# Patient Record
Sex: FEMALE | Race: WHITE | NOT HISPANIC OR LATINO | Employment: OTHER | URBAN - METROPOLITAN AREA
[De-identification: names, ages, dates, MRNs, and addresses within clinical notes are randomized per-mention and may not be internally consistent; named-entity substitution may affect disease eponyms.]

---

## 2017-01-03 ENCOUNTER — COMMUNICATION - HEALTHEAST (OUTPATIENT)
Dept: ONCOLOGY | Facility: HOSPITAL | Age: 52
End: 2017-01-03

## 2017-01-13 ENCOUNTER — MEDICAL CORRESPONDENCE (OUTPATIENT)
Dept: TRANSPLANT | Facility: CLINIC | Age: 52
End: 2017-01-13

## 2017-01-23 ENCOUNTER — RECORDS - HEALTHEAST (OUTPATIENT)
Dept: ADMINISTRATIVE | Facility: OTHER | Age: 52
End: 2017-01-23

## 2017-01-23 ENCOUNTER — HOSPITAL ENCOUNTER (OUTPATIENT)
Dept: CT IMAGING | Facility: CLINIC | Age: 52
Discharge: HOME | End: 2017-01-23
Payer: COMMERCIAL

## 2017-01-23 ENCOUNTER — OFFICE VISIT (OUTPATIENT)
Dept: TRANSPLANT | Facility: CLINIC | Age: 52
End: 2017-01-23
Attending: NURSE PRACTITIONER
Payer: COMMERCIAL

## 2017-01-23 VITALS
HEART RATE: 71 BPM | DIASTOLIC BLOOD PRESSURE: 75 MMHG | OXYGEN SATURATION: 100 % | WEIGHT: 226.7 LBS | SYSTOLIC BLOOD PRESSURE: 110 MMHG | BODY MASS INDEX: 35.5 KG/M2 | RESPIRATION RATE: 16 BRPM | TEMPERATURE: 97.7 F

## 2017-01-23 DIAGNOSIS — C83.18 LYMPHOMA, MANTLE CELL, MULTIPLE SITES (H): Primary | ICD-10-CM

## 2017-01-23 LAB
ALBUMIN SERPL-MCNC: 3.2 G/DL (ref 3.4–5)
ALP SERPL-CCNC: 96 U/L (ref 40–150)
ALT SERPL W P-5'-P-CCNC: 26 U/L (ref 0–50)
ANION GAP SERPL CALCULATED.3IONS-SCNC: 5 MMOL/L (ref 3–14)
AST SERPL W P-5'-P-CCNC: 17 U/L (ref 0–45)
BASOPHILS # BLD AUTO: 0 10E9/L (ref 0–0.2)
BASOPHILS NFR BLD AUTO: 0.4 %
BILIRUB SERPL-MCNC: 0.9 MG/DL (ref 0.2–1.3)
BUN SERPL-MCNC: 15 MG/DL (ref 7–30)
CALCIUM SERPL-MCNC: 8.3 MG/DL (ref 8.5–10.1)
CHLORIDE SERPL-SCNC: 111 MMOL/L (ref 94–109)
CO2 SERPL-SCNC: 26 MMOL/L (ref 20–32)
COPATH REPORT: NORMAL
CREAT SERPL-MCNC: 1.75 MG/DL (ref 0.52–1.04)
DIFFERENTIAL METHOD BLD: ABNORMAL
EOSINOPHIL # BLD AUTO: 0.1 10E9/L (ref 0–0.7)
EOSINOPHIL NFR BLD AUTO: 2.4 %
ERYTHROCYTE [DISTWIDTH] IN BLOOD BY AUTOMATED COUNT: 14.8 % (ref 10–15)
GFR SERPL CREATININE-BSD FRML MDRD: 31 ML/MIN/1.7M2
GLUCOSE SERPL-MCNC: 83 MG/DL (ref 70–99)
HCT VFR BLD AUTO: 40.3 % (ref 35–47)
HGB BLD-MCNC: 13.2 G/DL (ref 11.7–15.7)
IMM GRANULOCYTES # BLD: 0 10E9/L (ref 0–0.4)
IMM GRANULOCYTES NFR BLD: 0.2 %
LDH SERPL L TO P-CCNC: 202 U/L (ref 81–234)
LYMPHOCYTES # BLD AUTO: 1 10E9/L (ref 0.8–5.3)
LYMPHOCYTES NFR BLD AUTO: 19.6 %
MCH RBC QN AUTO: 30 PG (ref 26.5–33)
MCHC RBC AUTO-ENTMCNC: 32.8 G/DL (ref 31.5–36.5)
MCV RBC AUTO: 92 FL (ref 78–100)
MONOCYTES # BLD AUTO: 0.4 10E9/L (ref 0–1.3)
MONOCYTES NFR BLD AUTO: 7.5 %
NEUTROPHILS # BLD AUTO: 3.5 10E9/L (ref 1.6–8.3)
NEUTROPHILS NFR BLD AUTO: 69.9 %
NRBC # BLD AUTO: 0 10*3/UL
NRBC BLD AUTO-RTO: 0 /100
PLATELET # BLD AUTO: 148 10E9/L (ref 150–450)
POTASSIUM SERPL-SCNC: 3.6 MMOL/L (ref 3.4–5.3)
PROT SERPL-MCNC: 5.4 G/DL (ref 6.8–8.8)
RBC # BLD AUTO: 4.4 10E12/L (ref 3.8–5.2)
SODIUM SERPL-SCNC: 142 MMOL/L (ref 133–144)
WBC # BLD AUTO: 5 10E9/L (ref 4–11)

## 2017-01-23 PROCEDURE — 88264 CHROMOSOME ANALYSIS 20-25: CPT | Performed by: NURSE PRACTITIONER

## 2017-01-23 PROCEDURE — 88185 FLOWCYTOMETRY/TC ADD-ON: CPT

## 2017-01-23 PROCEDURE — 74176 CT ABD & PELVIS W/O CONTRAST: CPT

## 2017-01-23 PROCEDURE — 38221 DX BONE MARROW BIOPSIES: CPT | Mod: ZF

## 2017-01-23 PROCEDURE — 88237 TISSUE CULTURE BONE MARROW: CPT | Performed by: NURSE PRACTITIONER

## 2017-01-23 PROCEDURE — 25000125 ZZHC RX 250: Mod: ZF | Performed by: NURSE PRACTITIONER

## 2017-01-23 PROCEDURE — 88280 CHROMOSOME KARYOTYPE STUDY: CPT | Performed by: NURSE PRACTITIONER

## 2017-01-23 PROCEDURE — G0364 BONE MARROW ASPIRATE &BIOPSY: HCPCS | Mod: ZF

## 2017-01-23 PROCEDURE — 00000058 ZZHCL STATISTIC BONE MARROW ASP PERF TC 38220: Performed by: NURSE PRACTITIONER

## 2017-01-23 PROCEDURE — 81261 IGH GENE REARRANGE AMP METH: CPT | Performed by: NURSE PRACTITIONER

## 2017-01-23 PROCEDURE — 85025 COMPLETE CBC W/AUTO DIFF WBC: CPT | Performed by: NURSE PRACTITIONER

## 2017-01-23 PROCEDURE — 40000611 ZZHCL STATISTIC MORPHOLOGY W/INTERP HEMEPATH TC 85060: Performed by: NURSE PRACTITIONER

## 2017-01-23 PROCEDURE — 40000803 ZZHCL STATISTIC DNA ISOL HIGH PURITY: Performed by: NURSE PRACTITIONER

## 2017-01-23 PROCEDURE — 88275 CYTOGENETICS 100-300: CPT | Performed by: NURSE PRACTITIONER

## 2017-01-23 PROCEDURE — 80053 COMPREHEN METABOLIC PANEL: CPT | Performed by: NURSE PRACTITIONER

## 2017-01-23 PROCEDURE — 40000424 ZZHCL STATISTIC BONE MARROW CORE PERF TC 38221: Performed by: NURSE PRACTITIONER

## 2017-01-23 PROCEDURE — 88311 DECALCIFY TISSUE: CPT | Performed by: NURSE PRACTITIONER

## 2017-01-23 PROCEDURE — 88271 CYTOGENETICS DNA PROBE: CPT | Performed by: NURSE PRACTITIONER

## 2017-01-23 PROCEDURE — 00000161 ZZHCL STATISTIC H-SPHEME PROCESS B/S: Performed by: NURSE PRACTITIONER

## 2017-01-23 PROCEDURE — 83615 LACTATE (LD) (LDH) ENZYME: CPT | Performed by: NURSE PRACTITIONER

## 2017-01-23 PROCEDURE — 40000951 ZZHCL STATISTIC BONE MARROW INTERP TC 85097: Performed by: NURSE PRACTITIONER

## 2017-01-23 PROCEDURE — 88305 TISSUE EXAM BY PATHOLOGIST: CPT | Performed by: NURSE PRACTITIONER

## 2017-01-23 PROCEDURE — 88184 FLOWCYTOMETRY/ TC 1 MARKER: CPT

## 2017-01-23 PROCEDURE — 88161 CYTOPATH SMEAR OTHER SOURCE: CPT | Performed by: NURSE PRACTITIONER

## 2017-01-23 RX ORDER — HEPARIN SODIUM (PORCINE) LOCK FLUSH IV SOLN 100 UNIT/ML 100 UNIT/ML
5 SOLUTION INTRAVENOUS ONCE
Status: COMPLETED | OUTPATIENT
Start: 2017-01-23 | End: 2017-01-23

## 2017-01-23 RX ADMIN — SODIUM CHLORIDE, PRESERVATIVE FREE 5 ML: 5 INJECTION INTRAVENOUS at 08:55

## 2017-01-23 NOTE — PROGRESS NOTES
Patient supine for 30 minutes following biopsy. After 30 minutes, dressing clean, dry and intact. Vital signs stable. See DOC flow sheets for details. Left ambulatory with family member.

## 2017-01-23 NOTE — NURSING NOTE
BMT Heme Malignancy Rooming Note    Avis Paul - 1/23/2017 8:06 AM     Chief Complaint   Patient presents with     RECHECK     BMBX-NHL        /70 mmHg  Pulse 64  Temp(Src) 97.7  F (36.5  C) (Oral)  Resp 16  Wt 102.83 kg (226 lb 11.2 oz)  SpO2 100%     Medications reviewed: Yes    Labs drawn: No    Dressing changed: Not applicable     Medications given: No    Staff time:0    Additional information if applicable: n/a    BRITTNEY GARRIDO CMA

## 2017-01-23 NOTE — PROGRESS NOTES
2BMT ONC Adult Bone Marrow Biopsy Procedure Note  January 23, 2017  /70 mmHg  Pulse 64  Temp(Src) 97.7  F (36.5  C) (Oral)  Resp 16  Wt 102.83 kg (226 lb 11.2 oz)  SpO2 100%     Learning needs assessment complete within 12 months? YES    DIAGNOSIS: MCL     PROCEDURE: Unilateral Bone Marrow Biopsy and Unilateral Aspirate    LOCATION: [facility] 2nd Floor    Patient s identification was positively verified by verbal identification and invasive procedure safety checklist was completed. Informed consent was obtained. The patient was placed in the prone position and prepped and draped in a sterile manner. Approximately 20 cc of 1% Lidocaine was used over the right posterior iliac spine. Following this a 3 mm incision was made. Trephine bone marrow core(s) was (were) obtained from the [facility]. Bone marrow aspirates were obtained from the [facility]. Aspirates were sent for morphology, immunophenotyping, cytogenetics and molecular diagnostics . A total of approximately 20 ml of marrow was aspirated. Following this procedure a sterile dressing was applied to the bone marrow biopsy site(s). The patient was placed in the supine position to maintain pressure on the biopsy site. Post-procedure wound care instructions were given.     Complications: NO    Pre-procedural pain: 0 out of 10 on the numeric pain rating scale.     Procedural pain: 2 out of 10 on the numeric pain rating scale.     Post-procedural pain assessment: 0 out of 10 on the numeric pain rating scale.     Interventions: NO    Length of procedure:20 minutes or less      Procedure performed by: layne Franks

## 2017-01-24 ENCOUNTER — RECORDS - HEALTHEAST (OUTPATIENT)
Dept: ADMINISTRATIVE | Facility: OTHER | Age: 52
End: 2017-01-24

## 2017-01-24 ENCOUNTER — OFFICE VISIT (OUTPATIENT)
Dept: TRANSPLANT | Facility: CLINIC | Age: 52
End: 2017-01-24
Payer: COMMERCIAL

## 2017-01-24 VITALS
BODY MASS INDEX: 35.59 KG/M2 | OXYGEN SATURATION: 99 % | DIASTOLIC BLOOD PRESSURE: 72 MMHG | WEIGHT: 227.3 LBS | TEMPERATURE: 98.4 F | HEART RATE: 81 BPM | SYSTOLIC BLOOD PRESSURE: 116 MMHG | RESPIRATION RATE: 16 BRPM

## 2017-01-24 DIAGNOSIS — K21.9 GASTROESOPHAGEAL REFLUX DISEASE WITHOUT ESOPHAGITIS: Primary | ICD-10-CM

## 2017-01-24 DIAGNOSIS — C83.18 MANTLE CELL LYMPHOMA OF LYMPH NODES OF MULTIPLE SITES (H): ICD-10-CM

## 2017-01-24 DIAGNOSIS — K29.90 GASTRITIS AND DUODENITIS: ICD-10-CM

## 2017-01-24 LAB — COPATH REPORT: NORMAL

## 2017-01-24 PROCEDURE — 99212 OFFICE O/P EST SF 10 MIN: CPT | Mod: ZF

## 2017-01-24 RX ORDER — PROCHLORPERAZINE MALEATE 10 MG
5 TABLET ORAL EVERY 6 HOURS PRN
Qty: 90 TABLET | Refills: 3 | Status: SHIPPED | OUTPATIENT
Start: 2017-01-24 | End: 2017-03-21

## 2017-01-24 RX ORDER — PANTOPRAZOLE SODIUM 40 MG/1
40 TABLET, DELAYED RELEASE ORAL DAILY
Qty: 30 TABLET | Refills: 11 | Status: SHIPPED | OUTPATIENT
Start: 2017-01-24 | End: 2017-05-22

## 2017-01-24 NOTE — NURSING NOTE
BMT Heme Malignancy Rooming Note    Avis Paul - 1/24/2017 3:48 PM     Chief Complaint   Patient presents with     RECHECK     Return: LYMPHOMA- 1.5 year review        /72 mmHg  Pulse 81  Temp(Src) 98.4  F (36.9  C) (Oral)  Resp 16  Wt 103.103 kg (227 lb 4.8 oz)  SpO2 99%     Medications reviewed: Yes    Labs drawn: No    Dressing changed: No     Medications given: No    Staff time:6    Additional information if applicable: n/a    BRITTNEY GARRIDO CMA

## 2017-01-24 NOTE — MR AVS SNAPSHOT
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  After hours call the   hospital at [phone] or   [phone]. Ask for the BMT   fellow for pediatric or adult patients           Care Instructions    GI refereal for EGD 1/31 10am arrival, 1st floor Hospital Unit [facility], No food or drink 8hrs, need          Follow-ups after your visit        Additional Services     GASTROENTEROLOGY ADULT REF PROCEDURE ONLY       Last Lab Result: CREATININE (mg/dL)       Date                     Value                 01/23/2017               1.75*                 08/05/2015               1.98*                ----------  Body mass index is 35.59 kg/(m^2).      Patient will be contacted to schedule procedure.     Please be aware that coverage of these services is subject to the terms and limitations of your health insurance plan.  Call member services at your health plan with any benefit or coverage questions.  Any procedures must be performed at a [place] facility OR coordinated by your clinic's referral office.    Please bring the following with you to your appointment:    (1) Any X-Rays, CTs or MRIs which have been performed.  Contact the facility where they were done to arrange for  prior to your scheduled appointment.    (2) List of  current medications   (3) This referral request   (4) Any documents/labs given to you for this referral                  Your next 10 appointments already scheduled     Jan 31, 2017   Procedure with Leisa Macdonald MD   [facility] [place], Endoscopy ([facility])    [address]  [place] [address]   [phone]           The [facility] is located on the corner of [address] and [address] on the [facility]. It is easily accessible from virtually any point in the [facility] area, via [employer] and [employer].              Future tests that were ordered for you today     Open Future Orders        Priority Expected Expires Ordered    GASTROENTEROLOGY ADULT REF PROCEDURE ONLY Routine 1/31/2017 2/3/2017 1/24/2017            Who to contact     If you have questions or need follow up information about today's clinic visit or your schedule please contact [facility] BLOOD AND MARROW TRANSPLANT directly at [phone].  Normal or non-critical lab and imaging results will be communicated to you by [email or web address], letter or phone within 4 business days after the clinic has received the results. If you do not hear from us within 7 days, please contact the clinic through [employer] or phone. If you have a critical or abnormal lab result, we will notify you by phone as soon as possible.  Submit refill requests through [employer] or call your pharmacy and they will forward the refill request to us. Please allow 3 business days for your refill to be completed.          Additional Information About Your Visit        [email or web address] Information     [employer] gives you secure access to your electronic health record. If you see a primary care provider, you can also send messages to your care team and make appointments. If you have questions, please call your primary care clinic.  If you do not have a primary care provider, please call [phone] and they will  assist you.        Care EveryWhere ID     This is your Care EveryWhere ID. This could be used by other organizations to access your [place] medical records  [id number]        Your Vitals Were     Pulse Temperature Respirations Pulse Oximetry          81 98.4  F (36.9  C) (Oral) 16 99%         Blood Pressure from Last 3 Encounters:   01/24/17 116/72   01/23/17 110/75   11/07/16 123/70    Weight from Last 3 Encounters:   01/24/17 103.103 kg (227 lb 4.8 oz)   01/23/17 102.83 kg (226 lb 11.2 oz)   11/07/16 109.4 kg (241 lb 2.9 oz)                 Today's Medication Changes          These changes are accurate as of: 1/24/17  4:14 PM.  If you have any questions, ask your nurse or doctor.               Start taking these medicines.        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), Gastritis and duodenitis   Started by:  Carol Rose MD        Dose:  5 mg   Take 0.5 tablets (5 mg) by mouth every 6 hours as needed for nausea or vomiting   Quantity:  90 tablet   Refills:  3            Where to get your medicines      These medications were sent to [facility] - [address]  [address] [place] [address]     Phone:  [phone]    - pantoprazole 40 MG EC tablet  - prochlorperazine 10 MG tablet             Recent Review Flowsheet Data     BMT Recent Results Latest Ref Rng 10/8/2015 10/22/2015 11/23/2015 11/23/2015 2/17/2016 8/17/2016 1/23/2017    WBC 4.0 - 11.0 10e9/L 4.5 4.7 - 5.1 6.7 6.8 5.0    Hemoglobin 11.7 - 15.7 g/dL 9.7(L) 10.3(L) - 10.9(L) 13.0  12.8 13.2    Platelet Count 150 - 450 10e9/L 146(L) 137(L) - 146(L) 153 128(L) 148(L)    Neutrophils (Absolute) 1.6 - 8.3 10e9/L 3.0 2.9 - 3.4 5.1 4.8 3.5    Sodium 133 - 144 mmol/L 141 140 - 142 142 141 142    Potassium 3.4 - 5.3 mmol/L 3.7 3.6 - 3.1(L) 3.7 3.6 3.6    Chloride 94 - 109 mmol/L 108 108 - 109 109 110(H) 111(H)    Glucose 70 - 99 mg/dL 91 89 87 86 86 96 83    Urea Nitrogen 7 - 30 mg/dL 19 16 - 12 19 24 15    Creatinine 0.52 - 1.04 mg/dL 1.97(H) 1.88(H) - 1.74(H) 1.87(H) 1.78(H) 1.75(H)    Calcium (Total) 8.5 - 10.1 mg/dL 8.7 8.7 - 8.7 8.8 8.6 8.3(L)    Protein (Total) 6.8 - 8.8 g/dL 6.0(L) 6.2(L) - 6.0(L) 6.2(L) 5.5(L) 5.4(L)    Albumin 3.4 - 5.0 g/dL 3.4 3.6 - 3.6 4.0 3.4 3.2(L)    Alkaline Phosphatase 40 - 150 U/L 86 92 - 95 113 104 96    AST 0 - 45 U/L 14 13 - 13 14 18 17    ALT 0 - 50 U/L 17 18 - 25 22 29 26    MCV 78 - 100 fl 92 92 - 93 92 93 92               Primary Care Provider    None       No address on file        Thank you!     Thank you for choosing [facility] BLOOD AND MARROW TRANSPLANT  for your care. Our goal is always to provide you with excellent care. Hearing back from our patients is one way we can continue to improve our services. Please take a few minutes to complete the written survey that you may receive in the mail after your visit with us. Thank you!             Your Updated Medication List - Protect others around you: Learn how to safely use, store and throw away your medicines at www.disposemymeds.org.          This list is accurate as of: 1/24/17  4:14 PM.  Always use your most recent med list.                   Brand Name Dispense Instructions for use    ACYCLOVIR PO      Take 800 mg by mouth daily       LEVOTHYROXINE SODIUM PO      Take 75 mcg by mouth daily       pantoprazole 40 MG EC tablet    PROTONIX    30 tablet    Take 1 tablet (40 mg) by mouth daily       prochlorperazine 10 MG tablet    COMPAZINE    90 tablet    Take 0.5 tablets (5 mg) by mouth every 6 hours as  needed for nausea or vomiting

## 2017-01-27 LAB — COPATH REPORT: NORMAL

## 2017-01-28 ENCOUNTER — COMMUNICATION - HEALTHEAST (OUTPATIENT)
Dept: ONCOLOGY | Facility: HOSPITAL | Age: 52
End: 2017-01-28

## 2017-01-31 ENCOUNTER — HOSPITAL ENCOUNTER (OUTPATIENT)
Facility: CLINIC | Age: 52
Discharge: HOME | End: 2017-01-31
Attending: INTERNAL MEDICINE | Admitting: INTERNAL MEDICINE
Payer: COMMERCIAL

## 2017-01-31 ENCOUNTER — RECORDS - HEALTHEAST (OUTPATIENT)
Dept: ADMINISTRATIVE | Facility: OTHER | Age: 52
End: 2017-01-31

## 2017-01-31 ENCOUNTER — SURGERY (OUTPATIENT)
Age: 52
End: 2017-01-31

## 2017-01-31 VITALS
OXYGEN SATURATION: 100 % | HEART RATE: 71 BPM | RESPIRATION RATE: 18 BRPM | SYSTOLIC BLOOD PRESSURE: 92 MMHG | BODY MASS INDEX: 35.63 KG/M2 | DIASTOLIC BLOOD PRESSURE: 67 MMHG | WEIGHT: 227 LBS | HEIGHT: 67 IN

## 2017-01-31 LAB — SMALL BOWEL ENTEROSCOPY: NORMAL

## 2017-01-31 PROCEDURE — 25000132 ZZH RX MED GY IP 250 OP 250 PS 637: Performed by: INTERNAL MEDICINE

## 2017-01-31 PROCEDURE — 88184 FLOWCYTOMETRY/ TC 1 MARKER: CPT | Performed by: INTERNAL MEDICINE

## 2017-01-31 PROCEDURE — 88305 TISSUE EXAM BY PATHOLOGIST: CPT | Performed by: INTERNAL MEDICINE

## 2017-01-31 PROCEDURE — G0500 MOD SEDAT ENDO SERVICE >5YRS: HCPCS | Performed by: INTERNAL MEDICINE

## 2017-01-31 PROCEDURE — 88185 FLOWCYTOMETRY/TC ADD-ON: CPT | Performed by: INTERNAL MEDICINE

## 2017-01-31 PROCEDURE — 99153 MOD SED SAME PHYS/QHP EA: CPT | Performed by: INTERNAL MEDICINE

## 2017-01-31 PROCEDURE — 25000125 ZZHC RX 250: Performed by: INTERNAL MEDICINE

## 2017-01-31 PROCEDURE — 44361 SMALL BOWEL ENDOSCOPY/BIOPSY: CPT | Performed by: INTERNAL MEDICINE

## 2017-01-31 PROCEDURE — 25000128 H RX IP 250 OP 636: Performed by: INTERNAL MEDICINE

## 2017-01-31 PROCEDURE — 00000160 ZZHCL STATISTIC H-SPECIAL HANDLING: Performed by: INTERNAL MEDICINE

## 2017-01-31 PROCEDURE — 00000159 ZZHCL STATISTIC H-SEND OUTS PREP: Performed by: INTERNAL MEDICINE

## 2017-01-31 RX ORDER — LIDOCAINE 40 MG/G
CREAM TOPICAL
Status: DISCONTINUED | OUTPATIENT
Start: 2017-01-31 | End: 2017-01-31 | Stop reason: HOSPADM

## 2017-01-31 RX ORDER — FENTANYL CITRATE 50 UG/ML
INJECTION, SOLUTION INTRAMUSCULAR; INTRAVENOUS PRN
Status: DISCONTINUED | OUTPATIENT
Start: 2017-01-31 | End: 2017-01-31 | Stop reason: HOSPADM

## 2017-01-31 RX ORDER — SIMETHICONE
LIQUID (ML) MISCELLANEOUS PRN
Status: DISCONTINUED | OUTPATIENT
Start: 2017-01-31 | End: 2017-01-31 | Stop reason: HOSPADM

## 2017-01-31 RX ORDER — DIPHENHYDRAMINE HYDROCHLORIDE 50 MG/ML
INJECTION INTRAMUSCULAR; INTRAVENOUS PRN
Status: DISCONTINUED | OUTPATIENT
Start: 2017-01-31 | End: 2017-01-31 | Stop reason: HOSPADM

## 2017-01-31 RX ADMIN — MIDAZOLAM HYDROCHLORIDE 1 MG: 1 INJECTION, SOLUTION INTRAMUSCULAR; INTRAVENOUS at 11:12

## 2017-01-31 RX ADMIN — FENTANYL CITRATE 50 MCG: 50 INJECTION, SOLUTION INTRAMUSCULAR; INTRAVENOUS at 11:22

## 2017-01-31 RX ADMIN — FENTANYL CITRATE 50 MCG: 50 INJECTION, SOLUTION INTRAMUSCULAR; INTRAVENOUS at 11:30

## 2017-01-31 RX ADMIN — BENZOCAINE 1 APPLICATOR: 220 SPRAY, METERED PERIODONTAL at 11:03

## 2017-01-31 RX ADMIN — FENTANYL CITRATE 50 MCG: 50 INJECTION, SOLUTION INTRAMUSCULAR; INTRAVENOUS at 11:10

## 2017-01-31 RX ADMIN — FENTANYL CITRATE 50 MCG: 50 INJECTION, SOLUTION INTRAMUSCULAR; INTRAVENOUS at 11:15

## 2017-01-31 RX ADMIN — FENTANYL CITRATE 50 MCG: 50 INJECTION, SOLUTION INTRAMUSCULAR; INTRAVENOUS at 11:03

## 2017-01-31 RX ADMIN — MIDAZOLAM HYDROCHLORIDE 1 MG: 1 INJECTION, SOLUTION INTRAMUSCULAR; INTRAVENOUS at 11:15

## 2017-01-31 RX ADMIN — Medication 2 ML: at 10:45

## 2017-01-31 RX ADMIN — FENTANYL CITRATE 50 MCG: 50 INJECTION, SOLUTION INTRAMUSCULAR; INTRAVENOUS at 11:12

## 2017-01-31 RX ADMIN — DIPHENHYDRAMINE HYDROCHLORIDE 25 MG: 50 INJECTION, SOLUTION INTRAMUSCULAR; INTRAVENOUS at 11:05

## 2017-01-31 RX ADMIN — MIDAZOLAM HYDROCHLORIDE 1 MG: 1 INJECTION, SOLUTION INTRAMUSCULAR; INTRAVENOUS at 11:03

## 2017-01-31 RX ADMIN — MIDAZOLAM HYDROCHLORIDE 1 MG: 1 INJECTION, SOLUTION INTRAMUSCULAR; INTRAVENOUS at 11:20

## 2017-01-31 NOTE — OR NURSING
Saline flushed port with 10ml saline lock flush and then heparinized with 5ml 100units/ml heparin lock flush and then deaccessed port and applied dressing.

## 2017-01-31 NOTE — DISCHARGE INSTRUCTIONS
dcDischarge Instructions after  Enteroscopy   Activity and Diet  You were given medicine for pain. You may be dizzy or sleepy.  For 24 hours:    Do not drive or use heavy equipment.    Do not make important decisions.    Do not drink any alcohol.  ___ You may return to your regular diet.    Discomfort  You may have a sore throat for 2 to 3 days. It may help to:    Avoid hot liquids for 24 hours.    Use sore throat lozenges.    Gargle as needed with salt water up to 4 times a day. Mix 1 cup of warm water  with 1 teaspoon of salt. Do not swallow.  ___ Your esophagus was dilated (opened) or banded during the exam:    Drink only cool liquids for the rest of the day. Eat a soft diet for the next few days.    You may have a sore chest for 2 to 3 days.    You may take Tylenol (acetaminophen) for pain unless your doctor has told you not to.    Do not take aspirin or ibuprofen (Advil, Motrin) or other NSAIDS  (anti-inflammatory drugs) for ___ days.    Follow-up  ___ We took small tissue samples for study. If you do not have a follow-up visit scheduled,  call your provider s office in 2 weeks for the results.    Other instructions________________________________________________________    When to call us:  Problems are rare. Call right away if you have:    Unusual throat pain or trouble swallowing    Unusual pain in belly or chest that is not relieved by belching or passing air    Black stools (tar-like looking bowel movement)    Temperature above 100.6  F. (37.5  C).    If you vomit blood or have severe pain, go to an emergency room.    If you have questions, call:  Monday to Friday, 7 a.m. to 4:30 p.m.: Endoscopy: [phone] (We may have to call you back)    After hours: Hospital: [phone] (Ask for the GI fellow on call)

## 2017-01-31 NOTE — OR NURSING
Small bowel enteroscopy with biopsies completed.  Pt did a lot of reaching and attempting to take out bite block during first half of procedure.  Pt tolerated second half of procedure better.  Sedation was given per MD instruction.  VSs on 2L NC.  Pt taken to recovery by RN.

## 2017-01-31 NOTE — IP AVS SNAPSHOT
MRN:[id number]                      After Visit Summary   1/31/2017    Avis Paul    MRN: [id number]           Thank you!     Thank you for choosing [facility] for your care. Our goal is always to provide you with excellent care. Hearing back from our patients is one way we can continue to improve our services. Please take a few minutes to complete the written survey that you may receive in the mail after you visit with us. Thank you!        Patient Information     Date Of Birth          1965        About your hospital stay     You were admitted on:  January 31, 2017 You last received care in the:  [facility], Endoscopy    You were discharged on:  January 31, 2017       Who to Call     For medical emergencies, please call 911.  For non-urgent questions about your medical care, please call your primary care provider or clinic, None  For questions related to your surgery, please call your surgery clinic        Attending Provider     Provider    Leisa Macdonald MD       Primary Care Provider    None       No address on file        Further instructions from your care team       dcDischarge Instructions after  Enteroscopy   Activity and Diet  You were given medicine for pain. You may be dizzy or sleepy.  For 24 hours:    Do not drive or use heavy equipment.    Do not make important decisions.    Do not drink any alcohol.  ___ You may return to your regular diet.    Discomfort  You may have a sore throat for 2 to 3 days. It may help to:    Avoid hot liquids for 24 hours.    Use sore throat lozenges.    Gargle as needed with salt water up to 4 times a day. Mix 1 cup of warm water  with 1 teaspoon of salt. Do not swallow.  ___ Your esophagus was dilated (opened) or banded during the exam:    Drink only cool liquids for the rest of the day. Eat a soft diet for the next few days.    You may have a sore chest for 2 to 3 days.    You may take Tylenol (acetaminophen) for pain unless your  "doctor has told you not to.    Do not take aspirin or ibuprofen (Advil, Motrin) or other NSAIDS  (anti-inflammatory drugs) for ___ days.    Follow-up  ___ We took small tissue samples for study. If you do not have a follow-up visit scheduled,  call your provider s office in 2 weeks for the results.    Other instructions________________________________________________________    When to call us:  Problems are rare. Call right away if you have:    Unusual throat pain or trouble swallowing    Unusual pain in belly or chest that is not relieved by belching or passing air    Black stools (tar-like looking bowel movement)    Temperature above 100.6  F. (37.5  C).    If you vomit blood or have severe pain, go to an emergency room.    If you have questions, call:  Monday to Friday, 7 a.m. to 4:30 p.m.: Endoscopy: [phone] (We may have to call you back)    After hours: Hospital: [phone] (Ask for the GI fellow on call)    Pending Results     No orders found from 1/30/2017 to 2/1/2017.            Admission Information        Provider Department Dept Phone    1/31/2017 Leisa Macdonald MD  Endoscopy [phone]      Your Vitals Were     Blood Pressure Pulse Respirations Height Weight BMI (Body Mass Index)    102/56 mmHg 71 7 1.702 m (5' 7\") 102.967 kg (227 lb) 35.55 kg/m2    Pulse Oximetry                   94%           MyChart Information     [employer] gives you secure access to your electronic health record. If you see a primary care provider, you can also send messages to your care team and make appointments. If you have questions, please call your primary care clinic.  If you do not have a primary care provider, please call [phone] and they will assist you.        Care EveryWhere ID     This is your Care EveryWhere ID. This could be used by other organizations to access your [facility] medical records  [id number]           Review of your medicines      CONTINUE these medicines which have NOT " CHANGED        Dose / Directions    ACYCLOVIR PO        Dose:  800 mg   Take 800 mg by mouth daily   Refills:  0       LEVOTHYROXINE SODIUM PO        Dose:  75 mcg   Take 75 mcg by mouth daily   Refills:  0       pantoprazole 40 MG EC tablet   Commonly known as:  PROTONIX   Used for:  Gastroesophageal reflux disease without esophagitis        Dose:  40 mg   Take 1 tablet (40 mg) by mouth daily   Quantity:  30 tablet   Refills:  11       prochlorperazine 10 MG tablet   Commonly known as:  COMPAZINE   Used for:  Gastroesophageal reflux disease without esophagitis, 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             Medication List: This is a list of all your medications and when to take them. Check marks below indicate your daily home schedule. Keep this list as a reference.      Medications           Morning Afternoon Evening Bedtime As Needed    ACYCLOVIR PO   Take 800 mg by mouth daily                                LEVOTHYROXINE SODIUM PO   Take 75 mcg by mouth daily                                pantoprazole 40 MG EC tablet   Commonly known as:  PROTONIX   Take 1 tablet (40 mg) by mouth daily                                prochlorperazine 10 MG tablet   Commonly known as:  COMPAZINE   Take 0.5 tablets (5 mg) by mouth every 6 hours as needed for nausea or vomiting

## 2017-01-31 NOTE — IP AVS SNAPSHOT
[facility], [place], Endoscopy    [address] [address]    Phone:  [phone]                                       After Visit Summary   1/31/2017    Avis Paul    MRN: [id number]           After Visit Summary Signature Page     I have received my discharge instructions, and my questions have been answered. I have discussed any challenges I see with this plan with the nurse or doctor.    ..........................................................................................................................................  Patient/Patient Representative Signature      ..........................................................................................................................................  Patient Representative Print Name and Relationship to Patient    ..................................................               ................................................  Date                                            Time    ..........................................................................................................................................  Reviewed by Signature/Title    ...................................................              ..............................................  Date                                                            Time

## 2017-02-01 LAB — COPATH REPORT: NORMAL

## 2017-02-02 LAB — COPATH REPORT: NORMAL

## 2017-02-06 ENCOUNTER — TELEPHONE (OUTPATIENT)
Dept: GASTROENTEROLOGY | Facility: CLINIC | Age: 52
End: 2017-02-06

## 2017-02-06 DIAGNOSIS — R19.7 DIARRHEA, UNSPECIFIED TYPE: Primary | ICD-10-CM

## 2017-02-06 LAB — COPATH REPORT: NORMAL

## 2017-02-06 NOTE — TELEPHONE ENCOUNTER
Able to reach patient today to discuss recent pathology results from her recent push enteroscopy.    Symptom-wise - patient endorsing new nausea and acute worsening of chronic diarrhea in the last month or so.   She describes pronounced diarrhea for about 11 months after BMT. Thereafter she reached a baseline of loose to sometimes formed stools, often several a day.   In the last few weeks she had significant nausea (though now improved with sticking to a bland diet including bread/toast with gluten).  Her diarrhea worsened around the same time with loose to only semi-formed stools, averaging 6 BMs throughout the day, often with urgency. Occasionally having nocturnal stools. Infrequent fecal incontinence with passing of flatus. Flatus increased above baseline as well.     Family history is notable for a sister with celiac disease. Patient reports she had a negative blood test for celiac disease in 2009 (she believes at [facility]).  Pt takes rare NSAIDs. She is on a daily PPI.    We discussed that no evidence of lymphoma/abnormal cells was found on flow cytometry done on intestinal biopsies. On pathology she did have findings of increased intraepithelial lymphocytosis (though not in the villi) with preserved architecture is a non-specific finding and may represent celiac disease, gut infections (including SIBO), autoimmune disease, or even med effect (NSAIDs, PPIs). In many cases a specific cause is not identified. Pt is noted to have very mild villous blunting in the 1st and 2nd portion of the duodenum, but not throughout the small bowel (which would be unusual for untreated celiac disease).     I discussed pursuing additional testing for celiac disease (with DGP in the setting of patient's low immunoglobulins - this test has been more accurate in those with low IgA) - a positive DGP would be helpful, though a negative may not rule-out the diagnosis (the patient continues to eat gluten at this time). Will also  send infectious studies in light of worsening in the last few weeks.  Pending test results may consider HBT for SIBO. We discussing having her seen in GI clinic which she was open to.     In regards to her symptoms - we reviewed symptom and diet journaling. Recommended trial of fiber for stool bulking while she is symptomatic. If stool studies negative - can consider loperamide in the future.   Pt's questions were addressed and she was provided with GI contact information.     Leisa Macdonald MD

## 2017-02-07 ENCOUNTER — PRE VISIT (OUTPATIENT)
Dept: GASTROENTEROLOGY | Facility: CLINIC | Age: 52
End: 2017-02-07

## 2017-02-07 NOTE — TELEPHONE ENCOUNTER
1.  Date/reason for appt: 2/13/17 -- GERD    2.  Referring provider: Leisa Macdonald    3.  Call to patient (Yes / No - short description): no, referred.    4.  Previous care at / records requested from: [facility] GI -- records and imaging in epic/pacs

## 2017-02-08 ENCOUNTER — HOSPITAL ENCOUNTER (OUTPATIENT)
Facility: CLINIC | Age: 52
Discharge: HOME | End: 2017-02-08
Attending: INTERNAL MEDICINE | Admitting: INTERNAL MEDICINE
Payer: COMMERCIAL

## 2017-02-08 ENCOUNTER — INFUSION THERAPY VISIT (OUTPATIENT)
Dept: INFUSION THERAPY | Facility: CLINIC | Age: 52
End: 2017-02-08
Attending: INTERNAL MEDICINE
Payer: COMMERCIAL

## 2017-02-08 DIAGNOSIS — R19.7 DIARRHEA, UNSPECIFIED TYPE: ICD-10-CM

## 2017-02-08 PROCEDURE — 83516 IMMUNOASSAY NONANTIBODY: CPT | Performed by: INTERNAL MEDICINE

## 2017-02-08 PROCEDURE — 83516 IMMUNOASSAY NONANTIBODY: CPT | Mod: 91 | Performed by: INTERNAL MEDICINE

## 2017-02-08 PROCEDURE — 36591 DRAW BLOOD OFF VENOUS DEVICE: CPT

## 2017-02-08 PROCEDURE — 25000128 H RX IP 250 OP 636: Performed by: INTERNAL MEDICINE

## 2017-02-08 RX ORDER — HEPARIN SODIUM (PORCINE) LOCK FLUSH IV SOLN 100 UNIT/ML 100 UNIT/ML
5 SOLUTION INTRAVENOUS
Status: DISCONTINUED | OUTPATIENT
Start: 2017-02-08 | End: 2017-02-08 | Stop reason: HOSPADM

## 2017-02-08 RX ADMIN — SODIUM CHLORIDE, PRESERVATIVE FREE 5 ML: 5 INJECTION INTRAVENOUS at 15:07

## 2017-02-08 NOTE — PROGRESS NOTES
Infusion Nursing Note:  Avis Paul presents today for port flush and labs.    Patient seen by provider today: No   present during visit today: Not Applicable.    Note: N/A.    Intravenous Access:  Labs drawn without difficulty.  Implanted Port.    Treatment Conditions:  Not Applicable.      Post Infusion Assessment:  Patient tolerated port flush without incident.  Access discontinued per protocol.    Discharge Plan:   Patient discharged in stable condition accompanied by: self.  Departure Mode: Ambulatory.    Analia Stacy RN

## 2017-02-09 LAB
COPATH REPORT: NORMAL
GLIADIN IGA SER-ACNC: NORMAL U/ML
GLIADIN IGG SER-ACNC: NORMAL U/ML

## 2017-02-12 ASSESSMENT — ENCOUNTER SYMPTOMS
FATIGUE: 1
TINGLING: 1
DIZZINESS: 1
WEIGHT LOSS: 1
NAUSEA: 1
VOMITING: 1
NUMBNESS: 1
DIARRHEA: 1
BLOATING: 1
SINUS CONGESTION: 1
INCREASED ENERGY: 1
SORE THROAT: 1
DECREASED APPETITE: 1
ABDOMINAL PAIN: 1

## 2017-02-13 ENCOUNTER — RECORDS - HEALTHEAST (OUTPATIENT)
Dept: ADMINISTRATIVE | Facility: OTHER | Age: 52
End: 2017-02-13

## 2017-02-13 ENCOUNTER — OFFICE VISIT (OUTPATIENT)
Dept: GASTROENTEROLOGY | Facility: CLINIC | Age: 52
End: 2017-02-13

## 2017-02-13 VITALS
HEIGHT: 67 IN | HEART RATE: 79 BPM | DIASTOLIC BLOOD PRESSURE: 73 MMHG | WEIGHT: 222.9 LBS | BODY MASS INDEX: 34.99 KG/M2 | SYSTOLIC BLOOD PRESSURE: 114 MMHG | OXYGEN SATURATION: 100 %

## 2017-02-13 DIAGNOSIS — K63.8219 SMALL INTESTINAL BACTERIAL OVERGROWTH: ICD-10-CM

## 2017-02-13 DIAGNOSIS — K90.89 OTHER SPECIFIED INTESTINAL MALABSORPTION: ICD-10-CM

## 2017-02-13 DIAGNOSIS — K63.8219 SMALL INTESTINAL BACTERIAL OVERGROWTH: Primary | ICD-10-CM

## 2017-02-13 DIAGNOSIS — E55.9 VITAMIN D DEFICIENCY: ICD-10-CM

## 2017-02-13 DIAGNOSIS — R19.7 DIARRHEA, UNSPECIFIED TYPE: ICD-10-CM

## 2017-02-13 LAB
BASOPHILS # BLD AUTO: 0 10E9/L (ref 0–0.2)
BASOPHILS NFR BLD AUTO: 0.4 %
C DIFF TOX B STL QL: NORMAL
CAMPYLOBACTER GROUP BY NAT: NOT DETECTED
DEPRECATED CALCIDIOL+CALCIFEROL SERPL-MC: 16 UG/L (ref 20–75)
DIFFERENTIAL METHOD BLD: ABNORMAL
ENTERIC PATHOGEN COMMENT: ABNORMAL
EOSINOPHIL # BLD AUTO: 0.1 10E9/L (ref 0–0.7)
EOSINOPHIL NFR BLD AUTO: 1.7 %
ERYTHROCYTE [DISTWIDTH] IN BLOOD BY AUTOMATED COUNT: 15.5 % (ref 10–15)
FERRITIN SERPL-MCNC: 61 NG/ML (ref 8–252)
FOLATE SERPL-MCNC: 18 NG/ML
HCT VFR BLD AUTO: 38.3 % (ref 35–47)
HGB BLD-MCNC: 12.6 G/DL (ref 11.7–15.7)
IMM GRANULOCYTES # BLD: 0 10E9/L (ref 0–0.4)
IMM GRANULOCYTES NFR BLD: 0.4 %
IRON SATN MFR SERPL: 17 % (ref 15–46)
IRON SERPL-MCNC: 43 UG/DL (ref 35–180)
LYMPHOCYTES # BLD AUTO: 1 10E9/L (ref 0.8–5.3)
LYMPHOCYTES NFR BLD AUTO: 18.8 %
MCH RBC QN AUTO: 29.9 PG (ref 26.5–33)
MCHC RBC AUTO-ENTMCNC: 32.9 G/DL (ref 31.5–36.5)
MCV RBC AUTO: 91 FL (ref 78–100)
MONOCYTES # BLD AUTO: 0.4 10E9/L (ref 0–1.3)
MONOCYTES NFR BLD AUTO: 8.2 %
NEUTROPHILS # BLD AUTO: 3.7 10E9/L (ref 1.6–8.3)
NEUTROPHILS NFR BLD AUTO: 70.5 %
NOROVIRUS I AND II BY NAT: ABNORMAL
NRBC # BLD AUTO: 0 10*3/UL
NRBC BLD AUTO-RTO: 0 /100
PLATELET # BLD AUTO: 138 10E9/L (ref 150–450)
RBC # BLD AUTO: 4.22 10E12/L (ref 3.8–5.2)
ROTAVIRUS A BY NAT: NOT DETECTED
SALMONELLA SPECIES BY NAT: NOT DETECTED
SHIGA TOXIN 1 GENE BY NAT: NOT DETECTED
SHIGA TOXIN 2 GENE BY NAT: NOT DETECTED
SHIGELLA SP+EIEC IPAH STL QL NAA+PROBE: NOT DETECTED
SPECIMEN SOURCE: NORMAL
TIBC SERPL-MCNC: 249 UG/DL (ref 240–430)
VIBRIO GROUP BY NAT: NOT DETECTED
VIT B12 SERPL-MCNC: 166 PG/ML (ref 193–986)
WBC # BLD AUTO: 5.3 10E9/L (ref 4–11)
YERSINIA ENTEROCOLITICA BY NAT: NOT DETECTED

## 2017-02-13 PROCEDURE — 87206 SMEAR FLUORESCENT/ACID STAI: CPT | Mod: XU | Performed by: INTERNAL MEDICINE

## 2017-02-13 PROCEDURE — 84597 ASSAY OF VITAMIN K: CPT | Performed by: INTERNAL MEDICINE

## 2017-02-13 PROCEDURE — 83540 ASSAY OF IRON: CPT | Performed by: INTERNAL MEDICINE

## 2017-02-13 PROCEDURE — 87177 OVA AND PARASITES SMEARS: CPT | Performed by: INTERNAL MEDICINE

## 2017-02-13 PROCEDURE — 84446 ASSAY OF VITAMIN E: CPT | Performed by: INTERNAL MEDICINE

## 2017-02-13 PROCEDURE — 82607 VITAMIN B-12: CPT | Performed by: INTERNAL MEDICINE

## 2017-02-13 PROCEDURE — 84590 ASSAY OF VITAMIN A: CPT | Performed by: INTERNAL MEDICINE

## 2017-02-13 PROCEDURE — 82746 ASSAY OF FOLIC ACID SERUM: CPT | Performed by: INTERNAL MEDICINE

## 2017-02-13 PROCEDURE — 87493 C DIFF AMPLIFIED PROBE: CPT | Mod: XU | Performed by: INTERNAL MEDICINE

## 2017-02-13 PROCEDURE — 82728 ASSAY OF FERRITIN: CPT | Performed by: INTERNAL MEDICINE

## 2017-02-13 PROCEDURE — 87209 SMEAR COMPLEX STAIN: CPT | Performed by: INTERNAL MEDICINE

## 2017-02-13 PROCEDURE — 82306 VITAMIN D 25 HYDROXY: CPT | Performed by: INTERNAL MEDICINE

## 2017-02-13 PROCEDURE — 87506 IADNA-DNA/RNA PROBE TQ 6-11: CPT | Performed by: INTERNAL MEDICINE

## 2017-02-13 PROCEDURE — 85025 COMPLETE CBC W/AUTO DIFF WBC: CPT | Performed by: INTERNAL MEDICINE

## 2017-02-13 PROCEDURE — 87329 GIARDIA AG IA: CPT | Performed by: INTERNAL MEDICINE

## 2017-02-13 PROCEDURE — 83550 IRON BINDING TEST: CPT | Performed by: INTERNAL MEDICINE

## 2017-02-13 ASSESSMENT — PAIN SCALES - GENERAL: PAINLEVEL: MILD PAIN (2)

## 2017-02-13 NOTE — PATIENT INSTRUCTIONS
I've included a brief summary of our discussion and care plan from today's visit below.  Please review this information with your primary care provider.  _______________________________________________________________________      1. Thank you for coming into clinic today to see us, we really appreciate your time.    2. Recommend routine studies including nutritional and inflammatory markers in addition to stool testing for infections    3. We are recommending a course of treatment with rifaximin for small bowel bacterial overgrowth and will see how you respond after treatment.    4. We are recommending a colonoscopy, so we will arrange that in the next 2-3 months (sooner if no improvement on the rifaximin)    5. After the rifaximin (if you are feeling better), would recommend start soluble fiber (benefiber or generic equivalent)-1 tablespoon twice daily in cold glass of water.     6. Follow-up with us in clinic in 2-3 months    [facility] [phone]    7. Return to GI Clinic with me in 2-3 to review your progress, sooner if symptomatic.    - If you are unable to schedule this follow-up appointment today, please contact Dasha Cruz at [phone] within the next week to help set up this necessary appointment.    _______________________________________________________________________    It was a pleasure seeing you in clinic today - please be in touch if there are any further questions that arise following today's visit.  During business hours, you may reach my Clinic Coordinator at [phone].  For urgent/emergent questions after business hours, you may reach the on-call GI Fellow by contacting the [facility] at [phone].    Any benign/non-urgent test results are usually communicated via letter or [employer] message within 1-2 weeks after completion.  Urgent results (those that require a change in the previously-discussed care plan) are usually communicated via a phone call once available  from our clinic staff to discuss the results and the next steps in your evaluation.    I recommend signing up for [employer] access if you have not already done so and are comfortable with using a computer.  This allows for online access to your lab results and also helps you communicate efficiently with my clinic should any questions arise in your care.    We have Financial Counseling services available through our clinic.  If you have questions about your insurance coverage or payment responsibilities, particularly before you undergo any tests or procedures, please let us know and we can arrange a consultation accordingly to help you make informed decisions about your healthcare.    Sincerely,     Hari Taylor MD  Gastroenterology Fellow

## 2017-02-13 NOTE — NURSING NOTE
"Chief Complaint   Patient presents with     Consult     consult       Vitals:    02/13/17 0842   BP: 114/73   BP Location: Left arm   Pulse: 79   SpO2: 100%   Weight: 222 lb 14.4 oz   Height: 5' 7\"       Body mass index is 34.91 kg/(m^2).      Agusto Louis MA                          "

## 2017-02-13 NOTE — MR AVS SNAPSHOT
After Visit Summary   2/13/2017    Avis Paul    MRN: [id number]           Patient Information     Date Of Birth          1965        Visit Information        Provider Department      2/13/2017 8:30 AM Hari Taylor MD [facility] Gastroenterology and IBD        Today's Diagnoses     Small intestinal bacterial overgrowth    -  1      Care Instructions    I've included a brief summary of our discussion and care plan from today's visit below.  Please review this information with your primary care provider.  _______________________________________________________________________      1. Thank you for coming into clinic today to see us, we really appreciate your time.    2. Recommend routine studies including nutritional and inflammatory markers in addition to stool testing for infections    3. We are recommending a course of treatment with rifaximin for small bowel bacterial overgrowth and will see how you respond after treatment.    4. We are recommending a colonoscopy, so we will arrange that in the next 2-3 months (sooner if no improvement on the rifaximin)    5. After the rifaximin (if you are feeling better), would recommend start soluble fiber (benefiber or generic equivalent)-1 tablespoon twice daily in cold glass of water.     6. Follow-up with us in clinic in 2-3 months    7. Return to GI Clinic with me in 2-3 to review your progress, sooner if symptomatic.    - If you are unable to schedule this follow-up appointment today, please contact Dasha Cruz at [phone] within the next week to help set up this necessary appointment.    _______________________________________________________________________    It was a pleasure seeing you in clinic today - please be in touch if there are any further questions that arise following today's visit.  During business hours, you may reach my Clinic Coordinator at [phone].  For urgent/emergent questions after business hours, you may reach the  on-call GI Fellow by contacting the [facility] at [phone].    Any benign/non-urgent test results are usually communicated via letter or [employer] message within 1-2 weeks after completion.  Urgent results (those that require a change in the previously-discussed care plan) are usually communicated via a phone call once available from our clinic staff to discuss the results and the next steps in your evaluation.    I recommend signing up for [employer] access if you have not already done so and are comfortable with using a computer.  This allows for online access to your lab results and also helps you communicate efficiently with my clinic should any questions arise in your care.    We have Financial Counseling services available through our clinic.  If you have questions about your insurance coverage or payment responsibilities, particularly before you undergo any tests or procedures, please let us know and we can arrange a consultation accordingly to help you make informed decisions about your healthcare.    Sincerely,     Hari Taylor MD  Gastroenterology Fellow          Follow-ups after your visit        Follow-up notes from your care team     Return in about 10 weeks (around 4/24/2017).      Who to contact     Please call your clinic at [phone] to:    Ask questions about your health    Make or cancel appointments    Discuss your medicines    Learn about your test results    Speak to your doctor   If you have compliments or concerns about an experience at your clinic, or if you wish to file a complaint, please contact [facility] Patient Relations at [phone] or email us at [email or web address]         Additional Information About Your Visit        [employer] Information     [employer] gives you secure access to your electronic health record. If you see a primary care provider, you can also send messages to your care team and make appointments. If you have  "questions, please call your primary care clinic.  If you do not have a primary care provider, please call [phone] and they will assist you.      [employer] is an electronic gateway that provides easy, online access to your medical records. With [employer], you can request a clinic appointment, read your test results, renew a prescription or communicate with your care team.     To access your existing account, please contact your [facility] or call [phone] for assistance.        Care EveryWhere ID     This is your Care EveryWhere ID. This could be used by other organizations to access your [facility] medical records  [id number]        Your Vitals Were     Pulse Height Pulse Oximetry BMI (Body Mass Index)          79 1.702 m (5' 7\") 100% 34.91 kg/m2         Blood Pressure from Last 3 Encounters:   02/13/17 114/73   01/31/17 92/67   01/24/17 116/72    Weight from Last 3 Encounters:   02/13/17 101.1 kg (222 lb 14.4 oz)   01/31/17 103 kg (227 lb)   01/24/17 103.1 kg (227 lb 4.8 oz)              Today, you had the following     No orders found for display         Today's Medication Changes          These changes are accurate as of: 2/13/17  9:54 AM.  If you have any questions, ask your nurse or doctor.               Start taking these medicines.        Dose/Directions    rifaximin 550 MG Tabs tablet   Commonly known as:  XIFAXAN   Used for:  Small intestinal bacterial overgrowth   Started by:  Hari Taylor MD        Dose:  550 mg   Take 1 tablet (550 mg) by mouth 3 times daily for 10 days   Quantity:  30 tablet   Refills:  0            Where to get your medicines      These medications were sent to [facility] [address] [place] [address] [address]  [address] [facility], hospitals [address]     Phone:  [phone]     rifaximin 550 MG Tabs tablet                Primary Care Provider    None       No address on file        Thank you!     Thank you for choosing [facility] GASTROENTEROLOGY " AND IBD  for your care. Our goal is always to provide you with excellent care. Hearing back from our patients is one way we can continue to improve our services. Please take a few minutes to complete the written survey that you may receive in the mail after your visit with us. Thank you!             Your Updated Medication List - Protect others around you: Learn how to safely use, store and throw away your medicines at www.disposemymeds.org.          This list is accurate as of: 2/13/17  9:54 AM.  Always use your most recent med list.                   Brand Name Dispense Instructions for use    ACYCLOVIR PO      Take 800 mg by mouth daily       LEVOTHYROXINE SODIUM PO      Take 75 mcg by mouth daily       pantoprazole 40 MG EC tablet    PROTONIX    30 tablet    Take 1 tablet (40 mg) by mouth daily       prochlorperazine 10 MG tablet    COMPAZINE    90 tablet    Take 0.5 tablets (5 mg) by mouth every 6 hours as needed for nausea or vomiting       rifaximin 550 MG Tabs tablet    XIFAXAN    30 tablet    Take 1 tablet (550 mg) by mouth 3 times daily for 10 days

## 2017-02-13 NOTE — PROGRESS NOTES
GI CLINIC VISIT - NEW PATIENT    CC/REFERRING MD:  Torres  REASON FOR CONSULTATION:  Nausea, vomiting, abdominal pain, bloating, diarrhea    HPI:  52 year old female with past history of S IV Mantle Cell Lymphoma s/p BEAM conditioning and subsequent autologous HSCT in 8/2015. Prior to her transplant, she suffered from significant constipation (1 BM every 6-7 days) with prior episodes of rectal prolapse (one she can recall with balbir prolapse of several cm) in the setting of straining. Following her transplant, however, she developed significant diarrhea (10-12 loose watery stools daily with nocturnal stools) which persisted for approximately 10-12 months (with oscillating improvement and worsening) and eventual improvement who, more recently (12/2016) has now developed increasing nausea, bloating, post-prandial vomiting and intermittent diarrhea.  With regards to the nausea, she states that her symptoms started around the holidays and, at their worst, she was vomiting 3-4 times per day. Her vomiting would and is almost exclusively post-prandial and occurs 30 minutes to 2 hours after her meal. It is predominantly composed of partially digested food. If she does not have vomiting, she will develop significant abdominal bloating and audible borborygmi. Both are relieved with vomiting.  She does continue to have loose stools. On a good day, she will pass 2-3 smaller loose stools during the day, but continues to pass 1-2 stools overnight as well (will wake her from sleep). On bad days, she will have up to 12 stools in the course of a day, most of which are small and loose, but intermittently, she will pass large amounts of watery stool in a rather explosive manner. Additionally, she has developed insecurity passing gas and has had a few episodes of fecal incontinence in the setting of passing flatus. She has not had involuntary incontinence and no urge or stress incontinence. No blood. Does have hemorrhoids. As  "mentioned above, has previously had what she describes as an episode of balbir rectal prolapse, although no such episodes recently. Takes ibuprofen only very intermittently (once every 2 weeks) and has not started taking PPI yet.    ROS:  10pt ROS performed and otherwise negative.    PERTINENT PAST MEDICAL HISTORY:  As noted above.    PREVIOUS ABDOMINAL/GYNECOLOGIC SURGERIES:  As noted above.    PREVIOUS ENDOSCOPY:  EGD (push) on 1/23 with normal endoscopic findings--pathology with increased intraepithelial lymphocytes and some villous atrophy-not entirely classic for celiac.    PERTINENT MEDICATIONS:  Medications reviewed with patient today, see Medication List/Assessment for details.  No other NSAID/anticoagulation reported by patient.  No other OTC/herbal/supplements reported by patient.    SOCIAL HISTORY:  [place] in [place]--has cats/dogs/alpacas/chickens and geese. Interacts with animals on a daily basis. Has well water-recently tested. Very intermittent EtOH-no tobacco use.    FAMILY HISTORY:  Mother and sister with celiac  Mother and several maternal aunts with hypothyroidism.    PHYSICAL EXAMINATION:  /73 (BP Location: Left arm)  Pulse 79  Ht 1.702 m (5' 7\")  Wt 101.1 kg (222 lb 14.4 oz)  SpO2 100%  BMI 34.91 kg/m2 **noted significant weight loss over last year-->plateau-->recent recurrent weight loss.  Gen: aaox3, cooperative, pleasant, not dyspneic/diaphoretic, nad  HEENT: ncat, neck supple, no clad/sclad, normal op w/o ulcer/exudate, anicteric, mmm  Mallampati Score III  Resp/CV RRR, nl s1/2, no crackles, rhonchi, rales, good air exchange  Abd:  Soft, NT, ND, BS+  Ext: no c/c/e  Skin: warm, perfused, no jaundice  Neuro: grossly intact, no asterixis noted    PERTINENT STUDIES:  Noted dgp IgG negative (IgA negative as well)-->unclear how this may apply in the setting of all her immunoglobulins being low.  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    ASSESSMENT/PLAN:    #Nausea/Vomiting/Diarrhea/Bloating and microscopic findings of increased intraepithelial lymphocytes and partial villous blunting:  -Several possibilities discussed, including SIBO (symptoms c/w and noted duodenal diverticulum seen on CT), celiac disease, auto-immune enteropathy, less likely medication induced (very rare NSAID and not taking PPI), vs aquired CVID (noted pan-deficiency in immunoglobulins at last check vs new-onset IBD (although suspect anorectal thickening is most likely 2/2 prolapse physiology).   No clear-cut risk factors for delayed gastric emptying (no DM et cetera) but would be worth considered eval if empiric rx for SIBO ineffective.     Plan:  -10 day course of rifaximin (550 mg TID)-followed by probiotic of patients choice (some evidence showing probiotic maintenance therapy following SIBO rx may be beneficial).  -extended infectious stool w/u given low globulins and farm exposures.   -Schedule for colonosopy and EGD with biopsies for evaluation of colonic pathology and potential improvement of small bowel findings.  -IF SIBO treatment effective, recommend initiation of soluble fiber as per instructions.   -If above ineffective, and colonoscopy unremarkable and EGD findings and symptoms persist, would consider empiric GFD to assess for improvement and need to have a discussion with heme/onc if the acquired hypoglobulinemia could lead to a CVID-type picture and explain her gut symptoms.     2. Colorectal Cancer Screening  -Due, rec colonoscopy as above    RTC 3-4 months, sooner if symptomatic.      This patient was discussed with Dr. Amos, attending gastroenterologist    Hari Taylor MD  GI fellow  (p) [phone]    ATTENDING ATTESTATION:    REFERRING PROVIDER: Torres  DATE SEEN: 2/13/17      Thank you for this consultation. It was a pleasure to participate in the care of this patient; please contact us with any further questions.    Patient was discussed,  seen, and examined by me, Alek Amos. The plan of care and pertinent data/imaging were also reviewed with the GI Fellow in clinic today. Agree with the joint assessment and plan as delineated above.    Please contact me with any further questions.    Alek Amos MD    [facility] - Department of Medicine  Division of Gastroenterology

## 2017-02-13 NOTE — NURSING NOTE
Chief Complaint   Patient presents with     Port Draw     accessed with Gripper needle for labs, heparin locked and de accessed

## 2017-02-13 NOTE — LETTER
2/13/2017       RE: Avis Paul  [address] [facility] [address]     Dear Colleague,    Thank you for referring your patient, Avis Paul, to the [facility] GASTROENTEROLOGY AND IBD at [facility]. Please see a copy of my visit note below.    GI CLINIC VISIT - NEW PATIENT    CC/REFERRING MD:  Torres  REASON FOR CONSULTATION:  Nausea, vomiting, abdominal pain, bloating, diarrhea    HPI:  52 year old female with past history of S IV Mantle Cell Lymphoma s/p BEAM conditioning and subsequent autologous HSCT in 8/2015. Prior to her transplant, she suffered from significant constipation (1 BM every 6-7 days) with prior episodes of rectal prolapse (one she can recall with balbir prolapse of several cm) in the setting of straining. Following her transplant, however, she developed significant diarrhea (10-12 loose watery stools daily with nocturnal stools) which persisted for approximately 10-12 months (with oscillating improvement and worsening) and eventual improvement who, more recently (12/2016) has now developed increasing nausea, bloating, post-prandial vomiting and intermittent diarrhea.  With regards to the nausea, she states that her symptoms started around the holidays and, at their worst, she was vomiting 3-4 times per day. Her vomiting would and is almost exclusively post-prandial and occurs 30 minutes to 2 hours after her meal. It is predominantly composed of partially digested food. If she does not have vomiting, she will develop significant abdominal bloating and audible borborygmi. Both are relieved with vomiting.  She does continue to have loose stools. On a good day, she will pass 2-3 smaller loose stools during the day, but continues to pass 1-2 stools overnight as well (will wake her from sleep). On bad days, she will have up to 12 stools in the course of a day, most of which are small and loose, but intermittently, she will pass large amounts of watery stool  "in a rather explosive manner. Additionally, she has developed insecurity passing gas and has had a few episodes of fecal incontinence in the setting of passing flatus. She has not had involuntary incontinence and no urge or stress incontinence. No blood. Does have hemorrhoids. As mentioned above, has previously had what she describes as an episode of balbir rectal prolapse, although no such episodes recently. Takes ibuprofen only very intermittently (once every 2 weeks) and has not started taking PPI yet.    ROS:  10pt ROS performed and otherwise negative.    PERTINENT PAST MEDICAL HISTORY:  As noted above.    PREVIOUS ABDOMINAL/GYNECOLOGIC SURGERIES:  As noted above.    PREVIOUS ENDOSCOPY:  EGD (push) on 1/23 with normal endoscopic findings--pathology with increased intraepithelial lymphocytes and some villous atrophy-not entirely classic for celiac.    PERTINENT MEDICATIONS:  Medications reviewed with patient today, see Medication List/Assessment for details.  No other NSAID/anticoagulation reported by patient.  No other OTC/herbal/supplements reported by patient.    SOCIAL HISTORY:  Lives on a hobby farm in [place]--has cats/dogs/alpacas/chickens and geese. Interacts with animals on a daily basis. Has well water-recently tested. Very intermittent EtOH-no tobacco use.    FAMILY HISTORY:  Mother and sister with celiac  Mother and several maternal aunts with hypothyroidism.    PHYSICAL EXAMINATION:  /73 (BP Location: Left arm)  Pulse 79  Ht 1.702 m (5' 7\")  Wt 101.1 kg (222 lb 14.4 oz)  SpO2 100%  BMI 34.91 kg/m2 **noted significant weight loss over last year-->plateau-->recent recurrent weight loss.  Gen: aaox3, cooperative, pleasant, not dyspneic/diaphoretic, nad  HEENT: ncat, neck supple, no clad/sclad, normal op w/o ulcer/exudate, anicteric, mmm  Mallampati Score III  Resp/CV RRR, nl s1/2, no crackles, rhonchi, rales, good air exchange  Abd:  Soft, NT, ND, BS+  Ext: no c/c/e  Skin: warm, " perfused, no jaundice  Neuro: grossly intact, no asterixis noted    PERTINENT STUDIES:  Noted dgp IgG negative (IgA negative as well)-->unclear how this may apply in the setting of all her immunoglobulins being low.  Noted she is DQ2 on haplotyping for BMT.    ASSESSMENT/PLAN:    #Nausea/Vomiting/Diarrhea/Bloating and microscopic findings of increased intraepithelial lymphocytes and partial villous blunting:  -Several possibilities discussed, including SIBO (symptoms c/w and noted duodenal diverticulum seen on CT), celiac disease, auto-immune enteropathy, less likely medication induced (very rare NSAID and not taking PPI), vs aquired CVID (noted pan-deficiency in immunoglobulins at last check vs new-onset IBD (although suspect anorectal thickening is most likely 2/2 prolapse physiology).   No clear-cut risk factors for delayed gastric emptying (no DM et cetera) but would be worth considered eval if empiric rx for SIBO ineffective.     Plan:  -10 day course of rifaximin (550 mg TID)-followed by probiotic of patients choice (some evidence showing probiotic maintenance therapy following SIBO rx may be beneficial).  -extended infectious stool w/u given low globulins and farm exposures.   -Schedule for colonosopy and EGD with biopsies for evaluation of colonic pathology and potential improvement of small bowel findings.  -IF SIBO treatment effective, recommend initiation of soluble fiber as per instructions.   -If above ineffective, and colonoscopy unremarkable and EGD findings and symptoms persist, would consider empiric GFD to assess for improvement and need to have a discussion with heme/onc if the acquired hypoglobulinemia could lead to a CVID-type picture and explain her gut symptoms.     2. Colorectal Cancer Screening  -Due, rec colonoscopy as above    RTC 3-4 months, sooner if symptomatic.      This patient was discussed with Dr. Amos, attending gastroenterologist    Hari Taylor MD  GI fellow  (p)  [phone]    ATTENDING ATTESTATION:    REFERRING PROVIDER: Torres  DATE SEEN: 2/13/17      Thank you for this consultation. It was a pleasure to participate in the care of this patient; please contact us with any further questions.    Patient was discussed, seen, and examined by me, Alek Amos. The plan of care and pertinent data/imaging were also reviewed with the GI Fellow in clinic today. Agree with the joint assessment and plan as delineated above.    Please contact me with any further questions.    Alek Amos MD    [facility] - Department of Medicine  Division of Gastroenterology      After visit summary printed. Follow up appointment scheduled.     Again, thank you for allowing me to participate in the care of your patient.      Sincerely,    Hari Taylor MD

## 2017-02-14 ENCOUNTER — TELEPHONE (OUTPATIENT)
Dept: GASTROENTEROLOGY | Facility: CLINIC | Age: 52
End: 2017-02-14

## 2017-02-14 LAB
G LAMBLIA AG STL QL IA: NORMAL
MICRO REPORT STATUS: NORMAL
MICRO REPORT STATUS: NORMAL
O+P STL MICRO: NORMAL
SPECIMEN SOURCE: NORMAL
SPECIMEN SOURCE: NORMAL

## 2017-02-14 NOTE — TELEPHONE ENCOUNTER
Patient returned call later this afternoon.    Reviewed stool studies which revealed a positive norovirus (remainder of test results negative thus far). This certainly could lead to acute worsening of chronic diarrhea with new nausea and emesis. This would be consistent with biopsy results as well.   Typically intervention would be supportive.     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  Ms. Paul feels better than in Dec/Jan when symptoms began but not yet at baseline. For significant and persisting symptoms - transplant ID referral could be considered for nitazoxanide. An immunosuppression decrease could also be considered.    Whether this explains all her acute changes or is just part of the picture is unclear. This would be unlikely to explain her chronic (for years) diarrhea.    Ms. Paul was encouraged to follow-up in GI clinic as scheduled as well as update the clinic on her symptoms in the next 3-4 weeks. She was curious about starting rifaximin as discussed in GI Clinic and I encouraged her to speak to her GI clinic team. This antibiotic would not worsen things in the setting of norovirus and potentially could help if SIBO is part of her more chronic diarrhea picture.     All her questions were addressed.   This information will be passed along to her outpatient GI providers, Lois Taylor and Dandre.    Leisa Macdonald MD

## 2017-02-14 NOTE — TELEPHONE ENCOUNTER
Attempted to reach patient today to discuss stool test results.  No answer. Voicemail message left with callback # and GI clinic contact #.    Leisa Macdonald MD

## 2017-02-15 LAB
A-TOCOPHEROL VIT E SERPL-MCNC: 7.7
ANNOTATION COMMENT IMP: NORMAL
BETA+GAMMA TOCOPHEROL SERPL-MCNC: 0.9 MG/DL
MICRO REPORT STATUS: NORMAL
O+P STL CONC: NORMAL
RETINYL PALMITATE SERPL-MCNC: NORMAL UG/ML
SPECIMEN SOURCE: NORMAL
VIT A SERPL-MCNC: 0.61 UG/ML

## 2017-02-17 LAB — PHYTONADIONE SERPL-MCNC: 0.31 NG/L

## 2017-02-20 ENCOUNTER — COMMUNICATION - HEALTHEAST (OUTPATIENT)
Dept: ADMINISTRATIVE | Facility: HOSPITAL | Age: 52
End: 2017-02-20

## 2017-02-21 ENCOUNTER — DOCUMENTATION ONLY (OUTPATIENT)
Dept: GASTROENTEROLOGY | Facility: CLINIC | Age: 52
End: 2017-02-21

## 2017-02-21 ENCOUNTER — COMMUNICATION - HEALTHEAST (OUTPATIENT)
Dept: ADMINISTRATIVE | Facility: HOSPITAL | Age: 52
End: 2017-02-21

## 2017-02-21 ENCOUNTER — TRANSFERRED RECORDS (OUTPATIENT)
Dept: HEALTH INFORMATION MANAGEMENT | Facility: CLINIC | Age: 52
End: 2017-02-21

## 2017-02-21 LAB
HPV ABSTRACT: NORMAL
PAP-ABSTRACT: NORMAL

## 2017-02-21 NOTE — PROGRESS NOTES
Called patient to discuss lab results and clinical progress. Noted norovirus + in stool and low B12/normal folate.   Discussed that her constellation of symptoms, combined with low B12/normal folate does suggest SIBO as possibility. She is taking the rifaximin and has noted some improvement in her symptoms, although this is not consistent throughout the day.  We discussed the plan moving forward as well, which is to complete rx with rifaximin for 10 total days, re-evaluate symptoms in about 2 weeks time and if she is still having significant diarrhea, will recommend to Dr. Rose that she see transplant ID for consideration of treatment with nitazoxanide. She will also keep her appointment for colonoscopy for age appropriate screening and, additionally, for evaluation for IBD/microscopic colitis.   She was asked to speak with her PCP regarding Vitamin D replacement  We will defer treatment of B12 deficiency (and further work-up) to hematology/oncology but would not re-check until 6-8 weeks after SIBO treatment.     Will message Dr. Rose with regards to above as well.

## 2017-02-23 ENCOUNTER — COMMUNICATION - HEALTHEAST (OUTPATIENT)
Dept: TELEHEALTH | Facility: CLINIC | Age: 52
End: 2017-02-23

## 2017-02-23 ENCOUNTER — OFFICE VISIT - HEALTHEAST (OUTPATIENT)
Dept: ONCOLOGY | Facility: HOSPITAL | Age: 52
End: 2017-02-23

## 2017-02-23 ENCOUNTER — INFUSION - HEALTHEAST (OUTPATIENT)
Dept: INFUSION THERAPY | Facility: HOSPITAL | Age: 52
End: 2017-02-23

## 2017-02-23 ENCOUNTER — AMBULATORY - HEALTHEAST (OUTPATIENT)
Dept: INFUSION THERAPY | Facility: HOSPITAL | Age: 52
End: 2017-02-23

## 2017-02-23 DIAGNOSIS — C83.10 MANTLE CELL LYMPHOMA, UNSPECIFIED BODY REGION (H): ICD-10-CM

## 2017-02-23 DIAGNOSIS — C83.13 MANTLE CELL LYMPHOMA OF INTRA-ABDOMINAL LYMPH NODES (H): ICD-10-CM

## 2017-02-25 DIAGNOSIS — E55.9 VITAMIN D DEFICIENCY: Primary | ICD-10-CM

## 2017-02-25 RX ORDER — ERGOCALCIFEROL 1.25 MG/1
50000 CAPSULE, LIQUID FILLED ORAL WEEKLY
Qty: 4 CAPSULE | Refills: 1 | Status: SHIPPED | OUTPATIENT
Start: 2017-02-25 | End: 2017-03-21

## 2017-03-01 ENCOUNTER — MEDICAL CORRESPONDENCE (OUTPATIENT)
Dept: TRANSPLANT | Facility: CLINIC | Age: 52
End: 2017-03-01

## 2017-03-08 DIAGNOSIS — R14.1 FLATULENCE, ERUCTATION, AND GAS PAIN: ICD-10-CM

## 2017-03-08 DIAGNOSIS — R14.2 FLATULENCE, ERUCTATION, AND GAS PAIN: ICD-10-CM

## 2017-03-08 DIAGNOSIS — R19.7 DIARRHEA, UNSPECIFIED TYPE: Primary | ICD-10-CM

## 2017-03-08 DIAGNOSIS — K90.49 MALABSORPTION DUE TO INTOLERANCE, NOT ELSEWHERE CLASSIFIED: ICD-10-CM

## 2017-03-08 DIAGNOSIS — R14.3 FLATULENCE, ERUCTATION, AND GAS PAIN: ICD-10-CM

## 2017-03-08 DIAGNOSIS — K90.0 CELIAC DISEASE: ICD-10-CM

## 2017-03-20 ENCOUNTER — TELEPHONE (OUTPATIENT)
Dept: GASTROENTEROLOGY | Facility: CLINIC | Age: 52
End: 2017-03-20

## 2017-03-20 ENCOUNTER — COMMUNICATION - HEALTHEAST (OUTPATIENT)
Dept: ONCOLOGY | Facility: HOSPITAL | Age: 52
End: 2017-03-20

## 2017-03-20 NOTE — TELEPHONE ENCOUNTER
Patient scheduled for EGD and Colonoscopy    Indication for procedure.   Celiac disease [K90.0]       Malabsorption due to intolerance, not elsewhere classified        Referring Provider. Dr. Amos    ? Not Needed    Arrival time verified? Yes, 1050    Facility location verified? Yes, [address] [facility]    Instructions given regarding prep and procedure    Prep Type Golytely    Are you taking any anticoagulants or blood thinners? No    Instructions given? N/A    Electronic implanted devices? No    Barriers to learning? No    Pre procedure teaching completed? Yes    Transportation from procedure? ,  will stay with patient after procedure    H&P / Pre op physical completed? N/A

## 2017-03-21 ENCOUNTER — MYC MEDICAL ADVICE (OUTPATIENT)
Dept: TRANSPLANT | Facility: CLINIC | Age: 52
End: 2017-03-21

## 2017-03-21 RX ORDER — ALBUTEROL SULFATE 90 UG/1
2 AEROSOL, METERED RESPIRATORY (INHALATION) EVERY 4 HOURS PRN
COMMUNITY

## 2017-03-22 ENCOUNTER — SURGERY (OUTPATIENT)
Age: 52
End: 2017-03-22

## 2017-03-22 ENCOUNTER — HOSPITAL ENCOUNTER (OUTPATIENT)
Facility: CLINIC | Age: 52
Discharge: HOME | End: 2017-03-22
Attending: INTERNAL MEDICINE | Admitting: INTERNAL MEDICINE
Payer: COMMERCIAL

## 2017-03-22 ENCOUNTER — RECORDS - HEALTHEAST (OUTPATIENT)
Dept: ADMINISTRATIVE | Facility: OTHER | Age: 52
End: 2017-03-22

## 2017-03-22 VITALS
DIASTOLIC BLOOD PRESSURE: 53 MMHG | SYSTOLIC BLOOD PRESSURE: 111 MMHG | RESPIRATION RATE: 15 BRPM | HEART RATE: 63 BPM | OXYGEN SATURATION: 100 %

## 2017-03-22 LAB — COLONOSCOPY: NORMAL

## 2017-03-22 PROCEDURE — 25000128 H RX IP 250 OP 636: Performed by: INTERNAL MEDICINE

## 2017-03-22 PROCEDURE — 45380 COLONOSCOPY AND BIOPSY: CPT | Performed by: INTERNAL MEDICINE

## 2017-03-22 PROCEDURE — G0500 MOD SEDAT ENDO SERVICE >5YRS: HCPCS | Performed by: INTERNAL MEDICINE

## 2017-03-22 PROCEDURE — 43235 EGD DIAGNOSTIC BRUSH WASH: CPT | Performed by: INTERNAL MEDICINE

## 2017-03-22 PROCEDURE — 25000125 ZZHC RX 250: Performed by: INTERNAL MEDICINE

## 2017-03-22 PROCEDURE — 88342 IMHCHEM/IMCYTCHM 1ST ANTB: CPT | Performed by: INTERNAL MEDICINE

## 2017-03-22 PROCEDURE — 88305 TISSUE EXAM BY PATHOLOGIST: CPT | Performed by: INTERNAL MEDICINE

## 2017-03-22 PROCEDURE — 99153 MOD SED SAME PHYS/QHP EA: CPT | Performed by: INTERNAL MEDICINE

## 2017-03-22 PROCEDURE — 45378 DIAGNOSTIC COLONOSCOPY: CPT | Performed by: INTERNAL MEDICINE

## 2017-03-22 RX ORDER — DIPHENHYDRAMINE HYDROCHLORIDE 50 MG/ML
INJECTION INTRAMUSCULAR; INTRAVENOUS PRN
Status: DISCONTINUED | OUTPATIENT
Start: 2017-03-22 | End: 2017-03-22 | Stop reason: HOSPADM

## 2017-03-22 RX ORDER — FENTANYL CITRATE 50 UG/ML
INJECTION, SOLUTION INTRAMUSCULAR; INTRAVENOUS PRN
Status: DISCONTINUED | OUTPATIENT
Start: 2017-03-22 | End: 2017-03-22 | Stop reason: HOSPADM

## 2017-03-22 RX ADMIN — FENTANYL CITRATE 50 MCG: 50 INJECTION, SOLUTION INTRAMUSCULAR; INTRAVENOUS at 11:41

## 2017-03-22 RX ADMIN — FENTANYL CITRATE 50 MCG: 50 INJECTION, SOLUTION INTRAMUSCULAR; INTRAVENOUS at 11:38

## 2017-03-22 RX ADMIN — FENTANYL CITRATE 50 MCG: 50 INJECTION, SOLUTION INTRAMUSCULAR; INTRAVENOUS at 11:55

## 2017-03-22 RX ADMIN — FENTANYL CITRATE 50 MCG: 50 INJECTION, SOLUTION INTRAMUSCULAR; INTRAVENOUS at 11:49

## 2017-03-22 RX ADMIN — MIDAZOLAM HYDROCHLORIDE 1 MG: 1 INJECTION, SOLUTION INTRAMUSCULAR; INTRAVENOUS at 11:41

## 2017-03-22 RX ADMIN — MIDAZOLAM HYDROCHLORIDE 1 MG: 1 INJECTION, SOLUTION INTRAMUSCULAR; INTRAVENOUS at 11:38

## 2017-03-22 RX ADMIN — MIDAZOLAM HYDROCHLORIDE 1 MG: 1 INJECTION, SOLUTION INTRAMUSCULAR; INTRAVENOUS at 11:49

## 2017-03-22 RX ADMIN — DIPHENHYDRAMINE HYDROCHLORIDE 50 MG: 50 INJECTION, SOLUTION INTRAMUSCULAR; INTRAVENOUS at 11:46

## 2017-03-22 NOTE — OR NURSING
Port patent. Irrigated with Normal Saline 10 cc and Heparin  500 unit per 5 cc.  Line de-accessed and site dry. Covered with Band-Aid. Jennie Singer RN

## 2017-03-22 NOTE — IP AVS SNAPSHOT
MRN:[id number]                      After Visit Summary   3/22/2017    Sr. Avis aPul    MRN: [id number]           Thank you!     Thank you for choosing [facility] for your care. Our goal is always to provide you with excellent care. Hearing back from our patients is one way we can continue to improve our services. Please take a few minutes to complete the written survey that you may receive in the mail after you visit with us. Thank you!        Patient Information     Date Of Birth          1965        About your hospital stay     You were admitted on:  March 22, 2017 You last received care in the:  [facility], Endoscopy    You were discharged on:  March 22, 2017       Who to Call     For medical emergencies, please call 911.  For non-urgent questions about your medical care, please call your primary care provider or clinic, None  For questions related to your surgery, please call your surgery clinic        Attending Provider     Provider Specialty    Breanna Guzman MD Hepatology       Primary Care Provider    None       No address on file        Your next 10 appointments already scheduled     May 22, 2017  9:00 AM CDT   (Arrive by 8:45 AM)   Return Visit with Hari Taylor MD   [facility] Gastroenterology and IBD ([facility] [place])    [address] [place] [id number] [phone]              Pending Results     No orders found from 3/20/2017 to 3/23/2017.            Admission Information     Date & Time Provider Department Dept. Phone    3/22/2017 Breanna Guzman MD [facility], [facility], Endoscopy [phone]      Your Vitals Were     Blood Pressure Pulse Respirations Pulse Oximetry          111/53 63 15 100%        MyChart Information     [employer] gives you secure access to your electronic health record. If you see a primary care provider, you can also send messages to your care team and make appointments. If you have questions, please call  your primary care clinic.  If you do not have a primary care provider, please call [phone] and they will assist you.        Care EveryWhere ID     This is your Care EveryWhere ID. This could be used by other organizations to access your [place] medical records  [id number]           Review of your medicines      UNREVIEWED medicines. Ask your doctor about these medicines        Dose / Directions    ACYCLOVIR PO        Dose:  800 mg   Take 800 mg by mouth daily   Refills:  0       albuterol 108 (90 BASE) MCG/ACT Inhaler   Commonly known as:  PROAIR HFA/PROVENTIL HFA/VENTOLIN HFA        Dose:  2 puff   Inhale 2 puffs into the lungs every 4 hours as needed for shortness of breath / dyspnea or wheezing   Refills:  0       AZITHROMYCIN PO        Dose:  250 mg   Take 250 mg by mouth 2 times daily   Refills:  0       BENZONATATE PO        Dose:  200 mg   Take 200 mg by mouth 3 times daily   Refills:  0       LEVOTHYROXINE SODIUM PO        Dose:  75 mcg   Take 75 mcg by mouth daily   Refills:  0       pantoprazole 40 MG EC tablet   Commonly known as:  PROTONIX   Used for:  Gastroesophageal reflux disease without esophagitis        Dose:  40 mg   Take 1 tablet (40 mg) by mouth daily   Quantity:  30 tablet   Refills:  11       PREDNISONE PO        Dose:  60 mg   Take 60 mg by mouth daily   Refills:  0                Protect others around you: Learn how to safely use, store and throw away your medicines at www.disposemymeds.org.             Medication List: This is a list of all your medications and when to take them. Check marks below indicate your daily home schedule. Keep this list as a reference.      Medications           Morning Afternoon Evening Bedtime As Needed    ACYCLOVIR PO   Take 800 mg by mouth daily                                albuterol 108 (90 BASE) MCG/ACT Inhaler   Commonly known as:  PROAIR HFA/PROVENTIL HFA/VENTOLIN HFA   Inhale 2 puffs into the lungs every 4 hours as needed for shortness of  breath / dyspnea or wheezing                                AZITHROMYCIN PO   Take 250 mg by mouth 2 times daily                                BENZONATATE PO   Take 200 mg by mouth 3 times daily                                LEVOTHYROXINE SODIUM PO   Take 75 mcg by mouth daily                                pantoprazole 40 MG EC tablet   Commonly known as:  PROTONIX   Take 1 tablet (40 mg) by mouth daily                                PREDNISONE PO   Take 60 mg by mouth daily

## 2017-03-22 NOTE — IP AVS SNAPSHOT
[facility], [place], Endoscopy    [address] [facility] [address]    Phone:  [phone]                                       After Visit Summary   3/22/2017    Sr. Avis Paul    MRN: [id number]           After Visit Summary Signature Page     I have received my discharge instructions, and my questions have been answered. I have discussed any challenges I see with this plan with the nurse or doctor.    ..........................................................................................................................................  Patient/Patient Representative Signature      ..........................................................................................................................................  Patient Representative Print Name and Relationship to Patient    ..................................................               ................................................  Date                                            Time    ..........................................................................................................................................  Reviewed by Signature/Title    ...................................................              ..............................................  Date                                                            Time

## 2017-03-23 DIAGNOSIS — C83.18 LYMPHOMA, MANTLE CELL, MULTIPLE SITES (H): Primary | ICD-10-CM

## 2017-03-23 RX ORDER — ACYCLOVIR 400 MG/1
800 TABLET ORAL DAILY
Qty: 60 TABLET | Refills: 11 | Status: SHIPPED | OUTPATIENT
Start: 2017-03-23 | End: 2018-05-02

## 2017-03-24 DIAGNOSIS — E53.8 VITAMIN B12 DEFICIENCY (NON ANEMIC): ICD-10-CM

## 2017-03-24 DIAGNOSIS — C83.18 LYMPHOMA, MANTLE CELL, MULTIPLE SITES (H): Primary | ICD-10-CM

## 2017-03-24 LAB — COPATH REPORT: NORMAL

## 2017-04-07 DIAGNOSIS — C83.18 LYMPHOMA, MANTLE CELL, MULTIPLE SITES (H): ICD-10-CM

## 2017-04-07 LAB — IGG SERPL-MCNC: 92 MG/DL (ref 695–1620)

## 2017-04-07 PROCEDURE — 82784 ASSAY IGA/IGD/IGG/IGM EACH: CPT

## 2017-04-07 NOTE — NURSING NOTE
Chief Complaint   Patient presents with     Port Draw     Pt with hx of lymphoma labs collected via portacath.

## 2017-05-04 ENCOUNTER — COMMUNICATION - HEALTHEAST (OUTPATIENT)
Dept: ONCOLOGY | Facility: HOSPITAL | Age: 52
End: 2017-05-04

## 2017-05-04 ENCOUNTER — AMBULATORY - HEALTHEAST (OUTPATIENT)
Dept: ONCOLOGY | Facility: HOSPITAL | Age: 52
End: 2017-05-04

## 2017-05-04 DIAGNOSIS — E03.9 HYPOTHYROID: ICD-10-CM

## 2017-05-18 ENCOUNTER — AMBULATORY - HEALTHEAST (OUTPATIENT)
Dept: INFUSION THERAPY | Facility: HOSPITAL | Age: 52
End: 2017-05-18

## 2017-05-18 ENCOUNTER — TRANSFERRED RECORDS (OUTPATIENT)
Dept: HEALTH INFORMATION MANAGEMENT | Facility: CLINIC | Age: 52
End: 2017-05-18

## 2017-05-18 ENCOUNTER — INFUSION - HEALTHEAST (OUTPATIENT)
Dept: INFUSION THERAPY | Facility: HOSPITAL | Age: 52
End: 2017-05-18

## 2017-05-18 ENCOUNTER — OFFICE VISIT - HEALTHEAST (OUTPATIENT)
Dept: ONCOLOGY | Facility: HOSPITAL | Age: 52
End: 2017-05-18

## 2017-05-18 DIAGNOSIS — C83.10 MANTLE CELL LYMPHOMA, UNSPECIFIED BODY REGION (H): ICD-10-CM

## 2017-05-18 DIAGNOSIS — E03.9 HYPOTHYROID: ICD-10-CM

## 2017-05-18 DIAGNOSIS — C83.10 MANTLE CELL LYMPHOMA (H): ICD-10-CM

## 2017-05-22 ENCOUNTER — OFFICE VISIT (OUTPATIENT)
Dept: GASTROENTEROLOGY | Facility: CLINIC | Age: 52
End: 2017-05-22

## 2017-05-22 VITALS
DIASTOLIC BLOOD PRESSURE: 82 MMHG | HEIGHT: 68 IN | HEART RATE: 76 BPM | SYSTOLIC BLOOD PRESSURE: 124 MMHG | TEMPERATURE: 97.6 F | BODY MASS INDEX: 34.51 KG/M2 | WEIGHT: 227.7 LBS | OXYGEN SATURATION: 100 %

## 2017-05-22 DIAGNOSIS — K63.8219 SMALL INTESTINAL BACTERIAL OVERGROWTH: Primary | ICD-10-CM

## 2017-05-22 ASSESSMENT — ENCOUNTER SYMPTOMS
MUSCLE CRAMPS: 1
SINUS PAIN: 1
BRUISES/BLEEDS EASILY: 1
DIZZINESS: 1
SINUS CONGESTION: 1
STIFFNESS: 1
DECREASED CONCENTRATION: 1
TINGLING: 1
MEMORY LOSS: 1
JOINT SWELLING: 1
HEADACHES: 1
NUMBNESS: 1

## 2017-05-22 ASSESSMENT — PAIN SCALES - GENERAL: PAINLEVEL: NO PAIN (0)

## 2017-05-22 NOTE — NURSING NOTE
"Chief Complaint   Patient presents with     RECHECK     f/u       Vitals:    05/22/17 0905   BP: 124/82   BP Location: Left arm   Patient Position: Chair   Cuff Size: Adult Regular   Pulse: 76   Temp: 97.6  F (36.4  C)   SpO2: 100%   Weight: 227 lb 11.2 oz   Height: 5' 7.5\"       Body mass index is 35.14 kg/(m^2).      Agusto Louis MA                          "

## 2017-05-22 NOTE — LETTER
"5/22/2017       RE: Avis Paul  [address] [address] [facility] [address]     Dear Colleague,    Thank you for referring your patient, Avis Paul, to the [facility] GASTROENTEROLOGY AND IBD at [facility]. Please see a copy of my visit note below.    GI clinic follow-up note:    S: Ms. Paul is a delightful 52 year old female with mantle cell lymphoma s/p BEAM conditioning and auto-BMT in 8/2015. Her baseline bowel pattern prior to transplant was significant constipation (one BM/week) but following transplant, she developed significant diarrhea. We evaluated the diarrhea and she, over time, had evaluation with push enteroscopy (increased IELs, preserved villous architecture) and colonoscopy (path showing single cell necrotic bodies and no CMV). She also had persistent norovirus + testing on ENRIQUE. She was also having significant bloating in addition to her diarrhea.   The differential discussed at that time was SIBO vs persistent norovirus infection vs CVID and acquired auto-immune enteropathy. Celiac was felt less likely given preserved villous architecture although serologic testing was noted to likely be ineffective given low immunoglobulins    We elected to empirically treat SIBO (low B12/high folate, bloating, diarrhea, chemotherapy, small bowel diverticuli) and then subsequently to attempt pharmacologic treatment for norovirus.   We were more concerned about auto-immune enteropathy after colonoscopy results returned but she has improved remarkably following treatment with rifaximin.   She is currently having 1-2 formed stools daily and her bloating has also improved dramatically. She has no abdominal pain either.   She states that she overall feels quite well and has no additional GI complaints.    O:  /82 (BP Location: Left arm, Patient Position: Chair, Cuff Size: Adult Regular)  Pulse 76  Temp 97.6  F (36.4  C)  Ht 1.715 m (5' 7.5\")  Wt 103.3 kg " (227 lb 11.2 oz)  SpO2 100%  BMI 35.14 kg/m2  GEN: well-appearing, in NAD, respirations non-labored  HEENT: sclera anicteric, NCAT, PERRL  PULM: CTAB  CV: RRR  ABD: soft, NT, ND, BS+  EXTREM: WWP, no LE edema  NEURO: no asterixis    A:  52 year old female with history as noted above who presents for evaluation of diarrhea, now significantly and sustainably improved after empiric treatment for SIBO. Despite this, her colonscopy and EGD findings, in conjunction with her significantly low immunoglobulins, still place her at risk for diagnoses as mentioned above (including celiac, CVID, auto-immune enteropathy) and must be considered were her symptoms to recur (in addition to SIBO recurrence).     Recs:  -Continue to monitor for diarrhea  -RTC 6 months, sooner if symptoms arise    Discussed with Dr. Dandre Taylor  GI fellow    ATTENDING ATTESTATION:    REFERRING PROVIDER: [place]  DATE SEEN: 5/22/17      Thank you for this consultation. It was a pleasure to participate in the care of this patient; please contact us with any further questions.    Patient was discussed, seen, and examined by me, Alek Amos. The plan of care and pertinent data/imaging were also reviewed with the GI Fellow in clinic today. Agree with the joint assessment and plan as delineated above.    Please contact me with any further questions.    Alek Amos MD    [facility] - Department of Medicine  Division of Gastroenterology      Again, thank you for allowing me to participate in the care of your patient.      Hari Taylor MD

## 2017-05-22 NOTE — MR AVS SNAPSHOT
After Visit Summary   5/22/2017    Sr. Avis Paul    MRN: [id number]           Patient Information     Date Of Birth          1965        Visit Information        Provider Department      5/22/2017 9:00 AM Hari Taylor MD [facility] Gastroenterology and IBD        Today's Diagnoses     Small intestinal bacterial overgrowth    -  1      Care Instructions    I've included a brief summary of our discussion and care plan from today's visit below.  Please review this information with your primary care provider.  _______________________________________________________________________      1. Thank you for coming into see us in GI clinic, we appreciate you taking the time to come see us    2. Recommend routine studies including B12/folate today.    3. Return to GI Clinic with Dr. Amos in 4-6 months to review your progress, sooner if symptomatic.    - If you are unable to schedule this follow-up appointment today, please contact Dasha Cruz at [phone] within the next week to help set up this necessary appointment.    _______________________________________________________________________    It was a pleasure seeing you in clinic today - please be in touch if there are any further questions that arise following today's visit.  During business hours, you may reach my Clinic Coordinator at [phone], option 3.  For urgent/emergent questions after business hours, you may reach the on-call GI Fellow by contacting the [facility]  at [phone].    Any benign/non-urgent test results are usually communicated via letter or [employer] message within 1-2 weeks after completion.  Urgent results (those that require a change in the previously-discussed care plan) are usually communicated via a phone call once available from our clinic staff to discuss the results and the next steps in your evaluation.    I recommend signing up for [employer] access if you have not already done so  and are comfortable with using a computer.  This allows for online access to your lab results and also helps you communicate efficiently with my clinic should any questions arise in your care.    We have Financial Counseling services available through our clinic.  If you have questions about your insurance coverage or payment responsibilities, particularly before you undergo any tests or procedures, please let us know and we can arrange a consultation accordingly to help you make informed decisions about your healthcare.    Sincerely,     Hari Taylor MD  Gastroenterology Fellow          Follow-ups after your visit        Follow-up notes from your care team     Return in about 5 months (around 10/22/2017).      Your next 10 appointments already scheduled     Jun 13, 2017  1:30 PM CDT   [facility] Draw with  [facility]   [facility] Draw ([facility])    [address] [place] [address] [phone]            Jun 13, 2017  2:00 PM CDT   Return with Carol Rose MD   [facility] Blood and Marrow Transplant ([facility])    [address] [place] [address] [phone]              Future tests that were ordered for you today     Open Future Orders        Priority Expected Expires Ordered    Vitamin B12 Routine  5/22/2018 5/22/2017    Folate Routine  5/22/2018 5/22/2017            Who to contact     Please call your clinic at [phone] to:    Ask questions about your health    Make or cancel appointments    Discuss your medicines    Learn about your test results    Speak to your doctor   If you have compliments or concerns about an experience at your clinic, or if you wish to file a complaint, please contact [facility] Patient Relations at [phone] or email us at [email or web address]         Additional Information About Your Visit        Guy  "Information     [employer] gives you secure access to your electronic health record. If you see a primary care provider, you can also send messages to your care team and make appointments. If you have questions, please call your primary care clinic.  If you do not have a primary care provider, please call [phone] and they will assist you.      [employer]IPS is an electronic gateway that provides easy, online access to your medical records. With [employer], you can request a clinic appointment, read your test results, renew a prescription or communicate with your care team.     To access your existing account, please contact your [facility] or call [phone] for assistance.        Care EveryWhere ID     This is your Care EveryWhere ID. This could be used by other organizations to access your [place] medical records  [id number]        Your Vitals Were     Pulse Temperature Height Pulse Oximetry BMI (Body Mass Index)       76 97.6  F (36.4  C) 1.715 m (5' 7.5\") 100% 35.14 kg/m2        Blood Pressure from Last 3 Encounters:   05/22/17 124/82   03/22/17 111/53   02/13/17 114/73    Weight from Last 3 Encounters:   05/22/17 103.3 kg (227 lb 11.2 oz)   02/13/17 101.1 kg (222 lb 14.4 oz)   01/31/17 103 kg (227 lb)               Primary Care Provider    None       No address on file        Thank you!     Thank you for choosing [facility] GASTROENTEROLOGY AND IBD  for your care. Our goal is always to provide you with excellent care. Hearing back from our patients is one way we can continue to improve our services. Please take a few minutes to complete the written survey that you may receive in the mail after your visit with us. Thank you!             Your Updated Medication List - Protect others around you: Learn how to safely use, store and throw away your medicines at www.disposemymeds.org.          This list is accurate as of: 5/22/17  9:34 AM.  Always use your most recent med list.             "       Brand Name Dispense Instructions for use    acyclovir 400 MG tablet    ZOVIRAX    60 tablet    Take 2 tablets (800 mg) by mouth daily       albuterol 108 (90 BASE) MCG/ACT Inhaler    PROAIR HFA/PROVENTIL HFA/VENTOLIN HFA     Inhale 2 puffs into the lungs every 4 hours as needed for shortness of breath / dyspnea or wheezing       B-12 1000 MCG Tbcr     100 tablet    Take 1,000 mcg by mouth daily       BENZONATATE PO      Take 200 mg by mouth 3 times daily       LEVOTHYROXINE SODIUM PO      Take 75 mcg by mouth daily

## 2017-05-22 NOTE — PATIENT INSTRUCTIONS
I've included a brief summary of our discussion and care plan from today's visit below.  Please review this information with your primary care provider.  _______________________________________________________________________      1. Thank you for coming into see us in GI clinic, we appreciate you taking the time to come see us    2. Recommend routine studies including B12/folate today.    3. Return to GI Clinic with Dr. Amos in 4-6 months to review your progress, sooner if symptomatic.    - If you are unable to schedule this follow-up appointment today, please contact Dasha Cruz at [phone] within the next week to help set up this necessary appointment.    _______________________________________________________________________    It was a pleasure seeing you in clinic today - please be in touch if there are any further questions that arise following today's visit.  During business hours, you may reach my Clinic Coordinator at [phone], option 3.  For urgent/emergent questions after business hours, you may reach the on-call GI Fellow by contacting the [facility]  at [phone].    Any benign/non-urgent test results are usually communicated via letter or Check I'm Herehart message within 1-2 weeks after completion.  Urgent results (those that require a change in the previously-discussed care plan) are usually communicated via a phone call once available from our clinic staff to discuss the results and the next steps in your evaluation.    I recommend signing up for [employer] access if you have not already done so and are comfortable with using a computer.  This allows for online access to your lab results and also helps you communicate efficiently with my clinic should any questions arise in your care.    We have Financial Counseling services available through our clinic.  If you have questions about your insurance coverage or payment responsibilities, particularly before you undergo any tests  or procedures, please let us know and we can arrange a consultation accordingly to help you make informed decisions about your healthcare.    Sincerely,     Hari Taylor MD  Gastroenterology Fellow

## 2017-05-23 ENCOUNTER — COMMUNICATION - HEALTHEAST (OUTPATIENT)
Dept: ONCOLOGY | Facility: CLINIC | Age: 52
End: 2017-05-23

## 2017-06-12 DIAGNOSIS — C85.90 NON-HODGKIN'S LYMPHOMA, UNSPECIFIED BODY REGION, UNSPECIFIED NON-HODGKIN LYMPHOMA TYPE (H): Primary | ICD-10-CM

## 2017-06-13 ENCOUNTER — APPOINTMENT (OUTPATIENT)
Dept: LAB | Facility: CLINIC | Age: 52
End: 2017-06-13
Payer: COMMERCIAL

## 2017-06-13 ENCOUNTER — RECORDS - HEALTHEAST (OUTPATIENT)
Dept: ADMINISTRATIVE | Facility: OTHER | Age: 52
End: 2017-06-13

## 2017-06-13 ENCOUNTER — ONCOLOGY VISIT (OUTPATIENT)
Dept: TRANSPLANT | Facility: CLINIC | Age: 52
End: 2017-06-13
Payer: COMMERCIAL

## 2017-06-13 VITALS
SYSTOLIC BLOOD PRESSURE: 112 MMHG | WEIGHT: 227 LBS | BODY MASS INDEX: 35.03 KG/M2 | TEMPERATURE: 97.7 F | OXYGEN SATURATION: 97 % | HEART RATE: 67 BPM | RESPIRATION RATE: 18 BRPM | DIASTOLIC BLOOD PRESSURE: 80 MMHG

## 2017-06-13 DIAGNOSIS — K63.8219 SMALL INTESTINAL BACTERIAL OVERGROWTH: ICD-10-CM

## 2017-06-13 DIAGNOSIS — J01.01 ACUTE RECURRENT MAXILLARY SINUSITIS: Primary | ICD-10-CM

## 2017-06-13 DIAGNOSIS — G60.3 IDIOPATHIC PROGRESSIVE POLYNEUROPATHY: ICD-10-CM

## 2017-06-13 DIAGNOSIS — E55.9 VITAMIN D DEFICIENCY: ICD-10-CM

## 2017-06-13 LAB
DEPRECATED CALCIDIOL+CALCIFEROL SERPL-MC: 25 UG/L (ref 20–75)
FOLATE SERPL-MCNC: 48.8 NG/ML
VIT B12 SERPL-MCNC: 394 PG/ML (ref 193–986)

## 2017-06-13 PROCEDURE — 36415 COLL VENOUS BLD VENIPUNCTURE: CPT | Performed by: INTERNAL MEDICINE

## 2017-06-13 PROCEDURE — 82784 ASSAY IGA/IGD/IGG/IGM EACH: CPT | Performed by: INTERNAL MEDICINE

## 2017-06-13 PROCEDURE — 99212 OFFICE O/P EST SF 10 MIN: CPT | Mod: ZF

## 2017-06-13 PROCEDURE — 82607 VITAMIN B-12: CPT | Performed by: INTERNAL MEDICINE

## 2017-06-13 PROCEDURE — 82746 ASSAY OF FOLIC ACID SERUM: CPT | Performed by: INTERNAL MEDICINE

## 2017-06-13 PROCEDURE — 36591 DRAW BLOOD OFF VENOUS DEVICE: CPT

## 2017-06-13 PROCEDURE — 82306 VITAMIN D 25 HYDROXY: CPT | Performed by: INTERNAL MEDICINE

## 2017-06-13 RX ORDER — GABAPENTIN 300 MG/1
300 CAPSULE ORAL AT BEDTIME
Qty: 90 CAPSULE | Refills: 3 | Status: SHIPPED | OUTPATIENT
Start: 2017-06-13 | End: 2017-08-17

## 2017-06-13 NOTE — PROGRESS NOTES
BMT  visit note    Mrs Paul is a 49yo female, day +529 s/p auto PBSCT for MCL.        HPI:       Review of Systems:  o/w neg    Physical Exam:   /80  Pulse 67  Temp 97.7  F (36.5  C)  Resp 18  Wt 103 kg (227 lb)  SpO2 97%  BMI 35.03 kg/m2   Wt Readings from Last 4 Encounters:   06/13/17 103 kg (227 lb)   05/22/17 103.3 kg (227 lb 11.2 oz)   02/13/17 101.1 kg (222 lb 14.4 oz)   01/31/17 103 kg (227 lb)     General: NAD, interactive, appropriate, %   Eyes: : ROSA, sclera anicteric   Nose/Mouth/Throat: OP clear, mucosa moist,  Sl red on soft palate bilaterally; no exudate  Ears; TM clear bilat; no ext canal inflammation.  No sinus tenderness. Both nares clear for breathing.  Lungs:no crackles or wheezes  Cardiovascular: RRR, no M/R/G   Abdominal/Rectal: mild epigastic tenderness to palpation, no guarding, no RUQ or Mares sign, obese  Lymphatics: No cerv, axill supraclav nodes.  R groin soft mass smaller.   No BLE edema  Skin: No rashes or petechaie.   Neuro: A&O. Grossly non-focal.   Additional Findings: port in place; not tnder  LabS  Lab Results   Component Value Date    WBC 5.3 02/13/2017    ANEU 3.7 02/13/2017    HGB 12.6 02/13/2017    HCT 38.3 02/13/2017     (L) 02/13/2017     01/23/2017    POTASSIUM 3.6 01/23/2017    CHLORIDE 111 (H) 01/23/2017    CO2 26 01/23/2017    GLC 83 01/23/2017    BUN 15 01/23/2017    CR 1.75 (H) 01/23/2017    MAG 1.7 08/31/2015    INR 1.08 09/09/2015    AST 17 01/23/2017    ALT 26 01/23/2017   HISTORY OF PRESENT ILLNESS:  Avis is about 21 months after autologous transplant for her mantle cell lymphoma.  She is chronically being troubled by recurrent sinusitis and upper respiratory infections.  The last infection started about 3 weeks ago.  She has a runny nose, yellow secretions and intermittent headaches.  She has no fevers or night sweats.  She feels tired at times.  She also noted worsening neuropathy in the fingers of both hands and her toes for  the last couple of months.  The patient has not been on IVIG therapy due to insurance denial.  She also had a recent diagnosis of vitamin B12 and vitamin D deficiency and was only on oral over-the-counter replacement since insurance denied the replacement IVIG.  She is currently in remission from her mantle cell lymphoma.       PHYSICAL EXAMINATION:   VITAL SIGNS:  She is afebrile.    GENERAL:  She looks tired.   HEENT:  She has oropharyngeal erythema and tenderness over the maxillary sinus.  There is some nasal congestion.    CHEST:  Clear to auscultation.   HEART:  Heart tones are regular.     LYMPH:  No adenopathy.      LABORATORY DATA:  I reviewed the results of her lab tests.  The B12 today is 394 which is within normal limits.  We are waiting for the chemistry.  IgG level was 92 mg/dL in April.      ASSESSMENT AND PLAN:     1.  Recurrent sinusitis associated with hypogammaglobulinemia.  This is a rituximab side effect.  She will be referred to ENT for evaluation.  Also, the most important part of her therapy is to start IVIG as soon as possible.  Dr. Starks is appealing her insurance denial and I hope she can receive her IVIG soon.  My recommendation is to start IVIG weekly x4 every other month.   Treat with Levaquin 500mg po daily x 14 days.   2.  Polyneuropathy.  Likely residual secondary to prior chemotherapy.  It is now worse, but it is not B12 deficiency since the level is normal.  She will try gabapentin 300 mg at bedtime and during the day p.r.n.    3.  Mantle cell lymphoma, in remission at 21 months post-transplant.  She is on maintenance rituximab, which she will finish at 2 year post-transplant in august 2017.  The patient has a very high-risk mantle cell lymphoma and previously responded to ibrutinib.    4.  Infectious Disease.  She will receive 2-year immunizations in 08/2017.  5.  Gastrointestinal.  The patient had diarrhea likely due to bacterial overgrowth, status post rifampin therapy, now  completely resolved.  Her EGD did not show mantle cell lymphoma.       I will see Avis at her 2-year anniversary.   Carol oRse MD  Associate Professor of Medicine  [phone]        CT 1/4/2017  IMPRESSION: In this patient with a history of non-Hodgkin lymphoma:  1. No evidence evidence of pathologically enlarged lymphadenopathy.  Stable mild central mesenteric stranding and nonpathologically  enlarged mesenteric lymph nodes.  2. Duodenal wall thickening with mildly increased mesenteric  inflammatory stranding, concerning for infection/duodenitis, this felt  less likely to represent lymphomatous involvement.  3. Colonic wall thickening of the anorectal region, appears improved  from 8/17/2016.  4. Stable bilateral partially calcified breast masses.    BONE MARROW BIOPSY 11/20/2015  Bone marrow, posterior iliac crest, right decalcified trephine biopsy and touch imprint; right particle crush, direct aspirate smear, and concentrated aspirate smear; and peripheral blood smear:  - Marrow cellularity of 50-60%, with maturing trilineage hematopoiesis, and no morphologic or immunophenotypic evidence of  recurrent/persistent B-cell lymphoma (see comment)  - Peripheral blood showing slight normochromic, normocytic anemia; slight thrombocytopenia    Assessment and Plan: 49yo female, day +589 s/p auto PBSCT for MCL.    1.MCL : Received BEAM prep. Auto PBSCT8/18/15, cell dose 6.58.   - In complete remission.   - Received Rituximab 325mg q 3 months for 2 years. This is based on promising phase 2 studies.   is managing Rituximab.   - 1 year anniversary - ongoing CR.   -on maintanance Rituximab x 2 years     2. HEME: counts excellent   - No transfusion requirements.     3. ID: Afebrile  - no active infections.   -1 year immunization     -hypogamaglobulineia due to Rituximab. Will replace IVIG next week as level is below 200.  Due insurance and no active infection - it is ok no to replace.     - PPx:take  Cont HD  ACV 800mg bid resumed   - Sulfa allergy (hives). pentamidine  monthly at 's office.      4. GI: new onset Duodenal wall thickening with mildly increased mesenteric inflammatory stranding, concerning for infection/duodenitis, this felt  less likely to represent lymphomatous involvement.    Refer to GI for EGD - will need bx to r/o Mantle cell lymphoma.      - Hemorrhoids: internal and external per patient report. Using Proctofoam. PET active - pt refused further referral to colorectal surgery which I offered her today.  - Protonix for GI prophy.     5. FEN/Renal: CKD with new TORSTEN likely due to UTI and recent hypotension. Baseline Cr 2.0.   - Cr back to baseline.      - No electrolyte SS due to CKD.   MUST NOT GET IV CONTRAST and she is aware    6. Endo:  - Hypothyroid: Cont Levothyroxine    PLAN:  Worsening neuropathy - will try Gapapentin   hypogamaglobulinemia - IVIG replacement with   Refer to ENT for recurrent sinusitis       Carol Rose MD  Associate Professor of Medicine

## 2017-06-13 NOTE — PATIENT INSTRUCTIONS
MSG MD/NC to get pt w/ENT sooner than July    Jessica  BMT    No other instructions from today(6/13)

## 2017-06-13 NOTE — NURSING NOTE
"Oncology Rooming Note    June 13, 2017 2:24 PM   Avis Paul is a 52 year old female who presents for:    Chief Complaint   Patient presents with     Port Draw     hands are very swollen      RECHECK     Post BMT for NHL here for labs and provider     Initial Vitals: /80  Pulse 67  Temp 97.7  F (36.5  C)  Resp 18  Wt 103 kg (227 lb)  SpO2 97%  BMI 35.03 kg/m2 Estimated body mass index is 35.03 kg/(m^2) as calculated from the following:    Height as of 5/22/17: 1.715 m (5' 7.5\").    Weight as of this encounter: 103 kg (227 lb). Body surface area is 2.21 meters squared.  Data Unavailable Comment: Data Unavailable   No LMP recorded. Patient is postmenopausal.  Allergies reviewed: Yes  Medications reviewed: Yes    Medications: MEDICATION REFILLS NEEDED TODAY. Provider was NOT notified.  Pharmacy name entered into [employer]:    [facility] [address] [place] [address] - [address] [place] [address] PHARMACY [place], [place] - [address] [facility] [phone]    Clinical concerns: NA NA was NOT notified.    5 minutes for nursing intake (face to face time)     Anali Patton CMA              "

## 2017-06-13 NOTE — MR AVS SNAPSHOT
After Visit Summary   6/13/2017    Avis Paul    MRN: [id number]           Patient Information     Date Of Birth          1965        Visit Information        Provider Department      6/13/2017 2:00 PM Carol Rose MD [facility] Blood and Marrow Transplant        Today's Diagnoses     Acute recurrent maxillary sinusitis    -  1    Small intestinal bacterial overgrowth        Vitamin D deficiency        Idiopathic progressive polyneuropathy              [facility]  [address] [place] [address]  Phone: [phone]  Clinic Hours:   Monday-Thursday:7am to 7pm   Friday: 7am to 5pm   Weekends and holidays:    8am to noon (in general)  If your fever is 100.5  or greater,   call the clinic.  After hours call the   hospital at [phone] or   [phone]. Ask for the BMT   fellow on-call           Care Instructions    MSG MD/NC to get pt w/ENT sooner than July Mai  BMT    No other instructions from today(6/13)          Follow-ups after your visit        Additional Services     OTOLARYNGOLOGY REFERRAL       Your provider has referred you to: [facility]: Adult Ear, Nose and Throat Clinic (Otolaryngology) - [place] [phone]  [email or web address]    Please be aware that coverage of these services is subject to the terms and limitations of your health insurance plan.  Call member services at your health plan with any benefit or coverage questions.      Please bring the following with you to your appointment:    (1) Any X-Rays, CTs or MRIs which have been performed.  Contact the facility where they were done to arrange for  prior to your scheduled appointment.   (2) List of current medications  (3) This referral request   (4) Any documents/labs given to you for this referral                  Your next 10 appointments already scheduled     Jun 19, 2017  1:30 PM CDT   (Arrive by 1:15 PM)   NEW  RHINOLOGY with Emperatriz Waite MD   [facility] Ear Nose and Throat ([facility])    [address] [address] [phone]            Oct 02, 2017 10:40 AM CDT   (Arrive by 10:25 AM)   Return Visit with Alek Amos MD   [facility] Gastroenterology and IBD ([facility])    [address] [address] [phone]              Who to contact     If you have questions or need follow up information about today's clinic visit or your schedule please contact [facility] BLOOD [facility] MARROW TRANSPLANT directly at [phone].  Normal or non-critical lab and imaging results will be communicated to you by [employer], letter or phone within 4 business days after the clinic has received the results. If you do not hear from us within 7 days, please contact the clinic through [employer] or phone. If you have a critical or abnormal lab result, we will notify you by phone as soon as possible.  Submit refill requests through [employer] or call your pharmacy and they will forward the refill request to us. Please allow 3 business days for your refill to be completed.          Additional Information About Your Visit        [employer] Information     [employer] gives you secure access to your electronic health record. If you see a primary care provider, you can also send messages to your care team and make appointments. If you have questions, please call your primary care clinic.  If you do not have a primary care provider, please call [phone] and they will assist you.        Care EveryWhere ID     This is your Care EveryWhere ID. This could be used by other organizations to access your [facility] medical records  [id number]        Your Vitals Were     Pulse Temperature Respirations Pulse Oximetry BMI (Body Mass Index)       67 97.7  F (36.5  C) 18 97% 35.03 kg/m2        Blood Pressure from Last 3 Encounters:   06/13/17 112/80   05/22/17  124/82   03/22/17 111/53    Weight from Last 3 Encounters:   06/13/17 103 kg (227 lb)   05/22/17 103.3 kg (227 lb 11.2 oz)   02/13/17 101.1 kg (222 lb 14.4 oz)              We Performed the Following     Folate     IgG     OTOLARYNGOLOGY REFERRAL     Vitamin B12     Vitamin D Deficiency          Today's Medication Changes          These changes are accurate as of: 6/13/17 11:59 PM.  If you have any questions, ask your nurse or doctor.               Start taking these medicines.        Dose/Directions    gabapentin 300 MG capsule   Commonly known as:  NEURONTIN   Used for:  Idiopathic progressive polyneuropathy   Started by:  Carol Rose MD        Dose:  300 mg   Take 1 capsule (300 mg) by mouth At Bedtime   Quantity:  90 capsule   Refills:  3            Where to get your medicines      These medications were sent to [facility] [address] [place] [address] [address] [place] [address]  [address] [facility] [address]     Phone:  [phone]     gabapentin 300 MG capsule                Recent Review Flowsheet Data     BMT Recent Results Latest Ref Rng & Units 10/22/2015 11/23/2015 11/23/2015 2/17/2016 8/17/2016 1/23/2017 2/13/2017    WBC 4.0 - 11.0 10e9/L 4.7 - 5.1 6.7 6.8 5.0 5.3    Hemoglobin 11.7 - 15.7 g/dL 10.3(L) - 10.9(L) 13.0 12.8 13.2 12.6    Platelet Count 150 - 450 10e9/L 137(L) - 146(L) 153 128(L) 148(L) 138(L)    Neutrophils (Absolute) 1.6 - 8.3 10e9/L 2.9 - 3.4 5.1 4.8 3.5 3.7    INR 0.86 - 1.14 - - - - - - -    Sodium 133 - 144 mmol/L 140 - 142 142 141 142 -    Potassium 3.4 - 5.3 mmol/L 3.6 - 3.1(L) 3.7 3.6 3.6 -    Chloride 94 - 109 mmol/L 108 - 109 109 110(H) 111(H) -    Glucose 70 - 99 mg/dL 89 87 86 86 96 83 -    Urea Nitrogen 7 - 30 mg/dL 16 - 12 19 24 15 -    Creatinine 0.52 - 1.04 mg/dL 1.88(H) - 1.74(H) 1.87(H) 1.78(H) 1.75(H) -    Calcium (Total) 8.5 - 10.1 mg/dL 8.7 - 8.7 8.8 8.6 8.3(L) -    Protein (Total) 6.8 - 8.8 g/dL 6.2(L) - 6.0(L) 6.2(L) 5.5(L) 5.4(L) -    Albumin 3.4 - 5.0 g/dL  3.6 - 3.6 4.0 3.4 3.2(L) -    Alkaline Phosphatase 40 - 150 U/L 92 - 95 113 104 96 -    AST 0 - 45 U/L 13 - 13 14 18 17 -    ALT 0 - 50 U/L 18 - 25 22 29 26 -    MCV 78 - 100 fl 92 - 93 92 93 92 91               Primary Care Provider    None       No address on file        Thank you!     Thank you for choosing [facility] BLOOD [facility] MARROW TRANSPLANT  for your care. Our goal is always to provide you with excellent care. Hearing back from our patients is one way we can continue to improve our services. Please take a few minutes to complete the written survey that you may receive in the mail after your visit with us. Thank you!             Your Updated Medication List - Protect others around you: Learn how to safely use, store and throw away your medicines at www.disposemymeds.org.          This list is accurate as of: 6/13/17 11:59 PM.  Always use your most recent med list.                   Brand Name Dispense Instructions for use    acyclovir 400 MG tablet    ZOVIRAX    60 tablet    Take 2 tablets (800 mg) by mouth daily       albuterol 108 (90 BASE) MCG/ACT Inhaler    PROAIR HFA/PROVENTIL HFA/VENTOLIN HFA     Inhale 2 puffs into the lungs every 4 hours as needed for shortness of breath / dyspnea or wheezing       B-12 1000 MCG Tbcr     100 tablet    Take 1,000 mcg by mouth daily       BENZONATATE PO      Take 200 mg by mouth 3 times daily       gabapentin 300 MG capsule    NEURONTIN    90 capsule    Take 1 capsule (300 mg) by mouth At Bedtime       LEVOTHYROXINE SODIUM PO      Take 75 mcg by mouth daily

## 2017-06-15 LAB — IGG SERPL-MCNC: 127 MG/DL (ref 695–1620)

## 2017-06-16 ENCOUNTER — TELEPHONE (OUTPATIENT)
Dept: TRANSPLANT | Facility: CLINIC | Age: 52
End: 2017-06-16

## 2017-06-16 DIAGNOSIS — J32.9 SINUSITIS: Primary | ICD-10-CM

## 2017-06-16 RX ORDER — LEVOFLOXACIN 500 MG/1
500 TABLET, FILM COATED ORAL DAILY
Qty: 14 TABLET | Refills: 0 | Status: SHIPPED | OUTPATIENT
Start: 2017-06-16 | End: 2017-08-17

## 2017-06-19 ENCOUNTER — RECORDS - HEALTHEAST (OUTPATIENT)
Dept: ADMINISTRATIVE | Facility: OTHER | Age: 52
End: 2017-06-19

## 2017-06-19 ENCOUNTER — COMMUNICATION - HEALTHEAST (OUTPATIENT)
Dept: ONCOLOGY | Facility: HOSPITAL | Age: 52
End: 2017-06-19

## 2017-06-19 ENCOUNTER — OFFICE VISIT (OUTPATIENT)
Dept: OTOLARYNGOLOGY | Facility: CLINIC | Age: 52
End: 2017-06-19

## 2017-06-19 VITALS — HEIGHT: 68 IN | WEIGHT: 227 LBS | BODY MASS INDEX: 34.4 KG/M2

## 2017-06-19 DIAGNOSIS — J01.01 ACUTE RECURRENT MAXILLARY SINUSITIS: Primary | ICD-10-CM

## 2017-06-19 PROBLEM — R53.81 MALAISE AND FATIGUE: Status: ACTIVE | Noted: 2017-06-19

## 2017-06-19 PROBLEM — R53.83 MALAISE AND FATIGUE: Status: ACTIVE | Noted: 2017-06-19

## 2017-06-19 PROBLEM — R50.9 FEVER: Status: ACTIVE | Noted: 2017-06-19

## 2017-06-19 PROBLEM — R05.9 COUGH: Status: ACTIVE | Noted: 2017-06-19

## 2017-06-19 RX ORDER — FLUTICASONE PROPIONATE 50 MCG
SPRAY, SUSPENSION (ML) NASAL PRN
COMMUNITY
Start: 2015-11-04

## 2017-06-19 RX ORDER — CYANOCOBALAMIN (VITAMIN B-12) 500 MCG
2 TABLET ORAL
COMMUNITY
End: 2017-08-17

## 2017-06-19 RX ORDER — PROCHLORPERAZINE MALEATE 10 MG
5 TABLET ORAL
COMMUNITY
Start: 2017-01-24 | End: 2019-10-01

## 2017-06-19 RX ORDER — LEVOTHYROXINE SODIUM 75 UG/1
TABLET ORAL
COMMUNITY
Start: 2017-05-04 | End: 2018-08-31

## 2017-06-19 RX ORDER — PANTOPRAZOLE SODIUM 40 MG/1
40 TABLET, DELAYED RELEASE ORAL
COMMUNITY
Start: 2017-01-24 | End: 2017-08-17

## 2017-06-19 ASSESSMENT — PAIN SCALES - GENERAL: PAINLEVEL: MILD PAIN (3)

## 2017-06-19 NOTE — PROGRESS NOTES
Otolaryngology Adult Consultation    Patient: Avis Paul   : 1965     Patient presents with:  Consult:   Chief Complaint   Patient presents with     Consult     runny nose and sinus infections        Referring Provider: Carol Rose   Consulting Physician:  Emperatriz Waite MD       Assessment/Plan: ASSESSMENT AND PLAN:  Ms. Paul has recurrent acute rhinosinusitis.  This is the third episode this year.  She would benefit most from IVIG and hopefully she will be approved for that.  If not, we may consider surgical intervention.  However, when I review her CT from just over a year ago, she has completely normal sinus anatomy and no evidence of disease at that time  so I am going to bring her back when she is acutely symptomatic and repeat her imaging in the event that we are considering doing surgery.  We also talked about preventative measures such as saline irrigations when she is feeling congested and getting on an antibiotic within 5-7 days of feeling ill in the future.          HPI: Avis Paul is a 52 year old female seen today in the Otolaryngology Clinic for recurrent acute sinus infection in the setting of chemotherapy for mantle cell lymphoma.  She is currently on rituximab for at least two years.  She states that she used to have frequent sinus infections but not to the extent that she is having them now.  She was treated with a transplant for her mantle cell lymphoma and also treated with aggressive chemotherapy.  She is currently in remission, but she has been having a lot of trouble with sinus infections.  Dr. Rose has been following her.  She has hypogammaglobulinemia and is going through the process of getting approved for IVIG.  She is just now getting over a sinus infection.  She has been treated most recently with Levaquin and her symptoms are beginning to resolve.  She was having thick green mucus previously.  Now, it is more clear and she is breathing more  comfortably.  She does not do saline irrigations in her nose.  She is unaware as to whether or not she has allergies.           Current Outpatient Prescriptions on File Prior to Visit:  levofloxacin (LEVAQUIN) 500 MG tablet Take 1 tablet (500 mg) by mouth daily   gabapentin (NEURONTIN) 300 MG capsule Take 1 capsule (300 mg) by mouth At Bedtime   Cyanocobalamin (B-12) 1000 MCG TBCR Take 1,000 mcg by mouth daily   acyclovir (ZOVIRAX) 400 MG tablet Take 2 tablets (800 mg) by mouth daily   BENZONATATE PO Take 200 mg by mouth 3 times daily   albuterol (PROAIR HFA/PROVENTIL HFA/VENTOLIN HFA) 108 (90 BASE) MCG/ACT Inhaler Inhale 2 puffs into the lungs every 4 hours as needed for shortness of breath / dyspnea or wheezing   LEVOTHYROXINE SODIUM PO Take 75 mcg by mouth daily     No current facility-administered medications on file prior to visit.        Allergies   Allergen Reactions     Zofran [Ondansetron] Other (See Comments) and GI Disturbance     Massive constipation over 8 days  Severe constipation greater than 8 days     Aspirin Hives, Nausea and Vomiting and Rash     Vomiting       Codeine Hives, Nausea and Vomiting and Rash     6/2015 tolerated a dose of hydrocodone/apap with surgery  vomiting     Penicillins Hives, Nausea and Vomiting and Rash     vomiting     Sulfa Drugs Hives and Rash       Past Medical History:   Diagnosis Date     Cancer (H) 2014    Mantle Cell Lymphoma     History of blood transfusion     reaction to platelets with hives in the past     Hypothyroidism 2007     Kidney stones 2002     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)     significant improved     GENNA (obstructive sleep apnea)     uses CPAP     PONV (postoperative nausea and vomiting)          Review of Systems   ENT ROS 6/19/2017   Constitutional Problems with sleep   Neurology Numbness, Unexplained weakness   Ears, Nose, Throat  Hearing loss, Ringing/noise in ears   Cardiopulmonary Cough, Breathing problems   Musculoskeletal Sore or stiff joints, Back pain, Neck pain   Allergy/Immunology Allergies or hay fever   Hematologic Easy bruising        14 point ROS neg other than the symptoms noted above.    Physical Exam:    General Assessment   The patient is alert, oriented and in no acute distress.   Head/Face/Scalp  Grossly normal.   Ears  Normal canals, auricles and tympanic membranes.   Nose  External nose is straight, skin is normal. Intranasal exam (anterior rhinoscopy) reveals normal moist mucosa, turbinate tissue without edema, erythema or crusting.  Septum mainly in the midline.  Thick mucous left nasal cavity, not purulent.  Mouth  Oral cavity shows healthy mucosa with out ulceration, masses or other lesions involving the tongue, palate, buccal mucosa, floor of mouth or gingiva.  Dentition in good repair.  Oropharynx with normal tonsils, posterior wall and base of tongue.  Neck  No significant adenopathy, thyroid or salivary gland abnormality. Scar from neck fusion.      Imaging:    CT neck Feb 2016 shows normal sinus anatomy and no evidence of mucosal disease.

## 2017-06-19 NOTE — MR AVS SNAPSHOT
After Visit Summary   6/19/2017    Avis Paul    MRN: [id number]           Patient Information     Date Of Birth          1965        Visit Information        Provider Department      6/19/2017 1:30 PM Emperatriz Waite MD [facility] Ear Nose [facility] Th[facility]        Today's Diagnoses     Acute recurrent maxillary sinusitis    -  1       Follow-ups after your visit        Your next 10 appointments already scheduled     Aug 17, 2017  9:00 AM CDT   CT CHEST ABDOMEN PELVIS W/O CONTRAST with UCCT2   [facility] [place] [facility] ([facility])    [address] [place] [id number] [phone]           Please bring any scans or X-rays taken at other hospitals, if similar tests were done. Also bring a list of your medicines, including vitamins, minerals and over-the-counter drugs. It is safest to leave personal items at home.  Be sure to tell your doctor:   If you have any allergies.   If there s any chance you are pregnant.   If you are breastfeeding.   If you have any special needs.  You do not need to do anything special to prepare.  Please wear loose clothing, such as a sweat suit or jogging clothes. Avoid snaps, zippers and other metal. We may ask you to undress and put on a hospital gown.            Aug 17, 2017 10:00 AM CDT   [facility] Lab Draw with  MASONIC LAB DRAW   [facility] Lab Draw ([facility])    [address] [place] [id number]   [phone]            Aug 17, 2017 10:30 AM CDT   Bone Marrow Biopsy with  BMT BOONE #2, UU BONE MARROW BIOPSY   [facility] Blood and Marrow Transplant ([facility])    [address] [place] [id number]   [phone]            Aug 22, 2017  2:00 PM CDT   BMT Anniversary Visit with Carol Rose MD   [facility] Blood and Marrow Transplant ([facility])    [address] [place] [address]  "[facility] [id number] [phone]            Oct 02, 2017 10:40 AM CDT   (Arrive by 10:25 AM)   Return Visit with Alek Amos MD   [facility] Gastroenterology and IBD ([facility])    [address] [facility]  4th [facility] [id number] [phone]              Who to contact     Please call your clinic at [phone] to:    Ask questions about your health    Make or cancel appointments    Discuss your medicines    Learn about your test results    Speak to your doctor   If you have compliments or concerns about an experience at your clinic, or if you wish to file a complaint, please contact [facility] Patient Relations at [phone] or email us at [email or web address]         Additional Information About Your Visit        MyChart Information     [employer] gives you secure access to your electronic health record. If you see a primary care provider, you can also send messages to your care team and make appointments. If you have questions, please call your primary care clinic.  If you do not have a primary care provider, please call [phone] and they will assist you.      [employer] is an electronic gateway that provides easy, online access to your medical records. With [employer], you can request a clinic appointment, read your test results, renew a prescription or communicate with your care team.     To access your existing account, please contact your [facility] or call [phone] for assistance.        Care EveryWhere ID     This is your Care EveryWhere ID. This could be used by other organizations to access your [place] medical records  [id number]        Your Vitals Were     Height BMI (Body Mass Index)                1.715 m (5' 7.52\") 35.01 kg/m2           Blood Pressure from Last 3 Encounters:   06/13/17 112/80   05/22/17 124/82   03/22/17 111/53    Weight from Last 3 Encounters:   06/19/17 " 103 kg (227 lb)   06/13/17 103 kg (227 lb)   05/22/17 103.3 kg (227 lb 11.2 oz)              Today, you had the following     No orders found for display       Primary Care Provider    None       No address on file        Equal Access to Services     UNIQUEJAMIE HERB : Hadii jay jay mary kaio Sokaleeali, waaxda luqadaha, qaybta kaalmada adetracie, royal gan laalonsogricelda gayle. So [facility] [phone].    ATENCIÓN: Si habla español, tiene a walker disposición servicios gratuitos de asistencia lingüística. Llame al [phone].    We comply with applicable federal civil rights laws and [place] laws. We do not discriminate on the basis of race, color, national origin, age, disability sex, sexual orientation or gender identity.            Thank you!     Thank you for choosing [facility] EAR NOSE AND THROAT  for your care. Our goal is always to provide you with excellent care. Hearing back from our patients is one way we can continue to improve our services. Please take a few minutes to complete the written survey that you may receive in the mail after your visit with us. Thank you!             Your Updated Medication List - Protect others around you: Learn how to safely use, store and throw away your medicines at www.disposemymeds.org.          This list is accurate as of: 6/19/17 11:59 PM.  Always use your most recent med list.                   Brand Name Dispense Instructions for use Diagnosis    acyclovir 400 MG tablet    ZOVIRAX    60 tablet    Take 2 tablets (800 mg) by mouth daily    Lymphoma, mantle cell, multiple sites (H)       albuterol 108 (90 BASE) MCG/ACT Inhaler    PROAIR HFA/PROVENTIL HFA/VENTOLIN HFA     Inhale 2 puffs into the lungs every 4 hours as needed for shortness of breath / dyspnea or wheezing        B-12 1000 MCG Tbcr     100 tablet    Take 1,000 mcg by mouth daily    Lymphoma, mantle cell, multiple sites (H), Vitamin B12 deficiency (non anemic)       BENZONATATE PO      Take 200 mg by mouth 3  times daily        fluticasone 50 MCG/ACT spray    FLONASE          folic acid 0.8 MG Caps      Take 2 tablets by mouth        gabapentin 300 MG capsule    NEURONTIN    90 capsule    Take 1 capsule (300 mg) by mouth At Bedtime    Idiopathic progressive polyneuropathy       levofloxacin 500 MG tablet    LEVAQUIN    14 tablet    Take 1 tablet (500 mg) by mouth daily    Sinusitis       * LEVOTHYROXINE SODIUM PO      Take 75 mcg by mouth daily        * levothyroxine 75 MCG tablet    SYNTHROID/LEVOTHROID     TAKE ONE TABLET BY MOUTH ONCE DAILY AT  6  AM        pantoprazole 40 MG EC tablet    PROTONIX     Take 40 mg by mouth        prochlorperazine 10 MG tablet    COMPAZINE     Take 5 mg by mouth        * Notice:  This list has 2 medication(s) that are the same as other medications prescribed for you. Read the directions carefully, and ask your doctor or other care provider to review them with you.

## 2017-06-19 NOTE — LETTER
2017       RE: Avis Paul  [address] [facility] [address]     Dear Colleague,    Thank you for referring your patient, Avis Paul, to the [facility] EAR NOSE AND THROAT at [facility]. Please see a copy of my visit note below.    Otolaryngology Adult Consultation    Patient: Avis Paul   : 1965     Patient presents with:  Consult:   Chief Complaint   Patient presents with     Consult     runny nose and sinus infections        Referring Provider: Carol Rose   Consulting Physician:  Emperatriz Waite MD       Assessment/Plan: ASSESSMENT AND PLAN:  Ms. Paul has recurrent acute rhinosinusitis.  This is the third episode this year.  She would benefit most from IVIG and hopefully she will be approved for that.  If not, we may consider surgical intervention.  However, when I review her CT from just over a year ago, she has completely normal sinus anatomy and no evidence of disease at that time  so I am going to bring her back when she is acutely symptomatic and repeat her imaging in the event that we are considering doing surgery.  We also talked about preventative measures such as saline irrigations when she is feeling congested and getting on an antibiotic within 5-7 days of feeling ill in the future.          HPI: Avis Paul is a 52 year old female seen today in the Otolaryngology Clinic for recurrent acute sinus infection in the setting of chemotherapy for mantle cell lymphoma.  She is currently on rituximab for at least two years.  She states that she used to have frequent sinus infections but not to the extent that she is having them now.  She was treated with a transplant for her mantle cell lymphoma and also treated with aggressive chemotherapy.  She is currently in remission, but she has been having a lot of trouble with sinus infections.  Dr. Rose has been following her.  She has hypogammaglobulinemia and  is going through the process of getting approved for IVIG.  She is just now getting over a sinus infection.  She has been treated most recently with Levaquin and her symptoms are beginning to resolve.  She was having thick green mucus previously.  Now, it is more clear and she is breathing more comfortably.  She does not do saline irrigations in her nose.  She is unaware as to whether or not she has allergies.           Current Outpatient Prescriptions on File Prior to Visit:  levofloxacin (LEVAQUIN) 500 MG tablet Take 1 tablet (500 mg) by mouth daily   gabapentin (NEURONTIN) 300 MG capsule Take 1 capsule (300 mg) by mouth At Bedtime   Cyanocobalamin (B-12) 1000 MCG TBCR Take 1,000 mcg by mouth daily   acyclovir (ZOVIRAX) 400 MG tablet Take 2 tablets (800 mg) by mouth daily   BENZONATATE PO Take 200 mg by mouth 3 times daily   albuterol (PROAIR HFA/PROVENTIL HFA/VENTOLIN HFA) 108 (90 BASE) MCG/ACT Inhaler Inhale 2 puffs into the lungs every 4 hours as needed for shortness of breath / dyspnea or wheezing   LEVOTHYROXINE SODIUM PO Take 75 mcg by mouth daily     No current facility-administered medications on file prior to visit.        Allergies   Allergen Reactions     Zofran [Ondansetron] Other (See Comments) and GI Disturbance     Massive constipation over 8 days  Severe constipation greater than 8 days     Aspirin Hives, Nausea and Vomiting and Rash     Vomiting       Codeine Hives, Nausea and Vomiting and Rash     6/2015 tolerated a dose of hydrocodone/apap with surgery  vomiting     Penicillins Hives, Nausea and Vomiting and Rash     vomiting     Sulfa Drugs Hives and Rash       Past Medical History:   Diagnosis Date     Cancer (H) 2014    Mantle Cell Lymphoma     History of blood transfusion     reaction to platelets with hives in the past     Hypothyroidism 2007     Kidney stones 2002     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    Physical Exam:    General Assessment   The patient is alert, oriented and in no acute distress.   Head/Face/Scalp  Grossly normal.   Ears  Normal canals, auricles and tympanic membranes.   Nose  External nose is straight, skin is normal. Intranasal exam (anterior rhinoscopy) reveals normal moist mucosa, turbinate tissue without edema, erythema or crusting.  Septum mainly in the midline.  Thick mucous left nasal cavity, not purulent.  Mouth  Oral cavity shows healthy mucosa with out ulceration, masses or other lesions involving the tongue, palate, buccal mucosa, floor of mouth or gingiva.  Dentition in good repair.  Oropharynx with normal tonsils, posterior wall and base of tongue.  Neck  No significant adenopathy, thyroid or salivary gland abnormality. Scar from neck fusion.      Imaging:    CT neck Feb 2016 shows normal sinus anatomy and no evidence of mucosal disease.        Again, thank you for allowing me to participate in the care of your patient.      Sincerely,    Emperatriz Waite MD

## 2017-06-21 ENCOUNTER — COMMUNICATION - HEALTHEAST (OUTPATIENT)
Dept: ADMINISTRATIVE | Facility: HOSPITAL | Age: 52
End: 2017-06-21

## 2017-06-23 ENCOUNTER — OFFICE VISIT - HEALTHEAST (OUTPATIENT)
Dept: ONCOLOGY | Facility: HOSPITAL | Age: 52
End: 2017-06-23

## 2017-06-23 ENCOUNTER — AMBULATORY - HEALTHEAST (OUTPATIENT)
Dept: INFUSION THERAPY | Facility: HOSPITAL | Age: 52
End: 2017-06-23

## 2017-06-23 ENCOUNTER — INFUSION - HEALTHEAST (OUTPATIENT)
Dept: INFUSION THERAPY | Facility: HOSPITAL | Age: 52
End: 2017-06-23

## 2017-06-23 ENCOUNTER — TRANSFERRED RECORDS (OUTPATIENT)
Dept: HEALTH INFORMATION MANAGEMENT | Facility: CLINIC | Age: 52
End: 2017-06-23

## 2017-06-23 DIAGNOSIS — C83.13 MANTLE CELL LYMPHOMA OF INTRA-ABDOMINAL LYMPH NODES (H): ICD-10-CM

## 2017-06-23 DIAGNOSIS — C83.10 MANTLE CELL LYMPHOMA (H): ICD-10-CM

## 2017-06-23 DIAGNOSIS — D80.1 HYPOGAMMAGLOBULINEMIA, ACQUIRED (H): ICD-10-CM

## 2017-06-23 LAB
IGA SERPL-MCNC: 138 MG/DL (ref 700–1700)
IGA SERPL-MCNC: 27 MG/DL (ref 65–400)
IGM SERPL-MCNC: <5 MG/DL (ref 60–280)

## 2017-06-23 ASSESSMENT — MIFFLIN-ST. JEOR: SCORE: 1650.03

## 2017-07-18 ENCOUNTER — COMMUNICATION - HEALTHEAST (OUTPATIENT)
Dept: ONCOLOGY | Facility: HOSPITAL | Age: 52
End: 2017-07-18

## 2017-07-20 ENCOUNTER — OFFICE VISIT - HEALTHEAST (OUTPATIENT)
Dept: ONCOLOGY | Facility: HOSPITAL | Age: 52
End: 2017-07-20

## 2017-07-20 ENCOUNTER — INFUSION - HEALTHEAST (OUTPATIENT)
Dept: INFUSION THERAPY | Facility: HOSPITAL | Age: 52
End: 2017-07-20

## 2017-07-20 ENCOUNTER — TRANSFERRED RECORDS (OUTPATIENT)
Dept: HEALTH INFORMATION MANAGEMENT | Facility: CLINIC | Age: 52
End: 2017-07-20

## 2017-07-20 ENCOUNTER — AMBULATORY - HEALTHEAST (OUTPATIENT)
Dept: INFUSION THERAPY | Facility: HOSPITAL | Age: 52
End: 2017-07-20

## 2017-07-20 DIAGNOSIS — D80.1 HYPOGAMMAGLOBULINEMIA, ACQUIRED (H): ICD-10-CM

## 2017-07-20 DIAGNOSIS — C83.13 MANTLE CELL LYMPHOMA OF INTRA-ABDOMINAL LYMPH NODES (H): ICD-10-CM

## 2017-07-20 DIAGNOSIS — F41.9 ANXIETY: ICD-10-CM

## 2017-07-20 LAB
IGA SERPL-MCNC: 27 MG/DL (ref 65–400)
IGA SERPL-MCNC: 409 MG/DL (ref 700–1700)
IGM SERPL-MCNC: <5 MG/DL (ref 60–280)

## 2017-07-20 ASSESSMENT — MIFFLIN-ST. JEOR: SCORE: 1655.02

## 2017-07-24 ENCOUNTER — CARE COORDINATION (OUTPATIENT)
Dept: TRANSPLANT | Facility: CLINIC | Age: 52
End: 2017-07-24

## 2017-07-24 NOTE — PROGRESS NOTES
Pt requesting MARIBELL form to be completed and emailed back to her. This was completed today, MARIBELL from mailed to   [employer]  [address] [facility], [address] [address]

## 2017-07-26 ENCOUNTER — COMMUNICATION - HEALTHEAST (OUTPATIENT)
Dept: ONCOLOGY | Facility: CLINIC | Age: 52
End: 2017-07-26

## 2017-07-27 ENCOUNTER — AMBULATORY - HEALTHEAST (OUTPATIENT)
Dept: ONCOLOGY | Facility: HOSPITAL | Age: 52
End: 2017-07-27

## 2017-08-02 ENCOUNTER — COMMUNICATION - HEALTHEAST (OUTPATIENT)
Dept: ADMINISTRATIVE | Facility: HOSPITAL | Age: 52
End: 2017-08-02

## 2017-08-04 ENCOUNTER — COMMUNICATION - HEALTHEAST (OUTPATIENT)
Dept: ONCOLOGY | Facility: HOSPITAL | Age: 52
End: 2017-08-04

## 2017-08-08 ENCOUNTER — COMMUNICATION - HEALTHEAST (OUTPATIENT)
Dept: ADMINISTRATIVE | Facility: HOSPITAL | Age: 52
End: 2017-08-08

## 2017-08-10 ENCOUNTER — AMBULATORY - HEALTHEAST (OUTPATIENT)
Dept: INFUSION THERAPY | Facility: HOSPITAL | Age: 52
End: 2017-08-10

## 2017-08-10 ENCOUNTER — OFFICE VISIT - HEALTHEAST (OUTPATIENT)
Dept: ONCOLOGY | Facility: HOSPITAL | Age: 52
End: 2017-08-10

## 2017-08-10 ENCOUNTER — INFUSION - HEALTHEAST (OUTPATIENT)
Dept: INFUSION THERAPY | Facility: HOSPITAL | Age: 52
End: 2017-08-10

## 2017-08-10 ENCOUNTER — TRANSFERRED RECORDS (OUTPATIENT)
Dept: HEALTH INFORMATION MANAGEMENT | Facility: CLINIC | Age: 52
End: 2017-08-10

## 2017-08-10 DIAGNOSIS — C83.10 MANTLE CELL LYMPHOMA, UNSPECIFIED BODY REGION (H): ICD-10-CM

## 2017-08-10 DIAGNOSIS — C83.10 MANTLE CELL LYMPHOMA (H): ICD-10-CM

## 2017-08-16 ENCOUNTER — INFUSION - HEALTHEAST (OUTPATIENT)
Dept: INFUSION THERAPY | Facility: HOSPITAL | Age: 52
End: 2017-08-16

## 2017-08-16 DIAGNOSIS — D80.1 HYPOGAMMAGLOBULINEMIA, ACQUIRED (H): ICD-10-CM

## 2017-08-17 ENCOUNTER — OFFICE VISIT (OUTPATIENT)
Dept: TRANSPLANT | Facility: CLINIC | Age: 52
End: 2017-08-17
Payer: COMMERCIAL

## 2017-08-17 VITALS
TEMPERATURE: 98.4 F | WEIGHT: 231.8 LBS | OXYGEN SATURATION: 100 % | SYSTOLIC BLOOD PRESSURE: 146 MMHG | DIASTOLIC BLOOD PRESSURE: 94 MMHG | HEIGHT: 68 IN | BODY MASS INDEX: 35.13 KG/M2 | HEART RATE: 62 BPM | RESPIRATION RATE: 18 BRPM

## 2017-08-17 DIAGNOSIS — C85.90 NON-HODGKIN'S LYMPHOMA, UNSPECIFIED BODY REGION, UNSPECIFIED NON-HODGKIN LYMPHOMA TYPE (H): Primary | ICD-10-CM

## 2017-08-17 LAB
ALBUMIN SERPL-MCNC: 3.2 G/DL (ref 3.4–5)
ALP SERPL-CCNC: 108 U/L (ref 40–150)
ALT SERPL W P-5'-P-CCNC: 25 U/L (ref 0–50)
ANION GAP SERPL CALCULATED.3IONS-SCNC: 10 MMOL/L (ref 3–14)
AST SERPL W P-5'-P-CCNC: 20 U/L (ref 0–45)
BASOPHILS # BLD AUTO: 0 10E9/L (ref 0–0.2)
BASOPHILS NFR BLD AUTO: 0.3 %
BILIRUB SERPL-MCNC: 0.4 MG/DL (ref 0.2–1.3)
BUN SERPL-MCNC: 32 MG/DL (ref 7–30)
CALCIUM SERPL-MCNC: 8.7 MG/DL (ref 8.5–10.1)
CHLORIDE SERPL-SCNC: 107 MMOL/L (ref 94–109)
CO2 SERPL-SCNC: 24 MMOL/L (ref 20–32)
COPATH REPORT: NORMAL
COPATH REPORT: NORMAL
CREAT SERPL-MCNC: 1.81 MG/DL (ref 0.52–1.04)
DIFFERENTIAL METHOD BLD: ABNORMAL
EOSINOPHIL # BLD AUTO: 0 10E9/L (ref 0–0.7)
EOSINOPHIL NFR BLD AUTO: 0.3 %
ERYTHROCYTE [DISTWIDTH] IN BLOOD BY AUTOMATED COUNT: 13.5 % (ref 10–15)
GFR SERPL CREATININE-BSD FRML MDRD: 29 ML/MIN/1.7M2
GLUCOSE SERPL-MCNC: 89 MG/DL (ref 70–99)
HCT VFR BLD AUTO: 40.9 % (ref 35–47)
HGB BLD-MCNC: 13.1 G/DL (ref 11.7–15.7)
IMM GRANULOCYTES # BLD: 0 10E9/L (ref 0–0.4)
IMM GRANULOCYTES NFR BLD: 0.3 %
LDH SERPL L TO P-CCNC: 185 U/L (ref 81–234)
LYMPHOCYTES # BLD AUTO: 1.6 10E9/L (ref 0.8–5.3)
LYMPHOCYTES NFR BLD AUTO: 26.5 %
MCH RBC QN AUTO: 29.4 PG (ref 26.5–33)
MCHC RBC AUTO-ENTMCNC: 32 G/DL (ref 31.5–36.5)
MCV RBC AUTO: 92 FL (ref 78–100)
MONOCYTES # BLD AUTO: 0.5 10E9/L (ref 0–1.3)
MONOCYTES NFR BLD AUTO: 9 %
NEUTROPHILS # BLD AUTO: 3.7 10E9/L (ref 1.6–8.3)
NEUTROPHILS NFR BLD AUTO: 63.6 %
NRBC # BLD AUTO: 0 10*3/UL
NRBC BLD AUTO-RTO: 0 /100
PLATELET # BLD AUTO: 126 10E9/L (ref 150–450)
POTASSIUM SERPL-SCNC: 3.5 MMOL/L (ref 3.4–5.3)
PROT SERPL-MCNC: 6.9 G/DL (ref 6.8–8.8)
RBC # BLD AUTO: 4.46 10E12/L (ref 3.8–5.2)
SODIUM SERPL-SCNC: 140 MMOL/L (ref 133–144)
T4 FREE SERPL-MCNC: 0.97 NG/DL (ref 0.76–1.46)
TSH SERPL DL<=0.005 MIU/L-ACNC: 1.79 MU/L (ref 0.4–4)
WBC # BLD AUTO: 5.9 10E9/L (ref 4–11)

## 2017-08-17 PROCEDURE — 00000058 ZZHCL STATISTIC BONE MARROW ASP PERF TC 38220: Performed by: NURSE PRACTITIONER

## 2017-08-17 PROCEDURE — 84439 ASSAY OF FREE THYROXINE: CPT

## 2017-08-17 PROCEDURE — 40001004 ZZHCL STATISTIC FLOW INT 9-15 ABY TC 88188: Performed by: NURSE PRACTITIONER

## 2017-08-17 PROCEDURE — 40000611 ZZHCL STATISTIC MORPHOLOGY W/INTERP HEMEPATH TC 85060: Performed by: NURSE PRACTITIONER

## 2017-08-17 PROCEDURE — 40000951 ZZHCL STATISTIC BONE MARROW INTERP TC 85097: Performed by: NURSE PRACTITIONER

## 2017-08-17 PROCEDURE — 83615 LACTATE (LD) (LDH) ENZYME: CPT

## 2017-08-17 PROCEDURE — 84443 ASSAY THYROID STIM HORMONE: CPT

## 2017-08-17 PROCEDURE — 88341 IMHCHEM/IMCYTCHM EA ADD ANTB: CPT | Performed by: NURSE PRACTITIONER

## 2017-08-17 PROCEDURE — 88311 DECALCIFY TISSUE: CPT | Performed by: NURSE PRACTITIONER

## 2017-08-17 PROCEDURE — 00000161 ZZHCL STATISTIC H-SPHEME PROCESS B/S: Performed by: NURSE PRACTITIONER

## 2017-08-17 PROCEDURE — 40000424 ZZHCL STATISTIC BONE MARROW CORE PERF TC 38221: Performed by: NURSE PRACTITIONER

## 2017-08-17 PROCEDURE — 88184 FLOWCYTOMETRY/ TC 1 MARKER: CPT | Performed by: NURSE PRACTITIONER

## 2017-08-17 PROCEDURE — 40000795 ZZHCL STATISTIC DNA PROCESS AND HOLD: Performed by: NURSE PRACTITIONER

## 2017-08-17 PROCEDURE — 80053 COMPREHEN METABOLIC PANEL: CPT

## 2017-08-17 PROCEDURE — 88185 FLOWCYTOMETRY/TC ADD-ON: CPT | Performed by: NURSE PRACTITIONER

## 2017-08-17 PROCEDURE — 88342 IMHCHEM/IMCYTCHM 1ST ANTB: CPT | Performed by: NURSE PRACTITIONER

## 2017-08-17 PROCEDURE — G0364 BONE MARROW ASPIRATE &BIOPSY: HCPCS | Mod: ZF

## 2017-08-17 PROCEDURE — 88237 TISSUE CULTURE BONE MARROW: CPT | Performed by: NURSE PRACTITIONER

## 2017-08-17 PROCEDURE — 38221 DX BONE MARROW BIOPSIES: CPT | Mod: ZF

## 2017-08-17 PROCEDURE — 88305 TISSUE EXAM BY PATHOLOGIST: CPT | Performed by: NURSE PRACTITIONER

## 2017-08-17 PROCEDURE — 88161 CYTOPATH SMEAR OTHER SOURCE: CPT | Mod: XU | Performed by: NURSE PRACTITIONER

## 2017-08-17 PROCEDURE — 88264 CHROMOSOME ANALYSIS 20-25: CPT | Performed by: NURSE PRACTITIONER

## 2017-08-17 PROCEDURE — 25000128 H RX IP 250 OP 636: Mod: ZF

## 2017-08-17 PROCEDURE — 88280 CHROMOSOME KARYOTYPE STUDY: CPT | Performed by: NURSE PRACTITIONER

## 2017-08-17 PROCEDURE — 85025 COMPLETE CBC W/AUTO DIFF WBC: CPT

## 2017-08-17 RX ORDER — TRIAMCINOLONE ACETONIDE 1 MG/G
OINTMENT TOPICAL DAILY PRN
COMMUNITY
Start: 2017-07-20

## 2017-08-17 RX ORDER — HEPARIN SODIUM (PORCINE) LOCK FLUSH IV SOLN 100 UNIT/ML 100 UNIT/ML
5 SOLUTION INTRAVENOUS ONCE
Status: COMPLETED | OUTPATIENT
Start: 2017-08-17 | End: 2017-08-17

## 2017-08-17 RX ORDER — ACETAMINOPHEN 500 MG
500-1000 TABLET ORAL PRN
COMMUNITY

## 2017-08-17 RX ADMIN — SODIUM CHLORIDE, PRESERVATIVE FREE 5 ML: 5 INJECTION INTRAVENOUS at 09:58

## 2017-08-17 ASSESSMENT — PAIN SCALES - GENERAL
PAINLEVEL: NO PAIN (0)
PAINLEVEL: NO PAIN (0)

## 2017-08-17 NOTE — MR AVS SNAPSHOT
After Visit Summary   8/17/2017    Avis Paul    MRN: [id number]           Patient Information     Date Of Birth          1965        Visit Information        Provider Department      8/17/2017 10:30 AM UU BONE MARROW BIOPSY;  BMT BOONE #2 [facility] Blood and Marrow Transplant        Today's Diagnoses     Non-Hodgkin's lymphoma, unspecified body region, unspecified non-Hodgkin lymphoma type (H)    -  1          [facility] ([facility])  [address] [place] [id number]  Phone: [phone]  Clinic Hours:   Monday-Thursday:7am to 7pm   Friday: 7am to 5pm   Weekends and holidays:    8am to noon (in general)  If your fever is 100.5  or greater,   call the clinic.  After hours call the   hospital at [phone] or   [phone]. Ask for the BMT   fellow on-call            Follow-ups after your visit        Your next 10 appointments already scheduled     Aug 30, 2017 11:00 AM CDT   BMT Anniversary Visit with  BMT DOM   [facility] Blood and Marrow Transplant ([facility])    [address] [place] [id number] [phone]            Oct 02, 2017 10:40 AM CDT   (Arrive by 10:25 AM)   Return Visit with Alek Amos MD   [facility] Gastroenterology and IBD ([facility])    [address] [place] [address]  4th [facility] [id number] [phone]              Who to contact     If you have questions or need follow up information about today's clinic visit or your schedule please contact [facility] BLOOD AND MARROW TRANSPLANT directly at [phone].  Normal or non-critical lab and imaging results will be communicated to you by MyChart, letter or phone within 4 business days after the clinic has received the results. If you do not hear from us within 7 days, please contact the clinic through MyChart or phone. If you have a critical or abnormal lab result, we will notify you by phone as soon as  "possible.  Submit refill requests through [employer] or call your pharmacy and they will forward the refill request to us. Please allow 3 business days for your refill to be completed.          Additional Information About Your Visit        [email or web address] Information     [employer] gives you secure access to your electronic health record. If you see a primary care provider, you can also send messages to your care team and make appointments. If you have questions, please call your primary care clinic.  If you do not have a primary care provider, please call [phone] and they will assist you.        Care EveryWhere ID     This is your Care EveryWhere ID. This could be used by other organizations to access your [place] medical records  [id number]        Your Vitals Were     Pulse Temperature Respirations Height Pulse Oximetry BMI (Body Mass Index)    70 98.4  F (36.9  C) (Oral) 18 1.715 m (5' 7.52\") 100% 35.75 kg/m2       Blood Pressure from Last 3 Encounters:   08/17/17 131/85   06/13/17 112/80   05/22/17 124/82    Weight from Last 3 Encounters:   08/17/17 105.1 kg (231 lb 12.8 oz)   06/19/17 103 kg (227 lb)   06/13/17 103 kg (227 lb)              We Performed the Following     Bone Marrow Aspirate (Charge)     Bone Marrow Biopsy (Charge)     Bone marrow biopsy     CBC with platelets differential     CHROMOSOME BONE MARROW With Professional Interpretation     Comprehensive metabolic panel     Lactate Dehydrogenase     Leukemia Lymphoma Evaluation (Flow Cytometry)     Process and hold DNA - Bone Marrow     T4 free     TSH          Today's Medication Changes          These changes are accurate as of: 8/17/17 10:44 AM.  If you have any questions, ask your nurse or doctor.               Stop taking these medicines if you haven't already. Please contact your care team if you have questions.     B-12 1000 MCG Tbcr           BENZONATATE PO           folic acid 0.8 MG Caps           gabapentin 300 MG capsule   Commonly known as:  " NEURONTIN           levofloxacin 500 MG tablet   Commonly known as:  LEVAQUIN           pantoprazole 40 MG EC tablet   Commonly known as:  PROTONIX                    Recent Review Flowsheet Data     BMT Recent Results Latest Ref Rng & Units 11/23/2015 11/23/2015 2/17/2016 8/17/2016 1/23/2017 2/13/2017 8/17/2017    WBC 4.0 - 11.0 10e9/L - 5.1 6.7 6.8 5.0 5.3 5.9    Hemoglobin 11.7 - 15.7 g/dL - 10.9(L) 13.0 12.8 13.2 12.6 13.1    Platelet Count 150 - 450 10e9/L - 146(L) 153 128(L) 148(L) 138(L) 126(L)    Neutrophils (Absolute) 1.6 - 8.3 10e9/L - 3.4 5.1 4.8 3.5 3.7 3.7    INR 0.86 - 1.14 - - - - - - -    Sodium 133 - 144 mmol/L - 142 142 141 142 - -    Potassium 3.4 - 5.3 mmol/L - 3.1(L) 3.7 3.6 3.6 - -    Chloride 94 - 109 mmol/L - 109 109 110(H) 111(H) - -    Glucose 70 - 99 mg/dL 87 86 86 96 83 - -    Urea Nitrogen 7 - 30 mg/dL - 12 19 24 15 - -    Creatinine 0.52 - 1.04 mg/dL - 1.74(H) 1.87(H) 1.78(H) 1.75(H) - -    Calcium (Total) 8.5 - 10.1 mg/dL - 8.7 8.8 8.6 8.3(L) - -    Protein (Total) 6.8 - 8.8 g/dL - 6.0(L) 6.2(L) 5.5(L) 5.4(L) - -    Albumin 3.4 - 5.0 g/dL - 3.6 4.0 3.4 3.2(L) - -    Bilirubin (Direct) 0.0 - 0.2 mg/dL - - - - - - -    Alkaline Phosphatase 40 - 150 U/L - 95 113 104 96 - -    AST 0 - 45 U/L - 13 14 18 17 - -    ALT 0 - 50 U/L - 25 22 29 26 - -    MCV 78 - 100 fl - 93 92 93 92 91 92               Primary Care Provider    None       No address on file        Equal Access to Services     PERRI ESTEVEZ : Tonny Mtz, waflaviada sjadaha, qavanessata kaalmada adrien, royal gayle. So [facility] [phone].    ATENCIÓN: Si habla español, tiene a walker disposición servicios gratuitos de asistencia lingüística. Llame al [phone].    We comply with applicable federal civil rights laws and [place] laws. We do not discriminate on the basis of race, color, national origin, age, disability sex, sexual orientation or gender identity.            Thank you!      Thank you for choosing [facility] BLOOD AND MARROW TRANSPLANT  for your care. Our goal is always to provide you with excellent care. Hearing back from our patients is one way we can continue to improve our services. Please take a few minutes to complete the written survey that you may receive in the mail after your visit with us. Thank you!             Your Updated Medication List - Protect others around you: Learn how to safely use, store and throw away your medicines at www.disposemymeds.org.          This list is accurate as of: 8/17/17 10:44 AM.  Always use your most recent med list.                   Brand Name Dispense Instructions for use Diagnosis    acetaminophen 500 MG tablet    TYLENOL     Take 500-1,000 mg by mouth as needed        acyclovir 400 MG tablet    ZOVIRAX    60 tablet    Take 2 tablets (800 mg) by mouth daily    Lymphoma, mantle cell, multiple sites (H)       albuterol 108 (90 BASE) MCG/ACT Inhaler    PROAIR HFA/PROVENTIL HFA/VENTOLIN HFA     Inhale 2 puffs into the lungs every 4 hours as needed for shortness of breath / dyspnea or wheezing        fluticasone 50 MCG/ACT spray    FLONASE          * LEVOTHYROXINE SODIUM PO      Take 75 mcg by mouth daily        * levothyroxine 75 MCG tablet    SYNTHROID/LEVOTHROID     TAKE ONE TABLET BY MOUTH ONCE DAILY AT  6  AM        prochlorperazine 10 MG tablet    COMPAZINE     Take 5 mg by mouth        triamcinolone 0.1 % ointment    KENALOG     Apply topically daily        * Notice:  This list has 2 medication(s) that are the same as other medications prescribed for you. Read the directions carefully, and ask your doctor or other care provider to review them with you.

## 2017-08-17 NOTE — PROGRESS NOTES
"BMT ONC Adult Bone Marrow Biopsy Procedure Note  August 17, 2017  /85 (BP Location: Right arm, Patient Position: Chair, Cuff Size: Adult Large)  Pulse 70  Temp 98.4  F (36.9  C) (Oral)  Resp 18  Ht 1.715 m (5' 7.52\")  Wt 105.1 kg (231 lb 12.8 oz)  SpO2 100%  BMI 35.75 kg/m2     Learning needs assessment complete within 12 months? YES    DIAGNOSIS: NHL     PROCEDURE: Unilateral Bone Marrow Biopsy and Unilateral Aspirate    LOCATION: [facility] 2nd Floor    Patient s identification was positively verified by verbal identification and invasive procedure safety checklist was completed. Informed consent was obtained. The patient was placed in the prone position and prepped and draped in a sterile manner. Approximately 20 cc of 1% Lidocaine was used over the right posterior iliac spine. Following this a 3 mm incision was made. Trephine bone marrow core(s) was (were) obtained from the [facility]. Bone marrow aspirates were obtained from the [facility]. Aspirates were sent for morphology, immunophenotyping, cytogenetics and molecular diagnostics . A total of approximately 20 ml of marrow was aspirated. Following this procedure a sterile dressing was applied to the bone marrow biopsy site(s). The patient was placed in the supine position to maintain pressure on the biopsy site. Post-procedure wound care instructions were given.     Complications: NO    Pre-procedural pain: 0 out of 10 on the numeric pain rating scale.     Procedural pain: 4 out of 10 on the numeric pain rating scale.     Post-procedural pain assessment: 0 out of 10 on the numeric pain rating scale.     Interventions: NO    Length of procedure:20 minutes or less      Procedure performed by: Christiane Franks    "

## 2017-08-17 NOTE — NURSING NOTE
Pt remained supine for >30 min following completion of BMBX.  Pt awake and oriented upon this RN entering the room.  PT denies pain.  VSS.  Right illiac crest dressing site WNL, dressing CDI.  Care of this dressing discussed with patient.  Pt denies questions about how to care for this dressing, when to next shower, activity restrictions for today, prn pain medications, and when/why to call MD with clinical concerns.  PT denies dizziness or pain upon standing.  Port flushed with heparin and needle removed prior to discharged.  Bandaid applied to [facility] chest.   PT tolerated well.  Pt discharged to home.

## 2017-08-18 LAB — COPATH REPORT: NORMAL

## 2017-08-30 ENCOUNTER — OFFICE VISIT (OUTPATIENT)
Dept: TRANSPLANT | Facility: CLINIC | Age: 52
End: 2017-08-30
Payer: COMMERCIAL

## 2017-08-30 VITALS
HEART RATE: 81 BPM | WEIGHT: 230 LBS | BODY MASS INDEX: 35.47 KG/M2 | DIASTOLIC BLOOD PRESSURE: 81 MMHG | TEMPERATURE: 98.1 F | RESPIRATION RATE: 20 BRPM | SYSTOLIC BLOOD PRESSURE: 134 MMHG | OXYGEN SATURATION: 97 %

## 2017-08-30 DIAGNOSIS — C83.18 LYMPHOMA, MANTLE CELL, MULTIPLE SITES (H): ICD-10-CM

## 2017-08-30 DIAGNOSIS — J01.01 ACUTE RECURRENT MAXILLARY SINUSITIS: Primary | ICD-10-CM

## 2017-08-30 PROCEDURE — 99213 OFFICE O/P EST LOW 20 MIN: CPT

## 2017-08-30 RX ORDER — LEVOFLOXACIN 500 MG/1
500 TABLET, FILM COATED ORAL DAILY
Qty: 14 TABLET | Refills: 0 | Status: SHIPPED | OUTPATIENT
Start: 2017-08-30 | End: 2018-08-17

## 2017-08-30 ASSESSMENT — PAIN SCALES - GENERAL: PAINLEVEL: NO PAIN (0)

## 2017-08-30 NOTE — PROGRESS NOTES
BMT  visit note    Mrs Paul is a 49yo female, she is here for 2-year anniversary after auto PBSCT for MCL.        HPI:   Avis is delightful 52 to patient with high risk mantle cell lymphoma; She received BEAM conditioning followed by stem cell infusion on 8/18/2015 and has been on maintenance rituximab, which she will finish at 2 year post-transplant in august 2017.    The patient has a very high-risk mantle cell lymphoma and previously responded to ibrutinib.   Today, she reports overall doing quite well, but has another sinus infection which started about a week ago. She is congested, has nasal drainage, green phlegm and feel tired. She is not on antibiotics, She has been receiving IVIG replacement for last 3 months in 's office. No new lumps, no pain, no night sweats.     Review of Systems:  o/w neg, no resp, cardiac, GI or  complains.     Physical Exam:   /81  Pulse 81  Temp 98.1  F (36.7  C) (Oral)  Resp 20  Wt 104.3 kg (230 lb)  SpO2 97%  BMI 35.47 kg/m2   Wt Readings from Last 4 Encounters:   08/30/17 104.3 kg (230 lb)   08/17/17 105.1 kg (231 lb 12.8 oz)   06/19/17 103 kg (227 lb)   06/13/17 103 kg (227 lb)     General: NAD, interactive, appropriate, %   Eyes: : ROSA, sclera anicteric   Nose/Mouth/Throat: OP clear, mucosa moist,  Red and swollen nasal mucosa with drainage.  Ears; TM clear bilat; no ext canal inflammation.  yes sinus tenderness. Some pharyngeal erythema and edema.     Lungs:no crackles or wheezes  Cardiovascular: RRR, no M/R/G   Abdominal/Rectal: mild epigastic tenderness to palpation, no guarding, no RUQ or Mares sign, obese  Lymphatics: No cerv, axill supraclav nodes.  R groin soft mass smaller.   No BLE edema  Skin: No rashes or petechaie.   Neuro: A&O. Grossly non-focal.   Additional Findings: port in place; not tnder  LabS  Lab Results   Component Value Date    WBC 5.9 08/17/2017    ANEU 3.7 08/17/2017    HGB 13.1 08/17/2017    HCT 40.9 08/17/2017      (L) 08/17/2017     08/17/2017    POTASSIUM 3.5 08/17/2017    CHLORIDE 107 08/17/2017    CO2 24 08/17/2017    GLC 89 08/17/2017    BUN 32 (H) 08/17/2017    CR 1.81 (H) 08/17/2017    MAG 1.7 08/31/2015    INR 1.08 09/09/2015    AST 20 08/17/2017    ALT 25 08/17/2017       PMHX:   She is chronically being troubled by recurrent sinusitis and upper respiratory infections. SHeh ad 4 recurrent episodes of acute sinusitis in past 4 moths. Has been seen by ENT.     She also has chronic neuropathy in the fingers of both hands and her toes.  2016- recent diagnosis of vitamin B12 and vitamin D deficiency and was only on oral over-the-counter replacement   Severe hypogamaglobulinemia - insurance denied the replacement IVIG in past, most recently on IVIG x 4 month. She is currently in remission from her mantle cell lymphoma.       PHYSICAL EXAMINATION: /81  Pulse 81  Temp 98.1  F (36.7  C) (Oral)  Resp 20  Wt 104.3 kg (230 lb)  SpO2 97%  BMI 35.47 kg/m2  VITAL SIGNS:  She is afebrile.    GENERAL:  She looks tired.   HEENT:  She has oropharyngeal erythema and tenderness over the maxillary sinus.  There is some nasal congestion.    CHEST:  Clear to auscultation.   HEART:  Heart tones are regular.     LYMPH:  No adenopathy.      LABORATORY DATA:    Lab Results   Component Value Date    WBC 5.9 08/17/2017    ANEU 3.7 08/17/2017    HGB 13.1 08/17/2017    HCT 40.9 08/17/2017     (L) 08/17/2017     08/17/2017    POTASSIUM 3.5 08/17/2017    CHLORIDE 107 08/17/2017    CO2 24 08/17/2017    GLC 89 08/17/2017    BUN 32 (H) 08/17/2017    CR 1.81 (H) 08/17/2017    MAG 1.7 08/31/2015    INR 1.08 09/09/2015    AST 20 08/17/2017    ALT 25 08/17/2017     he B12 today is 394 which is within normal limits.  We are waiting for the chemistry.  IgG level was 92 mg/dL in April.      ASSESSMENT AND PLAN:     1.  Mantle cell lymphoma, high risk and resistant to chemotherapy but resonding to ibrutinib.   in remission  at 2 years post-transplant.  She completed maintenance rituximab, which finished at 2 year post-transplant in august 2017.  The patient has a very high-risk mantle cell lymphoma and previously responded to ibrutinib.    She will be followed by  as needed. Cont IVIG to keep above 500mg/dl.  I will have patient to see me yearly with imaging to monitor MCL. We have schedule follow-up in 12 months. Her MCL is at risk of recurrence and she should be monitored closely.      1.  Recurrent sinusitis associated with hypogammaglobulinemia.  I recommended to start Levaquin 500mg daily x 14 days. She was seen by ENT for evaluation in the past and I told her to call them if she is not better in 2 weeks.     Cont IVIG weekly x4 every other month.    4.  Infectious Disease.  She will receive 2-year immunizations in 09/2017. We did not give her vaccines today given a active infection but advices Avis to come back in 1 month.   5.  Gastrointestinal.  The patient had diarrhea likely due to bacterial overgrowth, status post rifampin therapy, now completely resolved.  Her EGD did not show mantle cell lymphoma.       Plan:  Transfer to [facility] for yearly appt  D.c from BMT clinic  F/u with  for IVIG   RTC in 1 month for 2-year vaccines      Carol Rose MD  Associate Professor of Medicine  [phone]

## 2017-08-30 NOTE — MR AVS SNAPSHOT
After Visit Summary   8/30/2017    Avis Paul    MRN: [id number]           Patient Information     Date Of Birth          1965        Visit Information        Provider Department      8/30/2017 11:00 AM UC BMT DOM [facility] Blood and Marrow Transplant        Today's Diagnoses     Acute recurrent maxillary sinusitis    -  1    Lymphoma, mantle cell, multiple sites (H)              [facility]  [address] [place] [address]  Phone: [phone]  Clinic Hours:   Monday-Thursday:7am to 7pm   Friday: 7am to 5pm   Weekends and holidays:    8am to noon (in general)  If your fever is 100.5  or greater,   call the clinic.  After hours call the   hospital at [phone] or   [phone]. Ask for the BMT   fellow on-call           Care Instructions    RTC in 1 year with me in [facility] with labs and CT scan CAP   VB    CC'd chart: Return for immunization in 1 month   F/u with me in 1year with CT scan and lab           Follow-ups after your visit        Your next 10 appointments already scheduled     Sep 27, 2017 11:00 AM CDT   BMT Nurse Visit with  Bmt Nurse 1   [facility] Blood and Marrow Transplant ([facility])    [address] [place] [address]  2nd Floor  [facility] [address] [phone]            Oct 02, 2017 10:40 AM CDT   (Arrive by 10:25 AM)   Return Visit with Alek Amos MD   [facility] ([facility])    [address] [place] [address]  4th Floor  [facility] [id number] [phone]            Aug 28, 2018 10:40 AM CDT   (Arrive by 10:25 AM)   CT CHEST ABDOMEN PELVIS W/O & W CONTRAST with UCCT2   [facility] Center CT ([facility])    [address] [facility]  1st Floor  [facility] [id number] [phone]           Please bring any scans or X-rays taken at other hospitals, if similar tests were done. Also bring a list of your medicines,  including vitamins, minerals and over-the-counter drugs. It is safest to leave personal items at home.  Be sure to tell your doctor:   If you have any allergies.   If there s any chance you are pregnant.   If you are breastfeeding.   If you have any special needs.  You may have contrast for this exam. To prepare:   Do not eat or drink for 2 hours before your exam. If you need to take medicine, you may take it with small sips of water. (We may ask you to take liquid medicine as well.)   The day before your exam, drink extra fluids at least six 8-ounce glasses (unless your doctor tells you to restrict your fluids).  Patients over 70 or patients with diabetes or kidney problems:   If you haven t had a blood test (creatinine test) within the last 30 days, go to your clinic or Diagnostic Imaging Department for this test.  If you have diabetes:   If your kidney function is normal, continue taking your metformin (Avandamet, Glucophage, Glucovance, Metaglip) on the day of your exam.   If your kidney function is abnormal, wait 48 hours before restarting this medicine.  You will have oral contrast for this exam:   You will drink the contrast at home. Get this from your clinic or Diagnostic Imaging Department. Please follow the directions given.  Please wear loose clothing, such as a sweat suit or jogging clothes. Avoid snaps, zippers and other metal. We may ask you to undress and put on a hospital gown.  If you have any questions, please call the Imaging Department where you will have your exam.            Aug 28, 2018  1:00 PM CDT   [facility] Draw with  [facility] DRAW   [facility] Lab Draw ([facility])    [address] [place] [address] [phone]            Aug 28, 2018  1:30 PM CDT   Return with Carol Rose MD   [facility] Blood and Marrow Transplant ([facility])    [address] [place] [address]    [phone]              Future tests that were ordered for you today     Open Future Orders        Priority Expected Expires Ordered    CT Chest Abdomen Pelvis w/o & w Contrast Routine 8/30/2018 8/30/2018 8/30/2017            Who to contact     If you have questions or need follow up information about today's clinic visit or your schedule please contact [facility] directly at [phone].  Normal or non-critical lab and imaging results will be communicated to you by Polisofiahart, letter or phone within 4 business days after the clinic has received the results. If you do not hear from us within 7 days, please contact the clinic through ShuttleCloud or phone. If you have a critical or abnormal lab result, we will notify you by phone as soon as possible.  Submit refill requests through ShuttleCloud or call your pharmacy and they will forward the refill request to us. Please allow 3 business days for your refill to be completed.          Additional Information About Your Visit        PolisofiaharMetrilus Information     ShuttleCloud gives you secure access to your electronic health record. If you see a primary care provider, you can also send messages to your care team and make appointments. If you have questions, please call your primary care clinic.  If you do not have a primary care provider, please call [phone] and they will assist you.        Care EveryWhere ID     This is your Care EveryWhere ID. This could be used by other organizations to access your [facility] medical records  [id number]        Your Vitals Were     Pulse Temperature Respirations Pulse Oximetry BMI (Body Mass Index)       81 98.1  F (36.7  C) (Oral) 20 97% 35.47 kg/m2        Blood Pressure from Last 3 Encounters:   08/30/17 134/81   08/17/17 (!) 146/94   06/13/17 112/80    Weight from Last 3 Encounters:   08/30/17 104.3 kg (230 lb)   08/17/17 105.1 kg (231 lb 12.8 oz)   06/19/17 103 kg (227 lb)                 Today's Medication Changes           These changes are accurate as of: 8/30/17  2:05 PM.  If you have any questions, ask your nurse or doctor.               Start taking these medicines.        Dose/Directions    levofloxacin 500 MG tablet   Commonly known as:  LEVAQUIN   Used for:  Acute recurrent maxillary sinusitis        Dose:  500 mg   Take 1 tablet (500 mg) by mouth daily   Quantity:  14 tablet   Refills:  0            Where to get your medicines      These medications were sent to [facility] [address] [place] [address]  [address] [facility] [address]     Phone:  [phone]     levofloxacin 500 MG tablet                Recent Review Flowsheet Data     BMT Recent Results Latest Ref Rng & Units 11/23/2015 11/23/2015 2/17/2016 8/17/2016 1/23/2017 2/13/2017 8/17/2017    WBC 4.0 - 11.0 10e9/L - 5.1 6.7 6.8 5.0 5.3 5.9    Hemoglobin 11.7 - 15.7 g/dL - 10.9(L) 13.0 12.8 13.2 12.6 13.1    Platelet Count 150 - 450 10e9/L - 146(L) 153 128(L) 148(L) 138(L) 126(L)    Neutrophils (Absolute) 1.6 - 8.3 10e9/L - 3.4 5.1 4.8 3.5 3.7 3.7    INR 0.86 - 1.14 - - - - - - -    Sodium 133 - 144 mmol/L - 142 142 141 142 - 140    Potassium 3.4 - 5.3 mmol/L - 3.1(L) 3.7 3.6 3.6 - 3.5    Chloride 94 - 109 mmol/L - 109 109 110(H) 111(H) - 107    Glucose 70 - 99 mg/dL 87 86 86 96 83 - 89    Urea Nitrogen 7 - 30 mg/dL - 12 19 24 15 - 32(H)    Creatinine 0.52 - 1.04 mg/dL - 1.74(H) 1.87(H) 1.78(H) 1.75(H) - 1.81(H)    Calcium (Total) 8.5 - 10.1 mg/dL - 8.7 8.8 8.6 8.3(L) - 8.7    Protein (Total) 6.8 - 8.8 g/dL - 6.0(L) 6.2(L) 5.5(L) 5.4(L) - 6.9    Albumin 3.4 - 5.0 g/dL - 3.6 4.0 3.4 3.2(L) - 3.2(L)    Bilirubin (Direct) 0.0 - 0.2 mg/dL - - - - - - -    Alkaline Phosphatase 40 - 150 U/L - 95 113 104 96 - 108    AST 0 - 45 U/L - 13 14 18 17 - 20    ALT 0 - 50 U/L - 25 22 29 26 - 25    MCV 78 - 100 fl - 93 92 93 92 91 92               Primary Care Provider    None       No address on file        Equal Access to Services     PERRI BRUNSON: Tonny goyal  mary Mtz, waflaviada sjtrent, qavanessata kavanesa juraod, royal arvindin hayaan merlynjennifer gan laalonsogricelda irwin. So [facility] [phone].    ATENCIÓN: Si juanjola toya, tiene a walker disposición servicios gratuitos de asistencia lingüística. Fahad al [phone].    We comply with applicable federal civil rights laws and [place] laws. We do not discriminate on the basis of race, color, national origin, age, disability sex, sexual orientation or gender identity.            Thank you!     Thank you for choosing [facility]PL[facility]  for your care. Our goal is always to provide you with excellent care. Hearing back from our patients is one way we can continue to improve our services. Please take a few minutes to complete the written survey that you may receive in the mail after your visit with us. Thank you!             Your Updated Medication List - Protect others around you: Learn how to safely use, store and throw away your medicines at www.disposemymeds.org.          This list is accurate as of: 8/30/17  2:05 PM.  Always use your most recent med list.                   Brand Name Dispense Instructions for use Diagnosis    acetaminophen 500 MG tablet    TYLENOL     Take 500-1,000 mg by mouth as needed        acyclovir 400 MG tablet    ZOVIRAX    60 tablet    Take 2 tablets (800 mg) by mouth daily    Lymphoma, mantle cell, multiple sites (H)       albuterol 108 (90 BASE) MCG/ACT Inhaler    PROAIR HFA/PROVENTIL HFA/VENTOLIN HFA     Inhale 2 puffs into the lungs every 4 hours as needed for shortness of breath / dyspnea or wheezing        fluticasone 50 MCG/ACT spray    FLONASE          levofloxacin 500 MG tablet    LEVAQUIN    14 tablet    Take 1 tablet (500 mg) by mouth daily    Acute recurrent maxillary sinusitis       * LEVOTHYROXINE SODIUM PO      Take 75 mcg by mouth daily        * levothyroxine 75 MCG tablet    SYNTHROID/LEVOTHROID     TAKE ONE TABLET BY MOUTH ONCE DAILY AT  6  AM        prochlorperazine  10 MG tablet    COMPAZINE     Take 5 mg by mouth        triamcinolone 0.1 % ointment    KENALOG     Apply topically daily        * Notice:  This list has 2 medication(s) that are the same as other medications prescribed for you. Read the directions carefully, and ask your doctor or other care provider to review them with you.

## 2017-08-30 NOTE — PATIENT INSTRUCTIONS
RTC in 1 year with me in [facility] with labs and CT scan CAP   MILA    CC'd chart: Return for immunization in 1 month   F/u with me in 1year with CT scan and lab

## 2017-08-30 NOTE — NURSING NOTE
"Oncology Rooming Note    August 30, 2017 3:34 PM   Avis Paul is a 52 year old female who presents for:    Chief Complaint   Patient presents with     RECHECK     NHL~ here for provider visit     Initial Vitals: /81  Pulse 81  Temp 98.1  F (36.7  C) (Oral)  Resp 20  Wt 104.3 kg (230 lb)  SpO2 97%  BMI 35.47 kg/m2 Estimated body mass index is 35.47 kg/(m^2) as calculated from the following:    Height as of 8/17/17: 1.715 m (5' 7.52\").    Weight as of this encounter: 104.3 kg (230 lb). Body surface area is 2.23 meters squared.  No Pain (0) Comment: has a cold   No LMP recorded. Patient is postmenopausal.  Allergies reviewed: Yes  Medications reviewed: Yes    Medications: MEDICATION REFILLS NEEDED TODAY. Provider was NOT notified.  Pharmacy name entered into SiCortex:    [facility] [address] [place] [address] - [address] [place] [address] PHARMACY [place], [place] - [address] [facility] [address]    Clinical concerns: no no was notified.    8 minutes for nursing intake (face to face time)     Juliane Mckeon RN              "

## 2017-08-31 LAB — COPATH REPORT: NORMAL

## 2017-09-13 ENCOUNTER — INFUSION - HEALTHEAST (OUTPATIENT)
Dept: INFUSION THERAPY | Facility: HOSPITAL | Age: 52
End: 2017-09-13

## 2017-09-13 DIAGNOSIS — D80.1 HYPOGAMMAGLOBULINEMIA, ACQUIRED (H): ICD-10-CM

## 2017-09-27 ENCOUNTER — OFFICE VISIT (OUTPATIENT)
Dept: TRANSPLANT | Facility: CLINIC | Age: 52
End: 2017-09-27
Payer: COMMERCIAL

## 2017-09-27 DIAGNOSIS — C85.90 NHL (NON-HODGKIN'S LYMPHOMA) (H): Primary | ICD-10-CM

## 2017-09-27 PROCEDURE — 90716 VAR VACCINE LIVE SUBQ: CPT | Mod: ZF

## 2017-09-27 PROCEDURE — G0008 ADMIN INFLUENZA VIRUS VAC: HCPCS

## 2017-09-27 PROCEDURE — 90648 HIB PRP-T VACCINE 4 DOSE IM: CPT | Mod: ZF

## 2017-09-27 PROCEDURE — 90472 IMMUNIZATION ADMIN EACH ADD: CPT

## 2017-09-27 PROCEDURE — 25000128 H RX IP 250 OP 636: Mod: ZF

## 2017-09-27 PROCEDURE — 90686 IIV4 VACC NO PRSV 0.5 ML IM: CPT | Mod: ZF

## 2017-09-27 PROCEDURE — 90707 MMR VACCINE SC: CPT | Mod: ZF

## 2017-09-27 PROCEDURE — 90723 DTAP-HEP B-IPV VACCINE IM: CPT | Mod: ZF

## 2017-09-27 PROCEDURE — 25000125 ZZHC RX 250: Mod: ZF

## 2017-09-27 PROCEDURE — G0009 ADMIN PNEUMOCOCCAL VACCINE: HCPCS

## 2017-09-27 PROCEDURE — 90670 PCV13 VACCINE IM: CPT | Mod: ZF

## 2017-09-27 PROCEDURE — G0010 ADMIN HEPATITIS B VACCINE: HCPCS

## 2017-09-27 RX ADMIN — VARICELLA VIRUS VACCINE LIVE 0.5 ML: 1350 INJECTION, POWDER, LYOPHILIZED, FOR SUSPENSION SUBCUTANEOUS at 11:39

## 2017-09-27 RX ADMIN — PNEUMOCOCCAL 13-VALENT CONJUGATE VACCINE 0.5 ML: 2.2; 2.2; 2.2; 2.2; 2.2; 4.4; 2.2; 2.2; 2.2; 2.2; 2.2; 2.2; 2.2 INJECTION, SUSPENSION INTRAMUSCULAR at 11:41

## 2017-09-27 RX ADMIN — HAEMOPHILUS B POLYSACCHARIDE CONJUGATE VACCINE FOR INJ 0.5 ML: RECON SOLN at 11:42

## 2017-09-27 RX ADMIN — DIPHTHERIA AND TETANUS TOXOIDS AND ACELLULAR PERTUSSIS ADSORBED, HEPATITIS B (RECOMBINANT) AND INACTIVATED POLIOVIRUS VACCINE COMBINED 0.5 ML: 25; 10; 25; 25; 8; 10; 40; 8; 32 INJECTION, SUSPENSION INTRAMUSCULAR at 11:40

## 2017-09-27 RX ADMIN — INFLUENZA A VIRUS A/MICHIGAN/45/2015 X-275 (H1N1) ANTIGEN (FORMALDEHYDE INACTIVATED), INFLUENZA A VIRUS A/HONG KONG/4801/2014 X-263B (H3N2) ANTIGEN (FORMALDEHYDE INACTIVATED), INFLUENZA B VIRUS B/PHUKET/3073/2013 ANTIGEN (FORMALDEHYDE INACTIVATED), AND INFLUENZA B VIRUS B/BRISBANE/60/2008 ANTIGEN (FORMALDEHYDE INACTIVATED) 0.5 ML: 15; 15; 15; 15 INJECTION, SUSPENSION INTRAMUSCULAR at 11:40

## 2017-09-27 RX ADMIN — MEASLES, MUMPS, AND RUBELLA VIRUS VACCINE LIVE 0.5 ML: 1000; 12500; 1000 INJECTION, POWDER, LYOPHILIZED, FOR SUSPENSION SUBCUTANEOUS at 11:39

## 2017-09-27 NOTE — MR AVS SNAPSHOT
After Visit Summary   9/27/2017    Avis Paul    MRN: [id number]           Patient Information     Date Of Birth          1965        Visit Information        Provider Department      9/27/2017 11:00 AM 1,  Bmt Nurse [facility] Blood and Marrow Transplant        Today's Diagnoses     NHL (non-Hodgkin's lymphoma) (H)    -  1          [facility] ([facility])  [address] [place] [address]  Phone: [phone]  Clinic Hours:   Monday-Thursday:7am to 7pm   Friday: 7am to 5pm   Weekends and holidays:    8am to noon (in general)  If your fever is 100.5  or greater,   call the clinic.  After hours call the   hospital at [phone] or   [phone]. Ask for the BMT   fellow on-call            Follow-ups after your visit        Your next 10 appointments already scheduled     Oct 02, 2017 10:40 AM CDT   (Arrive by 10:25 AM)   Return Visit with Alek Amos MD   [facility] ([facility])    [address] [place] [address]  4th [facility] [id number] [phone]            Aug 28, 2018 10:40 AM CDT   (Arrive by 10:25 AM)   CT CHEST ABDOMEN PELVIS W/O & W CONTRAST with UCCT2   [facility] [place] [facility] ([facility])    [address] [place] [id number] [phone]           Please bring any scans or X-rays taken at other hospitals, if similar tests were done. Also bring a list of your medicines, including vitamins, minerals and over-the-counter drugs. It is safest to leave personal items at home.  Be sure to tell your doctor:   If you have any allergies.   If there s any chance you are pregnant.   If you are breastfeeding.   If you have any special needs.  You may have contrast for this exam. To prepare:   Do not eat or drink for 2 hours before your exam. If you need to take medicine, you may take it with small sips of water. (We may ask you to take liquid medicine  as well.)   The day before your exam, drink extra fluids at least six 8-ounce glasses (unless your doctor tells you to restrict your fluids).  Patients over 70 or patients with diabetes or kidney problems:   If you haven t had a blood test (creatinine test) within the last 30 days, go to your clinic or Diagnostic Imaging Department for this test.  If you have diabetes:   If your kidney function is normal, continue taking your metformin (Avandamet, Glucophage, Glucovance, Metaglip) on the day of your exam.   If your kidney function is abnormal, wait 48 hours before restarting this medicine.  You will have oral contrast for this exam:   You will drink the contrast at home. Get this from your clinic or Diagnostic Imaging Department. Please follow the directions given.  Please wear loose clothing, such as a sweat suit or jogging clothes. Avoid snaps, zippers and other metal. We may ask you to undress and put on a hospital gown.  If you have any questions, please call the Imaging Department where you will have your exam.            Aug 28, 2018  1:00 PM CDT   [facility] Lab Draw with  [facility] LAB DRAW   [facility] Lab Draw ([facility])    [address] [place] [id number] [phone]            Aug 28, 2018  1:30 PM CDT   Return with Carol Rose MD   [facility] Blood and Marrow Transplant ([facility])    [address] [place] [id number] [phone]              Who to contact     If you have questions or need follow up information about today's clinic visit or your schedule please contact [facility] BLOOD AND MARROW TRANSPLANT directly at [phone].  Normal or non-critical lab and imaging results will be communicated to you by MyChart, letter or phone within 4 business days after the clinic has received the results. If you do not hear from us within 7 days, please contact the clinic through [employer] or  phone. If you have a critical or abnormal lab result, we will notify you by phone as soon as possible.  Submit refill requests through [employer] or call your pharmacy and they will forward the refill request to us. Please allow 3 business days for your refill to be completed.          Additional Information About Your Visit        [facility] Information     Mysterio gives you secure access to your electronic health record. If you see a primary care provider, you can also send messages to your care team and make appointments. If you have questions, please call your primary care clinic.  If you do not have a primary care provider, please call [phone] and they will assist you.        Care EveryWhere ID     This is your Care EveryWhere ID. This could be used by other organizations to access your [place] medical records  [id number]         Blood Pressure from Last 3 Encounters:   08/30/17 134/81   08/17/17 (!) 146/94   06/13/17 112/80    Weight from Last 3 Encounters:   08/30/17 104.3 kg (230 lb)   08/17/17 105.1 kg (231 lb 12.8 oz)   06/19/17 103 kg (227 lb)              Today, you had the following     No orders found for display       Recent Review Flowsheet Data     BMT Recent Results Latest Ref Rng & Units 11/23/2015 11/23/2015 2/17/2016 8/17/2016 1/23/2017 2/13/2017 8/17/2017    WBC 4.0 - 11.0 10e9/L - 5.1 6.7 6.8 5.0 5.3 5.9    Hemoglobin 11.7 - 15.7 g/dL - 10.9(L) 13.0 12.8 13.2 12.6 13.1    Platelet Count 150 - 450 10e9/L - 146(L) 153 128(L) 148(L) 138(L) 126(L)    Neutrophils (Absolute) 1.6 - 8.3 10e9/L - 3.4 5.1 4.8 3.5 3.7 3.7    INR 0.86 - 1.14 - - - - - - -    Sodium 133 - 144 mmol/L - 142 142 141 142 - 140    Potassium 3.4 - 5.3 mmol/L - 3.1(L) 3.7 3.6 3.6 - 3.5    Chloride 94 - 109 mmol/L - 109 109 110(H) 111(H) - 107    Glucose 70 - 99 mg/dL 87 86 86 96 83 - 89    Urea Nitrogen 7 - 30 mg/dL - 12 19 24 15 - 32(H)    Creatinine 0.52 - 1.04 mg/dL - 1.74(H) 1.87(H) 1.78(H) 1.75(H) - 1.81(H)    Calcium  (Total) 8.5 - 10.1 mg/dL - 8.7 8.8 8.6 8.3(L) - 8.7    Protein (Total) 6.8 - 8.8 g/dL - 6.0(L) 6.2(L) 5.5(L) 5.4(L) - 6.9    Albumin 3.4 - 5.0 g/dL - 3.6 4.0 3.4 3.2(L) - 3.2(L)    Bilirubin (Direct) 0.0 - 0.2 mg/dL - - - - - - -    Alkaline Phosphatase 40 - 150 U/L - 95 113 104 96 - 108    AST 0 - 45 U/L - 13 14 18 17 - 20    ALT 0 - 50 U/L - 25 22 29 26 - 25    MCV 78 - 100 fl - 93 92 93 92 91 92               Primary Care Provider    None Specified       No primary provider on file.        Equal Access to Services     PERRI ESTEVEZ : Tonny Mtz, jj diaz, valdemar jurado, royal miller . So [facility] [phone].    ATENCIÓN: Si habla español, tiene a walker disposición servicios gratuitos de asistencia lingüística. [facility] [phone].    We comply with applicable federal civil rights laws and [place] laws. We do not discriminate on the basis of race, color, national origin, age, disability sex, sexual orientation or gender identity.            Thank you!     Thank you for choosing [facility] BLOOD AND MARROW TRANSPLANT  for your care. Our goal is always to provide you with excellent care. Hearing back from our patients is one way we can continue to improve our services. Please take a few minutes to complete the written survey that you may receive in the mail after your visit with us. Thank you!             Your Updated Medication List - Protect others around you: Learn how to safely use, store and throw away your medicines at www.disposemymeds.org.          This list is accurate as of: 9/27/17 11:50 AM.  Always use your most recent med list.                   Brand Name Dispense Instructions for use Diagnosis    acetaminophen 500 MG tablet    TYLENOL     Take 500-1,000 mg by mouth as needed        acyclovir 400 MG tablet    ZOVIRAX    60 tablet    Take 2 tablets (800 mg) by mouth daily    Lymphoma, mantle cell, multiple sites (H)       albuterol 108 (90  BASE) MCG/ACT Inhaler    PROAIR HFA/PROVENTIL HFA/VENTOLIN HFA     Inhale 2 puffs into the lungs every 4 hours as needed for shortness of breath / dyspnea or wheezing        fluticasone 50 MCG/ACT spray    FLONASE          levofloxacin 500 MG tablet    LEVAQUIN    14 tablet    Take 1 tablet (500 mg) by mouth daily    Acute recurrent maxillary sinusitis       * LEVOTHYROXINE SODIUM PO      Take 75 mcg by mouth daily        * levothyroxine 75 MCG tablet    SYNTHROID/LEVOTHROID     TAKE ONE TABLET BY MOUTH ONCE DAILY AT  6  AM        prochlorperazine 10 MG tablet    COMPAZINE     Take 5 mg by mouth        triamcinolone 0.1 % ointment    KENALOG     Apply topically daily        * Notice:  This list has 2 medication(s) that are the same as other medications prescribed for you. Read the directions carefully, and ask your doctor or other care provider to review them with you.

## 2017-09-27 NOTE — NURSING NOTE
Avis Paul      1.  Has the patient received the information for the influenza vaccine? YES    2.  Does the patient have any of the following contraindications?     Allergy to eggs? No     Allergic reaction to previous influenza vaccines? No     Any other problems to previous influenza vaccines? No     Paralyzed by Guillain-Lutz syndrome? No     Currently pregnant? NO     Current moderate or severe illness? No     Allergy to contact lens solution? No    Recorded by BRITTNEY GARRIDO    Administered vaccines (6) see MAR.  BRITTNEY GARRIDO, CMA

## 2017-09-28 ENCOUNTER — TELEPHONE (OUTPATIENT)
Dept: GASTROENTEROLOGY | Facility: CLINIC | Age: 52
End: 2017-09-28

## 2017-10-12 ENCOUNTER — TRANSFERRED RECORDS (OUTPATIENT)
Dept: HEALTH INFORMATION MANAGEMENT | Facility: CLINIC | Age: 52
End: 2017-10-12

## 2017-10-12 ENCOUNTER — INFUSION - HEALTHEAST (OUTPATIENT)
Dept: INFUSION THERAPY | Facility: HOSPITAL | Age: 52
End: 2017-10-12

## 2017-10-12 ENCOUNTER — OFFICE VISIT - HEALTHEAST (OUTPATIENT)
Dept: ONCOLOGY | Facility: HOSPITAL | Age: 52
End: 2017-10-12

## 2017-10-12 DIAGNOSIS — D80.1 HYPOGAMMAGLOBULINEMIA, ACQUIRED (H): ICD-10-CM

## 2017-10-12 DIAGNOSIS — F43.20 ADJUSTMENT REACTION: ICD-10-CM

## 2017-10-12 DIAGNOSIS — C83.10 MANTLE CELL LYMPHOMA (H): ICD-10-CM

## 2017-10-12 ASSESSMENT — MIFFLIN-ST. JEOR: SCORE: 1719.19

## 2017-10-13 ENCOUNTER — MYC MEDICAL ADVICE (OUTPATIENT)
Dept: TRANSPLANT | Facility: CLINIC | Age: 52
End: 2017-10-13

## 2017-10-17 ENCOUNTER — COMMUNICATION - HEALTHEAST (OUTPATIENT)
Dept: INFUSION THERAPY | Facility: HOSPITAL | Age: 52
End: 2017-10-17

## 2017-10-19 NOTE — TELEPHONE ENCOUNTER
RN Care Coordination Note    Received request from Dr. Rose to call pt insurance / billing to help appeal IVIG decision.  -Multiple calls/messages with Ame Esqueda and Sam Conteh reveal that appeal was made and denied, and financial dept staff have asked for [employer] to contact pt to help with applying for [place] / [facility] for assistance with these charges. Likely assisting with application for [place] Medicaid.  -Call placed to pt's insurance and requested a mplo-ed-kauz as per Dr. Rose's request. Ref # [id number]. Notified Dr. Rose that [facility]  Lovely tell me that [id number] call would likely occur in 7-10 business days and that I provided [facility] Nurse Line as contact #  -Message to pt via [employer] with update    Monica Campos, RN, BSN, OCN  Care Coordinator  [facility]

## 2017-10-20 RX ORDER — ACETAMINOPHEN 325 MG/1
650 TABLET ORAL SEE ADMIN INSTRUCTIONS
Status: CANCELLED
Start: 2017-10-20

## 2017-10-20 RX ORDER — DIPHENHYDRAMINE HCL 25 MG
25-50 CAPSULE ORAL SEE ADMIN INSTRUCTIONS
Status: CANCELLED
Start: 2017-10-20

## 2017-10-21 RX ORDER — PENTAMIDINE ISETHIONATE 300 MG/300MG
300 INHALANT RESPIRATORY (INHALATION)
Status: CANCELLED
Start: 2017-10-21

## 2017-10-29 ENCOUNTER — COMMUNICATION - HEALTHEAST (OUTPATIENT)
Dept: ONCOLOGY | Facility: HOSPITAL | Age: 52
End: 2017-10-29

## 2017-10-29 ENCOUNTER — HEALTH MAINTENANCE LETTER (OUTPATIENT)
Age: 52
End: 2017-10-29

## 2017-11-06 ENCOUNTER — INFUSION - HEALTHEAST (OUTPATIENT)
Dept: INFUSION THERAPY | Facility: HOSPITAL | Age: 52
End: 2017-11-06

## 2017-11-06 DIAGNOSIS — C83.10 MANTLE CELL LYMPHOMA (H): ICD-10-CM

## 2017-11-06 LAB
IGA SERPL-MCNC: 32 MG/DL (ref 65–400)
IGA SERPL-MCNC: 721 MG/DL (ref 700–1700)
IGM SERPL-MCNC: <5 MG/DL (ref 60–280)

## 2017-11-10 ENCOUNTER — COMMUNICATION - HEALTHEAST (OUTPATIENT)
Dept: ONCOLOGY | Facility: CLINIC | Age: 52
End: 2017-11-10

## 2017-11-10 ENCOUNTER — OFFICE VISIT - HEALTHEAST (OUTPATIENT)
Dept: ONCOLOGY | Facility: HOSPITAL | Age: 52
End: 2017-11-10

## 2017-11-10 DIAGNOSIS — C83.10 MANTLE CELL LYMPHOMA (H): ICD-10-CM

## 2017-11-10 DIAGNOSIS — D80.1 HYPOGAMMAGLOBULINEMIA, ACQUIRED (H): ICD-10-CM

## 2017-11-20 ENCOUNTER — COMMUNICATION - HEALTHEAST (OUTPATIENT)
Dept: ONCOLOGY | Facility: HOSPITAL | Age: 52
End: 2017-11-20

## 2017-11-21 ENCOUNTER — AMBULATORY - HEALTHEAST (OUTPATIENT)
Dept: ONCOLOGY | Facility: CLINIC | Age: 52
End: 2017-11-21

## 2017-11-29 ENCOUNTER — COMMUNICATION - HEALTHEAST (OUTPATIENT)
Dept: ONCOLOGY | Facility: HOSPITAL | Age: 52
End: 2017-11-29

## 2017-12-08 ENCOUNTER — INFUSION - HEALTHEAST (OUTPATIENT)
Dept: INFUSION THERAPY | Facility: HOSPITAL | Age: 52
End: 2017-12-08

## 2017-12-08 ENCOUNTER — AMBULATORY - HEALTHEAST (OUTPATIENT)
Dept: INFUSION THERAPY | Facility: HOSPITAL | Age: 52
End: 2017-12-08

## 2017-12-08 ENCOUNTER — AMBULATORY - HEALTHEAST (OUTPATIENT)
Dept: ONCOLOGY | Facility: HOSPITAL | Age: 52
End: 2017-12-08

## 2017-12-08 DIAGNOSIS — D80.1 HYPOGAMMAGLOBULINEMIA, ACQUIRED (H): ICD-10-CM

## 2017-12-08 DIAGNOSIS — C83.13 MANTLE CELL LYMPHOMA OF INTRA-ABDOMINAL LYMPH NODES (H): ICD-10-CM

## 2017-12-08 DIAGNOSIS — C83.10 MANTLE CELL LYMPHOMA (H): ICD-10-CM

## 2017-12-08 LAB
IGA SERPL-MCNC: 26 MG/DL (ref 65–400)
IGA SERPL-MCNC: 476 MG/DL (ref 700–1700)
IGM SERPL-MCNC: <5 MG/DL (ref 60–280)

## 2018-01-15 ENCOUNTER — INFUSION - HEALTHEAST (OUTPATIENT)
Dept: INFUSION THERAPY | Facility: HOSPITAL | Age: 53
End: 2018-01-15

## 2018-01-15 ENCOUNTER — COMMUNICATION - HEALTHEAST (OUTPATIENT)
Dept: ONCOLOGY | Facility: HOSPITAL | Age: 53
End: 2018-01-15

## 2018-01-15 ENCOUNTER — HOSPITAL ENCOUNTER (OUTPATIENT)
Dept: CT IMAGING | Facility: HOSPITAL | Age: 53
Setting detail: RADIATION/ONCOLOGY SERIES
Discharge: STILL ADMITTED OR ADMINISTRATIVE | End: 2018-01-15
Attending: INTERNAL MEDICINE

## 2018-01-15 DIAGNOSIS — C83.10 MANTLE CELL LYMPHOMA (H): ICD-10-CM

## 2018-01-15 DIAGNOSIS — D80.1 HYPOGAMMAGLOBULINEMIA, ACQUIRED (H): ICD-10-CM

## 2018-01-16 ENCOUNTER — OFFICE VISIT - HEALTHEAST (OUTPATIENT)
Dept: ONCOLOGY | Facility: HOSPITAL | Age: 53
End: 2018-01-16

## 2018-01-16 ENCOUNTER — AMBULATORY - HEALTHEAST (OUTPATIENT)
Dept: INFUSION THERAPY | Facility: HOSPITAL | Age: 53
End: 2018-01-16

## 2018-01-16 DIAGNOSIS — C83.10 MANTLE CELL LYMPHOMA (H): ICD-10-CM

## 2018-01-16 LAB
ALBUMIN SERPL-MCNC: 3.8 G/DL (ref 3.5–5)
ALP SERPL-CCNC: 99 U/L (ref 45–120)
ALT SERPL W P-5'-P-CCNC: 16 U/L (ref 0–45)
ANION GAP SERPL CALCULATED.3IONS-SCNC: 11 MMOL/L (ref 5–18)
AST SERPL W P-5'-P-CCNC: 19 U/L (ref 0–40)
BASOPHILS # BLD AUTO: 0 THOU/UL (ref 0–0.2)
BASOPHILS NFR BLD AUTO: 0 % (ref 0–2)
BILIRUB SERPL-MCNC: 0.4 MG/DL (ref 0–1)
BUN SERPL-MCNC: 31 MG/DL (ref 8–22)
CALCIUM SERPL-MCNC: 9.5 MG/DL (ref 8.5–10.5)
CHLORIDE BLD-SCNC: 104 MMOL/L (ref 98–107)
CO2 SERPL-SCNC: 26 MMOL/L (ref 22–31)
CREAT SERPL-MCNC: 1.98 MG/DL (ref 0.6–1.1)
EOSINOPHIL # BLD AUTO: 0 THOU/UL (ref 0–0.4)
EOSINOPHIL NFR BLD AUTO: 0 % (ref 0–6)
ERYTHROCYTE [DISTWIDTH] IN BLOOD BY AUTOMATED COUNT: 14.1 % (ref 11–14.5)
ERYTHROCYTE [SEDIMENTATION RATE] IN BLOOD BY WESTERGREN METHOD: 9 MM/HR (ref 0–20)
GFR SERPL CREATININE-BSD FRML MDRD: 26 ML/MIN/1.73M2
GLUCOSE BLD-MCNC: 88 MG/DL (ref 70–125)
HCT VFR BLD AUTO: 44.6 % (ref 35–47)
HGB BLD-MCNC: 14.4 G/DL (ref 12–16)
LYMPHOCYTES # BLD AUTO: 1.7 THOU/UL (ref 0.8–4.4)
LYMPHOCYTES NFR BLD AUTO: 23 % (ref 20–40)
MCH RBC QN AUTO: 29.6 PG (ref 27–34)
MCHC RBC AUTO-ENTMCNC: 32.3 G/DL (ref 32–36)
MCV RBC AUTO: 92 FL (ref 80–100)
MONOCYTES # BLD AUTO: 0.5 THOU/UL (ref 0–0.9)
MONOCYTES NFR BLD AUTO: 7 % (ref 2–10)
NEUTROPHILS # BLD AUTO: 4.9 THOU/UL (ref 2–7.7)
NEUTROPHILS NFR BLD AUTO: 70 % (ref 50–70)
PLATELET # BLD AUTO: 164 THOU/UL (ref 140–440)
PMV BLD AUTO: 9.8 FL (ref 8.5–12.5)
POTASSIUM BLD-SCNC: 3.8 MMOL/L (ref 3.5–5)
PROT SERPL-MCNC: 7.5 G/DL (ref 6–8)
RBC # BLD AUTO: 4.87 MILL/UL (ref 3.8–5.4)
SODIUM SERPL-SCNC: 141 MMOL/L (ref 136–145)
WBC: 7.1 THOU/UL (ref 4–11)

## 2018-01-17 ENCOUNTER — COMMUNICATION - HEALTHEAST (OUTPATIENT)
Dept: ONCOLOGY | Facility: HOSPITAL | Age: 53
End: 2018-01-17

## 2018-01-31 ENCOUNTER — COMMUNICATION - HEALTHEAST (OUTPATIENT)
Dept: ONCOLOGY | Facility: HOSPITAL | Age: 53
End: 2018-01-31

## 2018-02-01 ENCOUNTER — AMBULATORY - HEALTHEAST (OUTPATIENT)
Dept: ONCOLOGY | Facility: HOSPITAL | Age: 53
End: 2018-02-01

## 2018-02-01 DIAGNOSIS — E03.9 HYPOTHYROID: ICD-10-CM

## 2018-02-09 ENCOUNTER — INFUSION - HEALTHEAST (OUTPATIENT)
Dept: INFUSION THERAPY | Facility: HOSPITAL | Age: 53
End: 2018-02-09

## 2018-02-09 ENCOUNTER — OFFICE VISIT - HEALTHEAST (OUTPATIENT)
Dept: ONCOLOGY | Facility: HOSPITAL | Age: 53
End: 2018-02-09

## 2018-02-09 ENCOUNTER — AMBULATORY - HEALTHEAST (OUTPATIENT)
Dept: INFUSION THERAPY | Facility: HOSPITAL | Age: 53
End: 2018-02-09

## 2018-02-09 DIAGNOSIS — C83.10 MANTLE CELL LYMPHOMA (H): ICD-10-CM

## 2018-02-09 DIAGNOSIS — D80.1 HYPOGAMMAGLOBULINEMIA, ACQUIRED (H): ICD-10-CM

## 2018-02-09 DIAGNOSIS — E03.9 HYPOTHYROID: ICD-10-CM

## 2018-02-09 LAB
ALBUMIN SERPL-MCNC: 3.4 G/DL (ref 3.5–5)
ALP SERPL-CCNC: 108 U/L (ref 45–120)
ALT SERPL W P-5'-P-CCNC: 17 U/L (ref 0–45)
ANION GAP SERPL CALCULATED.3IONS-SCNC: 7 MMOL/L (ref 5–18)
AST SERPL W P-5'-P-CCNC: 21 U/L (ref 0–40)
BASOPHILS # BLD AUTO: 0 THOU/UL (ref 0–0.2)
BASOPHILS NFR BLD AUTO: 0 % (ref 0–2)
BILIRUB SERPL-MCNC: 0.5 MG/DL (ref 0–1)
BUN SERPL-MCNC: 24 MG/DL (ref 8–22)
CALCIUM SERPL-MCNC: 9.2 MG/DL (ref 8.5–10.5)
CHLORIDE BLD-SCNC: 107 MMOL/L (ref 98–107)
CO2 SERPL-SCNC: 26 MMOL/L (ref 22–31)
CREAT SERPL-MCNC: 1.8 MG/DL (ref 0.6–1.1)
EOSINOPHIL # BLD AUTO: 0.2 THOU/UL (ref 0–0.4)
EOSINOPHIL NFR BLD AUTO: 4 % (ref 0–6)
ERYTHROCYTE [DISTWIDTH] IN BLOOD BY AUTOMATED COUNT: 14 % (ref 11–14.5)
GFR SERPL CREATININE-BSD FRML MDRD: 29 ML/MIN/1.73M2
GLUCOSE BLD-MCNC: 92 MG/DL (ref 70–125)
HCT VFR BLD AUTO: 42.9 % (ref 35–47)
HGB BLD-MCNC: 14 G/DL (ref 12–16)
IGA SERPL-MCNC: 30 MG/DL (ref 65–400)
IGA SERPL-MCNC: 722 MG/DL (ref 700–1700)
IGM SERPL-MCNC: <5 MG/DL (ref 60–280)
LYMPHOCYTES # BLD AUTO: 1.7 THOU/UL (ref 0.8–4.4)
LYMPHOCYTES NFR BLD AUTO: 35 % (ref 20–40)
MCH RBC QN AUTO: 29.5 PG (ref 27–34)
MCHC RBC AUTO-ENTMCNC: 32.6 G/DL (ref 32–36)
MCV RBC AUTO: 90 FL (ref 80–100)
MONOCYTES # BLD AUTO: 0.4 THOU/UL (ref 0–0.9)
MONOCYTES NFR BLD AUTO: 7 % (ref 2–10)
NEUTROPHILS # BLD AUTO: 2.6 THOU/UL (ref 2–7.7)
NEUTROPHILS NFR BLD AUTO: 54 % (ref 50–70)
PLATELET # BLD AUTO: 144 THOU/UL (ref 140–440)
PMV BLD AUTO: 10 FL (ref 8.5–12.5)
POTASSIUM BLD-SCNC: 4.2 MMOL/L (ref 3.5–5)
PROT SERPL-MCNC: 6.5 G/DL (ref 6–8)
RBC # BLD AUTO: 4.75 MILL/UL (ref 3.8–5.4)
SODIUM SERPL-SCNC: 140 MMOL/L (ref 136–145)
TSH SERPL DL<=0.005 MIU/L-ACNC: 2.7 UIU/ML (ref 0.3–5)
WBC: 4.9 THOU/UL (ref 4–11)

## 2018-02-13 ENCOUNTER — RECORDS - HEALTHEAST (OUTPATIENT)
Dept: ADMINISTRATIVE | Facility: OTHER | Age: 53
End: 2018-02-13

## 2018-02-18 ENCOUNTER — COMMUNICATION - HEALTHEAST (OUTPATIENT)
Dept: ONCOLOGY | Facility: HOSPITAL | Age: 53
End: 2018-02-18

## 2018-03-08 ENCOUNTER — INFUSION - HEALTHEAST (OUTPATIENT)
Dept: INFUSION THERAPY | Facility: HOSPITAL | Age: 53
End: 2018-03-08

## 2018-03-08 DIAGNOSIS — D80.1 HYPOGAMMAGLOBULINEMIA, ACQUIRED (H): ICD-10-CM

## 2018-03-21 ENCOUNTER — COMMUNICATION - HEALTHEAST (OUTPATIENT)
Dept: ONCOLOGY | Facility: HOSPITAL | Age: 53
End: 2018-03-21

## 2018-03-26 ENCOUNTER — COMMUNICATION - HEALTHEAST (OUTPATIENT)
Dept: ONCOLOGY | Facility: HOSPITAL | Age: 53
End: 2018-03-26

## 2018-03-26 ENCOUNTER — RECORDS - HEALTHEAST (OUTPATIENT)
Dept: ADMINISTRATIVE | Facility: OTHER | Age: 53
End: 2018-03-26

## 2018-04-04 ENCOUNTER — COMMUNICATION - HEALTHEAST (OUTPATIENT)
Dept: ONCOLOGY | Facility: HOSPITAL | Age: 53
End: 2018-04-04

## 2018-04-12 ENCOUNTER — INFUSION - HEALTHEAST (OUTPATIENT)
Dept: INFUSION THERAPY | Facility: HOSPITAL | Age: 53
End: 2018-04-12

## 2018-04-12 DIAGNOSIS — D80.1 HYPOGAMMAGLOBULINEMIA, ACQUIRED (H): ICD-10-CM

## 2018-05-02 ENCOUNTER — COMMUNICATION - HEALTHEAST (OUTPATIENT)
Dept: ONCOLOGY | Facility: HOSPITAL | Age: 53
End: 2018-05-02

## 2018-05-02 DIAGNOSIS — C83.18 LYMPHOMA, MANTLE CELL, MULTIPLE SITES (H): ICD-10-CM

## 2018-05-02 RX ORDER — ACYCLOVIR 400 MG/1
TABLET ORAL
Qty: 60 TABLET | Refills: 3 | Status: SHIPPED | OUTPATIENT
Start: 2018-05-02 | End: 2018-10-05

## 2018-05-07 ENCOUNTER — COMMUNICATION - HEALTHEAST (OUTPATIENT)
Dept: ONCOLOGY | Facility: HOSPITAL | Age: 53
End: 2018-05-07

## 2018-05-09 ENCOUNTER — AMBULATORY - HEALTHEAST (OUTPATIENT)
Dept: INFUSION THERAPY | Facility: HOSPITAL | Age: 53
End: 2018-05-09

## 2018-05-09 ENCOUNTER — OFFICE VISIT - HEALTHEAST (OUTPATIENT)
Dept: ONCOLOGY | Facility: HOSPITAL | Age: 53
End: 2018-05-09

## 2018-05-09 ENCOUNTER — COMMUNICATION - HEALTHEAST (OUTPATIENT)
Dept: ONCOLOGY | Facility: HOSPITAL | Age: 53
End: 2018-05-09

## 2018-05-09 DIAGNOSIS — D80.1 HYPOGAMMAGLOBULINEMIA, ACQUIRED (H): ICD-10-CM

## 2018-05-09 DIAGNOSIS — R53.83 FATIGUE: ICD-10-CM

## 2018-05-09 DIAGNOSIS — N18.30 CHRONIC KIDNEY DISEASE (CKD), STAGE III (MODERATE) (H): ICD-10-CM

## 2018-05-09 DIAGNOSIS — R53.82 CHRONIC FATIGUE: ICD-10-CM

## 2018-05-09 DIAGNOSIS — C83.10 MANTLE CELL LYMPHOMA (H): ICD-10-CM

## 2018-05-09 DIAGNOSIS — C83.13 MANTLE CELL LYMPHOMA OF INTRA-ABDOMINAL LYMPH NODES (H): ICD-10-CM

## 2018-05-09 LAB
ALBUMIN SERPL-MCNC: 3.3 G/DL (ref 3.5–5)
ALP SERPL-CCNC: 101 U/L (ref 45–120)
ALT SERPL W P-5'-P-CCNC: 16 U/L (ref 0–45)
ANION GAP SERPL CALCULATED.3IONS-SCNC: 9 MMOL/L (ref 5–18)
AST SERPL W P-5'-P-CCNC: 22 U/L (ref 0–40)
BASOPHILS # BLD AUTO: 0 THOU/UL (ref 0–0.2)
BASOPHILS NFR BLD AUTO: 1 % (ref 0–2)
BILIRUB SERPL-MCNC: 0.5 MG/DL (ref 0–1)
BUN SERPL-MCNC: 29 MG/DL (ref 8–22)
CALCIUM SERPL-MCNC: 9 MG/DL (ref 8.5–10.5)
CHLORIDE BLD-SCNC: 110 MMOL/L (ref 98–107)
CO2 SERPL-SCNC: 24 MMOL/L (ref 22–31)
CREAT SERPL-MCNC: 1.97 MG/DL (ref 0.6–1.1)
EOSINOPHIL # BLD AUTO: 0.1 THOU/UL (ref 0–0.4)
EOSINOPHIL NFR BLD AUTO: 2 % (ref 0–6)
ERYTHROCYTE [DISTWIDTH] IN BLOOD BY AUTOMATED COUNT: 14.6 % (ref 11–14.5)
GFR SERPL CREATININE-BSD FRML MDRD: 27 ML/MIN/1.73M2
GLUCOSE BLD-MCNC: 90 MG/DL (ref 70–125)
HCT VFR BLD AUTO: 42 % (ref 35–47)
HGB BLD-MCNC: 13.8 G/DL (ref 12–16)
IGA SERPL-MCNC: 26 MG/DL (ref 65–400)
IGA SERPL-MCNC: 771 MG/DL (ref 700–1700)
IGM SERPL-MCNC: 8 MG/DL (ref 60–280)
LYMPHOCYTES # BLD AUTO: 1.4 THOU/UL (ref 0.8–4.4)
LYMPHOCYTES NFR BLD AUTO: 26 % (ref 20–40)
MCH RBC QN AUTO: 30.4 PG (ref 27–34)
MCHC RBC AUTO-ENTMCNC: 32.9 G/DL (ref 32–36)
MCV RBC AUTO: 93 FL (ref 80–100)
MONOCYTES # BLD AUTO: 0.4 THOU/UL (ref 0–0.9)
MONOCYTES NFR BLD AUTO: 7 % (ref 2–10)
NEUTROPHILS # BLD AUTO: 3.5 THOU/UL (ref 2–7.7)
NEUTROPHILS NFR BLD AUTO: 65 % (ref 50–70)
PLATELET # BLD AUTO: 155 THOU/UL (ref 140–440)
PMV BLD AUTO: 9.9 FL (ref 8.5–12.5)
POTASSIUM BLD-SCNC: 4.6 MMOL/L (ref 3.5–5)
PROT SERPL-MCNC: 6.4 G/DL (ref 6–8)
RBC # BLD AUTO: 4.54 MILL/UL (ref 3.8–5.4)
SODIUM SERPL-SCNC: 143 MMOL/L (ref 136–145)
TSH SERPL DL<=0.005 MIU/L-ACNC: 3.75 UIU/ML (ref 0.3–5)
WBC: 5.3 THOU/UL (ref 4–11)

## 2018-06-06 ENCOUNTER — COMMUNICATION - HEALTHEAST (OUTPATIENT)
Dept: TELEHEALTH | Facility: CLINIC | Age: 53
End: 2018-06-06

## 2018-06-06 ENCOUNTER — AMBULATORY - HEALTHEAST (OUTPATIENT)
Dept: INFUSION THERAPY | Facility: HOSPITAL | Age: 53
End: 2018-06-06

## 2018-06-06 DIAGNOSIS — D80.1 HYPOGAMMAGLOBULINEMIA, ACQUIRED (H): ICD-10-CM

## 2018-06-06 LAB
IGA SERPL-MCNC: 28 MG/DL (ref 65–400)
IGA SERPL-MCNC: 531 MG/DL (ref 700–1700)
IGM SERPL-MCNC: 6 MG/DL (ref 60–280)

## 2018-07-17 ENCOUNTER — AMBULATORY - HEALTHEAST (OUTPATIENT)
Dept: INFUSION THERAPY | Facility: HOSPITAL | Age: 53
End: 2018-07-17

## 2018-07-17 ENCOUNTER — HOSPITAL ENCOUNTER (OUTPATIENT)
Dept: CT IMAGING | Facility: HOSPITAL | Age: 53
Setting detail: RADIATION/ONCOLOGY SERIES
Discharge: STILL ADMITTED OR ADMINISTRATIVE | End: 2018-07-17
Attending: INTERNAL MEDICINE

## 2018-07-17 ENCOUNTER — OFFICE VISIT - HEALTHEAST (OUTPATIENT)
Dept: ONCOLOGY | Facility: HOSPITAL | Age: 53
End: 2018-07-17

## 2018-07-17 DIAGNOSIS — C83.13 MANTLE CELL LYMPHOMA OF INTRA-ABDOMINAL LYMPH NODES (H): ICD-10-CM

## 2018-07-17 DIAGNOSIS — E03.9 ACQUIRED HYPOTHYROIDISM: ICD-10-CM

## 2018-07-17 DIAGNOSIS — D80.1 HYPOGAMMAGLOBULINEMIA, ACQUIRED (H): ICD-10-CM

## 2018-07-17 DIAGNOSIS — R53.83 FATIGUE: ICD-10-CM

## 2018-07-17 LAB
ALBUMIN SERPL-MCNC: 3.7 G/DL (ref 3.5–5)
ALP SERPL-CCNC: 103 U/L (ref 45–120)
ALT SERPL W P-5'-P-CCNC: 14 U/L (ref 0–45)
ANION GAP SERPL CALCULATED.3IONS-SCNC: 8 MMOL/L (ref 5–18)
AST SERPL W P-5'-P-CCNC: 18 U/L (ref 0–40)
BASOPHILS # BLD AUTO: 0 THOU/UL (ref 0–0.2)
BASOPHILS NFR BLD AUTO: 1 % (ref 0–2)
BILIRUB SERPL-MCNC: 0.5 MG/DL (ref 0–1)
BUN SERPL-MCNC: 25 MG/DL (ref 8–22)
CALCIUM SERPL-MCNC: 8.8 MG/DL (ref 8.5–10.5)
CHLORIDE BLD-SCNC: 108 MMOL/L (ref 98–107)
CO2 SERPL-SCNC: 24 MMOL/L (ref 22–31)
CREAT SERPL-MCNC: 2.01 MG/DL (ref 0.6–1.1)
EOSINOPHIL # BLD AUTO: 0.7 THOU/UL (ref 0–0.4)
EOSINOPHIL NFR BLD AUTO: 11 % (ref 0–6)
ERYTHROCYTE [DISTWIDTH] IN BLOOD BY AUTOMATED COUNT: 14.3 % (ref 11–14.5)
GFR SERPL CREATININE-BSD FRML MDRD: 26 ML/MIN/1.73M2
GLUCOSE BLD-MCNC: 89 MG/DL (ref 70–125)
HCT VFR BLD AUTO: 42.2 % (ref 35–47)
HGB BLD-MCNC: 13.7 G/DL (ref 12–16)
IGA SERPL-MCNC: 20 MG/DL (ref 65–400)
IGA SERPL-MCNC: 411 MG/DL (ref 700–1700)
IGM SERPL-MCNC: <5 MG/DL (ref 60–280)
LYMPHOCYTES # BLD AUTO: 1.7 THOU/UL (ref 0.8–4.4)
LYMPHOCYTES NFR BLD AUTO: 26 % (ref 20–40)
MCH RBC QN AUTO: 29.9 PG (ref 27–34)
MCHC RBC AUTO-ENTMCNC: 32.5 G/DL (ref 32–36)
MCV RBC AUTO: 92 FL (ref 80–100)
MONOCYTES # BLD AUTO: 0.4 THOU/UL (ref 0–0.9)
MONOCYTES NFR BLD AUTO: 6 % (ref 2–10)
NEUTROPHILS # BLD AUTO: 3.8 THOU/UL (ref 2–7.7)
NEUTROPHILS NFR BLD AUTO: 57 % (ref 50–70)
PLATELET # BLD AUTO: 165 THOU/UL (ref 140–440)
PMV BLD AUTO: 9.9 FL (ref 8.5–12.5)
POTASSIUM BLD-SCNC: 3.9 MMOL/L (ref 3.5–5)
PROT SERPL-MCNC: 6.2 G/DL (ref 6–8)
RBC # BLD AUTO: 4.58 MILL/UL (ref 3.8–5.4)
SODIUM SERPL-SCNC: 140 MMOL/L (ref 136–145)
WBC: 6.8 THOU/UL (ref 4–11)

## 2018-07-26 ENCOUNTER — OFFICE VISIT - HEALTHEAST (OUTPATIENT)
Dept: ALLERGY | Facility: CLINIC | Age: 53
End: 2018-07-26

## 2018-07-26 DIAGNOSIS — L50.8 CHRONIC URTICARIA: ICD-10-CM

## 2018-07-26 ASSESSMENT — MIFFLIN-ST. JEOR: SCORE: 1856.41

## 2018-07-29 ENCOUNTER — COMMUNICATION - HEALTHEAST (OUTPATIENT)
Dept: ONCOLOGY | Facility: HOSPITAL | Age: 53
End: 2018-07-29

## 2018-08-02 ENCOUNTER — COMMUNICATION - HEALTHEAST (OUTPATIENT)
Dept: ONCOLOGY | Facility: HOSPITAL | Age: 53
End: 2018-08-02

## 2018-08-03 ENCOUNTER — AMBULATORY - HEALTHEAST (OUTPATIENT)
Dept: ONCOLOGY | Facility: HOSPITAL | Age: 53
End: 2018-08-03

## 2018-08-07 ENCOUNTER — AMBULATORY - HEALTHEAST (OUTPATIENT)
Dept: ALLERGY | Facility: CLINIC | Age: 53
End: 2018-08-07

## 2018-08-07 ENCOUNTER — COMMUNICATION - HEALTHEAST (OUTPATIENT)
Dept: ALLERGY | Facility: CLINIC | Age: 53
End: 2018-08-07

## 2018-08-07 DIAGNOSIS — L50.9 URTICARIA: ICD-10-CM

## 2018-08-17 ENCOUNTER — OFFICE VISIT (OUTPATIENT)
Dept: FAMILY MEDICINE | Facility: CLINIC | Age: 53
End: 2018-08-17
Payer: COMMERCIAL

## 2018-08-17 VITALS
SYSTOLIC BLOOD PRESSURE: 130 MMHG | BODY MASS INDEX: 43.38 KG/M2 | HEIGHT: 67 IN | DIASTOLIC BLOOD PRESSURE: 82 MMHG | WEIGHT: 276.4 LBS | OXYGEN SATURATION: 99 % | HEART RATE: 78 BPM | TEMPERATURE: 97.5 F

## 2018-08-17 DIAGNOSIS — N18.4 CKD (CHRONIC KIDNEY DISEASE) STAGE 4, GFR 15-29 ML/MIN (H): Primary | ICD-10-CM

## 2018-08-17 DIAGNOSIS — E03.9 HYPOTHYROIDISM, UNSPECIFIED TYPE: ICD-10-CM

## 2018-08-17 DIAGNOSIS — C83.18 LYMPHOMA, MANTLE CELL, MULTIPLE SITES (H): ICD-10-CM

## 2018-08-17 PROCEDURE — 99203 OFFICE O/P NEW LOW 30 MIN: CPT | Performed by: FAMILY MEDICINE

## 2018-08-17 RX ORDER — CEPHALEXIN 500 MG/1
500 CAPSULE ORAL
COMMUNITY
Start: 2018-07-23 | End: 2019-10-01

## 2018-08-17 RX ORDER — MONTELUKAST SODIUM 10 MG/1
10 TABLET ORAL DAILY
COMMUNITY
Start: 2018-08-07 | End: 2024-07-08

## 2018-08-17 NOTE — PATIENT INSTRUCTIONS
When you do your labs at [facility] I'll add a thyroid check, if increasing TSH or even staying stable, plan to increase the thyroid medication.    I'm happy to see you for those if needed visits for infections.       Thank you for choosing [facility].  You may be receiving a survey in the mail from Amber Henry regarding your visit today.  Please take a few minutes to complete and return the survey to let us know how we are doing.      If you have questions or concerns, please contact us via [employer] or you can contact your care team at [phone].    Our Clinic hours are:  Monday 6:40 am  to 7:00 pm  Tuesday -Friday 6:40 am to 5:00 pm    The [place] outpatient lab hours are:  Monday - Friday 6:10 am to 4:45 pm  Saturdays 7:00 am to 11:00 am  Appointments are required, call [phone]    If you have clinical questions after hours or would like to schedule an appointment,  call the clinic at [phone].

## 2018-08-17 NOTE — Clinical Note
Please abstract the following data from this visit with this patient into the appropriate field in Epic:  Mammogram done on this date: 1/16/17 (approximately), by this group: [facility], results were neg, return in 1 year.  Pap smear done on this date: 12/14/16 (approximately), by this group: [facility], results were neg.

## 2018-08-17 NOTE — PROGRESS NOTES
SUBJECTIVE:   Avis Paul is a 53 year old female who presents to clinic today for the following health issues:      Medication Followup of levothyroxine    Taking Medication as prescribed: yes    Side Effects:  None    Medication Helping Symptoms:  Last levels taken have been elevated, weight gain of 6 lbs per month since last September     History of Mantle Cell lymphoma current in remission and since the BMT has had trouble with low immune system.  Has been doing IVIg which has been helpful to keep numbers up a little better than the past, but significant side effect from the IVIg.  Due to low immune system has trouble with cellulitis from bug bites, upper respiratory infections, illnesses.  And ongoing cycle ill then improves and able to go back to work part time.  Memory loss with the chemo.    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 Acupuncture was very helpful.     Chronic Kidney Disease Follow-up      Current NSAID use?  Yes:   None, very very rare IBP use     Has seen Nephrology in the past    Hypothyroid follow up: weight increasing (discussed prednisone with the IVIg could be doing this), fatigue, hair and nails stable since after chemo.      Had mammo and Pap within 2018 at [facility].  Problem list and histories reviewed & adjusted, as indicated.  Additional history: as documented    BP Readings from Last 3 Encounters:   08/17/18 130/82   08/30/17 134/81   08/17/17 (!) 146/94    Wt Readings from Last 3 Encounters:   08/17/18 276 lb 6.4 oz (125.4 kg)   08/30/17 230 lb (104.3 kg)   08/17/17 231 lb 12.8 oz (105.1 kg)                    Reviewed and updated as needed this visit by clinical staff  Tobacco  Allergies  Meds  Med Hx  Surg Hx  Fam Hx  Soc Hx      Reviewed and updated as needed this visit by Provider         ROS:  Constitutional, HEENT, cardiovascular, pulmonary, gi and gu systems are negative, except as otherwise noted.    OBJECTIVE:     /82  Pulse 78   "Temp 97.5  F (36.4  C) (Tympanic)  Ht 5' 7\" (1.702 m)  Wt 276 lb 6.4 oz (125.4 kg)  SpO2 99%  BMI 43.29 kg/m2  Body mass index is 43.29 kg/(m^2).  GENERAL: healthy, alert and no distress  NECK: no adenopathy, no asymmetry, masses, or scars and thyroid normal to palpation  RESP: lungs clear to auscultation - no rales, rhonchi or wheezes  CV: regular rate and rhythm, normal S1 S2, no S3 or S4, no murmur, click or rub, no peripheral edema and peripheral pulses strong  MS: no gross musculoskeletal defects noted, no edema  Psych: normal affect, tearful when discussing future thoughts about return of cancer.    Diagnostic Test Results:  Reviewed last thyroid results.  TSH 3.75   5/9/2018 in [facility].    ASSESSMENT/PLAN:       Avis was seen today for establish care and recheck medication.    Diagnoses and all orders for this visit:    CKD (chronic kidney disease) stage 4, GFR 15-29 ml/min (H): last 3 GFRs below 30, has seen Nephrology in the past, discussed this could be cause of fatigue as well.    Hypothyroidism, unspecified type: TSH ordered for labs next month  -if increasing TSH then plan to increase thyroid medication.  -weight gain could certainly be from the steroid given with the IVIg.  -     TSH; Future    Lymphoma, mantle cell, multiple sites (H): following with oncology yearly now, but worried about this.  Is seeing [facility] BMT next week and this is yearly now too.  Agree we could check CBC labs every 6 months.  -discussed the recurrent infection and trouble fighting infections as well and am happy to see patient when/if these infections come about.        Patient Instructions   When you do your labs at [facility] I'll add a thyroid check, if increasing TSH or even staying stable, plan to increase the thyroid medication.    I'm happy to see you for those if needed visits for infections.       Thank you for choosing [facility].  You may be receiving a survey in the mail from [employer] regarding " your visit today.  Please take a few minutes to complete and return the survey to let us know how we are doing.      If you have questions or concerns, please contact us via [employer] or you can contact your care team at [phone].    Our Clinic hours are:  Monday 6:40 am  to 7:00 pm  Tuesday -Friday 6:40 am to 5:00 pm    The [place] outpatient lab hours are:  Monday - Friday 6:10 am to 4:45 pm  Saturdays 7:00 am to 11:00 am  Appointments are required, call [phone]    If you have clinical questions after hours or would like to schedule an appointment,  call the clinic at [phone].      Sunil Tan MD  [facility]

## 2018-08-17 NOTE — MR AVS SNAPSHOT
After Visit Summary   8/17/2018    Avis Paul    MRN: [id number]           Patient Information     Date Of Birth          1965        Visit Information        Provider Department      8/17/2018 1:20 PM Sunil Tan MD [facility]        Today's Diagnoses     CKD (chronic kidney disease) stage 4, GFR 15-29 ml/min (H)    -  1    Hypothyroidism, unspecified type        Lymphoma, mantle cell, multiple sites (H)          Care Instructions    When you do your labs at [facility] I'll add a thyroid check, if increasing TSH or even staying stable, plan to increase the thyroid medication.    I'm happy to see you for those if needed visits for infections.       Thank you for choosing [facility].  You may be receiving a survey in the mail from Amber Henry regarding your visit today.  Please take a few minutes to complete and return the survey to let us know how we are doing.      If you have questions or concerns, please contact us via [employer] or you can contact your care team at [phone].    Our Clinic hours are:  Monday 6:40 am  to 7:00 pm  Tuesday -Friday 6:40 am to 5:00 pm    The [place] outpatient lab hours are:  Monday - Friday 6:10 am to 4:45 pm  Saturdays 7:00 am to 11:00 am  Appointments are required, call [phone]    If you have clinical questions after hours or would like to schedule an appointment,  call the clinic at [phone].          Follow-ups after your visit        Your next 10 appointments already scheduled     Aug 28, 2018 10:40 AM CDT   CT CHEST ABDOMEN PELVIS W/O & W CONTRAST with UCCT2   [facility] CT ([facility])    [address] [address] [phone]           Please bring any scans or X-rays taken at other hospitals, if similar tests were done. Also bring a list of your medicines, including vitamins, minerals and over-the-counter drugs. It is safest to leave  personal items at home.  Be sure to tell your doctor:   If you have any allergies.   If there s any chance you are pregnant.   If you are breastfeeding.  How to prepare:   Do not eat or drink for 2 hours before your exam. If you need to take medicine, you may take it with small sips of water. (We may ask you to take liquid medicine as well.)   Please wear loose clothing, such as a sweat suit or jogging clothes. Avoid snaps, zippers and other metal. We may ask you to undress and put on a hospital gown.  Please arrive 30 minutes early for your CT. Once in the department you might be asked to drink water 15-20 minutes prior to your exam.  If indicated you may be asked to drink an oral contrast in advance of your CT.  If this is the case, the imaging team will let you know or be in contact with you prior to your appointment  Patients over 70 or patients with diabetes or kidney problems:   If you haven t had a blood test (creatinine test) within the last 30 days, the Cardiologist/Radiologist may require you to get this test prior to your exam.  If you have diabetes:   Continue to take your metformin medication on the day of your exam  If you have any questions, please call the Imaging Department where you will have your exam.            Aug 28, 2018  1:00 PM CDT   [facility] Draw with  [facility] LAB DRAW   [facility] Lab Draw ([facility])    [address] [place] [address]  [address] [place] [id number]   [phone]            Aug 28, 2018  1:30 PM CDT   Return with Carol Rose MD   [facility] Blood and Marrow Transplant ([facility])    [address] [place] [address]  [address] [place] [id number]   [phone]              Future tests that were ordered for you today     Open Future Orders        Priority Expected Expires Ordered    TSH Routine  8/17/2019 8/17/2018            Who to contact     If you have questions or need follow up information about today's  "clinic visit or your schedule please contact [facility] directly at [phone].  Normal or non-critical lab and imaging results will be communicated to you by MyChart, letter or phone within 4 business days after the clinic has received the results. If you do not hear from us within 7 days, please contact the clinic through [employer] or phone. If you have a critical or abnormal lab result, we will notify you by phone as soon as possible.  Submit refill requests through [employer] or call your pharmacy and they will forward the refill request to us. Please allow 3 business days for your refill to be completed.          Additional Information About Your Visit        [employer] Information     [employer] gives you secure access to your electronic health record. If you see a primary care provider, you can also send messages to your care team and make appointments. If you have questions, please call your primary care clinic.  If you do not have a primary care provider, please call [phone] and they will assist you.        Care EveryWhere ID     This is your Care EveryWhere ID. This could be used by other organizations to access your [place] medical records  [id number]        Your Vitals Were     Pulse Temperature Height Pulse Oximetry BMI (Body Mass Index)       78 97.5  F (36.4  C) (Tympanic) 5' 7\" (1.702 m) 99% 43.29 kg/m2        Blood Pressure from Last 3 Encounters:   08/17/18 130/82   08/30/17 134/81   08/17/17 (!) 146/94    Weight from Last 3 Encounters:   08/17/18 276 lb 6.4 oz (125.4 kg)   08/30/17 230 lb (104.3 kg)   08/17/17 231 lb 12.8 oz (105.1 kg)               Primary Care Provider    None Specified       No primary provider on file.        Equal Access to Services     [facility] : Tonny Mtz, jj diaz, valdemar jurado, royal gayle. So [facility] [place] [phone].    ATENCIÓN: Si habla español, tiene a walker disposición servicios gratuitos " de asistencia lingüística. Fahad liang [phone].    We comply with applicable federal civil rights laws and [place] laws. We do not discriminate on the basis of race, color, national origin, age, disability, sex, sexual orientation, or gender identity.            Thank you!     Thank you for choosing [facility]  for your care. Our goal is always to provide you with excellent care. Hearing back from our patients is one way we can continue to improve our services. Please take a few minutes to complete the written survey that you may receive in the mail after your visit with us. Thank you!             Your Updated Medication List - Protect others around you: Learn how to safely use, store and throw away your medicines at www.disposemymeds.org.          This list is accurate as of 8/17/18  2:09 PM.  Always use your most recent med list.                   Brand Name Dispense Instructions for use Diagnosis    acetaminophen 500 MG tablet    TYLENOL     Take 500-1,000 mg by mouth as needed        acyclovir 400 MG tablet    ZOVIRAX    60 tablet    TAKE TWO TABLETS BY MOUTH ONCE DAILY    Lymphoma, mantle cell, multiple sites (H)       albuterol 108 (90 Base) MCG/ACT inhaler    PROAIR HFA/PROVENTIL HFA/VENTOLIN HFA     Inhale 2 puffs into the lungs every 4 hours as needed for shortness of breath / dyspnea or wheezing        cephALEXin 500 MG capsule    KEFLEX     Take 500 mg by mouth        fluticasone 50 MCG/ACT spray    FLONASE     as needed        levothyroxine 75 MCG tablet    SYNTHROID/LEVOTHROID     TAKE ONE TABLET BY MOUTH ONCE DAILY AT  6  AM        montelukast 10 MG tablet    SINGULAIR     Take 10 mg by mouth daily        prochlorperazine 10 MG tablet    COMPAZINE     Take 5 mg by mouth        triamcinolone 0.1 % ointment    KENALOG     Apply topically daily

## 2018-08-28 ENCOUNTER — APPOINTMENT (OUTPATIENT)
Dept: LAB | Facility: CLINIC | Age: 53
End: 2018-08-28
Payer: COMMERCIAL

## 2018-08-28 ENCOUNTER — RECORDS - HEALTHEAST (OUTPATIENT)
Dept: ADMINISTRATIVE | Facility: OTHER | Age: 53
End: 2018-08-28

## 2018-08-28 ENCOUNTER — OFFICE VISIT (OUTPATIENT)
Dept: TRANSPLANT | Facility: CLINIC | Age: 53
End: 2018-08-28
Payer: COMMERCIAL

## 2018-08-28 VITALS
TEMPERATURE: 97.9 F | DIASTOLIC BLOOD PRESSURE: 85 MMHG | SYSTOLIC BLOOD PRESSURE: 147 MMHG | BODY MASS INDEX: 43.43 KG/M2 | HEART RATE: 67 BPM | RESPIRATION RATE: 18 BRPM | WEIGHT: 277.3 LBS | OXYGEN SATURATION: 99 %

## 2018-08-28 DIAGNOSIS — D80.1 HYPOGAMMAGLOBULINEMIA (H): Primary | ICD-10-CM

## 2018-08-28 DIAGNOSIS — E03.9 HYPOTHYROIDISM, UNSPECIFIED TYPE: ICD-10-CM

## 2018-08-28 DIAGNOSIS — C83.18 LYMPHOMA, MANTLE CELL, MULTIPLE SITES (H): ICD-10-CM

## 2018-08-28 LAB
ALBUMIN SERPL-MCNC: 3.5 G/DL (ref 3.4–5)
ALP SERPL-CCNC: 117 U/L (ref 40–150)
ALT SERPL W P-5'-P-CCNC: 22 U/L (ref 0–50)
ANION GAP SERPL CALCULATED.3IONS-SCNC: 10 MMOL/L (ref 3–14)
AST SERPL W P-5'-P-CCNC: 21 U/L (ref 0–45)
BILIRUB SERPL-MCNC: 0.3 MG/DL (ref 0.2–1.3)
BUN SERPL-MCNC: 25 MG/DL (ref 7–30)
CALCIUM SERPL-MCNC: 8.4 MG/DL (ref 8.5–10.1)
CHLORIDE SERPL-SCNC: 107 MMOL/L (ref 94–109)
CO2 SERPL-SCNC: 24 MMOL/L (ref 20–32)
CREAT SERPL-MCNC: 1.92 MG/DL (ref 0.52–1.04)
ERYTHROCYTE [DISTWIDTH] IN BLOOD BY AUTOMATED COUNT: 14.3 % (ref 10–15)
GFR SERPL CREATININE-BSD FRML MDRD: 27 ML/MIN/1.7M2
GLUCOSE SERPL-MCNC: 81 MG/DL (ref 70–99)
HCT VFR BLD AUTO: 43.8 % (ref 35–47)
HGB BLD-MCNC: 13.9 G/DL (ref 11.7–15.7)
LDH SERPL L TO P-CCNC: 260 U/L (ref 81–234)
MCH RBC QN AUTO: 29.8 PG (ref 26.5–33)
MCHC RBC AUTO-ENTMCNC: 31.7 G/DL (ref 31.5–36.5)
MCV RBC AUTO: 94 FL (ref 78–100)
PLATELET # BLD AUTO: 169 10E9/L (ref 150–450)
POTASSIUM SERPL-SCNC: 4 MMOL/L (ref 3.4–5.3)
PROT SERPL-MCNC: 6.4 G/DL (ref 6.8–8.8)
RBC # BLD AUTO: 4.67 10E12/L (ref 3.8–5.2)
SODIUM SERPL-SCNC: 141 MMOL/L (ref 133–144)
TSH SERPL DL<=0.005 MIU/L-ACNC: 3.08 MU/L (ref 0.4–4)
WBC # BLD AUTO: 6.9 10E9/L (ref 4–11)

## 2018-08-28 PROCEDURE — G0463 HOSPITAL OUTPT CLINIC VISIT: HCPCS | Mod: 25

## 2018-08-28 PROCEDURE — 25000125 ZZHC RX 250: Mod: ZF

## 2018-08-28 PROCEDURE — 25000128 H RX IP 250 OP 636: Mod: ZF

## 2018-08-28 PROCEDURE — 80053 COMPREHEN METABOLIC PANEL: CPT

## 2018-08-28 PROCEDURE — 83615 LACTATE (LD) (LDH) ENZYME: CPT

## 2018-08-28 PROCEDURE — 85027 COMPLETE CBC AUTOMATED: CPT

## 2018-08-28 PROCEDURE — 90471 IMMUNIZATION ADMIN: CPT

## 2018-08-28 PROCEDURE — G0463 HOSPITAL OUTPT CLINIC VISIT: HCPCS

## 2018-08-28 PROCEDURE — 84443 ASSAY THYROID STIM HORMONE: CPT

## 2018-08-28 PROCEDURE — 82784 ASSAY IGA/IGD/IGG/IGM EACH: CPT

## 2018-08-28 PROCEDURE — 90750 HZV VACC RECOMBINANT IM: CPT | Mod: ZF

## 2018-08-28 RX ORDER — HEPARIN SODIUM (PORCINE) LOCK FLUSH IV SOLN 100 UNIT/ML 100 UNIT/ML
5 SOLUTION INTRAVENOUS
Status: DISCONTINUED | OUTPATIENT
Start: 2018-08-28 | End: 2018-09-02 | Stop reason: HOSPADM

## 2018-08-28 RX ADMIN — SODIUM CHLORIDE, PRESERVATIVE FREE 5 ML: 5 INJECTION INTRAVENOUS at 13:33

## 2018-08-28 RX ADMIN — ZOSTER VACCINE RECOMBINANT, ADJUVANTED 0.5 ML: KIT at 14:45

## 2018-08-28 ASSESSMENT — PAIN SCALES - GENERAL: PAINLEVEL: SEVERE PAIN (6)

## 2018-08-28 NOTE — PROGRESS NOTES
BMT  visit note    Mrs Paul is a 49yo female, she is here for 3-year anniversary after auto PBSCT for MCL.        HPI:   Avis is delightful 53 to patient with high risk mantle cell lymphoma; She received BEAM conditioning followed by stem cell infusion on 8/18/2015 and has been on maintenance rituximab, which she will finish at 2 year post-transplant in august 2017.    The patient has a very high-risk mantle cell lymphoma and previously responded to ibrutinib.   INTERIM HISTORY: Today, she reports overall doing quite well, but had several bouts of cellulitis in past few months and saw allergologist - she was Rx Claritin and Singulair with some improvoement. No fevers or other infections. Had a stomach flu 2 weeks ago.     She has been receiving IVIG replacement in 's office; last in March 2018. IVIG caused swelling and 10 lg wt gain after infusions.   No new lumps, no pain, no night sweats.     Review of Systems:  o/w neg, no resp, cardiac, GI or  complains.     Physical Exam:   There were no vitals taken for this visit.   Wt Readings from Last 4 Encounters:   08/17/18 125.4 kg (276 lb 6.4 oz)   08/30/17 104.3 kg (230 lb)   08/17/17 105.1 kg (231 lb 12.8 oz)   06/19/17 103 kg (227 lb)     General: NAD, interactive, appropriate, %   Eyes: : ROSA, sclera anicteric   Nose/Mouth/Throat: OP clear, mucosa moist,  Red and swollen nasal mucosa with drainage.  Ears; TM clear bilat; no ext canal inflammation.  yes sinus tenderness. Some pharyngeal erythema and edema.     Lungs:no crackles or wheezes  Cardiovascular: RRR, no M/R/G   Abdominal/Rectal: mild epigastic tenderness to palpation, no guarding, no RUQ or Mares sign, obese  Lymphatics: No cerv, axill supraclav nodes.  R groin soft mass smaller.   No BLE edema  Skin: No rashes or petechaie.   Neuro: A&O. Grossly non-focal.   Additional Findings: port in place; not tnder  LabS  Lab Results   Component Value Date    WBC 5.9 08/17/2017    ANEU 3.7  08/17/2017    HGB 13.1 08/17/2017    HCT 40.9 08/17/2017     (L) 08/17/2017     08/17/2017    POTASSIUM 3.5 08/17/2017    CHLORIDE 107 08/17/2017    CO2 24 08/17/2017    GLC 89 08/17/2017    BUN 32 (H) 08/17/2017    CR 1.81 (H) 08/17/2017    MAG 1.7 08/31/2015    INR 1.08 09/09/2015    AST 20 08/17/2017    ALT 25 08/17/2017       PMHX:   She is chronically being troubled by recurrent sinusitis and upper respiratory infections. SHeh ad 4 recurrent episodes of acute sinusitis in past 4 moths. Has been seen by ENT.     She also has chronic neuropathy in the fingers of both hands and her toes.  2016- recent diagnosis of vitamin B12 and vitamin D deficiency and was only on oral over-the-counter replacement   Severe hypogamaglobulinemia - insurance denied the replacement IVIG in past, most recently on IVIG x 4 month. She is currently in remission from her mantle cell lymphoma.       PHYSICAL EXAMINATION: /85 (BP Location: Right arm, Patient Position: Sitting, Cuff Size: Adult Large)  Pulse 67  Temp 97.9  F (36.6  C) (Oral)  Resp 18  Wt 125.8 kg (277 lb 4.8 oz)  SpO2 99%  BMI 43.43 kg/m2  VITAL SIGNS:  She is afebrile.    GENERAL:  She looks tired.   HEENT:  She has oropharyngeal erythema and tenderness over the maxillary sinus.  There is some nasal congestion.    CHEST:  Clear to auscultation.   HEART:  Heart tones are regular.     LYMPH:  No adenopathy.      LABORATORY DATA:    Lab Results   Component Value Date    WBC 5.9 08/17/2017    ANEU 3.7 08/17/2017    HGB 13.1 08/17/2017    HCT 40.9 08/17/2017     (L) 08/17/2017     08/17/2017    POTASSIUM 3.5 08/17/2017    CHLORIDE 107 08/17/2017    CO2 24 08/17/2017    GLC 89 08/17/2017    BUN 32 (H) 08/17/2017    CR 1.81 (H) 08/17/2017    MAG 1.7 08/31/2015    INR 1.08 09/09/2015    AST 20 08/17/2017    ALT 25 08/17/2017     he B12 today is 394 which is within normal limits.        ASSESSMENT AND PLAN:     1.  Mantle cell lymphoma, high  risk and resistant to chemotherapy but resonding to ibrutinib.   in remission at 2 years post-transplant.  She completed maintenance rituximab, which finished at 2 year post-transplant in august 2017.  The patient has a very high-risk mantle cell lymphoma and previously responded to ibrutinib.    She will be followed by  as needed. Her MCL should be monitored every 6-12 months by alternating onc and PCP visits. No need for f/u in BMT clinic unless there is a new relapse.       1.  Recurrent sinusitis associated with hypogammaglobulinemia. Repeat IgG today. Replace with IVIG if below 300mg/dl. Hypogammaglobulinemia can persist for 1-2 years post Rituximab.   Last IVIG weekly in May 2018.     4.  Infectious Disease.  Recent cellulitis. She will receive Shingrix vaccine today. Completed 2-year immunizations in 09/2017.    Allergies to insect bites -- seen by allergy specialist. On Claritin and Singulair. Overall better.    5.  Gastrointestinal.   now completely resolved.  Her EGD did not show mantle cell lymphoma.       Plan:     F/u with     Shingrix vaccine today and in boost in 3 months  Ok to keep a [id number] with monthly flush       Carol Rose MD  Associate Professor of Medicine  [phone]

## 2018-08-28 NOTE — NURSING NOTE
"Oncology Rooming Note    August 28, 2018 1:57 PM   Avis Paul is a 53 year old female who presents for:    Chief Complaint   Patient presents with     Port Draw     Labs drawn via port by RN, line flushed and hep locked, VS Taken     Initial Vitals: /85 (BP Location: Right arm, Patient Position: Sitting, Cuff Size: Adult Large)  Pulse 67  Temp 97.9  F (36.6  C) (Oral)  Resp 18  Wt 125.8 kg (277 lb 4.8 oz)  SpO2 99%  BMI 43.43 kg/m2 Estimated body mass index is 43.43 kg/(m^2) as calculated from the following:    Height as of 8/17/18: 1.702 m (5' 7\").    Weight as of this encounter: 125.8 kg (277 lb 4.8 oz). Body surface area is 2.44 meters squared.  Severe Pain (6) Comment: Data Unavailable   No LMP recorded. Patient is postmenopausal.  Allergies reviewed: Yes  Medications reviewed: Yes    Medications: Medication refills not needed today.  Pharmacy name entered into [facility]:    [facility] - [place], [place] - [address] [place] [address] PHARMACY [place], [place] - [address] [facility] [phone]    Clinical concerns: none     6 minutes for nursing intake (face to face time)     Ivory Escamilla MA              "

## 2018-08-28 NOTE — LETTER
8/28/2018       RE: Avis Paul  [address] [facility] [address]     Dear Colleague,    Thank you for referring your patient, Avis Paul, to the [facility] BLOOD AND MARROW TRANSPLANT at [facility]. Please see a copy of my visit note below.    BMT  visit note    Mrs Paul is a 51yo female, she is here for 3-year anniversary after auto PBSCT for MCL.        HPI:   Avis is delightful 53 to patient with high risk mantle cell lymphoma; She received BEAM conditioning followed by stem cell infusion on 8/18/2015 and has been on maintenance rituximab, which she will finish at 2 year post-transplant in august 2017.    The patient has a very high-risk mantle cell lymphoma and previously responded to ibrutinib.   INTERIM HISTORY: Today, she reports overall doing quite well, but had several bouts of cellulitis in past few months and saw allergologist - she was Rx Claritin and Singulair with some improvoement. No fevers or other infections. Had a stomach flu 2 weeks ago.     She has been receiving IVIG replacement in 's office; last in March 2018. IVIG caused swelling and 10 lg wt gain after infusions.   No new lumps, no pain, no night sweats.     Review of Systems:  o/w neg, no resp, cardiac, GI or  complains.     Physical Exam:   There were no vitals taken for this visit.   Wt Readings from Last 4 Encounters:   08/17/18 125.4 kg (276 lb 6.4 oz)   08/30/17 104.3 kg (230 lb)   08/17/17 105.1 kg (231 lb 12.8 oz)   06/19/17 103 kg (227 lb)     General: NAD, interactive, appropriate, %   Eyes: : ROSA, sclera anicteric   Nose/Mouth/Throat: OP clear, mucosa moist,  Red and swollen nasal mucosa with drainage.  Ears; TM clear bilat; no ext canal inflammation.  yes sinus tenderness. Some pharyngeal erythema and edema.     Lungs:no crackles or wheezes  Cardiovascular: RRR, no M/R/G   Abdominal/Rectal: mild epigastic tenderness to palpation, no guarding,  no RUQ or Mares sign, obese  Lymphatics: No cerv, axill supraclav nodes.  R groin soft mass smaller.   No BLE edema  Skin: No rashes or petechaie.   Neuro: A&O. Grossly non-focal.   Additional Findings: port in place; not tnder  LabS  Lab Results   Component Value Date    WBC 5.9 08/17/2017    ANEU 3.7 08/17/2017    HGB 13.1 08/17/2017    HCT 40.9 08/17/2017     (L) 08/17/2017     08/17/2017    POTASSIUM 3.5 08/17/2017    CHLORIDE 107 08/17/2017    CO2 24 08/17/2017    GLC 89 08/17/2017    BUN 32 (H) 08/17/2017    CR 1.81 (H) 08/17/2017    MAG 1.7 08/31/2015    INR 1.08 09/09/2015    AST 20 08/17/2017    ALT 25 08/17/2017       PMHX:   She is chronically being troubled by recurrent sinusitis and upper respiratory infections. SHeh ad 4 recurrent episodes of acute sinusitis in past 4 moths. Has been seen by ENT.     She also has chronic neuropathy in the fingers of both hands and her toes.  2016- recent diagnosis of vitamin B12 and vitamin D deficiency and was only on oral over-the-counter replacement   Severe hypogamaglobulinemia - insurance denied the replacement IVIG in past, most recently on IVIG x 4 month. She is currently in remission from her mantle cell lymphoma.       PHYSICAL EXAMINATION: /85 (BP Location: Right arm, Patient Position: Sitting, Cuff Size: Adult Large)  Pulse 67  Temp 97.9  F (36.6  C) (Oral)  Resp 18  Wt 125.8 kg (277 lb 4.8 oz)  SpO2 99%  BMI 43.43 kg/m2  VITAL SIGNS:  She is afebrile.    GENERAL:  She looks tired.   HEENT:  She has oropharyngeal erythema and tenderness over the maxillary sinus.  There is some nasal congestion.    CHEST:  Clear to auscultation.   HEART:  Heart tones are regular.     LYMPH:  No adenopathy.      LABORATORY DATA:    Lab Results   Component Value Date    WBC 5.9 08/17/2017    ANEU 3.7 08/17/2017    HGB 13.1 08/17/2017    HCT 40.9 08/17/2017     (L) 08/17/2017     08/17/2017    POTASSIUM 3.5 08/17/2017    CHLORIDE 107  08/17/2017    CO2 24 08/17/2017    GLC 89 08/17/2017    BUN 32 (H) 08/17/2017    CR 1.81 (H) 08/17/2017    MAG 1.7 08/31/2015    INR 1.08 09/09/2015    AST 20 08/17/2017    ALT 25 08/17/2017     he B12 today is 394 which is within normal limits.        ASSESSMENT AND PLAN:     1.  Mantle cell lymphoma, high risk and resistant to chemotherapy but resonding to ibrutinib.   in remission at 2 years post-transplant.  She completed maintenance rituximab, which finished at 2 year post-transplant in august 2017.  The patient has a very high-risk mantle cell lymphoma and previously responded to ibrutinib.    She will be followed by  as needed. Her MCL should be monitored every 6-12 months by alternating onc and PCP visits. No need for f/u in BMT clinic unless there is a new relapse.       1.  Recurrent sinusitis associated with hypogammaglobulinemia. Repeat IgG today. Replace with IVIG if below 300mg/dl. Hypogammaglobulinemia can persist for 1-2 years post Rituximab.   Last IVIG weekly in May 2018.     4.  Infectious Disease.  Recent cellulitis. She will receive Shingrix vaccine today. Completed 2-year immunizations in 09/2017.    Allergies to insect bites -- seen by allergy specialist. On Claritin and Singulair. Overall better.    5.  Gastrointestinal.   now completely resolved.  Her EGD did not show mantle cell lymphoma.       Plan:     F/u with     Shingrix vaccine today and in boost in 3 months  Ok to keep a [id number] with monthly flush       Carol Rose MD  Associate Professor of Medicine  [phone]      Avis, you IgG level is 308. I suggest you resume IVIG monthly. Please contact  to schedule. Or f/u with your allergist to consider subcutanoues route.         Again, thank you for allowing me to participate in the care of your patient.      Sincerely,    Carol Rose MD

## 2018-08-28 NOTE — MR AVS SNAPSHOT
After Visit Summary   8/28/2018    Avis Paul    MRN: [id number]           Patient Information     Date Of Birth          1965        Visit Information        Provider Department      8/28/2018 1:30 PM Carol Rose MD [facility]        Today's Diagnoses     Hypogammaglobulinemia (H)    -  1    Lymphoma, mantle cell, multiple sites (H)        Hypothyroidism, unspecified type              [facility] ([facility]  [address] [place] [id number]  Phone: [phone]  Clinic Hours:   Monday-Thursday:7am to 7pm   Friday: 7am to 5pm   Weekends and holidays:    8am to noon (in general)  If your fever is 100.5  or greater,   call the clinic.  After hours call the   hospital at [phone] or   [phone]. Ask for the BMT   fellow on-call            Follow-ups after your visit        Who to contact     If you have questions or need follow up information about today's clinic visit or your schedule please contact [facility] directly at [phone].  Normal or non-critical lab and imaging results will be communicated to you by Mixer Labshart, letter or phone within 4 business days after the clinic has received the results. If you do not hear from us within 7 days, please contact the clinic through [email or web address] or phone. If you have a critical or abnormal lab result, we will notify you by phone as soon as possible.  Submit refill requests through IMayGou or call your pharmacy and they will forward the refill request to us. Please allow 3 business days for your refill to be completed.          Additional Information About Your Visit        MyChart Information     IMayGou gives you secure access to your electronic health record. If you see a primary care provider, you can also send messages to your care team and make appointments. If you have questions, please call your primary care clinic.  If you do not have a primary care  provider, please call [phone] and they will assist you.        Care EveryWhere ID     This is your Care EveryWhere ID. This could be used by other organizations to access your [place] medical records  [id number]        Your Vitals Were     Pulse Temperature Respirations Pulse Oximetry BMI (Body Mass Index)       67 97.9  F (36.6  C) (Oral) 18 99% 43.43 kg/m2        Blood Pressure from Last 3 Encounters:   08/28/18 147/85   08/17/18 130/82   08/30/17 134/81    Weight from Last 3 Encounters:   08/28/18 125.8 kg (277 lb 4.8 oz)   08/17/18 125.4 kg (276 lb 6.4 oz)   08/30/17 104.3 kg (230 lb)              We Performed the Following     CBC with platelets     Comprehensive metabolic panel     IgG     Lactate Dehydrogenase     TSH with free T4 reflex        Recent Review Flowsheet Data     BMT Recent Results Latest Ref Rng & Units 11/23/2015 2/17/2016 8/17/2016 1/23/2017 2/13/2017 8/17/2017 8/28/2018    WBC 4.0 - 11.0 10e9/L 5.1 6.7 6.8 5.0 5.3 5.9 6.9    Hemoglobin 11.7 - 15.7 g/dL 10.9(L) 13.0 12.8 13.2 12.6 13.1 13.9    Platelet Count 150 - 450 10e9/L 146(L) 153 128(L) 148(L) 138(L) 126(L) 169    Neutrophils (Absolute) 1.6 - 8.3 10e9/L 3.4 5.1 4.8 3.5 3.7 3.7 -    INR 0.86 - 1.14 - - - - - - -    Sodium 133 - 144 mmol/L 142 142 141 142 - 140 141    Potassium 3.4 - 5.3 mmol/L 3.1(L) 3.7 3.6 3.6 - 3.5 4.0    Chloride 94 - 109 mmol/L 109 109 110(H) 111(H) - 107 107    Glucose 70 - 99 mg/dL 86 86 96 83 - 89 81    Urea Nitrogen 7 - 30 mg/dL 12 19 24 15 - 32(H) 25    Creatinine 0.52 - 1.04 mg/dL 1.74(H) 1.87(H) 1.78(H) 1.75(H) - 1.81(H) 1.92(H)    Calcium (Total) 8.5 - 10.1 mg/dL 8.7 8.8 8.6 8.3(L) - 8.7 8.4(L)    Protein (Total) 6.8 - 8.8 g/dL 6.0(L) 6.2(L) 5.5(L) 5.4(L) - 6.9 6.4(L)    Albumin 3.4 - 5.0 g/dL 3.6 4.0 3.4 3.2(L) - 3.2(L) 3.5    Bilirubin (Direct) 0.0 - 0.2 mg/dL - - - - - - -    Alkaline Phosphatase 40 - 150 U/L 95 113 104 96 - 108 117    AST 0 - 45 U/L 13 14 18 17 - 20 21    ALT 0 - 50 U/L 25 22 29  26 - 25 22    MCV 78 - 100 fl 93 92 93 92 91 92 94               Primary Care Provider Office Phone # Fax #    [facility] [phone] [address] [facility] [phone]        Equal Access to Services     PERRI ESTEVEZ : Hadii aad ku hadradhao Sokaleeali, waaxda luqadaha, qaybta kaalmada adeegyada, waxozzy samuelsin richn adejennifer gan laalonsogricelda gayle. So [facility] [phone].    ATENCIÓN: Si habla español, tiene a walker disposición servicios gratuitos de asistencia lingüística. Llame al [phone].    We comply with applicable federal civil rights laws and [place] laws. We do not discriminate on the basis of race, color, national origin, age, disability, sex, sexual orientation, or gender identity.            Thank you!     Thank you for choosing [facility]  for your care. Our goal is always to provide you with excellent care. Hearing back from our patients is one way we can continue to improve our services. Please take a few minutes to complete the written survey that you may receive in the mail after your visit with us. Thank you!             Your Updated Medication List - Protect others around you: Learn how to safely use, store and throw away your medicines at www.disposemymeds.org.          This list is accurate as of 8/28/18 11:59 PM.  Always use your most recent med list.                   Brand Name Dispense Instructions for use Diagnosis    acetaminophen 500 MG tablet    TYLENOL     Take 500-1,000 mg by mouth as needed        acyclovir 400 MG tablet    ZOVIRAX    60 tablet    TAKE TWO TABLETS BY MOUTH ONCE DAILY    Lymphoma, mantle cell, multiple sites (H)       albuterol 108 (90 Base) MCG/ACT inhaler    PROAIR HFA/PROVENTIL HFA/VENTOLIN HFA     Inhale 2 puffs into the lungs every 4 hours as needed for shortness of breath / dyspnea or wheezing        cephALEXin 500 MG capsule    KEFLEX     Take 500 mg by mouth        fluticasone 50 MCG/ACT spray    FLONASE      as needed        montelukast 10 MG tablet    SINGULAIR     Take 10 mg by mouth daily        prochlorperazine 10 MG tablet    COMPAZINE     Take 5 mg by mouth        triamcinolone 0.1 % ointment    KENALOG     Apply topically daily

## 2018-08-28 NOTE — NURSING NOTE
Chief Complaint   Patient presents with     Port Draw     Labs drawn via port by RN, line flushed and hep locked, VS Taken     No lab orders, provider paged. Richard red, red gel, 2 greens and 2 purple sent to main lab [facility].    Juan Bella RN

## 2018-08-29 LAB — IGG SERPL-MCNC: 308 MG/DL (ref 695–1620)

## 2018-08-30 ENCOUNTER — MYC MEDICAL ADVICE (OUTPATIENT)
Dept: FAMILY MEDICINE | Facility: CLINIC | Age: 53
End: 2018-08-30

## 2018-08-30 ENCOUNTER — COMMUNICATION - HEALTHEAST (OUTPATIENT)
Dept: ALLERGY | Facility: CLINIC | Age: 53
End: 2018-08-30

## 2018-08-30 ENCOUNTER — COMMUNICATION - HEALTHEAST (OUTPATIENT)
Dept: ONCOLOGY | Facility: HOSPITAL | Age: 53
End: 2018-08-30

## 2018-08-30 DIAGNOSIS — E03.9 HYPOTHYROIDISM, UNSPECIFIED TYPE: Primary | ICD-10-CM

## 2018-08-30 NOTE — PROGRESS NOTES
Avis, you IgG level is 308. I suggest you resume IVIG monthly. Please contact  to schedule. Or f/u with your allergist to consider subcutanoues route.

## 2018-08-31 ENCOUNTER — MYC MEDICAL ADVICE (OUTPATIENT)
Dept: FAMILY MEDICINE | Facility: CLINIC | Age: 53
End: 2018-08-31

## 2018-08-31 ENCOUNTER — COMMUNICATION - HEALTHEAST (OUTPATIENT)
Dept: ONCOLOGY | Facility: HOSPITAL | Age: 53
End: 2018-08-31

## 2018-08-31 ENCOUNTER — AMBULATORY - HEALTHEAST (OUTPATIENT)
Dept: ALLERGY | Facility: CLINIC | Age: 53
End: 2018-08-31

## 2018-08-31 DIAGNOSIS — L50.9 URTICARIA: ICD-10-CM

## 2018-08-31 RX ORDER — LEVOTHYROXINE SODIUM 88 UG/1
88 TABLET ORAL DAILY
Qty: 90 TABLET | Refills: 3 | Status: SHIPPED | OUTPATIENT
Start: 2018-08-31 | End: 2019-10-01

## 2018-08-31 NOTE — TELEPHONE ENCOUNTER
Please see patient my chart note  Question about her labs afrom the [facility].    Routing to provider.    Inna GALLAGHER Rn

## 2018-09-04 ENCOUNTER — COMMUNICATION - HEALTHEAST (OUTPATIENT)
Dept: ADMINISTRATIVE | Facility: HOSPITAL | Age: 53
End: 2018-09-04

## 2018-09-04 NOTE — TELEPHONE ENCOUNTER
Dr. Tan,    The patient had some more information for you about her LDH and labs.  Please see the my chart note.  Thank you, Jaz SAWYER RN

## 2018-09-17 ENCOUNTER — TRANSFERRED RECORDS (OUTPATIENT)
Dept: HEALTH INFORMATION MANAGEMENT | Facility: CLINIC | Age: 53
End: 2018-09-17

## 2018-09-17 ENCOUNTER — OFFICE VISIT - HEALTHEAST (OUTPATIENT)
Dept: ONCOLOGY | Facility: HOSPITAL | Age: 53
End: 2018-09-17

## 2018-09-17 ENCOUNTER — INFUSION - HEALTHEAST (OUTPATIENT)
Dept: INFUSION THERAPY | Facility: HOSPITAL | Age: 53
End: 2018-09-17

## 2018-09-17 ENCOUNTER — AMBULATORY - HEALTHEAST (OUTPATIENT)
Dept: INFUSION THERAPY | Facility: HOSPITAL | Age: 53
End: 2018-09-17

## 2018-09-17 DIAGNOSIS — D80.1 HYPOGAMMAGLOBULINEMIA, ACQUIRED (H): ICD-10-CM

## 2018-09-17 DIAGNOSIS — E03.9 ACQUIRED HYPOTHYROIDISM: ICD-10-CM

## 2018-09-17 DIAGNOSIS — C83.13 MANTLE CELL LYMPHOMA OF INTRA-ABDOMINAL LYMPH NODES (H): ICD-10-CM

## 2018-09-17 DIAGNOSIS — B99.9 CHRONIC INFECTION: ICD-10-CM

## 2018-09-17 LAB
BASOPHILS # BLD AUTO: 0 THOU/UL (ref 0–0.2)
BASOPHILS NFR BLD AUTO: 1 % (ref 0–2)
EOSINOPHIL # BLD AUTO: 0.3 THOU/UL (ref 0–0.4)
EOSINOPHIL NFR BLD AUTO: 6 % (ref 0–6)
ERYTHROCYTE [DISTWIDTH] IN BLOOD BY AUTOMATED COUNT: 14.5 % (ref 11–14.5)
HCT VFR BLD AUTO: 41 % (ref 35–47)
HGB BLD-MCNC: 13.3 G/DL (ref 12–16)
IGA SERPL-MCNC: 18 MG/DL (ref 65–400)
IGA SERPL-MCNC: 258 MG/DL (ref 700–1700)
IGM SERPL-MCNC: <5 MG/DL (ref 60–280)
LYMPHOCYTES # BLD AUTO: 1.1 THOU/UL (ref 0.8–4.4)
LYMPHOCYTES NFR BLD AUTO: 21 % (ref 20–40)
MCH RBC QN AUTO: 30 PG (ref 27–34)
MCHC RBC AUTO-ENTMCNC: 32.4 G/DL (ref 32–36)
MCV RBC AUTO: 92 FL (ref 80–100)
MONOCYTES # BLD AUTO: 0.5 THOU/UL (ref 0–0.9)
MONOCYTES NFR BLD AUTO: 9 % (ref 2–10)
NEUTROPHILS # BLD AUTO: 3.4 THOU/UL (ref 2–7.7)
NEUTROPHILS NFR BLD AUTO: 64 % (ref 50–70)
PLATELET # BLD AUTO: 140 THOU/UL (ref 140–440)
PMV BLD AUTO: 9.8 FL (ref 8.5–12.5)
RBC # BLD AUTO: 4.44 MILL/UL (ref 3.8–5.4)
TSH SERPL DL<=0.005 MIU/L-ACNC: 1.36 UIU/ML (ref 0.3–5)
WBC: 5.3 THOU/UL (ref 4–11)

## 2018-10-09 NOTE — NURSING NOTE
Chief Complaint   Patient presents with     Port Draw     hands are very swollen      Labs collected and sent, line flushed with NS and heparin and de-accessed immediately. Vitals taken and pt checked in for next appt.Liana Golden     Encounter Date: 10/9/2018       History     Chief Complaint   Patient presents with    Abnormal Lab     elevated creatinine; hx cirrhosis     Patient is a 49 year with a history of cirrhosis secondary to alcohol abuse with current liver evaluation, hepatic encephalopathy, portal hypertension is presenting to the ER for evaluation of abnormal lab.  Patient was sent over from the transplant clinic for ANDREW and evaluation of right upper quadrant pain. Patient recently underwent liver biopsy 2 weeks ago.  She states that since then she has had increasing pain to the right upper quadrant.  She describes it as sharp, constant.  She states that the pain radiates up into her chest and right shoulder region.  She denies pain with meals.  Denies any nausea, vomiting.  No diarrhea or constipation.  No blood in stool or black tarry stool.  Patient denies any fever chills at home.  No URI symptoms including cough and congestion.  No chest pain or palpitations.  No shortness of breath. Patient states that she was scheduled to get paracentesis on a weekly basis but she was recently started on spironolactone which has reduced the ascites .  Last tap was about 2 months ago      The history is provided by the patient.     Review of patient's allergies indicates:   Allergen Reactions    Dilaudid [hydromorphone] Hallucinations     Past Medical History:   Diagnosis Date    Acute hypoxemic respiratory failure 12/1/2016    ANDREW (acute kidney injury) 11/28/2016    Alcohol dependence in remission     Alcoholic cirrhosis     Alcoholic cirrhosis of liver with ascites 10/22/2015    Anxiety     Ascites due to alcoholic cirrhosis 6/13/2016    Centrilobular emphysema 12/12/2016    Cigarette nicotine dependence without complication 6/2/2016    Folate deficiency 10/22/2015    Gallstones     Hepatic encephalopathy     Hepatorenal syndrome 11/29/2016    Hyperbilirubinemia 6/13/2016    Hyponatremia 11/29/2016    Iron deficiency anemia  due to chronic blood loss 10/22/2015    Portal hypertensive gastropathy 10/22/2015    Subclinical hypothyroidism 10/22/2015     Past Surgical History:   Procedure Laterality Date    ADENOIDECTOMY      APPENDECTOMY      BIOPSY N/A 10/30/2014    [facility]le [facility]    BIOPSY, LIVER, TRANSJUGULAR APPROACH N/A 8/15/2018    [facility]     SECTION      COLONOSCOPY      COLONOSCOPY N/A 2018    Procedure: COLONOSCOPY;  Surgeon: Jesus Whalen MD;  Location: [facility] ([address] [place] [address]);  Service: Endoscopy;  Laterality: N/A;  2nd floor- pt has multiple comorbidities- requiring weekly paracentesis, now requires continuous home O2.  HCC/Cirrhosis, Variceal screening- labs ordered.  PM Prep    COLONOSCOPY N/A 2018    [facility]    COLONOSCOPY N/A 2014    Per[facility] by [facility] at [facility]    ESOPHAGOGASTRODUODENOSCOPY N/A 2018    Procedure: ESOPHAGOGASTRODUODENOSCOPY (EGD);  Surgeon: Jesus Whalen MD;  Location: [address]);  Service: Endoscopy;  Laterality: N/A;  2nd floor- pt has multiple comorbidities- requiring weekly paracentesis, now requires continuous home O2.  HCC/Cirrhosis, Variceal screening- labs ordered.    ESOPHAGOGASTRODUODENOSCOPY (EGD) N/A 2018    Performed by Jesus Whalen MD at [facility] ([address] [place] [address])    ESOPHAGOGASTRODUODENOSCOPY (EGD) N/A 6/3/2016    [facility]    ESOPHAGOGASTRODUODENOSCOPY (EGD) N/A 2015    Performed by Maritza García [facility] at [facility]    HYSTERECTOMY      26 yrs old    LIVER BIOPSY      OOPHORECTOMY Left     26 yrs old    OOPHORECTOMY Right     30 yrs old    TONSILLECTOMY      TRANSJUGULAR BIOPSY OF LIVER N/A 8/15/2018    Procedure: BIOPSY, LIVER, TRANSJUGULAR APPROACH;  Surgeon: [facility] Diagnostic Provider;  Location: [facility] OR [address] [place] [address];  Service: Radiology;   Laterality: N/A;    TUBAL LIGATION      UPPER GASTROINTESTINAL ENDOSCOPY       Family History   Problem Relation Age of Onset    Rheum arthritis Father     Cancer Mother     No Known Problems Sister     No Known Problems Brother     Colon cancer Neg Hx      Social History     Tobacco Use    Smoking status: Former Smoker     Packs/day: 1.00     Years: 35.00     Pack years: 35.00     Types: Cigarettes    Smokeless tobacco: Never Used   Substance Use Topics    Alcohol use: No     Alcohol/week: 0.0 oz     Comment: beer intake last ~2017, liquour last drink 9/2018    Drug use: No     Review of Systems   Constitutional: Negative for chills and fever.   HENT: Negative for congestion.    Respiratory: Negative for cough and shortness of breath.    Cardiovascular: Negative for chest pain and palpitations.   Gastrointestinal: Positive for abdominal pain. Negative for blood in stool, constipation, diarrhea, nausea and vomiting.   Genitourinary: Negative for dysuria, flank pain and hematuria.   Musculoskeletal: Negative for back pain.   Skin: Negative for rash and wound.   Allergic/Immunologic: Negative for immunocompromised state.   Neurological: Negative for dizziness and weakness.   Hematological: Bruises/bleeds easily.   Psychiatric/Behavioral: Negative for confusion.       Physical Exam     Initial Vitals [10/09/18 1603]   BP Pulse Resp Temp SpO2   102/61 76 16 98 °F (36.7 °C) 100 %      MAP       --         Physical Exam    Constitutional: She appears well-developed and well-nourished. She is not diaphoretic. No distress.   HENT:   Head: Normocephalic and atraumatic.   Mouth/Throat: Oropharynx is clear and moist.   Eyes: Conjunctivae and EOM are normal. Pupils are equal, round, and reactive to light.   Neck: Neck supple.   Cardiovascular: Normal rate, regular rhythm, normal heart sounds and intact distal pulses.   Pulmonary/Chest: Breath sounds normal.   Abdominal: Soft. Normal appearance. She exhibits no  distension. There is hepatomegaly. There is tenderness in the right upper quadrant. There is CVA tenderness (right). There is no rigidity, no rebound and no guarding.   Neurological: She is alert and oriented to person, place, and time.   Skin: Skin is warm and dry.         ED Course   Procedures  Labs Reviewed   COMPREHENSIVE METABOLIC PANEL - Abnormal; Notable for the following components:       Result Value    Sodium 130 (*)     CO2 19 (*)     BUN, Bld 26 (*)     Creatinine 1.5 (*)     Total Protein 8.5 (*)     Total Bilirubin 1.3 (*)     Alkaline Phosphatase 194 (*)     eGFR if  46.8 (*)     eGFR if non  40.6 (*)     All other components within normal limits   CBC W/ AUTO DIFFERENTIAL - Abnormal; Notable for the following components:    RBC 3.40 (*)     Hemoglobin 11.5 (*)     Hematocrit 34.7 (*)      (*)     MCH 33.8 (*)     Immature Grans (Abs) 0.05 (*)     Mono # 1.1 (*)     Lymph% 12.9 (*)     All other components within normal limits   POCT GLUCOSE - Abnormal; Notable for the following components:    POCT Glucose 145 (*)     All other components within normal limits   LIPASE   PROCALCITONIN   PHOSPHORUS   MAGNESIUM   LIPASE   OSMOLALITY   OSMOLALITY    Narrative:     ADD ON OSMO # [id number] MG # [id number] PHOS # [id number] PER DR. DEANA RANKIN @  10/09/2018  21:50           Imaging Results          US Doppler Abd/Retroperitoneal Limited (Final result)     Abnormal  Result time 10/09/18 23:52:16   Procedure changed from US Liver with Doppler (xpd)     Final result by Mark Jacobsen MD (10/09/18 23:52:16)                 Impression:      Findings consistent with portal hypertension with reversal of flow in the main and right portal veins.  The left portal vein demonstrates appropriate forward flow.  These findings are the reverse of finding on prior liver ultrasound Doppler.    Reversal flow in the SMV and splenic veins.  Several splenic collateral  vessels.    This report was flagged in Epic as abnormal.    Electronically signed by resident: Juan Diego Childers  Date:    10/09/2018  Time:    23:27    Electronically signed by: Mark Jacobsen MD  Date:    10/09/2018  Time:    23:52             Narrative:    EXAMINATION:  US DOPPLER ABDOMEN/RETROPERITONEAL LIMITED    CLINICAL HISTORY:  RUQ, S/P LIVER BX, RULE OUT CLOT;    TECHNIQUE:  Limited abdominal Doppler ultrasound.  Color and spectral Doppler were performed.    COMPARISON:  Ultrasound abdomen 10/09/2018.  CT abdomen pelvis 07/25/2018.  Ultrasound liver with Doppler 07/24/2018.    FINDINGS:  There is ascites.    VASCULATURE:    The main and right portal veins demonstrate reversed hepatofugal flow, a reversal when    compared to prior study.  The left portal vein demonstrates appropriate hepatopetal flow, also a reversal when compared to prior study.    The main, right, left hepatic veins are patent with proper directional flow.    The SMV and splenic vein (in the midline) demonstrate reversal of flow.    The main hepatic artery is patent. The umbilical vein is not patent.  The Celiac artery was not able to be visualized.    There are several splenic collateral vessels visualized.                               X-Ray Chest PA And Lateral (Final result)  Result time 10/09/18 20:06:06    Final result by Gonzales Oneil MD (10/09/18 20:06:06)                 Impression:      No detrimental change or radiographic acute intrathoracic process seen.    Electronically signed by resident: Liudmila Tavares  Date:    10/09/2018  Time:    19:55    Electronically signed by: Gonzales Oneil MD  Date:    10/09/2018  Time:    20:06             Narrative:    EXAMINATION:  XR CHEST PA AND LATERAL    CLINICAL HISTORY:  Pain, unspecified    TECHNIQUE:  PA and lateral views of the chest were performed.    COMPARISON:  PA and lateral chest radiograph 08/20/2018    FINDINGS:  No pleural effusion or pneumothorax.  A 0.7 cm left upper lobe  calcified granuloma is again noted.  Grossly unchanged multiple scattered subcentimeter dense nodules throughout both lungs consistent with calcified granulomas.  Bibasilar scattered linear opacities consistent with subsegmental scarring versus atelectasis.  Bilateral diffuse nonspecific interstitial coarsening similar to prior.  No large consolidation or definite new focal opacity.    The cardiac silhouette is normal in size. The hilar and mediastinal contours are unremarkable.    Bones are intact.                               US Abdomen Complete (Final result)  Result time 10/09/18 19:33:19    Final result by Peggy Jang MD (10/09/18 19:33:19)                 Impression:      1. Cholelithiasis without sonographic evidence for acute cholecystitis.  2. Cirrhosis and mild hepatomegaly.  3. Sequela of portal hypertension including small volume ascites and splenic collateral vessels.    Electronically signed by resident: Naseem Scott  Date:    10/09/2018  Time:    18:59    Electronically signed by: Peggy Jang MD  Date:    10/09/2018  Time:    19:33             Narrative:    EXAMINATION:  US ABDOMEN COMPLETE    CLINICAL HISTORY:  Unspecified abdominal pain    TECHNIQUE:  Complete abdominal ultrasound (including pancreas, aorta, liver, gallbladder, common bile duct, IVC, kidneys, and spleen) was performed.    COMPARISON:  MRI 07/25/2018; ultrasound 07/20/2014, 07/17/2018    FINDINGS:  Pancreas: The visualized portions of pancreas appear normal.    Aorta: No aneurysm.    Liver: 16.0 cm, mildly enlarged.  Coarse echotexture and subtle nodular contour compatible with cirrhosis..  No focal lesions.    Gallbladder: Distended with multiple mobile stones.  No wall thickening or pericholecystic fluid.  Negative sonographic Wiley's sign.    Biliary system: 4 mm common bile duct.  No intrahepatic ductal dilatation.    Inferior vena cava: Normal in appearance.    Right kidney: 9.8 cm. No hydronephrosis.    Left kidney:  11.3 cm. No hydronephrosis.    Spleen: 11.8 x 3.7 cm.  Normal in size with homogeneous echotexture.  Multiple splenic collaterals identified.    Miscellaneous: Small volume ascites.                                       APC / Resident Notes:   Patient was seen in the ER promptly upon arrival.  She is afebrile, no acute distress. Physical examination reveals tenderness on palpation over the epigastric and right upper quadrant region.  Hepatomegaly noted on exam.  IV access was established, labs ordered, patient was gently hydrated. She was given fentanyl for pain.    Laboratories patient normal white count of 9.9.  Hemoglobin is stable. Chemistries reveal ANDREW, creatinine 1.5 with a BUN of 26.  Sodium 130.  Lipase was within normal limits.    Ultrasound of the abdomen shows cholelithiasis without evidence of acute cholecystitis.  There is cirrhosis and mild hepatomegaly.  No evidence of bleeding to the liver.  There is also portal hypertension with small volume ascites and splenic collateral vessels. X-ray of chest unremarkable.    Upon reassessment patient is resting comfortably.  She states that she has had alleviation of her abdominal discomfort.    Discussed with Hospital Medicine for admission of patient.  Patient will be admitted for observation.  The care of this patient was overseen by attending physician who agrees with treatment, plan, and disposition.           Attending Attestation:     Physician Attestation Statement for NP/PA:   I discussed this assessment and plan of this patient with the NP/PA, but I did not personally examine the patient. The face to face encounter was performed by the NP/PA.                     Clinical Impression:   Diagnoses of Abdominal pain, Pain, ANDREW (acute kidney injury), Cirrhosis, Alcoholic cirrhosis of liver with ascites, and Ascites due to alcoholic cirrhosis were pertinent to this visit.      Disposition:   Disposition: Placed in Observation  Condition:  Serious                        Supriya Schneider PA-C  10/09/18 7001       Isauro Gunter MD  10/14/18 1008

## 2018-10-12 ENCOUNTER — AMBULATORY - HEALTHEAST (OUTPATIENT)
Dept: ONCOLOGY | Facility: HOSPITAL | Age: 53
End: 2018-10-12

## 2018-10-15 ENCOUNTER — INFUSION - HEALTHEAST (OUTPATIENT)
Dept: INFUSION THERAPY | Facility: HOSPITAL | Age: 53
End: 2018-10-15

## 2018-10-15 DIAGNOSIS — B99.9 CHRONIC INFECTION: ICD-10-CM

## 2018-10-15 DIAGNOSIS — D80.1 HYPOGAMMAGLOBULINEMIA, ACQUIRED (H): ICD-10-CM

## 2018-10-29 ENCOUNTER — ALLIED HEALTH/NURSE VISIT (OUTPATIENT)
Dept: FAMILY MEDICINE | Facility: CLINIC | Age: 53
End: 2018-10-29
Payer: MEDICARE

## 2018-10-29 DIAGNOSIS — Z23 ENCOUNTER FOR IMMUNIZATION: Primary | ICD-10-CM

## 2018-10-29 PROCEDURE — 90472 IMMUNIZATION ADMIN EACH ADD: CPT

## 2018-10-29 PROCEDURE — 90632 HEPA VACCINE ADULT IM: CPT

## 2018-10-29 PROCEDURE — 90750 HZV VACC RECOMBINANT IM: CPT

## 2018-10-29 PROCEDURE — 90471 IMMUNIZATION ADMIN: CPT

## 2018-10-29 NOTE — MR AVS SNAPSHOT
After Visit Summary   10/29/2018    Avis Paul    MRN: [id number]           Patient Information     Date Of Birth          1965        Visit Information        Provider Department      10/29/2018 10:00 AM [place] FP/IM CMA/LPN [facility]        Today's Diagnoses     Encounter for immunization    -  1       Follow-ups after your visit        Who to contact     If you have questions or need follow up information about today's clinic visit or your schedule please contact [facility] directly at [phone].  Normal or non-critical lab and imaging results will be communicated to you by Continuenthart, letter or phone within 4 business days after the clinic has received the results. If you do not hear from us within 7 days, please contact the clinic through BRAINDIGITt or phone. If you have a critical or abnormal lab result, we will notify you by phone as soon as possible.  Submit refill requests through PECO Pallet or call your pharmacy and they will forward the refill request to us. Please allow 3 business days for your refill to be completed.          Additional Information About Your Visit        MyChart Information     PECO Pallet gives you secure access to your electronic health record. If you see a primary care provider, you can also send messages to your care team and make appointments. If you have questions, please call your primary care clinic.  If you do not have a primary care provider, please call [phone] and they will assist you.        Care EveryWhere ID     This is your Care EveryWhere ID. This could be used by other organizations to access your [facility] medical records  [id number]         Blood Pressure from Last 3 Encounters:   08/28/18 147/85   08/17/18 130/82   08/30/17 134/81    Weight from Last 3 Encounters:   08/28/18 277 lb 4.8 oz (125.8 kg)   08/17/18 276 lb 6.4 oz (125.4 kg)   08/30/17 230 lb (104.3 kg)              We Performed the Following     ADMIN  1st VACCINE     EA ADD'L VACCINE     HEPATITIS A VACCINE (ADULT)     ZOSTER VACCINE RECOMBINANT ADJUVANTED IM NJX        Primary Care Provider Office Phone # Fax #    [facility] [phone] [address] [facility] [address]        Equal Access to Services     PERRI ESTEVEZ : Hadii aad ku hadasho Soomaali, waaxda luqadaha, qaybta kaalmada adeegyada, waxay idiin hayaan adeeg kharash la'aan ah. So [facility] [phone].    ATENCIÓN: Si habla español, tiene a walker disposición servicios gratuitos de asistencia lingüística. Fahad al [phone].    We comply with applicable federal civil rights laws and [place] laws. We do not discriminate on the basis of race, color, national origin, age, disability, sex, sexual orientation, or gender identity.            Thank you!     Thank you for choosing [facility]  for your care. Our goal is always to provide you with excellent care. Hearing back from our patients is one way we can continue to improve our services. Please take a few minutes to complete the written survey that you may receive in the mail after your visit with us. Thank you!             Your Updated Medication List - Protect others around you: Learn how to safely use, store and throw away your medicines at www.disposemymeds.org.          This list is accurate as of 10/29/18 10:51 AM.  Always use your most recent med list.                   Brand Name Dispense Instructions for use Diagnosis    acetaminophen 500 MG tablet    TYLENOL     Take 500-1,000 mg by mouth as needed        acyclovir 400 MG tablet    ZOVIRAX    180 tablet    Take 2 tablets (800 mg) by mouth daily    Lymphoma, mantle cell, multiple sites (H)       albuterol 108 (90 Base) MCG/ACT inhaler    PROAIR HFA/PROVENTIL HFA/VENTOLIN HFA     Inhale 2 puffs into the lungs every 4 hours as needed for shortness of breath / dyspnea or wheezing        cephALEXin 500 MG capsule    KEFLEX     Take 500 mg by mouth         fluticasone 50 MCG/ACT spray    FLONASE     as needed        levothyroxine 88 MCG tablet    SYNTHROID/LEVOTHROID    90 tablet    Take 1 tablet (88 mcg) by mouth daily    Hypothyroidism, unspecified type       montelukast 10 MG tablet    SINGULAIR     Take 10 mg by mouth daily        prochlorperazine 10 MG tablet    COMPAZINE     Take 5 mg by mouth        triamcinolone 0.1 % ointment    KENALOG     Apply topically daily

## 2018-10-29 NOTE — NURSING NOTE
Screening Questionnaire for Adult Immunization    Are you sick today?   No   Do you have allergies to medications, food, a vaccine component or latex?   No   Have you ever had a serious reaction after receiving a vaccination?   No   Do you have a long-term health problem with heart disease, lung disease, asthma, kidney disease, metabolic disease (e.g. diabetes), anemia, or other blood disorder?   No   Do you have cancer, leukemia, HIV/AIDS, or any other immune system problem?   YES   In the past 3 months, have you taken medications that affect  your immune system, such as prednisone, other steroids, or anticancer drugs; drugs for the treatment of rheumatoid arthritis, Crohn s disease, or psoriasis; or have you had radiation treatments?   YES   Have you had a seizure, or a brain or other nervous system problem?   No   During the past year, have you received a transfusion of blood or blood     products, or been given immune (gamma) globulin or antiviral drug?   No   For women: Are you pregnant or is there a chance you could become        pregnant during the next month?   No   Have you received any vaccinations in the past 4 weeks?   No     Immunization questionnaire answers are listed above.  Checked with Dr. Wilson    Ok to start the Hep A series with her IVIG monthly infusions.        Per orders of Dr. Wlison, injection of Shingrix, Hep A series given by Kortney Lerma. Patient instructed to remain in clinic for 15 minutes afterwards, and to report any adverse reaction to me immediately.       Screening performed by Kortney Lerma on 10/29/2018 at 10:43 AM.

## 2018-11-19 ENCOUNTER — INFUSION - HEALTHEAST (OUTPATIENT)
Dept: INFUSION THERAPY | Facility: HOSPITAL | Age: 53
End: 2018-11-19

## 2018-11-19 DIAGNOSIS — D80.1 HYPOGAMMAGLOBULINEMIA, ACQUIRED (H): ICD-10-CM

## 2018-11-19 DIAGNOSIS — B99.9 CHRONIC INFECTION: ICD-10-CM

## 2018-12-17 ENCOUNTER — OFFICE VISIT - HEALTHEAST (OUTPATIENT)
Dept: ONCOLOGY | Facility: HOSPITAL | Age: 53
End: 2018-12-17

## 2018-12-17 ENCOUNTER — TRANSFERRED RECORDS (OUTPATIENT)
Dept: HEALTH INFORMATION MANAGEMENT | Facility: CLINIC | Age: 53
End: 2018-12-17

## 2018-12-17 ENCOUNTER — AMBULATORY - HEALTHEAST (OUTPATIENT)
Dept: INFUSION THERAPY | Facility: HOSPITAL | Age: 53
End: 2018-12-17

## 2018-12-17 DIAGNOSIS — C83.13 MANTLE CELL LYMPHOMA OF INTRA-ABDOMINAL LYMPH NODES (H): ICD-10-CM

## 2018-12-17 DIAGNOSIS — D80.1 HYPOGAMMAGLOBULINEMIA, ACQUIRED (H): ICD-10-CM

## 2018-12-17 LAB
ALBUMIN SERPL-MCNC: 3.6 G/DL (ref 3.5–5)
ALP SERPL-CCNC: 106 U/L (ref 45–120)
ALT SERPL W P-5'-P-CCNC: 11 U/L (ref 0–45)
ANION GAP SERPL CALCULATED.3IONS-SCNC: 8 MMOL/L (ref 5–18)
AST SERPL W P-5'-P-CCNC: 17 U/L (ref 0–40)
BASOPHILS # BLD AUTO: 0 THOU/UL (ref 0–0.2)
BASOPHILS NFR BLD AUTO: 0 % (ref 0–2)
BILIRUB SERPL-MCNC: 0.4 MG/DL (ref 0–1)
BUN SERPL-MCNC: 23 MG/DL (ref 8–22)
CALCIUM SERPL-MCNC: 9.2 MG/DL (ref 8.5–10.5)
CHLORIDE BLD-SCNC: 106 MMOL/L (ref 98–107)
CO2 SERPL-SCNC: 25 MMOL/L (ref 22–31)
CREAT SERPL-MCNC: 1.82 MG/DL (ref 0.6–1.1)
EOSINOPHIL # BLD AUTO: 0.1 THOU/UL (ref 0–0.4)
EOSINOPHIL NFR BLD AUTO: 2 % (ref 0–6)
ERYTHROCYTE [DISTWIDTH] IN BLOOD BY AUTOMATED COUNT: 14.1 % (ref 11–14.5)
GFR SERPL CREATININE-BSD FRML MDRD: 29 ML/MIN/1.73M2
GLUCOSE BLD-MCNC: 100 MG/DL (ref 70–125)
HCT VFR BLD AUTO: 43 % (ref 35–47)
HGB BLD-MCNC: 14.2 G/DL (ref 12–16)
IGA SERPL-MCNC: 16 MG/DL (ref 65–400)
IGA SERPL-MCNC: 737 MG/DL
IGM SERPL-MCNC: <5 MG/DL (ref 60–280)
LYMPHOCYTES # BLD AUTO: 1.3 THOU/UL (ref 0.8–4.4)
LYMPHOCYTES NFR BLD AUTO: 26 % (ref 20–40)
MCH RBC QN AUTO: 29.6 PG (ref 27–34)
MCHC RBC AUTO-ENTMCNC: 33 G/DL (ref 32–36)
MCV RBC AUTO: 90 FL (ref 80–100)
MONOCYTES # BLD AUTO: 0.3 THOU/UL (ref 0–0.9)
MONOCYTES NFR BLD AUTO: 6 % (ref 2–10)
NEUTROPHILS # BLD AUTO: 3.3 THOU/UL (ref 2–7.7)
NEUTROPHILS NFR BLD AUTO: 66 % (ref 50–70)
PLATELET # BLD AUTO: 161 THOU/UL (ref 140–440)
PMV BLD AUTO: 10.3 FL (ref 8.5–12.5)
POTASSIUM BLD-SCNC: 4 MMOL/L (ref 3.5–5)
PROT SERPL-MCNC: 6.5 G/DL (ref 6–8)
RBC # BLD AUTO: 4.79 MILL/UL (ref 3.8–5.4)
SODIUM SERPL-SCNC: 139 MMOL/L (ref 136–145)
WBC: 5 THOU/UL (ref 4–11)

## 2018-12-18 ENCOUNTER — INFUSION - HEALTHEAST (OUTPATIENT)
Dept: INFUSION THERAPY | Facility: HOSPITAL | Age: 53
End: 2018-12-18

## 2018-12-18 DIAGNOSIS — D80.1 HYPOGAMMAGLOBULINEMIA, ACQUIRED (H): ICD-10-CM

## 2018-12-18 DIAGNOSIS — B99.9 CHRONIC INFECTION: ICD-10-CM

## 2018-12-30 ENCOUNTER — COMMUNICATION - HEALTHEAST (OUTPATIENT)
Dept: ALLERGY | Facility: CLINIC | Age: 53
End: 2018-12-30

## 2018-12-30 ENCOUNTER — COMMUNICATION - HEALTHEAST (OUTPATIENT)
Dept: ONCOLOGY | Facility: HOSPITAL | Age: 53
End: 2018-12-30

## 2019-01-15 ENCOUNTER — INFUSION - HEALTHEAST (OUTPATIENT)
Dept: INFUSION THERAPY | Facility: HOSPITAL | Age: 54
End: 2019-01-15

## 2019-01-15 DIAGNOSIS — C83.13 MANTLE CELL LYMPHOMA OF INTRA-ABDOMINAL LYMPH NODES (H): ICD-10-CM

## 2019-01-15 DIAGNOSIS — B99.9 CHRONIC INFECTION: ICD-10-CM

## 2019-01-15 DIAGNOSIS — D80.1 HYPOGAMMAGLOBULINEMIA, ACQUIRED (H): ICD-10-CM

## 2019-01-15 LAB
ALBUMIN SERPL-MCNC: 3.3 G/DL (ref 3.5–5)
ALP SERPL-CCNC: 97 U/L (ref 45–120)
ALT SERPL W P-5'-P-CCNC: 11 U/L (ref 0–45)
ANION GAP SERPL CALCULATED.3IONS-SCNC: 7 MMOL/L (ref 5–18)
AST SERPL W P-5'-P-CCNC: 17 U/L (ref 0–40)
BASOPHILS # BLD AUTO: 0 THOU/UL (ref 0–0.2)
BASOPHILS NFR BLD AUTO: 0 % (ref 0–2)
BILIRUB SERPL-MCNC: 0.4 MG/DL (ref 0–1)
BUN SERPL-MCNC: 23 MG/DL (ref 8–22)
CALCIUM SERPL-MCNC: 8.8 MG/DL (ref 8.5–10.5)
CHLORIDE BLD-SCNC: 107 MMOL/L (ref 98–107)
CO2 SERPL-SCNC: 26 MMOL/L (ref 22–31)
CREAT SERPL-MCNC: 1.86 MG/DL (ref 0.6–1.1)
EOSINOPHIL # BLD AUTO: 0.1 THOU/UL (ref 0–0.4)
EOSINOPHIL NFR BLD AUTO: 2 % (ref 0–6)
ERYTHROCYTE [DISTWIDTH] IN BLOOD BY AUTOMATED COUNT: 14.5 % (ref 11–14.5)
GFR SERPL CREATININE-BSD FRML MDRD: 28 ML/MIN/1.73M2
GLUCOSE BLD-MCNC: 105 MG/DL (ref 70–125)
HCT VFR BLD AUTO: 42.4 % (ref 35–47)
HGB BLD-MCNC: 13.5 G/DL (ref 12–16)
IGA SERPL-MCNC: 15 MG/DL (ref 65–400)
IGA SERPL-MCNC: 642 MG/DL
IGM SERPL-MCNC: <5 MG/DL (ref 60–280)
LYMPHOCYTES # BLD AUTO: 1 THOU/UL (ref 0.8–4.4)
LYMPHOCYTES NFR BLD AUTO: 21 % (ref 20–40)
MCH RBC QN AUTO: 28.7 PG (ref 27–34)
MCHC RBC AUTO-ENTMCNC: 31.8 G/DL (ref 32–36)
MCV RBC AUTO: 90 FL (ref 80–100)
MONOCYTES # BLD AUTO: 0.3 THOU/UL (ref 0–0.9)
MONOCYTES NFR BLD AUTO: 7 % (ref 2–10)
NEUTROPHILS # BLD AUTO: 3.3 THOU/UL (ref 2–7.7)
NEUTROPHILS NFR BLD AUTO: 70 % (ref 50–70)
PLATELET # BLD AUTO: 142 THOU/UL (ref 140–440)
PMV BLD AUTO: 9.8 FL (ref 8.5–12.5)
POTASSIUM BLD-SCNC: 3.8 MMOL/L (ref 3.5–5)
PROT SERPL-MCNC: 6 G/DL (ref 6–8)
RBC # BLD AUTO: 4.7 MILL/UL (ref 3.8–5.4)
SODIUM SERPL-SCNC: 140 MMOL/L (ref 136–145)
WBC: 4.7 THOU/UL (ref 4–11)

## 2019-01-15 ASSESSMENT — MIFFLIN-ST. JEOR: SCORE: 1901.89

## 2019-02-11 ENCOUNTER — COMMUNICATION - HEALTHEAST (OUTPATIENT)
Dept: ONCOLOGY | Facility: HOSPITAL | Age: 54
End: 2019-02-11

## 2019-02-14 ENCOUNTER — INFUSION - HEALTHEAST (OUTPATIENT)
Dept: INFUSION THERAPY | Facility: HOSPITAL | Age: 54
End: 2019-02-14

## 2019-02-14 DIAGNOSIS — D80.1 HYPOGAMMAGLOBULINEMIA, ACQUIRED (H): ICD-10-CM

## 2019-02-14 DIAGNOSIS — C83.13 MANTLE CELL LYMPHOMA OF INTRA-ABDOMINAL LYMPH NODES (H): ICD-10-CM

## 2019-02-14 DIAGNOSIS — B99.9 CHRONIC INFECTION: ICD-10-CM

## 2019-02-14 LAB
ALBUMIN SERPL-MCNC: 3.6 G/DL (ref 3.5–5)
ALP SERPL-CCNC: 101 U/L (ref 45–120)
ALT SERPL W P-5'-P-CCNC: 13 U/L (ref 0–45)
ANION GAP SERPL CALCULATED.3IONS-SCNC: 9 MMOL/L (ref 5–18)
AST SERPL W P-5'-P-CCNC: 21 U/L (ref 0–40)
BILIRUB SERPL-MCNC: 0.4 MG/DL (ref 0–1)
BUN SERPL-MCNC: 24 MG/DL (ref 8–22)
CALCIUM SERPL-MCNC: 8.9 MG/DL (ref 8.5–10.5)
CHLORIDE BLD-SCNC: 105 MMOL/L (ref 98–107)
CO2 SERPL-SCNC: 25 MMOL/L (ref 22–31)
CREAT SERPL-MCNC: 2.14 MG/DL (ref 0.6–1.1)
GFR SERPL CREATININE-BSD FRML MDRD: 24 ML/MIN/1.73M2
GLUCOSE BLD-MCNC: 122 MG/DL (ref 70–125)
POTASSIUM BLD-SCNC: 3.5 MMOL/L (ref 3.5–5)
PROT SERPL-MCNC: 6.5 G/DL (ref 6–8)
SODIUM SERPL-SCNC: 139 MMOL/L (ref 136–145)

## 2019-03-11 ENCOUNTER — INFUSION - HEALTHEAST (OUTPATIENT)
Dept: INFUSION THERAPY | Facility: HOSPITAL | Age: 54
End: 2019-03-11

## 2019-03-11 ENCOUNTER — AMBULATORY - HEALTHEAST (OUTPATIENT)
Dept: INFUSION THERAPY | Facility: HOSPITAL | Age: 54
End: 2019-03-11

## 2019-03-11 ENCOUNTER — OFFICE VISIT - HEALTHEAST (OUTPATIENT)
Dept: ONCOLOGY | Facility: HOSPITAL | Age: 54
End: 2019-03-11

## 2019-03-11 DIAGNOSIS — D80.1 HYPOGAMMAGLOBULINEMIA, ACQUIRED (H): ICD-10-CM

## 2019-03-11 DIAGNOSIS — B99.9 CHRONIC INFECTION: ICD-10-CM

## 2019-03-11 DIAGNOSIS — C83.13 MANTLE CELL LYMPHOMA OF INTRA-ABDOMINAL LYMPH NODES (H): ICD-10-CM

## 2019-03-11 DIAGNOSIS — C83.18 MANTLE CELL LYMPHOMA OF LYMPH NODES OF MULTIPLE REGIONS (H): ICD-10-CM

## 2019-03-11 LAB
ALBUMIN SERPL-MCNC: 3.6 G/DL (ref 3.5–5)
ALP SERPL-CCNC: 106 U/L (ref 45–120)
ALT SERPL W P-5'-P-CCNC: 17 U/L (ref 0–45)
ANION GAP SERPL CALCULATED.3IONS-SCNC: 8 MMOL/L (ref 5–18)
AST SERPL W P-5'-P-CCNC: 18 U/L (ref 0–40)
BILIRUB SERPL-MCNC: 0.4 MG/DL (ref 0–1)
BUN SERPL-MCNC: 23 MG/DL (ref 8–22)
CALCIUM SERPL-MCNC: 9.1 MG/DL (ref 8.5–10.5)
CHLORIDE BLD-SCNC: 106 MMOL/L (ref 98–107)
CO2 SERPL-SCNC: 26 MMOL/L (ref 22–31)
CREAT SERPL-MCNC: 1.92 MG/DL (ref 0.6–1.1)
GFR SERPL CREATININE-BSD FRML MDRD: 27 ML/MIN/1.73M2
GLUCOSE BLD-MCNC: 100 MG/DL (ref 70–125)
IGA SERPL-MCNC: 12 MG/DL (ref 65–400)
IGA SERPL-MCNC: 812 MG/DL
IGM SERPL-MCNC: <5 MG/DL (ref 60–280)
POTASSIUM BLD-SCNC: 4.4 MMOL/L (ref 3.5–5)
PROT SERPL-MCNC: 6.4 G/DL (ref 6–8)
SODIUM SERPL-SCNC: 140 MMOL/L (ref 136–145)

## 2019-04-09 ENCOUNTER — INFUSION - HEALTHEAST (OUTPATIENT)
Dept: INFUSION THERAPY | Facility: HOSPITAL | Age: 54
End: 2019-04-09

## 2019-04-09 DIAGNOSIS — B99.9 CHRONIC INFECTION: ICD-10-CM

## 2019-04-09 DIAGNOSIS — D80.1 HYPOGAMMAGLOBULINEMIA, ACQUIRED (H): ICD-10-CM

## 2019-04-09 LAB — IGA SERPL-MCNC: 821 MG/DL

## 2019-05-06 ENCOUNTER — INFUSION - HEALTHEAST (OUTPATIENT)
Dept: INFUSION THERAPY | Facility: HOSPITAL | Age: 54
End: 2019-05-06

## 2019-05-06 DIAGNOSIS — B99.9 CHRONIC INFECTION: ICD-10-CM

## 2019-05-06 DIAGNOSIS — D80.1 HYPOGAMMAGLOBULINEMIA, ACQUIRED (H): ICD-10-CM

## 2019-05-06 LAB — IGA SERPL-MCNC: 788 MG/DL

## 2019-06-03 ENCOUNTER — INFUSION - HEALTHEAST (OUTPATIENT)
Dept: INFUSION THERAPY | Facility: HOSPITAL | Age: 54
End: 2019-06-03

## 2019-06-03 ENCOUNTER — TRANSFERRED RECORDS (OUTPATIENT)
Dept: HEALTH INFORMATION MANAGEMENT | Facility: CLINIC | Age: 54
End: 2019-06-03

## 2019-06-03 ENCOUNTER — HOSPITAL ENCOUNTER (OUTPATIENT)
Dept: CT IMAGING | Facility: HOSPITAL | Age: 54
Setting detail: RADIATION/ONCOLOGY SERIES
Discharge: STILL ADMITTED OR ADMINISTRATIVE | End: 2019-06-03
Attending: INTERNAL MEDICINE

## 2019-06-03 ENCOUNTER — AMBULATORY - HEALTHEAST (OUTPATIENT)
Dept: INFUSION THERAPY | Facility: HOSPITAL | Age: 54
End: 2019-06-03

## 2019-06-03 ENCOUNTER — OFFICE VISIT - HEALTHEAST (OUTPATIENT)
Dept: ONCOLOGY | Facility: HOSPITAL | Age: 54
End: 2019-06-03

## 2019-06-03 DIAGNOSIS — B99.9 CHRONIC INFECTION: ICD-10-CM

## 2019-06-03 DIAGNOSIS — D80.1 HYPOGAMMAGLOBULINEMIA, ACQUIRED (H): ICD-10-CM

## 2019-06-03 DIAGNOSIS — C83.18 MANTLE CELL LYMPHOMA OF LYMPH NODES OF MULTIPLE REGIONS (H): ICD-10-CM

## 2019-06-03 DIAGNOSIS — C83.13 MANTLE CELL LYMPHOMA OF INTRA-ABDOMINAL LYMPH NODES (H): ICD-10-CM

## 2019-06-03 LAB
ALBUMIN SERPL-MCNC: 3.4 G/DL (ref 3.5–5)
ALP SERPL-CCNC: 112 U/L (ref 45–120)
ALT SERPL W P-5'-P-CCNC: 15 U/L (ref 0–45)
ANION GAP SERPL CALCULATED.3IONS-SCNC: 9 MMOL/L (ref 5–18)
AST SERPL W P-5'-P-CCNC: 20 U/L (ref 0–40)
BASOPHILS # BLD AUTO: 0 THOU/UL (ref 0–0.2)
BASOPHILS NFR BLD AUTO: 1 % (ref 0–2)
BILIRUB SERPL-MCNC: 0.4 MG/DL (ref 0–1)
BUN SERPL-MCNC: 23 MG/DL (ref 8–22)
CALCIUM SERPL-MCNC: 9.1 MG/DL (ref 8.5–10.5)
CHLORIDE BLD-SCNC: 107 MMOL/L (ref 98–107)
CO2 SERPL-SCNC: 23 MMOL/L (ref 22–31)
CREAT SERPL-MCNC: 2.07 MG/DL (ref 0.6–1.1)
EOSINOPHIL # BLD AUTO: 0.3 THOU/UL (ref 0–0.4)
EOSINOPHIL NFR BLD AUTO: 4 % (ref 0–6)
ERYTHROCYTE [DISTWIDTH] IN BLOOD BY AUTOMATED COUNT: 14.2 % (ref 11–14.5)
GFR SERPL CREATININE-BSD FRML MDRD: 25 ML/MIN/1.73M2
GLUCOSE BLD-MCNC: 124 MG/DL (ref 70–125)
HCT VFR BLD AUTO: 42.6 % (ref 35–47)
HGB BLD-MCNC: 14.1 G/DL (ref 12–16)
IGA SERPL-MCNC: 845 MG/DL
LYMPHOCYTES # BLD AUTO: 1.2 THOU/UL (ref 0.8–4.4)
LYMPHOCYTES NFR BLD AUTO: 19 % (ref 20–40)
MCH RBC QN AUTO: 29.6 PG (ref 27–34)
MCHC RBC AUTO-ENTMCNC: 33.1 G/DL (ref 32–36)
MCV RBC AUTO: 90 FL (ref 80–100)
MONOCYTES # BLD AUTO: 0.4 THOU/UL (ref 0–0.9)
MONOCYTES NFR BLD AUTO: 6 % (ref 2–10)
NEUTROPHILS # BLD AUTO: 4.2 THOU/UL (ref 2–7.7)
NEUTROPHILS NFR BLD AUTO: 70 % (ref 50–70)
PLATELET # BLD AUTO: 147 THOU/UL (ref 140–440)
PMV BLD AUTO: 10.5 FL (ref 8.5–12.5)
POTASSIUM BLD-SCNC: 3.8 MMOL/L (ref 3.5–5)
PROT SERPL-MCNC: 6.5 G/DL (ref 6–8)
RBC # BLD AUTO: 4.76 MILL/UL (ref 3.8–5.4)
SODIUM SERPL-SCNC: 139 MMOL/L (ref 136–145)
WBC: 6.1 THOU/UL (ref 4–11)

## 2019-06-05 ENCOUNTER — TELEPHONE (OUTPATIENT)
Dept: FAMILY MEDICINE | Facility: CLINIC | Age: 54
End: 2019-06-05

## 2019-06-05 NOTE — LETTER
[facility]  [address] [address]  Phone: [phone]  June 5, 2019      Avis Paul  [address] [address]      Dear Avis,    We care about your health and have reviewed your health plan including your medical conditions, medications, and lab results.  Based on this review, it is recommended that you follow up regarding the following health topic(s):  -Breast Cancer Screening    We recommend you take the following action(s):  -schedule a MAMMOGRAM which is due. Please disregard this reminder if you have had this exam elsewhere within the last 1-2 years please let us know so we can update your records.     Please call us at the [facility] [phone] (or use HIGH MOBILITY) to address the above recommendations.     Thank you for trusting [facility] and we appreciate the opportunity to serve you.  We look forward to supporting your healthcare needs in the future.    Healthy Regards,    Your Health Care Team  [facility]

## 2019-06-05 NOTE — TELEPHONE ENCOUNTER
Panel Management Review      Patient has the following on her problem list: None      Composite cancer screening  Chart review shows that this patient is due/due soon for the following Mammogram  Summary:    Patient is due/failing the following:   MAMMOGRAM    Action needed:   Patient needs referral/order: Mammogram    Type of outreach:    Sent letter.    Questions for provider review:    None                                                                                                                                    Laila Peters MA

## 2019-06-11 ENCOUNTER — COMMUNICATION - HEALTHEAST (OUTPATIENT)
Dept: ALLERGY | Facility: CLINIC | Age: 54
End: 2019-06-11

## 2019-06-11 ENCOUNTER — AMBULATORY - HEALTHEAST (OUTPATIENT)
Dept: ALLERGY | Facility: CLINIC | Age: 54
End: 2019-06-11

## 2019-06-11 DIAGNOSIS — L50.9 URTICARIA: ICD-10-CM

## 2019-07-01 ENCOUNTER — INFUSION - HEALTHEAST (OUTPATIENT)
Dept: INFUSION THERAPY | Facility: HOSPITAL | Age: 54
End: 2019-07-01

## 2019-07-01 DIAGNOSIS — D80.1 HYPOGAMMAGLOBULINEMIA, ACQUIRED (H): ICD-10-CM

## 2019-07-01 DIAGNOSIS — B99.9 CHRONIC INFECTION: ICD-10-CM

## 2019-07-01 LAB — IGA SERPL-MCNC: 828 MG/DL

## 2019-07-29 ENCOUNTER — INFUSION - HEALTHEAST (OUTPATIENT)
Dept: INFUSION THERAPY | Facility: HOSPITAL | Age: 54
End: 2019-07-29

## 2019-07-29 DIAGNOSIS — B99.9 CHRONIC INFECTION: ICD-10-CM

## 2019-07-29 DIAGNOSIS — D80.1 HYPOGAMMAGLOBULINEMIA, ACQUIRED (H): ICD-10-CM

## 2019-07-29 LAB — IGA SERPL-MCNC: 687 MG/DL

## 2019-08-27 ENCOUNTER — AMBULATORY - HEALTHEAST (OUTPATIENT)
Dept: INFUSION THERAPY | Facility: HOSPITAL | Age: 54
End: 2019-08-27

## 2019-08-27 ENCOUNTER — INFUSION - HEALTHEAST (OUTPATIENT)
Dept: INFUSION THERAPY | Facility: HOSPITAL | Age: 54
End: 2019-08-27

## 2019-08-27 ENCOUNTER — OFFICE VISIT - HEALTHEAST (OUTPATIENT)
Dept: ONCOLOGY | Facility: HOSPITAL | Age: 54
End: 2019-08-27

## 2019-08-27 DIAGNOSIS — B99.9 CHRONIC INFECTION: ICD-10-CM

## 2019-08-27 DIAGNOSIS — D80.1 HYPOGAMMAGLOBULINEMIA, ACQUIRED (H): ICD-10-CM

## 2019-08-27 DIAGNOSIS — C83.13 MANTLE CELL LYMPHOMA OF INTRA-ABDOMINAL LYMPH NODES (H): ICD-10-CM

## 2019-08-27 DIAGNOSIS — R53.82 CHRONIC FATIGUE: ICD-10-CM

## 2019-08-27 DIAGNOSIS — N18.30 CHRONIC KIDNEY DISEASE (CKD), STAGE III (MODERATE) (H): ICD-10-CM

## 2019-08-27 DIAGNOSIS — E03.9 ACQUIRED HYPOTHYROIDISM: ICD-10-CM

## 2019-08-27 LAB
ALBUMIN SERPL-MCNC: 3.4 G/DL (ref 3.5–5)
ALP SERPL-CCNC: 121 U/L (ref 45–120)
ALT SERPL W P-5'-P-CCNC: 17 U/L (ref 0–45)
ANION GAP SERPL CALCULATED.3IONS-SCNC: 7 MMOL/L (ref 5–18)
AST SERPL W P-5'-P-CCNC: 19 U/L (ref 0–40)
BASOPHILS # BLD AUTO: 0 THOU/UL (ref 0–0.2)
BASOPHILS NFR BLD AUTO: 1 % (ref 0–2)
BILIRUB SERPL-MCNC: 0.4 MG/DL (ref 0–1)
BUN SERPL-MCNC: 28 MG/DL (ref 8–22)
CALCIUM SERPL-MCNC: 9.1 MG/DL (ref 8.5–10.5)
CHLORIDE BLD-SCNC: 107 MMOL/L (ref 98–107)
CO2 SERPL-SCNC: 27 MMOL/L (ref 22–31)
CREAT SERPL-MCNC: 2.06 MG/DL (ref 0.6–1.1)
EOSINOPHIL # BLD AUTO: 0.2 THOU/UL (ref 0–0.4)
EOSINOPHIL NFR BLD AUTO: 4 % (ref 0–6)
ERYTHROCYTE [DISTWIDTH] IN BLOOD BY AUTOMATED COUNT: 14.5 % (ref 11–14.5)
GFR SERPL CREATININE-BSD FRML MDRD: 25 ML/MIN/1.73M2
GLUCOSE BLD-MCNC: 116 MG/DL (ref 70–125)
HCT VFR BLD AUTO: 42.9 % (ref 35–47)
HGB BLD-MCNC: 13.8 G/DL (ref 12–16)
IGA SERPL-MCNC: 697 MG/DL
LYMPHOCYTES # BLD AUTO: 1.2 THOU/UL (ref 0.8–4.4)
LYMPHOCYTES NFR BLD AUTO: 21 % (ref 20–40)
MCH RBC QN AUTO: 29 PG (ref 27–34)
MCHC RBC AUTO-ENTMCNC: 32.2 G/DL (ref 32–36)
MCV RBC AUTO: 90 FL (ref 80–100)
MONOCYTES # BLD AUTO: 0.4 THOU/UL (ref 0–0.9)
MONOCYTES NFR BLD AUTO: 7 % (ref 2–10)
NEUTROPHILS # BLD AUTO: 3.6 THOU/UL (ref 2–7.7)
NEUTROPHILS NFR BLD AUTO: 67 % (ref 50–70)
PLATELET # BLD AUTO: 166 THOU/UL (ref 140–440)
PMV BLD AUTO: 10.2 FL (ref 8.5–12.5)
POTASSIUM BLD-SCNC: 3.9 MMOL/L (ref 3.5–5)
PROT SERPL-MCNC: 6.4 G/DL (ref 6–8)
RBC # BLD AUTO: 4.76 MILL/UL (ref 3.8–5.4)
SODIUM SERPL-SCNC: 141 MMOL/L (ref 136–145)
WBC: 5.4 THOU/UL (ref 4–11)

## 2019-08-27 ASSESSMENT — MIFFLIN-ST. JEOR: SCORE: 1987.2

## 2019-09-24 ENCOUNTER — INFUSION - HEALTHEAST (OUTPATIENT)
Dept: INFUSION THERAPY | Facility: HOSPITAL | Age: 54
End: 2019-09-24

## 2019-09-24 DIAGNOSIS — R53.82 CHRONIC FATIGUE: ICD-10-CM

## 2019-09-24 DIAGNOSIS — D80.1 HYPOGAMMAGLOBULINEMIA, ACQUIRED (H): ICD-10-CM

## 2019-09-24 DIAGNOSIS — B99.9 CHRONIC INFECTION: ICD-10-CM

## 2019-09-24 LAB — TSH SERPL DL<=0.005 MIU/L-ACNC: 1.09 UIU/ML (ref 0.3–5)

## 2019-10-01 ENCOUNTER — OFFICE VISIT (OUTPATIENT)
Dept: FAMILY MEDICINE | Facility: CLINIC | Age: 54
End: 2019-10-01
Payer: COMMERCIAL

## 2019-10-01 VITALS
HEIGHT: 67 IN | OXYGEN SATURATION: 100 % | SYSTOLIC BLOOD PRESSURE: 116 MMHG | DIASTOLIC BLOOD PRESSURE: 70 MMHG | HEART RATE: 72 BPM | WEIGHT: 293 LBS | RESPIRATION RATE: 20 BRPM | BODY MASS INDEX: 45.99 KG/M2 | TEMPERATURE: 97.1 F

## 2019-10-01 DIAGNOSIS — C83.18 LYMPHOMA, MANTLE CELL, MULTIPLE SITES (H): Primary | ICD-10-CM

## 2019-10-01 DIAGNOSIS — E03.9 HYPOTHYROIDISM, UNSPECIFIED TYPE: ICD-10-CM

## 2019-10-01 DIAGNOSIS — Z23 NEED FOR PROPHYLACTIC VACCINATION AND INOCULATION AGAINST INFLUENZA: ICD-10-CM

## 2019-10-01 DIAGNOSIS — Z23 NEED FOR VACCINATION: ICD-10-CM

## 2019-10-01 PROCEDURE — 90682 RIV4 VACC RECOMBINANT DNA IM: CPT | Performed by: FAMILY MEDICINE

## 2019-10-01 PROCEDURE — 90632 HEPA VACCINE ADULT IM: CPT | Performed by: FAMILY MEDICINE

## 2019-10-01 PROCEDURE — G0008 ADMIN INFLUENZA VIRUS VAC: HCPCS | Mod: 59 | Performed by: FAMILY MEDICINE

## 2019-10-01 PROCEDURE — 90471 IMMUNIZATION ADMIN: CPT | Performed by: FAMILY MEDICINE

## 2019-10-01 PROCEDURE — 99214 OFFICE O/P EST MOD 30 MIN: CPT | Mod: 25 | Performed by: FAMILY MEDICINE

## 2019-10-01 RX ORDER — LEVOTHYROXINE SODIUM 100 UG/1
100 TABLET ORAL DAILY
Qty: 90 TABLET | Refills: 1 | Status: SHIPPED | OUTPATIENT
Start: 2019-10-01 | End: 2020-03-17

## 2019-10-01 ASSESSMENT — ANXIETY QUESTIONNAIRES
5. BEING SO RESTLESS THAT IT IS HARD TO SIT STILL: SEVERAL DAYS
3. WORRYING TOO MUCH ABOUT DIFFERENT THINGS: SEVERAL DAYS
6. BECOMING EASILY ANNOYED OR IRRITABLE: SEVERAL DAYS
1. FEELING NERVOUS, ANXIOUS, OR ON EDGE: NOT AT ALL
2. NOT BEING ABLE TO STOP OR CONTROL WORRYING: NOT AT ALL
GAD7 TOTAL SCORE: 4
IF YOU CHECKED OFF ANY PROBLEMS ON THIS QUESTIONNAIRE, HOW DIFFICULT HAVE THESE PROBLEMS MADE IT FOR YOU TO DO YOUR WORK, TAKE CARE OF THINGS AT HOME, OR GET ALONG WITH OTHER PEOPLE: SOMEWHAT DIFFICULT
7. FEELING AFRAID AS IF SOMETHING AWFUL MIGHT HAPPEN: NOT AT ALL

## 2019-10-01 ASSESSMENT — PATIENT HEALTH QUESTIONNAIRE - PHQ9
SUM OF ALL RESPONSES TO PHQ QUESTIONS 1-9: 6
5. POOR APPETITE OR OVEREATING: SEVERAL DAYS

## 2019-10-01 ASSESSMENT — MIFFLIN-ST. JEOR: SCORE: 1975.28

## 2019-10-01 NOTE — PROGRESS NOTES
"Subjective     Avis Paul is a 54 year old female who presents to clinic today for the following health issues:    HPI   Hypothyroidism Follow-up      Since last visit, patient describes the following symptoms: weight gain of 2-4 lbs a month. Patient states its from the steroid infusion she's had.    Thyroid lab was checked on 9/24/2019.    How many servings of fruits and vegetables do you eat daily?  4 or more    On average, how many sweetened beverages do you drink each day (soda, juice, sweet tea, etc)?   1    How many days per week do you miss taking your medication? 0    Still doing IgG infusions monthly which have steroids too.    History of Mantle Cell lymphoma current in remission and since the BMT has had trouble with low immune system.  Has been doing IVIg which has been helpful to keep numbers up a little better than the past, but significant side effect from the IVIg.  Due to low immune system has trouble with cellulitis from bug bites, upper respiratory infections, illnesses.  And ongoing cycle ill then improves and able to go back to work part time.  Memory loss with the chemo.    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 Acupuncture was very helpful.     BP Readings from Last 3 Encounters:   10/01/19 116/70   08/28/18 147/85   08/17/18 130/82    Wt Readings from Last 3 Encounters:   10/01/19 134.3 kg (296 lb)   08/28/18 125.8 kg (277 lb 4.8 oz)   08/17/18 125.4 kg (276 lb 6.4 oz)                    Reviewed and updated as needed this visit by Provider         Review of Systems   ROS COMP: Constitutional, HEENT, cardiovascular, pulmonary, gi and gu systems are negative, except as otherwise noted.      Objective    /70   Pulse 72   Temp 97.1  F (36.2  C) (Tympanic)   Resp 20   Ht 1.702 m (5' 7\")   Wt 134.3 kg (296 lb)   SpO2 100%   BMI 46.36 kg/m    Body mass index is 46.36 kg/m .  Physical Exam   GENERAL: healthy, alert and no distress  PSYCH: mentation appears normal, affect " "normal/bright   Ref Range & Units Value   TSH 0.30 - 5.00 uIU/mL 1.09        Diagnostic Test Results:  Labs reviewed in Epic        Assessment & Plan     Avis was seen today for recheck medication.    Diagnoses and all orders for this visit:    Lymphoma, mantle cell, multiple sites (H): stable/ in remission on IgG infusions, wondering about daily imunoglobin injection instead of the infusion requiring steroid due to reactions.  -referral to immunology  -     IMMUNOLOGY REFERRAL    Hypothyroidism, unspecified type: plan to increase to 100mcg up from 88mcg to see if bumping this up will improved some symptoms  -recheck thyroid in 2 months.  -     levothyroxine (SYNTHROID/LEVOTHROID) 100 MCG tablet; Take 1 tablet (100 mcg) by mouth daily  -     TSH; Future         BMI:   Estimated body mass index is 46.36 kg/m  as calculated from the following:    Height as of this encounter: 1.702 m (5' 7\").    Weight as of this encounter: 134.3 kg (296 lb).   Weight management plan: Discussed healthy diet and exercise guidelines        Patient Instructions   Hep A #2  Flu Blok    Increase thyroid medication up to 100mcg daily.  Do next thyroid lab in 2 months.    [facility] will fax TSH order.      No follow-ups on file.    Sunil Tan MD  [facility]      "

## 2019-10-01 NOTE — PATIENT INSTRUCTIONS
Hep A #2  Flu Blok    Increase thyroid medication up to 100mcg daily.  Do next thyroid lab in 2 months.    [facility] will fax TSH order.

## 2019-10-02 ASSESSMENT — ANXIETY QUESTIONNAIRES: GAD7 TOTAL SCORE: 4

## 2019-10-11 ENCOUNTER — HOSPITAL ENCOUNTER (OUTPATIENT)
Dept: MAMMOGRAPHY | Facility: CLINIC | Age: 54
Discharge: HOME | End: 2019-10-11
Attending: FAMILY MEDICINE | Admitting: FAMILY MEDICINE
Payer: COMMERCIAL

## 2019-10-11 DIAGNOSIS — Z12.31 VISIT FOR SCREENING MAMMOGRAM: ICD-10-CM

## 2019-10-11 PROCEDURE — 77063 BREAST TOMOSYNTHESIS BI: CPT

## 2019-10-24 ENCOUNTER — INFUSION - HEALTHEAST (OUTPATIENT)
Dept: INFUSION THERAPY | Facility: HOSPITAL | Age: 54
End: 2019-10-24

## 2019-10-24 DIAGNOSIS — B99.9 CHRONIC INFECTION: ICD-10-CM

## 2019-10-24 DIAGNOSIS — D80.1 HYPOGAMMAGLOBULINEMIA, ACQUIRED (H): ICD-10-CM

## 2019-11-19 ENCOUNTER — TRANSFERRED RECORDS (OUTPATIENT)
Dept: HEALTH INFORMATION MANAGEMENT | Facility: CLINIC | Age: 54
End: 2019-11-19

## 2019-11-19 ENCOUNTER — OFFICE VISIT - HEALTHEAST (OUTPATIENT)
Dept: ONCOLOGY | Facility: HOSPITAL | Age: 54
End: 2019-11-19

## 2019-11-19 ENCOUNTER — INFUSION - HEALTHEAST (OUTPATIENT)
Dept: INFUSION THERAPY | Facility: HOSPITAL | Age: 54
End: 2019-11-19

## 2019-11-19 ENCOUNTER — COMMUNICATION - HEALTHEAST (OUTPATIENT)
Dept: ONCOLOGY | Facility: HOSPITAL | Age: 54
End: 2019-11-19

## 2019-11-19 ENCOUNTER — AMBULATORY - HEALTHEAST (OUTPATIENT)
Dept: INFUSION THERAPY | Facility: HOSPITAL | Age: 54
End: 2019-11-19

## 2019-11-19 DIAGNOSIS — E03.9 ACQUIRED HYPOTHYROIDISM: ICD-10-CM

## 2019-11-19 DIAGNOSIS — D80.1 HYPOGAMMAGLOBULINEMIA, ACQUIRED (H): ICD-10-CM

## 2019-11-19 DIAGNOSIS — B99.9 CHRONIC INFECTION: ICD-10-CM

## 2019-11-19 DIAGNOSIS — C83.13 MANTLE CELL LYMPHOMA OF INTRA-ABDOMINAL LYMPH NODES (H): ICD-10-CM

## 2019-11-19 LAB
ALBUMIN SERPL-MCNC: 3.5 G/DL (ref 3.5–5)
ALP SERPL-CCNC: 107 U/L (ref 45–120)
ALT SERPL W P-5'-P-CCNC: 14 U/L (ref 0–45)
ANION GAP SERPL CALCULATED.3IONS-SCNC: 7 MMOL/L (ref 5–18)
AST SERPL W P-5'-P-CCNC: 18 U/L (ref 0–40)
BASOPHILS # BLD AUTO: 0 THOU/UL (ref 0–0.2)
BASOPHILS NFR BLD AUTO: 0 % (ref 0–2)
BILIRUB SERPL-MCNC: 0.4 MG/DL (ref 0–1)
BUN SERPL-MCNC: 23 MG/DL (ref 8–22)
CALCIUM SERPL-MCNC: 9.2 MG/DL (ref 8.5–10.5)
CHLORIDE BLD-SCNC: 108 MMOL/L (ref 98–107)
CO2 SERPL-SCNC: 26 MMOL/L (ref 22–31)
CREAT SERPL-MCNC: 2.03 MG/DL (ref 0.6–1.1)
EOSINOPHIL # BLD AUTO: 0.1 THOU/UL (ref 0–0.4)
EOSINOPHIL NFR BLD AUTO: 1 % (ref 0–6)
ERYTHROCYTE [DISTWIDTH] IN BLOOD BY AUTOMATED COUNT: 14.5 % (ref 11–14.5)
GFR SERPL CREATININE-BSD FRML MDRD: 26 ML/MIN/1.73M2
GLUCOSE BLD-MCNC: 91 MG/DL (ref 70–125)
HCT VFR BLD AUTO: 44.7 % (ref 35–47)
HGB BLD-MCNC: 14.3 G/DL (ref 12–16)
IGA SERPL-MCNC: 731 MG/DL
LYMPHOCYTES # BLD AUTO: 1.4 THOU/UL (ref 0.8–4.4)
LYMPHOCYTES NFR BLD AUTO: 25 % (ref 20–40)
MCH RBC QN AUTO: 28.8 PG (ref 27–34)
MCHC RBC AUTO-ENTMCNC: 32 G/DL (ref 32–36)
MCV RBC AUTO: 90 FL (ref 80–100)
MONOCYTES # BLD AUTO: 0.4 THOU/UL (ref 0–0.9)
MONOCYTES NFR BLD AUTO: 7 % (ref 2–10)
NEUTROPHILS # BLD AUTO: 3.8 THOU/UL (ref 2–7.7)
NEUTROPHILS NFR BLD AUTO: 67 % (ref 50–70)
PLATELET # BLD AUTO: 187 THOU/UL (ref 140–440)
PMV BLD AUTO: 10.4 FL (ref 8.5–12.5)
POTASSIUM BLD-SCNC: 4.1 MMOL/L (ref 3.5–5)
PROT SERPL-MCNC: 6.4 G/DL (ref 6–8)
RBC # BLD AUTO: 4.96 MILL/UL (ref 3.8–5.4)
SODIUM SERPL-SCNC: 141 MMOL/L (ref 136–145)
TSH SERPL DL<=0.005 MIU/L-ACNC: 0.89 UIU/ML (ref 0.3–5)
WBC: 5.6 THOU/UL (ref 4–11)

## 2019-11-19 ASSESSMENT — MIFFLIN-ST. JEOR: SCORE: 1958.62

## 2019-11-25 ENCOUNTER — COMMUNICATION - HEALTHEAST (OUTPATIENT)
Dept: ONCOLOGY | Facility: HOSPITAL | Age: 54
End: 2019-11-25

## 2019-12-19 ENCOUNTER — INFUSION - HEALTHEAST (OUTPATIENT)
Dept: INFUSION THERAPY | Facility: HOSPITAL | Age: 54
End: 2019-12-19

## 2019-12-19 DIAGNOSIS — D80.1 HYPOGAMMAGLOBULINEMIA, ACQUIRED (H): ICD-10-CM

## 2019-12-19 DIAGNOSIS — B99.9 CHRONIC INFECTION: ICD-10-CM

## 2019-12-29 DIAGNOSIS — C83.18 LYMPHOMA, MANTLE CELL, MULTIPLE SITES (H): ICD-10-CM

## 2019-12-30 NOTE — TELEPHONE ENCOUNTER
"Requested Prescriptions   Pending Prescriptions Disp Refills     acyclovir (ZOVIRAX) 400 MG tablet [Pharmacy Med Name: Acyclovir 400 MG Oral Tablet]  0     Sig: TAKE 2 TABLETS BY MOUTH ONCE DAILY   Last Written Prescription Date:  10/5/18  Last Fill Quantity: 180 tab,  # refills: 11   Last office visit: 10/1/2019 with prescribing provider:  FIDELIA Tan   Future Office Visit:        Antivirals for Herpes Protocol Failed - 12/29/2019  6:31 AM        Failed - Normal serum creatinine on file in past 12 months     Recent Labs   Lab Test 08/28/18  1340   CR 1.92*             Passed - Patient is age 12 or older        Passed - Recent (12 mo) or future (30 days) visit within the authorizing provider's specialty     Patient has had an office visit with the authorizing provider or a provider within the authorizing providers department within the previous 12 mos or has a future within next 30 days. See \"Patient Info\" tab in inbasket, or \"Choose Columns\" in Meds & Orders section of the refill encounter.              Passed - Medication is active on med list          "

## 2019-12-31 RX ORDER — ACYCLOVIR 400 MG/1
TABLET ORAL
Qty: 60 TABLET | Refills: 0 | Status: SHIPPED | OUTPATIENT
Start: 2019-12-31 | End: 2020-04-02

## 2020-01-30 ENCOUNTER — INFUSION - HEALTHEAST (OUTPATIENT)
Dept: INFUSION THERAPY | Facility: HOSPITAL | Age: 55
End: 2020-01-30

## 2020-01-30 DIAGNOSIS — B99.9 CHRONIC INFECTION: ICD-10-CM

## 2020-01-30 DIAGNOSIS — D80.1 HYPOGAMMAGLOBULINEMIA, ACQUIRED (H): ICD-10-CM

## 2020-02-27 ENCOUNTER — INFUSION - HEALTHEAST (OUTPATIENT)
Dept: INFUSION THERAPY | Facility: HOSPITAL | Age: 55
End: 2020-02-27

## 2020-02-27 ENCOUNTER — OFFICE VISIT - HEALTHEAST (OUTPATIENT)
Dept: ONCOLOGY | Facility: HOSPITAL | Age: 55
End: 2020-02-27

## 2020-02-27 DIAGNOSIS — D80.1 HYPOGAMMAGLOBULINEMIA, ACQUIRED (H): ICD-10-CM

## 2020-02-27 DIAGNOSIS — C83.13 MANTLE CELL LYMPHOMA OF INTRA-ABDOMINAL LYMPH NODES (H): ICD-10-CM

## 2020-02-27 DIAGNOSIS — B99.9 CHRONIC INFECTION: ICD-10-CM

## 2020-02-27 LAB
ALBUMIN SERPL-MCNC: 3.5 G/DL (ref 3.5–5)
ALP SERPL-CCNC: 96 U/L (ref 45–120)
ALT SERPL W P-5'-P-CCNC: 14 U/L (ref 0–45)
ANION GAP SERPL CALCULATED.3IONS-SCNC: 9 MMOL/L (ref 5–18)
AST SERPL W P-5'-P-CCNC: 17 U/L (ref 0–40)
BASOPHILS # BLD AUTO: 0 THOU/UL (ref 0–0.2)
BASOPHILS NFR BLD AUTO: 0 % (ref 0–2)
BILIRUB SERPL-MCNC: 0.5 MG/DL (ref 0–1)
BUN SERPL-MCNC: 23 MG/DL (ref 8–22)
CALCIUM SERPL-MCNC: 8.7 MG/DL (ref 8.5–10.5)
CHLORIDE BLD-SCNC: 107 MMOL/L (ref 98–107)
CO2 SERPL-SCNC: 24 MMOL/L (ref 22–31)
CREAT SERPL-MCNC: 2 MG/DL (ref 0.6–1.1)
EOSINOPHIL # BLD AUTO: 0.1 THOU/UL (ref 0–0.4)
EOSINOPHIL NFR BLD AUTO: 2 % (ref 0–6)
ERYTHROCYTE [DISTWIDTH] IN BLOOD BY AUTOMATED COUNT: 14.7 % (ref 11–14.5)
GFR SERPL CREATININE-BSD FRML MDRD: 26 ML/MIN/1.73M2
GLUCOSE BLD-MCNC: 132 MG/DL (ref 70–125)
HCT VFR BLD AUTO: 43.8 % (ref 35–47)
HGB BLD-MCNC: 13.9 G/DL (ref 12–16)
IGA SERPL-MCNC: 600 MG/DL
LYMPHOCYTES # BLD AUTO: 1.1 THOU/UL (ref 0.8–4.4)
LYMPHOCYTES NFR BLD AUTO: 24 % (ref 20–40)
MCH RBC QN AUTO: 28.4 PG (ref 27–34)
MCHC RBC AUTO-ENTMCNC: 31.7 G/DL (ref 32–36)
MCV RBC AUTO: 90 FL (ref 80–100)
MONOCYTES # BLD AUTO: 0.3 THOU/UL (ref 0–0.9)
MONOCYTES NFR BLD AUTO: 7 % (ref 2–10)
NEUTROPHILS # BLD AUTO: 3.1 THOU/UL (ref 2–7.7)
NEUTROPHILS NFR BLD AUTO: 67 % (ref 50–70)
PLATELET # BLD AUTO: 173 THOU/UL (ref 140–440)
PMV BLD AUTO: 10.1 FL (ref 8.5–12.5)
POTASSIUM BLD-SCNC: 3.7 MMOL/L (ref 3.5–5)
PROT SERPL-MCNC: 6.2 G/DL (ref 6–8)
RBC # BLD AUTO: 4.89 MILL/UL (ref 3.8–5.4)
SODIUM SERPL-SCNC: 140 MMOL/L (ref 136–145)
WBC: 4.7 THOU/UL (ref 4–11)

## 2020-02-27 ASSESSMENT — MIFFLIN-ST. JEOR: SCORE: 1956.36

## 2020-03-01 ENCOUNTER — HEALTH MAINTENANCE LETTER (OUTPATIENT)
Age: 55
End: 2020-03-01

## 2020-03-16 DIAGNOSIS — E03.9 HYPOTHYROIDISM, UNSPECIFIED TYPE: ICD-10-CM

## 2020-03-16 NOTE — TELEPHONE ENCOUNTER
"Requested Prescriptions   Pending Prescriptions Disp Refills     EUTHYROX 100 MCG tablet [Pharmacy Med Name: Euthyrox 100 MCG Oral Tablet] 90 tablet 0     Sig: Take 1 tablet by mouth once daily       Thyroid Protocol Failed - 3/16/2020  2:38 PM        Failed - Normal TSH on file in past 12 months     Recent Labs   Lab Test 08/28/18  1340   TSH 3.08              Passed - Patient is 12 years or older        Passed - Recent (12 mo) or future (30 days) visit within the authorizing provider's specialty     Patient has had an office visit with the authorizing provider or a provider within the authorizing providers department within the previous 12 mos or has a future within next 30 days. See \"Patient Info\" tab in inbasket, or \"Choose Columns\" in Meds & Orders section of the refill encounter.              Passed - Medication is active on med list        Passed - No active pregnancy on record     If patient is pregnant or has had a positive pregnancy test, please check TSH.          Passed - No positive pregnancy test in past 12 months     If patient is pregnant or has had a positive pregnancy test, please check TSH.             Last Written Prescription Date:  10/1/2019  Last Fill Quantity: 90,  # refills: 1   Last office visit: 10/1/2019 with prescribing provider:  Britney   Future Office Visit:      "

## 2020-03-17 RX ORDER — LEVOTHYROXINE SODIUM 100 UG/1
TABLET ORAL
Qty: 30 TABLET | Refills: 0 | Status: SHIPPED | OUTPATIENT
Start: 2020-03-17 | End: 2020-03-30

## 2020-03-26 ENCOUNTER — INFUSION - HEALTHEAST (OUTPATIENT)
Dept: INFUSION THERAPY | Facility: HOSPITAL | Age: 55
End: 2020-03-26

## 2020-03-26 DIAGNOSIS — E03.9 ACQUIRED HYPOTHYROIDISM: ICD-10-CM

## 2020-03-26 DIAGNOSIS — B99.9 CHRONIC INFECTION: ICD-10-CM

## 2020-03-26 DIAGNOSIS — D80.1 HYPOGAMMAGLOBULINEMIA, ACQUIRED (H): ICD-10-CM

## 2020-03-26 LAB — TSH SERPL DL<=0.005 MIU/L-ACNC: 1.01 UIU/ML (ref 0.3–5)

## 2020-03-28 ENCOUNTER — MYC MEDICAL ADVICE (OUTPATIENT)
Dept: FAMILY MEDICINE | Facility: CLINIC | Age: 55
End: 2020-03-28

## 2020-03-28 DIAGNOSIS — Z13.220 SCREENING CHOLESTEROL LEVEL: ICD-10-CM

## 2020-03-28 DIAGNOSIS — E03.9 HYPOTHYROIDISM, UNSPECIFIED TYPE: Primary | ICD-10-CM

## 2020-03-30 NOTE — TELEPHONE ENCOUNTER
Anali Gilmore approved 30 day due to needing labs. Ok for 90 day as she had TSH done through a different lab.    Exam Date  Exam Time  Accession #  Results     11/19/19  10:50 AM      TSH   Order: [id number]   (suggestion)   Information displayed in this report will not trend or trigger automated decision support.     Ref Range & Units  Value    TSH  0.30 - 5.00 uIU/mL  0.89      Also, do you want lipids?     DERRICK RoldanN, RN

## 2020-03-31 RX ORDER — LEVOTHYROXINE SODIUM 100 UG/1
100 TABLET ORAL DAILY
Qty: 90 TABLET | Refills: 1 | Status: SHIPPED | OUTPATIENT
Start: 2020-03-31 | End: 2020-10-15

## 2020-04-02 DIAGNOSIS — C83.18 LYMPHOMA, MANTLE CELL, MULTIPLE SITES (H): ICD-10-CM

## 2020-04-02 RX ORDER — ACYCLOVIR 400 MG/1
TABLET ORAL
Qty: 60 TABLET | Refills: 0 | Status: SHIPPED | OUTPATIENT
Start: 2020-04-02 | End: 2020-06-02

## 2020-04-02 NOTE — TELEPHONE ENCOUNTER
Acyclovir 400mg       Last Written Prescription Date:  12/31/19  Last Fill Quantity: 60,   # refills: 0  Last Office Visit: 11/1/19  Future Office visit: none    Routing refill request to provider for review/approval because:  Needs labs and follow up appointment    Celeste Matos, PharmD  PGY1 Medication Therapy Management Resident  Voicemail: [phone]

## 2020-04-23 ENCOUNTER — INFUSION - HEALTHEAST (OUTPATIENT)
Dept: INFUSION THERAPY | Facility: HOSPITAL | Age: 55
End: 2020-04-23

## 2020-04-23 DIAGNOSIS — D80.1 HYPOGAMMAGLOBULINEMIA, ACQUIRED (H): ICD-10-CM

## 2020-04-23 DIAGNOSIS — Z13.220 SCREENING CHOLESTEROL LEVEL: ICD-10-CM

## 2020-04-23 DIAGNOSIS — B99.9 CHRONIC INFECTION: ICD-10-CM

## 2020-04-23 LAB
CHOLEST SERPL-MCNC: 170 MG/DL
FASTING STATUS PATIENT QL REPORTED: YES
HDLC SERPL-MCNC: 50 MG/DL
LDLC SERPL CALC-MCNC: 106 MG/DL
TRIGL SERPL-MCNC: 72 MG/DL

## 2020-05-14 ENCOUNTER — MYC MEDICAL ADVICE (OUTPATIENT)
Dept: FAMILY MEDICINE | Facility: CLINIC | Age: 55
End: 2020-05-14

## 2020-05-14 NOTE — TELEPHONE ENCOUNTER
Routed to provider pool as Dr Tan is out of clinic the remainder of this week and next week.  Pt sent copy of recent cholesterol results.  Please see her [employer] message.    Gale Terry RN

## 2020-05-21 ENCOUNTER — HOSPITAL ENCOUNTER (OUTPATIENT)
Dept: CT IMAGING | Facility: HOSPITAL | Age: 55
Setting detail: RADIATION/ONCOLOGY SERIES
Discharge: STILL ADMITTED OR ADMINISTRATIVE | End: 2020-05-21
Attending: INTERNAL MEDICINE

## 2020-05-21 ENCOUNTER — OFFICE VISIT - HEALTHEAST (OUTPATIENT)
Dept: ONCOLOGY | Facility: HOSPITAL | Age: 55
End: 2020-05-21

## 2020-05-21 ENCOUNTER — INFUSION - HEALTHEAST (OUTPATIENT)
Dept: INFUSION THERAPY | Facility: HOSPITAL | Age: 55
End: 2020-05-21

## 2020-05-21 DIAGNOSIS — C83.13 MANTLE CELL LYMPHOMA OF INTRA-ABDOMINAL LYMPH NODES (H): ICD-10-CM

## 2020-05-21 DIAGNOSIS — B99.9 CHRONIC INFECTION: ICD-10-CM

## 2020-05-21 DIAGNOSIS — D80.1 HYPOGAMMAGLOBULINEMIA, ACQUIRED (H): ICD-10-CM

## 2020-05-21 LAB
ALBUMIN SERPL-MCNC: 3.5 G/DL (ref 3.5–5)
ALP SERPL-CCNC: 106 U/L (ref 45–120)
ALT SERPL W P-5'-P-CCNC: 15 U/L (ref 0–45)
ANION GAP SERPL CALCULATED.3IONS-SCNC: 8 MMOL/L (ref 5–18)
AST SERPL W P-5'-P-CCNC: 21 U/L (ref 0–40)
BASOPHILS # BLD AUTO: 0 THOU/UL (ref 0–0.2)
BASOPHILS NFR BLD AUTO: 1 % (ref 0–2)
BILIRUB SERPL-MCNC: 0.4 MG/DL (ref 0–1)
BUN SERPL-MCNC: 30 MG/DL (ref 8–22)
CALCIUM SERPL-MCNC: 9 MG/DL (ref 8.5–10.5)
CHLORIDE BLD-SCNC: 110 MMOL/L (ref 98–107)
CO2 SERPL-SCNC: 24 MMOL/L (ref 22–31)
CREAT SERPL-MCNC: 2.09 MG/DL (ref 0.6–1.1)
EOSINOPHIL # BLD AUTO: 0.1 THOU/UL (ref 0–0.4)
EOSINOPHIL NFR BLD AUTO: 2 % (ref 0–6)
ERYTHROCYTE [DISTWIDTH] IN BLOOD BY AUTOMATED COUNT: 14.6 % (ref 11–14.5)
GFR SERPL CREATININE-BSD FRML MDRD: 25 ML/MIN/1.73M2
GLUCOSE BLD-MCNC: 86 MG/DL (ref 70–125)
HCT VFR BLD AUTO: 43 % (ref 35–47)
HGB BLD-MCNC: 13.8 G/DL (ref 12–16)
IGA SERPL-MCNC: 681 MG/DL
LYMPHOCYTES # BLD AUTO: 1.1 THOU/UL (ref 0.8–4.4)
LYMPHOCYTES NFR BLD AUTO: 19 % (ref 20–40)
MCH RBC QN AUTO: 28.8 PG (ref 27–34)
MCHC RBC AUTO-ENTMCNC: 32.1 G/DL (ref 32–36)
MCV RBC AUTO: 90 FL (ref 80–100)
MONOCYTES # BLD AUTO: 0.5 THOU/UL (ref 0–0.9)
MONOCYTES NFR BLD AUTO: 8 % (ref 2–10)
NEUTROPHILS # BLD AUTO: 4 THOU/UL (ref 2–7.7)
NEUTROPHILS NFR BLD AUTO: 70 % (ref 50–70)
PLATELET # BLD AUTO: 170 THOU/UL (ref 140–440)
PMV BLD AUTO: 10.1 FL (ref 8.5–12.5)
POTASSIUM BLD-SCNC: 4.1 MMOL/L (ref 3.5–5)
PROT SERPL-MCNC: 6.3 G/DL (ref 6–8)
RBC # BLD AUTO: 4.8 MILL/UL (ref 3.8–5.4)
SODIUM SERPL-SCNC: 142 MMOL/L (ref 136–145)
WBC: 5.8 THOU/UL (ref 4–11)

## 2020-05-27 ENCOUNTER — HOSPITAL ENCOUNTER (OUTPATIENT)
Dept: PET IMAGING | Facility: HOSPITAL | Age: 55
Discharge: HOME | End: 2020-05-27
Attending: INTERNAL MEDICINE

## 2020-05-27 ENCOUNTER — HOSPITAL ENCOUNTER (OUTPATIENT)
Dept: PET IMAGING | Facility: HOSPITAL | Age: 55
Setting detail: RADIATION/ONCOLOGY SERIES
Discharge: STILL ADMITTED OR ADMINISTRATIVE | End: 2020-05-27
Attending: INTERNAL MEDICINE

## 2020-05-27 DIAGNOSIS — C83.13 MANTLE CELL LYMPHOMA OF INTRA-ABDOMINAL LYMPH NODES (H): ICD-10-CM

## 2020-05-27 LAB — GLUCOSE BLDC GLUCOMTR-MCNC: 82 MG/DL (ref 70–139)

## 2020-05-27 ASSESSMENT — MIFFLIN-ST. JEOR: SCORE: 1976.61

## 2020-05-28 ENCOUNTER — AMBULATORY - HEALTHEAST (OUTPATIENT)
Dept: ONCOLOGY | Facility: HOSPITAL | Age: 55
End: 2020-05-28

## 2020-05-28 DIAGNOSIS — C83.16 MANTLE CELL LYMPHOMA OF INTRAPELVIC LYMPH NODES (H): ICD-10-CM

## 2020-05-29 ENCOUNTER — AMBULATORY - HEALTHEAST (OUTPATIENT)
Dept: ULTRASOUND IMAGING | Facility: HOSPITAL | Age: 55
End: 2020-05-29

## 2020-05-29 ENCOUNTER — COMMUNICATION - HEALTHEAST (OUTPATIENT)
Dept: ONCOLOGY | Facility: HOSPITAL | Age: 55
End: 2020-05-29

## 2020-05-29 DIAGNOSIS — Z11.59 ENCOUNTER FOR SCREENING FOR OTHER VIRAL DISEASES: ICD-10-CM

## 2020-06-01 DIAGNOSIS — C83.18 LYMPHOMA, MANTLE CELL, MULTIPLE SITES (H): ICD-10-CM

## 2020-06-02 RX ORDER — ACYCLOVIR 400 MG/1
TABLET ORAL
Qty: 60 TABLET | Refills: 0 | Status: SHIPPED | OUTPATIENT
Start: 2020-06-02 | End: 2020-08-12

## 2020-06-07 ENCOUNTER — AMBULATORY - HEALTHEAST (OUTPATIENT)
Dept: FAMILY MEDICINE | Facility: CLINIC | Age: 55
End: 2020-06-07

## 2020-06-07 DIAGNOSIS — Z11.59 ENCOUNTER FOR SCREENING FOR OTHER VIRAL DISEASES: ICD-10-CM

## 2020-06-10 ENCOUNTER — HOSPITAL ENCOUNTER (OUTPATIENT)
Dept: ULTRASOUND IMAGING | Facility: HOSPITAL | Age: 55
Setting detail: RADIATION/ONCOLOGY SERIES
Discharge: STILL ADMITTED OR ADMINISTRATIVE | End: 2020-06-10
Attending: INTERNAL MEDICINE

## 2020-06-10 DIAGNOSIS — C83.16 MANTLE CELL LYMPHOMA OF INTRAPELVIC LYMPH NODES (H): ICD-10-CM

## 2020-06-12 LAB
LAB AP CHARGES (HE HISTORICAL CONVERSION): NORMAL
PATH REPORT.COMMENTS IMP SPEC: NORMAL
PATH REPORT.COMMENTS IMP SPEC: NORMAL
PATH REPORT.FINAL DX SPEC: NORMAL
PATH REPORT.MICROSCOPIC SPEC OTHER STN: NORMAL
RESULT FLAG (HE HISTORICAL CONVERSION): NORMAL

## 2020-06-15 ENCOUNTER — COMMUNICATION - HEALTHEAST (OUTPATIENT)
Dept: ADMINISTRATIVE | Facility: CLINIC | Age: 55
End: 2020-06-15

## 2020-06-15 ENCOUNTER — OFFICE VISIT - HEALTHEAST (OUTPATIENT)
Dept: ONCOLOGY | Facility: HOSPITAL | Age: 55
End: 2020-06-15

## 2020-06-15 ENCOUNTER — AMBULATORY - HEALTHEAST (OUTPATIENT)
Dept: INFUSION THERAPY | Facility: HOSPITAL | Age: 55
End: 2020-06-15

## 2020-06-15 DIAGNOSIS — C83.15 MANTLE CELL LYMPHOMA OF LYMPH NODES OF INGUINAL REGION (H): ICD-10-CM

## 2020-06-15 LAB — LDH SERPL L TO P-CCNC: 185 U/L (ref 125–220)

## 2020-06-16 ENCOUNTER — OFFICE VISIT - HEALTHEAST (OUTPATIENT)
Dept: SURGERY | Facility: CLINIC | Age: 55
End: 2020-06-16

## 2020-06-16 DIAGNOSIS — C83.15 MANTLE CELL LYMPHOMA OF LYMPH NODES OF INGUINAL REGION (H): ICD-10-CM

## 2020-06-16 ASSESSMENT — MIFFLIN-ST. JEOR: SCORE: 1990.22

## 2020-06-17 ENCOUNTER — COMMUNICATION - HEALTHEAST (OUTPATIENT)
Dept: SURGERY | Facility: CLINIC | Age: 55
End: 2020-06-17

## 2020-06-17 ENCOUNTER — AMBULATORY - HEALTHEAST (OUTPATIENT)
Dept: SURGERY | Facility: HOSPITAL | Age: 55
End: 2020-06-17

## 2020-06-17 ENCOUNTER — SURGERY - HEALTHEAST (OUTPATIENT)
Dept: SURGERY | Facility: CLINIC | Age: 55
End: 2020-06-17

## 2020-06-17 DIAGNOSIS — Z11.59 ENCOUNTER FOR SCREENING FOR OTHER VIRAL DISEASES: ICD-10-CM

## 2020-06-18 ENCOUNTER — INFUSION - HEALTHEAST (OUTPATIENT)
Dept: INFUSION THERAPY | Facility: HOSPITAL | Age: 55
End: 2020-06-18

## 2020-06-18 DIAGNOSIS — B99.9 CHRONIC INFECTION: ICD-10-CM

## 2020-06-18 DIAGNOSIS — D80.1 HYPOGAMMAGLOBULINEMIA, ACQUIRED (H): ICD-10-CM

## 2020-06-22 ENCOUNTER — AMBULATORY - HEALTHEAST (OUTPATIENT)
Dept: FAMILY MEDICINE | Facility: CLINIC | Age: 55
End: 2020-06-22

## 2020-06-22 DIAGNOSIS — Z11.59 ENCOUNTER FOR SCREENING FOR OTHER VIRAL DISEASES: ICD-10-CM

## 2020-06-25 ENCOUNTER — SURGERY - HEALTHEAST (OUTPATIENT)
Dept: SURGERY | Facility: HOSPITAL | Age: 55
End: 2020-06-25

## 2020-06-25 ENCOUNTER — HOSPITAL ENCOUNTER (OUTPATIENT)
Dept: ULTRASOUND IMAGING | Facility: HOSPITAL | Age: 55
Discharge: HOME | End: 2020-06-25
Attending: SURGERY

## 2020-06-25 ENCOUNTER — ANESTHESIA - HEALTHEAST (OUTPATIENT)
Dept: SURGERY | Facility: HOSPITAL | Age: 55
End: 2020-06-25

## 2020-06-25 DIAGNOSIS — C83.15 MANTLE CELL LYMPHOMA OF LYMPH NODES OF INGUINAL REGION (H): ICD-10-CM

## 2020-06-26 ENCOUNTER — COMMUNICATION - HEALTHEAST (OUTPATIENT)
Dept: ONCOLOGY | Facility: HOSPITAL | Age: 55
End: 2020-06-26

## 2020-06-29 ENCOUNTER — COMMUNICATION - HEALTHEAST (OUTPATIENT)
Dept: ONCOLOGY | Facility: HOSPITAL | Age: 55
End: 2020-06-29

## 2020-06-29 ENCOUNTER — OFFICE VISIT - HEALTHEAST (OUTPATIENT)
Dept: ONCOLOGY | Facility: HOSPITAL | Age: 55
End: 2020-06-29

## 2020-06-29 ENCOUNTER — AMBULATORY - HEALTHEAST (OUTPATIENT)
Dept: INFUSION THERAPY | Facility: HOSPITAL | Age: 55
End: 2020-06-29

## 2020-06-29 DIAGNOSIS — C83.15 MANTLE CELL LYMPHOMA OF LYMPH NODES OF INGUINAL REGION (H): ICD-10-CM

## 2020-06-29 DIAGNOSIS — D80.1 HYPOGAMMAGLOBULINEMIA, ACQUIRED (H): ICD-10-CM

## 2020-06-29 LAB
ALBUMIN SERPL-MCNC: 3.5 G/DL (ref 3.5–5)
ALP SERPL-CCNC: 115 U/L (ref 45–120)
ALT SERPL W P-5'-P-CCNC: 16 U/L (ref 0–45)
ANION GAP SERPL CALCULATED.3IONS-SCNC: 9 MMOL/L (ref 5–18)
AST SERPL W P-5'-P-CCNC: 21 U/L (ref 0–40)
BASOPHILS # BLD AUTO: 0 THOU/UL (ref 0–0.2)
BASOPHILS NFR BLD AUTO: 0 % (ref 0–2)
BILIRUB SERPL-MCNC: 0.4 MG/DL (ref 0–1)
BUN SERPL-MCNC: 25 MG/DL (ref 8–22)
CALCIUM SERPL-MCNC: 9.5 MG/DL (ref 8.5–10.5)
CHLORIDE BLD-SCNC: 103 MMOL/L (ref 98–107)
CO2 SERPL-SCNC: 27 MMOL/L (ref 22–31)
CREAT SERPL-MCNC: 2.24 MG/DL (ref 0.6–1.1)
EOSINOPHIL # BLD AUTO: 0.1 THOU/UL (ref 0–0.4)
EOSINOPHIL NFR BLD AUTO: 2 % (ref 0–6)
ERYTHROCYTE [DISTWIDTH] IN BLOOD BY AUTOMATED COUNT: 14.5 % (ref 11–14.5)
GFR SERPL CREATININE-BSD FRML MDRD: 23 ML/MIN/1.73M2
GLUCOSE BLD-MCNC: 125 MG/DL (ref 70–125)
HCT VFR BLD AUTO: 47.3 % (ref 35–47)
HGB BLD-MCNC: 15.1 G/DL (ref 12–16)
LYMPHOCYTES # BLD AUTO: 1.3 THOU/UL (ref 0.8–4.4)
LYMPHOCYTES NFR BLD AUTO: 15 % (ref 20–40)
MCH RBC QN AUTO: 28.7 PG (ref 27–34)
MCHC RBC AUTO-ENTMCNC: 31.9 G/DL (ref 32–36)
MCV RBC AUTO: 90 FL (ref 80–100)
MONOCYTES # BLD AUTO: 0.5 THOU/UL (ref 0–0.9)
MONOCYTES NFR BLD AUTO: 6 % (ref 2–10)
NEUTROPHILS # BLD AUTO: 6.5 THOU/UL (ref 2–7.7)
NEUTROPHILS NFR BLD AUTO: 77 % (ref 50–70)
PLATELET # BLD AUTO: 173 THOU/UL (ref 140–440)
PMV BLD AUTO: 10.1 FL (ref 8.5–12.5)
POTASSIUM BLD-SCNC: 4.1 MMOL/L (ref 3.5–5)
PROT SERPL-MCNC: 7.2 G/DL (ref 6–8)
RBC # BLD AUTO: 5.26 MILL/UL (ref 3.8–5.4)
SODIUM SERPL-SCNC: 139 MMOL/L (ref 136–145)
WBC: 8.5 THOU/UL (ref 4–11)

## 2020-07-01 ENCOUNTER — COMMUNICATION - HEALTHEAST (OUTPATIENT)
Dept: ONCOLOGY | Facility: HOSPITAL | Age: 55
End: 2020-07-01

## 2020-07-01 ENCOUNTER — COMMUNICATION - HEALTHEAST (OUTPATIENT)
Dept: SURGERY | Facility: CLINIC | Age: 55
End: 2020-07-01

## 2020-07-01 ENCOUNTER — COMMUNICATION - HEALTHEAST (OUTPATIENT)
Dept: ADMINISTRATIVE | Facility: CLINIC | Age: 55
End: 2020-07-01

## 2020-07-02 ENCOUNTER — OFFICE VISIT - HEALTHEAST (OUTPATIENT)
Dept: SURGERY | Facility: CLINIC | Age: 55
End: 2020-07-02

## 2020-07-02 DIAGNOSIS — C83.15 MANTLE CELL LYMPHOMA OF LYMPH NODES OF INGUINAL REGION (H): ICD-10-CM

## 2020-07-06 ENCOUNTER — COMMUNICATION - HEALTHEAST (OUTPATIENT)
Dept: ONCOLOGY | Facility: HOSPITAL | Age: 55
End: 2020-07-06

## 2020-07-13 ENCOUNTER — COMMUNICATION - HEALTHEAST (OUTPATIENT)
Dept: ONCOLOGY | Facility: HOSPITAL | Age: 55
End: 2020-07-13

## 2020-07-16 ENCOUNTER — COMMUNICATION - HEALTHEAST (OUTPATIENT)
Dept: ONCOLOGY | Facility: HOSPITAL | Age: 55
End: 2020-07-16

## 2020-07-20 ENCOUNTER — COMMUNICATION - HEALTHEAST (OUTPATIENT)
Dept: ONCOLOGY | Facility: HOSPITAL | Age: 55
End: 2020-07-20

## 2020-07-20 ENCOUNTER — AMBULATORY - HEALTHEAST (OUTPATIENT)
Dept: INFUSION THERAPY | Facility: HOSPITAL | Age: 55
End: 2020-07-20

## 2020-07-20 ENCOUNTER — OFFICE VISIT - HEALTHEAST (OUTPATIENT)
Dept: ONCOLOGY | Facility: HOSPITAL | Age: 55
End: 2020-07-20

## 2020-07-20 ENCOUNTER — INFUSION - HEALTHEAST (OUTPATIENT)
Dept: INFUSION THERAPY | Facility: HOSPITAL | Age: 55
End: 2020-07-20

## 2020-07-20 DIAGNOSIS — C83.15 MANTLE CELL LYMPHOMA OF LYMPH NODES OF INGUINAL REGION (H): ICD-10-CM

## 2020-07-20 DIAGNOSIS — B99.9 CHRONIC INFECTION: ICD-10-CM

## 2020-07-20 DIAGNOSIS — D80.1 HYPOGAMMAGLOBULINEMIA, ACQUIRED (H): ICD-10-CM

## 2020-07-20 LAB
ALBUMIN SERPL-MCNC: 3.4 G/DL (ref 3.5–5)
ALP SERPL-CCNC: 100 U/L (ref 45–120)
ALT SERPL W P-5'-P-CCNC: 11 U/L (ref 0–45)
ANION GAP SERPL CALCULATED.3IONS-SCNC: 7 MMOL/L (ref 5–18)
AST SERPL W P-5'-P-CCNC: 16 U/L (ref 0–40)
BASOPHILS # BLD AUTO: 0.1 THOU/UL (ref 0–0.2)
BASOPHILS NFR BLD AUTO: 1 % (ref 0–2)
BILIRUB SERPL-MCNC: 0.3 MG/DL (ref 0–1)
BUN SERPL-MCNC: 29 MG/DL (ref 8–22)
CALCIUM SERPL-MCNC: 8.6 MG/DL (ref 8.5–10.5)
CHLORIDE BLD-SCNC: 108 MMOL/L (ref 98–107)
CO2 SERPL-SCNC: 26 MMOL/L (ref 22–31)
CREAT SERPL-MCNC: 2.04 MG/DL (ref 0.6–1.1)
EOSINOPHIL # BLD AUTO: 0.1 THOU/UL (ref 0–0.4)
EOSINOPHIL NFR BLD AUTO: 2 % (ref 0–6)
ERYTHROCYTE [DISTWIDTH] IN BLOOD BY AUTOMATED COUNT: 14.6 % (ref 11–14.5)
GFR SERPL CREATININE-BSD FRML MDRD: 25 ML/MIN/1.73M2
GLUCOSE BLD-MCNC: 97 MG/DL (ref 70–125)
HCT VFR BLD AUTO: 43.6 % (ref 35–47)
HGB BLD-MCNC: 14 G/DL (ref 12–16)
LYMPHOCYTES # BLD AUTO: 1.3 THOU/UL (ref 0.8–4.4)
LYMPHOCYTES NFR BLD AUTO: 21 % (ref 20–40)
MCH RBC QN AUTO: 28.6 PG (ref 27–34)
MCHC RBC AUTO-ENTMCNC: 32.1 G/DL (ref 32–36)
MCV RBC AUTO: 89 FL (ref 80–100)
MONOCYTES # BLD AUTO: 0.5 THOU/UL (ref 0–0.9)
MONOCYTES NFR BLD AUTO: 8 % (ref 2–10)
NEUTROPHILS # BLD AUTO: 4.3 THOU/UL (ref 2–7.7)
NEUTROPHILS NFR BLD AUTO: 68 % (ref 50–70)
PLATELET # BLD AUTO: 152 THOU/UL (ref 140–440)
PMV BLD AUTO: 11.1 FL (ref 8.5–12.5)
POTASSIUM BLD-SCNC: 3.9 MMOL/L (ref 3.5–5)
PROT SERPL-MCNC: 6.2 G/DL (ref 6–8)
RBC # BLD AUTO: 4.9 MILL/UL (ref 3.8–5.4)
SODIUM SERPL-SCNC: 141 MMOL/L (ref 136–145)
WBC: 6.3 THOU/UL (ref 4–11)

## 2020-07-27 LAB
CAP COMMENT: NORMAL
LAB AP CHARGES (HE HISTORICAL CONVERSION): NORMAL
LAB AP INITIAL CYTO EVAL (HE HISTORICAL CONVERSION): NORMAL
PATH REPORT.ADDENDUM SPEC: NORMAL
PATH REPORT.ADDENDUM SPEC: NORMAL
PATH REPORT.COMMENTS IMP SPEC: NORMAL
PATH REPORT.COMMENTS IMP SPEC: NORMAL
PATH REPORT.FINAL DX SPEC: NORMAL
PATH REPORT.MICROSCOPIC SPEC OTHER STN: NORMAL
PATH REPORT.RELEVANT HX SPEC: NORMAL
SPECIMEN DESCRIPTION: NORMAL

## 2020-07-28 ENCOUNTER — COMMUNICATION - HEALTHEAST (OUTPATIENT)
Dept: ONCOLOGY | Facility: HOSPITAL | Age: 55
End: 2020-07-28

## 2020-08-03 NOTE — PROGRESS NOTES
"GI clinic follow-up note:    S: Ms. Paul is a delightful 52 year old female with mantle cell lymphoma s/p BEAM conditioning and auto-BMT in 8/2015. Her baseline bowel pattern prior to transplant was significant constipation (one BM/week) but following transplant, she developed significant diarrhea. We evaluated the diarrhea and she, over time, had evaluation with push enteroscopy (increased IELs, preserved villous architecture) and colonoscopy (path showing single cell necrotic bodies and no CMV). She also had persistent norovirus + testing on ENRIQUE. She was also having significant bloating in addition to her diarrhea.   The differential discussed at that time was SIBO vs persistent norovirus infection vs CVID and acquired auto-immune enteropathy. Celiac was felt less likely given preserved villous architecture although serologic testing was noted to likely be ineffective given low immunoglobulins    We elected to empirically treat SIBO (low B12/high folate, bloating, diarrhea, chemotherapy, small bowel diverticuli) and then subsequently to attempt pharmacologic treatment for norovirus.   We were more concerned about auto-immune enteropathy after colonoscopy results returned but she has improved remarkably following treatment with rifaximin.   She is currently having 1-2 formed stools daily and her bloating has also improved dramatically. She has no abdominal pain either.   She states that she overall feels quite well and has no additional GI complaints.    O:  /82 (BP Location: Left arm, Patient Position: Chair, Cuff Size: Adult Regular)  Pulse 76  Temp 97.6  F (36.4  C)  Ht 1.715 m (5' 7.5\")  Wt 103.3 kg (227 lb 11.2 oz)  SpO2 100%  BMI 35.14 kg/m2  GEN: well-appearing, in NAD, respirations non-labored  HEENT: sclera anicteric, NCAT, PERRL  PULM: CTAB  CV: RRR  ABD: soft, NT, ND, BS+  EXTREM: WWP, no LE edema  NEURO: no asterixis    A:  52 year old female with history as noted above who presents for " He would like to have his A1c and any other labs you would like to order for him.  He has an appt to see his specialist and would like to have these done.   evaluation of diarrhea, now significantly and sustainably improved after empiric treatment for SIBO. Despite this, her colonscopy and EGD findings, in conjunction with her significantly low immunoglobulins, still place her at risk for diagnoses as mentioned above (including celiac, CVID, auto-immune enteropathy) and must be considered were her symptoms to recur (in addition to SIBO recurrence).     Recs:  -Continue to monitor for diarrhea  -RTC 6 months, sooner if symptoms arise    Discussed with Dr. Dandre Taylor  GI fellow    ATTENDING ATTESTATION:    REFERRING PROVIDER: Milton  DATE SEEN: 5/22/17      Thank you for this consultation. It was a pleasure to participate in the care of this patient; please contact us with any further questions.    Patient was discussed, seen, and examined by me, Alek Amos. The plan of care and pertinent data/imaging were also reviewed with the GI Fellow in clinic today. Agree with the joint assessment and plan as delineated above.    Please contact me with any further questions.    Alek Amos MD    [facility] - Department of Medicine  Division of Gastroenterology

## 2020-08-04 ENCOUNTER — AMBULATORY - HEALTHEAST (OUTPATIENT)
Dept: ONCOLOGY | Facility: HOSPITAL | Age: 55
End: 2020-08-04

## 2020-08-04 ENCOUNTER — TRANSFERRED RECORDS (OUTPATIENT)
Dept: HEALTH INFORMATION MANAGEMENT | Facility: CLINIC | Age: 55
End: 2020-08-04

## 2020-08-04 ENCOUNTER — COMMUNICATION - HEALTHEAST (OUTPATIENT)
Dept: ONCOLOGY | Facility: HOSPITAL | Age: 55
End: 2020-08-04

## 2020-08-08 DIAGNOSIS — C83.18 LYMPHOMA, MANTLE CELL, MULTIPLE SITES (H): ICD-10-CM

## 2020-08-10 NOTE — TELEPHONE ENCOUNTER
Routing refill request to provider for review/approval because:  Labs not current:    Creatinine   Date Value Ref Range Status   08/28/2018 1.92 (H) 0.52 - 1.04 mg/dL Final     Last office visit: 10/01/2019.     DERRICK RoldanN, RN

## 2020-08-10 NOTE — TELEPHONE ENCOUNTER
"Requested Prescriptions   Pending Prescriptions Disp Refills     acyclovir (ZOVIRAX) 400 MG tablet [Pharmacy Med Name: Acyclovir 400 MG Oral Tablet] 60 tablet 0     Sig: Take 2 tablets by mouth once daily       Antivirals for Herpes Protocol Failed - 8/8/2020 11:59 PM        Failed - Normal serum creatinine on file in past 12 months     Recent Labs   Lab Test 08/28/18  1340   CR 1.92*       Ok to refill medication if creatinine is low          Passed - Patient is age 12 or older        Passed - Recent (12 mo) or future (30 days) visit within the authorizing provider's specialty     Patient has had an office visit with the authorizing provider or a provider within the authorizing providers department within the previous 12 mos or has a future within next 30 days. See \"Patient Info\" tab in inbasket, or \"Choose Columns\" in Meds & Orders section of the refill encounter.              Passed - Medication is active on med list             "

## 2020-08-11 NOTE — TELEPHONE ENCOUNTER
Kacey Mart contacted Avis on 08/11/20 and left a message. If patient calls back please schedule appointment as soon as possible.

## 2020-08-11 NOTE — TELEPHONE ENCOUNTER
Patient informed of message below from provider.    Patient reports she is out of medication.     Patient reports she is an immune compromised patient and has labs checked regularly [facility] and provider reported she didn't have to come in as an immune compromised patient. Patient lives in [place] and is not eligible for virtual visits.    Last CMP run 5/21/20 at [facility], seen in Epic, Cr, 2.09.    Provider please review and advise. Thank you.

## 2020-08-12 RX ORDER — ACYCLOVIR 400 MG/1
TABLET ORAL
Qty: 60 TABLET | Refills: 0 | Status: SHIPPED | OUTPATIENT
Start: 2020-08-12 | End: 2020-10-15

## 2020-08-17 ENCOUNTER — OFFICE VISIT - HEALTHEAST (OUTPATIENT)
Dept: ONCOLOGY | Facility: HOSPITAL | Age: 55
End: 2020-08-17

## 2020-08-17 ENCOUNTER — AMBULATORY - HEALTHEAST (OUTPATIENT)
Dept: INFUSION THERAPY | Facility: HOSPITAL | Age: 55
End: 2020-08-17

## 2020-08-17 ENCOUNTER — INFUSION - HEALTHEAST (OUTPATIENT)
Dept: INFUSION THERAPY | Facility: HOSPITAL | Age: 55
End: 2020-08-17

## 2020-08-17 DIAGNOSIS — D80.1 HYPOGAMMAGLOBULINEMIA, ACQUIRED (H): ICD-10-CM

## 2020-08-17 DIAGNOSIS — C83.15 MANTLE CELL LYMPHOMA OF LYMPH NODES OF INGUINAL REGION (H): ICD-10-CM

## 2020-08-17 DIAGNOSIS — B99.9 CHRONIC INFECTION: ICD-10-CM

## 2020-08-17 DIAGNOSIS — C83.13 MANTLE CELL LYMPHOMA OF INTRA-ABDOMINAL LYMPH NODES (H): ICD-10-CM

## 2020-08-17 LAB
ALBUMIN SERPL-MCNC: 3.5 G/DL (ref 3.5–5)
ALP SERPL-CCNC: 111 U/L (ref 45–120)
ALT SERPL W P-5'-P-CCNC: 18 U/L (ref 0–45)
ANION GAP SERPL CALCULATED.3IONS-SCNC: 8 MMOL/L (ref 5–18)
AST SERPL W P-5'-P-CCNC: 19 U/L (ref 0–40)
BASOPHILS # BLD AUTO: 0 THOU/UL (ref 0–0.2)
BASOPHILS NFR BLD AUTO: 1 % (ref 0–2)
BILIRUB SERPL-MCNC: 0.5 MG/DL (ref 0–1)
BUN SERPL-MCNC: 25 MG/DL (ref 8–22)
CALCIUM SERPL-MCNC: 8.9 MG/DL (ref 8.5–10.5)
CHLORIDE BLD-SCNC: 108 MMOL/L (ref 98–107)
CO2 SERPL-SCNC: 25 MMOL/L (ref 22–31)
CREAT SERPL-MCNC: 2.01 MG/DL (ref 0.6–1.1)
EOSINOPHIL # BLD AUTO: 0.3 THOU/UL (ref 0–0.4)
EOSINOPHIL NFR BLD AUTO: 4 % (ref 0–6)
ERYTHROCYTE [DISTWIDTH] IN BLOOD BY AUTOMATED COUNT: 15.2 % (ref 11–14.5)
GFR SERPL CREATININE-BSD FRML MDRD: 26 ML/MIN/1.73M2
GLUCOSE BLD-MCNC: 83 MG/DL (ref 70–125)
HCT VFR BLD AUTO: 44 % (ref 35–47)
HGB BLD-MCNC: 14 G/DL (ref 12–16)
IGA SERPL-MCNC: 582 MG/DL
LYMPHOCYTES # BLD AUTO: 1.3 THOU/UL (ref 0.8–4.4)
LYMPHOCYTES NFR BLD AUTO: 19 % (ref 20–40)
MCH RBC QN AUTO: 28.9 PG (ref 27–34)
MCHC RBC AUTO-ENTMCNC: 31.8 G/DL (ref 32–36)
MCV RBC AUTO: 91 FL (ref 80–100)
MONOCYTES # BLD AUTO: 0.5 THOU/UL (ref 0–0.9)
MONOCYTES NFR BLD AUTO: 7 % (ref 2–10)
NEUTROPHILS # BLD AUTO: 4.7 THOU/UL (ref 2–7.7)
NEUTROPHILS NFR BLD AUTO: 70 % (ref 50–70)
PLATELET # BLD AUTO: 175 THOU/UL (ref 140–440)
PMV BLD AUTO: 10 FL (ref 8.5–12.5)
POTASSIUM BLD-SCNC: 4.1 MMOL/L (ref 3.5–5)
PROT SERPL-MCNC: 6.5 G/DL (ref 6–8)
RBC # BLD AUTO: 4.84 MILL/UL (ref 3.8–5.4)
SODIUM SERPL-SCNC: 141 MMOL/L (ref 136–145)
WBC: 6.8 THOU/UL (ref 4–11)

## 2020-08-18 ENCOUNTER — COMMUNICATION - HEALTHEAST (OUTPATIENT)
Dept: ONCOLOGY | Facility: HOSPITAL | Age: 55
End: 2020-08-18

## 2020-09-11 ENCOUNTER — HOSPITAL ENCOUNTER (OUTPATIENT)
Dept: PET IMAGING | Facility: HOSPITAL | Age: 55
Setting detail: RADIATION/ONCOLOGY SERIES
Discharge: STILL ADMITTED OR ADMINISTRATIVE | End: 2020-09-11
Attending: INTERNAL MEDICINE

## 2020-09-11 ENCOUNTER — INFUSION - HEALTHEAST (OUTPATIENT)
Dept: INFUSION THERAPY | Facility: HOSPITAL | Age: 55
End: 2020-09-11

## 2020-09-11 DIAGNOSIS — D80.1 HYPOGAMMAGLOBULINEMIA, ACQUIRED (H): ICD-10-CM

## 2020-09-11 DIAGNOSIS — C83.13 MANTLE CELL LYMPHOMA OF INTRA-ABDOMINAL LYMPH NODES (H): ICD-10-CM

## 2020-09-11 DIAGNOSIS — B99.9 CHRONIC INFECTION: ICD-10-CM

## 2020-09-11 LAB
ALBUMIN SERPL-MCNC: 3.6 G/DL (ref 3.5–5)
ALP SERPL-CCNC: 113 U/L (ref 45–120)
ALT SERPL W P-5'-P-CCNC: 17 U/L (ref 0–45)
ANION GAP SERPL CALCULATED.3IONS-SCNC: 6 MMOL/L (ref 5–18)
AST SERPL W P-5'-P-CCNC: 23 U/L (ref 0–40)
BASOPHILS # BLD AUTO: 0 THOU/UL (ref 0–0.2)
BASOPHILS NFR BLD AUTO: 1 % (ref 0–2)
BILIRUB SERPL-MCNC: 0.4 MG/DL (ref 0–1)
BUN SERPL-MCNC: 26 MG/DL (ref 8–22)
CALCIUM SERPL-MCNC: 8.7 MG/DL (ref 8.5–10.5)
CHLORIDE BLD-SCNC: 107 MMOL/L (ref 98–107)
CO2 SERPL-SCNC: 27 MMOL/L (ref 22–31)
CREAT SERPL-MCNC: 1.94 MG/DL (ref 0.6–1.1)
EOSINOPHIL # BLD AUTO: 0.1 THOU/UL (ref 0–0.4)
EOSINOPHIL NFR BLD AUTO: 2 % (ref 0–6)
ERYTHROCYTE [DISTWIDTH] IN BLOOD BY AUTOMATED COUNT: 15 % (ref 11–14.5)
GFR SERPL CREATININE-BSD FRML MDRD: 27 ML/MIN/1.73M2
GLUCOSE BLD-MCNC: 95 MG/DL (ref 70–125)
GLUCOSE BLDC GLUCOMTR-MCNC: 87 MG/DL (ref 70–139)
HCT VFR BLD AUTO: 43.5 % (ref 35–47)
HGB BLD-MCNC: 13.9 G/DL (ref 12–16)
IMM GRANULOCYTES # BLD: 0 THOU/UL
IMM GRANULOCYTES NFR BLD: 0 %
LYMPHOCYTES # BLD AUTO: 1.2 THOU/UL (ref 0.8–4.4)
LYMPHOCYTES NFR BLD AUTO: 17 % (ref 20–40)
MCH RBC QN AUTO: 29 PG (ref 27–34)
MCHC RBC AUTO-ENTMCNC: 32 G/DL (ref 32–36)
MCV RBC AUTO: 91 FL (ref 80–100)
MONOCYTES # BLD AUTO: 0.5 THOU/UL (ref 0–0.9)
MONOCYTES NFR BLD AUTO: 7 % (ref 2–10)
NEUTROPHILS # BLD AUTO: 4.9 THOU/UL (ref 2–7.7)
NEUTROPHILS NFR BLD AUTO: 74 % (ref 50–70)
PLATELET # BLD AUTO: 190 THOU/UL (ref 140–440)
PMV BLD AUTO: 10.3 FL (ref 8.5–12.5)
POTASSIUM BLD-SCNC: 4.1 MMOL/L (ref 3.5–5)
PROT SERPL-MCNC: 6.5 G/DL (ref 6–8)
RBC # BLD AUTO: 4.79 MILL/UL (ref 3.8–5.4)
SODIUM SERPL-SCNC: 140 MMOL/L (ref 136–145)
WBC: 6.7 THOU/UL (ref 4–11)

## 2020-09-11 ASSESSMENT — MIFFLIN-ST. JEOR: SCORE: 1992.49

## 2020-09-13 ENCOUNTER — COMMUNICATION - HEALTHEAST (OUTPATIENT)
Dept: ONCOLOGY | Facility: HOSPITAL | Age: 55
End: 2020-09-13

## 2020-09-14 ENCOUNTER — OFFICE VISIT - HEALTHEAST (OUTPATIENT)
Dept: ONCOLOGY | Facility: HOSPITAL | Age: 55
End: 2020-09-14

## 2020-09-14 ENCOUNTER — COMMUNICATION - HEALTHEAST (OUTPATIENT)
Dept: ONCOLOGY | Facility: HOSPITAL | Age: 55
End: 2020-09-14

## 2020-09-14 DIAGNOSIS — C83.15 MANTLE CELL LYMPHOMA OF LYMPH NODES OF INGUINAL REGION (H): ICD-10-CM

## 2020-09-25 ENCOUNTER — COMMUNICATION - HEALTHEAST (OUTPATIENT)
Dept: ONCOLOGY | Facility: HOSPITAL | Age: 55
End: 2020-09-25

## 2020-10-05 PROBLEM — R74.01 NONSPECIFIC ELEVATION OF LEVELS OF TRANSAMINASE AND LACTIC ACID DEHYDROGENASE (LDH): Status: ACTIVE | Noted: 2017-06-19

## 2020-10-05 PROBLEM — R74.02 NONSPECIFIC ELEVATION OF LEVELS OF TRANSAMINASE AND LACTIC ACID DEHYDROGENASE (LDH): Status: ACTIVE | Noted: 2017-06-19

## 2020-10-13 DIAGNOSIS — E03.9 HYPOTHYROIDISM, UNSPECIFIED TYPE: ICD-10-CM

## 2020-10-13 DIAGNOSIS — C83.18 LYMPHOMA, MANTLE CELL, MULTIPLE SITES (H): ICD-10-CM

## 2020-10-13 NOTE — TELEPHONE ENCOUNTER
"Requested Prescriptions   Pending Prescriptions Disp Refills     acyclovir (ZOVIRAX) 400 MG tablet [Pharmacy Med Name: Acyclovir 400 MG Oral Tablet] 60 tablet 0     Sig: Take 2 tablets by mouth once daily       Antivirals for Herpes Protocol Failed - 10/13/2020  1:56 PM        Failed - Recent (12 mo) or future (30 days) visit within the authorizing provider's specialty     Patient has had an office visit with the authorizing provider or a provider within the authorizing providers department within the previous 12 mos or has a future within next 30 days. See \"Patient Info\" tab in inbasket, or \"Choose Columns\" in Meds & Orders section of the refill encounter.              Failed - Normal serum creatinine on file in past 12 months     Recent Labs   Lab Test 08/28/18  1340   CR 1.92*       Ok to refill medication if creatinine is low          Passed - Patient is age 12 or older        Passed - Medication is active on med list             "

## 2020-10-13 NOTE — TELEPHONE ENCOUNTER
"Requested Prescriptions   Pending Prescriptions Disp Refills     levothyroxine (SYNTHROID/LEVOTHROID) 100 MCG tablet [Pharmacy Med Name: Levothyroxine Sodium 100 MCG Oral Tablet] 90 tablet 0     Sig: Take 1 tablet by mouth once daily       Thyroid Protocol Failed - 10/13/2020  1:33 PM        Failed - Recent (12 mo) or future (30 days) visit within the authorizing provider's specialty     Patient has had an office visit with the authorizing provider or a provider within the authorizing providers department within the previous 12 mos or has a future within next 30 days. See \"Patient Info\" tab in inbasket, or \"Choose Columns\" in Meds & Orders section of the refill encounter.              Failed - Normal TSH on file in past 12 months     Recent Labs   Lab Test 08/28/18  1340   TSH 3.08              Passed - Patient is 12 years or older        Passed - Medication is active on med list        Passed - No active pregnancy on record     If patient is pregnant or has had a positive pregnancy test, please check TSH.          Passed - No positive pregnancy test in past 12 months     If patient is pregnant or has had a positive pregnancy test, please check TSH.               "

## 2020-10-15 ENCOUNTER — INFUSION - HEALTHEAST (OUTPATIENT)
Dept: INFUSION THERAPY | Facility: HOSPITAL | Age: 55
End: 2020-10-15

## 2020-10-15 DIAGNOSIS — D80.1 HYPOGAMMAGLOBULINEMIA, ACQUIRED (H): ICD-10-CM

## 2020-10-15 DIAGNOSIS — B99.9 CHRONIC INFECTION: ICD-10-CM

## 2020-10-15 RX ORDER — ACYCLOVIR 400 MG/1
TABLET ORAL
Qty: 60 TABLET | Refills: 0 | Status: SHIPPED | OUTPATIENT
Start: 2020-10-15 | End: 2020-12-18

## 2020-10-15 RX ORDER — LEVOTHYROXINE SODIUM 100 UG/1
TABLET ORAL
Qty: 90 TABLET | Refills: 1 | Status: SHIPPED | OUTPATIENT
Start: 2020-10-15 | End: 2021-04-12

## 2020-10-15 NOTE — TELEPHONE ENCOUNTER
TSH from 3/26/2020 1.01 from [facility].  Stable, refill  Thank you,  Sunil Tan MD

## 2020-10-23 ENCOUNTER — AMBULATORY - HEALTHEAST (OUTPATIENT)
Dept: ONCOLOGY | Facility: HOSPITAL | Age: 55
End: 2020-10-23

## 2020-10-23 ENCOUNTER — TRANSFERRED RECORDS (OUTPATIENT)
Dept: HEALTH INFORMATION MANAGEMENT | Facility: CLINIC | Age: 55
End: 2020-10-23

## 2020-11-12 ENCOUNTER — OFFICE VISIT - HEALTHEAST (OUTPATIENT)
Dept: ONCOLOGY | Facility: HOSPITAL | Age: 55
End: 2020-11-12

## 2020-11-12 ENCOUNTER — AMBULATORY - HEALTHEAST (OUTPATIENT)
Dept: INFUSION THERAPY | Facility: HOSPITAL | Age: 55
End: 2020-11-12

## 2020-11-12 ENCOUNTER — AMBULATORY - HEALTHEAST (OUTPATIENT)
Dept: ONCOLOGY | Facility: HOSPITAL | Age: 55
End: 2020-11-12

## 2020-11-12 ENCOUNTER — INFUSION - HEALTHEAST (OUTPATIENT)
Dept: INFUSION THERAPY | Facility: HOSPITAL | Age: 55
End: 2020-11-12

## 2020-11-12 ENCOUNTER — HOSPITAL ENCOUNTER (OUTPATIENT)
Dept: CT IMAGING | Facility: HOSPITAL | Age: 55
Setting detail: RADIATION/ONCOLOGY SERIES
Discharge: STILL ADMITTED OR ADMINISTRATIVE | End: 2020-11-12
Attending: INTERNAL MEDICINE

## 2020-11-12 DIAGNOSIS — E07.9 DISORDER OF THYROID, UNSPECIFIED: ICD-10-CM

## 2020-11-12 DIAGNOSIS — C83.15 MANTLE CELL LYMPHOMA OF LYMPH NODES OF INGUINAL REGION (H): ICD-10-CM

## 2020-11-12 DIAGNOSIS — D80.1 HYPOGAMMAGLOBULINEMIA, ACQUIRED (H): ICD-10-CM

## 2020-11-12 DIAGNOSIS — C83.10 MANTLE CELL LYMPHOMA, UNSPECIFIED BODY REGION (H): ICD-10-CM

## 2020-11-12 DIAGNOSIS — C83.13 MANTLE CELL LYMPHOMA OF INTRA-ABDOMINAL LYMPH NODES (H): ICD-10-CM

## 2020-11-12 DIAGNOSIS — B99.9 CHRONIC INFECTION: ICD-10-CM

## 2020-11-12 LAB
ALBUMIN SERPL-MCNC: 3.6 G/DL (ref 3.5–5)
ALP SERPL-CCNC: 117 U/L (ref 45–120)
ALT SERPL W P-5'-P-CCNC: 18 U/L (ref 0–45)
ANION GAP SERPL CALCULATED.3IONS-SCNC: 11 MMOL/L (ref 5–18)
AST SERPL W P-5'-P-CCNC: 21 U/L (ref 0–40)
BASOPHILS # BLD AUTO: 0 THOU/UL (ref 0–0.2)
BASOPHILS NFR BLD AUTO: 0 % (ref 0–2)
BILIRUB SERPL-MCNC: 0.6 MG/DL (ref 0–1)
BUN SERPL-MCNC: 28 MG/DL (ref 8–22)
CALCIUM SERPL-MCNC: 8.6 MG/DL (ref 8.5–10.5)
CHLORIDE BLD-SCNC: 106 MMOL/L (ref 98–107)
CO2 SERPL-SCNC: 24 MMOL/L (ref 22–31)
CREAT SERPL-MCNC: 2.23 MG/DL (ref 0.6–1.1)
EOSINOPHIL # BLD AUTO: 0.1 THOU/UL (ref 0–0.4)
EOSINOPHIL NFR BLD AUTO: 2 % (ref 0–6)
ERYTHROCYTE [DISTWIDTH] IN BLOOD BY AUTOMATED COUNT: 14.6 % (ref 11–14.5)
GFR SERPL CREATININE-BSD FRML MDRD: 23 ML/MIN/1.73M2
GLUCOSE BLD-MCNC: 133 MG/DL (ref 70–125)
HCT VFR BLD AUTO: 45 % (ref 35–47)
HGB BLD-MCNC: 14.4 G/DL (ref 12–16)
IGA SERPL-MCNC: 675 MG/DL
IGA SERPL-MCNC: 9 MG/DL (ref 65–400)
IGM SERPL-MCNC: <5 MG/DL (ref 60–280)
IMM GRANULOCYTES # BLD: 0 THOU/UL
IMM GRANULOCYTES NFR BLD: 0 %
LDH SERPL L TO P-CCNC: 234 U/L (ref 125–220)
LYMPHOCYTES # BLD AUTO: 1 THOU/UL (ref 0.8–4.4)
LYMPHOCYTES NFR BLD AUTO: 22 % (ref 20–40)
MCH RBC QN AUTO: 29.3 PG (ref 27–34)
MCHC RBC AUTO-ENTMCNC: 32 G/DL (ref 32–36)
MCV RBC AUTO: 92 FL (ref 80–100)
MONOCYTES # BLD AUTO: 0.4 THOU/UL (ref 0–0.9)
MONOCYTES NFR BLD AUTO: 8 % (ref 2–10)
NEUTROPHILS # BLD AUTO: 3.2 THOU/UL (ref 2–7.7)
NEUTROPHILS NFR BLD AUTO: 68 % (ref 50–70)
PLATELET # BLD AUTO: 156 THOU/UL (ref 140–440)
PMV BLD AUTO: 9.8 FL (ref 8.5–12.5)
POTASSIUM BLD-SCNC: 4.1 MMOL/L (ref 3.5–5)
PROT SERPL-MCNC: 6.7 G/DL (ref 6–8)
RBC # BLD AUTO: 4.92 MILL/UL (ref 3.8–5.4)
SODIUM SERPL-SCNC: 141 MMOL/L (ref 136–145)
TSH SERPL DL<=0.005 MIU/L-ACNC: 1.29 UIU/ML (ref 0.3–5)
WBC: 4.8 THOU/UL (ref 4–11)

## 2020-12-10 ENCOUNTER — INFUSION - HEALTHEAST (OUTPATIENT)
Dept: INFUSION THERAPY | Facility: HOSPITAL | Age: 55
End: 2020-12-10

## 2020-12-10 DIAGNOSIS — B99.9 CHRONIC INFECTION: ICD-10-CM

## 2020-12-10 DIAGNOSIS — D80.1 HYPOGAMMAGLOBULINEMIA, ACQUIRED (H): ICD-10-CM

## 2020-12-14 ENCOUNTER — HEALTH MAINTENANCE LETTER (OUTPATIENT)
Age: 55
End: 2020-12-14

## 2020-12-15 DIAGNOSIS — C83.18 LYMPHOMA, MANTLE CELL, MULTIPLE SITES (H): ICD-10-CM

## 2020-12-15 NOTE — TELEPHONE ENCOUNTER
"Requested Prescriptions   Pending Prescriptions Disp Refills     acyclovir (ZOVIRAX) 400 MG tablet [Pharmacy Med Name: Acyclovir 400 MG Oral Tablet] 60 tablet 0     Sig: Take 2 tablets by mouth once daily       Antivirals for Herpes Protocol Failed - 12/15/2020  2:31 AM        Failed - Recent (12 mo) or future (30 days) visit within the authorizing provider's specialty     Patient has had an office visit with the authorizing provider or a provider within the authorizing providers department within the previous 12 mos or has a future within next 30 days. See \"Patient Info\" tab in inbasket, or \"Choose Columns\" in Meds & Orders section of the refill encounter.              Failed - Normal serum creatinine on file in past 12 months     Recent Labs   Lab Test 08/28/18  1340   CR 1.92*       Ok to refill medication if creatinine is low          Passed - Patient is age 12 or older        Passed - Medication is active on med list             "

## 2020-12-18 RX ORDER — ACYCLOVIR 400 MG/1
TABLET ORAL
Qty: 60 TABLET | Refills: 0 | Status: SHIPPED | OUTPATIENT
Start: 2020-12-18 | End: 2021-03-04

## 2020-12-28 ENCOUNTER — MYC MEDICAL ADVICE (OUTPATIENT)
Dept: FAMILY MEDICINE | Facility: CLINIC | Age: 55
End: 2020-12-28

## 2020-12-28 ENCOUNTER — COMMUNICATION - HEALTHEAST (OUTPATIENT)
Dept: ONCOLOGY | Facility: HOSPITAL | Age: 55
End: 2020-12-28

## 2020-12-28 NOTE — TELEPHONE ENCOUNTER
Reason for Call:  Other call back    Detailed comments: patient is not eligible for  A virtual visit as she does not livie in the state of [place] and drs license does not cover [place] please advise patient on next steps. Thank you     Phone Number Patient can be reached at: Cell number on file:    Telephone Information:   Mobile [phone]       Best Time: any    Can we leave a detailed message on this number? YES    Call taken on 12/28/2020 at 1:11 PM by Silva Ghotra

## 2020-12-29 ENCOUNTER — OFFICE VISIT (OUTPATIENT)
Dept: FAMILY MEDICINE | Facility: CLINIC | Age: 55
End: 2020-12-29
Payer: COMMERCIAL

## 2020-12-29 ENCOUNTER — INFUSION THERAPY VISIT (OUTPATIENT)
Dept: INFUSION THERAPY | Facility: CLINIC | Age: 55
End: 2020-12-29
Attending: FAMILY MEDICINE
Payer: COMMERCIAL

## 2020-12-29 VITALS — HEART RATE: 107 BPM | OXYGEN SATURATION: 94 % | TEMPERATURE: 100.4 F

## 2020-12-29 DIAGNOSIS — R50.9 FEVER, UNSPECIFIED FEVER CAUSE: ICD-10-CM

## 2020-12-29 DIAGNOSIS — C85.90 NHL (NON-HODGKIN'S LYMPHOMA) (H): Primary | ICD-10-CM

## 2020-12-29 DIAGNOSIS — R05.9 COUGH: ICD-10-CM

## 2020-12-29 DIAGNOSIS — R06.02 SHORTNESS OF BREATH: ICD-10-CM

## 2020-12-29 DIAGNOSIS — Z20.822 SUSPECTED COVID-19 VIRUS INFECTION: ICD-10-CM

## 2020-12-29 DIAGNOSIS — R19.7 DIARRHEA, UNSPECIFIED TYPE: ICD-10-CM

## 2020-12-29 DIAGNOSIS — C83.18 LYMPHOMA, MANTLE CELL, MULTIPLE SITES (H): Primary | ICD-10-CM

## 2020-12-29 LAB
ALBUMIN SERPL-MCNC: 3 G/DL (ref 3.4–5)
ALP SERPL-CCNC: 94 U/L (ref 40–150)
ALT SERPL W P-5'-P-CCNC: 18 U/L (ref 0–50)
ANION GAP SERPL CALCULATED.3IONS-SCNC: 3 MMOL/L (ref 3–14)
AST SERPL W P-5'-P-CCNC: 26 U/L (ref 0–45)
BASOPHILS # BLD AUTO: 0 10E9/L (ref 0–0.2)
BASOPHILS NFR BLD AUTO: 0.4 %
BILIRUB SERPL-MCNC: 0.4 MG/DL (ref 0.2–1.3)
BUN SERPL-MCNC: 19 MG/DL (ref 7–30)
C DIFF TOX B STL QL: NEGATIVE
CALCIUM SERPL-MCNC: 8.5 MG/DL (ref 8.5–10.1)
CHLORIDE SERPL-SCNC: 104 MMOL/L (ref 94–109)
CO2 SERPL-SCNC: 27 MMOL/L (ref 20–32)
CREAT SERPL-MCNC: 2.27 MG/DL (ref 0.52–1.04)
DIFFERENTIAL METHOD BLD: ABNORMAL
EOSINOPHIL # BLD AUTO: 0 10E9/L (ref 0–0.7)
EOSINOPHIL NFR BLD AUTO: 0 %
ERYTHROCYTE [DISTWIDTH] IN BLOOD BY AUTOMATED COUNT: 14.4 % (ref 10–15)
GFR SERPL CREATININE-BSD FRML MDRD: 23 ML/MIN/{1.73_M2}
GLUCOSE SERPL-MCNC: 96 MG/DL (ref 70–99)
HCT VFR BLD AUTO: 45.4 % (ref 35–47)
HGB BLD-MCNC: 14.6 G/DL (ref 11.7–15.7)
IMM GRANULOCYTES # BLD: 0.1 10E9/L (ref 0–0.4)
IMM GRANULOCYTES NFR BLD: 1.4 %
LYMPHOCYTES # BLD AUTO: 1.1 10E9/L (ref 0.8–5.3)
LYMPHOCYTES NFR BLD AUTO: 22.7 %
MCH RBC QN AUTO: 28 PG (ref 26.5–33)
MCHC RBC AUTO-ENTMCNC: 32.2 G/DL (ref 31.5–36.5)
MCV RBC AUTO: 87 FL (ref 78–100)
MONOCYTES # BLD AUTO: 0.4 10E9/L (ref 0–1.3)
MONOCYTES NFR BLD AUTO: 7.3 %
NEUTROPHILS # BLD AUTO: 3.3 10E9/L (ref 1.6–8.3)
NEUTROPHILS NFR BLD AUTO: 68.2 %
NRBC # BLD AUTO: 0 10*3/UL
NRBC BLD AUTO-RTO: 0 /100
PLATELET # BLD AUTO: 159 10E9/L (ref 150–450)
POTASSIUM SERPL-SCNC: 3.7 MMOL/L (ref 3.4–5.3)
PROT SERPL-MCNC: 7 G/DL (ref 6.8–8.8)
RBC # BLD AUTO: 5.21 10E12/L (ref 3.8–5.2)
SODIUM SERPL-SCNC: 134 MMOL/L (ref 133–144)
SPECIMEN SOURCE: NORMAL
WBC # BLD AUTO: 4.9 10E9/L (ref 4–11)

## 2020-12-29 PROCEDURE — 87493 C DIFF AMPLIFIED PROBE: CPT | Performed by: FAMILY MEDICINE

## 2020-12-29 PROCEDURE — U0003 INFECTIOUS AGENT DETECTION BY NUCLEIC ACID (DNA OR RNA); SEVERE ACUTE RESPIRATORY SYNDROME CORONAVIRUS 2 (SARS-COV-2) (CORONAVIRUS DISEASE [COVID-19]), AMPLIFIED PROBE TECHNIQUE, MAKING USE OF HIGH THROUGHPUT TECHNOLOGIES AS DESCRIBED BY CMS-2020-01-R: HCPCS | Performed by: FAMILY MEDICINE

## 2020-12-29 PROCEDURE — 250N000011 HC RX IP 250 OP 636

## 2020-12-29 PROCEDURE — 85025 COMPLETE CBC W/AUTO DIFF WBC: CPT | Performed by: FAMILY MEDICINE

## 2020-12-29 PROCEDURE — 36591 DRAW BLOOD OFF VENOUS DEVICE: CPT

## 2020-12-29 PROCEDURE — 80053 COMPREHEN METABOLIC PANEL: CPT | Performed by: FAMILY MEDICINE

## 2020-12-29 PROCEDURE — 36415 COLL VENOUS BLD VENIPUNCTURE: CPT | Performed by: FAMILY MEDICINE

## 2020-12-29 PROCEDURE — 99214 OFFICE O/P EST MOD 30 MIN: CPT | Mod: CS | Performed by: FAMILY MEDICINE

## 2020-12-29 RX ORDER — HEPARIN SODIUM (PORCINE) LOCK FLUSH IV SOLN 100 UNIT/ML 100 UNIT/ML
SOLUTION INTRAVENOUS
Status: COMPLETED
Start: 2020-12-29 | End: 2020-12-29

## 2020-12-29 RX ADMIN — Medication 500 UNITS: at 12:36

## 2020-12-29 NOTE — PATIENT INSTRUCTIONS
"Power port access 1 inch needle for draw and heparin.    I'll MyChart with results and next steps.  If you  Illness worsens, ok to go to ER.    Instructions for Patients  After Your COVID-19 (Coronavirus) Test  You have been tested for COVID-19 (coronavirus).   If you'll have surgery in the next few days, we'll let you know ahead of time if you have the virus. Please call your surgeon's office with any questions.  For all other patients: Results are usually available in [employer] within 2 to 3 days.   If you do not have a [employer] account, you'll get a letter in the mail in about 7 to 10 days.   [employer] is often the fastest way to get test results. Please sign up if you do not already have a [employer] account. See the handout Getting COVID-19 Test Results in [employer] for help.  What if my test result is positive?  If your test is positive and you have not viewed your result in [employer], you'll get a phone call with your result. (A positive test means that you have the virus.)     Follow the tips under \"How do I self-isolate?\" below for 10 days (20 days if you have a weak immune system).    You don't need to be retested for COVID-19 before going back to school or work. As long as you're fever-free and feeling better, you can go back to school, work and other activities after waiting the 10 or 20 days.  What if I have questions after I get my results?  If you have questions about your results, please visit our testing website at [email or web address].   After 7 to 10 days, if you have not gotten your results:     Call [phone] ([phone]) and ask to speak with our COVID-19 results team.    If you're being treated at an infusion center: Call your infusion center directly.  What are the symptoms of COVID-19?  Cough, fever and trouble breathing are the most common signs of COVID-19.  Other symptoms can include new headaches, new muscle or body aches, new and unexplained fatigue (feeling " "very tired), chills, sore throat, congestion (stuffy or runny nose), diarrhea (loose poop), loss of taste or smell, belly pain, and nausea or vomiting (feeling sick to your stomach or throwing up).  You may already have symptoms of COVID-19, or they may show up later.  What should I do if I have symptoms?  If you're having surgery: Call your surgeon's office.  For all other patients: Stay home and away from others (self-isolate) until ...    You've had no fever--and no medicine that reduces fever--for 1 full day (24 hours), AND    Other symptoms have gotten better. For example, your cough or breathing has improved, AND    At least 10 days have passed since your symptoms first started.  How do I self-isolate?    Stay in your own room, even for meals. Use your own bathroom if you can.    Stay away from others in your home. No hugging, kissing or shaking hands. No visitors.    Don't go to work, school or anywhere else.    Clean \"high touch\" surfaces often (doorknobs, counters, handles). Use household cleaning spray or wipes. You'll find a full list of  on the EPA website: www.epa.gov/pesticide-registration/list-n-disinfectants-use-against-sars-cov-2.    Cover your mouth and nose with a mask or other face covering to avoid spreading germs.    Wash your hands and face often. Use soap and water.    Caregivers in these groups are at risk for severe illness due to COVID-19:  ? People 65 years and older  ? People who live in a nursing home or long-term care facility  ? People with chronic disease (lung, heart, cancer, diabetes, kidney, liver, immunologic)  ? People who have a weakened immune system, including those who:    Are in cancer treatment    Take medicine that weakens the immune system, such as corticosteroids    Had a bone marrow or organ transplant    Have an immune deficiency    Have poorly controlled HIV or AIDS    Are obese (body mass index of 40 or higher)    Smoke regularly    Caregivers should wear " gloves while washing dishes, handling laundry and cleaning bedrooms and bathrooms.    Use caution when washing and drying laundry: Don't shake dirty laundry and use the warmest water setting that you can.    For more tips on managing your health at home, go to www.cdc.gov/coronavirus/2019-ncov/downloads/10Things.pdf.  How can I take care of myself at home?  1. Get lots of rest. Drink extra fluids (unless a doctor has told you not to).  2. Take Tylenol (acetaminophen) for fever or pain. If you have liver or kidney problems, ask your family doctor if it's OK to take Tylenol.   Adults can take either:  ? 650 mg (two 325 mg pills) every 4 to 6 hours, or   ? 1,000 mg (two 500 mg pills) every 8 hours as needed.  ? Note: Don't take more than 3,000 mg in one day. Acetaminophen is found in many medicines (both prescribed and over-the-counter medicines). Read all labels to be sure you don't take too much.   For children, check the Tylenol bottle for the right dose. The dose is based on the child's age or weight.  3. If you have other health problems (like cancer, heart failure, an organ transplant or severe kidney disease): Call your specialty clinic if you don't feel better in the next 2 days.  4. Know when to call 911. Emergency warning signs include:  ? Trouble breathing or shortness of breath  ? Chest pain or pressure that doesn't go away  ? Feeling confused like you haven't felt before, or not being able to wake up  ? Bluish-colored lips or face  5. If your doctor prescribed a blood thinner medicine: Follow their instructions.  Where can I get more information?     [employer] [place] - About COVID-19:   [email or web address]    CDC - If You're Sick: cdc.gov/coronavirus/2019-ncov/about/steps-when-sick.html    CDC - Ending Home Isolation: www.cdc.gov/coronavirus/2019-ncov/hcp/disposition-in-home-patients.html    CDC - Caring for Someone: www.cdc.gov/coronavirus/2019-ncov/if-you-are-sick/care-for-someone.html    AYUSH  - Interim Guidance for Hospital Discharge to Home: www.health.[facility].[place]/diseases/coronavirus/hcp/hospdischarge.pdf    [facility] clinical trials (COVID-19 research studies): [email or web address]    Below are the COVID-19 hotlines at the [place] Department of Health ([facility]). Interpreters are available.  ? For health questions: Call 638-703-3476 or [phone] (7 a.m. to 7 p.m.)  ? For questions about schools and childcare: Call 811-517-4651 or 1-785.980.4419 (7 a.m. to 7 p.m.)    For informational purposes only. Not to replace the advice of your health care provider. Clinically reviewed by Infection Prevention and the [facility] COVID-19 Clinical Team. Copyright   2020 [facility]. All rights reserved. [employer] 891901 - Rev 11/11/20.      Thank you for limiting contact with others, wearing a simple mask to cover your cough, practice good hand hygiene habits and accessing our virtual services where possible to limit the spread of this virus.    For more information about COVID19 and options for caring for yourself at home, please visit the CDC website at https://www.cdc.gov/coronavirus/2019-ncov/about/steps-when-sick.html  For more options for care at [facility], please visit our website at [email or web address]

## 2020-12-30 LAB
LABORATORY COMMENT REPORT: NORMAL
SARS-COV-2 RNA SPEC QL NAA+PROBE: NEGATIVE
SARS-COV-2 RNA SPEC QL NAA+PROBE: NORMAL
SPECIMEN SOURCE: NORMAL
SPECIMEN SOURCE: NORMAL

## 2021-01-07 ENCOUNTER — INFUSION - HEALTHEAST (OUTPATIENT)
Dept: INFUSION THERAPY | Facility: HOSPITAL | Age: 56
End: 2021-01-07

## 2021-01-07 DIAGNOSIS — D80.1 HYPOGAMMAGLOBULINEMIA, ACQUIRED (H): ICD-10-CM

## 2021-01-07 DIAGNOSIS — B99.9 CHRONIC INFECTION: ICD-10-CM

## 2021-01-26 ENCOUNTER — MYC MEDICAL ADVICE (OUTPATIENT)
Dept: FAMILY MEDICINE | Facility: CLINIC | Age: 56
End: 2021-01-26

## 2021-01-26 ENCOUNTER — COMMUNICATION - HEALTHEAST (OUTPATIENT)
Dept: ONCOLOGY | Facility: HOSPITAL | Age: 56
End: 2021-01-26

## 2021-02-04 ENCOUNTER — OFFICE VISIT - HEALTHEAST (OUTPATIENT)
Dept: ONCOLOGY | Facility: HOSPITAL | Age: 56
End: 2021-02-04

## 2021-02-04 ENCOUNTER — HOSPITAL ENCOUNTER (OUTPATIENT)
Dept: CT IMAGING | Facility: HOSPITAL | Age: 56
Setting detail: RADIATION/ONCOLOGY SERIES
Discharge: STILL ADMITTED OR ADMINISTRATIVE | End: 2021-02-04
Attending: INTERNAL MEDICINE

## 2021-02-04 ENCOUNTER — INFUSION - HEALTHEAST (OUTPATIENT)
Dept: INFUSION THERAPY | Facility: HOSPITAL | Age: 56
End: 2021-02-04

## 2021-02-04 ENCOUNTER — AMBULATORY - HEALTHEAST (OUTPATIENT)
Dept: INFUSION THERAPY | Facility: HOSPITAL | Age: 56
End: 2021-02-04

## 2021-02-04 DIAGNOSIS — C83.10 MANTLE CELL LYMPHOMA, UNSPECIFIED BODY REGION (H): ICD-10-CM

## 2021-02-04 DIAGNOSIS — C83.15 MANTLE CELL LYMPHOMA OF LYMPH NODES OF INGUINAL REGION (H): ICD-10-CM

## 2021-02-04 LAB
ALBUMIN SERPL-MCNC: 3.4 G/DL (ref 3.5–5)
ALP SERPL-CCNC: 94 U/L (ref 45–120)
ALT SERPL W P-5'-P-CCNC: 17 U/L (ref 0–45)
ANION GAP SERPL CALCULATED.3IONS-SCNC: 8 MMOL/L (ref 5–18)
AST SERPL W P-5'-P-CCNC: 19 U/L (ref 0–40)
BASOPHILS # BLD AUTO: 0 THOU/UL (ref 0–0.2)
BASOPHILS NFR BLD AUTO: 1 % (ref 0–2)
BILIRUB SERPL-MCNC: 0.5 MG/DL (ref 0–1)
BUN SERPL-MCNC: 21 MG/DL (ref 8–22)
CALCIUM SERPL-MCNC: 8.6 MG/DL (ref 8.5–10.5)
CHLORIDE BLD-SCNC: 108 MMOL/L (ref 98–107)
CO2 SERPL-SCNC: 25 MMOL/L (ref 22–31)
CREAT SERPL-MCNC: 2.07 MG/DL (ref 0.6–1.1)
EOSINOPHIL # BLD AUTO: 0.1 THOU/UL (ref 0–0.4)
EOSINOPHIL NFR BLD AUTO: 2 % (ref 0–6)
ERYTHROCYTE [DISTWIDTH] IN BLOOD BY AUTOMATED COUNT: 16.6 % (ref 11–14.5)
GFR SERPL CREATININE-BSD FRML MDRD: 25 ML/MIN/1.73M2
GLUCOSE BLD-MCNC: 93 MG/DL (ref 70–125)
HCT VFR BLD AUTO: 41.7 % (ref 35–47)
HGB BLD-MCNC: 13.3 G/DL (ref 12–16)
IGA SERPL-MCNC: 552 MG/DL
IGA SERPL-MCNC: 6 MG/DL (ref 65–400)
IGM SERPL-MCNC: <5 MG/DL (ref 60–280)
IMM GRANULOCYTES # BLD: 0 THOU/UL
IMM GRANULOCYTES NFR BLD: 0 %
LDH SERPL L TO P-CCNC: 152 U/L (ref 125–220)
LYMPHOCYTES # BLD AUTO: 1.3 THOU/UL (ref 0.8–4.4)
LYMPHOCYTES NFR BLD AUTO: 24 % (ref 20–40)
MCH RBC QN AUTO: 29 PG (ref 27–34)
MCHC RBC AUTO-ENTMCNC: 31.9 G/DL (ref 32–36)
MCV RBC AUTO: 91 FL (ref 80–100)
MONOCYTES # BLD AUTO: 0.4 THOU/UL (ref 0–0.9)
MONOCYTES NFR BLD AUTO: 7 % (ref 2–10)
NEUTROPHILS # BLD AUTO: 3.7 THOU/UL (ref 2–7.7)
NEUTROPHILS NFR BLD AUTO: 67 % (ref 50–70)
PLATELET # BLD AUTO: 178 THOU/UL (ref 140–440)
PMV BLD AUTO: 10 FL (ref 8.5–12.5)
POTASSIUM BLD-SCNC: 3.9 MMOL/L (ref 3.5–5)
PROT SERPL-MCNC: 6.1 G/DL (ref 6–8)
RBC # BLD AUTO: 4.59 MILL/UL (ref 3.8–5.4)
SODIUM SERPL-SCNC: 141 MMOL/L (ref 136–145)
WBC: 5.6 THOU/UL (ref 4–11)

## 2021-02-17 ENCOUNTER — INFUSION - HEALTHEAST (OUTPATIENT)
Dept: INFUSION THERAPY | Facility: HOSPITAL | Age: 56
End: 2021-02-17

## 2021-02-17 DIAGNOSIS — B99.9 CHRONIC INFECTION: ICD-10-CM

## 2021-02-17 DIAGNOSIS — D80.1 HYPOGAMMAGLOBULINEMIA, ACQUIRED (H): ICD-10-CM

## 2021-02-28 DIAGNOSIS — C83.18 LYMPHOMA, MANTLE CELL, MULTIPLE SITES (H): ICD-10-CM

## 2021-03-01 NOTE — TELEPHONE ENCOUNTER
"Requested Prescriptions   Pending Prescriptions Disp Refills     acyclovir (ZOVIRAX) 400 MG tablet [Pharmacy Med Name: Acyclovir 400 MG Oral Tablet] 60 tablet 0     Sig: Take 2 tablets by mouth once daily       Antivirals for Herpes Protocol Failed - 2/28/2021  9:12 AM        Failed - Normal serum creatinine on file in past 12 months     Recent Labs   Lab Test 12/29/20  1230   CR 2.27*       Ok to refill medication if creatinine is low          Passed - Patient is age 12 or older        Passed - Recent (12 mo) or future (30 days) visit within the authorizing provider's specialty     Patient has had an office visit with the authorizing provider or a provider within the authorizing providers department within the previous 12 mos or has a future within next 30 days. See \"Patient Info\" tab in inbasket, or \"Choose Columns\" in Meds & Orders section of the refill encounter.              Passed - Medication is active on med list             "

## 2021-03-02 NOTE — TELEPHONE ENCOUNTER
Routing refill request to provider for review/approval because:  Patient needs to be seen because it has been more than 1 year since last office visit.    Amy Holm RN

## 2021-03-04 RX ORDER — ACYCLOVIR 400 MG/1
TABLET ORAL
Qty: 60 TABLET | Refills: 0 | Status: SHIPPED | OUTPATIENT
Start: 2021-03-04 | End: 2021-04-12

## 2021-03-17 ENCOUNTER — INFUSION - HEALTHEAST (OUTPATIENT)
Dept: INFUSION THERAPY | Facility: HOSPITAL | Age: 56
End: 2021-03-17

## 2021-03-17 DIAGNOSIS — D80.1 HYPOGAMMAGLOBULINEMIA, ACQUIRED (H): ICD-10-CM

## 2021-03-17 DIAGNOSIS — B99.9 CHRONIC INFECTION: ICD-10-CM

## 2021-03-17 ASSESSMENT — MIFFLIN-ST. JEOR: SCORE: 1944.41

## 2021-04-12 DIAGNOSIS — C83.18 LYMPHOMA, MANTLE CELL, MULTIPLE SITES (H): ICD-10-CM

## 2021-04-12 DIAGNOSIS — E03.9 HYPOTHYROIDISM, UNSPECIFIED TYPE: ICD-10-CM

## 2021-04-12 RX ORDER — LEVOTHYROXINE SODIUM 100 UG/1
100 TABLET ORAL DAILY
Qty: 90 TABLET | Refills: 2 | Status: SHIPPED | OUTPATIENT
Start: 2021-04-12 | End: 2021-04-15

## 2021-04-12 RX ORDER — ACYCLOVIR 400 MG/1
400 TABLET ORAL 2 TIMES DAILY
Qty: 180 TABLET | Refills: 3 | Status: SHIPPED | OUTPATIENT
Start: 2021-04-12 | End: 2022-07-18

## 2021-04-14 ENCOUNTER — INFUSION - HEALTHEAST (OUTPATIENT)
Dept: INFUSION THERAPY | Facility: HOSPITAL | Age: 56
End: 2021-04-14

## 2021-04-14 DIAGNOSIS — B99.9 CHRONIC INFECTION: ICD-10-CM

## 2021-04-14 DIAGNOSIS — D80.1 HYPOGAMMAGLOBULINEMIA, ACQUIRED (H): ICD-10-CM

## 2021-04-14 DIAGNOSIS — E03.9 HYPOTHYROIDISM, UNSPECIFIED TYPE: ICD-10-CM

## 2021-04-15 RX ORDER — LEVOTHYROXINE SODIUM 100 UG/1
TABLET ORAL
Qty: 90 TABLET | Refills: 1 | Status: SHIPPED | OUTPATIENT
Start: 2021-04-15 | End: 2021-11-26

## 2021-04-15 NOTE — TELEPHONE ENCOUNTER
Routing refill request to provider for review/approval because:  Labs not current: Last TSH was 1.29 on 11/12/20.  Last seen 12/29/20.  Refill given.  KpavelRn

## 2021-04-17 ENCOUNTER — HEALTH MAINTENANCE LETTER (OUTPATIENT)
Age: 56
End: 2021-04-17

## 2021-05-13 ENCOUNTER — OFFICE VISIT - HEALTHEAST (OUTPATIENT)
Dept: ONCOLOGY | Facility: HOSPITAL | Age: 56
End: 2021-05-13

## 2021-05-13 ENCOUNTER — INFUSION - HEALTHEAST (OUTPATIENT)
Dept: INFUSION THERAPY | Facility: HOSPITAL | Age: 56
End: 2021-05-13

## 2021-05-13 ENCOUNTER — HOSPITAL ENCOUNTER (OUTPATIENT)
Dept: CT IMAGING | Facility: HOSPITAL | Age: 56
Setting detail: RADIATION/ONCOLOGY SERIES
Discharge: STILL ADMITTED OR ADMINISTRATIVE | End: 2021-05-13
Attending: INTERNAL MEDICINE
Payer: COMMERCIAL

## 2021-05-13 DIAGNOSIS — C83.18 MANTLE CELL LYMPHOMA OF LYMPH NODES OF MULTIPLE REGIONS (H): ICD-10-CM

## 2021-05-13 DIAGNOSIS — D80.1 HYPOGAMMAGLOBULINEMIA, ACQUIRED (H): ICD-10-CM

## 2021-05-13 DIAGNOSIS — C83.15 MANTLE CELL LYMPHOMA OF LYMPH NODES OF INGUINAL REGION (H): ICD-10-CM

## 2021-05-13 DIAGNOSIS — B99.9 CHRONIC INFECTION: ICD-10-CM

## 2021-05-13 LAB
ALBUMIN SERPL-MCNC: 3.5 G/DL (ref 3.5–5)
ALP SERPL-CCNC: 109 U/L (ref 45–120)
ALT SERPL W P-5'-P-CCNC: 16 U/L (ref 0–45)
ANION GAP SERPL CALCULATED.3IONS-SCNC: 9 MMOL/L (ref 5–18)
AST SERPL W P-5'-P-CCNC: 22 U/L (ref 0–40)
BASOPHILS # BLD AUTO: 0 THOU/UL (ref 0–0.2)
BASOPHILS NFR BLD AUTO: 1 % (ref 0–2)
BILIRUB SERPL-MCNC: 0.4 MG/DL (ref 0–1)
BUN SERPL-MCNC: 24 MG/DL (ref 8–22)
CALCIUM SERPL-MCNC: 8.8 MG/DL (ref 8.5–10.5)
CHLORIDE BLD-SCNC: 107 MMOL/L (ref 98–107)
CO2 SERPL-SCNC: 23 MMOL/L (ref 22–31)
CREAT SERPL-MCNC: 2.24 MG/DL (ref 0.6–1.1)
EOSINOPHIL # BLD AUTO: 0.1 THOU/UL (ref 0–0.4)
EOSINOPHIL NFR BLD AUTO: 2 % (ref 0–6)
ERYTHROCYTE [DISTWIDTH] IN BLOOD BY AUTOMATED COUNT: 14.1 % (ref 11–14.5)
GFR SERPL CREATININE-BSD FRML MDRD: 23 ML/MIN/1.73M2
GLUCOSE BLD-MCNC: 90 MG/DL (ref 70–125)
HCT VFR BLD AUTO: 43.1 % (ref 35–47)
HGB BLD-MCNC: 13.7 G/DL (ref 12–16)
IGA SERPL-MCNC: 5 MG/DL (ref 65–400)
IGA SERPL-MCNC: 613 MG/DL
IGM SERPL-MCNC: <5 MG/DL (ref 60–280)
IMM GRANULOCYTES # BLD: 0 THOU/UL
IMM GRANULOCYTES NFR BLD: 0 %
LYMPHOCYTES # BLD AUTO: 1.4 THOU/UL (ref 0.8–4.4)
LYMPHOCYTES NFR BLD AUTO: 25 % (ref 20–40)
MCH RBC QN AUTO: 29.4 PG (ref 27–34)
MCHC RBC AUTO-ENTMCNC: 31.8 G/DL (ref 32–36)
MCV RBC AUTO: 93 FL (ref 80–100)
MONOCYTES # BLD AUTO: 0.4 THOU/UL (ref 0–0.9)
MONOCYTES NFR BLD AUTO: 8 % (ref 2–10)
NEUTROPHILS # BLD AUTO: 3.7 THOU/UL (ref 2–7.7)
NEUTROPHILS NFR BLD AUTO: 65 % (ref 50–70)
PLATELET # BLD AUTO: 161 THOU/UL (ref 140–440)
PMV BLD AUTO: 10.3 FL (ref 8.5–12.5)
POTASSIUM BLD-SCNC: 3.9 MMOL/L (ref 3.5–5)
PROT SERPL-MCNC: 6.4 G/DL (ref 6–8)
RBC # BLD AUTO: 4.66 MILL/UL (ref 3.8–5.4)
SODIUM SERPL-SCNC: 139 MMOL/L (ref 136–145)
WBC: 5.7 THOU/UL (ref 4–11)

## 2021-05-19 ENCOUNTER — MEDICAL CORRESPONDENCE (OUTPATIENT)
Dept: HEALTH INFORMATION MANAGEMENT | Facility: CLINIC | Age: 56
End: 2021-05-19

## 2021-05-24 ENCOUNTER — MYC MEDICAL ADVICE (OUTPATIENT)
Dept: FAMILY MEDICINE | Facility: CLINIC | Age: 56
End: 2021-05-24

## 2021-05-24 NOTE — TELEPHONE ENCOUNTER
Can you put Pretty into my Wednesday 9:20 am hold slot? And let her know, I'm happy to see her for the pre-op.  Thank you,  Sunil Tan MD

## 2021-05-26 ENCOUNTER — OFFICE VISIT (OUTPATIENT)
Dept: FAMILY MEDICINE | Facility: CLINIC | Age: 56
End: 2021-05-26
Payer: COMMERCIAL

## 2021-05-26 VITALS
HEIGHT: 67 IN | HEART RATE: 79 BPM | WEIGHT: 291.2 LBS | SYSTOLIC BLOOD PRESSURE: 124 MMHG | OXYGEN SATURATION: 99 % | DIASTOLIC BLOOD PRESSURE: 68 MMHG | BODY MASS INDEX: 45.71 KG/M2 | TEMPERATURE: 96.1 F | RESPIRATION RATE: 20 BRPM

## 2021-05-26 VITALS — DIASTOLIC BLOOD PRESSURE: 73 MMHG | SYSTOLIC BLOOD PRESSURE: 118 MMHG | HEART RATE: 76 BPM

## 2021-05-26 DIAGNOSIS — S83.242D ACUTE MEDIAL MENISCUS TEAR OF LEFT KNEE, SUBSEQUENT ENCOUNTER: ICD-10-CM

## 2021-05-26 DIAGNOSIS — C83.18 LYMPHOMA, MANTLE CELL, MULTIPLE SITES (H): ICD-10-CM

## 2021-05-26 DIAGNOSIS — N18.4 CKD (CHRONIC KIDNEY DISEASE) STAGE 4, GFR 15-29 ML/MIN (H): ICD-10-CM

## 2021-05-26 DIAGNOSIS — G47.30 SLEEP APNEA, UNSPECIFIED TYPE: ICD-10-CM

## 2021-05-26 DIAGNOSIS — Z01.818 PREOP GENERAL PHYSICAL EXAM: Primary | ICD-10-CM

## 2021-05-26 DIAGNOSIS — E03.9 HYPOTHYROIDISM, UNSPECIFIED TYPE: ICD-10-CM

## 2021-05-26 PROCEDURE — 99214 OFFICE O/P EST MOD 30 MIN: CPT | Performed by: FAMILY MEDICINE

## 2021-05-26 ASSESSMENT — MIFFLIN-ST. JEOR: SCORE: 1947.46
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
## 2021-05-26 NOTE — PATIENT INSTRUCTIONS

## 2021-05-27 VITALS
WEIGHT: 293 LBS | SYSTOLIC BLOOD PRESSURE: 125 MMHG | HEART RATE: 64 BPM | TEMPERATURE: 98 F | BODY MASS INDEX: 45.89 KG/M2 | OXYGEN SATURATION: 98 % | DIASTOLIC BLOOD PRESSURE: 83 MMHG

## 2021-05-27 VITALS
WEIGHT: 293 LBS | HEART RATE: 76 BPM | BODY MASS INDEX: 45.89 KG/M2 | SYSTOLIC BLOOD PRESSURE: 138 MMHG | DIASTOLIC BLOOD PRESSURE: 82 MMHG

## 2021-05-27 VITALS — SYSTOLIC BLOOD PRESSURE: 126 MMHG | DIASTOLIC BLOOD PRESSURE: 74 MMHG | HEART RATE: 87 BPM
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
## 2021-05-28 NOTE — PROGRESS NOTES
Avis Paul arrived for labs, premeds and IV IGG infusion. Labs drawn and results were reviewed. IGG level 788 today. Avis Paul was educated on her plan of care. Each medication given today was reviewed prior to administration. Premedications and IV IGG 35 gram IV infusion treatment were completed with no signs of reaction. IV site:Venous port patent throughout treatment with positive blood return obtained before and at completion. IV site with no pain, redness, swelling and drainage. IV site flushed with saline and heparin and deaccessed. Avis Paul has follow-up appointment scheduled on 06/03/2019. Avis Paul was discharged to home. She discharged from unit ambulatory and independent at 1438. The after visit summary was declined. Pretty reports her feet have been more painful.    Katarzyna Cronin

## 2021-05-29 NOTE — PROGRESS NOTES
"Patient arrived ambulatory at 10:45, seated in chair 8. Reviewed plan of care, today's lab results, and CT results. \"I am being started on levaquin for my sinus issues.\" Port a cath is currently accessed and has an excellent blood return. Used NS as the primary IV solution - administered premeds of oral acetaminophen; famotidine 20 mg IVP; methylprednisolone 125 mg IVP; and benadryl 12.5 mg IVP. Flushed with NS between each medication. Infused IgG 35 gms over 2 hours 55 minutes - tolerated well. Napped at intervals. Ate 90% of lunch. No ill effects noted. VSS. Left unit ambulatory in stable condition at 15:40. Patient was given an appointment schedule and plans to return on Monday July 1st.    Cristal Doherty RN  "

## 2021-05-29 NOTE — PROGRESS NOTES
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  We will start Levaquin 750 mg p.o. every other day for 10 doses, dose adjusted for her renal function.  She will see primary if symptoms are not resolved.    CT scans are reviewed and show no evidence of recurrence of her mantle cell lymphoma and the patient was reassured.  We will continue observation.  We will consider repeat scans again in 1 year.    Continues to receive IVIG every 4 weeks.  Is having infection requiring antibiotics every 5 to 6 months.    Recent exacerbation of her urticaria which she attributes again to insect bites.  She will use medications as recommended by the allergist.    Plan: Levaquin for sinusitis and  Continue IVIG every 4 weeks  Repeat labs just with follow-up visit in 3 months  Follow-up again in 3 months for visit        Measurable disease: Patient currently in remission    Current therapy:   IVIG resumed in January 2017 and stopped in May 2018; resumed again in September 2018  Maintenance Rituxan started December 2015, and completed August 2017, 8 doses  Acyclovir 400 mg twice daily  IVIG every 3 months, first dose August 29, 2016(currently on hold, last dose November 2016)   IVIG every 4 weeks restarted in June 2017 and stopped in May 2018      Treatment history:  First set of immunizations received at the [facility] in August 2016  First dose of IVIG  Patient completed 1 year of inhalational  pentamidine  Autologous stem cell transplant with Beam prep, August 18, 2015  One cycle of R-ICE, Naye 15, 2015, ifosfamide and etoposide reduced by 25% because of renal dysfunction  Ibrutinib since October 10, 2014, current dose of 420 mg by mouth daily, stopped June 9, 2015   3 cycles of R-DHAP completed late August 2014   5 cycles of R CHOP, started after diagnosis in March 2014       ECOG Performance   ECOG Performance Status: 1    Distress Assessment  Distress Assessment Score: No distress    Pain           Problem List    1. 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 Hypogammaglobulinemia, acquired (H)          CC: Carol Rose MD    ______________________________________________________________________________    History of Present Illness    Pretty is here for reevaluation.  She has been seen every 3 months.  She receiving IVIG every 4 weeks.  She just had CT scans.  She is almost 4 years out from her bone marrow transplant.  Currently having symptoms of sinusitis not improved after doxycycline.  Also with some skin rash after insect bites recently.    No fever chills or sweats.  Stable appetite and weight.  No new palpable adenopathy.  ECOG status is 1.      January 2018:   Ms. Avis Paul returns for a follow-up.  She was here for IVIG infusion yesterday and raise concerns about symptoms suggesting recurrence of lymphoma.  She had CT scans and is here to review results.  Has been having aches all over and increased fatigue and weight gain.  Describes some numbness in the right thigh area.  Also has been having memory issues since she received chemotherapy.  Notes an area of induration in the mouth on the lower right mandibular/jaw area.        November 2017:   She was seen 3 months ago.  She did have a visit shortly thereafter at the [facility] with bone  marrow and CT scan showing that she is in complete remission from her mantle cell lymphoma.  She has continued IVIG infusions monthly between June - October 2017.  She was informed recently that the insurance would not cover this.  We had previously got this approved prior to starting infusions in June 2017.    She is currently having sinus symptoms and cough productive of greenish phlegm.  No other infection issues in the last 3 months.  She is in significant distress because of the insurance issues.    No other new symptoms or problems.  ECOG status is 0.    August 2017:   She was seen 4 weeks ago.  She has received 2 doses of IVIG.  No infusion reaction with the last one.  Her last infection was a sinus infection more than 4 weeks ago.  She has some discomfort in the right ear.  No fever or mild sores.  No shortness of breath or cough.  No new adenopathy.  She has been starting to have some loose bowel moments 2-3 times a day in the last 3 weeks.  No other new problems.    Pain Status  Currently in Pain: No/denies    Review of Systems    Constitutional  Constitutional (WDL): Exceptions to WDL  Fatigue: Fatigue not relieved by rest - Limiting instrumental ADL  Weight Gain: 5 - <10% from baseline(Up 7 lbs since March.)  Neurosensory  Neurosensory (WDL): Exceptions to WDL  Ataxia: Moderate symptoms, limiting instrumental ADL(Very off balance. )  Peripheral Sensory Neuropathy: Moderate symptoms, limiting instrumental ADL(Feet.)  Cardiovascular  Cardiovascular (WDL): All cardiovascular elements are within defined limits  Pulmonary  Respiratory (WDL): Exceptions to WDL  Cough: Moderate symptoms, medical intervention indicated, limiting instrumental ADL  Dyspnea: Shortness of breath with minimal exertion, limiting instrumental ADL  Gastrointestinal  Gastrointestinal (WDL): All gastrointestinal elements are within defined limits  Genitourinary  Genitourinary (WDL): All genitourinary elements are within defined  limits  Integumentary  Integumentary (WDL): Exceptions to WDL(A lot of bug bites. )  Pruritus: Intense or widespread, intermittent, skin changes from scratching (e.g., edema, papulation, excoriations, lichenification, oozing/crusts), oral intervention indicated, limiting instrumental ADL  Patient Coping  Patient Coping: Accepting  Distress Assessment  Distress Assessment Score: No distress  Accompanied by  Accompanied by: Alone    Past History  Past Medical History:   Diagnosis Date     Anemia      Chronic kidney disease     elevated creatinine due to chemo     History of transfusion      Hypothyroid      Mantle cell lymphoma (H)     stage 4     Sleep apnea     uses cpap     Thrombocytopenia (H)          Past Surgical History:   Procedure Laterality Date     ANTERIOR / POSTERIOR COMBINED FUSION CERVICAL SPINE  2011    C2-C7     CARPAL TUNNEL RELEASE Right 2000     CERVICAL FUSION  2004    C4, C5, C6.  Pt. states surgery x 2     KIDNEY STONE SURGERY  1999    removal     AL BX/REMV,LYMPH NODE,DEEP AXILL Right 6/3/2015    Procedure: RIGHT INGUINAL LYMPH NODE BIOPSY;  Surgeon: Clayton Burgos MD;  Location: [facility];  Service: General     TUBAL LIGATION  2004       Physical Exam    Recent Vitals 6/3/2019   Weight 291 lbs 13 oz   BSA (m2) 2.5 m2   /72   Pulse 76   Temp 97.7   Temp src 1   SpO2 95   Some recent data might be hidden       GENERAL: Alert and oriented. Seated comfortably. In no distress.    HEAD: Atraumatic and normocephalic.  Has a full head of hair.    EYES: ROMEO, EOMI.  No pallor.  No icterus.    Oral cavity: no mucosal lesion or tonsillar enlargement.  Induration right mandibular lower jaw area    NECK: supple. JVP normal.  No thyroid enlargement.    LYMPH NODES: No palpable, cervical, axillary or inguinal lymphadenopathy.    CHEST: She has slight bilateral wheezing noted  Resonant to percussion throughout bilaterally.  Symmetrical breath movements bilaterally.    CVS: S1 and S2 are  heard. Regular rate and rhythm.  No murmur or gallop or rub heard.    ABDOMEN: Soft. Not tender. Not distended.  No palpable hepatomegaly or splenomegaly.  No other mass palpable.  Bowel sounds heard.    EXTREMITIES: Warm.  No peripheral edema.    SKIN: no rash, or bruising or purpura.    CNS: Nonfocal        Lab Results    Recent Results (from the past 168 hour(s))   Comprehensive Metabolic Panel   Result Value Ref Range    Sodium 139 136 - 145 mmol/L    Potassium 3.8 3.5 - 5.0 mmol/L    Chloride 107 98 - 107 mmol/L    CO2 23 22 - 31 mmol/L    Anion Gap, Calculation 9 5 - 18 mmol/L    Glucose 124 70 - 125 mg/dL    BUN 23 (H) 8 - 22 mg/dL    Creatinine 2.07 (H) 0.60 - 1.10 mg/dL    GFR MDRD Af Amer 30 (L) >60 mL/min/1.73m2    GFR MDRD Non Af Amer 25 (L) >60 mL/min/1.73m2    Bilirubin, Total 0.4 0.0 - 1.0 mg/dL    Calcium 9.1 8.5 - 10.5 mg/dL    Protein, Total 6.5 6.0 - 8.0 g/dL    Albumin 3.4 (L) 3.5 - 5.0 g/dL    Alkaline Phosphatase 112 45 - 120 U/L    AST 20 0 - 40 U/L    ALT 15 0 - 45 U/L   HM1 (CBC with Diff)   Result Value Ref Range    WBC 6.1 4.0 - 11.0 thou/uL    RBC 4.76 3.80 - 5.40 mill/uL    Hemoglobin 14.1 12.0 - 16.0 g/dL    Hematocrit 42.6 35.0 - 47.0 %    MCV 90 80 - 100 fL    MCH 29.6 27.0 - 34.0 pg    MCHC 33.1 32.0 - 36.0 g/dL    RDW 14.2 11.0 - 14.5 %    Platelets 147 140 - 440 thou/uL    MPV 10.5 8.5 - 12.5 fL    Neutrophils % 70 50 - 70 %    Lymphocytes % 19 (L) 20 - 40 %    Monocytes % 6 2 - 10 %    Eosinophils % 4 0 - 6 %    Basophils % 1 0 - 2 %    Neutrophils Absolute 4.2 2.0 - 7.7 thou/uL    Lymphocytes Absolute 1.2 0.8 - 4.4 thou/uL    Monocytes Absolute 0.4 0.0 - 0.9 thou/uL    Eosinophils Absolute 0.3 0.0 - 0.4 thou/uL    Basophils Absolute 0.0 0.0 - 0.2 thou/uL       Imaging    Ct Chest Abdomen Pelvis Without Oral Without Iv Contrast    Result Date: 6/3/2019  EXAM: CT CHEST ABDOMEN PELVIS WO ORAL WO IV CONTRAST LOCATION: [facility] DATE/TIME: 6/3/2019 9:03 AM INDICATION: hx of  lymphoma COMPARISON: 7/17/2018 TECHNIQUE: Helical thin-section CT scan of the chest, abdomen, and pelvis was performed without IV contrast. Multiplanar reformats were obtained. Dose reduction techniques were used. CONTRAST: None. FINDINGS: CHEST: No pulmonary nodules. No thoracic lymphadenopathy. There is a right chest wall Port-A-Cath. The main pulmonary artery is mildly enlarged at 33 mm in diameter, which could be seen with pulmonary hypertension.  ABDOMEN: Normal size of the spleen. Normal pancreas, adrenal glands, and gallbladder. A tiny hypoattenuating focus in the posterior right hepatic lobe has not changed. There is mild hepatomegaly. No hydronephrosis or hydroureter. Normal stomach. Normal caliber of the small bowel. Small duodenal diverticula are present. There is no abdominal lymphadenopathy. PELVIS: Normal urinary bladder. Normal uterus. No adnexal masses. There is mild wall thickening of the descending colon and ascending colon. Otherwise the colon is normal in appearance without wall thickening. Normal appendix. No pelvic lymphadenopathy. MUSCULOSKELETAL: Cervical fusion is noted. There are degenerative changes at L2-L3 and L5-S1.     CONCLUSION: 1.  No lymphadenopathy in the chest, abdomen, or pelvis. Normal size of the spleen. 2.  A tiny hypoattenuating focus in the right hepatic lobe has not changed. 3.  Wall thickening of the ascending colon and ascending colon suggests an inflammatory or infectious colitis. 4.  Mild hepatomegaly.         Signed by: Yelena Starks MD

## 2021-05-30 VITALS — BODY MASS INDEX: 33.99 KG/M2 | WEIGHT: 217 LBS

## 2021-05-30 NOTE — PROGRESS NOTES
Patient arrived ambulatory and was seated in chair 8. No recent fevers or infections - dry scabbed areas on legs, arms, back. Reviewed plan of care. Accessed port a cath per sterile technique and obtained an excellent blood return. Used NS as the primary IV solution - administered the premeds of oral acetaminophen; famotidine 20 mg IVP; methylprednisolone 125 mg IVP; benadryl 12.5 mg IVP. Infused IgG 25 gms over 2 hours 56 minutes. At completion the IV line was flushed with NS. The port a cath was flushed, heparinized, and then deaccessed. VSS. Slight headache at completion. Ate 90% of lunch. Napped at intervals and was up to the bathroom independently.      Left unit ambulatory in stable condition at 14:13 and plans to return on August 27th for labs/CNP visit/infusion. A printed copy of the appointment schedule was given to the patient - instructed to call with any questions or concerns (given the phone number for 1st floor OP and the phone number for 2nd floor registration).    Cristal Doherty RN

## 2021-05-30 NOTE — PROGRESS NOTES
"Patient arrived ambulatory at 09:50, seated in chair 8. Reviewed plan of care - \"I take several different things at home for the gnat bites, at least I no longer have the hives\" - dry scabbed areas on lower legs. Accessed port a cath per sterile technique and obtained an excellent blood return - the immunoglobulin G was drawn. \"I see Dr. Starks in August, and other labs will be done then.\" (The August appt was changed per pt request, and will see the CNP - per writer, confirmed with Cecy UMANZOR).    NS was used as the primary IV solution - administered the premeds of oral acetaminophen, famotidine 20 mg IVP, benadryl 12.5 mg IVP, and methylprednisolone 125 mg IVP - flushed with NS between each medication. The IgG 25 gm infusion was started at 11:15, and infused over 2 hours 57 minutes - at completion the IV line was flushed with NS. The port a cath was flushed, heparinized, and deaccessed - covered site with folded 2x2s and paper tape. VSS.    Declined the AVS. Left unit ambulatory in stable condition at 14:30, and plans to return on  July 29th.    Cristal Doherty RN   "

## 2021-05-31 VITALS — BODY MASS INDEX: 34.93 KG/M2 | WEIGHT: 226.5 LBS | BODY MASS INDEX: 35.55 KG/M2 | WEIGHT: 226.5 LBS | HEIGHT: 67 IN

## 2021-05-31 VITALS — WEIGHT: 247 LBS | BODY MASS INDEX: 38.11 KG/M2

## 2021-05-31 VITALS — WEIGHT: 253 LBS | BODY MASS INDEX: 39.04 KG/M2

## 2021-05-31 VITALS — BODY MASS INDEX: 35.46 KG/M2 | WEIGHT: 226.4 LBS

## 2021-05-31 VITALS — BODY MASS INDEX: 35.72 KG/M2 | WEIGHT: 227.6 LBS | HEIGHT: 67 IN

## 2021-05-31 VITALS — BODY MASS INDEX: 35.87 KG/M2 | WEIGHT: 229 LBS

## 2021-05-31 VITALS — WEIGHT: 230.6 LBS | BODY MASS INDEX: 36.12 KG/M2

## 2021-05-31 VITALS — WEIGHT: 234 LBS | BODY MASS INDEX: 36.09 KG/M2

## 2021-05-31 VITALS — WEIGHT: 240 LBS | HEIGHT: 68 IN | BODY MASS INDEX: 36.37 KG/M2

## 2021-05-31 VITALS — WEIGHT: 247.1 LBS | BODY MASS INDEX: 38.13 KG/M2

## 2021-05-31 NOTE — PROGRESS NOTES
Patient arrived ambulatory from the [facility]nd [facility] at approximately 11:45 - seated in the front room. Reviewed plan of care. Excellent blood retturn from the port a cath, and used NS as the primary IV solution. Administered premeds - oral acetaminophen; methylprednisolone 125 mg IVP; benadryl 12.5 mg IVP; and due to a pharmacy delay the famotidine was given at 12:35. The IgG infusion (25 gms) started at 13:05 and was completed at 16:05. The port a cath was flushed, heparinized, and deaccessed. Patient was given a copy of today's lab results, and appointment schedule. No ill effects from today's treatment. Patient ate 100% of lunch. Up to the bathroom independently. Left unit ambulatory in stable condition at 16:25, and plans to return on September 24.    Cristal Doherty RN

## 2021-05-31 NOTE — PROGRESS NOTES
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

## 2021-05-31 NOTE — PROGRESS NOTES
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 Mantle cell lymphoma status post autologous transplant, August 18, 2015: Then completed 2 years of maintenance Rituxan therapy.  She is currently on observation.  She did have a CT scan done in June and that showed no evidence of disease recurrence.  Dr. Starks will continue annual scans at this point.  She will return in 3 months to see Dr. Starks. Encourage patient to call us if she has any lymphadenopathy that she notices clinically before then.    2. Hypogammaglobulinemia with multiple recurrent infections:   She will get it every 4 weeks.  She will see Dr. Starks in 3 months and we will recheck her IVIG level at that time. She is interested in the subcutaneous at home IVIG..  I did tell her that she probably need to see an immunologist to discuss that.  I am reluctant to do that since she is having allergic reactions to the IV stuff but I am not clear if there is a correlation.    3. Fatigue and weight gain: recheck TSH      ECOG Performance   ECOG Performance Status: 1     Distress Assessment  Distress Assessment Score: 6(frustration with weight gain and side effects to joints/muscles):  No need for further intervention  Pain  Currently in Pain: Yes  Pain Score (Initial OR Reassessment): 7  Location: feet: using some OTC products. Worried that it is from weight gain and steroids.       Problem List    1. Chronic fatigue  TSH   2. Acquired hypothyroidism     3. Hypogammaglobulinemia, acquired (H)  CANCELED: Immunoglobulin G    CANCELED: Immunoglobulin G    CANCELED: Immunoglobulin G    CANCELED: Immunoglobulin G    CANCELED: Immunoglobulin G    CANCELED: Immunoglobulin G    CANCELED: Immunoglobulin G   4. Chronic  kidney disease (CKD), stage III (moderate) (H)     5. 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  8 doses total.  Acyclovir 800 mg twice daily  IVIG: did get three doses at the [facility] on August 29, 2016,  last dose November 2016.   She was on hold due to insurance issues, but then in June has restarted and is getting monthly.       Treatment history:  First set of immunizations received at the [facility] in August 2016  First dose of IVIG  Patient completed 1 year of inhalational pentamidine  Autologous stem cell transplant with Beam prep, August 18, 2015  One cycle of R-ICE, Naye 15, 2015, ifosfamide and etoposide reduced by 25% because of renal dysfunction  Ibrutinib since October 10, 2014, current dose of 420 mg by mouth daily, stopped June 9, 2015   3 cycles of R-DHAP completed late August 2014   5 cycles of R CHOP, started after diagnosis in March 2014     Interim History:   Pt is here today for follow-up visit.  She states she is doing okay.  She really only had one really bad infection from May to June and needed 2 different antibiotics.  Other than that she continues to have a lot of bug bites and reactions to them but she has not had any cellulitis.  Overall her summers been going okay.  Her weight gain she thinks probably from the medications and the steroids and its causing her to feel more pain in her joints as well as stiffness.  States that she has to be on steroids but she does have pretty severe allergic reactions to the IVIG if we do not give them or even give less of a dose.    Pain Status  Currently in Pain: Yes    Review of Systems    Constitutional  Constitutional (WDL): Exceptions to WDL  Fatigue: Concerns(relieved  with rest)  Fever: No Concerns  Chills: No Concerns  Weight Gain: Concerns  Weight Loss: No Concerns  Hot flashes/Night Sweats: No Concerns  Neurosensory  Neurosensory (WDL): Exceptions to WDL  Peripheral Motor Neuropathy: Concerns(fingertips and mid foot to toes)  Ataxia: Concerns  Peripheral Sensory Neuropathy: No Concerns  Confusion: No Concerns  Dizziness: Concerns(mild)  Syncope: No Concerns  Eye   Eye Disorder (WDL): Exceptions to WDL(glasses; cataracts present)  Blurred Vision: No Concerns  Dry Eye: No Concerns  Eye Pain: No Concerns  Watering Eyes: No Concerns  Ear  Ear Disorder (WDL): Exceptions to WDL  Ear Pain: No Concerns  Tinnitus: Concerns(right ear popping sound)  Cardiovascular  Cardiovascular (WDL): All cardiovascular elements are within defined limits  Palpitations: No Concerns  Edema: No Concerns  SVT, DVT/PE: No Concerns  Chest Pain - Cardiac: No Concerns  Pulmonary  Respiratory (WDL): Exceptions to WDL  Cough: Concerns(related to weather change )  Dyspnea: No Concerns  Hypoxia: No Concerns  Gastrointestinal  Gastrointestinal (WDL): Exceptions to WDL  Anorexia: No Concerns  Nausea: No Concerns  Vomiting: No Concerns  Dehydration: No Concerns  Dysgeusia: No Concerns  Dysphagia: No Concerns  Mucositis Oral: No Concerns  Esophagitis: No Concerns  Constipation: No Concerns  Diarrhea: Concerns  Pharyngitis: No Concerns  Dry Mouth: No Concerns  Genitourinary  Genitourinary (WDL): All genitourinary elements are within defined limits  Urinary Frequency: No Concerns  Urinary Retention: No Concerns  Urinary Tract Pain: No Concerns  Lymphatic  Lymph (WDL): All lymph disorder elements are within defined limits  Lymphedema: No Concerns  Lymph Node Discomfort: No Concerns  Musculoskeletal and Connective Tissue  Musculoskeletal and Connetive Tissue Disorders (WDL): Exceptions to WDL  Arthralgia: Concerns  Bone Pain: No Concerns  Muscle Weakness : No Concerns  Myalgia:  "Concerns(tightness)  Integumentary  Integumentary (WDL): All integumentary elements are within defined limits  Alopecia: No Concerns  Rash Maculo-Papular: No Concerns  Pruritus: No Concerns  Urticaria: No Concerns  Palmar-Plantar Erythrodysesthesia Syndrome: No Concerns  Flushing: No Concerns  Patient Coping  Patient Coping: Accepting;Open/discussion  Accompanied by  Accompanied by: Alone  Oral Chemo Adherence         Past History  Past Medical History:   Diagnosis Date     Anemia      Chronic kidney disease     elevated creatinine due to chemo     History of transfusion      Hypothyroid      Mantle cell lymphoma (H)     stage 4     Sleep apnea     uses cpap     Thrombocytopenia (H)        PHYSICAL EXAM:  /82   Pulse 75   Temp 97.8  F (36.6  C) (Oral)   Ht 5' 7.01\" (1.702 m)   Wt (!) 300 lb 12.8 oz (136.4 kg)   SpO2 99%   BMI 47.10 kg/m    GENERAL: no acute distress. Cooperative in conversation. Here alone.   HEENT: pupils are equal, round and reactive. Oromucosa is clean and intact. No ulcerations or mucositis noted. No bleeding noted.  RESP: lungs are clear bilaterally per auscultation. Regular respiratory rate. No wheezes or rhonchi.  CV: Regular, rate and rhythm. No murmurs.  ABD: soft, nontender. Positive bowel sounds. No organomegaly.   MUSCULOSKELETAL: No lower extremity swelling.   NEURO: non focal. Alert and oriented x3.   PSYCH: No signs of anxiety or depression today  SKIN: warm dry. Has bug bites all over her skin.  Of them appear to be old scars.  LYMPH: no cervical, supraclavicular or axillary lymphadenopathy.     Lab Results    Recent Results (from the past 168 hour(s))   Comprehensive Metabolic Panel   Result Value Ref Range    Sodium 141 136 - 145 mmol/L    Potassium 3.9 3.5 - 5.0 mmol/L    Chloride 107 98 - 107 mmol/L    CO2 27 22 - 31 mmol/L    Anion Gap, Calculation 7 5 - 18 mmol/L    Glucose 116 70 - 125 mg/dL    BUN 28 (H) 8 - 22 mg/dL    Creatinine 2.06 (H) 0.60 - 1.10 mg/dL    GFR " MDRD Af Amer 30 (L) >60 mL/min/1.73m2    GFR MDRD Non Af Amer 25 (L) >60 mL/min/1.73m2    Bilirubin, Total 0.4 0.0 - 1.0 mg/dL    Calcium 9.1 8.5 - 10.5 mg/dL    Protein, Total 6.4 6.0 - 8.0 g/dL    Albumin 3.4 (L) 3.5 - 5.0 g/dL    Alkaline Phosphatase 121 (H) 45 - 120 U/L    AST 19 0 - 40 U/L    ALT 17 0 - 45 U/L   HM1 (CBC with Diff)   Result Value Ref Range    WBC 5.4 4.0 - 11.0 thou/uL    RBC 4.76 3.80 - 5.40 mill/uL    Hemoglobin 13.8 12.0 - 16.0 g/dL    Hematocrit 42.9 35.0 - 47.0 %    MCV 90 80 - 100 fL    MCH 29.0 27.0 - 34.0 pg    MCHC 32.2 32.0 - 36.0 g/dL    RDW 14.5 11.0 - 14.5 %    Platelets 166 140 - 440 thou/uL    MPV 10.2 8.5 - 12.5 fL    Neutrophils % 67 50 - 70 %    Lymphocytes % 21 20 - 40 %    Monocytes % 7 2 - 10 %    Eosinophils % 4 0 - 6 %    Basophils % 1 0 - 2 %    Neutrophils Absolute 3.6 2.0 - 7.7 thou/uL    Lymphocytes Absolute 1.2 0.8 - 4.4 thou/uL    Monocytes Absolute 0.4 0.0 - 0.9 thou/uL    Eosinophils Absolute 0.2 0.0 - 0.4 thou/uL    Basophils Absolute 0.0 0.0 - 0.2 thou/uL     Lab Results   Component Value Date     (L) 07/29/2019     07/01/2019     06/03/2019     05/06/2019     04/09/2019     03/11/2019     (L) 01/15/2019     12/17/2018     (L) 09/17/2018     (L) 07/17/2018   ]  Imaging    No results found.      Signed by: Nanda Olivares, CNP

## 2021-06-01 VITALS — WEIGHT: 272 LBS | BODY MASS INDEX: 42.69 KG/M2 | HEIGHT: 67 IN

## 2021-06-01 VITALS — BODY MASS INDEX: 40.12 KG/M2 | WEIGHT: 260 LBS

## 2021-06-01 VITALS — BODY MASS INDEX: 42.37 KG/M2 | WEIGHT: 274.6 LBS

## 2021-06-01 VITALS — WEIGHT: 255.6 LBS | BODY MASS INDEX: 39.44 KG/M2

## 2021-06-01 VITALS — BODY MASS INDEX: 40.11 KG/M2 | WEIGHT: 259.92 LBS

## 2021-06-01 VITALS — BODY MASS INDEX: 41.11 KG/M2 | WEIGHT: 266.44 LBS

## 2021-06-01 NOTE — PROGRESS NOTES
"Patient arrived ambulatory at 10:20, seated in chair 9. Reviewed plan of care and today's treatment. Accessed port a cath per sterile technique and obtained an excellent blood return. Used NS as the primary IV solution - administered the premeds of oral acetaminophen, famotidine 20 mg IVP, solumedrol 125 mg IVP, and benadryl 12.5 mg IVP. Flushed with NS between each medication. Infused IgG 25 gms over 3 hours 10 minutes. At completion the IV line was flushed with NS. The port a cath was flushed, heparinized, and deaccessed - covered the site with folded 2x2s and paper tape. VSS. Ate 100% of a lunch. Napped at intervals. Denies a headache - \"some times that happens later, and lasts for a day.\"    Left the unit ambulatory in stable condition at 15:30, and plans to return on October 24th - following a trip to [place]. Declined the AVS, was given a copy of today's TSH result.    Cristal Doherty RN  "

## 2021-06-02 VITALS — WEIGHT: 280 LBS | BODY MASS INDEX: 43.85 KG/M2

## 2021-06-02 VITALS — BODY MASS INDEX: 44.26 KG/M2 | HEIGHT: 67 IN | WEIGHT: 282 LBS

## 2021-06-02 VITALS — BODY MASS INDEX: 43.76 KG/M2 | WEIGHT: 279.4 LBS

## 2021-06-02 VITALS — BODY MASS INDEX: 43.53 KG/M2 | WEIGHT: 278 LBS

## 2021-06-02 VITALS — BODY MASS INDEX: 44.79 KG/M2 | WEIGHT: 286 LBS

## 2021-06-02 VITALS — WEIGHT: 279.5 LBS | BODY MASS INDEX: 43.78 KG/M2

## 2021-06-02 VITALS — WEIGHT: 279.3 LBS | BODY MASS INDEX: 43.74 KG/M2

## 2021-06-02 VITALS — WEIGHT: 284.2 LBS | BODY MASS INDEX: 44.5 KG/M2

## 2021-06-02 NOTE — PROGRESS NOTES
"Patient arrived ambulatory at 09:55 - seated in chair 9. Reviewed plan of care and today's treatment. Patient just returned from [place] this morning -- no recent fevers or chills. Patient is in good spirits. \"My thyroid medication has been increased to 100 mcg; otherwise no changes in my meds.\"    Accessed port a cath per sterile technique and obtained an excellent blood return. Premeds were given -- oral acetaminophen, famotidine 20 mg IVP; methylprednisolone 125 mg IVP; and diphenhydramine 12.5 mg IVP. Infused IgG 35 gms over 3 hours 35 minutes. At completion the port a cath was flushed, heparinized, and deaccessed.    Left the unit ambulatory in stable condition at 15:59, and plans to return in 4 weeks on November 19th. Instructed patient to call with any questions or concerns.    Cristal Doherty RN  "

## 2021-06-03 VITALS
OXYGEN SATURATION: 98 % | BODY MASS INDEX: 45.99 KG/M2 | HEIGHT: 67 IN | TEMPERATURE: 97.8 F | SYSTOLIC BLOOD PRESSURE: 146 MMHG | HEART RATE: 70 BPM | DIASTOLIC BLOOD PRESSURE: 89 MMHG | WEIGHT: 293 LBS

## 2021-06-03 VITALS
HEART RATE: 66 BPM | OXYGEN SATURATION: 96 % | TEMPERATURE: 98.4 F | DIASTOLIC BLOOD PRESSURE: 75 MMHG | BODY MASS INDEX: 46.66 KG/M2 | SYSTOLIC BLOOD PRESSURE: 134 MMHG | WEIGHT: 293 LBS

## 2021-06-03 VITALS
WEIGHT: 293 LBS | HEART RATE: 70 BPM | SYSTOLIC BLOOD PRESSURE: 145 MMHG | OXYGEN SATURATION: 100 % | DIASTOLIC BLOOD PRESSURE: 86 MMHG | RESPIRATION RATE: 18 BRPM | BODY MASS INDEX: 46.67 KG/M2 | TEMPERATURE: 97.6 F

## 2021-06-03 VITALS — HEIGHT: 67 IN | WEIGHT: 293 LBS | BODY MASS INDEX: 45.99 KG/M2

## 2021-06-03 VITALS — BODY MASS INDEX: 45.69 KG/M2 | WEIGHT: 291.8 LBS

## 2021-06-03 VITALS — WEIGHT: 288 LBS | BODY MASS INDEX: 45.1 KG/M2

## 2021-06-03 VITALS — WEIGHT: 293 LBS | BODY MASS INDEX: 46.04 KG/M2

## 2021-06-03 VITALS — BODY MASS INDEX: 46.35 KG/M2 | WEIGHT: 293 LBS

## 2021-06-03 NOTE — TELEPHONE ENCOUNTER
----- Message from Nanda Olivares CNP sent at 11/19/2019  2:16 PM CST -----  Can you send TSH to pt's PCP. [place] doctor, Dr. Jamison Tan? Thx.

## 2021-06-03 NOTE — PATIENT INSTRUCTIONS - HE
Patient Education     Immune Globulin Solution for injection  What is this medicine?  IMMUNE GLOBULIN (im MUNE GLOB yoselyn mckeon) helps to prevent or reduce the severity of certain infections in patients who are at risk. This medicine is collected from the pooled blood of many donors. It is used to treat immune system problems, thrombocytopenia, and Kawasaki syndrome.  This medicine may be used for other purposes; ask your health care provider or pharmacist if you have questions.  What should I tell my health care provider before I take this medicine?  They need to know if you have any of these conditions:    diabetes    extremely low or no immune antibodies in the blood    heart disease    history of blood clots    hyperprolinemia    infection in the blood, sepsis    kidney disease    taking medicine that may change kidney function - ask your health care provider about your medicine    an unusual or allergic reaction to human immune globulin, albumin, maltose, sucrose, polysorbate 80, other medicines, foods, dyes, or preservatives    pregnant or trying to get pregnant    breast-feeding  How should I use this medicine?  This medicine is for injection into a muscle or infusion into a vein or skin. It is usually given by a health care professional in a hospital or clinic setting.  In rare cases, some brands of this medicine might be given at home. You will be taught how to give this medicine. Use exactly as directed. Take your medicine at regular intervals. Do not take your medicine more often than directed.  Talk to your pediatrician regarding the use of this medicine in children. Special care may be needed.  Overdosage: If you think you have taken too much of this medicine contact a poison control center or emergency room at once.  NOTE: This medicine is only for you. Do not share this medicine with others.  What if I miss a dose?  It is important not to miss your dose. Call your doctor or health care professional if you  are unable to keep an appointment. If you give yourself the medicine and you miss a dose, take it as soon as you can. If it is almost time for your next dose, take only that dose. Do not take double or extra doses.  What may interact with this medicine?    aspirin and aspirin-like medicines    cisplatin    cyclosporine    medicines for infection like acyclovir, adefovir, amphotericin B, bacitracin, cidofovir, foscarnet, ganciclovir, gentamicin, pentamidine, vancomycin    NSAIDS, medicines for pain and inflammation, like ibuprofen or naproxen    pamidronate    vaccines    zoledronic acid  This list may not describe all possible interactions. Give your health care provider a list of all the medicines, herbs, non-prescription drugs, or dietary supplements you use. Also tell them if you smoke, drink alcohol, or use illegal drugs. Some items may interact with your medicine.  What should I watch for while using this medicine?  Your condition will be monitored carefully while you are receiving this medicine.  This medicine is made from pooled blood donations of many different people. It may be possible to pass an infection in this medicine. However, the donors are screened for infections and all products are tested for HIV and hepatitis. The medicine is treated to kill most or all bacteria and viruses. Talk to your doctor about the risks and benefits of this medicine.  Do not have vaccinations for at least 14 days before, or until at least 3 months after receiving this medicine.  What side effects may I notice from receiving this medicine?  Side effects that you should report to your doctor or health care professional as soon as possible:    allergic reactions like skin rash, itching or hives, swelling of the face, lips, or tongue    breathing problems    chest pain or tightness    fever, chills    headache with nausea, vomiting    neck pain or difficulty moving neck    pain when moving eyes    pain, swelling, warmth in  the leg    problems with balance, talking, walking    sudden weight gain    swelling of the ankles, feet, hands    trouble passing urine or change in the amount of urine  Side effects that usually do not require medical attention (report to your doctor or health care professional if they continue or are bothersome):    dizzy, drowsy    flushing    increased sweating    leg cramps    muscle aches and pains    pain at site where injected  This list may not describe all possible side effects. Call your doctor for medical advice about side effects. You may report side effects to FDA at 3-178-FDA-5672.  Where should I keep my medicine?  Keep out of the reach of children.  This drug is usually given in a hospital or clinic and will not be stored at home.  In rare cases, some brands of this medicine may be given at home. If you are using this medicine at home, you will be instructed on how to store this medicine. Throw away any unused medicine after the expiration date on the label.  NOTE: This sheet is a summary. It may not cover all possible information. If you have questions about this medicine, talk to your doctor, pharmacist, or health care provider.  NOTE:This sheet is a summary. It may not cover all possible information. If you have questions about this medicine, talk to your doctor, pharmacist, or health care provider. Copyright  2015 Gold Standard

## 2021-06-03 NOTE — PROGRESS NOTES
Avis Paul arrived for premeds and IGG  infusion. Labs drawn and results were reviewed. Avis Paul was educated on her plan of care. Each medication given today was reviewed prior to administration. Avis was premedicated with Acetaminophen 650 mg po, Diphenhydramine 12.5 mg IV, Famotidine 20 mg IV and Methylprednisolone 125 mg IV. IGG 25 gram IV infusion treatment was completed with no signs of reaction. IV site:Venous port patent throughout treatment with positive blood return obtained before and at completion. IV site with no pain, redness, swelling and drainage. IV site flushed with saline and heparin and deaccessed. Avis Paul has follow-up appointment scheduled on 12/17/2019. Avis Paul was discharged to home. She discharged from unit ambulatory per self at 1555. The after visit summary was given.     Katarzyna Cronin

## 2021-06-03 NOTE — PROGRESS NOTES
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 Mantle cell lymphoma status post autologous transplant, August 18, 2015: Then completed 2 years of maintenance Rituxan therapy.  She is currently on observation.  She did have a CT scan done in June and that showed no evidence of disease recurrence.  Dr. Starks will continue annual scans at this point.  She will return in 3 months to see Dr. Starks. Encourage patient to call us if she has any lymphadenopathy that she notices clinically before then.    2. Hypogammaglobulinemia with multiple recurrent infections:   She will get it every 4 weeks.  She will see Dr. Starks in 3 months and we will recheck her IVIG level at that time. She is interested in the subcutaneous at home IVIG..  I did tell her that she probably need to see an immunologist to discuss that.  I am reluctant to do that since she is having allergic reactions to the IV stuff but I am not clear if there is a correlation.    3. Renal insufficiency: likely from medications and chronic disease. Avoid nephrotoxic medications.  She is stable. Stay hydrated.  Keep blood pressure well controlled.    4. Hypothyroid: currently at 100mcg. Being managed by PCP. Will send results when back.     ECOG Performance   ECOG Performance Status: 1     Distress Assessment  Distress Assessment Score: 4:    Pain  Currently in Pain: Yes  Pain Score (Initial OR Reassessment): 6  Location: right foot and joints: using some OTC products. Seeing podiatrist. She will go back to see him      Problem List    1. 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 Acquired hypothyroidism  TSH       ______________________________________________________________________________    History of Present Illness    Measurable disease: CT scan, PET, Patient currently in remission     Current therapy: Maintenance Rituxan started December 2015 completed August 2017.  8 doses total.  Acyclovir 800 mg twice daily  IVIG: did get three doses at the [facility] on August 29, 2016,  last dose November 2016.   She was on hold due to insurance issues, but then in June has restarted and is getting monthly.       Treatment history:  First set of immunizations received at the [facility] in August 2016  First dose of IVIG  Patient completed 1 year of inhalational pentamidine  Autologous stem cell transplant with Beam prep, August 18, 2015  One cycle of R-ICE, Naye 15, 2015, ifosfamide and etoposide reduced by 25% because of renal dysfunction  Ibrutinib since October 10, 2014, current dose of 420 mg by mouth daily, stopped June 9, 2015   3 cycles of R-DHAP completed late August 2014   5 cycles of R CHOP, started after diagnosis in March 2014     Interim History:   Pt is here today for follow-up visit.  She states she is doing okay.  Her biggest issue is that she is having a lot of joint achiness and her right foot is causing a lot of problems.  Quite sore and stiff if she does not use it but when she walks on it too much and is very painful.  She is been seeing a podiatrist who has gotten a couple cortisone injections.  Has not really noticed any infections over the last couple of months.  She denies any fevers.  She does continue to have chronic fatigue.  Pain Status  Currently in Pain: Yes    Review of Systems    Constitutional  Constitutional (WDL): Exceptions to WDL  Fatigue: Concerns(related to right foot pain)  Neurosensory  Neurosensory (WDL): Exceptions to WDL  Peripheral Motor Neuropathy: Concerns(feet and hands )  Ataxia: Concerns  Dizziness: Concerns  Eye   Eye Disorder (WDL): Exceptions to WDL(glasses; cataracts from  "steroid use)  Ear  Ear Disorder (WDL): Exceptions to WDL  Ear Pain: Concerns(popping in right ear)  Cardiovascular  Cardiovascular (WDL): All cardiovascular elements are within defined limits  Pulmonary  Respiratory (WDL): Within Defined Limits  Gastrointestinal  Gastrointestinal (WDL): Exceptions to WDL  Diarrhea: Concerns(ongoing since vacation 1 month ago)  Genitourinary  Genitourinary (WDL): All genitourinary elements are within defined limits  Lymphatic  Lymph (WDL): All lymph disorder elements are within defined limits  Musculoskeletal and Connective Tissue  Musculoskeletal and Connetive Tissue Disorders (WDL): Exceptions to WDL  Arthralgia: Concerns  Myalgia: Concerns  Integumentary  Integumentary (WDL): All integumentary elements are within defined limits  Patient Coping  Patient Coping: Accepting;Open/discussion  Accompanied by  Accompanied by: Alone  Oral Chemo Adherence         Past History  Past Medical History:   Diagnosis Date     Anemia      Chronic kidney disease     elevated creatinine due to chemo     History of transfusion      Hypothyroid      Mantle cell lymphoma (H)     stage 4     Sleep apnea     uses cpap     Thrombocytopenia (H)        PHYSICAL EXAM:  /89   Pulse 70   Temp 97.8  F (36.6  C) (Oral)   Ht 5' 7.01\" (1.702 m)   Wt (!) 294 lb 8 oz (133.6 kg)   SpO2 98%   BMI 46.11 kg/m    GENERAL: no acute distress. Cooperative in conversation. Here alone.   HEENT: pupils are equal, round and reactive. Oromucosa is clean and intact. No ulcerations or mucositis noted. No bleeding noted.  RESP: lungs are clear bilaterally per auscultation. Regular respiratory rate. No wheezes or rhonchi.  CV: Regular, rate and rhythm. No murmurs.  ABD: soft, nontender.   MUSCULOSKELETAL: No lower extremity swelling.   NEURO: non focal. Alert and oriented x3.   PSYCH: No signs of anxiety or depression today  SKIN: warm dry.   LYMPH: no cervical, supraclavicular or axillary lymphadenopathy.     Lab " Results    Recent Results (from the past 168 hour(s))   Comprehensive Metabolic Panel   Result Value Ref Range    Sodium 141 136 - 145 mmol/L    Potassium 4.1 3.5 - 5.0 mmol/L    Chloride 108 (H) 98 - 107 mmol/L    CO2 26 22 - 31 mmol/L    Anion Gap, Calculation 7 5 - 18 mmol/L    Glucose 91 70 - 125 mg/dL    BUN 23 (H) 8 - 22 mg/dL    Creatinine 2.03 (H) 0.60 - 1.10 mg/dL    GFR MDRD Af Amer 31 (L) >60 mL/min/1.73m2    GFR MDRD Non Af Amer 26 (L) >60 mL/min/1.73m2    Bilirubin, Total 0.4 0.0 - 1.0 mg/dL    Calcium 9.2 8.5 - 10.5 mg/dL    Protein, Total 6.4 6.0 - 8.0 g/dL    Albumin 3.5 3.5 - 5.0 g/dL    Alkaline Phosphatase 107 45 - 120 U/L    AST 18 0 - 40 U/L    ALT 14 0 - 45 U/L     Lab Results   Component Value Date     (L) 08/27/2019     (L) 07/29/2019     07/01/2019     06/03/2019     05/06/2019     04/09/2019     03/11/2019     (L) 01/15/2019     12/17/2018     (L) 09/17/2018   ]  Imaging    No results found.      Signed by: Nanda Olivares, CNP

## 2021-06-04 VITALS
TEMPERATURE: 98.4 F | BODY MASS INDEX: 46.03 KG/M2 | DIASTOLIC BLOOD PRESSURE: 56 MMHG | OXYGEN SATURATION: 98 % | RESPIRATION RATE: 18 BRPM | WEIGHT: 293 LBS | HEART RATE: 70 BPM | SYSTOLIC BLOOD PRESSURE: 125 MMHG

## 2021-06-04 VITALS
SYSTOLIC BLOOD PRESSURE: 135 MMHG | WEIGHT: 293 LBS | OXYGEN SATURATION: 100 % | HEART RATE: 76 BPM | DIASTOLIC BLOOD PRESSURE: 79 MMHG | BODY MASS INDEX: 45.36 KG/M2 | TEMPERATURE: 97.7 F

## 2021-06-04 VITALS
OXYGEN SATURATION: 99 % | WEIGHT: 293 LBS | HEART RATE: 76 BPM | BODY MASS INDEX: 46.35 KG/M2 | TEMPERATURE: 98 F | DIASTOLIC BLOOD PRESSURE: 66 MMHG | SYSTOLIC BLOOD PRESSURE: 131 MMHG

## 2021-06-04 VITALS
HEIGHT: 67 IN | BODY MASS INDEX: 45.99 KG/M2 | SYSTOLIC BLOOD PRESSURE: 125 MMHG | WEIGHT: 293 LBS | HEART RATE: 70 BPM | OXYGEN SATURATION: 98 % | DIASTOLIC BLOOD PRESSURE: 56 MMHG | TEMPERATURE: 98.4 F

## 2021-06-04 VITALS
TEMPERATURE: 98.1 F | DIASTOLIC BLOOD PRESSURE: 74 MMHG | SYSTOLIC BLOOD PRESSURE: 113 MMHG | WEIGHT: 293 LBS | BODY MASS INDEX: 46.66 KG/M2 | HEART RATE: 67 BPM | OXYGEN SATURATION: 99 %

## 2021-06-04 VITALS
DIASTOLIC BLOOD PRESSURE: 74 MMHG | TEMPERATURE: 98.1 F | OXYGEN SATURATION: 99 % | SYSTOLIC BLOOD PRESSURE: 113 MMHG | WEIGHT: 293 LBS | HEART RATE: 67 BPM | BODY MASS INDEX: 46.66 KG/M2

## 2021-06-04 VITALS
HEART RATE: 72 BPM | RESPIRATION RATE: 18 BRPM | SYSTOLIC BLOOD PRESSURE: 145 MMHG | BODY MASS INDEX: 46.04 KG/M2 | WEIGHT: 293 LBS | DIASTOLIC BLOOD PRESSURE: 88 MMHG | OXYGEN SATURATION: 99 % | TEMPERATURE: 97.8 F

## 2021-06-04 VITALS
DIASTOLIC BLOOD PRESSURE: 84 MMHG | OXYGEN SATURATION: 100 % | WEIGHT: 293 LBS | RESPIRATION RATE: 18 BRPM | SYSTOLIC BLOOD PRESSURE: 118 MMHG | HEART RATE: 75 BPM | BODY MASS INDEX: 46.19 KG/M2 | TEMPERATURE: 97.7 F

## 2021-06-04 VITALS
TEMPERATURE: 97.8 F | SYSTOLIC BLOOD PRESSURE: 136 MMHG | DIASTOLIC BLOOD PRESSURE: 60 MMHG | HEART RATE: 72 BPM | OXYGEN SATURATION: 99 % | RESPIRATION RATE: 18 BRPM | BODY MASS INDEX: 46.66 KG/M2 | WEIGHT: 293 LBS

## 2021-06-04 VITALS
WEIGHT: 292 LBS | RESPIRATION RATE: 18 BRPM | BODY MASS INDEX: 45.72 KG/M2 | TEMPERATURE: 98.1 F | DIASTOLIC BLOOD PRESSURE: 62 MMHG | OXYGEN SATURATION: 93 % | HEART RATE: 70 BPM | SYSTOLIC BLOOD PRESSURE: 119 MMHG

## 2021-06-04 VITALS — HEIGHT: 68 IN | WEIGHT: 293 LBS | BODY MASS INDEX: 44.41 KG/M2

## 2021-06-04 VITALS — HEIGHT: 68 IN | BODY MASS INDEX: 44.41 KG/M2 | WEIGHT: 293 LBS

## 2021-06-04 VITALS — BODY MASS INDEX: 45.99 KG/M2 | WEIGHT: 293 LBS | HEIGHT: 67 IN

## 2021-06-04 VITALS
TEMPERATURE: 97.9 F | BODY MASS INDEX: 45.51 KG/M2 | OXYGEN SATURATION: 99 % | HEART RATE: 70 BPM | DIASTOLIC BLOOD PRESSURE: 69 MMHG | WEIGHT: 293 LBS | SYSTOLIC BLOOD PRESSURE: 119 MMHG

## 2021-06-04 VITALS
RESPIRATION RATE: 18 BRPM | WEIGHT: 293 LBS | HEART RATE: 76 BPM | TEMPERATURE: 98.1 F | BODY MASS INDEX: 45.31 KG/M2 | DIASTOLIC BLOOD PRESSURE: 71 MMHG | SYSTOLIC BLOOD PRESSURE: 109 MMHG | OXYGEN SATURATION: 100 %

## 2021-06-04 VITALS
BODY MASS INDEX: 45.27 KG/M2 | SYSTOLIC BLOOD PRESSURE: 135 MMHG | OXYGEN SATURATION: 99 % | HEART RATE: 98 BPM | WEIGHT: 293 LBS | DIASTOLIC BLOOD PRESSURE: 92 MMHG | TEMPERATURE: 98.3 F

## 2021-06-04 VITALS
HEART RATE: 72 BPM | DIASTOLIC BLOOD PRESSURE: 68 MMHG | SYSTOLIC BLOOD PRESSURE: 146 MMHG | BODY MASS INDEX: 45.92 KG/M2 | TEMPERATURE: 97.9 F | WEIGHT: 293 LBS | OXYGEN SATURATION: 99 %

## 2021-06-04 NOTE — PROGRESS NOTES
Avis Paul arrived for premedications and IGG infusion. Avis reports her feet remain very painful. That they hurt so bad she cries. She is planning to make a return visit to her podiatrist. Avis Paul was educated on her plan of care. Each medication given today was reviewed prior to administration. Pretty was premedicated with Acetaminophen 650 mg po, Methylprednisolone 125 mg IV, Famotidine 20 mg IV and Diphenhydramine 12.5 mg IV. IGG 25 gram IV infusion treatment was completed with no signs of reaction. IV site:Venous port patent throughout treatment with positive blood return obtained before and at completion. IV site with no pain, redness, swelling and drainage. IV site flushed with saline and heparin and deaccessed. Avis Paul has follow-up appointment scheduled on 01/14/2020. Avis Paul was discharged to home. She discharged from unit ambulatory per self at 1442. The after visit summary was declined.     Katarzyna Cronin

## 2021-06-05 ENCOUNTER — RECORDS - HEALTHEAST (OUTPATIENT)
Dept: ONCOLOGY | Facility: CLINIC | Age: 56
End: 2021-06-05

## 2021-06-05 VITALS
TEMPERATURE: 97.6 F | WEIGHT: 293 LBS | HEART RATE: 79 BPM | OXYGEN SATURATION: 100 % | DIASTOLIC BLOOD PRESSURE: 86 MMHG | SYSTOLIC BLOOD PRESSURE: 132 MMHG | BODY MASS INDEX: 45.89 KG/M2 | RESPIRATION RATE: 18 BRPM

## 2021-06-05 VITALS
SYSTOLIC BLOOD PRESSURE: 118 MMHG | DIASTOLIC BLOOD PRESSURE: 80 MMHG | HEART RATE: 82 BPM | OXYGEN SATURATION: 100 % | WEIGHT: 293 LBS | BODY MASS INDEX: 46.89 KG/M2 | TEMPERATURE: 98 F

## 2021-06-05 VITALS
WEIGHT: 291.4 LBS | SYSTOLIC BLOOD PRESSURE: 119 MMHG | RESPIRATION RATE: 18 BRPM | HEIGHT: 67 IN | BODY MASS INDEX: 45.74 KG/M2 | OXYGEN SATURATION: 94 % | HEART RATE: 79 BPM | TEMPERATURE: 97.5 F | DIASTOLIC BLOOD PRESSURE: 87 MMHG

## 2021-06-05 VITALS
BODY MASS INDEX: 46.52 KG/M2 | RESPIRATION RATE: 18 BRPM | WEIGHT: 293 LBS | DIASTOLIC BLOOD PRESSURE: 77 MMHG | HEART RATE: 87 BPM | TEMPERATURE: 98.3 F | OXYGEN SATURATION: 97 % | SYSTOLIC BLOOD PRESSURE: 115 MMHG

## 2021-06-05 VITALS
WEIGHT: 293 LBS | OXYGEN SATURATION: 96 % | RESPIRATION RATE: 16 BRPM | BODY MASS INDEX: 46.83 KG/M2 | SYSTOLIC BLOOD PRESSURE: 115 MMHG | TEMPERATURE: 98.4 F | HEART RATE: 75 BPM | DIASTOLIC BLOOD PRESSURE: 79 MMHG

## 2021-06-05 VITALS
OXYGEN SATURATION: 100 % | HEART RATE: 78 BPM | SYSTOLIC BLOOD PRESSURE: 138 MMHG | WEIGHT: 291.1 LBS | TEMPERATURE: 98 F | DIASTOLIC BLOOD PRESSURE: 78 MMHG | BODY MASS INDEX: 45.59 KG/M2

## 2021-06-05 VITALS
WEIGHT: 293 LBS | BODY MASS INDEX: 45.89 KG/M2 | SYSTOLIC BLOOD PRESSURE: 136 MMHG | TEMPERATURE: 97.9 F | DIASTOLIC BLOOD PRESSURE: 70 MMHG | OXYGEN SATURATION: 99 % | RESPIRATION RATE: 18 BRPM | HEART RATE: 80 BPM

## 2021-06-05 VITALS
BODY MASS INDEX: 44.76 KG/M2 | HEART RATE: 86 BPM | SYSTOLIC BLOOD PRESSURE: 125 MMHG | WEIGHT: 285.8 LBS | DIASTOLIC BLOOD PRESSURE: 86 MMHG

## 2021-06-05 DIAGNOSIS — C83.19 MANTLE CELL LYMPHOMA OF EXTRANODAL AND SOLID ORGAN SITES (H): ICD-10-CM

## 2021-06-05 NOTE — PROGRESS NOTES
"Avis Paul arrived using a knee wheely as she has a stress fracture in her right heel and arch of right foot. She is also wearing a \"walking boot\". Avis came in today for premedications and IGG infusion. Labs due in February. Avis Paul was educated on her plan of care. Each medication given today was reviewed prior to administration. Jayleen was premedicated with Acetaminophen 650 mg po, Methylprednisolone  125 mg IV, Famotidine 20 mg IV and Diphenhydramine 12.5 mg IV. IGG 25 gram IV infusion treatment was completed with no signs of reaction. She usually takes a nap while here for her treatment but was awake the entire time today. IV site:Venous port patent throughout treatment with positive blood return obtained before and at completion. IV site with no pain, redness, swelling and drainage. IV site flushed with saline and heparin and deaccessed. Avis Paul has follow-up appointment scheduled on 02/27/2020, and will see KAPIL Olivares CNP after her treatment that day. Avis Paul was discharged to home. She discharged with right knee on a knee wheely and pushing with her left foot and leg. The after visit summary was given.     Katarzyna Cronin    "

## 2021-06-06 NOTE — PROGRESS NOTES
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 Mantle cell lymphoma status post autologous transplant, August 18, 2015: Then completed 2 years of maintenance Rituxan therapy.  She is currently on observation.  She did have a CT scan done in June and that showed no evidence of disease recurrence.  Dr. Starks will continue annual scans at this point.  She will return in 3 months to see Dr. Starks with CT. Encourage patient to call us if she has any lymphadenopathy that she notices clinically before then.    2. Hypogammaglobulinemia with multiple recurrent infections:   She will get it every 4 weeks.  She will see Dr. Starks in 3 months and we will recheck her IVIG level at that time. She is interested in the subcutaneous at home IVIG..  I did tell her that she probably need to see an immunologist to discuss that.  I am reluctant to do that since she is having allergic reactions to the IV stuff but I am not clear if there is a correlation.    3. Renal insufficiency: likely from medications and chronic disease. Avoid nephrotoxic medications, especially NSAIDS.  She is stable. Stay hydrated.  Keep blood pressure well controlled.    4. Right foot injury: currently in boot. Follow up with ortho.     ECOG Performance   ECOG Performance Status: 1     Distress Assessment  Distress Assessment Score: Unable to rate:    Pain  Currently in Pain: Yes  Pain Score (Initial OR Reassessment): 5  Location: left hand, right foot, knees, left fingers: using some OTC products. Encourage her to not take NSAIDS or narcotics      Problem List    1. 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 Hypogammaglobulinemia, acquired (H)  Immunoglobulin G      ______________________________________________________________________________    History of Present Illness    Measurable disease: CT scan, PET, Patient currently in remission     Current therapy: Maintenance Rituxan started December 2015 completed August 2017.  8 doses total.  Acyclovir 800 mg twice daily  IVIG: did get three doses at the [facility] on August 29, 2016,  last dose November 2016.   She was on hold due to insurance issues, but then in June has restarted and is getting monthly.       Treatment history:  First set of immunizations received at the [facility] in August 2016  First dose of IVIG  Patient completed 1 year of inhalational pentamidine  Autologous stem cell transplant with Beam prep, August 18, 2015  One cycle of R-ICE, Naye 15, 2015, ifosfamide and etoposide reduced by 25% because of renal dysfunction  Ibrutinib since October 10, 2014, current dose of 420 mg by mouth daily, stopped June 9, 2015   3 cycles of R-DHAP completed late August 2014   5 cycles of R CHOP, started after diagnosis in March 2014     Interim History:   Pt is here today for follow-up visit.  She states she is doing okay.  Her biggest issue is that she torn a ligament in her right foot and has a broken heel. Wearing a boot and hoping to avoid surgery.  She denies any fevers.  She does continue to have chronic fatigue.      Pain Status  Currently in Pain: Yes    Review of Systems    Constitutional  Constitutional (WDL): Exceptions to WDL(had a new fall)  Neurosensory  Neurosensory (WDL): Exceptions to WDL  Peripheral Motor Neuropathy: Concerns  Ataxia: Concerns  Peripheral Sensory Neuropathy: Concerns  Eye   Eye Disorder (WDL): Exceptions to WDL  Blurred Vision: Concerns(having cataracts evaluated next month)  Ear  Ear Disorder (WDL): Exceptions to WDL(popping sounds on right ear)  Cardiovascular  Cardiovascular (WDL): All  "cardiovascular elements are within defined limits  Pulmonary  Respiratory (WDL): Within Defined Limits  Gastrointestinal  Gastrointestinal (WDL): All gastrointestinal elements are within defined limits  Genitourinary  Genitourinary (WDL): All genitourinary elements are within defined limits  Lymphatic  Lymph (WDL): All lymph disorder elements are within defined limits  Musculoskeletal and Connective Tissue  Musculoskeletal and Connetive Tissue Disorders (WDL): Exceptions to WDL(worsened after recent fall)  Arthralgia: Concerns  Bone Pain: Concerns  Integumentary  Integumentary (WDL): Exceptions to WDL(bruising on abdomen, thighs, elbow and left eye)  Patient Coping     Accompanied by  Accompanied by: Alone  Oral Chemo Adherence         Past History  Past Medical History:   Diagnosis Date     Anemia      Chronic kidney disease     elevated creatinine due to chemo     History of transfusion      Hypothyroid      Mantle cell lymphoma (H)     stage 4     Sleep apnea     uses cpap     Thrombocytopenia (H)        PHYSICAL EXAM:  /56   Pulse 70   Temp 98.4  F (36.9  C) (Oral)   Ht 5' 7.01\" (1.702 m)   Wt (!) 294 lb (133.4 kg)   SpO2 98%   BMI 46.03 kg/m    GENERAL: no acute distress. Cooperative in conversation. Here alone.   HEENT: pupils are equal, round and reactive. Oromucosa is clean and intact. No ulcerations or mucositis noted. No bleeding noted.  RESP: lungs are clear bilaterally per auscultation. Regular respiratory rate. No wheezes or rhonchi.  CV: Regular, rate and rhythm. No murmurs.  ABD: soft, nontender.   MUSCULOSKELETAL: No lower extremity swelling. Boot on right foot and using scooter.   NEURO: non focal. Alert and oriented x3.   PSYCH: No signs of anxiety or depression today  SKIN: warm dry.   LYMPH: no cervical, supraclavicular or axillary lymphadenopathy.     Lab Results    Recent Results (from the past 168 hour(s))   Comprehensive Metabolic Panel   Result Value Ref Range    Sodium 140 136 - " 145 mmol/L    Potassium 3.7 3.5 - 5.0 mmol/L    Chloride 107 98 - 107 mmol/L    CO2 24 22 - 31 mmol/L    Anion Gap, Calculation 9 5 - 18 mmol/L    Glucose 132 (H) 70 - 125 mg/dL    BUN 23 (H) 8 - 22 mg/dL    Creatinine 2.00 (H) 0.60 - 1.10 mg/dL    GFR MDRD Af Amer 31 (L) >60 mL/min/1.73m2    GFR MDRD Non Af Amer 26 (L) >60 mL/min/1.73m2    Bilirubin, Total 0.5 0.0 - 1.0 mg/dL    Calcium 8.7 8.5 - 10.5 mg/dL    Protein, Total 6.2 6.0 - 8.0 g/dL    Albumin 3.5 3.5 - 5.0 g/dL    Alkaline Phosphatase 96 45 - 120 U/L    AST 17 0 - 40 U/L    ALT 14 0 - 45 U/L   HM1 (CBC with Diff)   Result Value Ref Range    WBC 4.7 4.0 - 11.0 thou/uL    RBC 4.89 3.80 - 5.40 mill/uL    Hemoglobin 13.9 12.0 - 16.0 g/dL    Hematocrit 43.8 35.0 - 47.0 %    MCV 90 80 - 100 fL    MCH 28.4 27.0 - 34.0 pg    MCHC 31.7 (L) 32.0 - 36.0 g/dL    RDW 14.7 (H) 11.0 - 14.5 %    Platelets 173 140 - 440 thou/uL    MPV 10.1 8.5 - 12.5 fL    Neutrophils % 67 50 - 70 %    Lymphocytes % 24 20 - 40 %    Monocytes % 7 2 - 10 %    Eosinophils % 2 0 - 6 %    Basophils % 0 0 - 2 %    Neutrophils Absolute 3.1 2.0 - 7.7 thou/uL    Lymphocytes Absolute 1.1 0.8 - 4.4 thou/uL    Monocytes Absolute 0.3 0.0 - 0.9 thou/uL    Eosinophils Absolute 0.1 0.0 - 0.4 thou/uL    Basophils Absolute 0.0 0.0 - 0.2 thou/uL     Lab Results   Component Value Date     11/19/2019     (L) 08/27/2019     (L) 07/29/2019     07/01/2019     06/03/2019     05/06/2019     04/09/2019     03/11/2019     (L) 01/15/2019     12/17/2018   ]  Imaging    No results found.      Signed by: Nanda Olivares, CNP

## 2021-06-07 NOTE — PROGRESS NOTES
Avis Paul arrived for lab draw, premedication and IGG infusion. Lab results were reviewed, Lipid panel results within normal parameters. Avis Paul was educated on her plan of care. Each medication given today was reviewed prior to administration. Avis was premedicated with Acetaminophen 650 mg po, Methylprednisolone 125 mg IV, Famotidine 20 mg IV and Diphenhydramine 12.5 mg IV. IGG 25 gram IV infusion treatment was completed with no signs of reaction. (IGG rate was titrated every 15 min by 25 ml/hr to max tolerated rate of 175 ml/hr). IV site:Venous port patent throughout treatment with positive blood return obtained before and at completion. IV site with no redness, swelling, drainage. Avis reports her port is usually tender with access and de-access.  IV site flushed with saline and heparin and deaccessed. Avis Paul has follow-up appointment scheduled on 05/21/20 for CT scan, IGG infusion and MD appointment. Avis Paul was discharged to home. She discharged from unit ambulatory per self at 1509. She has not been wearing her walking boot. She reports her right foot is so painful in the evening that she is unable to walk. The after visit summary was declined.     Katarzyna Cronin

## 2021-06-07 NOTE — PROGRESS NOTES
Avis Paul arrived for labs, premedication and IGG infusion. Avis reports her right heel fracture is worse. Lab specimen drawn for TSH and results were reviewed, TSH 1.01 today. Avis Paul was educated on her plan of care. Each medication given today was reviewed prior to administration. Avis was premedicated with oral Acetaminophen and IV Famotidine, Methylprednisolone and Diphenhydramine. IGG 25 gram IV infusion treatment was completed with no signs of reaction. IV site:Venous port patent throughout treatment with positive blood return obtained before and at completion. IV site with no pain, redness, swelling and drainage. IV site flushed with saline and heparin and deaccessed. Avis Paul has follow-up appointment scheduled on 04/23/2020. Avis Paul was discharged to home. She discharged from unit ambulatory per self with walking boot on right lower leg/foot at 1450. The after visit summary was declined.     Katarzyna Cronin

## 2021-06-08 NOTE — PROGRESS NOTES
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  Discussed with patient and we will get PET scan for further evaluation.  May need biopsy after that.    No infection issues since February.  We will plan to continue IVIG every 4 weeks.    Plan: As above      Measurable disease: Patient currently in remission    Current therapy:   IVIG resumed in January 2017 and stopped in May 2018; resumed again in September 2018  Maintenance Rituxan started December 2015, and completed August 2017, 8 doses  Acyclovir 400 mg twice daily  IVIG every 3 months, first dose August 29, 2016(currently on hold, last dose November 2016)   IVIG every 4 weeks restarted in June 2017 and stopped in May 2018      Treatment history:  First set of immunizations received at the [facility] in August 2016  First dose of IVIG  Patient completed 1 year of inhalational pentamidine  Autologous stem cell transplant with Beam prep, August 18, 2015  One cycle of R-ICE, Naye 15, 2015, ifosfamide and etoposide reduced by 25% because of renal dysfunction  Ibrutinib since October 10, 2014, current dose of 420 mg by mouth daily, stopped June 9, 2015   3 cycles of R-DHAP completed late August 2014   5 cycles of R CHOP, started after diagnosis in March 2014       ECOG Performance   ECOG Performance Status: 1    Distress Assessment  Distress Assessment Score: 4    Pain  Pain Score (Initial OR Reassessment): 6        Problem List    1. Mantle cell lymphoma of  intra-abdominal lymph nodes (H)  NM PET CT Skull to Mid Thigh        CC: Sunil Tan MD    ______________________________________________________________________________    History of Present Illness    Pretty is here for reevaluation.  Seen about 3 months ago.  Continues IVIG every 4 weeks.  No major infection issues in the last 3 months as she has been away from school due to the pandemic.  Not able to walk because of plantar fasciitis and so has had some weight gain.  Continues to have fatigue.  No fever chills or sweats.  No bleeding.  No palpable adenopathy.    January 2018:   Ms. Avis Paul returns for a follow-up.  She was here for IVIG infusion yesterday and raise concerns about symptoms suggesting recurrence of lymphoma.  She had CT scans and is here to review results.  Has been having aches all over and increased fatigue and weight gain.  Describes some numbness in the right thigh area.  Also has been having memory issues since she received chemotherapy.  Notes an area of induration in the mouth on the lower right mandibular/jaw area.        November 2017:   She was seen 3 months ago.  She did have a visit shortly thereafter at the [facility] with bone marrow and CT scan showing that she is in complete remission from her mantle cell lymphoma.  She has continued IVIG infusions monthly between June - October 2017.  She was informed recently that the insurance would not cover this.  We had previously got this approved prior to starting infusions in June 2017.    She is currently having sinus symptoms and cough productive of greenish phlegm.  No other infection issues in the last 3 months.  She is in significant distress because of the insurance issues.    No other new symptoms or problems.  ECOG status is 0.    August 2017:   She was seen 4 weeks ago.  She has received 2 doses of IVIG.  No infusion reaction with the last one.  Her last infection was a sinus infection more than 4 weeks ago.  She  "has some discomfort in the right ear.  No fever or mild sores.  No shortness of breath or cough.  No new adenopathy.  She has been starting to have some loose bowel moments 2-3 times a day in the last 3 weeks.  No other new problems.    Pain Status  Currently in Pain: Yes    Review of Systems    Constitutional  Constitutional (WDL): Exceptions to WDL  Fatigue: Fatigue not relieved by rest - Limiting instrumental ADL  Neurosensory  Neurosensory (WDL): Exceptions to WDL  Ataxia: Moderate symptoms, limiting instrumental ADL  Peripheral Sensory Neuropathy: Moderate symptoms, limiting instrumental ADL  Cardiovascular  Cardiovascular (WDL): All cardiovascular elements are within defined limits  Pulmonary  Respiratory (WDL): Within Defined Limits  Gastrointestinal  Gastrointestinal (WDL): All gastrointestinal elements are within defined limits  Genitourinary  Genitourinary (WDL): All genitourinary elements are within defined limits  Integumentary  Integumentary (WDL): Exceptions to WDL(\"I bruise easily.\")  Patient Coping  Patient Coping: Accepting  Distress Assessment  Distress Assessment Score: 4  Accompanied by  Accompanied by: Alone    Past History  Past Medical History:   Diagnosis Date     Anemia      Chronic kidney disease     elevated creatinine due to chemo     History of transfusion      Hypothyroid      Mantle cell lymphoma (H)     stage 4     Sleep apnea     uses cpap     Thrombocytopenia (H)          Past Surgical History:   Procedure Laterality Date     ANTERIOR / POSTERIOR COMBINED FUSION CERVICAL SPINE  2011    C2-C7     CARPAL TUNNEL RELEASE Right 2000     CERVICAL FUSION  2004    C4, C5, C6.  Pt. states surgery x 2     KIDNEY STONE SURGERY  1999    removal     KS BX/REMV,LYMPH NODE,DEEP AXILL Right 6/3/2015    Procedure: RIGHT INGUINAL LYMPH NODE BIOPSY;  Surgeon: Clayton Burgos MD;  Location: [facility];  Service: General     TUBAL LIGATION  2004       Physical Exam    Recent Vitals 5/21/2020 "   Weight 298 lbs   BSA (m2) 2.53 m2   /74   Pulse 67   Temp 98.1   Temp src -   SpO2 99   Some recent data might be hidden       GENERAL: Alert and oriented. Seated comfortably. In no distress.    HEAD: Atraumatic and normocephalic.  Has a full head of hair.    EYES: ROMEO, EOMI.  No pallor.  No icterus.    Oral cavity: no mucosal lesion or tonsillar enlargement.  Induration right mandibular lower jaw area    NECK: supple. JVP normal.  No thyroid enlargement.    LYMPH NODES: No palpable, cervical, axillary or inguinal lymphadenopathy.    CHEST: She has slight bilateral wheezing noted  Resonant to percussion throughout bilaterally.  Symmetrical breath movements bilaterally.    CVS: S1 and S2 are heard. Regular rate and rhythm.  No murmur or gallop or rub heard.    ABDOMEN: Soft. Not tender. Not distended.  No palpable hepatomegaly or splenomegaly.  No other mass palpable.  Bowel sounds heard.    EXTREMITIES: Warm.  No peripheral edema.    SKIN: no rash, or bruising or purpura.    CNS: Nonfocal        Lab Results    Recent Results (from the past 168 hour(s))   Comprehensive Metabolic Panel   Result Value Ref Range    Sodium 142 136 - 145 mmol/L    Potassium 4.1 3.5 - 5.0 mmol/L    Chloride 110 (H) 98 - 107 mmol/L    CO2 24 22 - 31 mmol/L    Anion Gap, Calculation 8 5 - 18 mmol/L    Glucose 86 70 - 125 mg/dL    BUN 30 (H) 8 - 22 mg/dL    Creatinine 2.09 (H) 0.60 - 1.10 mg/dL    GFR MDRD Af Amer 30 (L) >60 mL/min/1.73m2    GFR MDRD Non Af Amer 25 (L) >60 mL/min/1.73m2    Bilirubin, Total 0.4 0.0 - 1.0 mg/dL    Calcium 9.0 8.5 - 10.5 mg/dL    Protein, Total 6.3 6.0 - 8.0 g/dL    Albumin 3.5 3.5 - 5.0 g/dL    Alkaline Phosphatase 106 45 - 120 U/L    AST 21 0 - 40 U/L    ALT 15 0 - 45 U/L   Immunoglobulin G   Result Value Ref Range    Immunoglobulin G 681 (L) 700-1,700 mg/dL   HM1 (CBC with Diff)   Result Value Ref Range    WBC 5.8 4.0 - 11.0 thou/uL    RBC 4.80 3.80 - 5.40 mill/uL    Hemoglobin 13.8 12.0 - 16.0 g/dL     Hematocrit 43.0 35.0 - 47.0 %    MCV 90 80 - 100 fL    MCH 28.8 27.0 - 34.0 pg    MCHC 32.1 32.0 - 36.0 g/dL    RDW 14.6 (H) 11.0 - 14.5 %    Platelets 170 140 - 440 thou/uL    MPV 10.1 8.5 - 12.5 fL    Neutrophils % 70 50 - 70 %    Lymphocytes % 19 (L) 20 - 40 %    Monocytes % 8 2 - 10 %    Eosinophils % 2 0 - 6 %    Basophils % 1 0 - 2 %    Neutrophils Absolute 4.0 2.0 - 7.7 thou/uL    Lymphocytes Absolute 1.1 0.8 - 4.4 thou/uL    Monocytes Absolute 0.5 0.0 - 0.9 thou/uL    Eosinophils Absolute 0.1 0.0 - 0.4 thou/uL    Basophils Absolute 0.0 0.0 - 0.2 thou/uL       Imaging    Ct Chest Abdomen Pelvis Without Oral Without Iv Contrast    Result Date: 5/21/2020  EXAM: CT CHEST ABDOMEN PELVIS WO ORAL WO IV CONTRAST LOCATION: [facility] DATE/TIME: 5/21/2020 8:41 AM INDICATION: History of lymphoma. COMPARISON: CT scan of the chest, abdomen and pelvis, 7/17/2018. TECHNIQUE: CT scan of the chest, abdomen, and pelvis was performed without IV contrast. Multiplanar reformats were obtained. Dose reduction techniques were used. CONTRAST: None. FINDINGS: LUNGS AND PLEURA: There is a mild amount of subpleural groundglass opacity involving the bilateral lung bases. No pleural effusion or pneumothorax. MEDIASTINUM/AXILLAE: Heart size is normal. The thoracic aorta is normal in caliber. No lymphadenopathy. HEPATOBILIARY: There are a few faint subcentimeter low-dense lesions within the liver which are unchanged. PANCREAS: Normal. SPLEEN: Normal. ADRENAL GLANDS: Normal. KIDNEYS/BLADDER: Normal. BOWEL: Normal. LYMPH NODES: There is a new 3.8 x 4.5 cm soft tissue mass along the left internal iliac chain (series 2 image 98). This abuts the left S1 neural foramina. VASCULATURE: Unremarkable. PELVIC ORGANS: Urinary bladder is grossly normal. MUSCULOSKELETAL: No suspicious osseous lesions.     New 3.8 x 4.5 cm soft tissue mass along the left internal iliac chain abutting the S1 neural foramina. Given history, an enlarged lymph  node in the setting of recurrent lymphoma is likely. Given location adjacent to the S1 neural foramina, nerve sheath tumor is also a possibility. If warranted, a pelvic MRI could be performed for clarification.            Signed by: Yelena Starks MD

## 2021-06-08 NOTE — PROGRESS NOTES
General Surgery Consultation    Consulting Provider: Dr. Tan    CC: mantle cell lymphoma, inguinal lymphadenopathy    History:   Avis Paul is a 55 y.o. female referred to see me for left inguinal lymphadenopathy. Pt has history of mantle cell lymphoma s/p chemotherapy and autologous stem cell transplant August 2015 as well as IVIG therapy. In May 2020, she was found to have new pelvic and left inguinal lymphadenopathy concerning for recurrent disease. An US guided biopsy and needle aspiration was attempted but the results showed normal lymphocytic cells. She was referred for a surgical biopsy of her left inguinal lymph node.    Pt states she feels about the same overall. Is sore in her left groin from previous procedures but denies feeling any masses. Denies fever or chills.    Allergies:  Sulfa (sulfonamide antibiotics); Zofran odt [ondansetron]; Aspirin; Codeine; and Penicillins    Past medical history:  Past Medical History:   Diagnosis Date     Anemia      Chronic kidney disease     elevated creatinine due to chemo     History of transfusion      Hypothyroid      Mantle cell lymphoma (H)     stage 4     Sleep apnea     uses cpap     Thrombocytopenia (H)        Past surgical history:  Past Surgical History:   Procedure Laterality Date     ANTERIOR / POSTERIOR COMBINED FUSION CERVICAL SPINE  2011    C2-C7     CARPAL TUNNEL RELEASE Right 2000     CERVICAL FUSION  2004    C4, C5, C6.  Pt. states surgery x 2     KIDNEY STONE SURGERY  1999    removal     CO BX/REMV,LYMPH NODE,DEEP AXILL Right 6/3/2015    Procedure: RIGHT INGUINAL LYMPH NODE BIOPSY;  Surgeon: Clayton Burgos MD;  Location: [facility];  Service: General     TUBAL LIGATION  2004     US LYMPH NODE BIOPSY  6/10/2020       Medications:    Current Outpatient Medications:      acetaminophen (TYLENOL) 500 MG tablet, Take 500-1,000 mg by mouth every 6 (six) hours as needed for pain., Disp: , Rfl:      acyclovir (ZOVIRAX) 400 MG tablet, Take  "400 mg by mouth 2 (two) times a day., Disp: , Rfl:      albuterol (PROAIR HFA;PROVENTIL HFA;VENTOLIN HFA) 90 mcg/actuation inhaler, Inhale 2 puffs every 4 (four) hours as needed., Disp: , Rfl:      fluticasone (FLONASE) 50 mcg/actuation nasal spray, 1 spray into each nostril as needed .   , Disp: , Rfl:      FOLIC ACID/MULTIVIT-MINERALS (ADULT MULTIVITAMIN GUMMIES ORAL), Take 2 tablets by mouth once daily. 2 gummies = daily suggested dose, Disp: , Rfl:      HEMP OIL OR EXTRACT OR OTHER CBD CANNABINOID, NOT MEDICAL CANNABIS,, One gummy a day, Disp: , Rfl:      levothyroxine (SYNTHROID, LEVOTHROID) 100 MCG tablet, Take 100 mcg by mouth daily., Disp: , Rfl:      loratadine (CLARITIN) 10 mg tablet, Take 10 mg by mouth daily as needed.    , Disp: , Rfl:      montelukast (SINGULAIR) 10 mg tablet, Take 1 tablet (10 mg total) by mouth at bedtime., Disp: 30 tablet, Rfl: 0     triamcinolone (KENALOG) 0.1 % cream, Apply a small amount to affected area twice a day, Disp: , Rfl: 1  No current facility-administered medications for this visit.     Family history:  Family History   Problem Relation Age of Onset     Atrial fibrillation Mother      Diabetes Father      Arthritis Sister      Arthritis Brother      Anesthesia problems Neg Hx        Social history:   reports that she has never smoked. She has never used smokeless tobacco. She reports that she does not drink alcohol or use drugs.    Review of Systems:  General: No complaints or constitutional symptoms  Skin: see HPI  Hematologic/Lymphatic: No symptoms or complaints  Psychiatric: No symptoms or complaints  Endocrine: No excessive fatigue, no hypermetabolic symptoms reported  Respiratory: No cough, shortness of breath, or wheezing  Cardiovascular: No chest pain or dyspnea on exertion  Gastrointestinal: no complaints  Musculoskeletal: No recent injuries reported  Neurological: No focal neurologic defects reported    Exam:  Ht 5' 8\" (1.727 m)   Wt (!) 298 lb (135.2 kg)   " BMI 45.31 kg/m    Body mass index is 45.31 kg/m .  General : Alert, cooperative, appears stated age   Skin:  bilateral groins examined, small ecchymosis in left groin, tender to palpation, unable to palpate any discrete nodes due to body habitus  Lymphatic: No palpable adenopathy, no swelling   Eyes: No scleral icterus, pupils equal  HENT: No traumatic injury to the head or face, no gross abnormalities  Lungs: Normal respiratory effort  Heart: Regular rate and rhythm  Abdomen: obese, soft, NT, ND  Musculoskeletal: No obvious swelling  Neurologic: Grossly intact    Labs:  Lab Results   Component Value Date    WBC 5.8 05/21/2020    WBC 9.9 07/09/2014    HGB 13.8 05/21/2020    HCT 43.0 05/21/2020    MCV 90 05/21/2020     05/21/2020         Lab Results   Component Value Date    ALT 15 05/21/2020    AST 21 05/21/2020    ALKPHOS 106 05/21/2020    BILITOT 0.4 05/21/2020       Imaging:   Pertinent images personally reviewed by myself and discussed with the patient.  Radiology reports:  NM Pet 5/21-IMPRESSION:      Findings suspicious for active lymphoma involving lymph nodes from just above the aortic bifurcation to the left inguinal region. The left inguinal lymph node is amenable to ultrasound-guided histologic sampling if desired.    Assessment/Plan:   Avis Paul is a 55 y.o. female h/o mantle cell lymphoma now with new pelvic and left inguinal lymphadenopathy concerning for recurrent disease. Given her body habitus and how deep the inguinal lymph node is on her CT scan, would recommend needle localized excisional biopsy of left inguinal lymph node.    The risks of surgery were discussed in detail which include, but are not limited to, bleeding, infection, lymphatic leak, seroma, injury to surrounding structures.     She understands everything which was discussed and has consented to proceed with a needle localized excisional left inguinal lymph node biopsy.  We will schedule surgery accordingly, likely next  week.    Catalina Pascal MD  General Surgery  [facility]  [phone]

## 2021-06-08 NOTE — PRE-PROCEDURE
Pre-Procedure Negative COVID Test     Avis Paul  COVID-19 PCR Results    COVID-19 PCR Results 6/7/20 2019-nCOV Not Detected      Comments are available for some flowsheets but are not being displayed.             Patient Notification  Patient notified of the negative COVID test result    Patient Information  Patient informed of the no visitor policy  Patient instructed to continue to self-quarantine prior to procedure  Patient informed to contact the ordering provider if the following symptoms develop prior to procedure:   Fever  Cough  Shortness of Breath  Sore throat   Runny or stuffy nose  Muscle or body aches  Headaches  Fatigue  Vomiting or diarrhea   Rash    Pancho Rashid

## 2021-06-08 NOTE — PROGRESS NOTES
Avis Paul arrived for premeds and IGG infusion. Labs drawn from port (was a little positional) and results were reviewed. Avis Paul was educated on her plan of care. Each medication given today was reviewed prior to administration. Pretty was premedicated with Acetaminophen 650 mg po, Methylprednisolone 125 mg IVP, Famotidine 20 mg IVP and Diphenhydramine 12.5 mg IVP. IGG 25 gram IV infusion treatment was completed with no signs of reaction. IV site:Venous port patent throughout treatment with positive blood return obtained before and at completion. IV site with no pain, redness, swelling and drainage. IV site flushed with saline and heparin and deaccessed. Avis Paul has follow-up appointment scheduled on 06/18/2020 for next IV IGG infusion. Avis Paul was discharged to [facility] appointment. She discharged from OP infusion ambulatory at 1409. The after visit summary was given.     Katarzyna Cronin

## 2021-06-08 NOTE — PRE-PROCEDURE
Pre-Procedure Negative COVID Test     Avis Paul  COVID-19 PCR Results    COVID-19 PCR Results 6/7/20 2019-nCOV Not Detected      Comments are available for some flowsheets but are not being displayed.             Patient Notification  Patient not notified of the negative COVID test result     Left message, no call back at this time.   Pancho Rashid

## 2021-06-08 NOTE — PATIENT INSTRUCTIONS - HE
Patient Education     Immune Globulin Solution for injection  What is this medicine?  IMMUNE GLOBULIN (im MUNE GLOB yoselyn mckeon) helps to prevent or reduce the severity of certain infections in patients who are at risk. This medicine is collected from the pooled blood of many donors. It is used to treat immune system problems, thrombocytopenia, and Kawasaki syndrome.  This medicine may be used for other purposes; ask your health care provider or pharmacist if you have questions.  What should I tell my health care provider before I take this medicine?  They need to know if you have any of these conditions:    diabetes    extremely low or no immune antibodies in the blood    heart disease    history of blood clots    hyperprolinemia    infection in the blood, sepsis    kidney disease    taking medicine that may change kidney function - ask your health care provider about your medicine    an unusual or allergic reaction to human immune globulin, albumin, maltose, sucrose, polysorbate 80, other medicines, foods, dyes, or preservatives    pregnant or trying to get pregnant    breast-feeding  How should I use this medicine?  This medicine is for injection into a muscle or infusion into a vein or skin. It is usually given by a health care professional in a hospital or clinic setting.  In rare cases, some brands of this medicine might be given at home. You will be taught how to give this medicine. Use exactly as directed. Take your medicine at regular intervals. Do not take your medicine more often than directed.  Talk to your pediatrician regarding the use of this medicine in children. Special care may be needed.  Overdosage: If you think you have taken too much of this medicine contact a poison control center or emergency room at once.  NOTE: This medicine is only for you. Do not share this medicine with others.  What if I miss a dose?  It is important not to miss your dose. Call your doctor or health care professional if you  are unable to keep an appointment. If you give yourself the medicine and you miss a dose, take it as soon as you can. If it is almost time for your next dose, take only that dose. Do not take double or extra doses.  What may interact with this medicine?    aspirin and aspirin-like medicines    cisplatin    cyclosporine    medicines for infection like acyclovir, adefovir, amphotericin B, bacitracin, cidofovir, foscarnet, ganciclovir, gentamicin, pentamidine, vancomycin    NSAIDS, medicines for pain and inflammation, like ibuprofen or naproxen    pamidronate    vaccines    zoledronic acid  This list may not describe all possible interactions. Give your health care provider a list of all the medicines, herbs, non-prescription drugs, or dietary supplements you use. Also tell them if you smoke, drink alcohol, or use illegal drugs. Some items may interact with your medicine.  What should I watch for while using this medicine?  Your condition will be monitored carefully while you are receiving this medicine.  This medicine is made from pooled blood donations of many different people. It may be possible to pass an infection in this medicine. However, the donors are screened for infections and all products are tested for HIV and hepatitis. The medicine is treated to kill most or all bacteria and viruses. Talk to your doctor about the risks and benefits of this medicine.  Do not have vaccinations for at least 14 days before, or until at least 3 months after receiving this medicine.  What side effects may I notice from receiving this medicine?  Side effects that you should report to your doctor or health care professional as soon as possible:    allergic reactions like skin rash, itching or hives, swelling of the face, lips, or tongue    breathing problems    chest pain or tightness    fever, chills    headache with nausea, vomiting    neck pain or difficulty moving neck    pain when moving eyes    pain, swelling, warmth in  the leg    problems with balance, talking, walking    sudden weight gain    swelling of the ankles, feet, hands    trouble passing urine or change in the amount of urine  Side effects that usually do not require medical attention (report to your doctor or health care professional if they continue or are bothersome):    dizzy, drowsy    flushing    increased sweating    leg cramps    muscle aches and pains    pain at site where injected  This list may not describe all possible side effects. Call your doctor for medical advice about side effects. You may report side effects to FDA at 8-377-FDA-8745.  Where should I keep my medicine?  Keep out of the reach of children.  This drug is usually given in a hospital or clinic and will not be stored at home.  In rare cases, some brands of this medicine may be given at home. If you are using this medicine at home, you will be instructed on how to store this medicine. Throw away any unused medicine after the expiration date on the label.  NOTE: This sheet is a summary. It may not cover all possible information. If you have questions about this medicine, talk to your doctor, pharmacist, or health care provider.  NOTE:This sheet is a summary. It may not cover all possible information. If you have questions about this medicine, talk to your doctor, pharmacist, or health care provider. Copyright  2015 [employer]

## 2021-06-08 NOTE — PROGRESS NOTES
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  Reviewed PET scan with radiology and other adenopathy will be very difficult to access for needle biopsy.  Best option may be surgical excision of the left groin lymph node and I will refer her to surgery for this biopsy.  We will plan to see her back in a couple of weeks to review results and make further recommendations about treatment.    She has low burden of disease based on PET scan.  Reviewed various treatment options ranging from single agent therapy with ibrutinib, lenalidomide and Velcade.  Combination chemotherapy including bendamustine and Rituxan could be an option as well.  She did speak to her transplant physician and there is clinical trial available at the [facility] with combination of ibrutinib and immunotherapy which would seem to be a very appropriate option.  Aggressive approach with allogenic transplant is another consideration but apparently her insurance is changed and this would have to be done at the [facility].  We do have to be careful about her renal dysfunction.  Overall given potentially low burden of disease and that she is asymptomatic I would favor starting with less aggressive treatment options.    Patient's questions answered.    We will continue with immunoglobulin therapy this Thursday and every 4 weeks.    Plan: Check LDH  Referred to general surgery to plan  excisional biopsy of the left inguinal lymph node  Follow-up with me in 2 weeks  She will make appointment with her transplant physician, Dr. Rose,  to discuss clinical trial options with [facility]        Measurable disease: Patient currently in remission    Current therapy:   IVIG resumed in January 2017 and stopped in May 2018; resumed again in September 2018  Maintenance Rituxan started December 2015, and completed August 2017, 8 doses  Acyclovir 400 mg twice daily  IVIG every 3 months, first dose August 29, 2016(currently on hold, last dose November 2016)   IVIG every 4 weeks restarted in June 2017 and stopped in May 2018      Treatment history:  First set of immunizations received at the [facility] in August 2016  First dose of IVIG  Patient completed 1 year of inhalational pentamidine  Autologous stem cell transplant with Beam prep, August 18, 2015  One cycle of R-ICE, Naye 15, 2015, ifosfamide and etoposide reduced by 25% because of renal dysfunction  Ibrutinib since October 10, 2014, current dose of 420 mg by mouth daily, stopped June 9, 2015   3 cycles of R-DHAP completed late August 2014   5 cycles of R CHOP, started after diagnosis in March 2014       ECOG Performance   ECOG Performance Status: 1    Distress Assessment  Distress Assessment Score: 4    Pain           Problem List    1. Mantle cell lymphoma of lymph nodes of inguinal region (H)  Ambulatory referral to General Surgery    LD(LDH)        CC: Sunil Tan MD    ______________________________________________________________________________    History of Present Illness    Pretty is here for reevaluation.  She was seen 3 weeks ago.  CT scan had showed new adenopathy.  PET scan confirmed the same.  She has had lymph node biopsy and is here to review results.  Describes some pelvic and lower back pain.  Otherwise doing fine.  Reviewed biopsy results which show no malignancy.  Discussed options and she will probably need  excisional biopsy of the left inguinal lymph node is other lymph nodes are difficult to access for biopsy..    January 2018:   Ms. Avis Paul returns for a follow-up.  She was here for IVIG infusion yesterday and raise concerns about symptoms suggesting recurrence of lymphoma.  She had CT scans and is here to review results.  Has been having aches all over and increased fatigue and weight gain.  Describes some numbness in the right thigh area.  Also has been having memory issues since she received chemotherapy.  Notes an area of induration in the mouth on the lower right mandibular/jaw area.        November 2017:   She was seen 3 months ago.  She did have a visit shortly thereafter at the [facility] with bone marrow and CT scan showing that she is in complete remission from her mantle cell lymphoma.  She has continued IVIG infusions monthly between June - October 2017.  She was informed recently that the insurance would not cover this.  We had previously got this approved prior to starting infusions in June 2017.    She is currently having sinus symptoms and cough productive of greenish phlegm.  No other infection issues in the last 3 months.  She is in significant distress because of the insurance issues.    No other new symptoms or problems.  ECOG status is 0.    August 2017:   She was seen 4 weeks ago.  She has received 2 doses of IVIG.  No infusion reaction with the last one.  Her last infection was a sinus infection more than 4 weeks ago.  She has some discomfort in the right ear.  No fever or mild sores.  No shortness of breath or cough.  No new adenopathy.  She has been starting to have some loose bowel moments 2-3 times a day in the last 3 weeks.  No other new problems.    Pain Status  Currently in Pain: No/denies    Review of Systems    Constitutional  Constitutional (WDL): Exceptions to WDL  Fatigue: Fatigue not relieved by rest - Limiting instrumental ADL  Neurosensory  Neurosensory (WDL): Exceptions  to WDL  Peripheral Motor Neuropathy: Moderate symptoms, limiting instrumental ADL  Peripheral Sensory Neuropathy: Moderate symptoms, limiting instrumental ADL  Cardiovascular  Cardiovascular (WDL): All cardiovascular elements are within defined limits  Pulmonary  Respiratory (WDL): Within Defined Limits  Gastrointestinal  Gastrointestinal (WDL): All gastrointestinal elements are within defined limits  Genitourinary  Genitourinary (WDL): All genitourinary elements are within defined limits  Integumentary  Integumentary (WDL): Exceptions to WDL(Bruises easily.)  Patient Coping  Patient Coping: Accepting  Distress Assessment  Distress Assessment Score: 4  Accompanied by  Accompanied by: Alone    Past History  Past Medical History:   Diagnosis Date     Anemia      Chronic kidney disease     elevated creatinine due to chemo     History of transfusion      Hypothyroid      Mantle cell lymphoma (H)     stage 4     Sleep apnea     uses cpap     Thrombocytopenia (H)          Past Surgical History:   Procedure Laterality Date     ANTERIOR / POSTERIOR COMBINED FUSION CERVICAL SPINE  2011    C2-C7     CARPAL TUNNEL RELEASE Right 2000     CERVICAL FUSION  2004    C4, C5, C6.  Pt. states surgery x 2     KIDNEY STONE SURGERY  1999    removal     W[place] BX/REMV,LYMPH NODE,DEEP AXILL Right 6/3/2015    Procedure: RIGHT INGUINAL LYMPH NODE BIOPSY;  Surgeon: Clayton Burgos MD;  Location: [facility];  Service: General     TUBAL LIGATION  2004      LYMPH NODE BIOPSY  6/10/2020       Physical Exam    Recent Vitals 6/15/2020   Height -   Weight 298 lbs 5 oz   BSA (m2) 2.55 m2   /79   Pulse 76   Temp 97.7   Temp src 1   SpO2 100   Some recent data might be hidden       GENERAL: Alert and oriented. Seated comfortably. In no distress.    HEAD: Atraumatic and normocephalic.  Has a full head of hair.    EYES: ROMEO, EOMI.  No pallor.  No icterus.    Oral cavity: no mucosal lesion or tonsillar enlargement.  Induration right  mandibular lower jaw area    NECK: supple. JVP normal.  No thyroid enlargement.    LYMPH NODES: No palpable, cervical, axillary or inguinal lymphadenopathy.    CHEST: She has slight bilateral wheezing noted  Resonant to percussion throughout bilaterally.  Symmetrical breath movements bilaterally.    CVS: S1 and S2 are heard. Regular rate and rhythm.  No murmur or gallop or rub heard.    ABDOMEN: Soft. Not tender. Not distended.  No palpable hepatomegaly or splenomegaly.  No other mass palpable.  Bowel sounds heard.    EXTREMITIES: Warm.  No peripheral edema.    SKIN: no rash, or bruising or purpura.    CNS: Nonfocal        Lab Results    Recent Results (from the past 168 hour(s))   Medical cytology   Result Value Ref Range    Case Report       Medical Cytology                                  Case: [id number]                                   Authorizing Provider:  Yelena Starks       Collected:           06/10/2020 1318                                     MD Carrillo                                                                 Ordering Location:     [facility]        Received:            06/10/2020 1413                                     Ultrasound                                                                   Pathologist:           Randy Love MD                                                        Specimen:    Lymph Node, left inguinal lymph node                                                       Final Diagnosis       LEFT INGUINAL LYMPH NODE, NEEDLE CORE BIOPSIES:    -  MIXED LYMPHOID INFILTRATE WITH NO COMPELLING EVIDENCE OF MALIGNANCY     -  UNREMARKABLE FLOW CYTOMETRY ()    -  PENDING FISH STUDIES (11;14, [facility])    Comment       The history of mantle cell lymphoma (for example [id number]) is reviewed. Flow cytometry and immunoarchitecture demonstrate no compelling evidence of recurrent/residual disease, pending FISH studies. We certainly could perform PCR on the current  specimen if requested; however, consideration for additional sampling is suggested if clinically indicated. The results of the FISH study will be incorporated into the final report.     Cytology and histology demonstrate a mixed lymphoid infiltrate. Histology demonstrates typical findings for an inguinal lymph node (fibrosis with fibrofatty soft tissue, paracortical hyperplasia). Immunohistochemistry demonstrates a generally unremarkable mixture of CD20(+) B-cells with more abundant but unremarkable-appearing CD3(+) T-cells. CD21 and CD23 highlight follicular dendritic cell meshworks that colocalize with BCL-6(+), CD10(+), BCL-2(-) germinal center B-cells; this immunophenotype tends to argue against follicular lymphoma. The proliferation rate by  Ki-67 outside of germinal centers is somewhat elevated to perhaps 10-20%. Cyclin D1 is negative. MUM-1 demonstrates scattered stain, nonspecific. An OLGA keratin stain is negative for carcinoma. Clinical correlation is suggested. All controls stain appropriately.    Microscopic Description       Microscopic examination performed, substantiating the above diagnosis.    Clinical Information       history mantle cell lymphoma in remission, now with abn PET    Specimen Description       Biopsy was performed under Ultrasound guidance by Dr. Tyron Kelly with 6 pass(es) from which:                 3 Air-dried smear(s)  1 RPMI  1 cell/tissue block slides are prepared.     Assisted by:  SERVANDO Hopkins    Specimen Processing      Initial Cytologic Evaluation       Site #1, Episode #1, # Passes 6: (3 aspirates for flow)(3 cores) Diagnostic. KDP    Charges       CPT:  07377, 99068, 26602, 96495, 88341 x9  ICD-10:  C85.90      cc:  Tyron Kelly MD   Flow cytometry   Result Value Ref Range    Case Report       Flow Cytometry Report                             Case: [id number]                                    Authorizing Provider:  Yelena Starks       Collected:            06/10/2020 1318                                     MD Carrillo                                                                 Ordering Location:     [facility]        Received:            06/11/2020 0723                                     Ultrasound                                                                   Pathologist:           Randy Love MD                                                        Specimen:    Lymph Node(s), Inguinal, Left, FNA                                                         Diagnosis       LEFT INGUINAL LYMPH NODE, FINE NEEDLE ASPIRATION WITH FLOW CYTOMETRY IMMUNOPHENOTYPIC CHARACTERIZATION:     -  AT MOST 1-2% CD5/CD20(+) B-CELLS    Comment       The history of mantle cell lymphoma is reviewed. Preliminary review of immunoarchitecture demonstrates no compelling evidence of malignancy. Pending FISH studies. We could perform PCR on the current cytology specimen ([id number]); however, consideration for additional sampling is suggested if clinically indicated, in particular if the FISH studies are negative.    Phenotypic Data          Low Probability: FNA, Tissue, Body Fluid Phenotyping Report      Phenotyping Description of Gated Cells    Source: FNA - left inguinal lymph node  Case Reference/Related Case: [id number]      Antibody  %   Dual Labeling         %    B-Related    CD19  31      1  CD20  32      2  CD22  31   TK67-gfm.  17  CD10  24   OE80-vwg.   10  CD23  11   CD22-10   17  FMC7  23   CD20-10  19    1   Thien./Ruffin.*  1.7:1       TU24-qzz.  18       BQ40-euk.  10         Antibody %   Dual Labeling        %  T-Related    CD7   63   CD3-4   50  CD5   67   CD3-8    16  CD3   67   CD3-7    60   CD2   NA   CD4-8   NA  CD4  52   CD4/CD8*  3.1:1  CD8  17  CD56  2        Antibody %   Dual Labeling        %  Myeloid/Monocytic/Misc.    CD45   100  CD14   NA  CD38  76    HLA-DR 44      Cell Count is approximately: 0.9 x 10*3/uL  Viability is:  85%    *=ratio  NA=antibody not run  kenyon.=kappa  lafleur.-lambda    Charges CPT:  42029 (18 markers)  ICD-10:  C85.90     Result Flag         Imaging    Ct Chest Abdomen Pelvis Without Oral Without Iv Contrast    Result Date: 5/21/2020  EXAM: CT CHEST ABDOMEN PELVIS WO ORAL WO IV CONTRAST LOCATION: [facility] DATE/TIME: 5/21/2020 8:41 AM INDICATION: History of lymphoma. COMPARISON: CT scan of the chest, abdomen and pelvis, 7/17/2018. TECHNIQUE: CT scan of the chest, abdomen, and pelvis was performed without IV contrast. Multiplanar reformats were obtained. Dose reduction techniques were used. CONTRAST: None. FINDINGS: LUNGS AND PLEURA: There is a mild amount of subpleural groundglass opacity involving the bilateral lung bases. No pleural effusion or pneumothorax. MEDIASTINUM/AXILLAE: Heart size is normal. The thoracic aorta is normal in caliber. No lymphadenopathy. HEPATOBILIARY: There are a few faint subcentimeter low-dense lesions within the liver which are unchanged. PANCREAS: Normal. SPLEEN: Normal. ADRENAL GLANDS: Normal. KIDNEYS/BLADDER: Normal. BOWEL: Normal. LYMPH NODES: There is a new 3.8 x 4.5 cm soft tissue mass along the left internal iliac chain (series 2 image 98). This abuts the left S1 neural foramina. VASCULATURE: Unremarkable. PELVIC ORGANS: Urinary bladder is grossly normal. MUSCULOSKELETAL: No suspicious osseous lesions.     New 3.8 x 4.5 cm soft tissue mass along the left internal iliac chain abutting the S1 neural foramina. Given history, an enlarged lymph node in the setting of recurrent lymphoma is likely. Given location adjacent to the S1 neural foramina, nerve sheath tumor is also a possibility. If warranted, a pelvic MRI could be performed for clarification.        Nm Pet Ct Skull To Mid Thigh    Result Date: 5/27/2020  EXAM: NM PET CT SKULL TO MID THIGH LOCATION: [facility] DATE/TIME: 5/27/2020 11:46 AM INDICATION: Subsequent treatment planning and restaging for mantle cell  lymphoma, intra-abdominal lymph nodes. Status post chemotherapy completed in 2017. Monitor treatment response. COMPARISON: CT the chest abdomen pelvis dated 05/21/2020, 06/03/2019, 07/17/2018, and FDG PET/CT dated 06/29/2015 TECHNIQUE: Serum glucose level 82 mg/dL. One hour post intravenous administration of 13.0 mCi F-18 FDG, PET imaging was performed from the skull base to the mid thighs utilizing attenuation correction with concurrent axial CT and PET/CT image fusion. Dose reduction techniques were used. FINDINGS: FDG avid left inguinal (max SUV 6.6), left internal iliac (max SUV 12.4), left common iliac (max SUV 9.1), and left periaortic just above the aortic bifurcation (max SUV 5.0) lymph nodes suspicious for active lymphoma. Note is made that the left internal iliac lymph node likely encases the exiting S1 nerve root. Redemonstrated mildly FDG avid bilateral nodules in both breasts many of which demonstrate in situ calcifications either representing fibroadenomas for sequelae of fat necrosis, not significant changed dating back prior PET/CT dated 06/29/2015. Mild senescent intracranial changes. Right chest port with tip terminating near the superior cavoatrial junction. Small fat-containing umbilical hernia. Sigmoid diverticulosis. Tensor fascia kalpesh lipoma. Pelvic phleboliths. C4-C6 ACDF. Multilevel degenerative changes of the spine.     Findings suspicious for active lymphoma involving lymph nodes from just above the aortic bifurcation to the left inguinal region. The left inguinal lymph node is amenable to ultrasound-guided histologic sampling if desired.    Us Lymph Node Biopsy    Result Date: 6/10/2020  EXAM: 1. PERCUTANEOUS BIOPSY LEFT INGUINAL LYMPH NODE 2. ULTRASOUND GUIDANCE LOCATION: [facility] DATE/TIME: 6/10/2020 2:07 PM INDICATION: Mantle cell lymphoma in remisssion; now with abnormal PET; core biopsy of left inguinal lymph node PROCEDURE: Informed consent obtained. Site marked. Prior  images reviewed. Required items made available. Patient identity was confirmed verbally and with arm band. Patient reevaluated immediately before administering sedation. Universal protocol was followed. Time out performed. The site was prepped and draped in sterile fashion. 10 mL of 1 percent lidocaine was infused into the local soft tissues. Using standard technique and under direct ultrasound guidance, a 18 gauge biopsy needle was used to make three core biopsies. Additionally, three 25-gauge fine-needle aspirations were performed. Tissue was submitted to Pathology. The left inguinal lymph node which corresponds to the abnormal lymph node from the prior PET scan was identified and targeted. The patient tolerated the procedure well. No complications. RADIOLOGIC SUPERVISION AND INTERPRETATION: ULTRASOUND GUIDANCE: Images demonstrate the biopsy needle in satisfactory position.     Status post US-guided biopsy left inguinal lymph node. Reference CPT Code: 88030, 88397        Signed by: Yelena Starks MD

## 2021-06-09 ENCOUNTER — INFUSION - HEALTHEAST (OUTPATIENT)
Dept: INFUSION THERAPY | Facility: HOSPITAL | Age: 56
End: 2021-06-09

## 2021-06-09 DIAGNOSIS — B99.9 CHRONIC INFECTION: ICD-10-CM

## 2021-06-09 DIAGNOSIS — D80.1 HYPOGAMMAGLOBULINEMIA, ACQUIRED (H): ICD-10-CM

## 2021-06-09 NOTE — PROGRESS NOTES
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  Previous biopsy which was a needle biopsy showed similar finding but FISH study was positive for the 1114 translocation.  Overall the findings remain concerning for relapse of mantle cell lymphoma.  For now we will continue with ibrutinib 420 mg p.o. daily.  We will see patient back again in 4 weeks with recheck of labs.  We will plan repeat PET scan in about 2 months from now.    We will send both the recent pathology specimens to the [facility] for review.    Hives have resolved.  Neuropathy exacerbated by ibrutinib and will be managed symptomatically.    Plan: Continue ibrutinib 420 mg p.o. daily  Follow-up in 4 weeks with labs  Continue IVIG today and every 4 weeks    Measurable disease: Patient currently in remission    Current therapy:   IVIG resumed in January 2017 and stopped in May 2018; resumed again in September 2018  Maintenance Rituxan started December 2015, and completed August 2017, 8 doses  Acyclovir 400 mg twice daily  IVIG every 3 months, first dose August 29, 2016(currently on hold, last dose November 2016)   IVIG every 4 weeks restarted in June 2017 and stopped in May 2018      Treatment history:  [facility]  First dose of IVIG  Patient completed 1 year of inhalational pentamidine  Autologous stem cell  transplant with Beam prep, August 18, 2015  One cycle of R-ICE, Naye 15, 2015, ifosfamide and etoposide reduced by 25% because of renal dysfunction  Ibrutinib since October 10, 2014, current dose of 420 mg by mouth daily, stopped June 9, 2015   3 cycles of R-DHAP completed late August 2014   5 cycles of R CHOP, started after diagnosis in March 2014       ECOG Performance   ECOG Performance Status: 1    Distress Assessment  Distress Assessment Score: No distress    Pain           Problem List    1. Mantle cell lymphoma of lymph nodes of inguinal region (H)  HM1(CBC and Differential)    Comprehensive Metabolic Panel        CC: Sunil Tan MD    ______________________________________________________________________________    History of Present Illness    Pretty is here for reevaluation.  Seen 3 weeks ago.  Started ibrutinib on July 3.  Did develop hives beginning on July 9 with associated itching which was managed with a combination of Benadryl, Claritin and Singulair with now complete resolution of the past 24 hours.  Has had some increased toe pain related to neuropathy.  Also some fatigue and diarrhea but this is manageable manageable.  No other new issues.  ECOG status is 0.          June 29, 2020  Pretty is here for reevaluation.  Seen 2 weeks ago.  Underwent excisional biopsy of the left inguinal lymph node and is here to review results.  No new symptoms or problems.  ECOG status is 0.  January 2018:   Ms. Avis Paul returns for a follow-up.  She was here for IVIG infusion yesterday and raise concerns about symptoms suggesting recurrence of lymphoma.  She had CT scans and is here to review results.  Has been having aches all over and increased fatigue and weight gain.  Describes some numbness in the right thigh area.  Also has been having memory issues since she received chemotherapy.  Notes an area of induration in the mouth on the lower right mandibular/jaw area.        November 2017:   She was  seen 3 months ago.  She did have a visit shortly thereafter at the [facility] with bone marrow and CT scan showing that she is in complete remission from her mantle cell lymphoma.  She has continued IVIG infusions monthly between June - October 2017.  She was informed recently that the insurance would not cover this.  We had previously got this approved prior to starting infusions in June 2017.    She is currently having sinus symptoms and cough productive of greenish phlegm.  No other infection issues in the last 3 months.  She is in significant distress because of the insurance issues.    No other new symptoms or problems.  ECOG status is 0.    August 2017:   She was seen 4 weeks ago.  She has received 2 doses of IVIG.  No infusion reaction with the last one.  Her last infection was a sinus infection more than 4 weeks ago.  She has some discomfort in the right ear.  No fever or mild sores.  No shortness of breath or cough.  No new adenopathy.  She has been starting to have some loose bowel moments 2-3 times a day in the last 3 weeks.  No other new problems.    Pain Status  Currently in Pain: No/denies    Review of Systems    Constitutional  Constitutional (WDL): Exceptions to WDL  Fatigue: Fatigue not relieved by rest - Limiting instrumental ADL  Neurosensory  Neurosensory (WDL): Exceptions to WDL  Peripheral Sensory Neuropathy: Moderate symptoms, limiting instrumental ADL(Reports worsening.)  Cardiovascular  Cardiovascular (WDL): All cardiovascular elements are within defined limits  Pulmonary  Respiratory (WDL): Within Defined Limits  Gastrointestinal  Gastrointestinal (WDL): Exceptions to WDL  Diarrhea: Increase of <4 stools per day over baseline, mild increase in ostomy output compared to baseline  Genitourinary  Genitourinary (WDL): All genitourinary elements are within defined limits  Integumentary  Integumentary (WDL): Exceptions to WDL(Bruises easily)  Rash Maculo-Papular: Macules/papules covering <10% BSA  with or without symptoms (e.g., pruritus, burning, tightness)  Urticaria: Urticarial lesions covering <10% BSA, topical intervention indicated  Patient Coping  Patient Coping: Accepting  Distress Assessment  Distress Assessment Score: No distress  Accompanied by  Accompanied by: Alone    Past History  Past Medical History:   Diagnosis Date     Anemia      Chronic kidney disease     elevated creatinine due to chemo     History of anesthesia complications 2015    urinary retention after lymph node excision. required catheterization     History of transfusion      Hypothyroid      Mantle cell lymphoma (H)     stage 4     Sleep apnea     uses cpap     Thrombocytopenia (H)          Past Surgical History:   Procedure Laterality Date     ANTERIOR / POSTERIOR COMBINED FUSION CERVICAL SPINE  2011    C2-C7     CARPAL TUNNEL RELEASE Right 2000     CERVICAL FUSION  2004    C4, C5, C6.  Pt. states surgery x 2     KIDNEY STONE SURGERY  1999    removal     LIMBAL STEM CELL TRANSPLANT  08/2015     LYMPH NODE DISSECTION Left 6/25/2020    Procedure: LEFT INGUINAL LYMPH NODE BIOPSY;  Surgeon: Catalina Pascal MD;  Location: [facility];  Service: General     IA BX/REMV,LYMPH NODE,DEEP AXILL Right 6/3/2015    Procedure: RIGHT INGUINAL LYMPH NODE BIOPSY;  Surgeon: Clayton Burgos MD;  Location: [facility];  Service: General     TUBAL LIGATION  2004     US GUIDED NEEDLE PLACEMENT  6/25/2020     US LYMPH NODE BIOPSY  6/10/2020       Physical Exam    Recent Vitals 7/20/2020   Weight 299 lbs 5 oz   BSA (m2) 2.55 m2   /69   Pulse 70   Temp 97.9   Temp src 1   SpO2 99   Some recent data might be hidden       GENERAL: Alert and oriented. Seated comfortably. In no distress.    HEAD: Atraumatic and normocephalic.  Has a full head of hair.    EYES: ROMEO, EOMI.  No pallor.  No icterus.    Oral cavity: no mucosal lesion or tonsillar enlargement.  Induration right mandibular lower jaw area    NECK: supple. JVP  normal.  No thyroid enlargement.    LYMPH NODES: No palpable, cervical, axillary or inguinal lymphadenopathy.    CHEST: She has slight bilateral wheezing noted  Resonant to percussion throughout bilaterally.  Symmetrical breath movements bilaterally.    CVS: S1 and S2 are heard. Regular rate and rhythm.  No murmur or gallop or rub heard.    ABDOMEN: Soft. Not tender. Not distended.  No palpable hepatomegaly or splenomegaly.  No other mass palpable.  Bowel sounds heard.    EXTREMITIES: Warm.  No peripheral edema.    SKIN: no rash, or bruising or purpura.    CNS: Nonfocal        Lab Results    Recent Results (from the past 168 hour(s))   Comprehensive Metabolic Panel   Result Value Ref Range    Sodium 141 136 - 145 mmol/L    Potassium 3.9 3.5 - 5.0 mmol/L    Chloride 108 (H) 98 - 107 mmol/L    CO2 26 22 - 31 mmol/L    Anion Gap, Calculation 7 5 - 18 mmol/L    Glucose 97 70 - 125 mg/dL    BUN 29 (H) 8 - 22 mg/dL    Creatinine 2.04 (H) 0.60 - 1.10 mg/dL    GFR MDRD Af Amer 31 (L) >60 mL/min/1.73m2    GFR MDRD Non Af Amer 25 (L) >60 mL/min/1.73m2    Bilirubin, Total 0.3 0.0 - 1.0 mg/dL    Calcium 8.6 8.5 - 10.5 mg/dL    Protein, Total 6.2 6.0 - 8.0 g/dL    Albumin 3.4 (L) 3.5 - 5.0 g/dL    Alkaline Phosphatase 100 45 - 120 U/L    AST 16 0 - 40 U/L    ALT 11 0 - 45 U/L   HM1 (CBC with Diff)   Result Value Ref Range    WBC 6.3 4.0 - 11.0 thou/uL    RBC 4.90 3.80 - 5.40 mill/uL    Hemoglobin 14.0 12.0 - 16.0 g/dL    Hematocrit 43.6 35.0 - 47.0 %    MCV 89 80 - 100 fL    MCH 28.6 27.0 - 34.0 pg    MCHC 32.1 32.0 - 36.0 g/dL    RDW 14.6 (H) 11.0 - 14.5 %    Platelets 152 140 - 440 thou/uL    MPV 11.1 8.5 - 12.5 fL    Neutrophils % 68 50 - 70 %    Lymphocytes % 21 20 - 40 %    Monocytes % 8 2 - 10 %    Eosinophils % 2 0 - 6 %    Basophils % 1 0 - 2 %    Neutrophils Absolute 4.3 2.0 - 7.7 thou/uL    Lymphocytes Absolute 1.3 0.8 - 4.4 thou/uL    Monocytes Absolute 0.5 0.0 - 0.9 thou/uL    Eosinophils Absolute 0.1 0.0 - 0.4  thou/uL    Basophils Absolute 0.1 0.0 - 0.2 thou/uL       Imaging    Us Guided Needle Placement    Result Date: 6/25/2020  EXAM: US GUIDED NEEDLE PLACEMENT LOCATION: [facility] DATE/TIME: 6/25/2020 12:15 PM INDICATION: Preoperative localization of a left inguinal PROCEDURE: Informed consent was obtained from the patient. A time out was performed where the proper patient, procedure, and site were confirmed. The skin was prepped and draped and 1% lidocaine used for local anesthesia. Under direct sonographic guidance, a 20 gauge needle was advanced through the target and a 15 cm Kopan's localization wire deployed. Postprocedure images demonstrate the wire in appropriate position. The patient tolerated this well.     Sonographically-guided left inguinal lymph node wire localization without immediate complication.        Signed by: Yelena Starks MD

## 2021-06-09 NOTE — PROGRESS NOTES
Pt arrived ambulatory to clinic for Cycle # 6 of her Rituxan infusion.  Port accessed using aseptic technique with excellent blood return.  Administered premedications and Rituxan per MD order.  Pt tolerated infusion well, no s/s of infusion reaction.  Port flushed with NS and Heparin then de-assessed.  Pt verbalized understanding of plan of care and return to clinic.

## 2021-06-09 NOTE — TELEPHONE ENCOUNTER
Spoke with pt about her pain and swelling in her left groin after her lymph node bx. She is taking Tylenol to help manage. Her pain is worse when standing up. Discussed that seromas can be a common occurrence after lymph node surgery and she understands this. She has been scheduled with our PA-C tomorrow for evaluation.

## 2021-06-09 NOTE — ANESTHESIA POSTPROCEDURE EVALUATION
Patient: Avis Paul  Procedure(s):  LEFT INGUINAL LYMPH NODE BIOPSY (Left)  Anesthesia type: MAC    Patient location: Phase II Recovery  Last vitals:   Vitals Value Taken Time   /65 6/25/2020  2:00 PM   Temp 36.5  C (97.7  F) 6/25/2020  1:20 PM   Pulse 70 6/25/2020  1:59 PM   Resp 16 6/25/2020  1:20 PM   SpO2 100 % 6/25/2020  1:59 PM   Vitals shown include unvalidated device data.  Post vital signs: stable  Level of consciousness: awake and responds to simple questions  Post-anesthesia pain: pain controlled  Post-anesthesia nausea and vomiting: no  Pulmonary: unassisted, return to baseline  Cardiovascular: stable and blood pressure at baseline  Hydration: adequate  Anesthetic events: no    QCDR Measures:  ASA# 11 - Tracy-op Cardiac Arrest: ASA11B - Patient did NOT experience unanticipated cardiac arrest  ASA# 12 - Tracy-op Mortality Rate: ASA12B - Patient did NOT die  ASA# 13 - PACU Re-Intubation Rate: NA - No ETT / LMA used for case  ASA# 10 - Composite Anes Safety: ASA10A - No serious adverse event    Additional Notes:

## 2021-06-09 NOTE — TELEPHONE ENCOUNTER
I received an in-basket message that Nick is requesting to speak with a nurse navigator.  It looks like her two previous navigators are no longer in cancer care.  I called and had to leave a  for nick.  I asked that she call me back so I can assist her.  I gave her my direct number to call.

## 2021-06-09 NOTE — TELEPHONE ENCOUNTER
"I rec'd an e-mail from Avis asking for me to complete the \"verification of diagnosis\" paperwork for a nahun from [employer].  I have completed the form and e-mailed it to the organization.  I also sent a copy by e-mail to Avis.  "

## 2021-06-09 NOTE — ANESTHESIA CARE TRANSFER NOTE
Last vitals:   Vitals:    06/25/20 1320   BP: 101/54   Pulse: 80   Resp: 16   Temp: 36.5  C (97.7  F)   SpO2: 96%     Patient's level of consciousness is drowsy  Spontaneous respirations: yes  Maintains airway independently: yes  Dentition unchanged: yes  Oropharynx: oropharynx clear of all foreign objects    QCDR Measures:  ASA# 20 - Surgical Safety Checklist: WHO surgical safety checklist completed prior to induction    PQRS# 430 - Adult PONV Prevention: 4558F - Pt received => 2 anti-emetic agents (different classes) preop & intraop  ASA# 8 - Peds PONV Prevention: NA - Not pediatric patient, not GA or 2 or more risk factors NOT present  PQRS# 424 - Tracy-op Temp Management: 4559F - At least one body temp DOCUMENTED => 35.5C or 95.9F within required timeframe  PQRS# 426 - PACU Transfer Protocol: - Transfer of care checklist used  ASA# 14 - Acute Post-op Pain: ASA14B - Patient did NOT experience pain >= 7 out of 10

## 2021-06-09 NOTE — TELEPHONE ENCOUNTER
Pa was approved for [facility]. Patient is restricted to [facility].     Medication Prior Authorization    Start Date:  6/29/20  Status:  Approved  Approval Date:  6/29/20  Authorization Effective Date:  6/29/20  Authorization Expiration Date:  12/29/20  Type:  New  Urgency:  Urgent  Med Type:  Specialty  Insurance Info:  [employer] [facility]) - Phone [phone] Fax [phone]  Method:  ePA  Reference #:  Key: [id number] - PA Case ID: [id number]  Pharmacy Filling the Rx:  [facility] [address]  Pharmacy Notified:  Yes  Patient Notified:  Yes  ___________________________________________________________________________________________________________________:

## 2021-06-09 NOTE — PROGRESS NOTES
[facility] Hematology and Oncology Progress Note    Patient: Avis Paul  MRN: [id number]  Date of Service:         Reason for Visit    Chief Complaint   Patient presents with     Mantle cell lymphoma - follow up       Assessment and Plan    Mantle cell lymphoma status post autologous transplant, August 18, 2015  Hypogammaglobulinemia  Nausea/vomiting/diarrhea/weight loss    No evidence of recurrence of mantle cell lymphoma based on exam, recent CT scans and bone marrow.    Persistent hypogammaglobulinemia and currently unable to receive IVIG replacement because of insurance issues.  She has not however had any bacterial infections requiring antibiotic therapy.    Nausea and vomiting being worked up by the gastroenterology service.  Currently on a dose of antibiotics for possible bacterial overgrowth.  Additional workup will include colonoscopy.     I discussed the continuation of Rituxan and she is agreeable to going forward with this.  She understands that this may be contributing to the hypogammaglobulinemia which might predispose her to more infectious problems.  May need B12 replacement at some point.    Plan: Sixth dose of maintenance Rituxan today  Continue antibiotics and GI workup for diarrhea  Follow-up in 3 months    Measurable disease: Patient currently in remission    Current therapy: Maintenance Rituxan started December 2015, today is dose #5  Acyclovir 800 mg twice daily  IVIG every 3 months, first dose August 29, 2016(currently on hold, last dose November 2016)      Treatment history:  First set of immunizations received at the [place] in August 2016  First dose of IVIG  Patient completed 1 year of inhalational pentamidine  Autologous stem cell transplant with Beam prep, August 18, 2015  One cycle of R-ICE, Naye 15, 2015, ifosfamide and etoposide reduced by 25% because of renal dysfunction  Ibrutinib since October 10, 2014, current dose of 420 mg by mouth daily, stopped June 9, 2015   3 cycles  "of R-DHAP completed late August 2014   5 cycles of R CHOP, started after diagnosis in March 2014       ECOG Performance   ECOG Performance Status: 2    Distress Assessment  Distress Assessment Score: 4 (\"I just want to feel better.\")    Pain           Problem List    1. Mantle cell lymphoma of intra-abdominal lymph nodes  HM1(CBC and Differential)    Comprehensive Metabolic Panel    LD(LDH)   2. Mantle cell lymphoma, unspecified body region  DISCONTINUED: sodium chloride 0.9% 250 mL infusion    DISCONTINUED: acetaminophen tablet 650 mg (TYLENOL)    DISCONTINUED: diphenhydrAMINE injection 12.5 mg (BENADRYL)    DISCONTINUED: riTUXimab 850 mg in sodium chloride 0.9% 165 mL chemo (RITUXAN)    DISCONTINUED: sodium chloride 0.9 % flush 20 mL (NS)    DISCONTINUED: heparin 100 unit/mL lockflush (PF) porcine 300-600 Units    DISCONTINUED: diphenhydrAMINE injection 50 mg (BENADRYL)    DISCONTINUED: famotidine 20 mg/2 mL injection 20 mg (PEPCID)    DISCONTINUED: hydrocortisone sod succ (PF) 100 mg/2 mL injection 100 mg    DISCONTINUED: acetaminophen tablet 1,000 mg (TYLENOL)        CC: Carol Rose MD    ______________________________________________________________________________    History of Present Illness    Ms. Avis Paul returns for a follow-up.  She was seen 3 months ago.  She has been having extensive issues with nausea vomiting and diarrhea since around December 25.  She has 2-3 and up to 15 BMs per day.  She did get evaluated the [place] and underwent CT scans which showed no evidence of relapse of lymphoma.  Bone marrow was negative as well.  There was note of some small bowel thickening.  She has been seen by GI and undergone endoscopy and was told she had low B12 level.  She has not yet started on a B-12 injections.  She is currently on antibiotics for possible bacterial overgrowth.  She has had some weight loss.  ECOG status remains 0.  Pain Status  Currently in Pain: Yes    Review of " "Systems    Constitutional  Constitutional (WDL): Exceptions to WDL  Fatigue: Fatigue not relieved by rest - Limiting instrumental ADL  Weight Loss: to <10% from baseline, intervention not indicated (Down about 20 lbs since 12/1.)  Neurosensory  Neurosensory (WDL): Exceptions to WDL  Cardiovascular  Cardiovascular (WDL): All cardiovascular elements are within defined limits  Pulmonary  Respiratory (WDL): Within Defined Limits  Gastrointestinal  Gastrointestinal (WDL): Exceptions to WDL (A lot of GI complications post transplant.)  Diarrhea: Increase of 4 - 6 stools per day over baseline, moderate increase in ostomy output compared to baseline  Nausea: Oral intake decreased without significant weight loss, dehydration or malnutrition  Vomiting: 3 - 5 episodes ( by 5 minutes) in 24 hrs  Genitourinary  Genitourinary (WDL): All genitourinary elements are within defined limits  Integumentary  Integumentary (WDL): All integumentary elements are within defined limits  Patient Coping  Patient Coping: Accepting  Distress Assessment  Distress Assessment Score: 4 (\"I just want to feel better.\")  Accompanied by  Accompanied by: Alone    Past History  Past Medical History:   Diagnosis Date     Anemia      Chronic kidney disease     elevated creatinine due to chemo     History of transfusion      Hypothyroid      Mantle cell lymphoma     stage 4     Sleep apnea     uses cpap     Thrombocytopenia          Past Surgical History:   Procedure Laterality Date     ANTERIOR / POSTERIOR COMBINED FUSION CERVICAL SPINE  2011    C2-C7     CARPAL TUNNEL RELEASE Right 2000     CERVICAL FUSION  2004    C4, C5, C6.  Pt. states surgery x 2     KIDNEY STONE SURGERY  1999    removal     CO BX/REMV,LYMPH NODE,DEEP AXILL Right 6/3/2015    Procedure: RIGHT INGUINAL LYMPH NODE BIOPSY;  Surgeon: Clayton Burgos MD;  [facility];  Service: General     TUBAL LIGATION  2004       Physical Exam    Recent Vitals 2/23/2017   Weight " 217 lbs   /62   Pulse 75   Temp 97.8   Temp src 1   SpO2 99       GENERAL: Alert and oriented. Seated comfortably. In no distress.    HEAD: Atraumatic and normocephalic.  Has a full head of hair.    EYES: ROMEO, EOMI.  No pallor.  No icterus.    Oral cavity: no mucosal lesion or tonsillar enlargement.    NECK: supple. JVP normal.  No thyroid enlargement.    LYMPH NODES: No palpable, cervical, axillary or inguinal lymphadenopathy.    CHEST: clear to auscultation bilaterally.  Resonant to percussion throughout bilaterally.  Symmetrical breath movements bilaterally.    CVS: S1 and S2 are heard. Regular rate and rhythm.  No murmur or gallop or rub heard.    ABDOMEN: Soft. Not tender. Not distended.  No palpable hepatomegaly or splenomegaly.  No other mass palpable.  Bowel sounds heard.    EXTREMITIES: Warm.  No peripheral edema.    SKIN: no rash, or bruising or purpura.    CNS: Nonfocal        Lab Results    Recent Results (from the past 168 hour(s))   Comprehensive Metabolic Panel   Result Value Ref Range    Sodium 142 136 - 145 mmol/L    Potassium 3.4 (L) 3.5 - 5.0 mmol/L    Chloride 112 (H) 98 - 107 mmol/L    CO2 22 22 - 31 mmol/L    Anion Gap, Calculation 8 5 - 18 mmol/L    Glucose 96 70 - 125 mg/dL    BUN 13 8 - 22 mg/dL    Creatinine 1.82 (H) 0.60 - 1.10 mg/dL    GFR MDRD Af Amer 35 (L) >60 mL/min/1.73m2    GFR MDRD Non Af Amer 29 (L) >60 mL/min/1.73m2    Bilirubin, Total 0.5 0.0 - 1.0 mg/dL    Calcium 7.9 (L) 8.5 - 10.5 mg/dL    Protein, Total 4.8 (L) 6.0 - 8.0 g/dL    Albumin 3.1 (L) 3.5 - 5.0 g/dL    Alkaline Phosphatase 81 45 - 120 U/L    AST 19 0 - 40 U/L    ALT 21 0 - 45 U/L   LD(LDH)   Result Value Ref Range    LD (LDH) 279 (H) 125 - 220 U/L   HM1 (CBC with Diff)   Result Value Ref Range    WBC 5.2 4.0 - 11.0 thou/uL    RBC 4.38 3.80 - 5.40 mill/uL    Hemoglobin 13.1 12.0 - 16.0 g/dL    Hematocrit 39.6 35.0 - 47.0 %    MCV 90 80 - 100 fL    MCH 30.0 27.0 - 34.0 pg    MCHC 33.2 32.0 - 36.0 g/dL    RDW  16.3 (H) 11.0 - 14.5 %    Platelets 134 (L) 140 - 440 thou/uL    MPV 7.9 7.0 - 10.0 fL    Neutrophils % 71 (H) 50 - 70 %    Lymphocytes % 18 (L) 20 - 40 %    Monocytes % 8 2 - 10 %    Eosinophils % 2 0 - 6 %    Basophils % 1 0 - 2 %    Neutrophils Absolute 3.7 2.0 - 7.7 thou/uL    Lymphocytes Absolute 0.9 0.8 - 4.4 thou/uL    Monocytes Absolute 0.4 0.0 - 0.9 thou/uL    Eosinophils Absolute 0.1 0.0 - 0.4 thou/uL    Basophils Absolute 0.1 0.0 - 0.2 thou/uL       Imaging    No results found.      Signed by: Yelena Starks MD

## 2021-06-09 NOTE — PROGRESS NOTES
Avis Paul arrived for premeds and IGG infusion. Avis reported she has enlarged lymph nodes in her left groin that cause a pressure pain. She has several upcoming appointments for testing to be done.  Avis Paul was educated on her plan of care. Each medication given today was reviewed prior to administration. Avis was premedicated with oral Acetaminophen, IV Diphenhydramine, Famotidine and Methylprednisolone. IGG 25 gram IV infusion treatment was completed with no signs of reaction. IV site:Venous port patent throughout treatment with positive blood return obtained before and at completion. IV site with no pain, redness, swelling and drainage. IV site flushed with saline and heparin and deaccessed. Avis Paul has follow-up appointment scheduled for next IGG infusion on 07/16/2020. Avis Paul was discharged to home. She discharged from unit ambulatory and independent at 1433. The after visit summary was declined.     Katarzyna Cronin

## 2021-06-09 NOTE — TELEPHONE ENCOUNTER
Pt had LEFT INGUINAL LYMPH NODE BIOPSY on 6/25 with Dr. Pascal and called stating that she feels like she's having fluid buildup. There are no post op appts available until 7/7 with Dr. Pascal. She would like a call back as soon as possible to see if she needs to go to the hospital. She can be reached at [phone] or on her land line at [phone].

## 2021-06-09 NOTE — PROGRESS NOTES
Pre-Procedure Negative COVID Test     Aivs Paul  COVID-19 PCR Results    COVID-19 PCR Results 6/7/20 6/22/20 2019-nCOV Not Detected Not Detected      Comments are available for some flowsheets but are not being displayed.             Patient Notification  Patient notified of the negative COVID test result    Patient Information  Patient informed of the no visitor policy  Patient instructed to continue to self-quarantine prior to procedure  Patient informed to contact the ordering provider if the following symptoms develop prior to procedure:   Fever  Cough  Shortness of Breath  Sore throat   Runny or stuffy nose  Muscle or body aches  Headaches  Fatigue  Vomiting or diarrhea   Rash    Francisco J Pham

## 2021-06-09 NOTE — PROGRESS NOTES
HPI: Pt is here for follow up s/p left inguinal lymph node biopsy with Dr. Pascal on 6/25/20.  She noticed increased swelling and fullness in the left groin area a couple of days ago and wanted to come in for evaluation. Mild tenderness. She has not noticed any erythema or drainage. Mild ecchymosis. Denies fevers or chills.      There were no vitals taken for this visit.    EXAM:  GENERAL:Appears well  ABDOMEN:  Soft, mild ttp in left inguinal and panus area, mild ecchymosis, there definitely is some edema in the area, but no induration or focal area of fluctuance that would be amenable to aspiration or drainage  SURGICAL WOUNDS:  Incisions healing well, no induration or drainage, no erythema, 1 steristrip in place, this was removed      Assessment/Plan: Doing well after surgery and should follow up as needed.  Discussed that she seems to have an increase of swelling in the area, but no obvious signs of focal fluid collection. Her incision is healing up nicely. We reviewed signs of seroma and infection and she will call if she develops any of these, otherwise, if this start to improve and everything is looking good, she can follow up as needed.     Shree Benjamin PA-C  [phone]  General Surgery

## 2021-06-09 NOTE — TELEPHONE ENCOUNTER
PA initiated for Imbruvica.     Medication Prior Authorization    Start Date:  6/29/20  Type:  New  Urgency:  Urgent  Med Type:  Specialty  Insurance Info:  [employer] ([facility]) - Phone [phone] Fax [phone]  Method:  ePA  Reference #:  Key: [id number] - PA Case ID: [id number]  Pharmacy Filling the Rx:  [facility] [address]  ___________________________________________________________________________________________________________________:

## 2021-06-09 NOTE — PROGRESS NOTES
Pt arrived to infusion clinic. Port accessed previously upstairs with great blood return. Pt tolerated IVIG infusion well. Port flushed with Heparin, de-accessed, band-aid applied. Pt ambulated out of infusion clinic independently.

## 2021-06-09 NOTE — ANESTHESIA PREPROCEDURE EVALUATION
Anesthesia Evaluation      Patient summary reviewed   No history of anesthetic complications     Airway   Mallampati: II  Neck ROM: full   Pulmonary     breath sounds clear to auscultation  (+) sleep apnea on CPAP, ,                          Cardiovascular   Exercise tolerance: > or = 4 METS  (-) angina  Rhythm: regular        Neuro/Psych    (+) neuromuscular disease (neuropathy - chemo),      Endo/Other    (+) hypothyroidism, obesity,   (-) not pregnant     Comments: Last chemo 12/2017  IVIG monthly    GI/Hepatic/Renal    (+)   chronic renal disease CRI,      Other findings:     NPO > 8 hrs      Anemia      Chronic kidney disease      elevated creatinine due to chemo    History of transfusion      Hypothyroid      Mantle cell lymphoma (H)      stage 4    Sleep apnea      uses cpap    Thrombocytopenia (H)          Results for ALEX YIFAN L (MRN [id number]) as of 6/25/2020 6/22/2020 12:47  2019-nCOV: Not Detected        Dental - normal exam                        Anesthesia Plan  Planned anesthetic: MAC    ASA 3     Anesthetic plan and risks discussed with: patient  Anesthesia plan special considerations: antiemetics,   Post-op plan: routine recovery

## 2021-06-09 NOTE — TELEPHONE ENCOUNTER
I sent the following e-mail to Avis today:    Hope all is wellJ    [email or web address]    Just learned about this one today.  Go to the website and call the number they give.  It looks like patient/family initiate.    If they need clinic info:    Sameera Oviedo RN  [place] [address]    Clinic number:  [phone]  My number:  [phone]  My desktop fax:  [phone]  ~I can receive and send faxes from home.  ~I could sign documents.    Let me know if you guys need anythingJ!    Sameera Oviedo, RN, BSN  Nurse Navigator

## 2021-06-09 NOTE — TELEPHONE ENCOUNTER
[employer] Co-pay assistance form completed and faxed to:  [phone]    Copy e-mailed to Avis for her records.

## 2021-06-09 NOTE — PROGRESS NOTES
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  Additional FISH and PCR tests are pending.  Overall picture is concerning for relapsed mantle cell lymphoma.  We discussed options and I recommend starting ibrutinib 420 mg p.o. daily based on previous tolerance.  Reviewed potential side effects and she understands.  Start in a week and we will see her back in 3 weeks with repeat lab work.    Discussed alternate single agent options such as lenalidomide and Velcade and also bendamustine with Rituxan.    She is not a candidate for clinical trials with [facility] because of her elevated creatinine.  Also her insurance has changed and she may require to be seen at the [facility] for transplant options.  Will make referral to the [facility] relapsed mantle cell lymphoma.  Will follow daily for second opinion as well.    We will continue IVIG every 4 weeks.    Plan: Start ibrutinib 420 mg p.o. daily  Follow-up in 3 weeks with repeat labs, baseline labs today  Continue IVIG  Referral to the [facility]      Measurable disease: Patient currently in remission    Current therapy:   IVIG resumed in January 2017 and stopped in May 2018; resumed again in September 2018  Maintenance Rituxan started December 2015, and completed August 2017, 8 doses  Acyclovir 400 mg twice daily  IVIG every 3 months, first dose August 29, 2016(currently on hold, last dose November 2016)   IVIG every 4 weeks restarted  in June 2017 and stopped in May 2018      Treatment history:  First set of immunizations received at [facility] in August 2016  First dose of IVIG  Patient completed 1 year of inhalational pentamidine  Autologous stem cell transplant with Beam prep, August 18, 2015  One cycle of R-ICE, Naye 15, 2015, ifosfamide and etoposide reduced by 25% because of renal dysfunction  Ibrutinib since October 10, 2014, current dose of 420 mg by mouth daily, stopped June 9, 2015   3 cycles of R-DHAP completed late August 2014   5 cycles of R CHOP, started after diagnosis in March 2014       ECOG Performance   ECOG Performance Status: 1    Distress Assessment  Distress Assessment Score: 4    Pain  Pain Score (Initial OR Reassessment): 7        Problem List    1. Mantle cell lymphoma of lymph nodes of inguinal region (H)  ibrutinib 420 mg tablet    HM1(CBC and Differential)    Comprehensive Metabolic Panel    Ambulatory referral to Oncology/Hematology Adult   2. Hypogammaglobulinemia, acquired (H)          CC: Sunil Tan MD    ______________________________________________________________________________    History of Present Illness    Pretty is here for reevaluation.  Seen 2 weeks ago.  Underwent excisional biopsy of the left inguinal lymph node and is here to review results.  No new symptoms or problems.  ECOG status is 0.  January 2018:   Ms. Avis Paul returns for a follow-up.  She was here for IVIG infusion yesterday and raise concerns about symptoms suggesting recurrence of lymphoma.  She had CT scans and is here to review results.  Has been having aches all over and increased fatigue and weight gain.  Describes some numbness in the right thigh area.  Also has been having memory issues since she received chemotherapy.  Notes an area of induration in the mouth on the lower right mandibular/jaw area.        November 2017:   She was seen 3 months ago.  She did have a visit shortly thereafter at [facility] with  bone marrow and CT scan showing that she is in complete remission from her mantle cell lymphoma.  She has continued IVIG infusions monthly between June - October 2017.  She was informed recently that the insurance would not cover this.  We had previously got this approved prior to starting infusions in June 2017.    She is currently having sinus symptoms and cough productive of greenish phlegm.  No other infection issues in the last 3 months.  She is in significant distress because of the insurance issues.    No other new symptoms or problems.  ECOG status is 0.    August 2017:   She was seen 4 weeks ago.  She has received 2 doses of IVIG.  No infusion reaction with the last one.  Her last infection was a sinus infection more than 4 weeks ago.  She has some discomfort in the right ear.  No fever or mild sores.  No shortness of breath or cough.  No new adenopathy.  She has been starting to have some loose bowel moments 2-3 times a day in the last 3 weeks.  No other new problems.    Pain Status  Currently in Pain: Yes    Review of Systems    Constitutional  Constitutional (WDL): Exceptions to WDL  Fatigue: Fatigue not relieved by rest - Limiting instrumental ADL  Neurosensory  Neurosensory (WDL): Exceptions to WDL  Peripheral Sensory Neuropathy: Moderate symptoms, limiting instrumental ADL  Cardiovascular  Cardiovascular (WDL): All cardiovascular elements are within defined limits  Pulmonary  Respiratory (WDL): Within Defined Limits  Gastrointestinal  Gastrointestinal (WDL): All gastrointestinal elements are within defined limits  Genitourinary  Genitourinary (WDL): All genitourinary elements are within defined limits  Integumentary  Integumentary (WDL): Exceptions to WDL(Bruises easily)  Patient Coping  Patient Coping: Accepting  Distress Assessment  Distress Assessment Score: 4  Accompanied by  Accompanied by: Alone    Past History  Past Medical History:   Diagnosis Date     Anemia      Chronic kidney disease      elevated creatinine due to chemo     History of anesthesia complications 2015    urinary retention after lymph node excision. required catheterization     History of transfusion      Hypothyroid      Mantle cell lymphoma (H)     stage 4     Sleep apnea     uses cpap     Thrombocytopenia (H)          Past Surgical History:   Procedure Laterality Date     ANTERIOR / POSTERIOR COMBINED FUSION CERVICAL SPINE  2011    C2-C7     CARPAL TUNNEL RELEASE Right 2000     CERVICAL FUSION  2004    C4, C5, C6.  Pt. states surgery x 2     KIDNEY STONE SURGERY  1999    removal     LIMBAL STEM CELL TRANSPLANT  08/2015     LYMPH NODE DISSECTION Left 6/25/2020    Procedure: LEFT INGUINAL LYMPH NODE BIOPSY;  Surgeon: Catalina Pascal MD;  Location: [facility] OR;  Service: General     MS BX/REMV,LYMPH NODE,DEEP AXILL Right 6/3/2015    Procedure: RIGHT INGUINAL LYMPH NODE BIOPSY;  Surgeon: Clayton Burgos MD;  Location: [facility] OR;  Service: General     TUBAL LIGATION  2004     US GUIDED NEEDLE PLACEMENT  6/25/2020      LYMPH NODE BIOPSY  6/10/2020       Physical Exam    Recent Vitals 6/29/2020   Weight 297 lbs 11 oz   BSA (m2) 2.54 m2   /92   Pulse 98   Temp 98.3   Temp src 1   SpO2 99   Some recent data might be hidden       GENERAL: Alert and oriented. Seated comfortably. In no distress.    HEAD: Atraumatic and normocephalic.  Has a full head of hair.    EYES: ROMEO, EOMI.  No pallor.  No icterus.    Oral cavity: no mucosal lesion or tonsillar enlargement.  Induration right mandibular lower jaw area    NECK: supple. JVP normal.  No thyroid enlargement.    LYMPH NODES: No palpable, cervical, axillary or inguinal lymphadenopathy.    CHEST: She has slight bilateral wheezing noted  Resonant to percussion throughout bilaterally.  Symmetrical breath movements bilaterally.    CVS: S1 and S2 are heard. Regular rate and rhythm.  No murmur or gallop or rub heard.    ABDOMEN: Soft. Not tender. Not distended.  No  palpable hepatomegaly or splenomegaly.  No other mass palpable.  Bowel sounds heard.    EXTREMITIES: Warm.  No peripheral edema.    SKIN: no rash, or bruising or purpura.    CNS: Nonfocal        Lab Results    No results found for this or any previous visit (from the past 168 hour(s)).    Imaging    Us Guided Needle Placement    Result Date: 6/25/2020  EXAM: US GUIDED NEEDLE PLACEMENT LOCATION: [facility] DATE/TIME: 6/25/2020 12:15 PM INDICATION: Preoperative localization of a left inguinal PROCEDURE: Informed consent was obtained from the patient. A time out was performed where the proper patient, procedure, and site were confirmed. The skin was prepped and draped and 1% lidocaine used for local anesthesia. Under direct sonographic guidance, a 20 gauge needle was advanced through the target and a 15 cm Kopan's localization wire deployed. Postprocedure images demonstrate the wire in appropriate position. The patient tolerated this well.     Sonographically-guided left inguinal lymph node wire localization without immediate complication.    Us Lymph Node Biopsy    Result Date: 6/10/2020  EXAM: 1. PERCUTANEOUS BIOPSY LEFT INGUINAL LYMPH NODE 2. ULTRASOUND GUIDANCE LOCATION: [facility] DATE/TIME: 6/10/2020 2:07 PM INDICATION: Mantle cell lymphoma in Replaced by [facility]; now with abnormal PET; core biopsy of left inguinal lymph node PROCEDURE: Informed consent obtained. Site marked. Prior images reviewed. Required items made available. Patient identity was confirmed verbally and with arm band. Patient reevaluated immediately before administering sedation. Universal protocol was followed. Time out performed. The site was prepped and draped in sterile fashion. 10 mL of 1 percent lidocaine was infused into the local soft tissues. Using standard technique and under direct ultrasound guidance, a 18 gauge biopsy needle was used to make three core biopsies. Additionally, three 25-gauge fine-needle aspirations were  performed. Tissue was submitted to Pathology. The left inguinal lymph node which corresponds to the abnormal lymph node from the prior PET scan was identified and targeted. The patient tolerated the procedure well. No complications. RADIOLOGIC SUPERVISION AND INTERPRETATION: ULTRASOUND GUIDANCE: Images demonstrate the biopsy needle in satisfactory position.     Status post US-guided biopsy left inguinal lymph node. Reference CPT Code: 93382, 93779        Signed by: Yelena Starks MD

## 2021-06-10 NOTE — TELEPHONE ENCOUNTER
Avis expressed concern in the dip of her Immunoglobulin G level. It was 582 yesterday and it was 681 back on 5/21. I did review this with Dr. Starks who indicates no concern. We will continue to monitor level.    I called patient to report this. I left her a detailed message. She is to call us back should she have other concerns.     We'll be seeing her again for an office visit on 9/14.    Cecy Gan RN, Oncology Clinic   [facility]

## 2021-06-10 NOTE — PROGRESS NOTES
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  We will proceed with his seventh dose of maintenance Rituxan today.  She will follow-up again in 3 months for the next infusion.  She will have scans and visit at the [facility] in June.    The patient has been documented with severe hypogammaglobulinemia which may be the result of Rituxan therapy.  She has high risk mantle cell lymphoma and it is imperative to continue the Rituxan to try to maintain her remission.  She has had multiple serious infections over the past 2 years including sinus infections in December and February requiring antibiotics.  She also had normal virus documented causing significant diarrhea.  She has had recurrent episodes of cellulitis requiring antibiotics and also urinary tract infection.    On the basis of these multiple severe recurrent infections and documented hypogammaglobulinemia she absolutely requires treatment with immunoglobulin therapy.  This has previously been denied by insurance for unknown reasons.  We will make another appeal to get the IVIG approved and start that as soon as possible.    Symptoms of nausea vomiting diarrhea and weight loss have significantly improved after a course of antibiotics.  She did have upper and lower endoscopy with no documented infection.  Biopsy consistent with [id number] disease.    She is on B12 oral supplementation.    Plan: 7th dose of maintenance Rituxan today  Follow-up in 3 months  We will  recheck labs including B12 and quantitative immunoglobulins with her return.      Measurable disease: Patient currently in remission    Current therapy: Maintenance Rituxan started December 2015, today is dose #7  Acyclovir 800 mg twice daily  IVIG every 3 months, first dose August 29, 2016(currently on hold, last dose November 2016)      Treatment history:  First set of immunizations received at the [facility] in August 2016  First dose of IVIG  Patient completed 1 year of inhalational pentamidine  Autologous stem cell transplant with Beam prep, August 18, 2015  One cycle of R-ICE, Naye 15, 2015, ifosfamide and etoposide reduced by 25% because of renal dysfunction  Ibrutinib since October 10, 2014, current dose of 420 mg by mouth daily, stopped June 9, 2015   3 cycles of R-DHAP completed late August 2014   5 cycles of R CHOP, started after diagnosis in March 2014       ECOG Performance   ECOG Performance Status: 1    Distress Assessment  Distress Assessment Score: No distress    Pain           Problem List    1. Mantle cell lymphoma  HM1(CBC and Differential)    Comprehensive Metabolic Panel    Immunoglobulins, Quantitative    Vitamin B12   2. Mantle cell lymphoma, unspecified body region  DISCONTINUED: sodium chloride 0.9% 250 mL infusion    DISCONTINUED: acetaminophen tablet 650 mg (TYLENOL)    DISCONTINUED: diphenhydrAMINE injection 12.5 mg (BENADRYL)    DISCONTINUED: riTUXimab 800 mg in sodium chloride 0.9% 170 mL chemo (RITUXAN)    DISCONTINUED: sodium chloride 0.9 % flush 20 mL (NS)    DISCONTINUED: heparin 100 unit/mL lockflush (PF) porcine 300-600 Units    DISCONTINUED: diphenhydrAMINE injection 50 mg (BENADRYL)    DISCONTINUED: famotidine 20 mg/2 mL injection 20 mg (PEPCID)    DISCONTINUED: hydrocortisone sod succ (PF) 100 mg/2 mL injection 100 mg    DISCONTINUED: acetaminophen tablet 1,000 mg (TYLENOL)        CC: Carol Rose,  MD    ______________________________________________________________________________    History of Present Illness    Ms. Avis Paul returns for a follow-up.  She has had significant improvement in her symptoms of nausea vomiting and diarrhea which have resolved and has started to gain some weight.    She is having significant skin reactions to bug bites.  She has some left hand numbness which may be carpal tunnel syndrome.  She has had recurrent infections including sinus infections in December 2016 and February of this year.,  Neurovirus in February.  Cellulitis ×2 and urinary tract infection ×1.  ECOG status is 0.    Pain Status  Currently in Pain: No/denies    Review of Systems    Constitutional  Constitutional (WDL): Exceptions to WDL  Fatigue: Fatigue relieved by rest  Weight Gain: 5 - <10% from baseline (Up 9 lbs since last visit. )  Neurosensory  Neurosensory (WDL): Exceptions to WDL  Peripheral Sensory Neuropathy: Asymptomatic, loss of deep tendon reflexes or paresthesia (Feet and hands. Numb and tingly. Hands cramp up also, waking her up at night. )  Cardiovascular  Cardiovascular (WDL): Exceptions to WDL  Edema: Yes (Some hand swelling. Rings no longer fit. )  Pulmonary  Respiratory (WDL): Within Defined Limits  Gastrointestinal  Gastrointestinal (WDL): All gastrointestinal elements are within defined limits  Genitourinary  Genitourinary (WDL): All genitourinary elements are within defined limits  Integumentary  Integumentary (WDL): All integumentary elements are within defined limits  Patient Coping  Patient Coping: Accepting  Distress Assessment  Distress Assessment Score: No distress  Accompanied by  Accompanied by: Alone    Past History  Past Medical History:   Diagnosis Date     Anemia      Chronic kidney disease     elevated creatinine due to chemo     History of transfusion      Hypothyroid      Mantle cell lymphoma     stage 4     Sleep apnea     uses cpap     Thrombocytopenia          Past  Surgical History:   Procedure Laterality Date     ANTERIOR / POSTERIOR COMBINED FUSION CERVICAL SPINE  2011    C2-C7     CARPAL TUNNEL RELEASE Right 2000     CERVICAL FUSION  2004    C4, C5, C6.  Pt. states surgery x 2     KIDNEY STONE SURGERY  1999    removal     OR BX/REMV,LYMPH NODE,DEEP AXILL Right 6/3/2015    Procedure: RIGHT INGUINAL LYMPH NODE BIOPSY;  Surgeon: Clayton Burgos MD;  Location: [facility];  Service: General     TUBAL LIGATION  2004       Physical Exam    Recent Vitals 5/18/2017   Weight 226 lbs 6 oz   /77   Pulse 68   Temp 97.6   Temp src 1   SpO2 100       GENERAL: Alert and oriented. Seated comfortably. In no distress.    HEAD: Atraumatic and normocephalic.  Has a full head of hair.    EYES: ROMEO, EOMI.  No pallor.  No icterus.    Oral cavity: no mucosal lesion or tonsillar enlargement.    NECK: supple. JVP normal.  No thyroid enlargement.    LYMPH NODES: No palpable, cervical, axillary or inguinal lymphadenopathy.    CHEST: clear to auscultation bilaterally.  Resonant to percussion throughout bilaterally.  Symmetrical breath movements bilaterally.    CVS: S1 and S2 are heard. Regular rate and rhythm.  No murmur or gallop or rub heard.    ABDOMEN: Soft. Not tender. Not distended.  No palpable hepatomegaly or splenomegaly.  No other mass palpable.  Bowel sounds heard.    EXTREMITIES: Warm.  No peripheral edema.    SKIN: no rash, or bruising or purpura.    CNS: Nonfocal        Lab Results    Recent Results (from the past 168 hour(s))   Comprehensive Metabolic Panel   Result Value Ref Range    Sodium 143 136 - 145 mmol/L    Potassium 4.1 3.5 - 5.0 mmol/L    Chloride 110 (H) 98 - 107 mmol/L    CO2 25 22 - 31 mmol/L    Anion Gap, Calculation 8 5 - 18 mmol/L    Glucose 104 70 - 125 mg/dL    BUN 31 (H) 8 - 22 mg/dL    Creatinine 1.92 (H) 0.60 - 1.10 mg/dL    GFR MDRD Af Amer 33 (L) >60 mL/min/1.73m2    GFR MDRD Non Af Amer 27 (L) >60 mL/min/1.73m2    Bilirubin, Total 0.5 0.0 - 1.0  mg/dL    Calcium 8.8 8.5 - 10.5 mg/dL    Protein, Total 5.3 (L) 6.0 - 8.0 g/dL    Albumin 3.5 3.5 - 5.0 g/dL    Alkaline Phosphatase 116 45 - 120 U/L    AST 15 0 - 40 U/L    ALT 14 0 - 45 U/L   HM1 (CBC with Diff)   Result Value Ref Range    WBC 5.3 4.0 - 11.0 thou/uL    RBC 3.90 3.80 - 5.40 mill/uL    Hemoglobin 12.0 12.0 - 16.0 g/dL    Hematocrit 36.6 35.0 - 47.0 %    MCV 94 80 - 100 fL    MCH 30.8 27.0 - 34.0 pg    MCHC 32.8 32.0 - 36.0 g/dL    RDW 14.7 (H) 11.0 - 14.5 %    Platelets 134 (L) 140 - 440 thou/uL    MPV 10.2 8.5 - 12.5 fL    Neutrophils % 59 50 - 70 %    Lymphocytes % 28 20 - 40 %    Monocytes % 9 2 - 10 %    Eosinophils % 3 0 - 6 %    Basophils % 0 0 - 2 %    Neutrophils Absolute 3.2 2.0 - 7.7 thou/uL    Lymphocytes Absolute 1.5 0.8 - 4.4 thou/uL    Monocytes Absolute 0.5 0.0 - 0.9 thou/uL    Eosinophils Absolute 0.2 0.0 - 0.4 thou/uL    Basophils Absolute 0.0 0.0 - 0.2 thou/uL       Imaging    No results found.      Signed by: Yelena Starks MD

## 2021-06-10 NOTE — PROGRESS NOTES
Avis is here today for ongoing management and tx of Mantle Cell Lymphoma. Today is a 4 week f/u with labs and OV with Dr. Starks and IVIG to follow. Quynh Fontenot

## 2021-06-10 NOTE — PROGRESS NOTES
Patient arrived ambulatory, by self, for port lab draw.  Port accessed under aseptic technique - excellent blood return noted.  Blood drawn for labs.  Port flushed with NS and needle secured.

## 2021-06-10 NOTE — TELEPHONE ENCOUNTER
"Patient calls in today stating that she is at [facility] for consultation per Dr Starks's referral.  She wanted to know what kind of ports she had placed back in 2014.  She states that she forgot the card at home and is not sure if they will need any information on this.  I could not find anything in the chart beside that this was placed on 3/28/2014.  So I asked medical records if they could assist.  I did get some paperwork that shows that the implanted venous access port placement actually happened on 4/25/2014.  In that report it stated that \"this port is power injector compatible.\"  I called the patient to let her know this information.  None of the paperwork stated make or model.  She then said that they were not going to be doing any blood work so she did not think that she needed this information any longer.  I asked her to call back if she needed anything further.    Audrey Mcgrath RN  "

## 2021-06-10 NOTE — PROGRESS NOTES
Discussed case with Dr. Rudd at the [facility].  Pathology of both needle lymph node and excisional lymph node biopsy are negative for mantle cell lymphoma.  For now recommend to stop ibrutinib.  Plan repeat PET scan in 3 months.  If scan is better or the same can continue observation.  If there is worsening patient will need repeat biopsy.    If the patient has clear relapse could consider oral therapy with ibrutinib, lenalidomide or venetoclax.  Patient may be a candidate for Car t therapy although her creatinine and kidney function are borderline.  She does need a creatinine clearance over 30.  She is likely not a candidate for our transplant because of the kidney dysfunction.    Did discuss with patient and asked her to stop the ibrutinib.  Will review at tumor conference for any alternate recommendations.

## 2021-06-10 NOTE — PROGRESS NOTES
[facility] Hematology and Oncology Progress Note    Patient: Avis Paul  MRN: [id number]  Date of Service:         Reason for Visit    Chief Complaint   Patient presents with     HE Cancer     Malignant Hematology       Assessment and Plan    Mantle cell lymphoma status post autologous transplant, August 18, 2015  Hypogammaglobulinemia with multiple recurrent infections  Hives/eosinophilia/chronic urticaria  Acute sinusitis  New left inguinal and pelvic adenopathy, May 2020  Hives  Neuropathy    Pathology from the 2 most recent biopsies were reviewed at the [facility] and were negative for recurrence of mantle cell lymphoma.  Patient contacted and stopped ibrutinib as of August 4, 2020.  We will continue with observation for now.  We will do lab work and repeat PET scan in about 4 weeks just before follow-up.  We will determine the next steps based on the PET scan.  If there is further progression she will need another biopsy from the most appropriate site.  Otherwise we will continue observation.    No infection issues or hives currently.  We will continue with IVIG today and plan again in 4 weeks.      Plan: Continue observation  Follow-up in 4 weeks with labs and PET scan  Continue IVIG today and every 4 weeks    Measurable disease: Patient currently in remission    Current therapy: Observation  IVIG resumed in January 2017 and stopped in May 2018; resumed again in September 2018  Maintenance Rituxan started December 2015, and completed August 2017, 8 doses  Acyclovir 400 mg twice daily  IVIG every 3 months, first dose August 29, 2016(currently on hold, last dose November 2016)   IVIG every 4 weeks restarted in June 2017 and stopped in May 2018      Treatment history:  Ibrutinib 420 mg p.o. daily started for presumed relapse of mantle cell lymphoma  July 3, 2020 and stopped August 4, 2020    [facility]  First dose of IVIG  Patient completed 1 year of  inhalational pentamidine  Autologous stem cell transplant with Beam prep, August 18, 2015  One cycle of R-ICE, Naye 15, 2015, ifosfamide and etoposide reduced by 25% because of renal dysfunction  Ibrutinib since October 10, 2014, current dose of 420 mg by mouth daily, stopped June 9, 2015   3 cycles of R-DHAP completed late August 2014   5 cycles of R CHOP, started after diagnosis in March 2014       ECOG Performance   ECOG Performance Status: 1    Distress Assessment  Distress Assessment Score: 4    Pain  Pain Score (Initial OR Reassessment): 3        Problem List    1. Mantle cell lymphoma of intra-abdominal lymph nodes (H)  NM PET CT Skull to Mid Thigh    HM1(CBC and Differential)    Comprehensive Metabolic Panel        CC: Sunil Tan MD    ______________________________________________________________________________    History of Present Illness    Pretty is here for reevaluation.  Seen 4 weeks ago.  Pathology was reviewed at the [facility] with no with no finding of relapse of mantle cell lymphoma.  PET scan remains concerning however.  Ibrutinib therapy was stopped August 4, 2020.  Patient still with some moderate fatigue and neuropathy.  Weight is up.  Vitals are stable.  No hives.  No infectious issues.  Continues same medications.  IVIG is being tolerated fine.  ECOG status is 0.        June 29, 2020  Pretty is here for reevaluation.  Seen 2 weeks ago.  Underwent excisional biopsy of the left inguinal lymph node and is here to review results.  No new symptoms or problems.  ECOG status is 0.  January 2018:   Ms. Avis Paul returns for a follow-up.  She was here for IVIG infusion yesterday and raise concerns about symptoms suggesting recurrence of lymphoma.  She had CT scans and is here to review results.  Has been having aches all over and increased fatigue and weight gain.  Describes some numbness in the right thigh area.  Also has been having memory issues since she received chemotherapy.   Notes an area of induration in the mouth on the lower right mandibular/jaw area.        November 2017:   She was seen 3 months ago.  She did have a visit shortly thereafter at the [facility] with bone marrow and CT scan showing that she is in complete remission from her mantle cell lymphoma.  She has continued IVIG infusions monthly between June - October 2017.  She was informed recently that the insurance would not cover this.  We had previously got this approved prior to starting infusions in June 2017.    She is currently having sinus symptoms and cough productive of greenish phlegm.  No other infection issues in the last 3 months.  She is in significant distress because of the insurance issues.    No other new symptoms or problems.  ECOG status is 0.    August 2017:   She was seen 4 weeks ago.  She has received 2 doses of IVIG.  No infusion reaction with the last one.  Her last infection was a sinus infection more than 4 weeks ago.  She has some discomfort in the right ear.  No fever or mild sores.  No shortness of breath or cough.  No new adenopathy.  She has been starting to have some loose bowel moments 2-3 times a day in the last 3 weeks.  No other new problems.    Pain Status  Currently in Pain: Yes    Review of Systems    Constitutional  Constitutional (WDL): Exceptions to WDL  Fatigue: Fatigue not relieved by rest - Limiting instrumental ADL  Weight Gain: 5 - <10% from baseline  Neurosensory  Peripheral Sensory Neuropathy: Moderate symptoms, limiting instrumental ADL(balls of feet - prickly, tingly, numb)  Cardiovascular  Cardiovascular (WDL): All cardiovascular elements are within defined limits  Pulmonary  Respiratory (WDL): Exceptions to WDL  Dyspnea: Shortness of breath with moderate exertion  Gastrointestinal  Gastrointestinal (WDL): All gastrointestinal elements are within defined limits  Genitourinary  Genitourinary (WDL): All genitourinary elements are within defined  "limits  Integumentary  Integumentary (WDL): Exceptions to WDL  Pruritus: Mild or localized, topical intervention indicated(\"bug bites\")  Patient Coping  Patient Coping: Open/discussion  Distress Assessment  Distress Assessment Score: 4  Accompanied by  Accompanied by: Alone    Past History  Past Medical History:   Diagnosis Date     Anemia      Chronic kidney disease     elevated creatinine due to chemo     History of anesthesia complications 2015    urinary retention after lymph node excision. required catheterization     History of transfusion      Hypothyroid      Mantle cell lymphoma (H)     stage 4     Sleep apnea     uses cpap     Thrombocytopenia (H)          Past Surgical History:   Procedure Laterality Date     ANTERIOR / POSTERIOR COMBINED FUSION CERVICAL SPINE  2011    C2-C7     CARPAL TUNNEL RELEASE Right 2000     CERVICAL FUSION  2004    C4, C5, C6.  Pt. states surgery x 2     KIDNEY STONE SURGERY  1999    removal     LIMBAL STEM CELL TRANSPLANT  08/2015     LYMPH NODE DISSECTION Left 6/25/2020    Procedure: LEFT INGUINAL LYMPH NODE BIOPSY;  Surgeon: Catalina Pascal MD;  Location[facility];  Service: General     VT BX/REMV,LYMPH NODE,DEEP AXILL Right 6/3/2015    Procedure: RIGHT INGUINAL LYMPH NODE BIOPSY;  Surgeon: Clayton Burgos MD;  Location: [facility];  Service: General     TUBAL LIGATION  2004     US GUIDED NEEDLE PLACEMENT  6/25/2020     US LYMPH NODE BIOPSY  6/10/2020       Physical Exam    Recent Vitals 8/17/2020   Weight 302 lbs   BSA (m2) 2.56 m2   /68   Pulse 72   Temp 97.9   Temp src 1   SpO2 99   Some recent data might be hidden       GENERAL: Alert and oriented. Seated comfortably. In no distress.    HEAD: Atraumatic and normocephalic.  Has a full head of hair.    EYES: ROMEO, EOMI.  No pallor.  No icterus.    Oral cavity: no mucosal lesion or tonsillar enlargement.  Induration right mandibular lower jaw area    NECK: supple. JVP normal.  No thyroid " enlargement.    LYMPH NODES: No palpable, cervical, axillary or inguinal lymphadenopathy.    CHEST: She has slight bilateral wheezing noted  Resonant to percussion throughout bilaterally.  Symmetrical breath movements bilaterally.    CVS: S1 and S2 are heard. Regular rate and rhythm.  No murmur or gallop or rub heard.    ABDOMEN: Soft. Not tender. Not distended.  No palpable hepatomegaly or splenomegaly.  No other mass palpable.  Bowel sounds heard.    EXTREMITIES: Warm.  No peripheral edema.    SKIN: no rash, or bruising or purpura.    CNS: Nonfocal        Lab Results    Recent Results (from the past 168 hour(s))   Comprehensive Metabolic Panel   Result Value Ref Range    Sodium 141 136 - 145 mmol/L    Potassium 4.1 3.5 - 5.0 mmol/L    Chloride 108 (H) 98 - 107 mmol/L    CO2 25 22 - 31 mmol/L    Anion Gap, Calculation 8 5 - 18 mmol/L    Glucose 83 70 - 125 mg/dL    BUN 25 (H) 8 - 22 mg/dL    Creatinine 2.01 (H) 0.60 - 1.10 mg/dL    GFR MDRD Af Amer 31 (L) >60 mL/min/1.73m2    GFR MDRD Non Af Amer 26 (L) >60 mL/min/1.73m2    Bilirubin, Total 0.5 0.0 - 1.0 mg/dL    Calcium 8.9 8.5 - 10.5 mg/dL    Protein, Total 6.5 6.0 - 8.0 g/dL    Albumin 3.5 3.5 - 5.0 g/dL    Alkaline Phosphatase 111 45 - 120 U/L    AST 19 0 - 40 U/L    ALT 18 0 - 45 U/L   HM1 (CBC with Diff)   Result Value Ref Range    WBC 6.8 4.0 - 11.0 thou/uL    RBC 4.84 3.80 - 5.40 mill/uL    Hemoglobin 14.0 12.0 - 16.0 g/dL    Hematocrit 44.0 35.0 - 47.0 %    MCV 91 80 - 100 fL    MCH 28.9 27.0 - 34.0 pg    MCHC 31.8 (L) 32.0 - 36.0 g/dL    RDW 15.2 (H) 11.0 - 14.5 %    Platelets 175 140 - 440 thou/uL    MPV 10.0 8.5 - 12.5 fL    Neutrophils % 70 50 - 70 %    Lymphocytes % 19 (L) 20 - 40 %    Monocytes % 7 2 - 10 %    Eosinophils % 4 0 - 6 %    Basophils % 1 0 - 2 %    Neutrophils Absolute 4.7 2.0 - 7.7 thou/uL    Lymphocytes Absolute 1.3 0.8 - 4.4 thou/uL    Monocytes Absolute 0.5 0.0 - 0.9 thou/uL    Eosinophils Absolute 0.3 0.0 - 0.4 thou/uL    Basophils  Absolute 0.0 0.0 - 0.2 thou/uL       Imaging    No results found.      Signed by: Yelena Starks MD

## 2021-06-10 NOTE — PROGRESS NOTES
"Received the patient from the [facility]nd floor [facility]. Seated in chair 2. Reviewed plan of care. Per patient's request - a copy of the labs was given and discussed.     NS was used as the primary IV solution. Premeds were given as ordered. Infused IgG 25 gms over 2 hours 50 minutes. At completion the IV line was flushed with NS. The port a cath was flushed, heparinized, and deaccessed - covered site securely with folded 4x4 and tegaderm (per patient request, \"last time it really bled.\") VSS. Napped at intervals. Up to the bathroom independently.     The [facility] will call patient re: today's IgG level. Left the unit ambulatory in stable condition at 15:27, and plans to return on September 11th.    rCistal Doherty RN  "

## 2021-06-11 NOTE — PROGRESS NOTES
"Patient arrived ambulatory at 10:33 - accessed port a cath (using power loc port needle) and labs were drawn. Patient was escorted to radiology for the PET scan, and returned at approximately 13:05. Administered premeds, and infused IgG 25 gms over 2 hours 33 minutes. The port a cath was flushed, heparinized, and deaccessed. VSS. At 12:02, this writer notified Dr. Starks that the patient was having the PET scan and lab results were completed. Pt napped at intervals. Up to the bathroom independently. \"My R arm and shoulder feels better\", at completion. Left the unit ambulatory in stable condition at 16:34 - and is scheduled to return on Monday September 14th to meet with Dr. Starks.    Cristal Doherty RN  "

## 2021-06-11 NOTE — PROGRESS NOTES
"Avis Paul is a 55 y.o. female who is being evaluated via a billable telephone visit.      The patient has been notified of following:     \"This telephone visit will be conducted via a call between you and your physician/provider. We have found that certain health care needs can be provided without the need for a physical exam.  This service lets us provide the care you need with a short phone conversation.  If a prescription is necessary we can send it directly to your pharmacy.  If lab work is needed we can place an order for that and you can then stop by our lab to have the test done at a later time.    Telephone visits are billed at different rates depending on your insurance coverage. During this emergency period, for some insurers they may be billed the same as an in-person visit.  Please reach out to your insurance provider with any questions.    If during the course of the call the physician/provider feels a telephone visit is not appropriate, you will not be charged for this service.\"    Patient has given verbal consent to a Telephone visit? Yes    What phone number would you like to be contacted at? [phone]    Phone call duration: 10 minutes    Cecy Gan RN      "

## 2021-06-11 NOTE — PROGRESS NOTES
[facility] Hematology and Oncology Progress Note    Patient: Avis Paul  MRN: [id number]  Date of Service:         Reason for Visit    Chief Complaint   Patient presents with     HE Cancer     Mantle cell lymphoma of intra-abdominal lymph nodes       Assessment and Plan    Mantle cell lymphoma status post autologous transplant, August 18, 2015  Hypogammaglobulinemia with multiple recurrent infections  Hives/eosinophilia/chronic urticaria  Acute sinusitis  New left inguinal and pelvic adenopathy, May 2020  Hives  Neuropathy    PET shows resolution of activity in the pelvis, lymph node decreased in size.  Will continue observation for now.  Will see her in 8 weeks with repeat CT.  Continue IVIG q 4weeks.    PLAN:    As above      Measurable disease: Patient currently in remission    Current therapy: Observation  IVIG resumed in January 2017 and stopped in May 2018; resumed again in September 2018  Maintenance Rituxan started December 2015, and completed August 2017, 8 doses  Acyclovir 400 mg twice daily  IVIG every 3 months, first dose August 29, 2016(currently on hold, last dose November 2016)   IVIG every 4 weeks restarted in June 2017 and stopped in May 2018      Treatment history:  Ibrutinib 420 mg p.o. daily started for presumed relapse of mantle cell lymphoma  July 3, 2020 and stopped August 4, 2020    First set of immunizations received at the [facility] in August 2016  First dose of IVIG  Patient completed 1 year of inhalational pentamidine  Autologous stem cell transplant with Beam prep, August 18, 2015  One cycle of R-ICE, Naye 15, 2015, ifosfamide and etoposide reduced by 25% because of renal dysfunction  Ibrutinib since October 10, 2014, current dose of 420 mg by mouth daily, stopped June 9, 2015   3 cycles of R-DHAP completed late August 2014   5 cycles of R CHOP, started after diagnosis in March 2014       ECOG Performance   ECOG Performance Status: 1    Distress Assessment  Distress Assessment  Score: No distress    Pain  Pain Score (Initial OR Reassessment): 6        Problem List    1. Mantle cell lymphoma of lymph nodes of inguinal region (H)  HM1(CBC and Differential)    Comprehensive Metabolic Panel    LD(LDH)    Immunoglobulins, Quantitative    CT Chest Abdomen Pelvis Without Oral Without IV Contrast        CC: Sunil Tan MD    ______________________________________________________________________________    History of Present Illness    Pretty is spoke to for telephone visit.  Doing well.  PET reviewed.  Recommend continued observation.  Continue IVIG.      August 2020:      Seen 4 weeks ago.  Pathology was reviewed at the [facility] with no with no finding of relapse of mantle cell lymphoma.  PET scan remains concerning however.  Ibrutinib therapy was stopped August 4, 2020.  Patient still with some moderate fatigue and neuropathy.  Weight is up.  Vitals are stable.  No hives.  No infectious issues.  Continues same medications.  IVIG is being tolerated fine.  ECOG status is 0.        June 29, 2020  rPetty is here for reevaluation.  Seen 2 weeks ago.  Underwent excisional biopsy of the left inguinal lymph node and is here to review results.  No new symptoms or problems.  ECOG status is 0.  January 2018:   Ms. Avis Paul returns for a follow-up.  She was here for IVIG infusion yesterday and raise concerns about symptoms suggesting recurrence of lymphoma.  She had CT scans and is here to review results.  Has been having aches all over and increased fatigue and weight gain.  Describes some numbness in the right thigh area.  Also has been having memory issues since she received chemotherapy.  Notes an area of induration in the mouth on the lower right mandibular/jaw area.        November 2017:   She was seen 3 months ago.  She did have a visit shortly thereafter at the [facility] with bone marrow and CT scan showing that she is in complete remission from her mantle cell lymphoma.  She has  continued IVIG infusions monthly between  - 2017.  She was informed recently that the insurance would not cover this.  We had previously got this approved prior to starting infusions in 2017.    She is currently having sinus symptoms and cough productive of greenish phlegm.  No other infection issues in the last 3 months.  She is in significant distress because of the insurance issues.    No other new symptoms or problems.  ECOG status is 0.    2017:   She was seen 4 weeks ago.  She has received 2 doses of IVIG.  No infusion reaction with the last one.  Her last infection was a sinus infection more than 4 weeks ago.  She has some discomfort in the right ear.  No fever or mild sores.  No shortness of breath or cough.  No new adenopathy.  She has been starting to have some loose bowel moments 2-3 times a day in the last 3 weeks.  No other new problems.    Pain Status  Currently in Pain: Yes    Review of Systems    Constitutional  Constitutional (WDL): Exceptions to WDL  Fatigue: Fatigue not relieved by rest - Limiting instrumental ADL  Neurosensory  Neurosensory (WDL): Exceptions to WDL  Peripheral Sensory Neuropathy: Moderate symptoms, limiting instrumental ADL(Feet)  Cardiovascular  Cardiovascular (WDL): All cardiovascular elements are within defined limits  Pulmonary  Respiratory (WDL): Within Defined Limits  Gastrointestinal  Gastrointestinal (WDL): Exceptions to WDL  Diarrhea: Increase of <4 stools per day over baseline, mild increase in ostomy output compared to baseline  Vomitin - 2 episodes ( by 5 minutes) in 24 hrs  Genitourinary  Genitourinary (WDL): All genitourinary elements are within defined limits  Integumentary  Integumentary (WDL): All integumentary elements are within defined limits  Patient Coping  Patient Coping: Accepting  Distress Assessment  Distress Assessment Score: No distress  Accompanied by  Accompanied by: Alone    Past History  Past Medical History:    Diagnosis Date     Anemia      Chronic kidney disease     elevated creatinine due to chemo     History of anesthesia complications 2015    urinary retention after lymph node excision. required catheterization     History of transfusion      Hypothyroid      Mantle cell lymphoma (H)     stage 4     Sleep apnea     uses cpap     Thrombocytopenia (H)          Past Surgical History:   Procedure Laterality Date     ANTERIOR / POSTERIOR COMBINED FUSION CERVICAL SPINE  2011    C2-C7     CARPAL TUNNEL RELEASE Right 2000     CERVICAL FUSION  2004    C4, C5, C6.  Pt. states surgery x 2     KIDNEY STONE SURGERY  1999    removal     LIMBAL STEM CELL TRANSPLANT  08/2015     LYMPH NODE DISSECTION Left 6/25/2020    Procedure: LEFT INGUINAL LYMPH NODE BIOPSY;  Surgeon: Catalina Pascal MD;  Location: [facility];  Service: General     MN BX/REMV,LYMPH NODE,DEEP AXILL Right 6/3/2015    Procedure: RIGHT INGUINAL LYMPH NODE BIOPSY;  Surgeon: Clayton Burgos MD;  Location: [facility];  Service: General     TUBAL LIGATION  2004     US GUIDED NEEDLE PLACEMENT  6/25/2020      LYMPH NODE BIOPSY  6/10/2020       Physical Exam    Recent Vitals 9/11/2020   Height -   Weight -   BSA (m2) -   /88   Pulse 72   Temp -   Temp src -   SpO2 99   Some recent data might be hidden       GENERAL: Alert and oriented. Seated comfortably. In no distress.    HEAD: Atraumatic and normocephalic.  Has a full head of hair.    EYES: ROMEO, EOMI.  No pallor.  No icterus.    Oral cavity: no mucosal lesion or tonsillar enlargement.  Induration right mandibular lower jaw area    NECK: supple. JVP normal.  No thyroid enlargement.    LYMPH NODES: No palpable, cervical, axillary or inguinal lymphadenopathy.    CHEST: She has slight bilateral wheezing noted  Resonant to percussion throughout bilaterally.  Symmetrical breath movements bilaterally.    CVS: S1 and S2 are heard. Regular rate and rhythm.  No murmur or gallop or rub  heard.    ABDOMEN: Soft. Not tender. Not distended.  No palpable hepatomegaly or splenomegaly.  No other mass palpable.  Bowel sounds heard.    EXTREMITIES: Warm.  No peripheral edema.    SKIN: no rash, or bruising or purpura.    CNS: Nonfocal        Lab Results    Recent Results (from the past 168 hour(s))   Comprehensive Metabolic Panel   Result Value Ref Range    Sodium 140 136 - 145 mmol/L    Potassium 4.1 3.5 - 5.0 mmol/L    Chloride 107 98 - 107 mmol/L    CO2 27 22 - 31 mmol/L    Anion Gap, Calculation 6 5 - 18 mmol/L    Glucose 95 70 - 125 mg/dL    BUN 26 (H) 8 - 22 mg/dL    Creatinine 1.94 (H) 0.60 - 1.10 mg/dL    GFR MDRD Af Amer 32 (L) >60 mL/min/1.73m2    GFR MDRD Non Af Amer 27 (L) >60 mL/min/1.73m2    Bilirubin, Total 0.4 0.0 - 1.0 mg/dL    Calcium 8.7 8.5 - 10.5 mg/dL    Protein, Total 6.5 6.0 - 8.0 g/dL    Albumin 3.6 3.5 - 5.0 g/dL    Alkaline Phosphatase 113 45 - 120 U/L    AST 23 0 - 40 U/L    ALT 17 0 - 45 U/L   HM1 (CBC with Diff)   Result Value Ref Range    WBC 6.7 4.0 - 11.0 thou/uL    RBC 4.79 3.80 - 5.40 mill/uL    Hemoglobin 13.9 12.0 - 16.0 g/dL    Hematocrit 43.5 35.0 - 47.0 %    MCV 91 80 - 100 fL    MCH 29.0 27.0 - 34.0 pg    MCHC 32.0 32.0 - 36.0 g/dL    RDW 15.0 (H) 11.0 - 14.5 %    Platelets 190 140 - 440 thou/uL    MPV 10.3 8.5 - 12.5 fL    Neutrophils % 74 (H) 50 - 70 %    Lymphocytes % 17 (L) 20 - 40 %    Monocytes % 7 2 - 10 %    Eosinophils % 2 0 - 6 %    Basophils % 1 0 - 2 %    Immature Granulocyte % 0 <=0 %    Neutrophils Absolute 4.9 2.0 - 7.7 thou/uL    Lymphocytes Absolute 1.2 0.8 - 4.4 thou/uL    Monocytes Absolute 0.5 0.0 - 0.9 thou/uL    Eosinophils Absolute 0.1 0.0 - 0.4 thou/uL    Basophils Absolute 0.0 0.0 - 0.2 thou/uL    Immature Granulocyte Absolute 0.0 <=0.0 thou/uL   POCT Glucose    Specimen: Capillary; Blood   Result Value Ref Range    Glucose 87 70 - 139 mg/dL       Imaging    Nm Pet Ct Skull To Mid Thigh    Result Date: 9/11/2020  EXAM: NM PET CT SKULL TO MID  THIGH LOCATION: [facility] DATE/TIME: 9/11/2020 2:08 PM INDICATION: Subsequent treatment strategy for restaging Mantle cell lymphoma of intra-abdominal lymph nodes COMPARISON: PET CT from 05/27/2020 TECHNIQUE: Serum glucose level 87 mg/dL. One hour post intravenous administration of 12.7 mCi F-18 FDG, PET imaging was performed from the skull base to the mid thighs utilizing attenuation correction with concurrent axial CT and PET/CT image fusion. Dose reduction techniques were used. FINDINGS: Left internal iliac lymph node has decreased from 4.9 x 3.9 cm (SUVmax 12.4) to 3.3 x 1.5 cm (SUVmax 5.6), now just below background liver levels (SUVmax 5.7). Left inguinal lymphadenectomy has been performed with inflammatory FDG activity at the site and mild reactive lymphadenopathy locally. All other FDG activity is normal. Normal bone marrow and spleen. Right IJ Port-A-Cath tip in the upper right atrium. Diminutive or absent thyroid. Benign-appearing calcifications in both breasts. Duodenal diverticula. Pelvic phleboliths. Anterior fusion C4-C6. Posterior fusion C3-C7. Moderate degenerative change in the spine.     Complete response ([place] 3)        Signed by: Yelena Starks MD

## 2021-06-11 NOTE — PROGRESS NOTES
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  We will plan 400 mg/kg every 4 weeks for initial infusion.  I discussed potential side effects including but not limited to infusion reactions, anaphylaxis, thromboembolic events, headache, renal dysfunction and skin changes.  She understands and is willing to proceed and did sign a consent.    Given her previous infusion reactions we will plan to use IV Benadryl, IV Pepcid, IV Solu-Medrol and p.o. Tylenol as premedications.  We will plan to see the patient back again in 4 weeks with lab work prior to the next infusion.  Discussed that we may need to continue the infusions indefinitely unless her immunoglobulin levels improve after Rituxan has been discontinued.  I asked her to document any instances of infections and antibiotic use so we can adjust infusions as necessary.    45 minutes spent majority in counseling and coordination of care.      Measurable disease: Patient currently in remission    Current therapy: Maintenance Rituxan started December 2015, today is dose #7  Acyclovir 800 mg twice daily  IVIG every 3 months, first dose August 29, 2016(currently on hold, last dose November 2016)      Treatment history:  First set of immunizations received at the [facility] in August 2016  First dose of IVIG  Patient completed 1 year of inhalational pentamidine  Autologous stem cell transplant with Beam prep, August 18, 2015  One cycle of R-ICE, Naye 15, 2015, ifosfamide and etoposide reduced by 25% because of renal  dysfunction  Ibrutinib since October 10, 2014, current dose of 420 mg by mouth daily, stopped June 9, 2015   3 cycles of R-DHAP completed late August 2014   5 cycles of R CHOP, started after diagnosis in March 2014       ECOG Performance   ECOG Performance Status: 1    Distress Assessment  Distress Assessment Score: 3    Pain  Pain Score (Initial OR Reassessment): 3        Problem List    1. Hypogammaglobulinemia, acquired  HM1(CBC and Differential)    Comprehensive Metabolic Panel    Immunoglobulins, Quantitative    DISCONTINUED: acetaminophen tablet 650 mg (TYLENOL)    DISCONTINUED: sodium chloride 0.9% 250 mL    DISCONTINUED: immune globulin (IVIG) 10% IVPB 30 g (PRIVIGEN)    DISCONTINUED: heparin 100 unit/mL lockflush (PF) porcine 300-600 Units    DISCONTINUED: sodium chloride 0.9 % flush 10 mL (NS)    DISCONTINUED: diphenhydrAMINE injection 50 mg (BENADRYL)    DISCONTINUED: famotidine 20 mg/2 mL injection 20 mg (PEPCID)    DISCONTINUED: acetaminophen tablet 1,000 mg (TYLENOL)    DISCONTINUED: hydrocortisone sod succ (PF) 100 mg/2 mL injection 100 mg    DISCONTINUED: methylPREDNISolone sod suc(PF) 125 mg/2 mL injection 125 mg (SOLU-MEDROL)        CC: Carol Rose MD    ______________________________________________________________________________    History of Present Illness    Ms. Avis Paul returns for a follow-up.  She received Rituxan about 5 weeks ago.  Have now received approval for IVIG and she is here to initiate treatment.  She had a sinus infection a week ago and is currently on a 14 day course of Levaquin.  She had recent evaluation at the [facility] and everything else appears to be stable.    With her previous infusion she did have some shortness of breath and vomiting as well.  She has had some restless legs with Benadryl.  No other problems.      Pain Status  Currently in Pain: Yes    Review of Systems    Constitutional  Constitutional (WDL): Exceptions to WDL  Fatigue:  Fatigue relieved by rest  Neurosensory  Neurosensory (WDL): Exceptions to WDL  Peripheral Motor Neuropathy: Asymptomatic, clinical or diagnostic observations only, intervention not indicated  Ataxia: Asymptomatic, clinical or diagnostic observations only, intervention not indicated  Cardiovascular  Cardiovascular (WDL): Exceptions to WDL (crackling in right ear)  Edema: Yes  Pulmonary  Respiratory (WDL): Exceptions to WDL  Cough: Mild symptoms, nonprescription intervention indicated  Gastrointestinal  Gastrointestinal (WDL): All gastrointestinal elements are within defined limits  Genitourinary  Genitourinary (WDL): All genitourinary elements are within defined limits  Integumentary  Integumentary (WDL): All integumentary elements are within defined limits  Patient Coping  Patient Coping: Accepting  Distress Assessment  Distress Assessment Score: 3  Accompanied by  Accompanied by: Alone    Past History  Past Medical History:   Diagnosis Date     Anemia      Chronic kidney disease     elevated creatinine due to chemo     History of transfusion      Hypothyroid      Mantle cell lymphoma     stage 4     Sleep apnea     uses cpap     Thrombocytopenia          Past Surgical History:   Procedure Laterality Date     ANTERIOR / POSTERIOR COMBINED FUSION CERVICAL SPINE  2011    C2-C7     CARPAL TUNNEL RELEASE Right 2000     CERVICAL FUSION  2004    C4, C5, C6.  Pt. states surgery x 2     KIDNEY STONE SURGERY  1999    removal     MT BX/REMV,LYMPH NODE,DEEP AXILL Right 6/3/2015    Procedure: RIGHT INGUINAL LYMPH NODE BIOPSY;  Surgeon: Clayton Burgos MD;  Location: [facility];  Service: General     TUBAL LIGATION  2004       Physical Exam    Recent Vitals 6/23/2017   Height -   Weight -   BSA (m2) -   /61   Pulse 68   Temp -   Temp src -       GENERAL: Alert and oriented. Seated comfortably. In no distress.    HEAD: Atraumatic and normocephalic.  Has a full head of hair.    EYES: ROMEO, EOMI.  No pallor.  No  icterus.    Oral cavity: no mucosal lesion or tonsillar enlargement.    NECK: supple. JVP normal.  No thyroid enlargement.    LYMPH NODES: No palpable, cervical, axillary or inguinal lymphadenopathy.    CHEST: clear to auscultation bilaterally.  Resonant to percussion throughout bilaterally.  Symmetrical breath movements bilaterally.    CVS: S1 and S2 are heard. Regular rate and rhythm.  No murmur or gallop or rub heard.    ABDOMEN: Soft. Not tender. Not distended.  No palpable hepatomegaly or splenomegaly.  No other mass palpable.  Bowel sounds heard.    EXTREMITIES: Warm.  No peripheral edema.    SKIN: no rash, or bruising or purpura.    CNS: Nonfocal        Lab Results    Recent Results (from the past 168 hour(s))   Comprehensive Metabolic Panel   Result Value Ref Range    Sodium 141 136 - 145 mmol/L    Potassium 4.2 3.5 - 5.0 mmol/L    Chloride 107 98 - 107 mmol/L    CO2 28 22 - 31 mmol/L    Anion Gap, Calculation 6 5 - 18 mmol/L    Glucose 94 70 - 125 mg/dL    BUN 28 (H) 8 - 22 mg/dL    Creatinine 1.96 (H) 0.60 - 1.10 mg/dL    GFR MDRD Af Amer 32 (L) >60 mL/min/1.73m2    GFR MDRD Non Af Amer 27 (L) >60 mL/min/1.73m2    Bilirubin, Total 0.5 0.0 - 1.0 mg/dL    Calcium 9.0 8.5 - 10.5 mg/dL    Protein, Total 5.5 (L) 6.0 - 8.0 g/dL    Albumin 3.4 (L) 3.5 - 5.0 g/dL    Alkaline Phosphatase 124 (H) 45 - 120 U/L    AST 15 0 - 40 U/L    ALT 16 0 - 45 U/L   LD(LDH)   Result Value Ref Range    LD (LDH) 213 125 - 220 U/L   Immunoglobulins, Quantitative   Result Value Ref Range    Immunoglobulin G 138 (L) 700 - 1700 mg/dL    Immunoglobulin M <5 (L) 60 - 280 mg/dL    Immunoglobulin A 27 (L) 65 - 400 mg/dL   Vitamin B12   Result Value Ref Range    Vitamin B-12 315 213 - 816 pg/mL   HM1 (CBC with Diff)   Result Value Ref Range    WBC 4.5 4.0 - 11.0 thou/uL    RBC 4.23 3.80 - 5.40 mill/uL    Hemoglobin 12.6 12.0 - 16.0 g/dL    Hematocrit 39.1 35.0 - 47.0 %    MCV 92 80 - 100 fL    MCH 29.8 27.0 - 34.0 pg    MCHC 32.2 32.0 -  36.0 g/dL    RDW 13.4 11.0 - 14.5 %    Platelets 140 140 - 440 thou/uL    MPV 9.8 8.5 - 12.5 fL    Neutrophils % 50 50 - 70 %    Lymphocytes % 32 20 - 40 %    Monocytes % 10 2 - 10 %    Eosinophils % 7 (H) 0 - 6 %    Basophils % 0 0 - 2 %    Neutrophils Absolute 2.2 2.0 - 7.7 thou/uL    Lymphocytes Absolute 1.4 0.8 - 4.4 thou/uL    Monocytes Absolute 0.4 0.0 - 0.9 thou/uL    Eosinophils Absolute 0.3 0.0 - 0.4 thou/uL    Basophils Absolute 0.0 0.0 - 0.2 thou/uL       Imaging    No results found.      Signed by: Yelena Starks MD

## 2021-06-11 NOTE — PROGRESS NOTES
"1015 Avis arrived A&OX4 ambulatory and stable, confirms she is here for IGG infusion. Pt states she has had IGG at the [facility] and reacted with vomiting and SOB. She was given \"benadryl and i am not sure what else and they slowed it down and I was ok.\" pt had appt with Lilli this morning and orders placed for pre-meds and IGG. Avis arrived to Infusion with her port accessed.  IGG ordered 0.5mg/kg/min, titratable if tolerating;   1130 IGG started 30mL/hr  X15min;   60mL/hr ;  @ 1152 ; 14.7mL infused pt s/o feeling Flushed, and chest tightness (states she always has experienced SOB with IGG infusion, \"they give me some more meds then finish the infusion)  Infusion immediately stopped; NS started.  Famotidine 20mg IVP given over 10min. 1156 per Lilli hold IGG x30min and update him with pt status. Within 2minutes of stopping infusion pt stated she was feeling improvement. By 1230 she stated she feels \"normal\".  1230 page to Lilli. 1253 2nd page to Lilli  1312 per Lilli: give steriod and benadry then restart infusion at beginning rate.  Benadryl 25mg IVP and Solumedrol 100mg IVP given as ordered; line flushed NS. VSS  1342  Pump programmed according to MD orders of 0.5mg/kg/min, increasing by 0.5mg/kg/min every 15min x4 with max infusion rate 4mg/kg/min.  IGG started 30mL/hr x15min    1405 VSS. Pt states she feels \"normal\", denies adverse symptoms  15mL of IGG has infused.  Resting quietly.  1440 VSS; pt denies adverse Sx. Current infusion rate 2mg/kg/min  1515 VSS; pt denies adverse Sx. 160mL of IGG has infused.  1550 VSS; rate 216mL/hr OR 3.5mg/kg/min  1620 VSS. Infusion complete. Line flushed NS and pt monitored. Avis stated she was feeling \"normal\" and denied any adverse Sx. Port heparinized and needle dc'd. Discussed with pt that on her next appt, in 4weeks, she and the RN request additional pre-meds to help prevent this type of reaction that pt states she has had with " "every IGG infusion.  Dc education reviewed, pt stated good understanding and that her needs were me today. Avis dc'd A&OX4 ambulatory and stable approx 1630 1635 Lilli called to check on Avis, writer explained that she did well after benadryl and pepcid were given; MD stated he was under the impression that APAP, benadryl, pepcid and solumedrol were all ordered as pre meds; the therapy plan only has APAP and solumedrol as \"every visit\", all others are ordered PRN.  "

## 2021-06-12 NOTE — PROGRESS NOTES
[facility] Hematology and Oncology Progress Note    Patient: Avis Paul  MRN: [id number]  Date of Service: 07/20/2017        Reason for Visit    Chief Complaint   Patient presents with     HE Cancer       Assessment and Plan  1. Mantle cell lymphoma status post autologous transplant, August 18, 2015: she has been getting Rituxan every 3 months. She will get her eighth cycle in August and then she will be done. She is getting imaging done at the [facility] in August.     2. Hypogammaglobulinemia with multiple recurrent infections: in the last month she has been on antiobiotics once for a sinus infection. She has also had a viral GI illness. She also has significant reactions to bug bites that almost become like cellulitis. We will continue on monthly IVIG. She has had reactions to that so we will automatically premed with benadryl, pepcid, solumedrol and tylenol. IVIG levels are still low, but have significantly improved.     ECOG Performance   ECOG Performance Status: 1     Distress Assessment  Distress Assessment Score: 7: refer to Jazmin mccauley. Having issues with anxiety due to finishing up her maintenance treatment and worrying about recurrence. Teary and emotional today.     Pain  Currently in Pain: Yes  Pain Score (Initial OR Reassessment): 5  Location: hands and feet      Problem List    1. Mantle cell lymphoma of intra-abdominal lymph nodes     2. Hypogammaglobulinemia, acquired     3. Anxiety  Ambulatory referral to Oncology Psychotherapist      ______________________________________________________________________________    History of Present Illness    Measurable disease: CT scan, PET, Patient currently in remission     Current therapy: Maintenance Rituxan started December 2015, today is dose #8  Acyclovir 800 mg twice daily  IVIG: did get three doses at the [facility] on August 29, 2016,  last dose November 2016.   She was on hold due to insurance issues, but then in June has restarted and is getting monthly.        Treatment history:  First set of immunizations received at the [facility] in August 2016  First dose of IVIG  Patient completed 1 year of inhalational pentamidine  Autologous stem cell transplant with Beam prep, August 18, 2015  One cycle of R-ICE, Naye 15, 2015, ifosfamide and etoposide reduced by 25% because of renal dysfunction  Ibrutinib since October 10, 2014, current dose of 420 mg by mouth daily, stopped June 9, 2015   3 cycles of R-DHAP completed late August 2014   5 cycles of R CHOP, started after diagnosis in March 2014     Interim History:   Pt is here today to continue on IVIG. She is doing ok, but feels very anxious about finishing up the Rituxan. She continues to have issues with infections, viral illnesses and generally not feeling well. She is trying to work, but can only work part time because of the infections. She also has been struggling with reactions to bug bites. They become large red marks and sometimes get infected. She wears long sleeves, shoes and takes proper precautions when she is outdoors. No fevers currently.        Pain Status  Currently in Pain: Yes    Review of Systems    Constitutional  Constitutional (WDL): Exceptions to WDL  Fatigue: Fatigue relieved by rest  Neurosensory  Neurosensory (WDL): Exceptions to WDL  Peripheral Motor Neuropathy: Asymptomatic, clinical or diagnostic observations only, intervention not indicated  Peripheral Sensory Neuropathy: Asymptomatic, loss of deep tendon reflexes or paresthesia (hands and feet)  Eye   Eye Disorder (WDL): Exceptions to WDL (wears glasses)  Ear  Ear Disorder (WDL): Exceptions to WDL (right ear popping)  Cardiovascular  Cardiovascular (WDL): Exceptions to WDL  Edema: Yes (wiliam hands)  Pulmonary  Respiratory (WDL): Exceptions to WDL  Cough: Mild symptoms, nonprescription intervention indicated  Dyspnea: Shortness of breath with moderate exertion  Gastrointestinal  Gastrointestinal (WDL): All gastrointestinal elements are within  "defined limits  Genitourinary  Genitourinary (WDL): All genitourinary elements are within defined limits  Lymphatic  Lymph (WDL): All lymph disorder elements are within defined limits  Musculoskeletal and Connective Tissue  Musculoskeletal and Connetive Tissue Disorders (WDL): Exceptions to WDL  Arthralgia: Mild pain  Myalgia: Mild pain  Integumentary  Integumentary (WDL): Exceptions to WDL (tons of bug bites with blisters, skin tight)  Patient Coping  Patient Coping: Accepting  Distress Assessment  Distress Assessment Score: 7  Accompanied by  Accompanied by: Alone  Oral Chemo Adherence       Past History  Past Medical History:   Diagnosis Date     Anemia      Chronic kidney disease     elevated creatinine due to chemo     History of transfusion      Hypothyroid      Mantle cell lymphoma     stage 4     Sleep apnea     uses cpap     Thrombocytopenia        PHYSICAL EXAM:  /84  Pulse 64  Temp 97.5  F (36.4  C) (Oral)   Ht 5' 7\" (1.702 m)  Wt (!) 227 lb 9.6 oz (103.2 kg)  SpO2 100%  BMI 35.65 kg/m2  GENERAL: no acute distress. Cooperative in conversation. Here alone.   HEENT: pupils are equal, round and reactive. Oromucosa is clean and intact. No ulcerations or mucositis noted. No bleeding noted.  RESP: lungs are clear bilaterally per auscultation. Regular respiratory rate. No wheezes or rhonchi.  CV: Regular, rate and rhythm. No murmurs.  ABD: soft, nontender. Positive bowel sounds. No organomegaly.   MUSCULOSKELETAL: No lower extremity swelling.   NEURO: non focal. Alert and oriented x3.   PSYCH: pt crying and emotional today  SKIN: warm dry. Has red welts and bug bites all over her skin  LYMPH: no cervical, supraclavicular or axillary lymphadenopathy          Lab Results    Recent Results (from the past 168 hour(s))   Comprehensive Metabolic Panel   Result Value Ref Range    Sodium 142 136 - 145 mmol/L    Potassium 4.0 3.5 - 5.0 mmol/L    Chloride 108 (H) 98 - 107 mmol/L    CO2 27 22 - 31 mmol/L    " Anion Gap, Calculation 7 5 - 18 mmol/L    Glucose 80 70 - 125 mg/dL    BUN 27 (H) 8 - 22 mg/dL    Creatinine 1.96 (H) 0.60 - 1.10 mg/dL    GFR MDRD Af Amer 32 (L) >60 mL/min/1.73m2    GFR MDRD Non Af Amer 27 (L) >60 mL/min/1.73m2    Bilirubin, Total 0.4 0.0 - 1.0 mg/dL    Calcium 8.9 8.5 - 10.5 mg/dL    Protein, Total 5.8 (L) 6.0 - 8.0 g/dL    Albumin 3.4 (L) 3.5 - 5.0 g/dL    Alkaline Phosphatase 99 45 - 120 U/L    AST 26 0 - 40 U/L    ALT 20 0 - 45 U/L   Immunoglobulins, Quantitative   Result Value Ref Range    Immunoglobulin G 409 (L) 700 - 1700 mg/dL    Immunoglobulin M <5 (L) 60 - 280 mg/dL    Immunoglobulin A 27 (L) 65 - 400 mg/dL   HM1 (CBC with Diff)   Result Value Ref Range    WBC 5.9 4.0 - 11.0 thou/uL    RBC 4.46 3.80 - 5.40 mill/uL    Hemoglobin 13.2 12.0 - 16.0 g/dL    Hematocrit 41.0 35.0 - 47.0 %    MCV 92 80 - 100 fL    MCH 29.6 27.0 - 34.0 pg    MCHC 32.2 32.0 - 36.0 g/dL    RDW 13.3 11.0 - 14.5 %    Platelets 136 (L) 140 - 440 thou/uL    MPV 9.5 8.5 - 12.5 fL    Neutrophils % 52 50 - 70 %    Lymphocytes % 31 20 - 40 %    Monocytes % 7 2 - 10 %    Eosinophils % 10 (H) 0 - 6 %    Basophils % 1 0 - 2 %    Neutrophils Absolute 3.0 2.0 - 7.7 thou/uL    Lymphocytes Absolute 1.8 0.8 - 4.4 thou/uL    Monocytes Absolute 0.4 0.0 - 0.9 thou/uL    Eosinophils Absolute 0.6 (H) 0.0 - 0.4 thou/uL    Basophils Absolute 0.0 0.0 - 0.2 thou/uL       Imaging    No results found.      Signed by: Nanda Olivares, CNP

## 2021-06-12 NOTE — PROGRESS NOTES
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  We will proceed with last maintenance dose of Rituxan No. 8 today.  She does have follow-up at the [facility] later this month for a 2 year post transplant reevaluation.  I will see her again in 3 months for a visit.    She will return next week for third dose of immunoglobulin.  She has had only one infection since restarting on IVIG in June so will maintain the current dose.  She will get premedications to prevent any type of reaction.  I will check labs with her infusion in October and reassess in November to determine if we need to change any dosing on the IVIG.  I asked her to keep a journal of any time she is treated with antibiotics and duration of treatment so we can review this when she returns.    Plan: Proceed with cycle #8 of Rituxan maintenance which is her last one today  Return in 1 week, 5 weeks, 9 weeks for IVIG  Follow-up with me in 3 months  She will have labs checked with IVIG infusion in October including CBC, CMP and quantitative immunoglobulins      Measurable disease: Patient currently in remission    Current therapy: Maintenance Rituxan started December 2015, today is dose #8  Acyclovir 800 mg twice daily  IVIG every 3 months, first dose August 29, 2016(currently on hold, last dose November 2016)   IVIG every 4 weeks restarted in June 2017      Treatment history:  First set of immunizations received at the [facility] in August 2016  First dose of IVIG  Patient completed 1 year  "of inhalational pentamidine  Autologous stem cell transplant with Beam prep, August 18, 2015  One cycle of R-ICE, Naye 15, 2015, ifosfamide and etoposide reduced by 25% because of renal dysfunction  Ibrutinib since October 10, 2014, current dose of 420 mg by mouth daily, stopped June 9, 2015   3 cycles of R-DHAP completed late August 2014   5 cycles of R CHOP, started after diagnosis in March 2014       ECOG Performance   ECOG Performance Status: 1    Distress Assessment  Distress Assessment Score: 7    Pain  Pain Score (Initial OR Reassessment): 5        Problem List    1. Mantle cell lymphoma  HM1(CBC and Differential)    Comprehensive Metabolic Panel    Immunoglobulins, Quantitative        CC: Carol Rose MD    ______________________________________________________________________________    History of Present Illness    Ms. Avis Paul returns for a follow-up.  She was seen 4 weeks ago.  She has received 2 doses of IVIG.  No infusion reaction with the last one.  Her last infection was a sinus infection more than 4 weeks ago.  She has some discomfort in the right ear.  No fever or mild sores.  No shortness of breath or cough.  No new adenopathy.  She has been starting to have some loose bowel moments 2-3 times a day in the last 3 weeks.  No other new problems.    Pain Status  Currently in Pain: Yes    Review of Systems    Constitutional  Constitutional (WDL): Exceptions to WDL  Fatigue: Fatigue not relieved by rest - Limiting instrumental ADL  Neurosensory  Neurosensory (WDL): Exceptions to WDL  Ataxia: Asymptomatic, clinical or diagnostic observations only, intervention not indicated  Peripheral Sensory Neuropathy: Asymptomatic, loss of deep tendon reflexes or paresthesia (Feet.)  Confusion: Mild disorientation (\"Off balance.\")  Cardiovascular  Cardiovascular (WDL): Exceptions to WDL  Pulmonary  Respiratory (WDL): Exceptions to WDL (Runny nose. )  Dyspnea: Shortness of breath with moderate " exertion  Gastrointestinal  Gastrointestinal (WDL): Exceptions to WDL  Diarrhea: Increase of <4 stools per day over baseline, mild increase in ostomy output compared to baseline  Genitourinary  Genitourinary (WDL): All genitourinary elements are within defined limits  Integumentary  Integumentary (WDL): Exceptions to WDL (Bug bites. Blisters. All over her legs and arms. )  Patient Coping  Patient Coping: Accepting  Distress Assessment  Distress Assessment Score: 7  Accompanied by  Accompanied by: Family Member    Past History  Past Medical History:   Diagnosis Date     Anemia      Chronic kidney disease     elevated creatinine due to chemo     History of transfusion      Hypothyroid      Mantle cell lymphoma     stage 4     Sleep apnea     uses cpap     Thrombocytopenia          Past Surgical History:   Procedure Laterality Date     ANTERIOR / POSTERIOR COMBINED FUSION CERVICAL SPINE  2011    C2-C7     CARPAL TUNNEL RELEASE Right 2000     CERVICAL FUSION  2004    C4, C5, C6.  Pt. states surgery x 2     KIDNEY STONE SURGERY  1999    removal     DE BX/REMV,LYMPH NODE,DEEP AXILL Right 6/3/2015    Procedure: RIGHT INGUINAL LYMPH NODE BIOPSY;  Surgeon: Clayton Burgos MD;  Location: [facility];  Service: General     TUBAL LIGATION  2004       Physical Exam    Recent Vitals 8/10/2017   Weight 230 lbs 10 oz   /75   Pulse 66   Temp 97.8   Temp src 1   SpO2 100   Some recent data might be hidden       GENERAL: Alert and oriented. Seated comfortably. In no distress.    HEAD: Atraumatic and normocephalic.  Has a full head of hair.    EYES: ROMEO, EOMI.  No pallor.  No icterus.    Oral cavity: no mucosal lesion or tonsillar enlargement.    NECK: supple. JVP normal.  No thyroid enlargement.    LYMPH NODES: No palpable, cervical, axillary or inguinal lymphadenopathy.    CHEST: clear to auscultation bilaterally.  Resonant to percussion throughout bilaterally.  Symmetrical breath movements bilaterally.    CVS: S1  and S2 are heard. Regular rate and rhythm.  No murmur or gallop or rub heard.    ABDOMEN: Soft. Not tender. Not distended.  No palpable hepatomegaly or splenomegaly.  No other mass palpable.  Bowel sounds heard.    EXTREMITIES: Warm.  No peripheral edema.    SKIN: no rash, or bruising or purpura.    CNS: Nonfocal        Lab Results    Recent Results (from the past 168 hour(s))   Comprehensive Metabolic Panel   Result Value Ref Range    Sodium 143 136 - 145 mmol/L    Potassium 4.3 3.5 - 5.0 mmol/L    Chloride 109 (H) 98 - 107 mmol/L    CO2 27 22 - 31 mmol/L    Anion Gap, Calculation 7 5 - 18 mmol/L    Glucose 86 70 - 125 mg/dL    BUN 25 (H) 8 - 22 mg/dL    Creatinine 1.88 (H) 0.60 - 1.10 mg/dL    GFR MDRD Af Amer 34 (L) >60 mL/min/1.73m2    GFR MDRD Non Af Amer 28 (L) >60 mL/min/1.73m2    Bilirubin, Total 0.4 0.0 - 1.0 mg/dL    Calcium 9.0 8.5 - 10.5 mg/dL    Protein, Total 5.8 (L) 6.0 - 8.0 g/dL    Albumin 3.4 (L) 3.5 - 5.0 g/dL    Alkaline Phosphatase 97 45 - 120 U/L    AST 26 0 - 40 U/L    ALT 19 0 - 45 U/L   HM1 (CBC with Diff)   Result Value Ref Range    WBC 4.7 4.0 - 11.0 thou/uL    RBC 4.39 3.80 - 5.40 mill/uL    Hemoglobin 13.0 12.0 - 16.0 g/dL    Hematocrit 40.1 35.0 - 47.0 %    MCV 91 80 - 100 fL    MCH 29.6 27.0 - 34.0 pg    MCHC 32.4 32.0 - 36.0 g/dL    RDW 13.4 11.0 - 14.5 %    Platelets 126 (L) 140 - 440 thou/uL    MPV 9.9 8.5 - 12.5 fL    Neutrophils % 51 50 - 70 %    Lymphocytes % 34 20 - 40 %    Monocytes % 8 2 - 10 %    Eosinophils % 8 (H) 0 - 6 %    Basophils % 0 0 - 2 %    Neutrophils Absolute 2.4 2.0 - 7.7 thou/uL    Lymphocytes Absolute 1.6 0.8 - 4.4 thou/uL    Monocytes Absolute 0.4 0.0 - 0.9 thou/uL    Eosinophils Absolute 0.4 0.0 - 0.4 thou/uL    Basophils Absolute 0.0 0.0 - 0.2 thou/uL       Imaging    No results found.      Signed by: Yelena Starks MD

## 2021-06-12 NOTE — PROGRESS NOTES
Avis Paul arrived for pre medications(Acetaminophen, Diphenhydramine, Famotadine and Methylprednisolone) and IGG  infusion. Lab results were  reviewed as required. Avis Paul was educated on her plan of care. Each medication given today was reviewed prior to administration. Treatment was completed as ordered with no signs of reaction. (Rate titrated as follows - IGG started at  20 ml/hr x 20 min,  The was increased 40 ml/hr x 20 min, 60 ml/hr x 20 min, 90 ml/hr x 15 min, 120 ml/hr x 15 min, 150 ml/hr x 15 min with final rate of 180 ml/hr until infusion was completed.)  IV site:Venous port patent throughout treatment with positive blood return obtained before and at completion. IV site with no pain, redness, swelling and drainage. IV site flushed with saline and heparin and deaccessed. Avis Paul has follow-up appointment scheduled on 08/16/17. Avis Paul was alert, oriented and up and about without problems at the time of her discharged to home. She discharged ambulatory per self at 1530. The after visit summary was given.     Katarzyna Cronin

## 2021-06-12 NOTE — PROGRESS NOTES
Avis Paul arrived for premeds and IGG infusion. Avis reports when she had last fallen she had broken a bone in her right upper arm. She reports she falls at least 2 x a month due to the neuropathy in her feet, uneven ground and vision issues. She reports her vision is changing due to cataracts from all the steroids she has had. Avis Paul was educated on her plan of care. Each medication given today was reviewed prior to administration. Avis was premedicated with oral Acetaminophen, IV Methylprednisolone, Famotidine and Diphenhydramine. IGG 25 gram IV infusion treatment were completed as ordered. She requested and was given flu vaccine today. She reports she does have an egg allergy but that she has never had any problems with the flu vaccines she has had in the past. Dr. Starks did give an OK to have the flue vaccine today (in relation to receiving the IGG today). Avis was observed for 20 minutes post injection and had no signs of reaction. IV site:Venous port patent throughout treatment with positive blood return obtained before and at completion. IV site with no pain, redness, swelling and drainage. IV site flushed with saline and heparin and deaccessed. Avis Paul has follow-up appointment scheduled on 11/12/2020 . Avis Paul was discharged to home. She discharged from unit per self at 1600. The after visit summary was declined.     Katarzyna Cronin

## 2021-06-12 NOTE — PROGRESS NOTES
Pt arrived amb for her iv igg, pt had the wrong day, Portacath accessed with good blood return, vss, Pre meds given , After the pt gets IV benadryl 50mg, iv she gets restless legs lasting 2 hours or so, PT was infused with iv igg, titration of  the dose, MD talked to about changing the iv benadryl< So we can try benadryl 12.5mg iv then if she does ok with no reaction, may try oral the time after that. Pt was watched for 45 min after feeling well, IV was flushed with ns then heparin to the port, Needle d/cd after, Pt did have a sinus infection and is still on antibiotics. Pt left at 1440  Mago Belcher

## 2021-06-12 NOTE — PROGRESS NOTES
[facility] paperwork has been filled out and signed. Original forms were mailed to [employer] agency per patient's request.     [place] copies obtained, one to be mailed out to patient and the other to be scanned into patient's chart.     Avis was made aware of this. She verbalized understanding and appreciation.

## 2021-06-12 NOTE — PROGRESS NOTES
Disability paperwork filled out and signed. It has been faxed to [facility] .     Fax: [phone].

## 2021-06-12 NOTE — PROGRESS NOTES
Patient arrived ambulatory after clinic visit with Dr. Starks today. Lab results reviewed. Patient given pre-medications as ordered. Port access with blood returned confirmed. Rituxan infusion given as ordered in multi-step fashion at rate of 100ml/hour for first 30 minutes/then rate to 200ml/hour for final hour. Patient tolerated infusion with no complaints/no signs of adverse reaction. Patient ate lunch. Patient resting/reclined at times during infusion. Port de-accessed in standard fashion. Patient discharged to home ambulatory and reports will return as scheduled next week for her IVIG infusion.

## 2021-06-12 NOTE — PROGRESS NOTES
Pt arrived amb for her iv igg and pre meds, Portacath accessed with good blood return, Vss all pre meds given as ordered waited over 45 min then iv igg infused with titrated the dose. Iv was flushed with ns then dcd after. vss throughout the pt does get restless leg from the benadryl which she did, [place] lunch, Pt up to void 3 times, Feeling well, Iv d/cd after flushed with ns then heparin. RTC 4 weeks  Mago Belcher

## 2021-06-13 NOTE — PROGRESS NOTES
Psychology Psychotherapy  Note    Name:  Avis Paul  :  1965  MRN:  [id number]      Date of Service: 10/12/2017  Duration: 60 minutes (9:00 AM- 10:00 AM)    Target Symptoms:    The patient was seen in light of concerns regarding symptoms of sadness and worry related to her medical condition as evidenced by patient and staff report.    Participation:  The patient was able to participate and benefit from treatment as evidenced by her verbal expression of ideas and initiation of topics discussed.    Mental Status:    Mood:  sad  Affect:  tearful  Suicidal Ideation:  absent  Homicidal Ideation:  absent  Thought process:  normal  Thought content:  Normal  Fund of Knowledge:  Sufficient  Attention/Concentration:  intact  Language ability:  intact  Speech: normal  Memory:  recent and remote memory intact  Insight and Judgement:  good  Orientation:  person, place, time and situation  Appearance: casually-dressed,  and engaged,  Eye Contact:  Appropriate  Estimated IQ:  Average      Intervention:    Avis was referred to me by Nanda Olivares NP for support and assistance with her cancer journey.  I was involved with Avis and her family in the early stages of her illness.  The last time I met with her and her family was in 2014.    Avis has a diagnosis of mantle cell lymphoma.  She had a stem cell transplant in 2015.  She pleaded her maintenance chemotherapy in August.  She currently is in remission.  Avis's main worry and concern is how to cope and move forward with her life, well being concerned about a relapse of her lymphoma.  We processed her thoughts and feelings about this at length.  We also discussed strategies to help her cope and manage her feelings of worry, anxiety and sadness.    Avis lives with her  and 14-year-old daughter on a farm in [place] [place].  When I initially had met her, they lived in a small cabin on their property which did not have plumbing.  At that  "time, they described their life as \"living off the grid\".  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  They have 10 alpacas along with chickens which they are raising.      Avis had been working, but lost her job as her medical leave had ran out.  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  She currently is not working, but is involved in various volunteer opportunities including assisting at her daughter's school, assisting at , and helping out with her daughter's theater productions.  Avis's  has been working at [employer] on the night shift and just 2 weeks ago started a management position on the day shift.  They are adjusting to this change in their family with the shift change    Avis was raised Religion, but after the divorce of her first , she became a [employer].  She has a levon community that is important to her and she believes that her levon has been instrumental in getting her through her cancer journey.    Avis feels that she is trying to live in the moment and feels that she tends to push in life experiences and memory making that she is afraid that she might miss out of if cancer relapses.  She processed her thoughts and feelings about end-of-life.  She also talked about how she took trip to [place] with her daughter and sisters this past summer.    We talked at length about the lessons that she is learned from her cancer journey.  We also talked about her levon and how that has helped her through this journey.  In addition, Avis indicated that she tends to want to \"control\" her life.  We talked at length about \"the illusion of control\".  I gave her a psychoeducational book to read that may be helpful for her during this time and her journey.    Psychoterapeutic Techniques:  " Cognitive-behavioral therapy, motivational interviewing and supportive psychotherapy strategies were utilized.    Necessity:    The session was  necessary for the care of the patient to address symptoms of sadness and worry related to the patient's medical condition.    Progress:    Today Avis updated me on her cancer journey.  She also discussed some of her thoughts, feelings and worries this next step of her cancer journey.  We discussed strategies to help her cope and her manage her symptoms of worry, sadness and issues of control.    Plan:    Avis is interested in meeting with me again when she comes in for her monthly infusion.  She plans to schedule her next appointment with me when she determines her next infusion time.  She is interested in ongoing support and assistance to help her work through the emotional aspects of her cancer journey.    Diagnosis:    1. Adjustment reaction    2. Mantle cell lymphoma        Problem List:  Patient Active Problem List   Diagnosis     Fever (Symptom)     Fatigue     Mantle cell lymphoma     Cough     Serum Enzyme Levels - AST (SGOT) Elevated     Antineoplastic chemotherapy induced anemia     Anemia associated with chemotherapy     Constipation     Dehydration     Chemotherapy induced thrombocytopenia     Hypogammaglobulinemia, acquired       Provider: Jazmni Davis MA, LP, LICSW    Date:  10/12/2017  Time:  12:28 PM

## 2021-06-13 NOTE — PROGRESS NOTES
Avis Paul arrived from  [facility] with portacath accessed for pre medication and IVIG infusion. Avis reported that her tumors have not gotten smaller or larger. Lab results were reviewed as required. Avis Paul was educated on her plan of care. Each medication given today was reviewed prior to administration. Avis was premedicated with oral Acetaminophen and IV Methylprednisolone, Famotidine and Diphenhydramine. IVIG 25 gram IV infusion treatment was completed with no signs of reaction. IV site:Venous port patent throughout treatment with positive blood return obtained before and at completion. IV site with no pain, redness, swelling and drainage. IV site flushed with saline and heparin and deaccessed. Site did bleed slightly when deaccessed. Avis held pressure to site for approximately 7 minutes. Upon site recheck there was no active bleeding at site. Avis Paul has follow-up appointment scheduled on 12/10/2020. Avis Paul was discharged to home. She discharged from unit ambulatory and independent per self at 1450. The after visit summary was given.     Katarzyna Cronin

## 2021-06-13 NOTE — PROGRESS NOTES
[facility] Hematology and Oncology Progress Note    Patient: Avis Paul  MRN: [id number]  Date of Service:         Reason for Visit    Chief Complaint   Patient presents with     HE Cancer     Mantle cell lymphoma       Assessment and Plan    Mantle cell lymphoma status post autologous transplant, August 18, 2015  Hypogammaglobulinemia with multiple recurrent infections, on IVIG every 4 weeks  Hives/eosinophilia/chronic urticaria  Acute sinusitis  New left inguinal and pelvic adenopathy, May 2020(patient had 2 separate biopsies, initial 1 suggesting relapse of mantle cell lymphoma, review at [facility] was negative; patient treated with 1 month of ibrutinib with resolution of CT scan and PET findings)  Hives  Neuropathy    The scans are reviewed and show stable adenopathy and no other evidence of recurrence or progression.  We will continue with observation.  We will see her in 12 weeks with repeat scans and labs.    She will continue IVIG every 4 weeks.  We will recheck thyroid function tests.        Plan: As above      Measurable disease: Patient currently in remission    Current therapy: Observation  IVIG resumed in January 2017 and stopped in May 2018; resumed again in September 2018  Maintenance Rituxan started December 2015, and completed August 2017, 8 doses  Acyclovir 400 mg twice daily  IVIG every 3 months, first dose August 29, 2016(currently on hold, last dose November 2016)   IVIG every 4 weeks restarted in June 2017 and stopped in May 2018      Treatment history:  Ibrutinib 420 mg p.o. daily started for presumed relapse of mantle cell lymphoma  July 3, 2020 and stopped August 4, 2020    First set of immunizations received at the [facility] in August 2016  First dose of IVIG  Patient completed 1 year of inhalational pentamidine  Autologous stem cell transplant with Beam prep, August 18, 2015  One cycle of R-ICE, Naye 15, 2015, ifosfamide and etoposide reduced by 25% because of renal  dysfunction  Ibrutinib since October 10, 2014, current dose of 420 mg by mouth daily, stopped June 9, 2015   3 cycles of R-DHAP completed late August 2014   5 cycles of R CHOP, started after diagnosis in March 2014       ECOG Performance   ECOG Performance Status: 1    Distress Assessment  Distress Assessment Score: No distress    Pain           Problem List    1. Mantle cell lymphoma, unspecified body region (H)  TSH   2. Disorder of thyroid, unspecified   TSH        CC: Sunil Tan MD    ______________________________________________________________________________    History of Present Illness    Pretty is here for reevaluation.  She had a virtual visit 2 months ago.  Feeling well.  No new palpable adenopathy.  No fever chills or sweats.  ECOG status is 0.    Continues on acyclovir and thyroid replacement.      June 29, 2020  Pretty is here for reevaluation.  Seen 2 weeks ago.  Underwent excisional biopsy of the left inguinal lymph node and is here to review results.  No new symptoms or problems.  ECOG status is 0.  January 2018:   Ms. Avis Paul returns for a follow-up.  She was here for IVIG infusion yesterday and raise concerns about symptoms suggesting recurrence of lymphoma.  She had CT scans and is here to review results.  Has been having aches all over and increased fatigue and weight gain.  Describes some numbness in the right thigh area.  Also has been having memory issues since she received chemotherapy.  Notes an area of induration in the mouth on the lower right mandibular/jaw area.        November 2017:   She was seen 3 months ago.  She did have a visit shortly thereafter at the [facility] with bone marrow and CT scan showing that she is in complete remission from her mantle cell lymphoma.  She has continued IVIG infusions monthly between June - October 2017.  She was informed recently that the insurance would not cover this.  We had previously got this approved prior to starting  infusions in June 2017.    She is currently having sinus symptoms and cough productive of greenish phlegm.  No other infection issues in the last 3 months.  She is in significant distress because of the insurance issues.    No other new symptoms or problems.  ECOG status is 0.    August 2017:   She was seen 4 weeks ago.  She has received 2 doses of IVIG.  No infusion reaction with the last one.  Her last infection was a sinus infection more than 4 weeks ago.  She has some discomfort in the right ear.  No fever or mild sores.  No shortness of breath or cough.  No new adenopathy.  She has been starting to have some loose bowel moments 2-3 times a day in the last 3 weeks.  No other new problems.    Pain Status  Currently in Pain: No/denies    Review of Systems    Constitutional  Constitutional (WDL): Exceptions to WDL  Fatigue: Fatigue not relieved by rest - Limiting instrumental ADL  Neurosensory  Neurosensory (WDL): Exceptions to WDL  Ataxia: Asymptomatic, clinical or diagnostic observations only, intervention not indicated  Peripheral Sensory Neuropathy: Asymptomatic, loss of deep tendon reflexes or paresthesia  Cardiovascular  Cardiovascular (WDL): All cardiovascular elements are within defined limits  Pulmonary  Respiratory (WDL): Within Defined Limits  Gastrointestinal  Gastrointestinal (WDL): All gastrointestinal elements are within defined limits  Genitourinary  Genitourinary (WDL): All genitourinary elements are within defined limits  Integumentary  Integumentary (WDL): All integumentary elements are within defined limits(Bruses easily)  Patient Coping  Patient Coping: Accepting  Distress Assessment  Distress Assessment Score: No distress  Accompanied by  Accompanied by: Alone    Past History  Past Medical History:   Diagnosis Date     Anemia      Chronic kidney disease     elevated creatinine due to chemo     History of anesthesia complications 2015    urinary retention after lymph node excision. required  catheterization     History of transfusion      Hypothyroid      Mantle cell lymphoma (H)     stage 4     Sleep apnea     uses cpap     Thrombocytopenia (H)          Past Surgical History:   Procedure Laterality Date     ANTERIOR / POSTERIOR COMBINED FUSION CERVICAL SPINE  2011    C2-C7     CARPAL TUNNEL RELEASE Right 2000     CERVICAL FUSION  2004    C4, C5, C6.  Pt. states surgery x 2     KIDNEY STONE SURGERY  1999    removal     LIMBAL STEM CELL TRANSPLANT  08/2015     LYMPH NODE DISSECTION Left 6/25/2020    Procedure: LEFT INGUINAL LYMPH NODE BIOPSY;  Surgeon: Catalina Pascal MD;  Location: [facility];  Service: General     OR BX/REMV,LYMPH NODE,DEEP AXILL Right 6/3/2015    Procedure: RIGHT INGUINAL LYMPH NODE BIOPSY;  Surgeon: Clayton Burgos MD;  Location: [facility];  Service: General     TUBAL LIGATION  2004     US GUIDED NEEDLE PLACEMENT  6/25/2020      LYMPH NODE BIOPSY  6/10/2020       Physical Exam    Recent Vitals 11/12/2020   Height -   Weight 299 lbs 6 oz   BSA (m2) 2.53 m2   /80   Pulse 82   Temp 98   Temp src 1   SpO2 100   Some recent data might be hidden       GENERAL: Alert and oriented. Seated comfortably. In no distress.    HEAD: Atraumatic and normocephalic.  Has a full head of hair.    EYES: ROMEO, EOMI.  No pallor.  No icterus.    Oral cavity: no mucosal lesion or tonsillar enlargement.  Induration right mandibular lower jaw area    NECK: supple. JVP normal.  No thyroid enlargement.    LYMPH NODES: No palpable, cervical, axillary or inguinal lymphadenopathy.    CHEST: She has slight bilateral wheezing noted  Resonant to percussion throughout bilaterally.  Symmetrical breath movements bilaterally.    CVS: S1 and S2 are heard. Regular rate and rhythm.  No murmur or gallop or rub heard.    ABDOMEN: Soft. Not tender. Not distended.  No palpable hepatomegaly or splenomegaly.  No other mass palpable.  Bowel sounds heard.    EXTREMITIES: Warm.  No peripheral  edema.    SKIN: no rash, or bruising or purpura.    CNS: Nonfocal        Lab Results    Recent Results (from the past 168 hour(s))   Comprehensive Metabolic Panel   Result Value Ref Range    Sodium 141 136 - 145 mmol/L    Potassium 4.1 3.5 - 5.0 mmol/L    Chloride 106 98 - 107 mmol/L    CO2 24 22 - 31 mmol/L    Anion Gap, Calculation 11 5 - 18 mmol/L    Glucose 133 (H) 70 - 125 mg/dL    BUN 28 (H) 8 - 22 mg/dL    Creatinine 2.23 (H) 0.60 - 1.10 mg/dL    GFR MDRD Af Amer 28 (L) >60 mL/min/1.73m2    GFR MDRD Non Af Amer 23 (L) >60 mL/min/1.73m2    Bilirubin, Total 0.6 0.0 - 1.0 mg/dL    Calcium 8.6 8.5 - 10.5 mg/dL    Protein, Total 6.7 6.0 - 8.0 g/dL    Albumin 3.6 3.5 - 5.0 g/dL    Alkaline Phosphatase 117 45 - 120 U/L    AST 21 0 - 40 U/L    ALT 18 0 - 45 U/L   HM1 (CBC with Diff)   Result Value Ref Range    WBC 4.8 4.0 - 11.0 thou/uL    RBC 4.92 3.80 - 5.40 mill/uL    Hemoglobin 14.4 12.0 - 16.0 g/dL    Hematocrit 45.0 35.0 - 47.0 %    MCV 92 80 - 100 fL    MCH 29.3 27.0 - 34.0 pg    MCHC 32.0 32.0 - 36.0 g/dL    RDW 14.6 (H) 11.0 - 14.5 %    Platelets 156 140 - 440 thou/uL    MPV 9.8 8.5 - 12.5 fL    Neutrophils % 68 50 - 70 %    Lymphocytes % 22 20 - 40 %    Monocytes % 8 2 - 10 %    Eosinophils % 2 0 - 6 %    Basophils % 0 0 - 2 %    Immature Granulocyte % 0 <=0 %    Neutrophils Absolute 3.2 2.0 - 7.7 thou/uL    Lymphocytes Absolute 1.0 0.8 - 4.4 thou/uL    Monocytes Absolute 0.4 0.0 - 0.9 thou/uL    Eosinophils Absolute 0.1 0.0 - 0.4 thou/uL    Basophils Absolute 0.0 0.0 - 0.2 thou/uL    Immature Granulocyte Absolute 0.0 <=0.0 thou/uL       Imaging    No results found.      Signed by: Yelena Starks MD

## 2021-06-13 NOTE — PROGRESS NOTES
Arrived ambulatory at 10:00, seated in chair 6. Reviewed plan of care. Labs are not ordered for today. Accessed port a cath per sterile technique, using a 1 inch gripper needle and obtained an excellent blood return. Used NS as the primary IV solution and administered the premeds as ordered. Infused IgG 25 gms over 2 hours 45 minutes. At completion the IV line was flushed with NS 50 mls. During the infusion the patient experienced restless legs, and then was able to rest in the recliner. The port a cath was flushed, heparinized, and deaccessed. Covered the site with folded 2x2s and paper tape. VSS. No ill effects noted. Denies headache. Declined the AVS. Left the unit ambulatory and in stable condition at 15:00 - patient plans to return on January 7th, 2021.    Cristal Doherty RN

## 2021-06-13 NOTE — PROGRESS NOTES
Avis Paul came in today for port lab draw. Portacath accessed with ease. Good blood return right away. Lab specimens obtained after a waste tube. Portacath was flushed with NS, Heparin and deaccessed. Avis was discharged ambulatory and independent at 0955. She has a follow up appt with Dr. Starks on 11/10/17. Then will come to op infusion for IV IGG.

## 2021-06-13 NOTE — PROGRESS NOTES
Avis Paul arrived for premedications and IGG infusion. Avis Paul was educated on her plan of care. Pretty was premedicated with Acetaminophen 650 mg po, Diphenhydramine 12.5 mg IV, Soulmedrol 125 mg IV and Famotidine 20 mg IV. Each medication given today was reviewed prior to administration. Pretty reported she still had issues with her legs feeling restless even with the Diphenhydramine dose reduced to 12.5 mg IV. But that the symptoms were not as intense and did not last as long. She got up and walked for a bit and that helped some too. IGG treatment was completed with no signs of reaction.  IV site:Venous port patent throughout treatment with positive blood return obtained before and at completion. IV site with no pain, redness, swelling and drainage. IV site flushed with saline and heparin and deaccessed. Avis Paul has follow-up appointment scheduled on 11/10/17 for labs, MD and IV IGG. Avis Paul was discharged to home. She discharged ambulatory and independent at 1436. The after visit summary was given.     Katarzyna Cronin

## 2021-06-14 NOTE — PROGRESS NOTES
Pt arrived amb for her pre meds, iv igg, labs, Portacath accessed with good blood return, labs drawn, pre meds given, pt only had min. Leg restlessness with the 12.5 of benadryl, , Pt rested, ig igg infused with titrating the dose, adolfo well, bp stable, POrt was flushed with ns then heparin Needle dcdafter and pt left amb at 1500 feeling well rtc 1/15 per pt choice  Mago Belcher

## 2021-06-14 NOTE — PROGRESS NOTES
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  She did have bone marrow and imaging in August at the [facility] which was negative as well.  Will continue observation for this as she has completed all of her Rituxan maintenance.    Patient has been receiving IVIG between June and October 2017.  We were informed by insurance that this will no longer be covered.  Will try to get this rectified as soon as possible.    Patient has had an excellent response to IVIG with only 2 infections since June 2017.  IgG level checked just last week is now within the normal range.    The patient has been documented with severe hypogammaglobulinemia which may be the result of Rituxan therapy.  She has had multiple serious infections over the past 2 years including sinus infections in December and February requiring antibiotics.  She also had Noro virus documented causing significant diarrhea.  She has had recurrent episodes of cellulitis requiring antibiotics and also urinary tract infection.    I feel it is imperative that the patient continue on immunoglobulin infusions for now.    We will treat her with Levaquin 750 mg p.o. every 48 hours for a total of 5 doses for current sinusitis and bronchitis symptoms.    Plan: Levaquin for infection  We will try to resume IVIG infusions as soon as possible  Recheck quantitative immune globulin's in 4 weeks  Follow-up in 3 months with recheck of  labs      Measurable disease: Patient currently in remission    Current therapy: Maintenance Rituxan started December 2015, and completed August 2017, 8 doses  Acyclovir 800 mg twice daily  IVIG every 3 months, first dose August 29, 2016(currently on hold, last dose November 2016)   IVIG every 4 weeks restarted in June 2017      Treatment history:  First set of immunizations received at the [facility] in August 2016  First dose of IVIG  Patient completed 1 year of inhalational pentamidine  Autologous stem cell transplant with Beam prep, August 18, 2015  One cycle of R-ICE, Naye 15, 2015, ifosfamide and etoposide reduced by 25% because of renal dysfunction  Ibrutinib since October 10, 2014, current dose of 420 mg by mouth daily, stopped June 9, 2015   3 cycles of R-DHAP completed late August 2014   5 cycles of R CHOP, started after diagnosis in March 2014       ECOG Performance   ECOG Performance Status: 1    Distress Assessment  Distress Assessment Score: Extreme distress (Is currently having insurance coverage issues. )    Pain  Pain Score (Initial OR Reassessment): 4        Problem List    1. Mantle cell lymphoma  Immunoglobulins, Quantitative    HM1(CBC and Differential)    Comprehensive Metabolic Panel    levoFLOXacin (LEVAQUIN) 750 MG tablet   2. Hypogammaglobulinemia, acquired          CC: Carol Rose MD    ______________________________________________________________________________    History of Present Illness    Ms. Avis Paul returns for a follow-up.  She was seen 3 months ago.  She did have a visit shortly thereafter at the [facility] with bone marrow and CT scan showing that she is in complete remission from her mantle cell lymphoma.  She has continued IVIG infusions monthly between June - October 2017.  She was informed recently that the insurance would not cover this.  We had previously got this approved prior to starting infusions in June 2017.    She is currently having sinus symptoms  and cough productive of greenish phlegm.  No other infection issues in the last 3 months.  She is in significant distress because of the insurance issues.    No other new symptoms or problems.  ECOG status is 0.    August 2017:   She was seen 4 weeks ago.  She has received 2 doses of IVIG.  No infusion reaction with the last one.  Her last infection was a sinus infection more than 4 weeks ago.  She has some discomfort in the right ear.  No fever or mild sores.  No shortness of breath or cough.  No new adenopathy.  She has been starting to have some loose bowel moments 2-3 times a day in the last 3 weeks.  No other new problems.    Pain Status  Currently in Pain: Yes    Review of Systems    Constitutional  Constitutional (WDL): Exceptions to WDL  Fatigue: Fatigue relieved by rest  Neurosensory  Neurosensory (WDL): Exceptions to WDL  Peripheral Motor Neuropathy: Asymptomatic, clinical or diagnostic observations only, intervention not indicated  Ataxia: Asymptomatic, clinical or diagnostic observations only, intervention not indicated  Peripheral Sensory Neuropathy: Asymptomatic, loss of deep tendon reflexes or paresthesia (Finger numbness. Hands are hard to close. Comes and goes. )  Cardiovascular  Cardiovascular (WDL): All cardiovascular elements are within defined limits  Pulmonary  Respiratory (WDL): Within Defined Limits  Gastrointestinal  Gastrointestinal (WDL): All gastrointestinal elements are within defined limits  Genitourinary  Genitourinary (WDL): All genitourinary elements are within defined limits  Integumentary  Integumentary (WDL): All integumentary elements are within defined limits  Patient Coping  Patient Coping: Accepting  Distress Assessment  Distress Assessment Score: Extreme distress (Is currently having insurance coverage issues. )  Accompanied by  Accompanied by: Alone    Past History  Past Medical History:   Diagnosis Date     Anemia      Chronic kidney disease     elevated creatinine due to  chemo     History of transfusion      Hypothyroid      Mantle cell lymphoma     stage 4     Sleep apnea     uses cpap     Thrombocytopenia          Past Surgical History:   Procedure Laterality Date     ANTERIOR / POSTERIOR COMBINED FUSION CERVICAL SPINE  2011    C2-C7     CARPAL TUNNEL RELEASE Right 2000     CERVICAL FUSION  2004    C4, C5, C6.  Pt. states surgery x 2     KIDNEY STONE SURGERY  1999    removal     CT BX/REMV,LYMPH NODE,DEEP AXILL Right 6/3/2015    Procedure: RIGHT INGUINAL LYMPH NODE BIOPSY;  Surgeon: Clayton Burgos MD;  Location: [facility];  Service: General     TUBAL LIGATION  2004       Physical Exam    Recent Vitals 11/10/2017   Height -   Weight 247 lbs 2 oz   BSA (m2) -   /86   Pulse 76   Temp 98   Temp src 1   SpO2 100   Some recent data might be hidden       GENERAL: Alert and oriented. Seated comfortably. In no distress.    HEAD: Atraumatic and normocephalic.  Has a full head of hair.    EYES: ROMEO, EOMI.  No pallor.  No icterus.    Oral cavity: no mucosal lesion or tonsillar enlargement.    NECK: supple. JVP normal.  No thyroid enlargement.    LYMPH NODES: No palpable, cervical, axillary or inguinal lymphadenopathy.    CHEST: clear to auscultation bilaterally.  Resonant to percussion throughout bilaterally.  Symmetrical breath movements bilaterally.    CVS: S1 and S2 are heard. Regular rate and rhythm.  No murmur or gallop or rub heard.    ABDOMEN: Soft. Not tender. Not distended.  No palpable hepatomegaly or splenomegaly.  No other mass palpable.  Bowel sounds heard.    EXTREMITIES: Warm.  No peripheral edema.    SKIN: no rash, or bruising or purpura.    CNS: Nonfocal        Lab Results    Recent Results (from the past 168 hour(s))   Comprehensive Metabolic Panel   Result Value Ref Range    Sodium 141 136 - 145 mmol/L    Potassium 3.8 3.5 - 5.0 mmol/L    Chloride 106 98 - 107 mmol/L    CO2 26 22 - 31 mmol/L    Anion Gap, Calculation 9 5 - 18 mmol/L    Glucose 104 70 -  125 mg/dL    BUN 27 (H) 8 - 22 mg/dL    Creatinine 1.93 (H) 0.60 - 1.10 mg/dL    GFR MDRD Af Amer 33 (L) >60 mL/min/1.73m2    GFR MDRD Non Af Amer 27 (L) >60 mL/min/1.73m2    Bilirubin, Total 0.4 0.0 - 1.0 mg/dL    Calcium 9.1 8.5 - 10.5 mg/dL    Protein, Total 6.2 6.0 - 8.0 g/dL    Albumin 3.2 (L) 3.5 - 5.0 g/dL    Alkaline Phosphatase 104 45 - 120 U/L    AST 21 0 - 40 U/L    ALT 16 0 - 45 U/L   Immunoglobulins, Quantitative   Result Value Ref Range    Immunoglobulin G 721 700 - 1700 mg/dL    Immunoglobulin M <5 (L) 60 - 280 mg/dL    Immunoglobulin A 32 (L) 65 - 400 mg/dL   HM1 (CBC with Diff)   Result Value Ref Range    WBC 6.2 4.0 - 11.0 thou/uL    RBC 4.26 3.80 - 5.40 mill/uL    Hemoglobin 12.7 12.0 - 16.0 g/dL    Hematocrit 38.9 35.0 - 47.0 %    MCV 91 80 - 100 fL    MCH 29.8 27.0 - 34.0 pg    MCHC 32.6 32.0 - 36.0 g/dL    RDW 14.3 11.0 - 14.5 %    Platelets 137 (L) 140 - 440 thou/uL    MPV 9.8 8.5 - 12.5 fL    Neutrophils % 68 50 - 70 %    Lymphocytes % 20 20 - 40 %    Monocytes % 7 2 - 10 %    Eosinophils % 4 0 - 6 %    Basophils % 1 0 - 2 %    Neutrophils Absolute 4.2 2.0 - 7.7 thou/uL    Lymphocytes Absolute 1.3 0.8 - 4.4 thou/uL    Monocytes Absolute 0.4 0.0 - 0.9 thou/uL    Eosinophils Absolute 0.3 0.0 - 0.4 thou/uL    Basophils Absolute 0.0 0.0 - 0.2 thou/uL       Imaging    No results found.      Signed by: Yelena Starks MD

## 2021-06-14 NOTE — PROGRESS NOTES
I mailed Avis a [employer] follow up letter as well as a flyer for [facility] [employer] [facility] and two [facility] [facility] Cards.  Wished her well, told her to call me if she has any questions or needs.

## 2021-06-14 NOTE — PROGRESS NOTES
"Patient arrived ambulatory and was seated in chair 7. Reviewed plan of care and today's treatment. Today's weight is 285.8 lbs.    Accessed port a cath per sterile technique and obtained an excellent blood return. Used NS as the primary IV solution. Administered the premeds of oral acetaminophen; famotidine IVP; solu-medrol 125 mg IVP; benadryl 12.5 mg. Waited approximately 30 minutes. Infused IgG 25 gm over 2 hours 40 minutes, and flushed the IV line with NS at completion. VSS, no ill effects noted. Napped in the recliner - \"I needed some rest really bad.\" Patient talks about having fevers, night sweats, and diarrhea in December - \"all my covid tests have been negative.\" Patient has verbally declined a covid test on today's visit. At completion the port a cath was flushed, heparinized, and deaccessed - covered the site with folded 2x2s and paper tape.     This writer called Cecy UMANZOR and was updated on patient status - who will discuss with Dr. Torres re: moving up the date of the scheduled CT. Patient plans to return on February 4th. Left the unit ambulatory and in stable condition at 15:10.    Cristal Doherty RN  "

## 2021-06-15 NOTE — PROGRESS NOTES
"Patient arrived ambulatory and was seated in chair 2. Reviewed plan of care. \"I cannot have my infusion today, need approval from my [employer] insurance.\" Accessed port a cath per sterile technique and obtained an excellent blood return. Called Cecy UMANZOR - and confirmed that labs should be drawn. At completion the port a cath was flushed, heparinized, and deaccessed - covered the site with folded 2x2s and paper tape. At 09:25, this writer escorted the patient to radiology for a CT; and then [facility] with Dr. Starks. A follow up appointment has not been scheduled.    Cristal Doherty RN  "

## 2021-06-15 NOTE — PROGRESS NOTES
[facility] Hematology and Oncology Progress Note    Patient: Avis Paul  MRN: [id number]  Date of Service:         Reason for Visit    Chief Complaint   Patient presents with     HE Cancer     Mantle cell lymphoma       Assessment and Plan    Mantle cell lymphoma status post autologous transplant, August 18, 2015  Hypogammaglobulinemia with multiple recurrent infections  Nausea/vomiting/diarrhea/weight loss, biopsy consistent with grade 1 graft versus host disease  Sinus infection with bronchitis  Fatigue and generalized achiness  Induration right mandibular area  Memory issues    CT scan reviewed and there is no evidence of recurrence of lymphoma and the patient is reassured.  Will continue observation for this.    She has restarted IVIG and has received 2 doses and has had no infectious problems since November.  She will follow-up in 4 weeks with recheck of labs and next infusion.    Nausea vomiting diarrhea appear to have resolved and she is recently had weight gain.  No current infection.    Arthralgias and myalgias could be related to immunoglobulin therapy.  Will check ESR today.  Will check CBC and chemistries to evaluate fatigue.  Will refer to ENT for evaluation of the induration in the mouth in the lower right mandibular area.    I asked her to try working on different puzzles to help with her short-term memory issues.  Will consider neurology evaluation if this is persistently a problem.    Plan: Check CBC, CMP and sed rate today  ENT evaluation  Follow-up in 3-4 weeks for next IVIG infusion with lab and visit prior      Measurable disease: Patient currently in remission    Current therapy:   IVIG resumed in January 2017  Maintenance Rituxan started December 2015, and completed August 2017, 8 doses  Acyclovir 800 mg twice daily  IVIG every 3 months, first dose August 29, 2016(currently on hold, last dose November 2016)   IVIG every 4 weeks restarted in June 2017      Treatment history:  First set of  immunizations received at the [facility] in August 2016  First dose of IVIG  Patient completed 1 year of inhalational pentamidine  Autologous stem cell transplant with Beam prep, August 18, 2015  One cycle of R-ICE, Naye 15, 2015, ifosfamide and etoposide reduced by 25% because of renal dysfunction  Ibrutinib since October 10, 2014, current dose of 420 mg by mouth daily, stopped June 9, 2015   3 cycles of R-DHAP completed late August 2014   5 cycles of R CHOP, started after diagnosis in March 2014       ECOG Performance   ECOG Performance Status: 2    Distress Assessment  Distress Assessment Score: No distress    Pain  Pain Score (Initial OR Reassessment): 3        Problem List    1. Mantle cell lymphoma  HM1(CBC and Differential)    Comprehensive Metabolic Panel    Sedimentation Rate    Ambulatory referral to ENT        CC: Carol Rose MD    ______________________________________________________________________________    History of Present Illness    Ms. Avis Paul returns for a follow-up.  She was here for IVIG infusion yesterday and raise concerns about symptoms suggesting recurrence of lymphoma.  She had CT scans and is here to review results.  Has been having aches all over and increased fatigue and weight gain.  Describes some numbness in the right thigh area.  Also has been having memory issues since she received chemotherapy.  Notes an area of induration in the mouth on the lower right mandibular/jaw area.        November 2017:   She was seen 3 months ago.  She did have a visit shortly thereafter at the [facility] with bone marrow and CT scan showing that she is in complete remission from her mantle cell lymphoma.  She has continued IVIG infusions monthly between June - October 2017.  She was informed recently that the insurance would not cover this.  We had previously got this approved prior to starting infusions in June 2017.    She is currently having sinus symptoms and cough productive of  greenish phlegm.  No other infection issues in the last 3 months.  She is in significant distress because of the insurance issues.    No other new symptoms or problems.  ECOG status is 0.    August 2017:   She was seen 4 weeks ago.  She has received 2 doses of IVIG.  No infusion reaction with the last one.  Her last infection was a sinus infection more than 4 weeks ago.  She has some discomfort in the right ear.  No fever or mild sores.  No shortness of breath or cough.  No new adenopathy.  She has been starting to have some loose bowel moments 2-3 times a day in the last 3 weeks.  No other new problems.    Pain Status  Currently in Pain: Yes    Review of Systems    Constitutional  Constitutional (WDL): Exceptions to WDL  Fatigue: Fatigue not relieved by rest - Limiting instrumental ADL  Weight Gain: 10 - <20% from baseline  Neurosensory  Neurosensory (WDL): Exceptions to WDL  Peripheral Sensory Neuropathy: Moderate symptoms, limiting instrumental ADL (Hands and feet. Rt thigh too. )  Cardiovascular  Cardiovascular (WDL): All cardiovascular elements are within defined limits  Pulmonary  Respiratory (WDL): Within Defined Limits  Gastrointestinal  Gastrointestinal (WDL): Exceptions to WDL (Reports a lump around jaw area. )  Genitourinary  Genitourinary (WDL): All genitourinary elements are within defined limits  Integumentary  Integumentary (WDL): All integumentary elements are within defined limits  Patient Coping  Patient Coping: Accepting;Anxiety  Distress Assessment  Distress Assessment Score: No distress  Accompanied by  Accompanied by: Alone    Past History  Past Medical History:   Diagnosis Date     Anemia      Chronic kidney disease     elevated creatinine due to chemo     History of transfusion      Hypothyroid      Mantle cell lymphoma     stage 4     Sleep apnea     uses cpap     Thrombocytopenia          Past Surgical History:   Procedure Laterality Date     ANTERIOR / POSTERIOR COMBINED FUSION CERVICAL  SPINE  2011    C2-C7     CARPAL TUNNEL RELEASE Right 2000     CERVICAL FUSION  2004    C4, C5, C6.  Pt. states surgery x 2     KIDNEY STONE SURGERY  1999    removal     LA BX/REMV,LYMPH NODE,DEEP AXILL Right 6/3/2015    Procedure: RIGHT INGUINAL LYMPH NODE BIOPSY;  Surgeon: Clayton Burgos MD;  Location: [facility] OR;  Service: General     TUBAL LIGATION  2004       Physical Exam    Recent Vitals 1/16/2018   Weight -   /70   Pulse 60   Temp 97.6   Temp src 1   SpO2 100   Some recent data might be hidden       GENERAL: Alert and oriented. Seated comfortably. In no distress.    HEAD: Atraumatic and normocephalic.  Has a full head of hair.    EYES: ROMEO, EOMI.  No pallor.  No icterus.    Oral cavity: no mucosal lesion or tonsillar enlargement.  Induration right mandibular lower jaw area    NECK: supple. JVP normal.  No thyroid enlargement.    LYMPH NODES: No palpable, cervical, axillary or inguinal lymphadenopathy.    CHEST: clear to auscultation bilaterally.  Resonant to percussion throughout bilaterally.  Symmetrical breath movements bilaterally.    CVS: S1 and S2 are heard. Regular rate and rhythm.  No murmur or gallop or rub heard.    ABDOMEN: Soft. Not tender. Not distended.  No palpable hepatomegaly or splenomegaly.  No other mass palpable.  Bowel sounds heard.    EXTREMITIES: Warm.  No peripheral edema.    SKIN: no rash, or bruising or purpura.    CNS: Nonfocal        Lab Results    No results found for this or any previous visit (from the past 168 hour(s)).    Imaging    Ct Chest Abdomen Pelvis Without Oral Without Iv Contrast    Result Date: 1/15/2018  CT CHEST, ABDOMEN, AND PELVIS 1/15/2018 3:20 PM      INDICATION: Mantle cell lymphoma. TECHNIQUE: CT chest, abdomen, and pelvis. Dose reduction techniques were used. IV CONTRAST: None. COMPARISON: 12/01/2014 CT chest, abdomen and pelvis. PET scan 06/29/2015 and 03/26/2015. FINDINGS: CHEST: MEDIASTINUM: No adenopathy. No pericardial effusion. Right  internal jugular venous access port catheter overlies right atrial superior vena caval junction. LUNGS AND PLEURA: Stable breast masses. No pleural effusions. No pulmonary nodules.  ABDOMEN: Liver, gallbladder, spleen, and pancreas normal. No para-aortic adenopathy. Adrenal glands normal. Kidneys normal without renal calculi or or hydronephrosis. No para-aortic adenopathy. PELVIS: Sigmoid diverticulosis. No diverticulitis. No free fluid. MUSCULOSKELETAL: Negative.     CONCLUSION: 1.  No adenopathy. No evidence for recurrent disease. Stable exam.        Signed by: Yelena Starks MD

## 2021-06-15 NOTE — PROGRESS NOTES
Patient seen in clinic today for follow-up of lymphoma.    Cecy Gan RN, Oncology Clinic   [facility]

## 2021-06-15 NOTE — PROGRESS NOTES
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  Will do short burst of prednisone.  Otherwise is doing well.  Will continue IVIG infusions every 4 weeks and see her back in about 3 months for a follow-up.  Can consider a break from IVIG infusion at that time to see if the acquired immunodeficiency will improve or resolve.    She did meet with ENT and was reassured about the dental findings and will follow up with her dentist.    Plan: Continue with IVIG infusion today and every 4 weeks  Prednisone taper for 9 days  Follow-up in 3 months with labs      Measurable disease: Patient currently in remission    Current therapy:   IVIG resumed in January 2017  Maintenance Rituxan started December 2015, and completed August 2017, 8 doses  Acyclovir 800 mg twice daily  IVIG every 3 months, first dose August 29, 2016(currently on hold, last dose November 2016)   IVIG every 4 weeks restarted in June 2017      Treatment history:  First set of immunizations received at the [facility] in August 2016  First dose of IVIG  Patient completed 1 year of inhalational pentamidine  Autologous stem cell transplant with Beam prep, August 18, 2015  One cycle of R-ICE, Naye 15, 2015, ifosfamide and etoposide reduced by 25% because of renal dysfunction  Ibrutinib since October 10, 2014, current dose of 420 mg by mouth daily, stopped June 9, 2015   3 cycles of  R-DHAP completed late August 2014   5 cycles of R CHOP, started after diagnosis in March 2014       ECOG Performance   ECOG Performance Status: 2    Distress Assessment  Distress Assessment Score: No distress    Pain  Pain Score (Initial OR Reassessment): 3        Problem List    1. Mantle cell lymphoma  HM1(CBC and Differential)    Comprehensive Metabolic Panel    Immunoglobulins, Quantitative    predniSONE (DELTASONE) 10 mg tablet   2. Hypogammaglobulinemia, acquired  HM1(CBC and Differential)    Comprehensive Metabolic Panel    Immunoglobulins, Quantitative        CC: Carol Rose MD    ______________________________________________________________________________    History of Present Illness    Pretty is here for reevaluation.  She was seen 4 weeks ago.  CT scans were negative at that time.  Lab work was good as well.  She did have some concerns  she did have some concerns arise in of mandibular nodules in her mouth.  She did see ENT and these are benign and she is reassured.  She is currently having some symptoms of a cold with some slight shortness of breath.  No other infectious issues.  No new adenopathy.  No other new symptoms or problems.  ECOG status is 0.      January 2018:   Ms. Avis Paul returns for a follow-up.  She was here for IVIG infusion yesterday and raise concerns about symptoms suggesting recurrence of lymphoma.  She had CT scans and is here to review results.  Has been having aches all over and increased fatigue and weight gain.  Describes some numbness in the right thigh area.  Also has been having memory issues since she received chemotherapy.  Notes an area of induration in the mouth on the lower right mandibular/jaw area.        November 2017:   She was seen 3 months ago.  She did have a visit shortly thereafter at the [facility] with bone marrow and CT scan showing that she is in complete remission from her mantle cell lymphoma.  She has continued IVIG infusions monthly  between June - October 2017.  She was informed recently that the insurance would not cover this.  We had previously got this approved prior to starting infusions in June 2017.    She is currently having sinus symptoms and cough productive of greenish phlegm.  No other infection issues in the last 3 months.  She is in significant distress because of the insurance issues.    No other new symptoms or problems.  ECOG status is 0.    August 2017:   She was seen 4 weeks ago.  She has received 2 doses of IVIG.  No infusion reaction with the last one.  Her last infection was a sinus infection more than 4 weeks ago.  She has some discomfort in the right ear.  No fever or mild sores.  No shortness of breath or cough.  No new adenopathy.  She has been starting to have some loose bowel moments 2-3 times a day in the last 3 weeks.  No other new problems.    Pain Status  Currently in Pain: Yes    Review of Systems    Constitutional  Constitutional (WDL): Exceptions to WDL  Fatigue: Fatigue not relieved by rest - Limiting instrumental ADL  Weight Gain: 10 - <20% from baseline  Neurosensory  Neurosensory (WDL): Exceptions to WDL  Peripheral Sensory Neuropathy: Moderate symptoms, limiting instrumental ADL (Legs. )  Cardiovascular  Cardiovascular (WDL): All cardiovascular elements are within defined limits  Pulmonary  Respiratory (WDL): Exceptions to WDL  Cough: Mild symptoms, nonprescription intervention indicated  Dyspnea: Shortness of breath with moderate exertion  Gastrointestinal  Gastrointestinal (WDL): All gastrointestinal elements are within defined limits  Genitourinary  Genitourinary (WDL): All genitourinary elements are within defined limits  Integumentary  Integumentary (WDL): All integumentary elements are within defined limits  Patient Coping  Patient Coping: Accepting  Distress Assessment  Distress Assessment Score: No distress  Accompanied by  Accompanied by: Alone    Past History  Past Medical History:   Diagnosis  Date     Anemia      Chronic kidney disease     elevated creatinine due to chemo     History of transfusion      Hypothyroid      Mantle cell lymphoma     stage 4     Sleep apnea     uses cpap     Thrombocytopenia          Past Surgical History:   Procedure Laterality Date     ANTERIOR / POSTERIOR COMBINED FUSION CERVICAL SPINE  2011    C2-C7     CARPAL TUNNEL RELEASE Right 2000     CERVICAL FUSION  2004    C4, C5, C6.  Pt. states surgery x 2     KIDNEY STONE SURGERY  1999    removal     WA BX/REMV,LYMPH NODE,DEEP AXILL Right 6/3/2015    Procedure: RIGHT INGUINAL LYMPH NODE BIOPSY;  Surgeon: Clayton Burgos MD;  Location: [facility];  Service: General     TUBAL LIGATION  2004       Physical Exam    Recent Vitals 2/9/2018   Weight 255 lbs 10 oz   /80   Pulse 74   Temp 98   Temp src 1   SpO2 98   Some recent data might be hidden       GENERAL: Alert and oriented. Seated comfortably. In no distress.    HEAD: Atraumatic and normocephalic.  Has a full head of hair.    EYES: ROMEO, EOMI.  No pallor.  No icterus.    Oral cavity: no mucosal lesion or tonsillar enlargement.  Induration right mandibular lower jaw area    NECK: supple. JVP normal.  No thyroid enlargement.    LYMPH NODES: No palpable, cervical, axillary or inguinal lymphadenopathy.    CHEST: She has slight bilateral wheezing noted  Resonant to percussion throughout bilaterally.  Symmetrical breath movements bilaterally.    CVS: S1 and S2 are heard. Regular rate and rhythm.  No murmur or gallop or rub heard.    ABDOMEN: Soft. Not tender. Not distended.  No palpable hepatomegaly or splenomegaly.  No other mass palpable.  Bowel sounds heard.    EXTREMITIES: Warm.  No peripheral edema.    SKIN: no rash, or bruising or purpura.    CNS: Nonfocal        Lab Results    No results found for this or any previous visit (from the past 168 hour(s)).    Imaging    Ct Chest Abdomen Pelvis Without Oral Without Iv Contrast    Result Date: 1/15/2018  CT CHEST,  ABDOMEN, AND PELVIS 1/15/2018 3:20 PM      INDICATION: Mantle cell lymphoma. TECHNIQUE: CT chest, abdomen, and pelvis. Dose reduction techniques were used. IV CONTRAST: None. COMPARISON: 12/01/2014 CT chest, abdomen and pelvis. PET scan 06/29/2015 and 03/26/2015. FINDINGS: CHEST: MEDIASTINUM: No adenopathy. No pericardial effusion. Right internal jugular venous access port catheter overlies right atrial superior vena caval junction. LUNGS AND PLEURA: Stable breast masses. No pleural effusions. No pulmonary nodules.  ABDOMEN: Liver, gallbladder, spleen, and pancreas normal. No para-aortic adenopathy. Adrenal glands normal. Kidneys normal without renal calculi or or hydronephrosis. No para-aortic adenopathy. PELVIS: Sigmoid diverticulosis. No diverticulitis. No free fluid. MUSCULOSKELETAL: Negative.     CONCLUSION: 1.  No adenopathy. No evidence for recurrent disease. Stable exam.        Signed by: Yelena Starks MD

## 2021-06-15 NOTE — PROGRESS NOTES
Arrived ambulatory at 09:05, seated in chair 4. Current treatment shows authorization in Epic. Accessed port a cath per sterile technique and obtained an excellent blood return. NS was used as the primary IV solution and the premeds were administered as ordered - oral acetaminophen, benadryl IVP 12.5 mg, solumedrol 125 mg IVP, and famotidine 20 mg IVP. Waited approximately 30 minutes - and iInfused IgG 25 gms over 2 hours 45 minutes. At completion the IV line was flushed with NS 50 mls. VSS. Napped at intervals. Up to the bathroom x 3. The port a cath was flushed, heparinized, and deaccessed - covered the site with folded 2x2s and paper tape.     Future appointments were scheduled, and a copy of the appointment schedule given to the patient. Left the unit ambulatory and in stable condition at 14:15 - plans to return on March 17th. Instructed to call with any questions or concerns.    Cristal Doherty RN

## 2021-06-15 NOTE — PROGRESS NOTES
Avis Paul arrived for IV IGG infusion. No labs due today. Avis reports the recurrence of several symptoms that occurred when she was first diagnosed with her lymphoma. She is very concerned. [facility] patient concerns. A scan was ordered for her which she will have after her IV IGG infusion today. Avis Paul was educated on her plan of care. Each medication given today was reviewed prior to administration. Premeds and IV IGG treatment was completed with no signs of reaction. IV site:Venous port patent throughout treatment with positive blood return obtained before and at completion. IV site with no pain, redness, swelling and drainage. IV site flushed with saline and heparin and deaccessed. Avis Paul has follow-up appointment scheduledon 01/16/18 to see Dr. Starks and 02/09/18 for IGG. Avis Paul was discharged to radiology dept for a scan.. She discharged ambulatory and independent at 1500. The after visit summary was declined.     Katarzyna Cronin

## 2021-06-15 NOTE — PROGRESS NOTES
[facility] Hematology and Oncology Progress Note    Patient: Avis Paul  MRN: [id number]  Date of Service:         Reason for Visit    Chief Complaint   Patient presents with     HE Cancer     Mantle cell lymphoma       Assessment and Plan    Mantle cell lymphoma status post autologous transplant, August 18, 2015  Hypogammaglobulinemia with multiple recurrent infections, on IVIG every 4 weeks  Hives/eosinophilia/chronic urticaria  New left inguinal and pelvic adenopathy, May 2020(patient had 2 separate biopsies, initial 1 suggesting relapse of mantle cell lymphoma, review at [facility] was negative; patient treated with 1 month of ibrutinib with resolution of CT scan and PET findings)  Neuropathy    CT scans show decrease in size of the left-sided pelvic adenopathy and there is no other concern for recurrence of lymphoma.  Patient is reassured.  We will continue with observation.  We will see back in 3 months with repeat scans.    No recurrence of hives, urticaria which is usually a problem in the summer.    New insurance has denied IVIG.    Patient developed hypogammaglobulinemia probably related to Rituxan treatment which was used to treat her mantle cell lymphoma.  She had multiple serious infections over a 2-year period including sinus infections and norovirus causing diarrhea and also cellulitis and urinary tract infection, prior to starting IVIG in August 2016.  Whenever IVIG has been stopped due to insurance issues, the patient has again had significant issues with infection.  Without IVIG her antibody levels do drop and with IVIG infusion she has been able to avoid infections.  Based on this the patient needs to continue on IVIG administered every 4 weeks at a dose of 400 mg/kg.    The patient has had infusion reactions to IVIG requiring premedications and the need to administer IVIG in the office and not self administration at home.    This information will be forwarded to insurance and we will resume  IVIG once it has been approved.    Plan: As above    Measurable disease: Patient currently in remission    Current therapy: Observation  IVIG resumed in January 2017 and stopped in May 2018; resumed again in September 2018  Maintenance Rituxan started December 2015, and completed August 2017, 8 doses  Acyclovir 400 mg twice daily  IVIG every 3 months, first dose August 29, 2016(currently on hold, last dose November 2016)   IVIG every 4 weeks restarted in June 2017 and stopped in May 2018      Treatment history:  Ibrutinib 420 mg p.o. daily started for presumed relapse of mantle cell lymphoma  July 3, 2020 and stopped August 4, 2020    First set of immunizations received at the [facility] in August 2016  First dose of IVIG  Patient completed 1 year of inhalational pentamidine  Autologous stem cell transplant with Beam prep, August 18, 2015  One cycle of R-ICE, Naye 15, 2015, ifosfamide and etoposide reduced by 25% because of renal dysfunction  Ibrutinib since October 10, 2014, current dose of 420 mg by mouth daily, stopped June 9, 2015   3 cycles of R-DHAP completed late August 2014   5 cycles of R CHOP, started after diagnosis in March 2014       ECOG Performance   ECOG Performance Status: 1    Distress Assessment  Distress Assessment Score: No distress    Pain           Problem List    1. Mantle cell lymphoma of lymph nodes of inguinal region (H)  CT Chest Abdomen Pelvis Without Oral Without IV Contrast    HM1(CBC and Differential)    Comprehensive Metabolic Panel    Immunoglobulins, Quantitative        CC: Sunil Tan MD    ______________________________________________________________________________    History of Present Illness    Pretty is here for reevaluation.  She was seen 3 months ago.  She had episode of being unwell starting in mid December with significant diarrhea, night sweats and some episodes of fever up to 103 associated with anorexia, shortness of breath, cough, vomiting, fatigue.  She did  see a primary physician and had COVID-19 testing which is negative.  Blood work was fine as well.  She did take 2 doses of Levaquin which she had in hand in early January.  Symptoms have been improving since then and she is pretty much back to baseline.  No other new symptoms or problems.  ECOG status is 0.    June 29, 2020  Pretty is here for reevaluation.  Seen 2 weeks ago.  Underwent excisional biopsy of the left inguinal lymph node and is here to review results.  No new symptoms or problems.  ECOG status is 0.  January 2018:   Ms. Avis Paul returns for a follow-up.  She was here for IVIG infusion yesterday and raise concerns about symptoms suggesting recurrence of lymphoma.  She had CT scans and is here to review results.  Has been having aches all over and increased fatigue and weight gain.  Describes some numbness in the right thigh area.  Also has been having memory issues since she received chemotherapy.  Notes an area of induration in the mouth on the lower right mandibular/jaw area.        November 2017:   She was seen 3 months ago.  She did have a visit shortly thereafter at the [facility] with bone marrow and CT scan showing that she is in complete remission from her mantle cell lymphoma.  She has continued IVIG infusions monthly between June - October 2017.  She was informed recently that the insurance would not cover this.  We had previously got this approved prior to starting infusions in June 2017.    She is currently having sinus symptoms and cough productive of greenish phlegm.  No other infection issues in the last 3 months.  She is in significant distress because of the insurance issues.    No other new symptoms or problems.  ECOG status is 0.    August 2017:   She was seen 4 weeks ago.  She has received 2 doses of IVIG.  No infusion reaction with the last one.  Her last infection was a sinus infection more than 4 weeks ago.  She has some discomfort in the right ear.  No fever or mild sores.   No shortness of breath or cough.  No new adenopathy.  She has been starting to have some loose bowel moments 2-3 times a day in the last 3 weeks.  No other new problems.    Pain Status  Currently in Pain: No/denies    Review of Systems    Constitutional  Fatigue: Fatigue relieved by rest(Reports improvement)  Neurosensory  Neurosensory (WDL): Exceptions to WDL  Ataxia: Asymptomatic, clinical or diagnostic observations only, intervention not indicated  Peripheral Sensory Neuropathy: Asymptomatic, loss of deep tendon reflexes or paresthesia(Ball of feet)  Cardiovascular  Cardiovascular (WDL): All cardiovascular elements are within defined limits  Pulmonary  Respiratory (WDL): Within Defined Limits  Gastrointestinal  Gastrointestinal (WDL): All gastrointestinal elements are within defined limits  Genitourinary  Genitourinary (WDL): All genitourinary elements are within defined limits  Integumentary  Integumentary (WDL): All integumentary elements are within defined limits(Bruising)  Patient Coping  Patient Coping: Accepting  Distress Assessment  Distress Assessment Score: No distress  Accompanied by  Accompanied by: Alone    Past History  Past Medical History:   Diagnosis Date     Anemia      Chronic kidney disease     elevated creatinine due to chemo     History of anesthesia complications 2015    urinary retention after lymph node excision. required catheterization     History of transfusion      Hypothyroid      Mantle cell lymphoma (H)     stage 4     Sleep apnea     uses cpap     Thrombocytopenia (H)          Past Surgical History:   Procedure Laterality Date     ANTERIOR / POSTERIOR COMBINED FUSION CERVICAL SPINE  2011    C2-C7     CARPAL TUNNEL RELEASE Right 2000     CERVICAL FUSION  2004    C4, C5, C6.  Pt. states surgery x 2     KIDNEY STONE SURGERY  1999    removal     LIMBAL STEM CELL TRANSPLANT  08/2015     LYMPH NODE DISSECTION Left 6/25/2020    Procedure: LEFT INGUINAL LYMPH NODE BIOPSY;  Surgeon: Gwyn  Catalina Kee MD;  Location: [facility] OR;  Service: General     PA BX/REMV,LYMPH NODE,DEEP AXILL Right 6/3/2015    Procedure: RIGHT INGUINAL LYMPH NODE BIOPSY;  Surgeon: Clayton Burgos MD;  Location: [facility] OR;  Service: General     TUBAL LIGATION  2004     US GUIDED NEEDLE PLACEMENT  6/25/2020     US LYMPH NODE BIOPSY  6/10/2020       Physical Exam    Recent Vitals 2/4/2021   Weight 291 lbs 2 oz   BSA (m2) 2.5 m2   /78   Pulse 78   Temp 98   Temp src 1   SpO2 100   Some recent data might be hidden       GENERAL: Alert and oriented. Seated comfortably. In no distress.    HEAD: Atraumatic and normocephalic.  Has a full head of hair.    EYES: ROMEO, EOMI.  No pallor.  No icterus.    Oral cavity: no mucosal lesion or tonsillar enlargement.  Induration right mandibular lower jaw area    NECK: supple. JVP normal.  No thyroid enlargement.    LYMPH NODES: No palpable, cervical, axillary or inguinal lymphadenopathy.    CHEST: She has slight bilateral wheezing noted  Resonant to percussion throughout bilaterally.  Symmetrical breath movements bilaterally.    CVS: S1 and S2 are heard. Regular rate and rhythm.  No murmur or gallop or rub heard.    ABDOMEN: Soft. Not tender. Not distended.  No palpable hepatomegaly or splenomegaly.  No other mass palpable.  Bowel sounds heard.    EXTREMITIES: Warm.  No peripheral edema.    SKIN: no rash, or bruising or purpura.    CNS: Nonfocal        Lab Results    Recent Results (from the past 168 hour(s))   Comprehensive Metabolic Panel   Result Value Ref Range    Sodium 141 136 - 145 mmol/L    Potassium 3.9 3.5 - 5.0 mmol/L    Chloride 108 (H) 98 - 107 mmol/L    CO2 25 22 - 31 mmol/L    Anion Gap, Calculation 8 5 - 18 mmol/L    Glucose 93 70 - 125 mg/dL    BUN 21 8 - 22 mg/dL    Creatinine 2.07 (H) 0.60 - 1.10 mg/dL    GFR MDRD Af Amer 30 (L) >60 mL/min/1.73m2    GFR MDRD Non Af Amer 25 (L) >60 mL/min/1.73m2    Bilirubin, Total 0.5 0.0 - 1.0 mg/dL    Calcium 8.6  8.5 - 10.5 mg/dL    Protein, Total 6.1 6.0 - 8.0 g/dL    Albumin 3.4 (L) 3.5 - 5.0 g/dL    Alkaline Phosphatase 94 45 - 120 U/L    AST 19 0 - 40 U/L    ALT 17 0 - 45 U/L   HM1 (CBC with Diff)   Result Value Ref Range    WBC 5.6 4.0 - 11.0 thou/uL    RBC 4.59 3.80 - 5.40 mill/uL    Hemoglobin 13.3 12.0 - 16.0 g/dL    Hematocrit 41.7 35.0 - 47.0 %    MCV 91 80 - 100 fL    MCH 29.0 27.0 - 34.0 pg    MCHC 31.9 (L) 32.0 - 36.0 g/dL    RDW 16.6 (H) 11.0 - 14.5 %    Platelets 178 140 - 440 thou/uL    MPV 10.0 8.5 - 12.5 fL    Neutrophils % 67 50 - 70 %    Lymphocytes % 24 20 - 40 %    Monocytes % 7 2 - 10 %    Eosinophils % 2 0 - 6 %    Basophils % 1 0 - 2 %    Immature Granulocyte % 0 <=0 %    Neutrophils Absolute 3.7 2.0 - 7.7 thou/uL    Lymphocytes Absolute 1.3 0.8 - 4.4 thou/uL    Monocytes Absolute 0.4 0.0 - 0.9 thou/uL    Eosinophils Absolute 0.1 0.0 - 0.4 thou/uL    Basophils Absolute 0.0 0.0 - 0.2 thou/uL    Immature Granulocyte Absolute 0.0 <=0.0 thou/uL       Imaging    Ct Chest Abdomen Pelvis Without Oral Without Iv Contrast    Result Date: 2/4/2021  EXAM: CT CHEST ABDOMEN PELVIS WO ORAL WO IV CONTRAST LOCATION: [facility] DATE/TIME: 2/4/2021 9:57 AM INDICATION: Mantle cell lymphoma diagnosed 2014 with relapse May 2020 treated with 1 round of ibrutinib with subsequent complete imaging response. Hypogammaglobulinemia. COMPARISON: CT CAP 11/12/2020, PET/CTs 9/11/2020, 5/27/2020 and 5/21/2015. TECHNIQUE: CT scan of the chest, abdomen, and pelvis was performed without IV contrast. Multiplanar reformats were obtained. Dose reduction techniques were used. CONTRAST: None. FINDINGS: LUNGS AND PLEURA: Several diminutive 2 mm subpleural nodules in both lungs with long-term stability. Previously seen faint subpleural groundglass density in the mid and lower lungs has nearly completely resolved with only minimal opacity in the right lower lobe posteriorly. Lungs are otherwise clear. No pleural  effusion. MEDIASTINUM/AXILLAE: Port anterior right chest wall with right IJ central venous catheter tip RA/SVC junction. No lymphadenopathy. Heart size within normal limits. No pericardial effusion. Several uncalcified and partially calcified soft tissue density nodules in both breasts have decreased in size since 2015 consistent with benign etiology. HEPATOBILIARY: A few stable subcentimeter hepatic hypodensities which are too small to characterize. Liver is otherwise normal.  No calcified gallstones or bile duct dilatation. PANCREAS: Normal. SPLEEN: Spleen size normal and unchanged. ADRENAL GLANDS: Normal. KIDNEY/BLADDER: Kidneys, ureters and bladder are normal. BOWEL: Normal appendix. Colonic diverticulosis. Bowel is otherwise normal with no obstruction or inflammatory change. LYMPH NODES: Left internal iliac lymphadenopathy abutting the anterior cortex left sacral ala has decreased from 2.6 x 1.7 cm to 1.9 x 1.5 cm (image 248 of series 4). Stable ill-defined focal soft tissue density left inguinal region consistent with postop change. Abdominal and pelvic lymph nodes otherwise normal. VASCULATURE: Normal caliber abdominal aorta with mild calcified atheromatous plaque.  PELVIC ORGANS: No pelvic mass or fluid. MUSCULOSKELETAL: Spondylotic change lumbar spine. Bones are demineralized. Visualized bones otherwise unremarkable. No bone lesions.     1.  Left internal iliac lymphadenopathy abutting the anterior cortex left sacral ala has decreased from 2.6 x 1.7 cm to 1.9 x 1.5 cm. 2.  No new disease.         Signed by: Yelena Starks MD

## 2021-06-16 NOTE — PROGRESS NOTES
"Patient arrived ambulatory at 09:56, seated in chair 3. Reviewed plan of care. No recent fevers or chills - \"I did get my 2nd covid vaccination.\"    Accessed port a cath per sterile technique and obtained an excellent blood return. Premeds were given as ordered. Infused IgG 25 gms over 2 hours 55 minutes - titrated the rate slowly to a maximum rate of 125 mls/hour. At completion the IV line was flushed with NS 50 mls. The port a cath was flushed, heparinized, and deaccessed - covered the site with folded 2x2s and paper tape. VSS. Left the unit ambulatory and in stable condition at 14:07, and plans to return on May 13th.    Cristal Doherty RN  "

## 2021-06-16 NOTE — PROGRESS NOTES
Avis Paul arrived for premeds and IV IGG infusion. Lab results were  reviewed as required. Avis Paul was educated on her plan of care. Premeds of oral Acetaminophen, IV Diphenhydramine, Famotidine and Methylprednisolone given. Each medication given today was reviewed prior to administration. IV IGG infusion treatment was completed with no signs of reaction. IV site:Venous port patent throughout treatment with positive blood return obtained before and at completion. IV port site felt a little tender to her post IV IGG infusion. IV site flushed with saline and heparin and deaccessed. Avis Paul has follow-up appointment scheduled on 03/08/18 for IV IGG. Avis Paul was discharged to home. She discharged ambulatory and per self at approx 1500. The after visit summary was declined.     Katarzyna Cronin

## 2021-06-16 NOTE — PROGRESS NOTES
Patient arrived ambulatory at 09:40, seated in chair 3. Patient feels well today -  no recent fevers, chills, or sore throat. Labs are not required for today's visit. Today's weight is 291.4 lbs.    Accessed port a cath per sterile technique and obtained an excellent blood return. Used NS as the primary IV solution, and premeds were given as ordered. Infused IgG 25 gms over 3 hours, and flushed the IV line with NS 50 mls. No ill effects noted. VSS. Napped at intervals. Denies headache. Declined the AVS. Left the unit ambulatory and in stable condition at 14:32, and plans to return on April 14th.    Cristal Doherty RN

## 2021-06-16 NOTE — PROGRESS NOTES
"Avis Paul arrived for premeds and IV IGG infusion. Avis reported that after her last IV IGG infusion and restarting on Prednisone her legs swelled up. She reported she called the doctors office and did not hear back from them. She also had a recent fall and hit her head, neck and left back/flank area. I asked if she went to an ER to be evaluated and she said \" I don't like the ER up near where I live.\"  Avis Paul was educated on her plan of care. Premedications were given. Each medication given today was reviewed prior to administration. Premedications and IV IGG treatment was completed  with no signs of reaction. IV site:Venous port patent throughout treatment with positive blood return obtained before and at completion. IV site with no pain, redness, swelling and drainage. IV site flushed with saline and heparin and deaccessed. Avis Paul has follow-up appointment scheduled. Avis Paul was discharged to home. She discharged ambulatory and with a friend at approx 1440. The after visit summary was given. Avis had requested EMLA cream. Nanda Olivares NP was notified and will send Rx to her pharmacy.  Katarzyna Cronin    "

## 2021-06-17 NOTE — PROGRESS NOTES
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  No other concern for recurrent disease.  Patient reassured.  We will continue observation.  We will see back in 3 months with repeat scans.    She will continue with IVIG infusion every 4 weeks with lab recheck with return.    Neuropathy stable.    Plan: As above    Measurable disease: Patient currently in remission    Current therapy: Observation  IVIG resumed in January 2017 and stopped in May 2018; resumed again in September 2018  Maintenance Rituxan started December 2015, and completed August 2017, 8 doses  Acyclovir 400 mg twice daily  IVIG every 3 months, first dose August 29, 2016(currently on hold, last dose November 2016)   IVIG every 4 weeks restarted in June 2017 and stopped in May 2018      Treatment history:  Ibrutinib 420 mg p.o. daily started for presumed relapse of mantle cell lymphoma  July 3, 2020 and stopped August 4, 2020    First set of immunizations received at the [facility] in August 2016  First dose of IVIG  Patient completed 1 year of inhalational pentamidine  Autologous stem cell transplant with Beam prep, August 18, 2015  One cycle of R-ICE, Naye 15, 2015, ifosfamide and etoposide  reduced by 25% because of renal dysfunction  Ibrutinib since October 10, 2014, current dose of 420 mg by mouth daily, stopped June 9, 2015   3 cycles of R-DHAP completed late August 2014   5 cycles of R CHOP, started after diagnosis in March 2014       ECOG Performance   ECOG Performance Status: 0    Distress Assessment  Distress Assessment Score: 3    Pain           Problem List    1. 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 Mantle cell lymphoma of lymph nodes of multiple regions (H)          CC: Sunil Tan MD    ______________________________________________________________________________    History of Present Illness    Pretty is here for reevaluation.  She was seen 3 months ago.  She had a fall about a month ago and will get MRI on her knee.  Otherwise has been doing well with no infectious problems and no other new symptoms.  ECOG status is 1.      June 29, 2020  Pretty is here for reevaluation.  Seen 2 weeks ago.  Underwent excisional biopsy of the left inguinal lymph node and is here to review results.  No new symptoms or problems.  ECOG status is 0.  January 2018:   Ms. Avis Paul returns for a follow-up.  She was here for IVIG infusion yesterday and raise concerns about symptoms suggesting recurrence of lymphoma.  She had CT scans and is here to review results.  Has been having aches all over and increased fatigue and weight gain.  Describes some numbness in the right thigh area.  Also has been having memory issues since she received chemotherapy.  Notes an area of induration in the mouth on the lower right mandibular/jaw area.        November 2017:   She was seen 3 months ago.  She did have a visit shortly thereafter at the [facility] with bone marrow and CT scan showing that she is in complete remission from her mantle cell lymphoma.  She  has continued IVIG infusions monthly between June - October 2017.  She was informed recently that the insurance would not cover this.  We had previously got this approved prior to starting infusions in June 2017.    She is currently having sinus symptoms and cough productive of greenish phlegm.  No other infection issues in the last 3 months.  She is in significant distress because of the insurance issues.    No other new symptoms or problems.  ECOG status is 0.    August 2017:   She was seen 4 weeks ago.  She has received 2 doses of IVIG.  No infusion reaction with the last one.  Her last infection was a sinus infection more than 4 weeks ago.  She has some discomfort in the right ear.  No fever or mild sores.  No shortness of breath or cough.  No new adenopathy.  She has been starting to have some loose bowel moments 2-3 times a day in the last 3 weeks.  No other new problems.    Pain Status  Currently in Pain: Yes    Review of Systems    Constitutional     Neurosensory     Cardiovascular     Pulmonary     Gastrointestinal     Genitourinary     Integumentary     Patient Coping  Patient Coping: Accepting  Distress Assessment  Distress Assessment Score: 3  Accompanied by  Accompanied by: Alone    Past History  Past Medical History:   Diagnosis Date     Anemia      Chronic kidney disease     elevated creatinine due to chemo     History of anesthesia complications 2015    urinary retention after lymph node excision. required catheterization     History of transfusion      Hypothyroid      Mantle cell lymphoma (H)     stage 4     Sleep apnea     uses cpap     Thrombocytopenia (H)          Past Surgical History:   Procedure Laterality Date     ANTERIOR / POSTERIOR COMBINED FUSION CERVICAL SPINE  2011    C2-C7     CARPAL TUNNEL RELEASE Right 2000     CERVICAL FUSION  2004    C4, C5, C6.  Pt. states surgery x 2     KIDNEY STONE SURGERY  1999    removal     LIMBAL STEM CELL TRANSPLANT  08/2015     LYMPH NODE DISSECTION Left  6/25/2020    Procedure: LEFT INGUINAL LYMPH NODE BIOPSY;  Surgeon: Catalina Pascal MD;  Location: [facility] OR;  Service: General     TX BX/REMV,LYMPH NODE,DEEP AXILL Right 6/3/2015    Procedure: RIGHT INGUINAL LYMPH NODE BIOPSY;  Surgeon: Clayton Burgos MD;  Location: [facility];  Service: General     TUBAL LIGATION  2004     US GUIDED NEEDLE PLACEMENT  6/25/2020     US LYMPH NODE BIOPSY  6/10/2020       Physical Exam    Recent Vitals 5/13/2021   Height -   Weight 293 lbs   BSA (m2) 2.51 m2   /83   Pulse 64   Temp 98   Temp src 1   SpO2 98   Some recent data might be hidden       GENERAL: Alert and oriented. Seated comfortably. In no distress.    HEAD: Atraumatic and normocephalic.  Has a full head of hair.    EYES: ROMEO, EOMI.  No pallor.  No icterus.    Oral cavity: no mucosal lesion or tonsillar enlargement.  Induration right mandibular lower jaw area    NECK: supple. JVP normal.  No thyroid enlargement.    LYMPH NODES: No palpable, cervical, axillary or inguinal lymphadenopathy.    CHEST: She has slight bilateral wheezing noted  Resonant to percussion throughout bilaterally.  Symmetrical breath movements bilaterally.    CVS: S1 and S2 are heard. Regular rate and rhythm.  No murmur or gallop or rub heard.    ABDOMEN: Soft. Not tender. Not distended.  No palpable hepatomegaly or splenomegaly.  No other mass palpable.  Bowel sounds heard.    EXTREMITIES: Warm.  No peripheral edema.    SKIN: no rash, or bruising or purpura.    CNS: Nonfocal        Lab Results    Recent Results (from the past 168 hour(s))   Comprehensive Metabolic Panel   Result Value Ref Range    Sodium 139 136 - 145 mmol/L    Potassium 3.9 3.5 - 5.0 mmol/L    Chloride 107 98 - 107 mmol/L    CO2 23 22 - 31 mmol/L    Anion Gap, Calculation 9 5 - 18 mmol/L    Glucose 90 70 - 125 mg/dL    BUN 24 (H) 8 - 22 mg/dL    Creatinine 2.24 (H) 0.60 - 1.10 mg/dL    GFR MDRD Af Amer 27 (L) >60 mL/min/1.73m2    GFR MDRD Non Af Amer  23 (L) >60 mL/min/1.73m2    Bilirubin, Total 0.4 0.0 - 1.0 mg/dL    Calcium 8.8 8.5 - 10.5 mg/dL    Protein, Total 6.4 6.0 - 8.0 g/dL    Albumin 3.5 3.5 - 5.0 g/dL    Alkaline Phosphatase 109 45 - 120 U/L    AST 22 0 - 40 U/L    ALT 16 0 - 45 U/L   HM1 (CBC with Diff)   Result Value Ref Range    WBC 5.7 4.0 - 11.0 thou/uL    RBC 4.66 3.80 - 5.40 mill/uL    Hemoglobin 13.7 12.0 - 16.0 g/dL    Hematocrit 43.1 35.0 - 47.0 %    MCV 93 80 - 100 fL    MCH 29.4 27.0 - 34.0 pg    MCHC 31.8 (L) 32.0 - 36.0 g/dL    RDW 14.1 11.0 - 14.5 %    Platelets 161 140 - 440 thou/uL    MPV 10.3 8.5 - 12.5 fL    Neutrophils % 65 50 - 70 %    Lymphocytes % 25 20 - 40 %    Monocytes % 8 2 - 10 %    Eosinophils % 2 0 - 6 %    Basophils % 1 0 - 2 %    Immature Granulocyte % 0 <=0 %    Neutrophils Absolute 3.7 2.0 - 7.7 thou/uL    Lymphocytes Absolute 1.4 0.8 - 4.4 thou/uL    Monocytes Absolute 0.4 0.0 - 0.9 thou/uL    Eosinophils Absolute 0.1 0.0 - 0.4 thou/uL    Basophils Absolute 0.0 0.0 - 0.2 thou/uL    Immature Granulocyte Absolute 0.0 <=0.0 thou/uL       Imaging    Ct Chest Abdomen Pelvis Without Oral Without Iv Contrast    Result Date: 5/13/2021  EXAM: CT CHEST ABDOMEN PELVIS WO ORAL WO IV CONTRAST LOCATION: [facility] DATE/TIME: 5/13/2021 9:26 AM INDICATION: Followup lymphoma. COMPARISON: 02/04/2021, 11/12/2020. TECHNIQUE: CT scan of the chest, abdomen, and pelvis was performed without IV contrast. Multiplanar reformats were obtained. Dose reduction techniques were used. CONTRAST: None. FINDINGS: LUNGS AND PLEURA: There are small pulmonary nodules, reference a 2 mm nodule in the right lower lobe on image 95 series 3. These are stable compared to the prior 2 studies and should be benign. There is a small linear density in the inferior aspect of the lingula that is stable dating back to November 2020 MEDIASTINUM/AXILLAE: There is a port in the right anterior chest wall extending into the superior vena cava. No  enlarged mediastinal lymph nodes are seen. No enlarged axillary nodes are seen. Again seen are calcified and noncalcified nodules in both breasts that are similar in appearance to prior studies. These may be fibroadenomas. CORONARY ARTERY CALCIFICATION: None. HEPATOBILIARY: There are a few tiny low dense lesions in the liver that are stable but too small to characterize, reference posterior right lobe image 64. These are likely benign. PANCREAS: Normal. SPLEEN: Normal in size, stable. ADRENAL GLANDS: Normal. KIDNEYS/BLADDER: Normal. BOWEL: There is some colonic diverticulosis. There is no evidence of appendicitis. LYMPH NODES: Left internal iliac lymph nodes?? with follow-up abutting the anterior cortex of the left sacral alae. On image 415 series 4 this measures 1.9 x 1.3 and on the prior study it measured 1.9 x 1.5 cm. VASCULATURE: Unremarkable. PELVIC ORGANS: Normal. MUSCULOSKELETAL: There are normal degenerative changes in the spine.     1.  There is very mild left internal iliac adenopathy that is stable to slightly smaller than on the prior study. 2.  There are multiple benign-appearing nodules in the breast, some with calcification and all are stable, these are likely fibroadenomas. 3.  No evidence of new disease is seen.        Signed by: Yelena Starks MD

## 2021-06-17 NOTE — PROGRESS NOTES
"Patient arrived ambulatory at 08:50, and was seated in chair 2. Reviewed plan of care. Accessed port a cath per sterile technique and obtained an excellent blood return, labs were drawn at 08:55. Patient left ambulatory at 09:03 for radiology, and then a [facility]. Returned to 1st floor OP Infusion at 10:50.    NS was used as the primary IV solution - administered premeds of famotidine 20 mg IVP; methylprednisolone 125 mg IVP; and benadryl 12.5 mg IVP. \"I took tylenol, two extra strength before I left home this morning.\" Started the IgG 25 gm infusion at 11:35, and completed at 14:35. At completion, the port a cath was flushed, heparinized, and then deaccessed - covered the site with folded 2x2s and paper tape. VSS. No ill effects noted. A copy of today's labs was given and explained, along with the appointment schedule. Left the unit ambulatory and in stable condition at 15:02, and plans to return on June 9th.    Cristal Doherty RN  "

## 2021-06-17 NOTE — PROGRESS NOTES
[facility] Hematology and Oncology Progress Note    Patient: Avis Paul  MRN: [id number]  Date of Service:         Reason for Visit    Chief Complaint   Patient presents with     HE Cancer       Assessment and Plan  1. Mantle cell lymphoma status post autologous transplant, August 18, 2015: Then completed 2 years of maintenance Rituxan therapy.  She is currently on observation.  She did have a CT scan done in January and that showed no evidence of disease recurrence.  We will have her return in July with another CT scan and labs.  Encourage patient to call us if she has any lymphadenopathy that she notices clinically before then.    2. Hypogammaglobulinemia with multiple recurrent infections: He continues to have some viral illnesses every 1-2 months even with IVIG.  Patient does feel that she is having a lot of side effects from the IVIG so at this time her IgG levels are normal so we are going to hold the IVIG and see how she does.  I am to check her IGG levels every month.  If she starts having worsening infections or her IgG levels start getting significantly lower we may have to restart.    3.  Chronic kidney disease: This is been going on for quite some time.  No real significant changes.  Encourage her to try to stay hydrated, keep good blood pressure control.  No other changes.    4.  Fatigue and weight gain: This could be from IVIG.  There are a lot of issues that could be causing this.  She is deconditioned.  Encourage her to try to get a little bit more active.  Also use resting times as needed.    ECOG Performance    1-2     Distress Assessment  Distress Assessment Score: 4: Has seen psychology in the past.  Continue to work with them if needed.  Encourage her to call us if this gets worse    Pain  Currently in Pain: Yes  Pain Score (Initial OR Reassessment): 3  Location: joint and muscle pain      Problem List    1. Mantle cell lymphoma of intra-abdominal lymph nodes  HM1(CBC and Differential)     Comprehensive Metabolic Panel   2. Fatigue  TSH    CT Chest Abdomen Pelvis With Oral With IV Cont   3. Hypogammaglobulinemia, acquired  Immunoglobulins, Quantitative    Immunoglobulins, Quantitative (add-ons/treatment plan)   4. Chronic fatigue     5. Chronic kidney disease (CKD), stage III (moderate)        ______________________________________________________________________________    History of Present Illness    Measurable disease: CT scan, PET, Patient currently in remission     Current therapy: Maintenance Rituxan started December 2015 completed August 2017.  8 doses total.  Acyclovir 800 mg twice daily  IVIG: did get three doses at the [facility] on August 29, 2016,  last dose November 2016.   She was on hold due to insurance issues, but then in June has restarted and is getting monthly.       Treatment history:  First set of immunizations received at the [place] [facility] in August 2016  First dose of IVIG  Patient completed 1 year of inhalational pentamidine  Autologous stem cell transplant with Beam prep, August 18, 2015  One cycle of R-ICE, Naye 15, 2015, ifosfamide and etoposide reduced by 25% because of renal dysfunction  Ibrutinib since October 10, 2014, current dose of 420 mg by mouth daily, stopped June 9, 2015   3 cycles of R-DHAP completed late August 2014   5 cycles of R CHOP, started after diagnosis in March 2014     Interim History:   Pt is here today for follow-up visit.  She continues to have complaints of feeling tired.  States that she can usually work a couple of hours in the morning but then usually has to rest in between going to work for a couple of hours in the afternoon.  She works a total of 4 hours in a row she generally has almost a full day of recovery.  She states that she does seem to get some viral illnesses about every 6 weeks and they are usually upper respiratory.  She did have influenza B this winter/spring but other than that she has been on antibiotics for quite a while.  She has  been gaining weight and feels like it may be from the IVIG.  She states that she generally is gaining about 5 pounds a month.  She is hoping to get a break from the IVIG if possible.       Pain Status  Currently in Pain: Yes    Review of Systems    Constitutional  Constitutional (WDL): Exceptions to WDL  Fatigue: Fatigue not relieved by rest - Limiting instrumental ADL  Neurosensory  Neurosensory (WDL): Exceptions to WDL  Peripheral Sensory Neuropathy: Moderate symptoms, limiting instrumental ADL  Eye   Eye Disorder (WDL): Exceptions to WDL  Ear  Ear Disorder (WDL): Exceptions to WDL  Cardiovascular  Cardiovascular (WDL): All cardiovascular elements are within defined limits  Pulmonary  Respiratory (WDL): Exceptions to WDL  Dyspnea: Shortness of breath with minimal exertion, limiting instrumental ADL  Gastrointestinal  Gastrointestinal (WDL): All gastrointestinal elements are within defined limits  Genitourinary  Genitourinary (WDL): All genitourinary elements are within defined limits  Lymphatic  Lymph (WDL): All lymph disorder elements are within defined limits  Musculoskeletal and Connective Tissue  Musculoskeletal and Connetive Tissue Disorders (WDL): Exceptions to WDL  Bone Pain: Mild pain  Myalgia: Mild pain  Integumentary  Integumentary (WDL): Exceptions to WDL  Urticaria: Urticarial lesions covering <10% BSA, topical intervention indicated (back and foot itching)  Patient Coping   Doing okay  Distress Assessment  Distress Assessment Score: 4  Accompanied by  Accompanied by: Alone  Oral Chemo Adherence       Past History  Past Medical History:   Diagnosis Date     Anemia      Chronic kidney disease     elevated creatinine due to chemo     History of transfusion      Hypothyroid      Mantle cell lymphoma     stage 4     Sleep apnea     uses cpap     Thrombocytopenia        PHYSICAL EXAM:  /83  Pulse 72  Temp 97.8  F (36.6  C) (Oral)   Wt (!) 266 lb 7 oz (120.9 kg)  SpO2 99%  BMI 41.11  kg/m2  GENERAL: no acute distress. Cooperative in conversation. Here alone.   HEENT: pupils are equal, round and reactive. Oromucosa is clean and intact. No ulcerations or mucositis noted. No bleeding noted.  RESP: lungs are clear bilaterally per auscultation. Regular respiratory rate. No wheezes or rhonchi.  CV: Regular, rate and rhythm. No murmurs.  ABD: soft, nontender. Positive bowel sounds. No organomegaly.   MUSCULOSKELETAL: No lower extremity swelling.   NEURO: non focal. Alert and oriented x3.   PSYCH: pt crying and emotional today  SKIN: warm dry. Has red welts and bug bites all over her skin  LYMPH: no cervical, supraclavicular or axillary lymphadenopathy.  Does have some tenderness upon palpation in the right axillary area but no lymphadenopathy appreciated          Lab Results    Recent Results (from the past 168 hour(s))   Comprehensive Metabolic Panel   Result Value Ref Range    Sodium 143 136 - 145 mmol/L    Potassium 4.6 3.5 - 5.0 mmol/L    Chloride 110 (H) 98 - 107 mmol/L    CO2 24 22 - 31 mmol/L    Anion Gap, Calculation 9 5 - 18 mmol/L    Glucose 90 70 - 125 mg/dL    BUN 29 (H) 8 - 22 mg/dL    Creatinine 1.97 (H) 0.60 - 1.10 mg/dL    GFR MDRD Af Amer 32 (L) >60 mL/min/1.73m2    GFR MDRD Non Af Amer 27 (L) >60 mL/min/1.73m2    Bilirubin, Total 0.5 0.0 - 1.0 mg/dL    Calcium 9.0 8.5 - 10.5 mg/dL    Protein, Total 6.4 6.0 - 8.0 g/dL    Albumin 3.3 (L) 3.5 - 5.0 g/dL    Alkaline Phosphatase 101 45 - 120 U/L    AST 22 0 - 40 U/L    ALT 16 0 - 45 U/L   Immunoglobulins, Quantitative   Result Value Ref Range    Immunoglobulin G 771 700 - 1700 mg/dL    Immunoglobulin M 8 (L) 60 - 280 mg/dL    Immunoglobulin A 26 (L) 65 - 400 mg/dL   HM1 (CBC with Diff)   Result Value Ref Range    WBC 5.3 4.0 - 11.0 thou/uL    RBC 4.54 3.80 - 5.40 mill/uL    Hemoglobin 13.8 12.0 - 16.0 g/dL    Hematocrit 42.0 35.0 - 47.0 %    MCV 93 80 - 100 fL    MCH 30.4 27.0 - 34.0 pg    MCHC 32.9 32.0 - 36.0 g/dL    RDW 14.6 (H) 11.0 -  14.5 %    Platelets 155 140 - 440 thou/uL    MPV 9.9 8.5 - 12.5 fL    Neutrophils % 65 50 - 70 %    Lymphocytes % 26 20 - 40 %    Monocytes % 7 2 - 10 %    Eosinophils % 2 0 - 6 %    Basophils % 1 0 - 2 %    Neutrophils Absolute 3.5 2.0 - 7.7 thou/uL    Lymphocytes Absolute 1.4 0.8 - 4.4 thou/uL    Monocytes Absolute 0.4 0.0 - 0.9 thou/uL    Eosinophils Absolute 0.1 0.0 - 0.4 thou/uL    Basophils Absolute 0.0 0.0 - 0.2 thou/uL   TSH   Result Value Ref Range    TSH 3.75 0.30 - 5.00 uIU/mL       Imaging    No results found.      Signed by: Nanda Olivares, CNP

## 2021-06-17 NOTE — PROGRESS NOTES
"Avis Paul arrived for premeds and IV IGG infusion. No labs done today. Avis reports she is so tired of not feeling well. She was sick for 4 weeks. \"I am exasperated that I gain 4-6 pounds every month.\" She wonders why she is not feeling any better. She is  Avis Paul was educated on her plan of care. Avis was premedicated with oral Acetaminophen and IV Diphenhydramine, Methylprednisolone and Famotidine.Each medication given today was reviewed prior to administration.  IV IGG infusion treatment was completed with no signs of reaction. IV site:Venous port patent throughout treatment with positive blood return obtained before and at completion. IV site with no pain, redness, swelling and drainage. IV site flushed with saline and heparin and deaccessed. Avis Paul has follow-up appointment scheduled on 05/09/18. Avis Paul was discharged to home. She discharged ambulatory  per self at 1452. The after visit summary was declined.     Katarzyna Cronin    "

## 2021-06-17 NOTE — PROGRESS NOTES
"Oncology Rooming Note    05/13/21 10:18 AM    Avis Paul is a 56 y.o. 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    Weight as of this encounter: 293 lb (132.9 kg).     Body surface area is 2.51 meters squared.      Allergies reviewed: Yes  Medications reviewed: Yes    Refills needed: No  Pharmacy name entered into EPIC: @[email or web address]@      Clinical concerns: concerns - results of CT      Pretty Muir RN      "

## 2021-06-18 NOTE — PATIENT INSTRUCTIONS - HE
Patient Instructions by Yelena Starks MD at 6/29/2020  2:15 PM     Author: Yelena Starks MD Service: -- Author Type: Physician    Filed: 6/29/2020  2:56 PM Encounter Date: 6/29/2020 Status: Signed    : Yelena Starks MD (Physician)       Patient Education     Ibrutinib capsules  Brand Name: [id number]  What is this medicine?  IBRUTINIB (eye BROO ti nib) is a medicine that targets proteins in cancer cells and stops the cancer cells from growing. It is used to treat mantle cell lymphoma, chronic lymphocytic leukemia, small lymphocytic lymphoma, marginal zone lymphoma, Waldenstrom macroglobulinemia, and chronic beuec-fgmvgh-kvnl disease.  How should I use this medicine?  Take this medicine by mouth with a glass of water. Follow the directions on the prescription label. Do not cut, crush or chew this medicine. Do not take with grapefruit juice or eat [place] oranges. Take your medicine at regular intervals. Do not take it more often than directed. Do not stop taking except on your doctor's advice.  Talk to your pediatrician regarding the use of this medicine in children. Special care may be needed.  What side effects may I notice from receiving this medicine?  Side effects that you should report to your doctor or health care professional as soon as possible:    allergic reactions like skin rash, itching or hives, swelling of the face, lips, or tongue    low blood counts - this medicine may decrease the number of white blood cells, red blood cells and platelets. You may be at increased risk for infections and bleeding    signs or symptoms of bleeding such as bloody or black, tarry stools; red or dark-brown urine; spitting up blood or brown material that looks like coffee grounds; red spots on the skin; unusual bruising or bleeding from the eye, gums, or nose; confusion; trouble speaking or understanding; severe headaches; weakness; or dizziness    signs and symptoms  of a dangerous change in heartbeat or heart rhythm like chest pain; dizziness; fast or irregular heartbeat; palpitations; feeling faint or lightheaded, falls; breathing problems    signs and symptoms of infection like fever or chills; cough; sore throat; or pain when urinating    signs and symptoms of kidney injury like trouble passing urine or change in the amount of urine  Side effects that usually do not require medical attention (report to your doctor or health care professional if they continue or are bothersome):    bone pain    diarrhea    mouth sores    muscle cramps    muscle pain    nausea    tiredness  What may interact with this medicine?  This medicine may interact with the following medications:    antiviral medications for HIV or AIDS    aprepitant    boceprevir    calcium channel blockers like diltiazem and verapamil    certain antibiotics like clarithromycin, erythromycin, and troleandomycin    certain medicines for fungal infections like fluconazole, ketoconazole, itraconazole, posaconazole, and voriconazole    certain medicines for seizures like carbamazepine and phenytoin    cimetidine    ciprofloxacin    clotrimazole    conivaptan    crizotinib    cyclosporine    digoxin    dronedarone    enzalutamide    fluvoxamine    grapefruit juice or [place] oranges    idelalisib    imatinib    methotrexate    mitotane    nefazodone    rifampin    Zoya's wort    tofisopam  What if I miss a dose?  If you miss a dose, take it as soon as you can. If it is almost time for your next dose, take only that dose. Do not take double or extra doses.  Where should I keep my medicine?  Keep out of the reach of children.  Store between 20 and 25 degrees C (68 and 77 degrees F). Keep this medicine in the original container. Throw away any unused medicine after the expiration date.  What should I tell my health care provider before I take this medicine?  They need to know if you have any of these  conditions:    bleeding disorders    diabetes    heart disease    high blood pressure    high cholesterol    history of irregular heartbeat    infection    liver disease    recent surgery    smoke tobacco    take medicines that treat or prevent blood clots    an unusual or allergic reaction to ibrutinib, other medicines, foods, dyes, or preservatives    pregnant or trying to get pregnant    breast-feeding  What should I watch for while using this medicine?  You may need blood work done while you are taking this medicine.  This medicine may increase your risk to bruise or bleed. Call your doctor or health care professional if you notice any unusual bleeding.  Call your doctor or health care professional for advice if you get a fever, chills or sore throat, or other symptoms of a cold or flu. Do not treat yourself. This drug decreases your body's ability to fight infections. Try to avoid being around people who are sick.  If you are going to have surgery or any other procedures, tell your doctor you are taking this medicine. Tell your dentist and dental surgeon that you are taking this medicine. You should not have major dental surgery while on this medicine. See your dentist to have a dental exam and fix any dental problems before starting this medicine.  Talk to your doctor about your risk of cancer. You may be more at risk for certain types of cancers if you take this medicine.  Do not become pregnant while taking this medicine or for 1 month after stopping it. Women should inform their doctor if they wish to become pregnant or think they might be pregnant. Men should not father a child while taking this medicine and for 1 month after stopping it. There is a potential for serious side effects to an unborn child. Talk to your health care professional or pharmacist for more information.  NOTE:This sheet is a summary. It may not cover all possible information. If you have questions about this medicine, talk to your  doctor, pharmacist, or health care provider. Copyright  2018 [employer]

## 2021-06-19 NOTE — PROGRESS NOTES
Chief complaint: Extreme reactions from bug bites    History of present illness: This is a pleasant 53-year-old woman who has a history of mantle cell lymphoma status post stem cell transplant in 2015.  She is here today as she has been having some extreme reactions to bug bites last year this year.  She states during May through October she has extreme reactions where she is bit by a bug she has large swellings.  They become very itchy and then she itches them open.  When she itches them often they often become infected.  She has a history of hypogammaglobulinemia and has previously been on IVIG but this has been stopped recently.  She has a history of a slightly high eosinophil count of 700.  She reports that she can get hives randomly as well.  She thinks she has a combination of bug bites as well as hives causing some of her reactions.  She has been having some increased respiratory tract infections as well after being off of the IVIG.  She has a history of multiple drug allergies and states most of the drug allergies consisted of rash.  She has had no swelling of her lips or eyes.  No bruising to the lesions.  The hives are very fleeting when she has them.  She notes that she touches her skin she will develop hives in that area.  She has tried every 4-6 hour Benadryl.  She also uses Claritin on a regular basis.  She believes that helps but not completely genesis the symptoms and her Benadryl makes her very sleepy.  No family history of hives.    She does bring pictures today that showed a great reaction she had in her legs bilaterally.  She states she was walking and grass and developed these large welts that then blistered over.  She has a family history of celiac disease and she has been tested for multiple times.  She states she was told perhaps she needs to be retested again.    Past medical history: As listed in history present illness plus carpal tunnel surgery, neck fusions, tubal ligation, she is in  remission from her cancer last chemotherapy was August 2017, hypothyroidism    Social history: She lives in a home without central air, has dogs and cats at home, non-smoking impairment, wears a bug suit when she goes out    Family history: Sister with asthma, no history of urticaria, sister and mom with allergies, family history of celiac disease      Review of Systems performed as above and the remainder is negative.        Current Outpatient Prescriptions:      acetaminophen (TYLENOL) 500 MG tablet, Take 500-1,000 mg by mouth every 6 (six) hours as needed for pain., Disp: , Rfl:      acyclovir (ZOVIRAX) 800 MG tablet, Take 800 mg by mouth 2 (two) times a day. , Disp: , Rfl:      albuterol (PROAIR HFA;PROVENTIL HFA;VENTOLIN HFA) 90 mcg/actuation inhaler, Inhale 2 puffs every 4 (four) hours as needed., Disp: , Rfl:      cephalexin (KEFLEX) 500 MG capsule, Take 500 mg by mouth 2 (two) times a day., Disp: , Rfl: 0     fluticasone (FLONASE) 50 mcg/actuation nasal spray, , Disp: , Rfl:      FOLIC ACID/MULTIVIT-MINERALS (ADULT MULTIVITAMIN GUMMIES ORAL), Take 2 tablets by mouth once daily. 2 gummies = daily suggested dose, Disp: , Rfl:      levothyroxine (SYNTHROID, LEVOTHROID) 75 MCG tablet, TAKE ONE TABLET BY MOUTH ONCE DAILY AT  6AM, Disp: 90 tablet, Rfl: 0     lidocaine-prilocaine (EMLA) cream, Apply to port 1/2 to 1 hour prior to accessing., Disp: 30 g, Rfl: 1     loratadine (CLARITIN) 10 mg tablet, Take 10 mg by mouth daily., Disp: , Rfl:      triamcinolone (KENALOG) 0.1 % cream, Apply a small amount to affected area twice a day, Disp: , Rfl: 1    Allergies   Allergen Reactions     Sulfa (Sulfonamide Antibiotics) Rash     Zofran Odt [Ondansetron] Other (See Comments)     Massive constipation over 8 days     Aspirin Nausea And Vomiting and Rash     Codeine Nausea And Vomiting and Rash     6/2015 tolerated a dose of hydrocodone/apap with surgery     Penicillins Nausea And Vomiting and Rash       /74  Pulse 78  " Ht 5' 7\" (1.702 m)  Wt (!) 272 lb (123.4 kg)  BMI 42.6 kg/m2  Gen: Pleasant female not in acute distress  HEENT: Eyes no erythema of the bulbar or palpebral conjunctiva, no edema. Ears: TMs well visualized, no effusions. Nose: No congestion, mucosa normal. Mouth: Throat clear, no lip or tongue edema.   Cardiac: Regular rate and rhythm, no murmurs, rubs or gallops  Respiratory: Clear to auscultation bilaterally, no adventitious breath sounds  Lymph: No supraclavicular or cervical lymphadenopathy  Skin: Multiple sores and excoriations all over her body, dermatographia  Psych: Alert and oriented times 3      Impression report and plan:    1.  Chronic urticaria    I am not sure if these are large local reactions to bug bites or chronic urticaria.  I suspect this is more likely chronic urticaria.  She does have an elevated creatinine so we do need to be mindful of this.  I did ask her to take Claritin 10 mg twice daily.  If she is not better in a week, I recommend adding H2 blocker or perhaps montelukast.  I went over triggering factors for chronic urticaria as well.  I stated IVIG has been shown to improve chronic urticaria in case reports as well.  This could explain why she did better on IVIG.  85% of patients do resolve within 2 years of developing chronic urticaria.  She will keep me updated regarding her progress.  Allergy testing is not necessary.      Time spent with the patient, 30 minutes, greater than half spent counseling and coordination of care regarding urticaria.  "

## 2021-06-19 NOTE — PROGRESS NOTES
Pt here for tato lab draw and to see MD. Juarez flushed and deaccessed and pt d/c ambulatory to lobby alone

## 2021-06-19 NOTE — PROGRESS NOTES
[facility] Progress Note    Patient: Avis Paul  MRN: [id number]  Date of Service:         Reason for Visit    No chief complaint on file.      Assessment and Plan    Mantle cell lymphoma status post autologous transplant, August 18, 2015  Hypogammaglobulinemia with multiple recurrent infections  Hives/eosinophilia    No clinical evidence of relapse of lymphoma.  CT scans are reviewed and show no evidence of recurrent lymphoma either.  Recommend continued observation.  She is 3 years out from transplant so I would only plan to repeat scans on an annual basis now.    We stopped IVIG infusions in May 2018.  She has had more reactions to bug bites requiring antibiotic treatment on 2 occasions.  No other spontaneous infections requiring antibiotics.  Will wait to see immunoglobulin levels today.  Will hold on resuming IVIG infusions for now.  We will see patient back again in 2 months and reassess the need to resume IVIG infusions at that time.    She has significant skin reactions to bug bites.  She is describing spontaneous hives as well and is also noted with mild eosinophilia for the first time.  Will refer to allergy for further evaluation.      Plan: Continue observation of the lymphoma  Follow-up in 2 months with recheck of labs including CBC, TSH and quantitative immunoglobulins  Refer to allergy for evaluation of hives and eosinophilia    Measurable disease: Patient currently in remission    Current therapy:   IVIG resumed in January 2017  Maintenance Rituxan started December 2015, and completed August 2017, 8 doses  Acyclovir 400 mg twice daily  IVIG every 3 months, first dose August 29, 2016(currently on hold, last dose November 2016)   IVIG every 4 weeks restarted in June 2017 and stopped in May 2018      Treatment history:  First set of immunizations received at the [facility] in August 2016  First dose of IVIG  Patient completed 1 year of inhalational pentamidine  Autologous  stem cell transplant with Beam prep, August 18, 2015  One cycle of R-ICE, Naye 15, 2015, ifosfamide and etoposide reduced by 25% because of renal dysfunction  Ibrutinib since October 10, 2014, current dose of 420 mg by mouth daily, stopped June 9, 2015   3 cycles of R-DHAP completed late August 2014   5 cycles of R CHOP, started after diagnosis in March 2014       ECOG Performance   ECOG Performance Status: 1    Distress Assessment  Distress Assessment Score: 7 (related to disease process and treatment and personal stress)    Pain           Problem List    1. Mantle cell lymphoma of intra-abdominal lymph nodes (H)  Immunoglobulins, Quantitative    Ambulatory referral to Allergy    HM1(CBC and Differential)   2. Acquired hypothyroidism  TSH        CC: Carol Rose MD    ______________________________________________________________________________    History of Present Illness    Pretty is here for reevaluation.  She was seen about 2 months ago.  IVIG infusions were stopped at that time.  She reports more significant reactions to bug bites and has required antibiotics twice for this in the last 2 months or so.  No other infections requiring antibiotics.  She is concerned about weight gain.  No fever chills or sweats.  No bleeding.  No new palpable adenopathy.  She describes spontaneous skin lesions which are itchy and pictures that she has taken suggest hives.  She has had documentation of hypogammaglobulinemia for about 2 years now.  This is felt related to Rituxan therapy.  She has been on intermittent treatment with IVIG infusions for about 2 years now.    January 2018:   Ms. Avsi Paul returns for a follow-up.  She was here for IVIG infusion yesterday and raise concerns about symptoms suggesting recurrence of lymphoma.  She had CT scans and is here to review results.  Has been having aches all over and increased fatigue and weight gain.  Describes some numbness in the right thigh area.  Also has been  having memory issues since she received chemotherapy.  Notes an area of induration in the mouth on the lower right mandibular/jaw area.        November 2017:   She was seen 3 months ago.  She did have a visit shortly thereafter at the [facility] with bone marrow and CT scan showing that she is in complete remission from her mantle cell lymphoma.  She has continued IVIG infusions monthly between June - October 2017.  She was informed recently that the insurance would not cover this.  We had previously got this approved prior to starting infusions in June 2017.    She is currently having sinus symptoms and cough productive of greenish phlegm.  No other infection issues in the last 3 months.  She is in significant distress because of the insurance issues.    No other new symptoms or problems.  ECOG status is 0.    August 2017:   She was seen 4 weeks ago.  She has received 2 doses of IVIG.  No infusion reaction with the last one.  Her last infection was a sinus infection more than 4 weeks ago.  She has some discomfort in the right ear.  No fever or mild sores.  No shortness of breath or cough.  No new adenopathy.  She has been starting to have some loose bowel moments 2-3 times a day in the last 3 weeks.  No other new problems.    Pain Status  Currently in Pain: Yes    Review of Systems    Constitutional  Constitutional (WDL): Exceptions to WDL  Fatigue: Fatigue not relieved by rest - Limiting instrumental ADL  Weight Gain: 5 - <10% from baseline (reports 6lbs a month increasing monthly)  Neurosensory  Neurosensory (WDL): Exceptions to WDL  Ataxia: Asymptomatic, clinical or diagnostic observations only, intervention not indicated (with numbness and tingling occassionally)  Peripheral Sensory Neuropathy: Asymptomatic, loss of deep tendon reflexes or paresthesia (occ affects  balance and typing, top of foot)  Confusion: Mild disorientation (short term term memory, frustration, difficulty  "processing)  Cardiovascular  Cardiovascular (WDL): All cardiovascular elements are within defined limits  Edema: No  Pulmonary  Respiratory (WDL): Exceptions to WDL  Cough: Mild symptoms, nonprescription intervention indicated (non productive in mornings, reports sinus related)  Gastrointestinal  Gastrointestinal (WDL): Exceptions to WDL  Diarrhea: Increase of <4 stools per day over baseline, mild increase in ostomy output compared to baseline (every day, improved from last year)  Dry Mouth: Moderate symptoms, oral intake alterations (e.g., copious water, other lubricants, diet limited to purees and/or soft, moist foods), unstimulated saliva 0.1 to 0.2 ml/min (reports severely dry, some skin missing inside cheeks)  Genitourinary  Genitourinary (WDL): All genitourinary elements are within defined limits  Integumentary  Integumentary (WDL): Exceptions to WDL (recent outbreak of insect bites on arms and legs, dry skin also)  Urticaria: Urticarial lesions covering 10 - 30% BSA, oral intervention indicated (related to recent insect bites)  Patient Coping  Patient Coping: Anxiety;Depression;Sadness (\"wishing I was me\")  Distress Assessment  Distress Assessment Score: 7 (related to disease process and treatment and personal stress)  Accompanied by  Accompanied by: Alone    Past History  Past Medical History:   Diagnosis Date     Anemia      Chronic kidney disease     elevated creatinine due to chemo     History of transfusion      Hypothyroid      Mantle cell lymphoma (H)     stage 4     Sleep apnea     uses cpap     Thrombocytopenia (H)          Past Surgical History:   Procedure Laterality Date     ANTERIOR / POSTERIOR COMBINED FUSION CERVICAL SPINE  2011    C2-C7     CARPAL TUNNEL RELEASE Right 2000     CERVICAL FUSION  2004    C4, C5, C6.  Pt. states surgery x 2     KIDNEY STONE SURGERY  1999    removal     MO BX/REMV,LYMPH NODE,DEEP AXILL Right 6/3/2015    Procedure: RIGHT INGUINAL LYMPH NODE BIOPSY;  Surgeon: Clayton" LEONORA Burgos MD;  Location: [facility];  Service: General     TUBAL LIGATION  2004       Physical Exam    Recent Vitals 7/17/2018   Weight 274 lbs 10 oz   /96   Pulse 73   Temp 98.5   Temp src 1   SpO2 99   Some recent data might be hidden       GENERAL: Alert and oriented. Seated comfortably. In no distress.    HEAD: Atraumatic and normocephalic.  Has a full head of hair.    EYES: ROMEO, EOMI.  No pallor.  No icterus.    Oral cavity: no mucosal lesion or tonsillar enlargement.  Induration right mandibular lower jaw area    NECK: supple. JVP normal.  No thyroid enlargement.    LYMPH NODES: No palpable, cervical, axillary or inguinal lymphadenopathy.    CHEST: She has slight bilateral wheezing noted  Resonant to percussion throughout bilaterally.  Symmetrical breath movements bilaterally.    CVS: S1 and S2 are heard. Regular rate and rhythm.  No murmur or gallop or rub heard.    ABDOMEN: Soft. Not tender. Not distended.  No palpable hepatomegaly or splenomegaly.  No other mass palpable.  Bowel sounds heard.    EXTREMITIES: Warm.  No peripheral edema.    SKIN: no rash, or bruising or purpura.    CNS: Nonfocal        Lab Results    Recent Results (from the past 168 hour(s))   Comprehensive Metabolic Panel   Result Value Ref Range    Sodium 140 136 - 145 mmol/L    Potassium 3.9 3.5 - 5.0 mmol/L    Chloride 108 (H) 98 - 107 mmol/L    CO2 24 22 - 31 mmol/L    Anion Gap, Calculation 8 5 - 18 mmol/L    Glucose 89 70 - 125 mg/dL    BUN 25 (H) 8 - 22 mg/dL    Creatinine 2.01 (H) 0.60 - 1.10 mg/dL    GFR MDRD Af Amer 31 (L) >60 mL/min/1.73m2    GFR MDRD Non Af Amer 26 (L) >60 mL/min/1.73m2    Bilirubin, Total 0.5 0.0 - 1.0 mg/dL    Calcium 8.8 8.5 - 10.5 mg/dL    Protein, Total 6.2 6.0 - 8.0 g/dL    Albumin 3.7 3.5 - 5.0 g/dL    Alkaline Phosphatase 103 45 - 120 U/L    AST 18 0 - 40 U/L    ALT 14 0 - 45 U/L   HM1 (CBC with Diff)   Result Value Ref Range    WBC 6.8 4.0 - 11.0 thou/uL    RBC 4.58 3.80 - 5.40 mill/uL     Hemoglobin 13.7 12.0 - 16.0 g/dL    Hematocrit 42.2 35.0 - 47.0 %    MCV 92 80 - 100 fL    MCH 29.9 27.0 - 34.0 pg    MCHC 32.5 32.0 - 36.0 g/dL    RDW 14.3 11.0 - 14.5 %    Platelets 165 140 - 440 thou/uL    MPV 9.9 8.5 - 12.5 fL    Neutrophils % 57 50 - 70 %    Lymphocytes % 26 20 - 40 %    Monocytes % 6 2 - 10 %    Eosinophils % 11 (H) 0 - 6 %    Basophils % 1 0 - 2 %    Neutrophils Absolute 3.8 2.0 - 7.7 thou/uL    Lymphocytes Absolute 1.7 0.8 - 4.4 thou/uL    Monocytes Absolute 0.4 0.0 - 0.9 thou/uL    Eosinophils Absolute 0.7 (H) 0.0 - 0.4 thou/uL    Basophils Absolute 0.0 0.0 - 0.2 thou/uL       Imaging    No results found.      Signed by: Yelena Starks MD

## 2021-06-19 NOTE — PROGRESS NOTES
Disability form has been completed and faxed to [facility], [phone]. Form sent to medical records to be scanned in patient's chart.

## 2021-06-20 NOTE — PROGRESS NOTES
Patient is here for labs, provider visit and treatment of Mantle cell lymphoma of intra-abdominal lymph nodes.

## 2021-06-20 NOTE — PROGRESS NOTES
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 Mantle cell lymphoma status post autologous transplant, August 18, 2015: Then completed 2 years of maintenance Rituxan therapy.  She is currently on observation.  She did have a CT scan done in July and that showed no evidence of disease recurrence.  Dr. Starks will likely do annual scans at this point. Encourage patient to call us if she has any lymphadenopathy that she notices clinically before then.    2. Hypogammaglobulinemia with multiple recurrent infections: Have been holding her IVIG since May.  She recently saw someone at the [facility] and they thought that if her IVIG level was 300 or less we should probably reinstitute IVIG.  It was 308 so they recommended restarting it.  She does continue to have chronic infections from bug bites, upper respiratory staff, hives and other issues.  Patient does want to restart it so we will restart that today.  She will get it every 4 weeks.  She will see Dr. Starks in 3 months and we will recheck her IVIG level at that time.    3.  Chronic hives significant reaction to bug bites/chronic urticaria: Has been seen by Dr. Pascal from allergy.  She has tried a couple of different medications.  Patient states that things have gotten better being on the layer from the chronic hives standpoint but not necessarily from reactions to bug bites.  To need to follow with Dr. Pascal as needed.    ECOG Performance   ECOG Performance Status: 1     Distress Assessment  Distress Assessment Score: 8: Has seen psychology in the past.  Continue to work with them if needed.  Encourage her to call us if this gets worse    Pain  Currently in Pain: No/denies      Problem List    1. Mantle cell lymphoma  of intra-abdominal lymph nodes (H)     2. Chronic infection  Immunoglobulins, Quantitative    DISCONTINUED: methylPREDNISolone sod suc(PF) 40 mg/mL injection 80 mg (SOLU-MEDROL)    DISCONTINUED: immune globulin (IVIG) 10% IVPB 35 g (PRIVIGEN)    DISCONTINUED: heparin 100 unit/mL lockflush (PF) porcine 300-600 Units    DISCONTINUED: sodium chloride flush 10 mL (NS)    DISCONTINUED: diphenhydrAMINE injection 50 mg (BENADRYL)    DISCONTINUED: famotidine 20 mg/2 mL injection 20 mg (PEPCID)    DISCONTINUED: acetaminophen tablet 1,000 mg (TYLENOL)    DISCONTINUED: hydrocortisone sod succ (PF) 100 mg/2 mL injection 100 mg    DISCONTINUED: diphenhydrAMINE injection 12.5 mg (BENADRYL)    DISCONTINUED: acetaminophen tablet 650 mg (TYLENOL)    DISCONTINUED: famotidine 20 mg injection   3. Hypogammaglobulinemia, acquired (H)  Immunoglobulins, Quantitative    DISCONTINUED: methylPREDNISolone sod suc(PF) 40 mg/mL injection 80 mg (SOLU-MEDROL)    DISCONTINUED: immune globulin (IVIG) 10% IVPB 35 g (PRIVIGEN)    DISCONTINUED: heparin 100 unit/mL lockflush (PF) porcine 300-600 Units    DISCONTINUED: sodium chloride flush 10 mL (NS)    DISCONTINUED: diphenhydrAMINE injection 50 mg (BENADRYL)    DISCONTINUED: famotidine 20 mg/2 mL injection 20 mg (PEPCID)    DISCONTINUED: acetaminophen tablet 1,000 mg (TYLENOL)    DISCONTINUED: hydrocortisone sod succ (PF) 100 mg/2 mL injection 100 mg    DISCONTINUED: diphenhydrAMINE injection 12.5 mg (BENADRYL)    DISCONTINUED: acetaminophen tablet 650 mg (TYLENOL)    DISCONTINUED: famotidine 20 mg injection      ______________________________________________________________________________    History of Present Illness    Measurable disease: CT scan, PET, Patient currently in remission     Current therapy: Maintenance Rituxan started December 2015 completed August 2017.  8 doses total.  Acyclovir 800 mg twice daily  IVIG: did get three doses at the [facility] on August 29, 2016,  last dose November 2016.    She was on hold due to insurance issues, but then in June has restarted and is getting monthly.       Treatment history:  First set of immunizations received at the [facility] in August 2016  First dose of IVIG  Patient completed 1 year of inhalational pentamidine  Autologous stem cell transplant with Beam prep, August 18, 2015  One cycle of R-ICE, Naye 15, 2015, ifosfamide and etoposide reduced by 25% because of renal dysfunction  Ibrutinib since October 10, 2014, current dose of 420 mg by mouth daily, stopped June 9, 2015   3 cycles of R-DHAP completed late August 2014   5 cycles of R CHOP, started after diagnosis in March 2014     Interim History:   Pt is here today for follow-up visit.  Recently seen by Dr. Rose in no that the [facility].  She felt that her IgG levels were close to 300 which was worrisome.  She thought she needed to be back on the IVIG.  Patient states that she continues to have issues with infections on and off.  Over the summer she is mainly had cellulitis on her skin from bug bite reactions.  Other than that she has not had too many upper respiratory infections but did have a lot of them through the winter.  She denies any other new constitutional symptoms.  She is worried about gaining weight and fluid overload which happens when she gets the IVIG with the steroids.    Pain Status  Currently in Pain: No/denies    Review of Systems    Constitutional  Constitutional (WDL): Exceptions to WDL  Fatigue: Fatigue relieved by rest  Weight Gain: 5 - <10% from baseline (5#)  Neurosensory  Neurosensory (WDL): Exceptions to WDL  Peripheral Motor Neuropathy: Asymptomatic, clinical or diagnostic observations only, intervention not indicated  Ataxia: Asymptomatic, clinical or diagnostic observations only, intervention not indicated  Peripheral Sensory Neuropathy: Asymptomatic, loss of deep tendon reflexes or paresthesia (feet and hands, feet worse)  Eye   Eye Disorder (WDL): All eye  disorder elements are within defined limits  Ear  Ear Disorder (WDL): All ear disorder elements are within defined limits  Cardiovascular  Cardiovascular (WDL): All cardiovascular elements are within defined limits  Pulmonary  Respiratory (WDL): Within Defined Limits  Gastrointestinal  Gastrointestinal (WDL): Exceptions to WDL  Dry Mouth: Symptomatic (e.g., dry or thick saliva) without significant dietary alteration, unstimulated saliva flow >0.2 ml/min  Genitourinary  Genitourinary (WDL): All genitourinary elements are within defined limits  Lymphatic  Lymph (WDL): All lymph disorder elements are within defined limits  Musculoskeletal and Connective Tissue  Musculoskeletal and Connetive Tissue Disorders (WDL): Exceptions to WDL  Arthralgia: Mild pain (various joints)  Integumentary  Integumentary (WDL): Exceptions to WDL  Rash Maculo-Papular: Macules/papules covering <10% BSA with or without symptoms (e.g., pruritus, burning, tightness) (bad reactions to bug bites)  Pruritus: Mild or localized, topical intervention indicated  Patient Coping  Patient Coping: JaelynDoing okay  Distress Assessment  Distress Assessment Score: 8  Accompanied by  Accompanied by: Alone  Oral Chemo Adherence       Past History  Past Medical History:   Diagnosis Date     Anemia      Chronic kidney disease     elevated creatinine due to chemo     History of transfusion      Hypothyroid      Mantle cell lymphoma (H)     stage 4     Sleep apnea     uses cpap     Thrombocytopenia (H)        PHYSICAL EXAM:  /80  Pulse 69  Temp 98.1  F (36.7  C) (Oral)   Wt (!) 279 lb 8 oz (126.8 kg)  BMI 43.78 kg/m2  GENERAL: no acute distress. Cooperative in conversation. Here alone.   HEENT: pupils are equal, round and reactive. Oromucosa is clean and intact. No ulcerations or mucositis noted. No bleeding noted.  RESP: lungs are clear bilaterally per auscultation. Regular respiratory rate. No wheezes or rhonchi.  CV: Regular, rate and rhythm. No  murmurs.  ABD: soft, nontender. Positive bowel sounds. No organomegaly.   MUSCULOSKELETAL: No lower extremity swelling.   NEURO: non focal. Alert and oriented x3.   PSYCH: pt crying and emotional today  SKIN: warm dry. Has red welts and bug bites all over her skin  LYMPH: no cervical, supraclavicular or axillary lymphadenopathy.  Does have some tenderness upon palpation in the right axillary area but no lymphadenopathy appreciated          Lab Results    Recent Results (from the past 168 hour(s))   Immunoglobulins, Quantitative   Result Value Ref Range    Immunoglobulin G 258 (L) 700 - 1700 mg/dL    Immunoglobulin M <5 (L) 60 - 280 mg/dL    Immunoglobulin A 18 (L) 65 - 400 mg/dL   TSH   Result Value Ref Range    TSH 1.36 0.30 - 5.00 uIU/mL   HM1 (CBC with Diff)   Result Value Ref Range    WBC 5.3 4.0 - 11.0 thou/uL    RBC 4.44 3.80 - 5.40 mill/uL    Hemoglobin 13.3 12.0 - 16.0 g/dL    Hematocrit 41.0 35.0 - 47.0 %    MCV 92 80 - 100 fL    MCH 30.0 27.0 - 34.0 pg    MCHC 32.4 32.0 - 36.0 g/dL    RDW 14.5 11.0 - 14.5 %    Platelets 140 140 - 440 thou/uL    MPV 9.8 8.5 - 12.5 fL    Neutrophils % 64 50 - 70 %    Lymphocytes % 21 20 - 40 %    Monocytes % 9 2 - 10 %    Eosinophils % 6 0 - 6 %    Basophils % 1 0 - 2 %    Neutrophils Absolute 3.4 2.0 - 7.7 thou/uL    Lymphocytes Absolute 1.1 0.8 - 4.4 thou/uL    Monocytes Absolute 0.5 0.0 - 0.9 thou/uL    Eosinophils Absolute 0.3 0.0 - 0.4 thou/uL    Basophils Absolute 0.0 0.0 - 0.2 thou/uL   [facility]     Imaging    No results found.      Signed by: Nanda Olivares, CNP

## 2021-06-20 NOTE — PROGRESS NOTES
"Pt arrived ambulatory from the 2nd floor Cancer Care at 11:00, and was seated in chair 2. Reviewed plan of care. Administered premeds slowly - oral acetaminophen, famotidine 20 mg IVP, solumedrol 80 mg IVP, and benadryl 12.5 mg IVP - flushed with NS between each medication.     The Igg was started at 12:35 at 35 mls/hour x 15 minutes, then 70 mls/hour -- At 13:00 pt c/o \"heavier breathing, claustrophobic\" and was flushed - the Igg was promptly stopped at 13:00 - 19 mls had infused. Gave rescue meds of  sloumedrol 100 mg and famotidine 20 mg, both IV push with a running IV of NS. Called and discussed with SETH Olivares CNP at 13:25 - Igg was restarted. At 13:25 started the IgG at 20mls/hour x 15 minutes, 40mls/hour x 15 minutes, 60 mls/hour x 15 minutes, 80 mls/hour x 15 minutes, 100 mls/hour x 15 minutes, 125 mls/hour x 15 minutes; 150 mls/hour x 30 minutest, 175 mls/hour x 20 minutes, 200 mls/hour till complete at 16:20. (The Igg infusion was paused for 25 minutes, total infusion time 3 hours 20 minutes.) At completion the port a cath was flushed, heparinized, and then deaccessed - covered site with folded 2x2s and paper tape. Post infusion AVS and a copy of lab results was given and explained to the patient. Pt left unit ambulatory in stable condition 16:50, and plans to return on October 15th.    Cristal Doherty RN  "

## 2021-06-21 NOTE — PROGRESS NOTES
Pt arrived amb for her IV IgG, Pt asking about getting the flu shot and a hep a shot because she is going [place] a [place]. Dr Lezama notified, ok to give fue shot, but have her check with her Primary MD regarding the Hep a shot, Portacath accessed with good blood return, All pre med's given slowly with flushing of NS in between, Pt had just min. restless legs from benadryl, IV IgG started 1/2ghr after the last pre med, Started the infusion at 20 ml per hour on up to 200 ml adolfo well over 3 hours, Iv flushed ater, Went over flu questions, no neuro problems, no guillain barre syndrome, eats eggs and has had a flu shot every year, but a allergist said she levels are high with eggs. Flu shot given in lt deltoid, Adolfo well, observed the pt for 10min after feeling well, IV was flushed with ns then heparin, Needle dcd after, and pt left amb at 1435 rtc 4 weeks  Mago Belcher

## 2021-06-21 NOTE — PROGRESS NOTES
Avis Paul, 53 y.o., female arrived at 1000 A&Ox4, ambulatory, and stable, confirms she is here for her scheduled IGG. Patient stated she has sinus congestion; possibly a sinus infection (however, has not seen physician and this was not diagnosed). Vital signs normal and afebrile. Dr. Starks notified and stated to proceed with treatment today.   Plan of care and medication education reviewed, Pt stated understanding and agreed to plan.      Venous port accessed with great blood return. Easily flushed with NS. Avis Paul given Benadryl, Pepcid, Methylprednisolone and Tylenol premedications. Due to previous reaction, IGG infused at 20mls/hour x 15 minutes, 40mls/hour x 15 minutes, 60 mls/hour x 15 minutes, 80 mls/hour x 15 minutes, 100 mls/hour x 15 minutes, 125 mls/hour x 15 minutes; 150 mls/hour x 30 minutest, 175 mls/hour x 20 minutes, and then 200 mls/hour until complete. Patient tolerated infusion without signs or symptoms of adverse reaction. Line flushed with NS. Port flushed NS 20mL, 600 units heparin and deaccessed. VSS throughout treatment.     Avis Paul was able to eat lunch and rest during her infusion today.  Discharged at 1530 A&Ox4, ambulatory, and stable. AVS given.

## 2021-06-22 NOTE — PROGRESS NOTES
Patient seen in clinic today for follow-up of mantle cell lymphoma of intra-abdominal lymph nodes.

## 2021-06-22 NOTE — PROGRESS NOTES
Avis Paul arrived for premedication and IV IGG infusion. Lab results from 12/17/18 were  Reviewed, IGG level 737. Avis was happy about that. She is thinking about possibly wanting to do sub q IV IGG.  Avis Paul was educated on her plan of care. Each medication given today was reviewed prior to administration. Premedications (Tylenol po, Famotidine, Methylprednisolone and Benadryl IV) and IV IGG  35 gram infusion treatment was completed with no signs of reaction. IV site:Venous port patent throughout treatment with positive blood return obtained before and at completion. IV site with no pain, redness, swelling and drainage. IV site flushed with saline and heparin and deaccessed. Avis Paul has follow-up appointment scheduled on 01/14/2019. Avis Paul was discharged to home. She discharged ambulatory and independent from unit at approx 1515. The after visit summary was declined.     Katarzyna Cronin

## 2021-06-22 NOTE — PROGRESS NOTES
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  Will do follow-up visit again in 3 months.  Will plan repeat CT scans in June or July 2019.    She restarted IVIG infusions in September as IgG level had dropped below 300.  She is tolerating the infusions fine.  She is receiving them every 4 weeks.  She has only required antibiotics once in the last 3 months with Levaquin for sinus infection in late November 2018.  We will plan to check chemistry profile every 4 weeks and quantitative immunoglobulins about every 3 months.    She did see the allergist and is felt to have chronic urticaria explaining her skin lesions.    Plan: Continue observation for lymphoma  Continue IVIG infusion every 4 weeks with labs  Follow-up in about 3 months      Measurable disease: Patient currently in remission    Current therapy:   IVIG resumed in January 2017 and stopped in May 2018; zoomed again in September 2018  Maintenance Rituxan started December 2015, and completed August 2017, 8 doses  Acyclovir 400 mg twice daily  IVIG every 3 months, first dose August 29, 2016(currently on hold, last dose November 2016)   IVIG every 4 weeks restarted in June 2017 and stopped in May 2018      Treatment history:  First set of immunizations received at the [facility] in August 2016  First dose of IVIG  Patient completed 1 year of inhalational pentamidine  Autologous stem cell transplant with Beam prep, August 18, 2015  One cycle of R-ICE, Naye 15, 2015, ifosfamide and etoposide reduced by 25%  because of renal dysfunction  Ibrutinib since October 10, 2014, current dose of 420 mg by mouth daily, stopped June 9, 2015   3 cycles of R-DHAP completed late August 2014   5 cycles of R CHOP, started after diagnosis in March 2014       ECOG Performance   ECOG Performance Status: 1    Distress Assessment  Distress Assessment Score: 6    Pain  Pain Score (Initial OR Reassessment): 5        Problem List    1. Hypogammaglobulinemia, acquired (H)  HM1(CBC and Differential)    Comprehensive Metabolic Panel    Immunoglobulins, Quantitative   2. 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  She was seen 3 months ago.  She restarted IVIG at that time.  She did meet with the allergist and was felt to have chronic urticaria explaining her skin symptoms.  Describes some fatigue.  Treated with Levaquin for 10 days in late November for sinus infection.  No other new symptoms or problems.  ECOG status is 0.    January 2018:   Ms. Avis Paul returns for a follow-up.  She was here for IVIG infusion yesterday and raise concerns about symptoms suggesting recurrence of lymphoma.  She had CT scans and is here to review results.  Has been having aches all over and increased fatigue and weight gain.  Describes some numbness in the right thigh area.  Also has been having memory issues since she received chemotherapy.  Notes an area of induration in the mouth on the lower right mandibular/jaw area.        November 2017:   She was seen 3 months ago.  She did have a visit shortly thereafter at the [facility] with bone marrow and CT scan showing that she is in complete remission from her mantle cell lymphoma.  She has continued IVIG infusions monthly  between  - 2017.  She was informed recently that the insurance would not cover this.  We had previously got this approved prior to starting infusions in 2017.    She is currently having sinus symptoms and cough productive of greenish phlegm.  No other infection issues in the last 3 months.  She is in significant distress because of the insurance issues.    No other new symptoms or problems.  ECOG status is 0.    2017:   She was seen 4 weeks ago.  She has received 2 doses of IVIG.  No infusion reaction with the last one.  Her last infection was a sinus infection more than 4 weeks ago.  She has some discomfort in the right ear.  No fever or mild sores.  No shortness of breath or cough.  No new adenopathy.  She has been starting to have some loose bowel moments 2-3 times a day in the last 3 weeks.  No other new problems.    Pain Status  Currently in Pain: Yes    Review of Systems    Constitutional  Constitutional (WDL): Exceptions to WDL  Fatigue: Fatigue not relieved by rest, limiting self care ADL  Neurosensory  Neurosensory (WDL): Exceptions to WDL  Ataxia: Asymptomatic, clinical or diagnostic observations only, intervention not indicated  Peripheral Sensory Neuropathy: Moderate symptoms, limiting instrumental ADL(Feet.)  Cardiovascular  Cardiovascular (WDL): All cardiovascular elements are within defined limits  Pulmonary  Respiratory (WDL): Exceptions to WDL(Recent bronchitis and sinus infection. )  Cough: Mild symptoms, nonprescription intervention indicated  Dyspnea: Shortness of breath with moderate exertion  Gastrointestinal  Nausea: Loss of appetite without alteration in eating habits  Vomitin - 2 episodes ( by 5 minutes) in 24 hrs  Genitourinary  Genitourinary (WDL): All genitourinary elements are within defined limits  Integumentary  Integumentary (WDL): Exceptions to WDL(Rt heel blister; almost all healed. )  Pruritus: Mild or localized, topical intervention  indicated  Patient Coping  Patient Coping: Accepting  Distress Assessment  Distress Assessment Score: 6  Accompanied by  Accompanied by: Alone    Past History  Past Medical History:   Diagnosis Date     Anemia      Chronic kidney disease     elevated creatinine due to chemo     History of transfusion      Hypothyroid      Mantle cell lymphoma (H)     stage 4     Sleep apnea     uses cpap     Thrombocytopenia (H)          Past Surgical History:   Procedure Laterality Date     ANTERIOR / POSTERIOR COMBINED FUSION CERVICAL SPINE  2011    C2-C7     CARPAL TUNNEL RELEASE Right 2000     CERVICAL FUSION  2004    C4, C5, C6.  Pt. states surgery x 2     KIDNEY STONE SURGERY  1999    removal     MN BX/REMV,LYMPH NODE,DEEP AXILL Right 6/3/2015    Procedure: RIGHT INGUINAL LYMPH NODE BIOPSY;  Surgeon: Clayton Burgos MD;  Location: [facility];  Service: General     TUBAL LIGATION  2004       Physical Exam    Recent Vitals 12/17/2018   Weight 279 lbs 5 oz   BSA (m2) 2.45 m2   /93   Pulse 62   Temp 97.6   Temp src 1   SpO2 100   Some recent data might be hidden       GENERAL: Alert and oriented. Seated comfortably. In no distress.    HEAD: Atraumatic and normocephalic.  Has a full head of hair.    EYES: ROMEO, EOMI.  No pallor.  No icterus.    Oral cavity: no mucosal lesion or tonsillar enlargement.  Induration right mandibular lower jaw area    NECK: supple. JVP normal.  No thyroid enlargement.    LYMPH NODES: No palpable, cervical, axillary or inguinal lymphadenopathy.    CHEST: She has slight bilateral wheezing noted  Resonant to percussion throughout bilaterally.  Symmetrical breath movements bilaterally.    CVS: S1 and S2 are heard. Regular rate and rhythm.  No murmur or gallop or rub heard.    ABDOMEN: Soft. Not tender. Not distended.  No palpable hepatomegaly or splenomegaly.  No other mass palpable.  Bowel sounds heard.    EXTREMITIES: Warm.  No peripheral edema.    SKIN: no rash, or bruising or  purpura.    CNS: Nonfocal        Lab Results    No results found for this or any previous visit (from the past 168 hour(s)).    Imaging    No results found.      Signed by: Yelena Starks MD

## 2021-06-23 NOTE — PROGRESS NOTES
Avis Paul arrived for premedications, and IV IGG  infusion. Avis reports the numbness in her feet has gotten worse. Labs drawn from port after waste and lab results were reviewed. Avis Paul was educated on her plan of care. Each medication given today was reviewed prior to administration. Avis was premedicated with oral Acetaminophen, IV Methylprednisolone, Famotidine and Diphenhydramine. IV IGG 35 gram IV infusion treatment was completed with no signs of reaction. IV site:Venous port patent throughout treatment with positive blood return obtained before and at completion. IV site with no pain, redness, swelling and drainage. IV site flushed with saline and heparin and deaccessed. Avis Paul has follow-up appointment scheduled on 02/11/2019. Avis Paul was discharged to home. She discharged from unit ambulatory and independent at 1445. The after visit summary was given.     Katarzyna Cronin

## 2021-06-24 NOTE — PROGRESS NOTES
"Avis Paul arrived for labs, pre meds and IV IGG infusion. She reports she was ill last weekend with fevers, chills and a cough. She is feeling a little better the past 2 days. Labs drawn and results were reviewed. She reports her renal function is elevated because treatment \"fried\" her kidneys. Avis Paul was educated on her plan of care. Each medication given today was reviewed prior to administration. She was premedicated with IV Methylprednisolone, Diphenhydramine, Famotidine and oral Acetaminophen. IV IGG 35 gram infusion treatment was completed with no signs of reaction. IV site:Venous port patent throughout treatment with positive blood return obtained before and at completion. IV site with no pain, redness, swelling and drainage. IV site flushed with saline and heparin and deaccessed. Avis Paul has follow-up appointment scheduled on 03/11/2019. Avis Paul was discharged to home. She discharged from unit ambulatory and independent at 1443. The after visit summary was given.     Katarzyna Cronin    "

## 2021-06-24 NOTE — PROGRESS NOTES
Avis Paul arrived for premedications and IV IGG infusion. Labs drawn and results were reviewed. IGG level 812 today. Avis Paul was educated on her plan of care. Avis was premedicated with Acetaminophen, Famotidine, Solu-cortef and Diphenhydramine. Nanda Olivares CNP was notified of the premedications that were administered. IV IGG 35 gram infusion treatment was completed with no signs of reaction. IV site:Venous port patent throughout treatment with positive blood return obtained before and at completion. IV site with no pain, redness, swelling and drainage. IV site flushed with saline and heparin and deaccessed. Avis Paul has follow-up appointment scheduled on 04/08/2019. Avis Paul was discharged to home. She discharged from unit ambulatory and independent at 1530. The after visit summary was declined. Avis had questions about IV IGG and a message was received from [facility]. Avis was informed that the IV IGG infusion treatments may be something long term. That if stopped her IGG levels may drop. Dr. Starks thinks she needs to speak with an immunologist and he has recommended [facility].    Katarzyna Cronin

## 2021-06-24 NOTE — PROGRESS NOTES
[facility] Hematology and Oncology Progress Note    Patient: Avis Paul  MRN: [id number]  Date of Service: 03/11/19          Reason for Visit    Chief Complaint   Patient presents with     Benign Hematology     Hypogammaglobulinemia, acquired       Assessment and Plan  1. Mantle cell lymphoma status post autologous transplant, August 18, 2015: Then completed 2 years of maintenance Rituxan therapy.  She is currently on observation.  She did have a CT scan done in July and that showed no evidence of disease recurrence.  Dr. Starks will likely do annual scans at this point.  She will return in 3 months to see Dr. Starks with scan. Encourage patient to call us if she has any lymphadenopathy that she notices clinically before then.    2. Hypogammaglobulinemia with multiple recurrent infections: Held IVIG from May until September. Her IgG levels dropped significantly so she was restarted.   She will get it every 4 weeks.  She will see Dr. Starks in 3 months and we will recheck her IVIG level at that time.  If the subcutaneous at home IVIG would work for her.  I did tell her that she probably need to see an immunologist to discuss that.  I be quite reluctant to do that since she is having allergic reactions to the IV stuff but I am not clear if there is a correlation.      ECOG Performance   ECOG Performance Status: 1     Distress Assessment  Distress Assessment Score: 4(life): Has seen psychology in the past.  Continue to work with them if needed.  Encourage her to call us if this gets worse    Pain  Currently in Pain: Yes  Pain Score (Initial OR Reassessment): 4  Location: every where; worse when at rest and go to get up      Problem List    1. Hypogammaglobulinemia, acquired (H)  Immunoglobulins, Quantitative   2. Mantle cell lymphoma of lymph nodes of multiple regions (H)  CT Chest Abdomen Pelvis Without Oral Without IV Contrast       ______________________________________________________________________________    History of Present Illness    Measurable disease: CT scan, PET, Patient currently in remission     Current therapy: Maintenance Rituxan started December 2015 completed August 2017.  8 doses total.  Acyclovir 800 mg twice daily  IVIG: did get three doses at the [facility] on August 29, 2016,  last dose November 2016.   She was on hold due to insurance issues, but then in June has restarted and is getting monthly.       Treatment history:  First set of immunizations received at the [facility] in August 2016  First dose of IVIG  Patient completed 1 year of inhalational pentamidine  Autologous stem cell transplant with Beam prep, August 18, 2015  One cycle of R-ICE, Naye 15, 2015, ifosfamide and etoposide reduced by 25% because of renal dysfunction  Ibrutinib since October 10, 2014, current dose of 420 mg by mouth daily, stopped June 9, 2015   3 cycles of R-DHAP completed late August 2014   5 cycles of R CHOP, started after diagnosis in March 2014     Interim History:   Pt is here today for follow-up visit.  To needs to do okay.  She has chronic arthritic pain in multiple areas of her body.  She says she initially feels a little bit better after she gets her injections because she gets a steroids but then the 2 weeks before she comes back she feels pretty awful.  She denies any recent infections but she stated she did have a pretty good upper respiratory infection in February.  Her last infection before that was in November.  I believe she got antibiotics for both of those.  Other than that she does has questions about how long she is on the IVIG and how often we need to test the IgG levels.  She also wants to know if she could potentially be a candidate to do it at home subcutaneously    Pain Status  Currently in Pain: Yes    Review of Systems    Constitutional  Constitutional (WDL): Exceptions to WDL  Fatigue: Concerns  Fever: No  Concerns  Chills: No Concerns  Weight Gain: Concerns(up 6 pounds since 2/15)  Weight Loss: No Concerns  Hot flashes/Night Sweats: Concerns(night sweats)  Neurosensory  Neurosensory (WDL): Exceptions to WDL  Peripheral Motor Neuropathy: Concerns  Ataxia: Concerns  Peripheral Sensory Neuropathy: No Concerns  Confusion: No Concerns  Dizziness: No Concerns  Syncope: No Concerns  Eye   Eye Disorder (WDL): All eye disorder elements are within defined limits(glasses)  Blurred Vision: No Concerns  Dry Eye: No Concerns  Eye Pain: No Concerns  Watering Eyes: No Concerns  Ear  Ear Disorder (WDL): Exceptions to WDL  Ear Pain: No Concerns  Tinnitus: Concerns(popping in right ear)  Cardiovascular  Cardiovascular (WDL): All cardiovascular elements are within defined limits  Palpitations: No Concerns  Edema: No Concerns  SVT, DVT/PE: No Concerns  Chest Pain - Cardiac: No Concerns  Pulmonary  Respiratory (WDL): Within Defined Limits  Cough: No Concerns  Dyspnea: No Concerns  Hypoxia: No Concerns  Gastrointestinal  Gastrointestinal (WDL): All gastrointestinal elements are within defined limits  Anorexia: No Concerns  Nausea: No Concerns  Vomiting: No Concerns  Dehydration: No Concerns  Dysgeusia: No Concerns  Dysphagia: No Concerns  Mucositis Oral: No Concerns  Esophagitis: No Concerns  Constipation: No Concerns  Diarrhea: No Concerns  Pharyngitis: No Concerns  Dry Mouth: No Concerns  Genitourinary  Genitourinary (WDL): All genitourinary elements are within defined limits  Urinary Frequency: No Concerns  Urinary Retention: No Concerns  Urinary Tract Pain: No Concerns  Lymphatic  Lymph (WDL): All lymph disorder elements are within defined limits  Lymphedema: No Concerns  Lymph Node Discomfort: No Concerns  Musculoskeletal and Connective Tissue  Musculoskeletal and Connetive Tissue Disorders (WDL): Exceptions to WDL  Arthralgia: Concerns(generalized all the time)  Bone Pain: No Concerns  Muscle Weakness : Concerns  Myalgia: No  Concerns  Integumentary  Integumentary (WDL): All integumentary elements are within defined limits  Alopecia: No Concerns  Rash Maculo-Papular: No Concerns  Pruritus: No Concerns  Urticaria: No Concerns  Palmar-Plantar Erythrodysesthesia Syndrome: No Concerns  Flushing: No Concerns  Patient Coping  Patient Coping: Accepting  Accompanied by  Accompanied by: Alone  Oral Chemo Adherence         Past History  Past Medical History:   Diagnosis Date     Anemia      Chronic kidney disease     elevated creatinine due to chemo     History of transfusion      Hypothyroid      Mantle cell lymphoma (H)     stage 4     Sleep apnea     uses cpap     Thrombocytopenia (H)        PHYSICAL EXAM:  /64   Pulse 74   Temp 97.8  F (36.6  C) (Oral)   Wt (!) 284 lb 3.2 oz (128.9 kg)   SpO2 99%   BMI 44.50 kg/m    GENERAL: no acute distress. Cooperative in conversation. Here alone.   HEENT: pupils are equal, round and reactive. Oromucosa is clean and intact. No ulcerations or mucositis noted. No bleeding noted.  RESP: lungs are clear bilaterally per auscultation. Regular respiratory rate. No wheezes or rhonchi.  CV: Regular, rate and rhythm. No murmurs.  ABD: soft, nontender. Positive bowel sounds. No organomegaly.   MUSCULOSKELETAL: No lower extremity swelling.   NEURO: non focal. Alert and oriented x3.   PSYCH: pt crying and emotional today  SKIN: warm dry. Has red welts and bug bites all over her skin  LYMPH: no cervical, supraclavicular or axillary lymphadenopathy.     Lab Results    Recent Results (from the past 168 hour(s))   Comprehensive Metabolic Panel   Result Value Ref Range    Sodium 140 136 - 145 mmol/L    Potassium 4.4 3.5 - 5.0 mmol/L    Chloride 106 98 - 107 mmol/L    CO2 26 22 - 31 mmol/L    Anion Gap, Calculation 8 5 - 18 mmol/L    Glucose 100 70 - 125 mg/dL    BUN 23 (H) 8 - 22 mg/dL    Creatinine 1.92 (H) 0.60 - 1.10 mg/dL    GFR MDRD Af Amer 33 (L) >60 mL/min/1.73m2    GFR MDRD Non Af Amer 27 (L) >60  mL/min/1.73m2    Bilirubin, Total 0.4 0.0 - 1.0 mg/dL    Calcium 9.1 8.5 - 10.5 mg/dL    Protein, Total 6.4 6.0 - 8.0 g/dL    Albumin 3.6 3.5 - 5.0 g/dL    Alkaline Phosphatase 106 45 - 120 U/L    AST 18 0 - 40 U/L    ALT 17 0 - 45 U/L     Lab Results   Component Value Date     (L) 01/15/2019     12/17/2018     (L) 09/17/2018     (L) 07/17/2018     (L) 06/06/2018     05/09/2018     02/09/2018     (L) 12/08/2017     11/06/2017     (L) 07/20/2017   ]  Imaging    No results found.      Signed by: Nanda Olivares, CNP

## 2021-06-26 DIAGNOSIS — D80.1 HYPOGAMMAGLOBULINEMIA (H): Primary | ICD-10-CM

## 2021-06-26 RX ORDER — DIPHENHYDRAMINE HYDROCHLORIDE 50 MG/ML
50 INJECTION INTRAMUSCULAR; INTRAVENOUS
Status: CANCELLED
Start: 2021-08-05

## 2021-06-26 RX ORDER — DIPHENHYDRAMINE HCL 25 MG
12.5 TABLET ORAL ONCE
Status: CANCELLED
Start: 2021-08-05

## 2021-06-26 RX ORDER — ALBUTEROL SULFATE 90 UG/1
1-2 AEROSOL, METERED RESPIRATORY (INHALATION)
Status: CANCELLED
Start: 2021-08-05

## 2021-06-26 RX ORDER — METHYLPREDNISOLONE SODIUM SUCCINATE 125 MG/2ML
125 INJECTION, POWDER, LYOPHILIZED, FOR SOLUTION INTRAMUSCULAR; INTRAVENOUS ONCE
Status: CANCELLED
Start: 2021-08-05 | End: 2021-08-05

## 2021-06-26 RX ORDER — DIPHENHYDRAMINE HCL 50 MG
50 CAPSULE ORAL ONCE
Status: CANCELLED
Start: 2021-08-05

## 2021-06-26 RX ORDER — ALBUTEROL SULFATE 0.83 MG/ML
2.5 SOLUTION RESPIRATORY (INHALATION)
Status: CANCELLED | OUTPATIENT
Start: 2021-08-05

## 2021-06-26 RX ORDER — ACETAMINOPHEN 325 MG/1
650 TABLET ORAL ONCE
Status: CANCELLED
Start: 2021-08-05

## 2021-06-26 RX ORDER — MEPERIDINE HYDROCHLORIDE 25 MG/ML
25 INJECTION INTRAMUSCULAR; INTRAVENOUS; SUBCUTANEOUS EVERY 30 MIN PRN
Status: CANCELLED | OUTPATIENT
Start: 2021-08-05

## 2021-06-26 RX ORDER — METHYLPREDNISOLONE SODIUM SUCCINATE 125 MG/2ML
125 INJECTION, POWDER, LYOPHILIZED, FOR SOLUTION INTRAMUSCULAR; INTRAVENOUS
Status: CANCELLED
Start: 2021-08-05

## 2021-06-26 RX ORDER — NALOXONE HYDROCHLORIDE 0.4 MG/ML
0.2 INJECTION, SOLUTION INTRAMUSCULAR; INTRAVENOUS; SUBCUTANEOUS
Status: CANCELLED | OUTPATIENT
Start: 2021-08-05

## 2021-06-26 RX ORDER — HEPARIN SODIUM (PORCINE) LOCK FLUSH IV SOLN 100 UNIT/ML 100 UNIT/ML
5 SOLUTION INTRAVENOUS
Status: CANCELLED | OUTPATIENT
Start: 2021-08-05

## 2021-06-26 RX ORDER — HEPARIN SODIUM,PORCINE 10 UNIT/ML
5 VIAL (ML) INTRAVENOUS
Status: CANCELLED | OUTPATIENT
Start: 2021-08-05

## 2021-06-26 RX ORDER — EPINEPHRINE 1 MG/ML
0.3 INJECTION, SOLUTION, CONCENTRATE INTRAVENOUS EVERY 5 MIN PRN
Status: CANCELLED | OUTPATIENT
Start: 2021-08-05

## 2021-06-26 NOTE — PROGRESS NOTES
"Arrived ambulatory with use of 1 crutch at 09:45, seated in chair 3. Accessed port a cath per sterile technique and obtained an excellent blood return. NS was used as the primary IV solution. Administered premeds - oral acetaminophen, famotidine 20 mg IVP, solu medrol 125 mg IVP, and benadryl 12.5 mg. The IgG 25 gm infusion was started at 11:10, infused over 3 hours. At completion the port a cath was flushed, heparinized, and deaccessed - covered the site with folded 2x2s and paper tape. VSS. No ill effects noted. Patient had left knee surgery Thursday June 3rd, \"for a torn meniscus.\" Resting in the recliner with legs elevated, pillow and ice under the left knee.     Left the unit ambulatory and in stable condition at 14:40, and plans to return July 7th. Instructed to call with questions or concerns.    Cristal Doherty RN  "

## 2021-07-03 NOTE — ADDENDUM NOTE
Addendum Note by Nichol Mathews PharmD at 2/14/2019  9:30 AM     Author: Nichol Mathews PharmD Service: -- Author Type: Pharmacist    Filed: 7/1/2019  8:07 AM Encounter Date: 2/14/2019 Status: Signed    : Nichol Mathews PharmD (Pharmacist)    Addended by: NICHOL MATHEWS on: 7/1/2019 08:07 AM        Modules accepted: Orders

## 2021-07-03 NOTE — ADDENDUM NOTE
Addendum Note by Juanita Ernst CMA at 6/16/2020 12:45 PM     Author: Juanita Ernst CMA Service: -- Author Type: Medical Assistant    Filed: 6/17/2020 11:27 AM Encounter Date: 6/16/2020 Status: Signed    : Juanita Ernst CMA (Medical Assistant)    Addended by: JUANITA ERNST on: 6/17/2020 11:27 AM        Modules accepted: Orders

## 2021-07-03 NOTE — ADDENDUM NOTE
Addendum Note by Zita Yen PharmD at 6/26/2017  2:05 PM     Author: Zita Yen PharmD Service: -- Author Type: Pharmacist    Filed: 6/26/2017  2:05 PM Encounter Date: 6/23/2017 Status: Signed    : Zita Yen PharmD (Pharmacist)    Addended by: ZITA YEN on: 6/26/2017 02:05 PM        Modules accepted: Orders

## 2021-07-03 NOTE — ADDENDUM NOTE
Addendum Note by Catalina Pascal MD at 6/17/2020 11:34 AM     Author: Catalina Pascal MD Service: -- Author Type: Physician    Filed: 7/30/2020  4:15 PM Encounter Date: 6/17/2020 Status: Signed    : Catalina Pascal MD (Physician)    Addended by: CATALINA PASCAL on: 7/30/2020 04:15 PM        Modules accepted: Orders

## 2021-07-03 NOTE — ADDENDUM NOTE
Addendum Note by Carissa Lemus CMA at 11/19/2019 10:30 AM     Author: Carissa Lemus CMA Service: -- Author Type: Certified Medical Assistant    Filed: 11/19/2019 12:16 PM Encounter Date: 11/19/2019 Status: Signed    : Carissa Lemus CMA (Certified Medical Assistant)    Addended by: CARISSA LEMUS on: 11/19/2019 12:16 PM        Modules accepted: Orders

## 2021-07-06 VITALS
HEART RATE: 72 BPM | BODY MASS INDEX: 45.29 KG/M2 | WEIGHT: 291.3 LBS | RESPIRATION RATE: 18 BRPM | DIASTOLIC BLOOD PRESSURE: 81 MMHG | OXYGEN SATURATION: 96 % | SYSTOLIC BLOOD PRESSURE: 131 MMHG | TEMPERATURE: 98.2 F

## 2021-07-08 ENCOUNTER — OFFICE VISIT (OUTPATIENT)
Dept: OBGYN | Facility: CLINIC | Age: 56
End: 2021-07-08
Payer: COMMERCIAL

## 2021-07-08 VITALS
HEART RATE: 68 BPM | SYSTOLIC BLOOD PRESSURE: 128 MMHG | HEIGHT: 68 IN | BODY MASS INDEX: 44.25 KG/M2 | TEMPERATURE: 97.6 F | DIASTOLIC BLOOD PRESSURE: 70 MMHG | RESPIRATION RATE: 18 BRPM | WEIGHT: 292 LBS

## 2021-07-08 DIAGNOSIS — Z00.00 ROUTINE GENERAL MEDICAL EXAMINATION AT A HEALTH CARE FACILITY: Primary | ICD-10-CM

## 2021-07-08 DIAGNOSIS — B96.89 BV (BACTERIAL VAGINOSIS): ICD-10-CM

## 2021-07-08 DIAGNOSIS — Z12.31 ENCOUNTER FOR SCREENING MAMMOGRAM FOR BREAST CANCER: ICD-10-CM

## 2021-07-08 DIAGNOSIS — N76.0 BV (BACTERIAL VAGINOSIS): ICD-10-CM

## 2021-07-08 DIAGNOSIS — Z12.4 SCREENING FOR MALIGNANT NEOPLASM OF CERVIX: ICD-10-CM

## 2021-07-08 LAB
SPECIMEN SOURCE: ABNORMAL
WET PREP SPEC: ABNORMAL

## 2021-07-08 PROCEDURE — 99386 PREV VISIT NEW AGE 40-64: CPT | Performed by: OBSTETRICS & GYNECOLOGY

## 2021-07-08 PROCEDURE — 87624 HPV HI-RISK TYP POOLED RSLT: CPT | Performed by: OBSTETRICS & GYNECOLOGY

## 2021-07-08 PROCEDURE — 87210 SMEAR WET MOUNT SALINE/INK: CPT | Performed by: OBSTETRICS & GYNECOLOGY

## 2021-07-08 PROCEDURE — G0145 SCR C/V CYTO,THINLAYER,RESCR: HCPCS | Performed by: OBSTETRICS & GYNECOLOGY

## 2021-07-08 RX ORDER — METRONIDAZOLE 500 MG/1
500 TABLET ORAL 2 TIMES DAILY
Qty: 14 TABLET | Refills: 0 | Status: SHIPPED | OUTPATIENT
Start: 2021-07-08 | End: 2021-07-15

## 2021-07-08 ASSESSMENT — MIFFLIN-ST. JEOR: SCORE: 1955.06
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
## 2021-07-08 NOTE — NURSING NOTE
"Initial /70 (BP Location: Other (Comment), Patient Position: Chair, Cuff Size: Adult Large)   Pulse 68   Temp 97.6  F (36.4  C) (Tympanic)   Resp 18   Ht 1.715 m (5' 7.5\")   Wt 132.5 kg (292 lb)   Breastfeeding No   BMI 45.06 kg/m   Estimated body mass index is 45.06 kg/m  as calculated from the following:    Height as of this encounter: 1.715 m (5' 7.5\").    Weight as of this encounter: 132.5 kg (292 lb). .      "

## 2021-07-10 LAB
COPATH REPORT: NORMAL
PAP: NORMAL

## 2021-07-13 LAB
FINAL DIAGNOSIS: NORMAL
HPV HR 12 DNA CVX QL NAA+PROBE: NEGATIVE
HPV16 DNA SPEC QL NAA+PROBE: NEGATIVE
HPV18 DNA SPEC QL NAA+PROBE: NEGATIVE
SPECIMEN DESCRIPTION: NORMAL
SPECIMEN SOURCE CVX/VAG CYTO: NORMAL

## 2021-07-27 ENCOUNTER — TELEPHONE (OUTPATIENT)
Dept: OBGYN | Facility: CLINIC | Age: 56
End: 2021-07-27

## 2021-07-27 NOTE — TELEPHONE ENCOUNTER
Reason for Call:  Other     Detailed comments: Pt was seen on 07/08 and diagnosed with a bacterial infection - started on an antibiotic - finished 7-day course. Symptoms went away but returned 2 days after being off antibiotic. Pt is requesting another prescription and is also asking if the first prescription should have been sent in for 14 days - Please advise    PHARMACY:  [facility]     Phone Number Patient can be reached at: Home number on file [phone] (home)    Best Time: Any    Can we leave a detailed message on this number? YES    Call taken on 7/27/2021 at 12:47 PM by Denise Behrendt

## 2021-08-05 ENCOUNTER — ONCOLOGY VISIT (OUTPATIENT)
Dept: ONCOLOGY | Facility: HOSPITAL | Age: 56
End: 2021-08-05
Attending: INTERNAL MEDICINE
Payer: COMMERCIAL

## 2021-08-05 ENCOUNTER — HOSPITAL ENCOUNTER (OUTPATIENT)
Dept: CT IMAGING | Facility: HOSPITAL | Age: 56
End: 2021-08-05
Attending: INTERNAL MEDICINE
Payer: COMMERCIAL

## 2021-08-05 ENCOUNTER — INFUSION THERAPY VISIT (OUTPATIENT)
Dept: INFUSION THERAPY | Facility: HOSPITAL | Age: 56
End: 2021-08-05
Attending: INTERNAL MEDICINE
Payer: COMMERCIAL

## 2021-08-05 VITALS
HEIGHT: 67 IN | RESPIRATION RATE: 16 BRPM | BODY MASS INDEX: 45.99 KG/M2 | DIASTOLIC BLOOD PRESSURE: 72 MMHG | OXYGEN SATURATION: 98 % | TEMPERATURE: 98 F | HEART RATE: 84 BPM | SYSTOLIC BLOOD PRESSURE: 137 MMHG | WEIGHT: 293 LBS

## 2021-08-05 VITALS
BODY MASS INDEX: 45.52 KG/M2 | HEART RATE: 72 BPM | WEIGHT: 293 LBS | SYSTOLIC BLOOD PRESSURE: 136 MMHG | DIASTOLIC BLOOD PRESSURE: 79 MMHG

## 2021-08-05 DIAGNOSIS — C83.15 MANTLE CELL LYMPHOMA OF LYMPH NODES OF INGUINAL REGION (H): ICD-10-CM

## 2021-08-05 DIAGNOSIS — C83.18 LYMPHOMA, MANTLE CELL, MULTIPLE SITES (H): Primary | ICD-10-CM

## 2021-08-05 DIAGNOSIS — D80.1 HYPOGAMMAGLOBULINEMIA (H): Primary | ICD-10-CM

## 2021-08-05 DIAGNOSIS — C83.18 LYMPHOMA, MANTLE CELL, MULTIPLE SITES (H): ICD-10-CM

## 2021-08-05 LAB
ALBUMIN SERPL-MCNC: 3.3 G/DL (ref 3.5–5)
ALP SERPL-CCNC: 104 U/L (ref 45–120)
ALT SERPL W P-5'-P-CCNC: 13 U/L (ref 0–45)
ANION GAP SERPL CALCULATED.3IONS-SCNC: 6 MMOL/L (ref 5–18)
AST SERPL W P-5'-P-CCNC: 21 U/L (ref 0–40)
BASOPHILS # BLD AUTO: 0 10E3/UL (ref 0–0.2)
BASOPHILS NFR BLD AUTO: 1 %
BILIRUB SERPL-MCNC: 0.6 MG/DL (ref 0–1)
BUN SERPL-MCNC: 26 MG/DL (ref 8–22)
CALCIUM SERPL-MCNC: 9.2 MG/DL (ref 8.5–10.5)
CHLORIDE BLD-SCNC: 108 MMOL/L (ref 98–107)
CO2 SERPL-SCNC: 25 MMOL/L (ref 22–31)
CREAT SERPL-MCNC: 2.02 MG/DL (ref 0.6–1.1)
EOSINOPHIL # BLD AUTO: 0.1 10E3/UL (ref 0–0.7)
EOSINOPHIL NFR BLD AUTO: 2 %
ERYTHROCYTE [DISTWIDTH] IN BLOOD BY AUTOMATED COUNT: 14.6 % (ref 10–15)
GFR SERPL CREATININE-BSD FRML MDRD: 27 ML/MIN/1.73M2
GLUCOSE BLD-MCNC: 88 MG/DL (ref 70–125)
HCT VFR BLD AUTO: 44 % (ref 35–47)
HGB BLD-MCNC: 14.1 G/DL (ref 11.7–15.7)
HOLD SPECIMEN: NORMAL
HOLD SPECIMEN: NORMAL
IGG SERPL-MCNC: 665 MG/DL (ref 700–1700)
IMM GRANULOCYTES # BLD: 0 10E3/UL
IMM GRANULOCYTES NFR BLD: 0 %
LDH SERPL L TO P-CCNC: 164 U/L (ref 125–220)
LYMPHOCYTES # BLD AUTO: 1.3 10E3/UL (ref 0.8–5.3)
LYMPHOCYTES NFR BLD AUTO: 23 %
MCH RBC QN AUTO: 29.5 PG (ref 26.5–33)
MCHC RBC AUTO-ENTMCNC: 32 G/DL (ref 31.5–36.5)
MCV RBC AUTO: 92 FL (ref 78–100)
MONOCYTES # BLD AUTO: 0.5 10E3/UL (ref 0–1.3)
MONOCYTES NFR BLD AUTO: 9 %
NEUTROPHILS # BLD AUTO: 3.6 10E3/UL (ref 1.6–8.3)
NEUTROPHILS NFR BLD AUTO: 65 %
NRBC # BLD AUTO: 0 10E3/UL
NRBC BLD AUTO-RTO: 0 /100
PLATELET # BLD AUTO: 163 10E3/UL (ref 150–450)
POTASSIUM BLD-SCNC: 4 MMOL/L (ref 3.5–5)
PROT SERPL-MCNC: 6.2 G/DL (ref 6–8)
RBC # BLD AUTO: 4.78 10E6/UL (ref 3.8–5.2)
SODIUM SERPL-SCNC: 139 MMOL/L (ref 136–145)
WBC # BLD AUTO: 5.6 10E3/UL (ref 4–11)

## 2021-08-05 PROCEDURE — 96375 TX/PRO/DX INJ NEW DRUG ADDON: CPT

## 2021-08-05 PROCEDURE — 250N000011 HC RX IP 250 OP 636: Performed by: INTERNAL MEDICINE

## 2021-08-05 PROCEDURE — 36591 DRAW BLOOD OFF VENOUS DEVICE: CPT

## 2021-08-05 PROCEDURE — 83615 LACTATE (LD) (LDH) ENZYME: CPT

## 2021-08-05 PROCEDURE — 71250 CT THORAX DX C-: CPT

## 2021-08-05 PROCEDURE — G0463 HOSPITAL OUTPT CLINIC VISIT: HCPCS | Mod: 25

## 2021-08-05 PROCEDURE — 82784 ASSAY IGA/IGD/IGG/IGM EACH: CPT | Performed by: INTERNAL MEDICINE

## 2021-08-05 PROCEDURE — 96366 THER/PROPH/DIAG IV INF ADDON: CPT

## 2021-08-05 PROCEDURE — 250N000013 HC RX MED GY IP 250 OP 250 PS 637: Performed by: INTERNAL MEDICINE

## 2021-08-05 PROCEDURE — 85025 COMPLETE CBC W/AUTO DIFF WBC: CPT

## 2021-08-05 PROCEDURE — 86769 SARS-COV-2 COVID-19 ANTIBODY: CPT

## 2021-08-05 PROCEDURE — 80053 COMPREHEN METABOLIC PANEL: CPT

## 2021-08-05 PROCEDURE — 36415 COLL VENOUS BLD VENIPUNCTURE: CPT

## 2021-08-05 PROCEDURE — 250N000009 HC RX 250: Performed by: INTERNAL MEDICINE

## 2021-08-05 PROCEDURE — 96365 THER/PROPH/DIAG IV INF INIT: CPT

## 2021-08-05 PROCEDURE — 258N000003 HC RX IP 258 OP 636: Performed by: INTERNAL MEDICINE

## 2021-08-05 PROCEDURE — 99214 OFFICE O/P EST MOD 30 MIN: CPT | Performed by: INTERNAL MEDICINE

## 2021-08-05 RX ORDER — MULTIVIT-MINERALS/FOLIC ACID 200 MCG
1 TABLET,CHEWABLE ORAL DAILY
COMMUNITY

## 2021-08-05 RX ORDER — DIPHENHYDRAMINE HYDROCHLORIDE 50 MG/ML
50 INJECTION INTRAMUSCULAR; INTRAVENOUS
Status: CANCELLED
Start: 2021-09-02

## 2021-08-05 RX ORDER — ALBUTEROL SULFATE 90 UG/1
1-2 AEROSOL, METERED RESPIRATORY (INHALATION)
Status: DISCONTINUED | OUTPATIENT
Start: 2021-08-05 | End: 2021-08-05 | Stop reason: HOSPADM

## 2021-08-05 RX ORDER — NALOXONE HYDROCHLORIDE 0.4 MG/ML
0.2 INJECTION, SOLUTION INTRAMUSCULAR; INTRAVENOUS; SUBCUTANEOUS
Status: DISCONTINUED | OUTPATIENT
Start: 2021-08-05 | End: 2021-08-05 | Stop reason: HOSPADM

## 2021-08-05 RX ORDER — ALBUTEROL SULFATE 90 UG/1
1-2 AEROSOL, METERED RESPIRATORY (INHALATION)
Status: CANCELLED
Start: 2021-09-02

## 2021-08-05 RX ORDER — METHYLPREDNISOLONE SODIUM SUCCINATE 125 MG/2ML
125 INJECTION, POWDER, LYOPHILIZED, FOR SOLUTION INTRAMUSCULAR; INTRAVENOUS ONCE
Status: CANCELLED
Start: 2021-09-02 | End: 2021-09-02

## 2021-08-05 RX ORDER — ACETAMINOPHEN 325 MG/1
650 TABLET ORAL ONCE
Status: COMPLETED | OUTPATIENT
Start: 2021-08-05 | End: 2021-08-05

## 2021-08-05 RX ORDER — DIPHENHYDRAMINE HCL 25 MG
12.5 TABLET ORAL ONCE
Status: COMPLETED | OUTPATIENT
Start: 2021-08-05 | End: 2021-08-05

## 2021-08-05 RX ORDER — METHYLPREDNISOLONE SODIUM SUCCINATE 125 MG/2ML
125 INJECTION, POWDER, LYOPHILIZED, FOR SOLUTION INTRAMUSCULAR; INTRAVENOUS ONCE
Status: COMPLETED | OUTPATIENT
Start: 2021-08-05 | End: 2021-08-05

## 2021-08-05 RX ORDER — HEPARIN SODIUM (PORCINE) LOCK FLUSH IV SOLN 100 UNIT/ML 100 UNIT/ML
5 SOLUTION INTRAVENOUS
Status: CANCELLED | OUTPATIENT
Start: 2021-09-02

## 2021-08-05 RX ORDER — HEPARIN SODIUM (PORCINE) LOCK FLUSH IV SOLN 100 UNIT/ML 100 UNIT/ML
5 SOLUTION INTRAVENOUS
Status: DISCONTINUED | OUTPATIENT
Start: 2021-08-05 | End: 2021-08-05 | Stop reason: HOSPADM

## 2021-08-05 RX ORDER — DIPHENHYDRAMINE HYDROCHLORIDE 50 MG/ML
50 INJECTION INTRAMUSCULAR; INTRAVENOUS
Status: DISCONTINUED | OUTPATIENT
Start: 2021-08-05 | End: 2021-08-05 | Stop reason: HOSPADM

## 2021-08-05 RX ORDER — NALOXONE HYDROCHLORIDE 0.4 MG/ML
0.2 INJECTION, SOLUTION INTRAMUSCULAR; INTRAVENOUS; SUBCUTANEOUS
Status: CANCELLED | OUTPATIENT
Start: 2021-09-02

## 2021-08-05 RX ORDER — METHYLPREDNISOLONE SODIUM SUCCINATE 125 MG/2ML
125 INJECTION, POWDER, LYOPHILIZED, FOR SOLUTION INTRAMUSCULAR; INTRAVENOUS
Status: CANCELLED
Start: 2021-09-02

## 2021-08-05 RX ORDER — DIPHENHYDRAMINE HCL 25 MG
12.5 TABLET ORAL ONCE
Status: CANCELLED
Start: 2021-09-02

## 2021-08-05 RX ORDER — EPINEPHRINE 1 MG/ML
0.3 INJECTION, SOLUTION INTRAMUSCULAR; SUBCUTANEOUS EVERY 5 MIN PRN
Status: CANCELLED | OUTPATIENT
Start: 2021-09-02

## 2021-08-05 RX ORDER — MEPERIDINE HYDROCHLORIDE 25 MG/ML
25 INJECTION INTRAMUSCULAR; INTRAVENOUS; SUBCUTANEOUS EVERY 30 MIN PRN
Status: DISCONTINUED | OUTPATIENT
Start: 2021-08-05 | End: 2021-08-05 | Stop reason: HOSPADM

## 2021-08-05 RX ORDER — MEPERIDINE HYDROCHLORIDE 25 MG/ML
25 INJECTION INTRAMUSCULAR; INTRAVENOUS; SUBCUTANEOUS EVERY 30 MIN PRN
Status: CANCELLED | OUTPATIENT
Start: 2021-09-02

## 2021-08-05 RX ORDER — ALBUTEROL SULFATE 0.83 MG/ML
2.5 SOLUTION RESPIRATORY (INHALATION)
Status: CANCELLED | OUTPATIENT
Start: 2021-09-02

## 2021-08-05 RX ORDER — EPINEPHRINE 1 MG/ML
0.3 INJECTION, SOLUTION INTRAMUSCULAR; SUBCUTANEOUS EVERY 5 MIN PRN
Status: DISCONTINUED | OUTPATIENT
Start: 2021-08-05 | End: 2021-08-05 | Stop reason: HOSPADM

## 2021-08-05 RX ORDER — ACETAMINOPHEN 325 MG/1
650 TABLET ORAL ONCE
Status: CANCELLED
Start: 2021-09-02

## 2021-08-05 RX ORDER — ALBUTEROL SULFATE 0.83 MG/ML
2.5 SOLUTION RESPIRATORY (INHALATION)
Status: DISCONTINUED | OUTPATIENT
Start: 2021-08-05 | End: 2021-08-05 | Stop reason: HOSPADM

## 2021-08-05 RX ORDER — HEPARIN SODIUM,PORCINE 10 UNIT/ML
5 VIAL (ML) INTRAVENOUS
Status: CANCELLED | OUTPATIENT
Start: 2021-09-02

## 2021-08-05 RX ORDER — METHYLPREDNISOLONE SODIUM SUCCINATE 125 MG/2ML
125 INJECTION, POWDER, LYOPHILIZED, FOR SOLUTION INTRAMUSCULAR; INTRAVENOUS
Status: DISCONTINUED | OUTPATIENT
Start: 2021-08-05 | End: 2021-08-05 | Stop reason: HOSPADM

## 2021-08-05 RX ADMIN — HUMAN IMMUNOGLOBULIN G 25 G: 20 LIQUID INTRAVENOUS at 13:15

## 2021-08-05 RX ADMIN — METHYLPREDNISOLONE SODIUM SUCCINATE 125 MG: 125 INJECTION, POWDER, FOR SOLUTION INTRAMUSCULAR; INTRAVENOUS at 12:43

## 2021-08-05 RX ADMIN — HEPARIN 5 ML: 100 SYRINGE at 15:42

## 2021-08-05 RX ADMIN — SODIUM CHLORIDE 100 ML: 9 INJECTION, SOLUTION INTRAVENOUS at 11:45

## 2021-08-05 RX ADMIN — FAMOTIDINE 20 MG: 10 INJECTION, SOLUTION INTRAVENOUS at 12:00

## 2021-08-05 RX ADMIN — ACETAMINOPHEN 650 MG: 325 TABLET ORAL at 11:58

## 2021-08-05 RX ADMIN — DIPHENHYDRAMINE HCL 12.5 MG: 25 TABLET ORAL at 11:58

## 2021-08-05 ASSESSMENT — MIFFLIN-ST. JEOR: SCORE: 1958.47

## 2021-08-05 ASSESSMENT — PAIN SCALES - GENERAL: PAINLEVEL: MODERATE PAIN (4)

## 2021-08-05 NOTE — PROGRESS NOTES
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        Assessment and Plan    Mantle cell lymphoma status post autologous transplant, August 18, 2015  Hypogammaglobulinemia with multiple recurrent infections, on IVIG every 4 weeks  Hives/eosinophilia/chronic urticaria  New left inguinal and pelvic adenopathy, May 2020(patient had 2 separate biopsies, initial 1 suggesting relapse of mantle cell lymphoma, review at [facility] was negative; patient treated with 1 month of ibrutinib with resolution of CT scan and PET findings)  Neuropathy    CT scan reviewed showing no worsening of adenopathy and no other sign of recurrent lymphoma.    Lab work is all very stable.    Patient will continue IVIG infusion every 4 weeks and do follow-up with labs and scans in about 4 months.    Plan: As above      Measurable disease: Patient currently in remission    Current therapy: Observation  IVIG resumed in January 2017 and stopped in May 2018; resumed again in September 2018  Maintenance Rituxan started December 2015, and completed August 2017, 8 doses  Acyclovir 400 mg twice daily  IVIG every 3 months, first dose August 29, 2016(currently on hold, last dose November 2016)   IVIG every 4 weeks restarted in June 2017 and stopped in May 2018      Treatment history:  Ibrutinib 420 mg p.o. daily started for presumed relapse of mantle cell lymphoma  July 3, 2020 and stopped August 4, 2020    First set of immunizations received at the [facility] in August 2016  First dose of IVIG  Patient completed 1 year of inhalational pentamidine  Autologous stem cell transplant with Beam prep, August 18, 2015  One cycle of R-ICE, Naye 15, 2015, ifosfamide and etoposide reduced by 25% because of renal dysfunction  Ibrutinib since October 10, 2014,  current dose of 420 mg by mouth daily, stopped June 9, 2015   3 cycles of R-DHAP completed late August 2014   5 cycles of R CHOP, started after diagnosis in March 2014       ECOG Performance   ECOG Performance Status: 0    Distress Assessment  Distress Assessment Score: 3    Pain           Problem List    1. 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 Mantle cell lymphoma of lymph nodes of multiple regions (H)          CC: Sunil Tan MD    ______________________________________________________________________________    History of Present Illness    Pretty is here for reevaluation.  She was seen 3 months ago.  She had left meniscal repair may require knee replacement  .  Currently on an antibiotic for bacterial vaginosis.  No fever chills or sweats.  No new palpable adenopathy.  No other infectious problems.  Skin has been doing okay.  ECOG status is 0.    June 29, 2020  Pretty is here for reevaluation.  Seen 2 weeks ago.  Underwent excisional biopsy of the left inguinal lymph node and is here to review results.  No new symptoms or problems.  ECOG status is 0.  January 2018:   Ms. Avis Paul returns for a follow-up.  She was here for IVIG infusion yesterday and raise concerns about symptoms suggesting recurrence of lymphoma.  She had CT scans and is here to review results.  Has been having aches all over and increased fatigue and weight gain.  Describes some numbness in the right thigh area.  Also has been having memory issues since she received chemotherapy.  Notes an area of induration in the mouth on the lower right mandibular/jaw area.        November 2017:   She was seen 3 months ago.  She did have a visit shortly thereafter at the [facility] with bone marrow and CT scan showing that she is in complete remission from her mantle cell lymphoma.  She  has continued IVIG infusions monthly between June - October 2017.  She was informed recently that the insurance would not cover this.  We had previously got this approved prior to starting infusions in June 2017.    She is currently having sinus symptoms and cough productive of greenish phlegm.  No other infection issues in the last 3 months.  She is in significant distress because of the insurance issues.    No other new symptoms or problems.  ECOG status is 0.    August 2017:   She was seen 4 weeks ago.  She has received 2 doses of IVIG.  No infusion reaction with the last one.  Her last infection was a sinus infection more than 4 weeks ago.  She has some discomfort in the right ear.  No fever or mild sores.  No shortness of breath or cough.  No new adenopathy.  She has been starting to have some loose bowel moments 2-3 times a day in the last 3 weeks.  No other new problems.    Pain Status  Currently in Pain: Yes    Review of Systems    Constitutional     Neurosensory     Cardiovascular     Pulmonary     Gastrointestinal     Genitourinary     Integumentary     Patient Coping  Patient Coping: Accepting  Distress Assessment  Distress Assessment Score: 3  Accompanied by  Accompanied by: Alone    Past History  Past Medical History:   Diagnosis Date     Anemia      Chronic kidney disease     elevated creatinine due to chemo     History of anesthesia complications 2015    urinary retention after lymph node excision. required catheterization     History of transfusion      Hypothyroid      Mantle cell lymphoma (H)     stage 4     Sleep apnea     uses cpap     Thrombocytopenia (H)          Past Surgical History:   Procedure Laterality Date     ANTERIOR / POSTERIOR COMBINED FUSION CERVICAL SPINE  2011    C2-C7     CARPAL TUNNEL RELEASE Right 2000     CERVICAL FUSION  2004    C4, C5, C6.  Pt. states surgery x 2     KIDNEY STONE SURGERY  1999    removal     LIMBAL STEM CELL TRANSPLANT  08/2015     LYMPH NODE DISSECTION Left  6/25/2020    Procedure: LEFT INGUINAL LYMPH NODE BIOPSY;  Surgeon: Catalina Pascal MD;  Location: [facility] OR;  Service: General     LA BX/REMV,LYMPH NODE,DEEP AXILL Right 6/3/2015    Procedure: RIGHT INGUINAL LYMPH NODE BIOPSY;  Surgeon: Clayton Burgos MD;  Location: [facility];  Service: General     TUBAL LIGATION  2004     US GUIDED NEEDLE PLACEMENT  6/25/2020     US LYMPH NODE BIOPSY  6/10/2020       Physical Exam    Recent Vitals 5/13/2021   Height -   Weight 293 lbs   BSA (m2) 2.51 m2   /83   Pulse 64   Temp 98   Temp src 1   SpO2 98   Some recent data might be hidden       GENERAL: Alert and oriented. Seated comfortably. In no distress.    HEAD: Atraumatic and normocephalic.  Has a full head of hair.    EYES: ROMEO, EOMI.  No pallor.  No icterus.    Oral cavity: no mucosal lesion or tonsillar enlargement.  Induration right mandibular lower jaw area    NECK: supple. JVP normal.  No thyroid enlargement.    LYMPH NODES: No palpable, cervical, axillary or inguinal lymphadenopathy.    CHEST: She has slight bilateral wheezing noted  Resonant to percussion throughout bilaterally.  Symmetrical breath movements bilaterally.    CVS: S1 and S2 are heard. Regular rate and rhythm.  No murmur or gallop or rub heard.    ABDOMEN: Soft. Not tender. Not distended.  No palpable hepatomegaly or splenomegaly.  No other mass palpable.  Bowel sounds heard.    EXTREMITIES: Warm.  No peripheral edema.    SKIN: no rash, or bruising or purpura.    CNS: Nonfocal        Lab Results    Recent Results (from the past 168 hour(s))   Comprehensive Metabolic Panel   Result Value Ref Range    Sodium 139 136 - 145 mmol/L    Potassium 3.9 3.5 - 5.0 mmol/L    Chloride 107 98 - 107 mmol/L    CO2 23 22 - 31 mmol/L    Anion Gap, Calculation 9 5 - 18 mmol/L    Glucose 90 70 - 125 mg/dL    BUN 24 (H) 8 - 22 mg/dL    Creatinine 2.24 (H) 0.60 - 1.10 mg/dL    GFR MDRD Af Amer 27 (L) >60 mL/min/1.73m2    GFR MDRD Non Af Amer  23 (L) >60 mL/min/1.73m2    Bilirubin, Total 0.4 0.0 - 1.0 mg/dL    Calcium 8.8 8.5 - 10.5 mg/dL    Protein, Total 6.4 6.0 - 8.0 g/dL    Albumin 3.5 3.5 - 5.0 g/dL    Alkaline Phosphatase 109 45 - 120 U/L    AST 22 0 - 40 U/L    ALT 16 0 - 45 U/L   HM1 (CBC with Diff)   Result Value Ref Range    WBC 5.7 4.0 - 11.0 thou/uL    RBC 4.66 3.80 - 5.40 mill/uL    Hemoglobin 13.7 12.0 - 16.0 g/dL    Hematocrit 43.1 35.0 - 47.0 %    MCV 93 80 - 100 fL    MCH 29.4 27.0 - 34.0 pg    MCHC 31.8 (L) 32.0 - 36.0 g/dL    RDW 14.1 11.0 - 14.5 %    Platelets 161 140 - 440 thou/uL    MPV 10.3 8.5 - 12.5 fL    Neutrophils % 65 50 - 70 %    Lymphocytes % 25 20 - 40 %    Monocytes % 8 2 - 10 %    Eosinophils % 2 0 - 6 %    Basophils % 1 0 - 2 %    Immature Granulocyte % 0 <=0 %    Neutrophils Absolute 3.7 2.0 - 7.7 thou/uL    Lymphocytes Absolute 1.4 0.8 - 4.4 thou/uL    Monocytes Absolute 0.4 0.0 - 0.9 thou/uL    Eosinophils Absolute 0.1 0.0 - 0.4 thou/uL    Basophils Absolute 0.0 0.0 - 0.2 thou/uL    Immature Granulocyte Absolute 0.0 <=0.0 thou/uL       Imaging    Ct Chest Abdomen Pelvis Without Oral Without Iv Contrast    Result Date: 5/13/2021  EXAM: CT CHEST ABDOMEN PELVIS WO ORAL WO IV CONTRAST LOCATION: [facility] DATE/TIME: 5/13/2021 9:26 AM INDICATION: Followup lymphoma. COMPARISON: 02/04/2021, 11/12/2020. TECHNIQUE: CT scan of the chest, abdomen, and pelvis was performed without IV contrast. Multiplanar reformats were obtained. Dose reduction techniques were used. CONTRAST: None. FINDINGS: LUNGS AND PLEURA: There are small pulmonary nodules, reference a 2 mm nodule in the right lower lobe on image 95 series 3. These are stable compared to the prior 2 studies and should be benign. There is a small linear density in the inferior aspect of the lingula that is stable dating back to November 2020 MEDIASTINUM/AXILLAE: There is a port in the right anterior chest wall extending into the superior vena cava. No  enlarged mediastinal lymph nodes are seen. No enlarged axillary nodes are seen. Again seen are calcified and noncalcified nodules in both breasts that are similar in appearance to prior studies. These may be fibroadenomas. CORONARY ARTERY CALCIFICATION: None. HEPATOBILIARY: There are a few tiny low dense lesions in the liver that are stable but too small to characterize, reference posterior right lobe image 64. These are likely benign. PANCREAS: Normal. SPLEEN: Normal in size, stable. ADRENAL GLANDS: Normal. KIDNEYS/BLADDER: Normal. BOWEL: There is some colonic diverticulosis. There is no evidence of appendicitis. LYMPH NODES: Left internal iliac lymph nodes?? with follow-up abutting the anterior cortex of the left sacral alae. On image 415 series 4 this measures 1.9 x 1.3 and on the prior study it measured 1.9 x 1.5 cm. VASCULATURE: Unremarkable. PELVIC ORGANS: Normal. MUSCULOSKELETAL: There are normal degenerative changes in the spine.     1.  There is very mild left internal iliac adenopathy that is stable to slightly smaller than on the prior study. 2.  There are multiple benign-appearing nodules in the breast, some with calcification and all are stable, these are likely fibroadenomas. 3.  No evidence of new disease is seen.        Signed by: Yelena Starks MD

## 2021-08-05 NOTE — PROGRESS NOTES
"Oncology Rooming Note    August 5, 2021 11:02 AM   Avis Paul is a 56 year old female who presents for:    Chief Complaint   Patient presents with     Oncology Clinic Visit     NHL (non-Hodgkin's lymphoma)     Initial Vitals: /72 (BP Location: Right arm, Patient Position: Sitting)   Pulse 84   Temp 98  F (36.7  C) (Oral)   Resp 16   Ht 1.702 m (5' 7\")   Wt 133.6 kg (294 lb 8 oz)   SpO2 98%   BMI 46.13 kg/m   Estimated body mass index is 46.13 kg/m  as calculated from the following:    Height as of this encounter: 1.702 m (5' 7\").    Weight as of this encounter: 133.6 kg (294 lb 8 oz). Body surface area is 2.51 meters squared.  Moderate Pain (4) Comment: Data Unavailable   No LMP recorded. Patient is postmenopausal.  Allergies reviewed: Yes  Medications reviewed: Yes    Medications: Medication refills not needed today.  Pharmacy name entered into [employer]: [facility] [place] [id number] [address] [facility]    Clinical concerns: none      Pretty Muir RN              "

## 2021-08-05 NOTE — PROGRESS NOTES
"Infusion Nursing Note:  Avis Paul presents today for labs and IgG infusion.   Patient seen by provider today: Yes   present during visit today: Not Applicable.    Note: Patient arrived ambulatory and was seated in chair 3. Reviewed plan of care. Port a cath was accessed (using a 1 inch gripper needle) per sterile technique and obtained an excellent blood return. Labs were drawn at approximately 09:05. Patient then left the unit ambulatory and went to radiology for a CT scan, and for an office visit at the [facility]. Upon returning, seated in chair 3. Used NS as the primary IV solution and administered the premeds - oral acetaminophen, oral benadryl 12.5 mg; and solumedrol 125 mg IVP, and famotidine 20 mg IVP. Waited approximately 30 minutes before starting the infusion. Infused IgG 25 gms over 2 hours 15 minutes. VSS throughout. No ill effects noted. \"I think my head feels better with using the oral benadryl.\" Future appointments were scheduled. Left the unit ambulatory and in stable condition at 16:05, and plans to return on September 2nd.     Intravenous Access:  Implanted Port.    Treatment Conditions:  Results reviewed - patient was given a copy of the completed lab results.      Post Infusion Assessment:  Patient tolerated infusion without incident.  Blood return noted pre and post infusion.  Site patent and intact, free from redness, edema or discomfort.  Access discontinued per protocol.     Discharge Plan:   Discharge instructions reviewed with: Patient.  Patient discharged in stable condition accompanied by: self.  Departure Mode: Ambulatory.    Cristal Doherty RN                    "

## 2021-08-05 NOTE — LETTER
"    8/5/2021         RE: Avis Paul  [address] [facility] [address]        Dear Colleague,    Thank you for referring your patient, Avis Paul, to the [facility]. Please see a copy of my visit note below.    Oncology Rooming Note    August 5, 2021 11:02 AM   Avis Paul is a 56 year old female who presents for:    Chief Complaint   Patient presents with     Oncology Clinic Visit     NHL (non-Hodgkin's lymphoma)     Initial Vitals: /72 (BP Location: Right arm, Patient Position: Sitting)   Pulse 84   Temp 98  F (36.7  C) (Oral)   Resp 16   Ht 1.702 m (5' 7\")   Wt 133.6 kg (294 lb 8 oz)   SpO2 98%   BMI 46.13 kg/m   Estimated body mass index is 46.13 kg/m  as calculated from the following:    Height as of this encounter: 1.702 m (5' 7\").    Weight as of this encounter: 133.6 kg (294 lb 8 oz). Body surface area is 2.51 meters squared.  Moderate Pain (4) Comment: Data Unavailable   No LMP recorded. Patient is postmenopausal.  Allergies reviewed: Yes  Medications reviewed: Yes    Medications: Medication refills not needed today.  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        Assessment and Plan    Mantle cell lymphoma status post autologous transplant, August 18, 2015  Hypogammaglobulinemia with multiple recurrent infections, on IVIG every 4 weeks  Hives/eosinophilia/chronic urticaria  New left inguinal and pelvic adenopathy, May 2020(patient had 2 separate biopsies, initial 1 suggesting relapse of mantle cell lymphoma, review at [facility] was negative; patient treated with 1 month of ibrutinib " with resolution of CT scan and PET findings)  Neuropathy    CT scan reviewed showing no worsening of adenopathy and no other sign of recurrent lymphoma.    Lab work is all very stable.    Patient will continue IVIG infusion every 4 weeks and do follow-up with labs and scans in about 4 months.    Plan: As above      Measurable disease: Patient currently in remission    Current therapy: Observation  IVIG resumed in January 2017 and stopped in May 2018; resumed again in September 2018  Maintenance Rituxan started December 2015, and completed August 2017, 8 doses  Acyclovir 400 mg twice daily  IVIG every 3 months, first dose August 29, 2016(currently on hold, last dose November 2016)   IVIG every 4 weeks restarted in June 2017 and stopped in May 2018      Treatment history:  Ibrutinib 420 mg p.o. daily started for presumed relapse of mantle cell lymphoma  July 3, 2020 and stopped August 4, 2020    First set of immunizations received at the [facility] in August 2016  First dose of IVIG  Patient completed 1 year of inhalational pentamidine  Autologous stem cell transplant with Beam prep, August 18, 2015  One cycle of R-ICE, Naye 15, 2015, ifosfamide and etoposide reduced by 25% because of renal dysfunction  Ibrutinib since October 10, 2014, current dose of 420 mg by mouth daily, stopped June 9, 2015   3 cycles of R-DHAP completed late August 2014   5 cycles of R CHOP, started after diagnosis in March 2014       ECOG Performance   ECOG Performance Status: 0    Distress Assessment  Distress Assessment Score: 3    Pain           Problem List    1. 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 Mantle cell lymphoma of lymph nodes of multiple regions (H)          CC: Sunil Tan MD    ______________________________________________________________________________    History  of Present Illness    Pretty is here for reevaluation.  She was seen 3 months ago.  She had left meniscal repair may require knee replacement  .  Currently on an antibiotic for bacterial vaginosis.  No fever chills or sweats.  No new palpable adenopathy.  No other infectious problems.  Skin has been doing okay.  ECOG status is 0.    June 29, 2020  Pretty is here for reevaluation.  Seen 2 weeks ago.  Underwent excisional biopsy of the left inguinal lymph node and is here to review results.  No new symptoms or problems.  ECOG status is 0.  January 2018:   Ms. Avis Paul returns for a follow-up.  She was here for IVIG infusion yesterday and raise concerns about symptoms suggesting recurrence of lymphoma.  She had CT scans and is here to review results.  Has been having aches all over and increased fatigue and weight gain.  Describes some numbness in the right thigh area.  Also has been having memory issues since she received chemotherapy.  Notes an area of induration in the mouth on the lower right mandibular/jaw area.        November 2017:   She was seen 3 months ago.  She did have a visit shortly thereafter at the [facility] with bone marrow and CT scan showing that she is in complete remission from her mantle cell lymphoma.  She has continued IVIG infusions monthly between June - October 2017.  She was informed recently that the insurance would not cover this.  We had previously got this approved prior to starting infusions in June 2017.    She is currently having sinus symptoms and cough productive of greenish phlegm.  No other infection issues in the last 3 months.  She is in significant distress because of the insurance issues.    No other new symptoms or problems.  ECOG status is 0.    August 2017:   She was seen 4 weeks ago.  She has received 2 doses of IVIG.  No infusion reaction with the last one.  Her last infection was a sinus infection more than 4 weeks ago.  She has some discomfort in the right ear.   No fever or mild sores.  No shortness of breath or cough.  No new adenopathy.  She has been starting to have some loose bowel moments 2-3 times a day in the last 3 weeks.  No other new problems.    Pain Status  Currently in Pain: Yes    Review of Systems    Constitutional     Neurosensory     Cardiovascular     Pulmonary     Gastrointestinal     Genitourinary     Integumentary     Patient Coping  Patient Coping: Accepting  Distress Assessment  Distress Assessment Score: 3  Accompanied by  Accompanied by: Alone    Past History  Past Medical History:   Diagnosis Date     Anemia      Chronic kidney disease     elevated creatinine due to chemo     History of anesthesia complications 2015    urinary retention after lymph node excision. required catheterization     History of transfusion      Hypothyroid      Mantle cell lymphoma (H)     stage 4     Sleep apnea     uses cpap     Thrombocytopenia (H)          Past Surgical History:   Procedure Laterality Date     ANTERIOR / POSTERIOR COMBINED FUSION CERVICAL SPINE  2011    C2-C7     CARPAL TUNNEL RELEASE Right 2000     CERVICAL FUSION  2004    C4, C5, C6.  Pt. states surgery x 2     KIDNEY STONE SURGERY  1999    removal     LIMBAL STEM CELL TRANSPLANT  08/2015     LYMPH NODE DISSECTION Left 6/25/2020    Procedure: LEFT INGUINAL LYMPH NODE BIOPSY;  Surgeon: Catalina Pascal MD;  Location: [facility] OR;  Service: General     WY BX/REMV,LYMPH NODE,DEEP AXILL Right 6/3/2015    Procedure: RIGHT INGUINAL LYMPH NODE BIOPSY;  Surgeon: Clayton Burgos MD;  Location: [facility] OR;  Service: General     TUBAL LIGATION  2004     US GUIDED NEEDLE PLACEMENT  6/25/2020     US LYMPH NODE BIOPSY  6/10/2020       Physical Exam    Recent Vitals 5/13/2021   Height -   Weight 293 lbs   BSA (m2) 2.51 m2   /83   Pulse 64   Temp 98   Temp src 1   SpO2 98   Some recent data might be hidden       GENERAL: Alert and oriented. Seated comfortably. In no distress.    HEAD:  Atraumatic and normocephalic.  Has a full head of hair.    EYES: ROMEO, EOMI.  No pallor.  No icterus.    Oral cavity: no mucosal lesion or tonsillar enlargement.  Induration right mandibular lower jaw area    NECK: supple. JVP normal.  No thyroid enlargement.    LYMPH NODES: No palpable, cervical, axillary or inguinal lymphadenopathy.    CHEST: She has slight bilateral wheezing noted  Resonant to percussion throughout bilaterally.  Symmetrical breath movements bilaterally.    CVS: S1 and S2 are heard. Regular rate and rhythm.  No murmur or gallop or rub heard.    ABDOMEN: Soft. Not tender. Not distended.  No palpable hepatomegaly or splenomegaly.  No other mass palpable.  Bowel sounds heard.    EXTREMITIES: Warm.  No peripheral edema.    SKIN: no rash, or bruising or purpura.    CNS: Nonfocal        Lab Results    Recent Results (from the past 168 hour(s))   Comprehensive Metabolic Panel   Result Value Ref Range    Sodium 139 136 - 145 mmol/L    Potassium 3.9 3.5 - 5.0 mmol/L    Chloride 107 98 - 107 mmol/L    CO2 23 22 - 31 mmol/L    Anion Gap, Calculation 9 5 - 18 mmol/L    Glucose 90 70 - 125 mg/dL    BUN 24 (H) 8 - 22 mg/dL    Creatinine 2.24 (H) 0.60 - 1.10 mg/dL    GFR MDRD Af Amer 27 (L) >60 mL/min/1.73m2    GFR MDRD Non Af Amer 23 (L) >60 mL/min/1.73m2    Bilirubin, Total 0.4 0.0 - 1.0 mg/dL    Calcium 8.8 8.5 - 10.5 mg/dL    Protein, Total 6.4 6.0 - 8.0 g/dL    Albumin 3.5 3.5 - 5.0 g/dL    Alkaline Phosphatase 109 45 - 120 U/L    AST 22 0 - 40 U/L    ALT 16 0 - 45 U/L   HM1 (CBC with Diff)   Result Value Ref Range    WBC 5.7 4.0 - 11.0 thou/uL    RBC 4.66 3.80 - 5.40 mill/uL    Hemoglobin 13.7 12.0 - 16.0 g/dL    Hematocrit 43.1 35.0 - 47.0 %    MCV 93 80 - 100 fL    MCH 29.4 27.0 - 34.0 pg    MCHC 31.8 (L) 32.0 - 36.0 g/dL    RDW 14.1 11.0 - 14.5 %    Platelets 161 140 - 440 thou/uL    MPV 10.3 8.5 - 12.5 fL    Neutrophils % 65 50 - 70 %    Lymphocytes % 25 20 - 40 %    Monocytes % 8 2 - 10 %     Eosinophils % 2 0 - 6 %    Basophils % 1 0 - 2 %    Immature Granulocyte % 0 <=0 %    Neutrophils Absolute 3.7 2.0 - 7.7 thou/uL    Lymphocytes Absolute 1.4 0.8 - 4.4 thou/uL    Monocytes Absolute 0.4 0.0 - 0.9 thou/uL    Eosinophils Absolute 0.1 0.0 - 0.4 thou/uL    Basophils Absolute 0.0 0.0 - 0.2 thou/uL    Immature Granulocyte Absolute 0.0 <=0.0 thou/uL       Imaging    Ct Chest Abdomen Pelvis Without Oral Without Iv Contrast    Result Date: 5/13/2021  EXAM: CT CHEST ABDOMEN PELVIS WO ORAL WO IV CONTRAST LOCATION: [facility] DATE/TIME: 5/13/2021 9:26 AM INDICATION: Followup lymphoma. COMPARISON: 02/04/2021, 11/12/2020. TECHNIQUE: CT scan of the chest, abdomen, and pelvis was performed without IV contrast. Multiplanar reformats were obtained. Dose reduction techniques were used. CONTRAST: None. FINDINGS: LUNGS AND PLEURA: There are small pulmonary nodules, reference a 2 mm nodule in the right lower lobe on image 95 series 3. These are stable compared to the prior 2 studies and should be benign. There is a small linear density in the inferior aspect of the lingula that is stable dating back to November 2020 MEDIASTINUM/AXILLAE: There is a port in the right anterior chest wall extending into the superior vena cava. No enlarged mediastinal lymph nodes are seen. No enlarged axillary nodes are seen. Again seen are calcified and noncalcified nodules in both breasts that are similar in appearance to prior studies. These may be fibroadenomas. CORONARY ARTERY CALCIFICATION: None. HEPATOBILIARY: There are a few tiny low dense lesions in the liver that are stable but too small to characterize, reference posterior right lobe image 64. These are likely benign. PANCREAS: Normal. SPLEEN: Normal in size, stable. ADRENAL GLANDS: Normal. KIDNEYS/BLADDER: Normal. BOWEL: There is some colonic diverticulosis. There is no evidence of appendicitis. LYMPH NODES: Left internal iliac lymph nodes?? with follow-up  abutting the anterior cortex of the left sacral alae. On image 415 series 4 this measures 1.9 x 1.3 and on the prior study it measured 1.9 x 1.5 cm. VASCULATURE: Unremarkable. PELVIC ORGANS: Normal. MUSCULOSKELETAL: There are normal degenerative changes in the spine.     1.  There is very mild left internal iliac adenopathy that is stable to slightly smaller than on the prior study. 2.  There are multiple benign-appearing nodules in the breast, some with calcification and all are stable, these are likely fibroadenomas. 3.  No evidence of new disease is seen.        Signed by: Yelena Starks MD        Again, thank you for allowing me to participate in the care of your patient.        Sincerely,        Yelena Starks MD

## 2021-08-06 LAB
SARS-COV-2 AB SERPL IA-ACNC: 51 U/ML
SARS-COV-2 AB SERPL QL IA: POSITIVE

## 2021-08-16 ENCOUNTER — DOCUMENTATION ONLY (OUTPATIENT)
Dept: ONCOLOGY | Facility: CLINIC | Age: 56
End: 2021-08-16

## 2021-08-16 NOTE — PROGRESS NOTES
CLINICAL NUTRITION SERVICES     Reason for Contact: Patient was contacted by phone due to requested to speak with a Dietitian on the Oncology Distress Screening tool 8/5/21    Action: RD called patient indicating reason for phone call. Left a VM with a return call back number.     Follow up: Wait for a return phone call.    Gabi Michel RDN, SHAANN  [facility]  [phone]

## 2021-08-31 ENCOUNTER — HOSPITAL ENCOUNTER (OUTPATIENT)
Dept: MAMMOGRAPHY | Facility: CLINIC | Age: 56
Discharge: HOME | End: 2021-08-31
Attending: FAMILY MEDICINE | Admitting: OBSTETRICS & GYNECOLOGY
Payer: COMMERCIAL

## 2021-08-31 DIAGNOSIS — Z12.31 ENCOUNTER FOR SCREENING MAMMOGRAM FOR BREAST CANCER: ICD-10-CM

## 2021-08-31 PROCEDURE — 77067 SCR MAMMO BI INCL CAD: CPT

## 2021-09-02 ENCOUNTER — INFUSION THERAPY VISIT (OUTPATIENT)
Dept: INFUSION THERAPY | Facility: HOSPITAL | Age: 56
End: 2021-09-02
Attending: INTERNAL MEDICINE
Payer: COMMERCIAL

## 2021-09-02 ENCOUNTER — DOCUMENTATION ONLY (OUTPATIENT)
Dept: ONCOLOGY | Facility: HOSPITAL | Age: 56
End: 2021-09-02

## 2021-09-02 VITALS
SYSTOLIC BLOOD PRESSURE: 128 MMHG | BODY MASS INDEX: 45.42 KG/M2 | RESPIRATION RATE: 18 BRPM | OXYGEN SATURATION: 98 % | DIASTOLIC BLOOD PRESSURE: 74 MMHG | HEART RATE: 78 BPM | TEMPERATURE: 98.1 F | WEIGHT: 290 LBS

## 2021-09-02 DIAGNOSIS — D80.1 HYPOGAMMAGLOBULINEMIA (H): Primary | ICD-10-CM

## 2021-09-02 PROCEDURE — 258N000003 HC RX IP 258 OP 636: Performed by: INTERNAL MEDICINE

## 2021-09-02 PROCEDURE — 250N000011 HC RX IP 250 OP 636: Performed by: INTERNAL MEDICINE

## 2021-09-02 PROCEDURE — 96375 TX/PRO/DX INJ NEW DRUG ADDON: CPT

## 2021-09-02 PROCEDURE — 250N000013 HC RX MED GY IP 250 OP 250 PS 637: Performed by: INTERNAL MEDICINE

## 2021-09-02 PROCEDURE — 96365 THER/PROPH/DIAG IV INF INIT: CPT

## 2021-09-02 PROCEDURE — 250N000009 HC RX 250: Performed by: INTERNAL MEDICINE

## 2021-09-02 RX ORDER — HEPARIN SODIUM (PORCINE) LOCK FLUSH IV SOLN 100 UNIT/ML 100 UNIT/ML
5 SOLUTION INTRAVENOUS
Status: DISCONTINUED | OUTPATIENT
Start: 2021-09-02 | End: 2021-09-02 | Stop reason: HOSPADM

## 2021-09-02 RX ORDER — ALBUTEROL SULFATE 90 UG/1
1-2 AEROSOL, METERED RESPIRATORY (INHALATION)
Status: DISCONTINUED | OUTPATIENT
Start: 2021-09-02 | End: 2021-09-02 | Stop reason: HOSPADM

## 2021-09-02 RX ORDER — ALBUTEROL SULFATE 0.83 MG/ML
2.5 SOLUTION RESPIRATORY (INHALATION)
Status: CANCELLED | OUTPATIENT
Start: 2021-09-30

## 2021-09-02 RX ORDER — DIPHENHYDRAMINE HCL 25 MG
12.5 TABLET ORAL ONCE
Status: COMPLETED | OUTPATIENT
Start: 2021-09-02 | End: 2021-09-02

## 2021-09-02 RX ORDER — NALOXONE HYDROCHLORIDE 0.4 MG/ML
0.2 INJECTION, SOLUTION INTRAMUSCULAR; INTRAVENOUS; SUBCUTANEOUS
Status: CANCELLED | OUTPATIENT
Start: 2021-09-30

## 2021-09-02 RX ORDER — MEPERIDINE HYDROCHLORIDE 25 MG/ML
25 INJECTION INTRAMUSCULAR; INTRAVENOUS; SUBCUTANEOUS EVERY 30 MIN PRN
Status: CANCELLED | OUTPATIENT
Start: 2021-09-30

## 2021-09-02 RX ORDER — NALOXONE HYDROCHLORIDE 0.4 MG/ML
0.2 INJECTION, SOLUTION INTRAMUSCULAR; INTRAVENOUS; SUBCUTANEOUS
Status: DISCONTINUED | OUTPATIENT
Start: 2021-09-02 | End: 2021-09-02 | Stop reason: HOSPADM

## 2021-09-02 RX ORDER — HEPARIN SODIUM,PORCINE 10 UNIT/ML
5 VIAL (ML) INTRAVENOUS
Status: CANCELLED | OUTPATIENT
Start: 2021-09-30

## 2021-09-02 RX ORDER — DIPHENHYDRAMINE HYDROCHLORIDE 50 MG/ML
50 INJECTION INTRAMUSCULAR; INTRAVENOUS
Status: CANCELLED
Start: 2021-09-30

## 2021-09-02 RX ORDER — HEPARIN SODIUM (PORCINE) LOCK FLUSH IV SOLN 100 UNIT/ML 100 UNIT/ML
5 SOLUTION INTRAVENOUS
Status: CANCELLED | OUTPATIENT
Start: 2021-09-30

## 2021-09-02 RX ORDER — DIPHENHYDRAMINE HYDROCHLORIDE 50 MG/ML
50 INJECTION INTRAMUSCULAR; INTRAVENOUS
Status: DISCONTINUED | OUTPATIENT
Start: 2021-09-02 | End: 2021-09-02 | Stop reason: HOSPADM

## 2021-09-02 RX ORDER — EPINEPHRINE 1 MG/ML
0.3 INJECTION, SOLUTION INTRAMUSCULAR; SUBCUTANEOUS EVERY 5 MIN PRN
Status: DISCONTINUED | OUTPATIENT
Start: 2021-09-02 | End: 2021-09-02 | Stop reason: HOSPADM

## 2021-09-02 RX ORDER — DIPHENHYDRAMINE HCL 25 MG
12.5 TABLET ORAL ONCE
Status: CANCELLED
Start: 2021-09-30

## 2021-09-02 RX ORDER — ALBUTEROL SULFATE 0.83 MG/ML
2.5 SOLUTION RESPIRATORY (INHALATION)
Status: DISCONTINUED | OUTPATIENT
Start: 2021-09-02 | End: 2021-09-02 | Stop reason: HOSPADM

## 2021-09-02 RX ORDER — METHYLPREDNISOLONE SODIUM SUCCINATE 125 MG/2ML
125 INJECTION, POWDER, LYOPHILIZED, FOR SOLUTION INTRAMUSCULAR; INTRAVENOUS ONCE
Status: COMPLETED | OUTPATIENT
Start: 2021-09-02 | End: 2021-09-02

## 2021-09-02 RX ORDER — MEPERIDINE HYDROCHLORIDE 25 MG/ML
25 INJECTION INTRAMUSCULAR; INTRAVENOUS; SUBCUTANEOUS EVERY 30 MIN PRN
Status: DISCONTINUED | OUTPATIENT
Start: 2021-09-02 | End: 2021-09-02 | Stop reason: HOSPADM

## 2021-09-02 RX ORDER — EPINEPHRINE 1 MG/ML
0.3 INJECTION, SOLUTION INTRAMUSCULAR; SUBCUTANEOUS EVERY 5 MIN PRN
Status: CANCELLED | OUTPATIENT
Start: 2021-09-30

## 2021-09-02 RX ORDER — ACETAMINOPHEN 325 MG/1
650 TABLET ORAL ONCE
Status: COMPLETED | OUTPATIENT
Start: 2021-09-02 | End: 2021-09-02

## 2021-09-02 RX ORDER — HEPARIN SODIUM,PORCINE 10 UNIT/ML
5 VIAL (ML) INTRAVENOUS
Status: DISCONTINUED | OUTPATIENT
Start: 2021-09-02 | End: 2021-09-02 | Stop reason: HOSPADM

## 2021-09-02 RX ORDER — ACETAMINOPHEN 325 MG/1
650 TABLET ORAL ONCE
Status: CANCELLED
Start: 2021-09-30

## 2021-09-02 RX ORDER — METHYLPREDNISOLONE SODIUM SUCCINATE 125 MG/2ML
125 INJECTION, POWDER, LYOPHILIZED, FOR SOLUTION INTRAMUSCULAR; INTRAVENOUS
Status: DISCONTINUED | OUTPATIENT
Start: 2021-09-02 | End: 2021-09-02 | Stop reason: HOSPADM

## 2021-09-02 RX ORDER — METHYLPREDNISOLONE SODIUM SUCCINATE 125 MG/2ML
125 INJECTION, POWDER, LYOPHILIZED, FOR SOLUTION INTRAMUSCULAR; INTRAVENOUS
Status: CANCELLED
Start: 2021-09-30

## 2021-09-02 RX ORDER — METHYLPREDNISOLONE SODIUM SUCCINATE 125 MG/2ML
125 INJECTION, POWDER, LYOPHILIZED, FOR SOLUTION INTRAMUSCULAR; INTRAVENOUS ONCE
Status: CANCELLED
Start: 2021-09-30 | End: 2021-09-30

## 2021-09-02 RX ORDER — ALBUTEROL SULFATE 90 UG/1
1-2 AEROSOL, METERED RESPIRATORY (INHALATION)
Status: CANCELLED
Start: 2021-09-30

## 2021-09-02 RX ADMIN — HEPARIN 5 ML: 100 SYRINGE at 13:43

## 2021-09-02 RX ADMIN — METHYLPREDNISOLONE SODIUM SUCCINATE 125 MG: 125 INJECTION, POWDER, FOR SOLUTION INTRAMUSCULAR; INTRAVENOUS at 09:30

## 2021-09-02 RX ADMIN — DIPHENHYDRAMINE HCL 12.5 MG: 25 TABLET ORAL at 09:29

## 2021-09-02 RX ADMIN — ACETAMINOPHEN 650 MG: 325 TABLET ORAL at 09:29

## 2021-09-02 RX ADMIN — HUMAN IMMUNOGLOBULIN G 25 G: 20 LIQUID INTRAVENOUS at 10:00

## 2021-09-02 RX ADMIN — FAMOTIDINE 20 MG: 10 INJECTION, SOLUTION INTRAVENOUS at 09:25

## 2021-09-02 RX ADMIN — SODIUM CHLORIDE 250 ML: 9 INJECTION, SOLUTION INTRAVENOUS at 09:20

## 2021-09-02 NOTE — PROGRESS NOTES
Paperwork for patient has been filled out, signed and faxed to [employer].    Fax: [phone]    Cecy Gan RN, Oncology Clinic   [facility]

## 2021-09-02 NOTE — PROGRESS NOTES
Infusion Nursing Note:  Avis Paul presents today for IgG infusion.    Patient seen by provider today: No   present during visit today: Not Applicable.    Note: Patient arrived ambulatory and was seated in chair 3. Reviewed plan of care. Accessed port a cath using a 1 inch gripper needle, and obtained an excellent blood return. Premeds were given - oral acetaminophen, oral benadryl 12.5 mg, famotidine 20 mg IVP, and solumedrol 125 mg IVP (used with a running IV of NS). Infused IgG 25 gms. Patient napped at intervals in the recliner. No ill effects noted. At completion the port a cath was flushed, heparinized, and deaccessed. VSS throughout. Left the unit ambulatory and in stable condition at 14:47, and plans to return on September 30.    Intravenous Access:  Implanted Port.    Treatment Conditions:  Labs were not required for today's treatment.    Post Infusion Assessment:  Patient tolerated infusion without incident.  Blood return noted pre and post infusion.  Site patent and intact, free from redness, edema or discomfort.  Access discontinued per protocol.     Discharge Plan:   Discharge instructions reviewed with: Patient.  Patient verbalized understanding of discharge instructions and all questions answered.  Patient discharged in stable condition accompanied by: self.  Departure Mode: Ambulatory 14:47.    Cristal Doherty RN

## 2021-09-30 ENCOUNTER — INFUSION THERAPY VISIT (OUTPATIENT)
Dept: INFUSION THERAPY | Facility: HOSPITAL | Age: 56
End: 2021-09-30
Attending: FAMILY MEDICINE
Payer: COMMERCIAL

## 2021-09-30 VITALS
HEART RATE: 66 BPM | DIASTOLIC BLOOD PRESSURE: 78 MMHG | BODY MASS INDEX: 45.58 KG/M2 | RESPIRATION RATE: 18 BRPM | OXYGEN SATURATION: 97 % | TEMPERATURE: 98.1 F | SYSTOLIC BLOOD PRESSURE: 124 MMHG | WEIGHT: 291 LBS

## 2021-09-30 DIAGNOSIS — D80.1 HYPOGAMMAGLOBULINEMIA (H): Primary | ICD-10-CM

## 2021-09-30 PROCEDURE — 250N000009 HC RX 250: Performed by: INTERNAL MEDICINE

## 2021-09-30 PROCEDURE — 250N000013 HC RX MED GY IP 250 OP 250 PS 637: Performed by: INTERNAL MEDICINE

## 2021-09-30 PROCEDURE — 96365 THER/PROPH/DIAG IV INF INIT: CPT

## 2021-09-30 PROCEDURE — 250N000011 HC RX IP 250 OP 636: Performed by: INTERNAL MEDICINE

## 2021-09-30 PROCEDURE — 96375 TX/PRO/DX INJ NEW DRUG ADDON: CPT

## 2021-09-30 RX ORDER — METHYLPREDNISOLONE SODIUM SUCCINATE 125 MG/2ML
125 INJECTION, POWDER, LYOPHILIZED, FOR SOLUTION INTRAMUSCULAR; INTRAVENOUS ONCE
Status: CANCELLED
Start: 2021-10-28 | End: 2021-10-28

## 2021-09-30 RX ORDER — ACETAMINOPHEN 325 MG/1
650 TABLET ORAL ONCE
Status: CANCELLED
Start: 2021-10-28

## 2021-09-30 RX ORDER — METHYLPREDNISOLONE SODIUM SUCCINATE 125 MG/2ML
125 INJECTION, POWDER, LYOPHILIZED, FOR SOLUTION INTRAMUSCULAR; INTRAVENOUS
Status: CANCELLED
Start: 2021-10-28

## 2021-09-30 RX ORDER — METHYLPREDNISOLONE SODIUM SUCCINATE 125 MG/2ML
125 INJECTION, POWDER, LYOPHILIZED, FOR SOLUTION INTRAMUSCULAR; INTRAVENOUS ONCE
Status: COMPLETED | OUTPATIENT
Start: 2021-09-30 | End: 2021-09-30

## 2021-09-30 RX ORDER — ALBUTEROL SULFATE 90 UG/1
1-2 AEROSOL, METERED RESPIRATORY (INHALATION)
Status: DISCONTINUED | OUTPATIENT
Start: 2021-09-30 | End: 2021-09-30 | Stop reason: HOSPADM

## 2021-09-30 RX ORDER — ALBUTEROL SULFATE 0.83 MG/ML
2.5 SOLUTION RESPIRATORY (INHALATION)
Status: CANCELLED | OUTPATIENT
Start: 2021-10-28

## 2021-09-30 RX ORDER — EPINEPHRINE 1 MG/ML
0.3 INJECTION, SOLUTION INTRAMUSCULAR; SUBCUTANEOUS EVERY 5 MIN PRN
Status: DISCONTINUED | OUTPATIENT
Start: 2021-09-30 | End: 2021-09-30 | Stop reason: HOSPADM

## 2021-09-30 RX ORDER — MEPERIDINE HYDROCHLORIDE 25 MG/ML
25 INJECTION INTRAMUSCULAR; INTRAVENOUS; SUBCUTANEOUS EVERY 30 MIN PRN
Status: DISCONTINUED | OUTPATIENT
Start: 2021-09-30 | End: 2021-09-30 | Stop reason: HOSPADM

## 2021-09-30 RX ORDER — DIPHENHYDRAMINE HCL 25 MG
12.5 TABLET ORAL ONCE
Status: CANCELLED
Start: 2021-10-28

## 2021-09-30 RX ORDER — NALOXONE HYDROCHLORIDE 0.4 MG/ML
0.2 INJECTION, SOLUTION INTRAMUSCULAR; INTRAVENOUS; SUBCUTANEOUS
Status: DISCONTINUED | OUTPATIENT
Start: 2021-09-30 | End: 2021-09-30 | Stop reason: HOSPADM

## 2021-09-30 RX ORDER — METHYLPREDNISOLONE SODIUM SUCCINATE 125 MG/2ML
125 INJECTION, POWDER, LYOPHILIZED, FOR SOLUTION INTRAMUSCULAR; INTRAVENOUS
Status: DISCONTINUED | OUTPATIENT
Start: 2021-09-30 | End: 2021-09-30 | Stop reason: HOSPADM

## 2021-09-30 RX ORDER — NALOXONE HYDROCHLORIDE 0.4 MG/ML
0.2 INJECTION, SOLUTION INTRAMUSCULAR; INTRAVENOUS; SUBCUTANEOUS
Status: CANCELLED | OUTPATIENT
Start: 2021-10-28

## 2021-09-30 RX ORDER — EPINEPHRINE 1 MG/ML
0.3 INJECTION, SOLUTION INTRAMUSCULAR; SUBCUTANEOUS EVERY 5 MIN PRN
Status: CANCELLED | OUTPATIENT
Start: 2021-10-28

## 2021-09-30 RX ORDER — HEPARIN SODIUM,PORCINE 10 UNIT/ML
5 VIAL (ML) INTRAVENOUS
Status: CANCELLED | OUTPATIENT
Start: 2021-10-28

## 2021-09-30 RX ORDER — DIPHENHYDRAMINE HCL 25 MG
12.5 TABLET ORAL ONCE
Status: COMPLETED | OUTPATIENT
Start: 2021-09-30 | End: 2021-09-30

## 2021-09-30 RX ORDER — MEPERIDINE HYDROCHLORIDE 25 MG/ML
25 INJECTION INTRAMUSCULAR; INTRAVENOUS; SUBCUTANEOUS EVERY 30 MIN PRN
Status: CANCELLED | OUTPATIENT
Start: 2021-10-28

## 2021-09-30 RX ORDER — DIPHENHYDRAMINE HYDROCHLORIDE 50 MG/ML
50 INJECTION INTRAMUSCULAR; INTRAVENOUS
Status: CANCELLED
Start: 2021-10-28

## 2021-09-30 RX ORDER — ALBUTEROL SULFATE 0.83 MG/ML
2.5 SOLUTION RESPIRATORY (INHALATION)
Status: DISCONTINUED | OUTPATIENT
Start: 2021-09-30 | End: 2021-09-30 | Stop reason: HOSPADM

## 2021-09-30 RX ORDER — HEPARIN SODIUM (PORCINE) LOCK FLUSH IV SOLN 100 UNIT/ML 100 UNIT/ML
5 SOLUTION INTRAVENOUS
Status: DISCONTINUED | OUTPATIENT
Start: 2021-09-30 | End: 2021-09-30 | Stop reason: HOSPADM

## 2021-09-30 RX ORDER — ALBUTEROL SULFATE 90 UG/1
1-2 AEROSOL, METERED RESPIRATORY (INHALATION)
Status: CANCELLED
Start: 2021-10-28

## 2021-09-30 RX ORDER — DIPHENHYDRAMINE HYDROCHLORIDE 50 MG/ML
50 INJECTION INTRAMUSCULAR; INTRAVENOUS
Status: DISCONTINUED | OUTPATIENT
Start: 2021-09-30 | End: 2021-09-30 | Stop reason: HOSPADM

## 2021-09-30 RX ORDER — ACETAMINOPHEN 325 MG/1
650 TABLET ORAL ONCE
Status: COMPLETED | OUTPATIENT
Start: 2021-09-30 | End: 2021-09-30

## 2021-09-30 RX ORDER — HEPARIN SODIUM (PORCINE) LOCK FLUSH IV SOLN 100 UNIT/ML 100 UNIT/ML
5 SOLUTION INTRAVENOUS
Status: CANCELLED | OUTPATIENT
Start: 2021-10-28

## 2021-09-30 RX ADMIN — FAMOTIDINE 20 MG: 10 INJECTION, SOLUTION INTRAVENOUS at 10:07

## 2021-09-30 RX ADMIN — ACETAMINOPHEN 650 MG: 325 TABLET ORAL at 10:06

## 2021-09-30 RX ADMIN — DIPHENHYDRAMINE HCL 12.5 MG: 25 TABLET ORAL at 10:32

## 2021-09-30 RX ADMIN — HEPARIN 5 ML: 100 SYRINGE at 12:52

## 2021-09-30 RX ADMIN — HUMAN IMMUNOGLOBULIN G 25 G: 20 LIQUID INTRAVENOUS at 11:01

## 2021-09-30 RX ADMIN — METHYLPREDNISOLONE SODIUM SUCCINATE 125 MG: 125 INJECTION, POWDER, FOR SOLUTION INTRAMUSCULAR; INTRAVENOUS at 10:11

## 2021-09-30 NOTE — PROGRESS NOTES
"Infusion Nursing Note:  Avis Paul presents today for monthly IGG infusion.    Patient seen by provider today: No   present during visit today: Not Applicable.    Note: Pt arrives ambulatory to [facility]. Pt reports worsening vision, with upcoming cataract surgery. Current baseline pain unchanged.      Intravenous Access:  Implanted Port; 1\" port needle used.    Treatment Conditions:  Not Applicable.      Post Infusion Assessment:  Pt pre-medicated (oral benadryl) and tolerated infusion without incident; infusion rate schedule per MAR direction. VSS.  Site patent and intact, free from redness, edema or discomfort.  No evidence of extravasations.  Access discontinued per protocol.       Discharge Plan:   Patient discharged in stable condition accompanied by: self.      Tracie Sandy RN                      "

## 2021-10-02 ENCOUNTER — HEALTH MAINTENANCE LETTER (OUTPATIENT)
Age: 56
End: 2021-10-02

## 2021-10-29 ENCOUNTER — INFUSION THERAPY VISIT (OUTPATIENT)
Dept: INFUSION THERAPY | Facility: HOSPITAL | Age: 56
End: 2021-10-29
Attending: INTERNAL MEDICINE
Payer: COMMERCIAL

## 2021-10-29 VITALS
TEMPERATURE: 98 F | OXYGEN SATURATION: 99 % | WEIGHT: 293 LBS | RESPIRATION RATE: 18 BRPM | DIASTOLIC BLOOD PRESSURE: 74 MMHG | HEART RATE: 69 BPM | BODY MASS INDEX: 46.05 KG/M2 | SYSTOLIC BLOOD PRESSURE: 126 MMHG

## 2021-10-29 DIAGNOSIS — Z23 NEED FOR PROPHYLACTIC VACCINATION AND INOCULATION AGAINST INFLUENZA: ICD-10-CM

## 2021-10-29 DIAGNOSIS — D80.1 HYPOGAMMAGLOBULINEMIA (H): Primary | ICD-10-CM

## 2021-10-29 PROCEDURE — 96375 TX/PRO/DX INJ NEW DRUG ADDON: CPT

## 2021-10-29 PROCEDURE — G0008 ADMIN INFLUENZA VIRUS VAC: HCPCS | Performed by: INTERNAL MEDICINE

## 2021-10-29 PROCEDURE — 96366 THER/PROPH/DIAG IV INF ADDON: CPT

## 2021-10-29 PROCEDURE — 250N000013 HC RX MED GY IP 250 OP 250 PS 637: Performed by: INTERNAL MEDICINE

## 2021-10-29 PROCEDURE — 90682 RIV4 VACC RECOMBINANT DNA IM: CPT | Performed by: INTERNAL MEDICINE

## 2021-10-29 PROCEDURE — 96365 THER/PROPH/DIAG IV INF INIT: CPT

## 2021-10-29 PROCEDURE — 250N000011 HC RX IP 250 OP 636: Performed by: INTERNAL MEDICINE

## 2021-10-29 PROCEDURE — 250N000009 HC RX 250: Performed by: INTERNAL MEDICINE

## 2021-10-29 PROCEDURE — 258N000003 HC RX IP 258 OP 636: Performed by: INTERNAL MEDICINE

## 2021-10-29 RX ORDER — HEPARIN SODIUM (PORCINE) LOCK FLUSH IV SOLN 100 UNIT/ML 100 UNIT/ML
5 SOLUTION INTRAVENOUS
Status: DISCONTINUED | OUTPATIENT
Start: 2021-10-29 | End: 2021-10-29 | Stop reason: HOSPADM

## 2021-10-29 RX ORDER — ALBUTEROL SULFATE 90 UG/1
1-2 AEROSOL, METERED RESPIRATORY (INHALATION)
Status: CANCELLED
Start: 2021-11-25

## 2021-10-29 RX ORDER — MEPERIDINE HYDROCHLORIDE 25 MG/ML
25 INJECTION INTRAMUSCULAR; INTRAVENOUS; SUBCUTANEOUS EVERY 30 MIN PRN
Status: CANCELLED | OUTPATIENT
Start: 2021-11-25

## 2021-10-29 RX ORDER — EPINEPHRINE 1 MG/ML
0.3 INJECTION, SOLUTION INTRAMUSCULAR; SUBCUTANEOUS EVERY 5 MIN PRN
Status: CANCELLED | OUTPATIENT
Start: 2021-11-25

## 2021-10-29 RX ORDER — HEPARIN SODIUM,PORCINE 10 UNIT/ML
5 VIAL (ML) INTRAVENOUS
Status: CANCELLED | OUTPATIENT
Start: 2021-11-25

## 2021-10-29 RX ORDER — METHYLPREDNISOLONE SODIUM SUCCINATE 125 MG/2ML
125 INJECTION, POWDER, LYOPHILIZED, FOR SOLUTION INTRAMUSCULAR; INTRAVENOUS ONCE
Status: CANCELLED
Start: 2021-11-25 | End: 2021-11-25

## 2021-10-29 RX ORDER — METHYLPREDNISOLONE SODIUM SUCCINATE 125 MG/2ML
125 INJECTION, POWDER, LYOPHILIZED, FOR SOLUTION INTRAMUSCULAR; INTRAVENOUS
Status: DISCONTINUED | OUTPATIENT
Start: 2021-10-29 | End: 2021-10-29 | Stop reason: HOSPADM

## 2021-10-29 RX ORDER — ALBUTEROL SULFATE 0.83 MG/ML
2.5 SOLUTION RESPIRATORY (INHALATION)
Status: CANCELLED | OUTPATIENT
Start: 2021-11-25

## 2021-10-29 RX ORDER — DIPHENHYDRAMINE HCL 25 MG
12.5 TABLET ORAL ONCE
Status: CANCELLED
Start: 2021-11-25

## 2021-10-29 RX ORDER — EPINEPHRINE 1 MG/ML
0.3 INJECTION, SOLUTION INTRAMUSCULAR; SUBCUTANEOUS EVERY 5 MIN PRN
Status: DISCONTINUED | OUTPATIENT
Start: 2021-10-29 | End: 2021-10-29 | Stop reason: HOSPADM

## 2021-10-29 RX ORDER — MEPERIDINE HYDROCHLORIDE 25 MG/ML
25 INJECTION INTRAMUSCULAR; INTRAVENOUS; SUBCUTANEOUS EVERY 30 MIN PRN
Status: DISCONTINUED | OUTPATIENT
Start: 2021-10-29 | End: 2021-10-29 | Stop reason: HOSPADM

## 2021-10-29 RX ORDER — ACETAMINOPHEN 325 MG/1
650 TABLET ORAL ONCE
Status: COMPLETED | OUTPATIENT
Start: 2021-10-29 | End: 2021-10-29

## 2021-10-29 RX ORDER — ACETAMINOPHEN 325 MG/1
650 TABLET ORAL ONCE
Status: CANCELLED
Start: 2021-11-25

## 2021-10-29 RX ORDER — ALBUTEROL SULFATE 90 UG/1
1-2 AEROSOL, METERED RESPIRATORY (INHALATION)
Status: DISCONTINUED | OUTPATIENT
Start: 2021-10-29 | End: 2021-10-29 | Stop reason: HOSPADM

## 2021-10-29 RX ORDER — ALBUTEROL SULFATE 0.83 MG/ML
2.5 SOLUTION RESPIRATORY (INHALATION)
Status: DISCONTINUED | OUTPATIENT
Start: 2021-10-29 | End: 2021-10-29 | Stop reason: HOSPADM

## 2021-10-29 RX ORDER — METHYLPREDNISOLONE SODIUM SUCCINATE 125 MG/2ML
125 INJECTION, POWDER, LYOPHILIZED, FOR SOLUTION INTRAMUSCULAR; INTRAVENOUS ONCE
Status: COMPLETED | OUTPATIENT
Start: 2021-10-29 | End: 2021-10-29

## 2021-10-29 RX ORDER — HEPARIN SODIUM (PORCINE) LOCK FLUSH IV SOLN 100 UNIT/ML 100 UNIT/ML
5 SOLUTION INTRAVENOUS
Status: CANCELLED | OUTPATIENT
Start: 2021-11-25

## 2021-10-29 RX ORDER — DIPHENHYDRAMINE HCL 25 MG
12.5 TABLET ORAL ONCE
Status: COMPLETED | OUTPATIENT
Start: 2021-10-29 | End: 2021-10-29

## 2021-10-29 RX ORDER — METHYLPREDNISOLONE SODIUM SUCCINATE 125 MG/2ML
125 INJECTION, POWDER, LYOPHILIZED, FOR SOLUTION INTRAMUSCULAR; INTRAVENOUS
Status: CANCELLED
Start: 2021-11-25

## 2021-10-29 RX ORDER — NALOXONE HYDROCHLORIDE 0.4 MG/ML
0.2 INJECTION, SOLUTION INTRAMUSCULAR; INTRAVENOUS; SUBCUTANEOUS
Status: DISCONTINUED | OUTPATIENT
Start: 2021-10-29 | End: 2021-10-29 | Stop reason: HOSPADM

## 2021-10-29 RX ORDER — NALOXONE HYDROCHLORIDE 0.4 MG/ML
0.2 INJECTION, SOLUTION INTRAMUSCULAR; INTRAVENOUS; SUBCUTANEOUS
Status: CANCELLED | OUTPATIENT
Start: 2021-11-25

## 2021-10-29 RX ORDER — DIPHENHYDRAMINE HYDROCHLORIDE 50 MG/ML
50 INJECTION INTRAMUSCULAR; INTRAVENOUS
Status: DISCONTINUED | OUTPATIENT
Start: 2021-10-29 | End: 2021-10-29 | Stop reason: HOSPADM

## 2021-10-29 RX ORDER — DIPHENHYDRAMINE HYDROCHLORIDE 50 MG/ML
50 INJECTION INTRAMUSCULAR; INTRAVENOUS
Status: CANCELLED
Start: 2021-11-25

## 2021-10-29 RX ADMIN — FAMOTIDINE 20 MG: 10 INJECTION INTRAVENOUS at 09:50

## 2021-10-29 RX ADMIN — DIPHENHYDRAMINE HCL 12.5 MG: 25 TABLET ORAL at 09:42

## 2021-10-29 RX ADMIN — SODIUM CHLORIDE 250 ML: 9 INJECTION, SOLUTION INTRAVENOUS at 09:15

## 2021-10-29 RX ADMIN — HEPARIN 5 ML: 100 SYRINGE at 12:37

## 2021-10-29 RX ADMIN — HUMAN IMMUNOGLOBULIN G 25 G: 20 LIQUID INTRAVENOUS at 10:00

## 2021-10-29 RX ADMIN — ACETAMINOPHEN 650 MG: 325 TABLET ORAL at 09:42

## 2021-10-29 RX ADMIN — METHYLPREDNISOLONE SODIUM SUCCINATE 125 MG: 125 INJECTION, POWDER, FOR SOLUTION INTRAMUSCULAR; INTRAVENOUS at 09:53

## 2021-10-29 RX ADMIN — INFLUENZA A VIRUS A/WISCONSIN/588/2019 (H1N1) RECOMBINANT HEMAGGLUTININ ANTIGEN, INFLUENZA A VIRUS A/TASMANIA/503/2020 (H3N2) RECOMBINANT HEMAGGLUTININ ANTIGEN, INFLUENZA B VIRUS B/WASHINGTON/02/2019 RECOMBINANT HEMAGGLUTININ ANTIGEN, AND INFLUENZA B VIRUS B/PHUKET/3073/2013 RECOMBINANT HEMAGGLUTININ ANTIGEN 0.5 ML: 45; 45; 45; 45 INJECTION INTRAMUSCULAR at 12:58

## 2021-10-29 NOTE — PROGRESS NOTES
Infusion Nursing Note:  Avis Paul presents today, ambulatory for IgG (and premeds) - seatted in chair 3.    Patient seen by provider today: No   present during visit today: Not Applicable.    Note: Reviewed plan of care. Accessed port a cath per sterile technique and obtained an excellent blood return. Used NS as the primary IV solution, and administered the premeds -- oral acetaminophen, oral benadryl 12.5 mg, famotidine 20 mg IVP, solumedrol 125 mg IVP. Infused IgG 25 gms over 2 hours 10 minutes. No ill effects noted. VSS. Administered the flu vaccine, upon patient request. Left the unit ambulatory and in stable condition.     Intravenous Access:  Implanted Port, accessed using a 1 inch gripper needle.    Treatment Conditions:  Labs not required on this OP visit.    Post Infusion Assessment:  Patient tolerated infusion without incident.  Blood return noted pre and post infusion.  Site patent and intact, free from redness, edema or discomfort.  Access discontinued per protocol.     Discharge Plan:   Discharge instructions reviewed with: Patient.  Patient verbalized understanding of discharge instructions and all questions answered.  Patient discharged in stable condition accompanied by: self.  Departure Mode: Ambulatory at approximately 13:10; plans to return on November 24th for labs, CT, and infusion.    Cristal Doherty RN

## 2021-11-02 ENCOUNTER — DOCUMENTATION ONLY (OUTPATIENT)
Dept: ONCOLOGY | Facility: HOSPITAL | Age: 56
End: 2021-11-02

## 2021-11-02 NOTE — PROGRESS NOTES
Disability paperwork for [employer] has been filled out, signed and faxed.    Fax: [phone]    Cecy Gan RN Care Coordinator  [facility]

## 2021-11-24 ENCOUNTER — HOSPITAL ENCOUNTER (OUTPATIENT)
Dept: CT IMAGING | Facility: HOSPITAL | Age: 56
End: 2021-11-24
Attending: INTERNAL MEDICINE
Payer: COMMERCIAL

## 2021-11-24 ENCOUNTER — ONCOLOGY VISIT (OUTPATIENT)
Dept: ONCOLOGY | Facility: HOSPITAL | Age: 56
End: 2021-11-24
Attending: NURSE PRACTITIONER
Payer: COMMERCIAL

## 2021-11-24 ENCOUNTER — INFUSION THERAPY VISIT (OUTPATIENT)
Dept: INFUSION THERAPY | Facility: HOSPITAL | Age: 56
End: 2021-11-24
Attending: INTERNAL MEDICINE
Payer: COMMERCIAL

## 2021-11-24 VITALS
DIASTOLIC BLOOD PRESSURE: 79 MMHG | BODY MASS INDEX: 45.42 KG/M2 | HEART RATE: 70 BPM | WEIGHT: 290 LBS | RESPIRATION RATE: 18 BRPM | SYSTOLIC BLOOD PRESSURE: 124 MMHG

## 2021-11-24 VITALS
SYSTOLIC BLOOD PRESSURE: 132 MMHG | DIASTOLIC BLOOD PRESSURE: 75 MMHG | WEIGHT: 290.6 LBS | BODY MASS INDEX: 45.51 KG/M2 | TEMPERATURE: 98.2 F | RESPIRATION RATE: 20 BRPM | OXYGEN SATURATION: 95 % | HEART RATE: 82 BPM

## 2021-11-24 DIAGNOSIS — C83.18 LYMPHOMA, MANTLE CELL, MULTIPLE SITES (H): ICD-10-CM

## 2021-11-24 DIAGNOSIS — R50.9 FEVER, UNSPECIFIED FEVER CAUSE: ICD-10-CM

## 2021-11-24 DIAGNOSIS — E03.8 OTHER SPECIFIED HYPOTHYROIDISM: Primary | ICD-10-CM

## 2021-11-24 DIAGNOSIS — D80.1 HYPOGAMMAGLOBULINEMIA (H): Primary | ICD-10-CM

## 2021-11-24 LAB
ALBUMIN SERPL-MCNC: 3.4 G/DL (ref 3.5–5)
ALP SERPL-CCNC: 104 U/L (ref 45–120)
ALT SERPL W P-5'-P-CCNC: 13 U/L (ref 0–45)
ANION GAP SERPL CALCULATED.3IONS-SCNC: 10 MMOL/L (ref 5–18)
AST SERPL W P-5'-P-CCNC: 17 U/L (ref 0–40)
BASOPHILS # BLD AUTO: 0 10E3/UL (ref 0–0.2)
BASOPHILS NFR BLD AUTO: 0 %
BILIRUB SERPL-MCNC: 0.6 MG/DL (ref 0–1)
BUN SERPL-MCNC: 24 MG/DL (ref 8–22)
CALCIUM SERPL-MCNC: 9.7 MG/DL (ref 8.5–10.5)
CHLORIDE BLD-SCNC: 106 MMOL/L (ref 98–107)
CO2 SERPL-SCNC: 25 MMOL/L (ref 22–31)
CREAT SERPL-MCNC: 2.13 MG/DL (ref 0.6–1.1)
EOSINOPHIL # BLD AUTO: 0.1 10E3/UL (ref 0–0.7)
EOSINOPHIL NFR BLD AUTO: 1 %
ERYTHROCYTE [DISTWIDTH] IN BLOOD BY AUTOMATED COUNT: 14.2 % (ref 10–15)
GFR SERPL CREATININE-BSD FRML MDRD: 25 ML/MIN/1.73M2
GLUCOSE BLD-MCNC: 80 MG/DL (ref 70–125)
HCT VFR BLD AUTO: 46.2 % (ref 35–47)
HGB BLD-MCNC: 14.7 G/DL (ref 11.7–15.7)
IGA SERPL-MCNC: 7 MG/DL (ref 65–400)
IGG SERPL-MCNC: 627 MG/DL (ref 700–1700)
IGM SERPL-MCNC: <5 MG/DL (ref 60–280)
IMM GRANULOCYTES # BLD: 0 10E3/UL
IMM GRANULOCYTES NFR BLD: 0 %
LDH SERPL L TO P-CCNC: 188 U/L (ref 125–220)
LYMPHOCYTES # BLD AUTO: 1.4 10E3/UL (ref 0.8–5.3)
LYMPHOCYTES NFR BLD AUTO: 17 %
MCH RBC QN AUTO: 29.5 PG (ref 26.5–33)
MCHC RBC AUTO-ENTMCNC: 31.8 G/DL (ref 31.5–36.5)
MCV RBC AUTO: 93 FL (ref 78–100)
MONOCYTES # BLD AUTO: 0.6 10E3/UL (ref 0–1.3)
MONOCYTES NFR BLD AUTO: 7 %
NEUTROPHILS # BLD AUTO: 5.9 10E3/UL (ref 1.6–8.3)
NEUTROPHILS NFR BLD AUTO: 75 %
NRBC # BLD AUTO: 0 10E3/UL
NRBC BLD AUTO-RTO: 0 /100
PLATELET # BLD AUTO: 171 10E3/UL (ref 150–450)
POTASSIUM BLD-SCNC: 3.9 MMOL/L (ref 3.5–5)
PROT SERPL-MCNC: 6.3 G/DL (ref 6–8)
RBC # BLD AUTO: 4.99 10E6/UL (ref 3.8–5.2)
SODIUM SERPL-SCNC: 141 MMOL/L (ref 136–145)
TSH SERPL DL<=0.005 MIU/L-ACNC: 2.81 UIU/ML (ref 0.3–5)
WBC # BLD AUTO: 7.9 10E3/UL (ref 4–11)

## 2021-11-24 PROCEDURE — 80053 COMPREHEN METABOLIC PANEL: CPT

## 2021-11-24 PROCEDURE — 99214 OFFICE O/P EST MOD 30 MIN: CPT | Performed by: NURSE PRACTITIONER

## 2021-11-24 PROCEDURE — 250N000013 HC RX MED GY IP 250 OP 250 PS 637: Performed by: INTERNAL MEDICINE

## 2021-11-24 PROCEDURE — 250N000009 HC RX 250: Performed by: INTERNAL MEDICINE

## 2021-11-24 PROCEDURE — G0463 HOSPITAL OUTPT CLINIC VISIT: HCPCS | Mod: 25

## 2021-11-24 PROCEDURE — 250N000011 HC RX IP 250 OP 636: Performed by: INTERNAL MEDICINE

## 2021-11-24 PROCEDURE — 84443 ASSAY THYROID STIM HORMONE: CPT | Performed by: NURSE PRACTITIONER

## 2021-11-24 PROCEDURE — 83615 LACTATE (LD) (LDH) ENZYME: CPT

## 2021-11-24 PROCEDURE — 71250 CT THORAX DX C-: CPT

## 2021-11-24 PROCEDURE — 36591 DRAW BLOOD OFF VENOUS DEVICE: CPT

## 2021-11-24 PROCEDURE — 82784 ASSAY IGA/IGD/IGG/IGM EACH: CPT

## 2021-11-24 PROCEDURE — 85025 COMPLETE CBC W/AUTO DIFF WBC: CPT

## 2021-11-24 PROCEDURE — 96365 THER/PROPH/DIAG IV INF INIT: CPT

## 2021-11-24 PROCEDURE — 96375 TX/PRO/DX INJ NEW DRUG ADDON: CPT

## 2021-11-24 PROCEDURE — 258N000003 HC RX IP 258 OP 636: Performed by: INTERNAL MEDICINE

## 2021-11-24 RX ORDER — MEPERIDINE HYDROCHLORIDE 25 MG/ML
25 INJECTION INTRAMUSCULAR; INTRAVENOUS; SUBCUTANEOUS EVERY 30 MIN PRN
Status: DISCONTINUED | OUTPATIENT
Start: 2021-11-24 | End: 2021-11-24 | Stop reason: HOSPADM

## 2021-11-24 RX ORDER — METHYLPREDNISOLONE SODIUM SUCCINATE 125 MG/2ML
125 INJECTION, POWDER, LYOPHILIZED, FOR SOLUTION INTRAMUSCULAR; INTRAVENOUS
Status: CANCELLED
Start: 2021-11-26

## 2021-11-24 RX ORDER — DIPHENHYDRAMINE HYDROCHLORIDE 50 MG/ML
50 INJECTION INTRAMUSCULAR; INTRAVENOUS
Status: DISCONTINUED | OUTPATIENT
Start: 2021-11-24 | End: 2021-11-24 | Stop reason: HOSPADM

## 2021-11-24 RX ORDER — ACETAMINOPHEN 325 MG/1
650 TABLET ORAL ONCE
Status: COMPLETED | OUTPATIENT
Start: 2021-11-24 | End: 2021-11-24

## 2021-11-24 RX ORDER — NALOXONE HYDROCHLORIDE 0.4 MG/ML
0.2 INJECTION, SOLUTION INTRAMUSCULAR; INTRAVENOUS; SUBCUTANEOUS
Status: DISCONTINUED | OUTPATIENT
Start: 2021-11-24 | End: 2021-11-24 | Stop reason: HOSPADM

## 2021-11-24 RX ORDER — METHYLPREDNISOLONE SODIUM SUCCINATE 125 MG/2ML
125 INJECTION, POWDER, LYOPHILIZED, FOR SOLUTION INTRAMUSCULAR; INTRAVENOUS ONCE
Status: COMPLETED | OUTPATIENT
Start: 2021-11-24 | End: 2021-11-24

## 2021-11-24 RX ORDER — METHYLPREDNISOLONE SODIUM SUCCINATE 125 MG/2ML
125 INJECTION, POWDER, LYOPHILIZED, FOR SOLUTION INTRAMUSCULAR; INTRAVENOUS ONCE
Status: CANCELLED
Start: 2021-11-26 | End: 2021-11-26

## 2021-11-24 RX ORDER — METHYLPREDNISOLONE SODIUM SUCCINATE 125 MG/2ML
125 INJECTION, POWDER, LYOPHILIZED, FOR SOLUTION INTRAMUSCULAR; INTRAVENOUS
Status: DISCONTINUED | OUTPATIENT
Start: 2021-11-24 | End: 2021-11-24 | Stop reason: HOSPADM

## 2021-11-24 RX ORDER — HEPARIN SODIUM (PORCINE) LOCK FLUSH IV SOLN 100 UNIT/ML 100 UNIT/ML
5 SOLUTION INTRAVENOUS
Status: DISCONTINUED | OUTPATIENT
Start: 2021-11-24 | End: 2021-11-24 | Stop reason: HOSPADM

## 2021-11-24 RX ORDER — DIPHENHYDRAMINE HYDROCHLORIDE 50 MG/ML
50 INJECTION INTRAMUSCULAR; INTRAVENOUS
Status: CANCELLED
Start: 2021-11-26

## 2021-11-24 RX ORDER — ALBUTEROL SULFATE 90 UG/1
1-2 AEROSOL, METERED RESPIRATORY (INHALATION)
Status: DISCONTINUED | OUTPATIENT
Start: 2021-11-24 | End: 2021-11-24 | Stop reason: HOSPADM

## 2021-11-24 RX ORDER — DIPHENHYDRAMINE HCL 25 MG
12.5 TABLET ORAL ONCE
Status: COMPLETED | OUTPATIENT
Start: 2021-11-24 | End: 2021-11-24

## 2021-11-24 RX ORDER — ALBUTEROL SULFATE 0.83 MG/ML
2.5 SOLUTION RESPIRATORY (INHALATION)
Status: CANCELLED | OUTPATIENT
Start: 2021-11-26

## 2021-11-24 RX ORDER — DIPHENHYDRAMINE HCL 25 MG
12.5 TABLET ORAL ONCE
Status: CANCELLED
Start: 2021-11-26

## 2021-11-24 RX ORDER — HEPARIN SODIUM,PORCINE 10 UNIT/ML
5 VIAL (ML) INTRAVENOUS
Status: CANCELLED | OUTPATIENT
Start: 2021-11-26

## 2021-11-24 RX ORDER — ACETAMINOPHEN 325 MG/1
650 TABLET ORAL ONCE
Status: CANCELLED
Start: 2021-11-26

## 2021-11-24 RX ORDER — ALBUTEROL SULFATE 90 UG/1
1-2 AEROSOL, METERED RESPIRATORY (INHALATION)
Status: CANCELLED
Start: 2021-11-26

## 2021-11-24 RX ORDER — EPINEPHRINE 1 MG/ML
0.3 INJECTION, SOLUTION INTRAMUSCULAR; SUBCUTANEOUS EVERY 5 MIN PRN
Status: DISCONTINUED | OUTPATIENT
Start: 2021-11-24 | End: 2021-11-24 | Stop reason: HOSPADM

## 2021-11-24 RX ORDER — EPINEPHRINE 1 MG/ML
0.3 INJECTION, SOLUTION INTRAMUSCULAR; SUBCUTANEOUS EVERY 5 MIN PRN
Status: CANCELLED | OUTPATIENT
Start: 2021-11-26

## 2021-11-24 RX ORDER — MEPERIDINE HYDROCHLORIDE 25 MG/ML
25 INJECTION INTRAMUSCULAR; INTRAVENOUS; SUBCUTANEOUS EVERY 30 MIN PRN
Status: CANCELLED | OUTPATIENT
Start: 2021-11-26

## 2021-11-24 RX ORDER — HEPARIN SODIUM (PORCINE) LOCK FLUSH IV SOLN 100 UNIT/ML 100 UNIT/ML
5 SOLUTION INTRAVENOUS
Status: CANCELLED | OUTPATIENT
Start: 2021-11-26

## 2021-11-24 RX ORDER — NALOXONE HYDROCHLORIDE 0.4 MG/ML
0.2 INJECTION, SOLUTION INTRAMUSCULAR; INTRAVENOUS; SUBCUTANEOUS
Status: CANCELLED | OUTPATIENT
Start: 2021-11-26

## 2021-11-24 RX ORDER — ALBUTEROL SULFATE 0.83 MG/ML
2.5 SOLUTION RESPIRATORY (INHALATION)
Status: DISCONTINUED | OUTPATIENT
Start: 2021-11-24 | End: 2021-11-24 | Stop reason: HOSPADM

## 2021-11-24 RX ADMIN — HEPARIN SODIUM (PORCINE) LOCK FLUSH IV SOLN 100 UNIT/ML 5 ML: 100 SOLUTION at 12:00

## 2021-11-24 RX ADMIN — METHYLPREDNISOLONE SODIUM SUCCINATE 125 MG: 125 INJECTION, POWDER, FOR SOLUTION INTRAMUSCULAR; INTRAVENOUS at 09:39

## 2021-11-24 RX ADMIN — HUMAN IMMUNOGLOBULIN G 25 G: 20 LIQUID INTRAVENOUS at 10:18

## 2021-11-24 RX ADMIN — DIPHENHYDRAMINE HCL 12.5 MG: 25 TABLET ORAL at 09:39

## 2021-11-24 RX ADMIN — ACETAMINOPHEN 650 MG: 325 TABLET ORAL at 09:38

## 2021-11-24 RX ADMIN — FAMOTIDINE 20 MG: 10 INJECTION, SOLUTION INTRAVENOUS at 09:39

## 2021-11-24 RX ADMIN — SODIUM CHLORIDE 250 ML: 9 INJECTION, SOLUTION INTRAVENOUS at 09:38

## 2021-11-24 ASSESSMENT — PAIN SCALES - GENERAL: PAINLEVEL: MODERATE PAIN (5)

## 2021-11-24 NOTE — LETTER
11/24/2021         RE: Avis Paul  [address] [facility] [address]        Dear Colleague,    Thank you for referring your patient, Avis Paul, to the [facility] [place]. Please see a copy of my visit note below.    [facility] Hematology and Oncology Progress Note    Patient: Avis Paul  MRN: [id number]  Date of Service: Nov 24, 2021          Reason for Visit    Chief Complaint   Patient presents with     Oncology Clinic Visit     Lymphoma, mantle cell, multiple sites (H)       Assessment and Plan    Cancer Staging  No matching staging information was found for the patient.    1. Mantle cell lymphoma status post autologous transplant, August 18, 2015: Then completed 2 years of maintenance Rituxan therapy.  She is currently on observation.   She will return in 4 months to see Dr. Starks with CT. Encourage patient to call us if she has any lymphadenopathy that she notices clinically before then.     2. Hypogammaglobulinemia with multiple recurrent infections:   On IVIG. She will get it every 4 weeks.  She will see Dr. Starks in 3 months and we will recheck her IVIG level at that time.      3. Mild left inguinal adenopathy found in May 2020: pt had 2 biopsies. Initial one suggested relapse of lymphoma, but review at [facility] was negative. Pt treatment with one month of ibrutinib in the meantime and lymph node resolved.  Has been stable since that time.  Her scan done today shows one lymph node that is stable.  Repeat scan in 4 months.     ECOG Performance    0 - Independent    Distress Screening (within last 30 days)    No data recorded     Pain  Pain Score: Moderate Pain (5) (R shoulder)    Problem List    Patient Active Problem List   Diagnosis     Lymphoma, mantle cell, multiple sites (H)     H/O renal impairment     UTI (urinary tract infection)     NHL (non-Hodgkin's lymphoma) (H)     Neutropenic fever (H)     Hypogammaglobulinemia (H)     Anemia due  to antineoplastic chemotherapy     Drug-induced thrombocytopenia     Constipation     Cough     Dehydration     Fever     Hypothyroidism     Morbid obesity (H)     Elevation of level of transaminase or lactic acid dehydrogenase (LDH)     Malaise and fatigue     Postoperative anemia due to acute blood loss     Sleep apnea     CKD (chronic kidney disease) stage 4, GFR 15-29 ml/min (H)        ______________________________________________________________________________    History of Present Illness    Measurable disease: Patient currently in remission. Disease previously found on CT, PET     Current therapy: Observation  IVIG resumed in January 2017 and stopped in May 2018; resumed again in September 2018  Maintenance Rituxan started December 2015, and completed August 2017, 8 doses  Acyclovir 400 mg twice daily  IVIG every 3 months, first dose August 29, 2016(currently on hold, last dose November 2016)   IVIG every 4 weeks restarted in June 2017 and stopped in May 2018        Treatment history:  Ibrutinib 420 mg p.o. daily started for presumed relapse of mantle cell lymphoma  July 3, 2020 and stopped August 4, 2020     First set of immunizations received at the [facility] in August 2016  First dose of IVIG  Patient completed 1 year of inhalational pentamidine  Autologous stem cell transplant with Beam prep, August 18, 2015  One cycle of R-ICE, Naye 15, 2015, ifosfamide and etoposide reduced by 25% because of renal dysfunction  Ibrutinib since October 10, 2014, current dose of 420 mg by mouth daily, stopped June 9, 2015   3 cycles of R-DHAP completed late August 2014   5 cycles of R CHOP, started after diagnosis in March 2014     Interim History: Is here today for a 4-month follow-up visit.  Overall she is been doing okay.  She has noticed that she is been having some falls over the last year and a half.  She thinks is probably due to her neuropathy and then if she has any sort of balance issue she really cannot  recover from it and then falls.  Has not had any major issues but it is getting more frequent.  Other than that she had some minor sinus infections but has not needed antibiotics.           Review of Systems    Pertinent items are noted in HPI.    Past History    Past Medical History:   Diagnosis Date     Anemia      Cancer (H) 2014    Mantle Cell Lymphoma     Chronic kidney disease     elevated creatinine due to chemo     History of anesthesia complications 2015    urinary retention after lymph node excision. required catheterization     History of blood transfusion     reaction to platelets with hives in the past     History of transfusion      Hypothyroid      Hypothyroidism 2007     Kidney stones 2002     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 Cooperative in conversation. Here alone. Mask on  RESP: Regular respiratory rate. No expiratory wheezes   MUSCULOSKELETAL: no bilateral leg swelling  NEURO: non focal. Alert and oriented x3.   PSYCH: within normal limits. No depression or anxiety.  SKIN: exposed skin is dry intact.     Lab Results    Recent Results (from the past 168 hour(s))   Comprehensive metabolic panel (BMP + Alb, Alk Phos, ALT, AST, Total. Bili, TP)   Result Value Ref Range    Sodium 141 136 - 145 mmol/L    Potassium 3.9 3.5 - 5.0 mmol/L    Chloride 106 98 - 107 mmol/L    Carbon Dioxide (CO2) 25 22 - 31 mmol/L    Anion Gap 10 5 - 18 mmol/L    Urea Nitrogen 24 (H) 8 - 22 mg/dL    Creatinine 2.13 (H) 0.60 - 1.10  mg/dL    Calcium 9.7 8.5 - 10.5 mg/dL    Glucose 80 70 - 125 mg/dL    Alkaline Phosphatase 104 45 - 120 U/L    AST 17 0 - 40 U/L    ALT 13 0 - 45 U/L    Protein Total 6.3 6.0 - 8.0 g/dL    Albumin 3.4 (L) 3.5 - 5.0 g/dL    Bilirubin Total 0.6 0.0 - 1.0 mg/dL    GFR Estimate 25 (L) >60 mL/min/1.73m2   CBC with platelets and differential   Result Value Ref Range    WBC Count 7.9 4.0 - 11.0 10e3/uL    RBC Count 4.99 3.80 - 5.20 10e6/uL    Hemoglobin 14.7 11.7 - 15.7 g/dL    Hematocrit 46.2 35.0 - 47.0 %    MCV 93 78 - 100 fL    MCH 29.5 26.5 - 33.0 pg    MCHC 31.8 31.5 - 36.5 g/dL    RDW 14.2 10.0 - 15.0 %    Platelet Count 171 150 - 450 10e3/uL    % Neutrophils 75 %    % Lymphocytes 17 %    % Monocytes 7 %    % Eosinophils 1 %    % Basophils 0 %    % Immature Granulocytes 0 %    NRBCs per 100 WBC 0 <1 /100    Absolute Neutrophils 5.9 1.6 - 8.3 10e3/uL    Absolute Lymphocytes 1.4 0.8 - 5.3 10e3/uL    Absolute Monocytes 0.6 0.0 - 1.3 10e3/uL    Absolute Eosinophils 0.1 0.0 - 0.7 10e3/uL    Absolute Basophils 0.0 0.0 - 0.2 10e3/uL    Absolute Immature Granulocytes 0.0 <=0.0 10e3/uL    Absolute NRBCs 0.0 10e3/uL   TSH with free T4 reflex   Result Value Ref Range    TSH 2.81 0.30 - 5.00 uIU/mL       Imaging    CT Chest Abdomen Pelvis w/o Contrast    Result Date: 11/24/2021  EXAM: CT CHEST ABDOMEN PELVIS W/O CONTRAST LOCATION: [facility] DATE/TIME: 11/24/2021 8:16 AM INDICATION:  Lymphoma, mantle cell, multiple sites (H) COMPARISON: CT chest, abdomen and pelvis, 08/05/2021. TECHNIQUE: CT scan of the chest, abdomen, and pelvis was performed without IV contrast. Multiplanar reformats were obtained. Dose reduction techniques were used. CONTRAST: None. FINDINGS: LUNGS AND PLEURA: There are a few tiny bilateral pulmonary nodules with the largest measuring 2 mm in greatest dimension within the right lower lobe (series 5 image 196). These are not significantly changed. MEDIASTINUM/AXILLAE: Heart size is  "normal. The thoracic aorta is normal in caliber. No lymphadenopathy. CORONARY ARTERY CALCIFICATION: None. HEPATOBILIARY: Assessment of the liver is technically compromised given lack of intravenous contrast. There are a few subcentimeter low dense lesions throughout the liver which are not significantly changed. The largest measure 8 mm in greatest dimension  within segment 8 (series 3 image 128). PANCREAS: Normal. SPLEEN: Normal. ADRENAL GLANDS: Normal. KIDNEYS/BLADDER: Normal. BOWEL: Normal. LYMPH NODES: No significant change in ill-defined nodule in the left internal iliac chain (series 3 image 247). This measures 1 cm in short axis. There are no other enlarged lymph nodes identified. VASCULATURE: Unremarkable. PELVIC ORGANS: Normal. MUSCULOSKELETAL: No suspicious osseous lesions. Multiple circumscribed breast masses are seen bilaterally, not significantly changed.     IMPRESSION: 1.  Stable mildly prominent left internal iliac lymph node. No new areas of lymphadenopathy are seen within the chest, abdomen or pelvis. 2.  Stable multiple small bilateral circumscribed breast masses. 3.  Stable subcentimeter hypodense hepatic lesions. These are technically indeterminate. Ultrasound could be performed for further evaluation.        Signed by: CORNELIUS Post CNP    Oncology Rooming Note    November 24, 2021 8:49 AM   Avis Paul is a 56 year old female who presents for:    Chief Complaint   Patient presents with     Oncology Clinic Visit     Lymphoma, mantle cell, multiple sites (H)     Initial Vitals: /75   Pulse 82   Temp 98.2  F (36.8  C)   Resp 20   Wt 131.8 kg (290 lb 9.6 oz)   SpO2 95%   BMI 45.51 kg/m   Estimated body mass index is 45.51 kg/m  as calculated from the following:    Height as of 8/5/21: 1.702 m (5' 7\").    Weight as of this encounter: 131.8 kg (290 lb 9.6 oz). Body surface area is 2.5 meters squared.  Moderate Pain (5) Comment: R shoulder   No LMP recorded. Patient is " postmenopausal.  Allergies reviewed: Yes  Medications reviewed: Yes    Medications: Medication refills not needed today.  Pharmacy name entered into [facility]: [facility] - [place], [place] - [facility] [address]     Clinical concerns: None       Carissa Hawkins LPN                Again, thank you for allowing me to participate in the care of your patient.        Sincerely,        CORNELIUS Post CNP

## 2021-11-24 NOTE — PROGRESS NOTES
[facility] Hematology and Oncology Progress Note    Patient: Avis Paul  MRN: [id number]  Date of Service: Nov 24, 2021          Reason for Visit    Chief Complaint   Patient presents with     Oncology Clinic Visit     Lymphoma, mantle cell, multiple sites (H)       Assessment and Plan    Cancer Staging  No matching staging information was found for the patient.    1. Mantle cell lymphoma status post autologous transplant, August 18, 2015: Then completed 2 years of maintenance Rituxan therapy.  She is currently on observation.   She will return in 4 months to see Dr. Starks with CT. Encourage patient to call us if she has any lymphadenopathy that she notices clinically before then.     2. Hypogammaglobulinemia with multiple recurrent infections:   On IVIG. She will get it every 4 weeks.  She will see Dr. Starks in 3 months and we will recheck her IVIG level at that time.      3. Mild left inguinal adenopathy found in May 2020: pt had 2 biopsies. Initial one suggested relapse of lymphoma, but review at [facility] was negative. Pt treatment with one month of ibrutinib in the meantime and lymph node resolved.  Has been stable since that time.  Her scan done today shows one lymph node that is stable.  Repeat scan in 4 months.     ECOG Performance    0 - Independent    Distress Screening (within last 30 days)    No data recorded     Pain  Pain Score: Moderate Pain (5) (R shoulder)    Problem List    Patient Active Problem List   Diagnosis     Lymphoma, mantle cell, multiple sites (H)     H/O renal impairment     UTI (urinary tract infection)     NHL (non-Hodgkin's lymphoma) (H)     Neutropenic fever (H)     Hypogammaglobulinemia (H)     Anemia due to antineoplastic chemotherapy     Drug-induced thrombocytopenia     Constipation     Cough     Dehydration     Fever     Hypothyroidism     Morbid obesity (H)     Elevation of level of transaminase or lactic acid dehydrogenase (LDH)     Malaise and fatigue      Postoperative anemia due to acute blood loss     Sleep apnea     CKD (chronic kidney disease) stage 4, GFR 15-29 ml/min (H)        ______________________________________________________________________________    History of Present Illness    Measurable disease: Patient currently in remission. Disease previously found on CT, PET     Current therapy: Observation  IVIG resumed in January 2017 and stopped in May 2018; resumed again in September 2018  Maintenance Rituxan started December 2015, and completed August 2017, 8 doses  Acyclovir 400 mg twice daily  IVIG every 3 months, first dose August 29, 2016(currently on hold, last dose November 2016)   IVIG every 4 weeks restarted in June 2017 and stopped in May 2018        Treatment history:  Ibrutinib 420 mg p.o. daily started for presumed relapse of mantle cell lymphoma  July 3, 2020 and stopped August 4, 2020     First set of immunizations received at the [facility] in August 2016  First dose of IVIG  Patient completed 1 year of inhalational pentamidine  Autologous stem cell transplant with Beam prep, August 18, 2015  One cycle of R-ICE, Naye 15, 2015, ifosfamide and etoposide reduced by 25% because of renal dysfunction  Ibrutinib since October 10, 2014, current dose of 420 mg by mouth daily, stopped June 9, 2015   3 cycles of R-DHAP completed late August 2014   5 cycles of R CHOP, started after diagnosis in March 2014     Interim History: Is here today for a 4-month follow-up visit.  Overall she is been doing okay.  She has noticed that she is been having some falls over the last year and a half.  She thinks is probably due to her neuropathy and then if she has any sort of balance issue she really cannot recover from it and then falls.  Has not had any major issues but it is getting more frequent.  Other than that she had some minor sinus infections but has not needed antibiotics.           Review of Systems    Pertinent items are noted in HPI.    Past  History    Past Medical History:   Diagnosis Date     Anemia      Cancer (H) 2014    Mantle Cell Lymphoma     Chronic kidney disease     elevated creatinine due to chemo     History of anesthesia complications 2015    urinary retention after lymph node excision. required catheterization     History of blood transfusion     reaction to platelets with hives in the past     History of transfusion      Hypothyroid      Hypothyroidism 2007     Kidney stones 2002     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 Cooperative in conversation. Here alone. Mask on  RESP: Regular respiratory rate. No expiratory wheezes   MUSCULOSKELETAL: no bilateral leg swelling  NEURO: non focal. Alert and oriented x3.   PSYCH: within normal limits. No depression or anxiety.  SKIN: exposed skin is dry intact.     Lab Results    Recent Results (from the past 168 hour(s))   Comprehensive metabolic panel (BMP + Alb, Alk Phos, ALT, AST, Total. Bili, TP)   Result Value Ref Range    Sodium 141 136 - 145 mmol/L    Potassium 3.9 3.5 - 5.0 mmol/L    Chloride 106 98 - 107 mmol/L    Carbon Dioxide (CO2) 25 22 - 31 mmol/L    Anion Gap 10 5 - 18 mmol/L    Urea Nitrogen 24 (H) 8 - 22 mg/dL    Creatinine 2.13 (H) 0.60 - 1.10 mg/dL    Calcium 9.7 8.5 - 10.5 mg/dL    Glucose 80 70 - 125 mg/dL    Alkaline Phosphatase 104 45 - 120 U/L    AST 17 0 - 40 U/L    ALT 13 0 - 45 U/L    Protein Total 6.3 6.0 - 8.0 g/dL    Albumin 3.4 (L) 3.5 - 5.0 g/dL    Bilirubin Total 0.6 0.0 - 1.0  mg/dL    GFR Estimate 25 (L) >60 mL/min/1.73m2   CBC with platelets and differential   Result Value Ref Range    WBC Count 7.9 4.0 - 11.0 10e3/uL    RBC Count 4.99 3.80 - 5.20 10e6/uL    Hemoglobin 14.7 11.7 - 15.7 g/dL    Hematocrit 46.2 35.0 - 47.0 %    MCV 93 78 - 100 fL    MCH 29.5 26.5 - 33.0 pg    MCHC 31.8 31.5 - 36.5 g/dL    RDW 14.2 10.0 - 15.0 %    Platelet Count 171 150 - 450 10e3/uL    % Neutrophils 75 %    % Lymphocytes 17 %    % Monocytes 7 %    % Eosinophils 1 %    % Basophils 0 %    % Immature Granulocytes 0 %    NRBCs per 100 WBC 0 <1 /100    Absolute Neutrophils 5.9 1.6 - 8.3 10e3/uL    Absolute Lymphocytes 1.4 0.8 - 5.3 10e3/uL    Absolute Monocytes 0.6 0.0 - 1.3 10e3/uL    Absolute Eosinophils 0.1 0.0 - 0.7 10e3/uL    Absolute Basophils 0.0 0.0 - 0.2 10e3/uL    Absolute Immature Granulocytes 0.0 <=0.0 10e3/uL    Absolute NRBCs 0.0 10e3/uL   TSH with free T4 reflex   Result Value Ref Range    TSH 2.81 0.30 - 5.00 uIU/mL       Imaging    CT Chest Abdomen Pelvis w/o Contrast    Result Date: 11/24/2021  EXAM: CT CHEST ABDOMEN PELVIS W/O CONTRAST LOCATION: [facility] DATE/TIME: 11/24/2021 8:16 AM INDICATION:  Lymphoma, mantle cell, multiple sites (H) COMPARISON: CT chest, abdomen and pelvis, 08/05/2021. TECHNIQUE: CT scan of the chest, abdomen, and pelvis was performed without IV contrast. Multiplanar reformats were obtained. Dose reduction techniques were used. CONTRAST: None. FINDINGS: LUNGS AND PLEURA: There are a few tiny bilateral pulmonary nodules with the largest measuring 2 mm in greatest dimension within the right lower lobe (series 5 image 196). These are not significantly changed. MEDIASTINUM/AXILLAE: Heart size is normal. The thoracic aorta is normal in caliber. No lymphadenopathy. CORONARY ARTERY CALCIFICATION: None. HEPATOBILIARY: Assessment of the liver is technically compromised given lack of intravenous contrast. There are a few subcentimeter low dense lesions  throughout the liver which are not significantly changed. The largest measure 8 mm in greatest dimension  within segment 8 (series 3 image 128). PANCREAS: Normal. SPLEEN: Normal. ADRENAL GLANDS: Normal. KIDNEYS/BLADDER: Normal. BOWEL: Normal. LYMPH NODES: No significant change in ill-defined nodule in the left internal iliac chain (series 3 image 247). This measures 1 cm in short axis. There are no other enlarged lymph nodes identified. VASCULATURE: Unremarkable. PELVIC ORGANS: Normal. MUSCULOSKELETAL: No suspicious osseous lesions. Multiple circumscribed breast masses are seen bilaterally, not significantly changed.     IMPRESSION: 1.  Stable mildly prominent left internal iliac lymph node. No new areas of lymphadenopathy are seen within the chest, abdomen or pelvis. 2.  Stable multiple small bilateral circumscribed breast masses. 3.  Stable subcentimeter hypodense hepatic lesions. These are technically indeterminate. Ultrasound could be performed for further evaluation.        Signed by: CORNELIUS Post CNP

## 2021-11-24 NOTE — PROGRESS NOTES
"Oncology Rooming Note    November 24, 2021 8:49 AM   Avis Paul is a 56 year old female who presents for:    Chief Complaint   Patient presents with     Oncology Clinic Visit     Lymphoma, mantle cell, multiple sites (H)     Initial Vitals: /75   Pulse 82   Temp 98.2  F (36.8  C)   Resp 20   Wt 131.8 kg (290 lb 9.6 oz)   SpO2 95%   BMI 45.51 kg/m   Estimated body mass index is 45.51 kg/m  as calculated from the following:    Height as of 8/5/21: 1.702 m (5' 7\").    Weight as of this encounter: 131.8 kg (290 lb 9.6 oz). Body surface area is 2.5 meters squared.  Moderate Pain (5) Comment: R shoulder   No LMP recorded. Patient is postmenopausal.  Allergies reviewed: Yes  Medications reviewed: Yes    Medications: Medication refills not needed today.  Pharmacy name entered into [facility]: [facility] - [place], [place] - [facility] [address]     Clinical concerns: None       Carissa Hawkins LPN            "

## 2021-11-24 NOTE — PROGRESS NOTES
"0745 Avis arrived A&Ox4 ambulatory and stable, here for lab draw, then going for CT, OV with KE then back to Infusion for pre meds and IVIG  Port accesed with ease, excellent blood return and labs drawn; port flushed with NS20mL and capped. 0810 Avis left for Radiology and clinic appt with KE.  0925  Avis returned to infusion, seated in chair #2. Labs reviewed- pt commented that her thyroid level was \"high for me- they like to keep in closer to 1\", pt will contact provider.   Pre meds given as ordered; then IVIG infused as ordered and at rate per MAR (initiate @ 0.5mL/kg/min and increase by 0.5 mL/kg/min every 15minutes if pt tolerating infusion, to max infusion rate of 4mL/kg/hr. Avis tolerated infusion w/o sxs adverse Rxn; VSS. line flushed NS20mL; port flushed, heparinized and gripper needle dc'd, needle insertion site bled when needle removed- pressure held/gauze 4x4 until bleeding stopped, new gauze applied. Pt declined AVS and stated she will stop at [facility] desk to make more appts. Avis dc'd A&Ox4 ambulatory and stable at 1210.  "

## 2021-11-25 ENCOUNTER — MYC MEDICAL ADVICE (OUTPATIENT)
Dept: FAMILY MEDICINE | Facility: CLINIC | Age: 56
End: 2021-11-25
Payer: MEDICARE

## 2021-11-25 DIAGNOSIS — E03.9 HYPOTHYROIDISM, UNSPECIFIED TYPE: ICD-10-CM

## 2021-11-26 NOTE — TELEPHONE ENCOUNTER
Holding for primary provider - since her TSH is in the normal range I will defer adjustment of her levothyroxine to Dr. Tan

## 2021-11-26 NOTE — TELEPHONE ENCOUNTER
Dr. Tan,    Patient sends my chart with labs TSH.  Wanting her thyroid medication adjusted.  Please advise. Jaz SAWYER RN

## 2021-11-29 RX ORDER — LEVOTHYROXINE SODIUM 112 UG/1
112 TABLET ORAL EVERY OTHER DAY
Qty: 45 TABLET | Refills: 3 | Status: SHIPPED | OUTPATIENT
Start: 2021-11-29 | End: 2022-08-10

## 2021-11-29 RX ORDER — LEVOTHYROXINE SODIUM 100 UG/1
100 TABLET ORAL EVERY OTHER DAY
Qty: 45 TABLET | Refills: 3 | Status: SHIPPED | OUTPATIENT
Start: 2021-11-29 | End: 2022-02-14

## 2021-12-08 ENCOUNTER — TELEPHONE (OUTPATIENT)
Dept: OBGYN | Facility: CLINIC | Age: 56
End: 2021-12-08

## 2021-12-08 ENCOUNTER — OFFICE VISIT (OUTPATIENT)
Dept: OBGYN | Facility: CLINIC | Age: 56
End: 2021-12-08
Payer: COMMERCIAL

## 2021-12-08 VITALS
DIASTOLIC BLOOD PRESSURE: 94 MMHG | HEIGHT: 68 IN | HEART RATE: 80 BPM | WEIGHT: 292.3 LBS | BODY MASS INDEX: 44.3 KG/M2 | TEMPERATURE: 97 F | SYSTOLIC BLOOD PRESSURE: 159 MMHG

## 2021-12-08 DIAGNOSIS — N76.0 BV (BACTERIAL VAGINOSIS): Primary | ICD-10-CM

## 2021-12-08 DIAGNOSIS — B96.89 BV (BACTERIAL VAGINOSIS): Primary | ICD-10-CM

## 2021-12-08 DIAGNOSIS — N89.8 VAGINAL DISCHARGE: ICD-10-CM

## 2021-12-08 LAB
CLUE CELLS: ABNORMAL
TRICHOMONAS, WET PREP: ABNORMAL
WBC'S/HIGH POWER FIELD, WET PREP: ABNORMAL
YEAST, WET PREP: ABNORMAL

## 2021-12-08 PROCEDURE — 99213 OFFICE O/P EST LOW 20 MIN: CPT | Performed by: OBSTETRICS & GYNECOLOGY

## 2021-12-08 PROCEDURE — 87210 SMEAR WET MOUNT SALINE/INK: CPT | Performed by: OBSTETRICS & GYNECOLOGY

## 2021-12-08 ASSESSMENT — MIFFLIN-ST. JEOR: SCORE: 1956.42

## 2021-12-08 NOTE — PROGRESS NOTES
SUBJECTIVE:   56 year old female complains of green vaginal discharge for 1 year .  Denies abnormal vaginal bleeding or significant pelvic pain or  fever. No UTI symptoms. Denies history of known exposure to STD.  She is not sexually active   She has been treated in the past for BV with 2 weeks of Flagyl without any improvement  She picked up some Boric acid and has been using it twice weekly with improvement in odor reduction but not significantly decreasing the discharge    No LMP recorded. Patient is postmenopausal.    OBJECTIVE:   She appears well, afebrile.  Abdomen: benign, soft, nontender, no masses.  Pelvic Exam: vaginal discharge described as white and creamy, adenxa normal in size without tenderness, wet mount exam shows 4+ white blood cells, exam chaperoned by nurse.      ASSESSMENT:   nonspecific vaginitis  Nonspecific vaginitis vs Pyometra  Comment:   Plan: Wet prep - Clinic Collect            (N89.8) Vaginal discharge  Comment:   Plan: boric acid 600 mg vaginal suppository -         PHARMACY TO MIX COMPOUND, US Pelvic Complete         with Transvaginal               ROV prn if symptoms persist or worsen.      Henri Ambriz MD

## 2021-12-08 NOTE — TELEPHONE ENCOUNTER
Left message for patient letting her know Dr Ambriz said she is able to use the Boric acid up to three times weekly if it is helping her symptoms.  Told patient to call us back or mychart message us with any questions.

## 2021-12-09 ENCOUNTER — HOSPITAL ENCOUNTER (OUTPATIENT)
Dept: ULTRASOUND IMAGING | Facility: CLINIC | Age: 56
Discharge: HOME | End: 2021-12-09
Attending: OBSTETRICS & GYNECOLOGY | Admitting: OBSTETRICS & GYNECOLOGY
Payer: COMMERCIAL

## 2021-12-09 DIAGNOSIS — N89.8 VAGINAL DISCHARGE: ICD-10-CM

## 2021-12-09 PROCEDURE — 76830 TRANSVAGINAL US NON-OB: CPT

## 2021-12-14 NOTE — RESULT ENCOUNTER NOTE
Your results are back and they are normal.  I HAVE CONTACTED THE PHARMACY @ [place] TO SEND YOU VAGINAL STEROIDS TO PLACE VAGINALLY AT BEDTIME DAILY.  I THINK THAT YOU HAVE INFLAMMATORY VAGINITIS.  Henri Ambriz MD

## 2021-12-15 ENCOUNTER — TELEPHONE (OUTPATIENT)
Dept: OBGYN | Facility: CLINIC | Age: 56
End: 2021-12-15
Payer: MEDICARE

## 2021-12-15 NOTE — TELEPHONE ENCOUNTER
Henri Ambriz MD  You 22 minutes ago (2:46 PM)     RM    That would be a month supply.   I would like to see how she does on it.   Can they give her a week supply to try?   Henri Ambriz MD      One week supply is $61.61     If patient is unable to pay for- please inform Dr. Ambriz.    LM for patient to call to see if 1 week supply is okay to fill for now.     Everton MIDDLETON RN   Specialty Clinics

## 2021-12-15 NOTE — TELEPHONE ENCOUNTER
"Patient called. Stated a cream was ordered by Dr. Ambriz and it will be over $200. Stated \"it started with a hydro\".     Called pharmacy: Hydrocortisone and Eucerin cream- does not appear on her med list. The pharmacy is however able to see it.     Patient will not be able to fill due to cost. Is wondering what alternative there may be, or can she purchase hydrocortisone cream and Eucerin separately and mix herself?     Thank you,   Everton MIDDLETON RN   [facility]   "

## 2021-12-16 NOTE — TELEPHONE ENCOUNTER
Patient called back and approved a one week prescription, requesting prescription be sent to [facility].    Elin Contreras RN  [facility]

## 2021-12-16 NOTE — TELEPHONE ENCOUNTER
Prescription needs to go to compounding pharmacy as it is a compounded prescription. Spoke with compounding pharmacy to notify of patient intent to try for one week. Pharmacy will reach out to patient to arrange payment and delivery.    Aysha Lock   [facility]  [place]

## 2021-12-17 ENCOUNTER — TELEPHONE (OUTPATIENT)
Dept: ONCOLOGY | Facility: HOSPITAL | Age: 56
End: 2021-12-17
Payer: MEDICARE

## 2021-12-17 ENCOUNTER — MYC MEDICAL ADVICE (OUTPATIENT)
Dept: FAMILY MEDICINE | Facility: CLINIC | Age: 56
End: 2021-12-17
Payer: MEDICARE

## 2021-12-17 NOTE — TELEPHONE ENCOUNTER
Pt calls at 1625 to say she is covid positive and want order for antibody treatment. I spoke with her that this clinic doesn't order that.Pt sounds very congested and states she has extreme fatigue. She is 2 hours away from [facility] and is nervous she will get worse and can't drive here. I instructed pt tp go to ED near her and if necessary they will transport her down.   I called the [facility] and spoke with Srini a pharmacist and he says pt needs to have a poitive covid test there and then a Dr there can order treatment. He will call pt to update.

## 2021-12-23 ENCOUNTER — INFUSION THERAPY VISIT (OUTPATIENT)
Dept: INFUSION THERAPY | Facility: HOSPITAL | Age: 56
End: 2021-12-23
Attending: INTERNAL MEDICINE
Payer: COMMERCIAL

## 2021-12-23 ENCOUNTER — TELEPHONE (OUTPATIENT)
Dept: ONCOLOGY | Facility: HOSPITAL | Age: 56
End: 2021-12-23
Payer: MEDICARE

## 2021-12-23 VITALS
WEIGHT: 282 LBS | RESPIRATION RATE: 18 BRPM | OXYGEN SATURATION: 95 % | TEMPERATURE: 98.2 F | DIASTOLIC BLOOD PRESSURE: 86 MMHG | BODY MASS INDEX: 43.52 KG/M2 | SYSTOLIC BLOOD PRESSURE: 129 MMHG | HEART RATE: 96 BPM

## 2021-12-23 DIAGNOSIS — D80.1 HYPOGAMMAGLOBULINEMIA (H): Primary | ICD-10-CM

## 2021-12-23 PROCEDURE — 250N000013 HC RX MED GY IP 250 OP 250 PS 637: Performed by: INTERNAL MEDICINE

## 2021-12-23 PROCEDURE — 250N000009 HC RX 250: Performed by: INTERNAL MEDICINE

## 2021-12-23 PROCEDURE — 96366 THER/PROPH/DIAG IV INF ADDON: CPT

## 2021-12-23 PROCEDURE — 96375 TX/PRO/DX INJ NEW DRUG ADDON: CPT

## 2021-12-23 PROCEDURE — 96365 THER/PROPH/DIAG IV INF INIT: CPT

## 2021-12-23 PROCEDURE — 250N000011 HC RX IP 250 OP 636: Performed by: INTERNAL MEDICINE

## 2021-12-23 RX ORDER — ALBUTEROL SULFATE 0.83 MG/ML
2.5 SOLUTION RESPIRATORY (INHALATION)
Status: CANCELLED | OUTPATIENT
Start: 2021-12-24

## 2021-12-23 RX ORDER — MEPERIDINE HYDROCHLORIDE 25 MG/ML
25 INJECTION INTRAMUSCULAR; INTRAVENOUS; SUBCUTANEOUS EVERY 30 MIN PRN
Status: CANCELLED | OUTPATIENT
Start: 2021-12-24

## 2021-12-23 RX ORDER — DIPHENHYDRAMINE HYDROCHLORIDE 50 MG/ML
50 INJECTION INTRAMUSCULAR; INTRAVENOUS
Status: CANCELLED
Start: 2021-12-24

## 2021-12-23 RX ORDER — ACETAMINOPHEN 325 MG/1
650 TABLET ORAL ONCE
Status: CANCELLED
Start: 2021-12-24

## 2021-12-23 RX ORDER — ALBUTEROL SULFATE 90 UG/1
1-2 AEROSOL, METERED RESPIRATORY (INHALATION)
Status: CANCELLED
Start: 2021-12-24

## 2021-12-23 RX ORDER — HEPARIN SODIUM (PORCINE) LOCK FLUSH IV SOLN 100 UNIT/ML 100 UNIT/ML
5 SOLUTION INTRAVENOUS
Status: DISCONTINUED | OUTPATIENT
Start: 2021-12-23 | End: 2021-12-23 | Stop reason: HOSPADM

## 2021-12-23 RX ORDER — DIPHENHYDRAMINE HCL 25 MG
12.5 TABLET ORAL ONCE
Status: CANCELLED
Start: 2021-12-24

## 2021-12-23 RX ORDER — DIPHENHYDRAMINE HYDROCHLORIDE 50 MG/ML
50 INJECTION INTRAMUSCULAR; INTRAVENOUS
Status: DISCONTINUED | OUTPATIENT
Start: 2021-12-23 | End: 2021-12-23 | Stop reason: HOSPADM

## 2021-12-23 RX ORDER — ALBUTEROL SULFATE 0.83 MG/ML
2.5 SOLUTION RESPIRATORY (INHALATION)
Status: DISCONTINUED | OUTPATIENT
Start: 2021-12-23 | End: 2021-12-23 | Stop reason: HOSPADM

## 2021-12-23 RX ORDER — NALOXONE HYDROCHLORIDE 0.4 MG/ML
0.2 INJECTION, SOLUTION INTRAMUSCULAR; INTRAVENOUS; SUBCUTANEOUS
Status: DISCONTINUED | OUTPATIENT
Start: 2021-12-23 | End: 2021-12-23 | Stop reason: HOSPADM

## 2021-12-23 RX ORDER — HEPARIN SODIUM (PORCINE) LOCK FLUSH IV SOLN 100 UNIT/ML 100 UNIT/ML
5 SOLUTION INTRAVENOUS
Status: CANCELLED | OUTPATIENT
Start: 2021-12-24

## 2021-12-23 RX ORDER — ACETAMINOPHEN 325 MG/1
650 TABLET ORAL ONCE
Status: COMPLETED | OUTPATIENT
Start: 2021-12-23 | End: 2021-12-23

## 2021-12-23 RX ORDER — METHYLPREDNISOLONE SODIUM SUCCINATE 125 MG/2ML
125 INJECTION, POWDER, LYOPHILIZED, FOR SOLUTION INTRAMUSCULAR; INTRAVENOUS
Status: CANCELLED
Start: 2021-12-24

## 2021-12-23 RX ORDER — METHYLPREDNISOLONE SODIUM SUCCINATE 125 MG/2ML
125 INJECTION, POWDER, LYOPHILIZED, FOR SOLUTION INTRAMUSCULAR; INTRAVENOUS ONCE
Status: COMPLETED | OUTPATIENT
Start: 2021-12-23 | End: 2021-12-23

## 2021-12-23 RX ORDER — EPINEPHRINE 1 MG/ML
0.3 INJECTION, SOLUTION INTRAMUSCULAR; SUBCUTANEOUS EVERY 5 MIN PRN
Status: CANCELLED | OUTPATIENT
Start: 2021-12-24

## 2021-12-23 RX ORDER — DIPHENHYDRAMINE HCL 25 MG
12.5 TABLET ORAL ONCE
Status: COMPLETED | OUTPATIENT
Start: 2021-12-23 | End: 2021-12-23

## 2021-12-23 RX ORDER — NALOXONE HYDROCHLORIDE 0.4 MG/ML
0.2 INJECTION, SOLUTION INTRAMUSCULAR; INTRAVENOUS; SUBCUTANEOUS
Status: CANCELLED | OUTPATIENT
Start: 2021-12-24

## 2021-12-23 RX ORDER — HEPARIN SODIUM,PORCINE 10 UNIT/ML
5 VIAL (ML) INTRAVENOUS
Status: CANCELLED | OUTPATIENT
Start: 2021-12-24

## 2021-12-23 RX ORDER — ALBUTEROL SULFATE 90 UG/1
1-2 AEROSOL, METERED RESPIRATORY (INHALATION)
Status: DISCONTINUED | OUTPATIENT
Start: 2021-12-23 | End: 2021-12-23 | Stop reason: HOSPADM

## 2021-12-23 RX ORDER — MEPERIDINE HYDROCHLORIDE 25 MG/ML
25 INJECTION INTRAMUSCULAR; INTRAVENOUS; SUBCUTANEOUS EVERY 30 MIN PRN
Status: DISCONTINUED | OUTPATIENT
Start: 2021-12-23 | End: 2021-12-23 | Stop reason: HOSPADM

## 2021-12-23 RX ORDER — METHYLPREDNISOLONE SODIUM SUCCINATE 125 MG/2ML
125 INJECTION, POWDER, LYOPHILIZED, FOR SOLUTION INTRAMUSCULAR; INTRAVENOUS ONCE
Status: CANCELLED
Start: 2021-12-24 | End: 2021-12-24

## 2021-12-23 RX ORDER — METHYLPREDNISOLONE SODIUM SUCCINATE 125 MG/2ML
125 INJECTION, POWDER, LYOPHILIZED, FOR SOLUTION INTRAMUSCULAR; INTRAVENOUS
Status: DISCONTINUED | OUTPATIENT
Start: 2021-12-23 | End: 2021-12-23 | Stop reason: HOSPADM

## 2021-12-23 RX ORDER — EPINEPHRINE 1 MG/ML
0.3 INJECTION, SOLUTION INTRAMUSCULAR; SUBCUTANEOUS EVERY 5 MIN PRN
Status: DISCONTINUED | OUTPATIENT
Start: 2021-12-23 | End: 2021-12-23 | Stop reason: HOSPADM

## 2021-12-23 RX ADMIN — FAMOTIDINE 20 MG: 10 INJECTION, SOLUTION INTRAVENOUS at 10:08

## 2021-12-23 RX ADMIN — HEPARIN 5 ML: 100 SYRINGE at 12:28

## 2021-12-23 RX ADMIN — METHYLPREDNISOLONE SODIUM SUCCINATE 125 MG: 125 INJECTION, POWDER, FOR SOLUTION INTRAMUSCULAR; INTRAVENOUS at 10:01

## 2021-12-23 RX ADMIN — HUMAN IMMUNOGLOBULIN G 25 G: 20 LIQUID INTRAVENOUS at 10:47

## 2021-12-23 RX ADMIN — DIPHENHYDRAMINE HCL 12.5 MG: 25 TABLET ORAL at 09:59

## 2021-12-23 RX ADMIN — ACETAMINOPHEN 650 MG: 325 TABLET ORAL at 09:59

## 2021-12-23 NOTE — TELEPHONE ENCOUNTER
Patient is due in today for IVIG infusion in first floor infusion.  The infusion nurses have asked Nanda Olivares CNP if she should be coming in since she is COVID positive as of 12/17/21. Nanda called and spoke with the oncology pharmacist who states that as long as the patient no longer has symptoms of COVID, she is ok to get the infusion.  If she has symptoms, then she cannot get it.  I have called and left a detailed message on patients self identifying voicemail letting her know the above information. I asked that she not come in if she is having symptoms.  If she is no longer symptomatic, I told her she can still come in.  I did ask that she call us back and update us. Since her appt is at 0915 and she shows up without getting the message, we will assess at that time.  Infusion nurses have been updated.    Audrey Mcgrath RN

## 2021-12-23 NOTE — PROGRESS NOTES
Infusion Nursing Note:  Avis Paul presents today for IVIG.    Patient seen by provider today: No   present during visit today: Not Applicable.    Note: Avis comes to infusion today ambulatory and in stable condition. Does report that she tested positive for COVID-19 on 12/17, but stated that she now just has a mild cough and her fatigue has improved tremendously. OK per KAPIL Sen NP and pharmacy to received monthly IVIG today. Port accessed under sterile technique with excellent blood return noted. Pre medications given (PO Tylenol and Benadryl, and IV Solu medrol and Pepcid). Waited about 40 minutes before the IGG was given. IGG given per orders. Avis tolerated well without any issues. VSS throughout today's appointment. After the completion of IGG, the line was flushed with NS, Heparin and then was de accessed. Port site did bleed at time of de access. Pressure was applied with 2x2 gauze and then redressed site with a clean 2x2 gauze and secured with paper tape. Reviewed upcoming appointments. Left infusion in stable condition at 1237. Will return next month.    Intravenous Access:  Implanted Port.    Treatment Conditions:  Not Applicable.    Post Infusion Assessment:  Patient tolerated infusion without incident.  Blood return noted pre and post infusion.  Site patent and intact, free from redness, edema or discomfort.  No evidence of extravasations.  Access discontinued per protocol.     Discharge Plan:   Discharge instructions reviewed with: Patient.  Patient and/or family verbalized understanding of discharge instructions and all questions answered.  Patient will return on 1/20/2022 for next appointment.  Patient discharged in stable condition accompanied by: self.  Departure Mode: Ambulatory.    Jessi Smith RN

## 2022-01-10 ENCOUNTER — TELEPHONE (OUTPATIENT)
Dept: SLEEP MEDICINE | Facility: CLINIC | Age: 57
End: 2022-01-10
Payer: COMMERCIAL

## 2022-01-10 NOTE — TELEPHONE ENCOUNTER
Called patient that we are needing previous sleep study report. Patient stated she already reach out to her previous clinic and waiting on sleep study reports.  Patient also stated she is using a CPAP. Patient was informed to bring CPAP and power cord with for the appointment.         Em Galeana [facility]

## 2022-01-20 ENCOUNTER — INFUSION THERAPY VISIT (OUTPATIENT)
Dept: INFUSION THERAPY | Facility: HOSPITAL | Age: 57
End: 2022-01-20
Attending: INTERNAL MEDICINE
Payer: COMMERCIAL

## 2022-01-20 VITALS
OXYGEN SATURATION: 99 % | RESPIRATION RATE: 16 BRPM | BODY MASS INDEX: 44.9 KG/M2 | HEART RATE: 81 BPM | TEMPERATURE: 97.7 F | WEIGHT: 291 LBS | DIASTOLIC BLOOD PRESSURE: 89 MMHG | SYSTOLIC BLOOD PRESSURE: 135 MMHG

## 2022-01-20 DIAGNOSIS — D80.1 HYPOGAMMAGLOBULINEMIA (H): Primary | ICD-10-CM

## 2022-01-20 DIAGNOSIS — E03.9 HYPOTHYROIDISM, UNSPECIFIED TYPE: ICD-10-CM

## 2022-01-20 LAB
IGG SERPL-MCNC: 595 MG/DL (ref 700–1700)
TSH SERPL DL<=0.005 MIU/L-ACNC: 0.38 UIU/ML (ref 0.3–5)

## 2022-01-20 PROCEDURE — 250N000011 HC RX IP 250 OP 636: Performed by: INTERNAL MEDICINE

## 2022-01-20 PROCEDURE — 96375 TX/PRO/DX INJ NEW DRUG ADDON: CPT

## 2022-01-20 PROCEDURE — 250N000013 HC RX MED GY IP 250 OP 250 PS 637: Performed by: INTERNAL MEDICINE

## 2022-01-20 PROCEDURE — 84443 ASSAY THYROID STIM HORMONE: CPT

## 2022-01-20 PROCEDURE — 82784 ASSAY IGA/IGD/IGG/IGM EACH: CPT | Performed by: INTERNAL MEDICINE

## 2022-01-20 PROCEDURE — 258N000003 HC RX IP 258 OP 636: Performed by: INTERNAL MEDICINE

## 2022-01-20 PROCEDURE — 36591 DRAW BLOOD OFF VENOUS DEVICE: CPT

## 2022-01-20 PROCEDURE — 250N000009 HC RX 250: Performed by: INTERNAL MEDICINE

## 2022-01-20 PROCEDURE — 96365 THER/PROPH/DIAG IV INF INIT: CPT

## 2022-01-20 RX ORDER — HEPARIN SODIUM (PORCINE) LOCK FLUSH IV SOLN 100 UNIT/ML 100 UNIT/ML
5 SOLUTION INTRAVENOUS
Status: DISCONTINUED | OUTPATIENT
Start: 2022-01-20 | End: 2022-01-20 | Stop reason: HOSPADM

## 2022-01-20 RX ORDER — EPINEPHRINE 1 MG/ML
0.3 INJECTION, SOLUTION INTRAMUSCULAR; SUBCUTANEOUS EVERY 5 MIN PRN
Status: CANCELLED | OUTPATIENT
Start: 2022-01-21

## 2022-01-20 RX ORDER — ALBUTEROL SULFATE 0.83 MG/ML
2.5 SOLUTION RESPIRATORY (INHALATION)
Status: CANCELLED | OUTPATIENT
Start: 2022-01-21

## 2022-01-20 RX ORDER — DIPHENHYDRAMINE HYDROCHLORIDE 50 MG/ML
50 INJECTION INTRAMUSCULAR; INTRAVENOUS
Status: CANCELLED
Start: 2022-01-21

## 2022-01-20 RX ORDER — HEPARIN SODIUM,PORCINE 10 UNIT/ML
5 VIAL (ML) INTRAVENOUS
Status: CANCELLED | OUTPATIENT
Start: 2022-01-21

## 2022-01-20 RX ORDER — ACETAMINOPHEN 325 MG/1
650 TABLET ORAL ONCE
Status: COMPLETED | OUTPATIENT
Start: 2022-01-20 | End: 2022-01-20

## 2022-01-20 RX ORDER — ALBUTEROL SULFATE 90 UG/1
1-2 AEROSOL, METERED RESPIRATORY (INHALATION)
Status: CANCELLED
Start: 2022-01-21

## 2022-01-20 RX ORDER — METHYLPREDNISOLONE SODIUM SUCCINATE 125 MG/2ML
125 INJECTION, POWDER, LYOPHILIZED, FOR SOLUTION INTRAMUSCULAR; INTRAVENOUS ONCE
Status: CANCELLED
Start: 2022-01-21 | End: 2022-01-21

## 2022-01-20 RX ORDER — ACETAMINOPHEN 325 MG/1
650 TABLET ORAL ONCE
Status: CANCELLED
Start: 2022-01-21

## 2022-01-20 RX ORDER — DIPHENHYDRAMINE HCL 25 MG
12.5 TABLET ORAL ONCE
Status: CANCELLED
Start: 2022-01-21

## 2022-01-20 RX ORDER — METHYLPREDNISOLONE SODIUM SUCCINATE 125 MG/2ML
125 INJECTION, POWDER, LYOPHILIZED, FOR SOLUTION INTRAMUSCULAR; INTRAVENOUS
Status: CANCELLED
Start: 2022-01-21

## 2022-01-20 RX ORDER — DIPHENHYDRAMINE HCL 25 MG
12.5 TABLET ORAL ONCE
Status: COMPLETED | OUTPATIENT
Start: 2022-01-20 | End: 2022-01-20

## 2022-01-20 RX ORDER — HEPARIN SODIUM (PORCINE) LOCK FLUSH IV SOLN 100 UNIT/ML 100 UNIT/ML
5 SOLUTION INTRAVENOUS
Status: CANCELLED | OUTPATIENT
Start: 2022-01-21

## 2022-01-20 RX ORDER — LEVOFLOXACIN 500 MG/1
TABLET, FILM COATED ORAL
COMMUNITY
Start: 2022-01-10 | End: 2022-12-16

## 2022-01-20 RX ORDER — MEPERIDINE HYDROCHLORIDE 25 MG/ML
25 INJECTION INTRAMUSCULAR; INTRAVENOUS; SUBCUTANEOUS EVERY 30 MIN PRN
Status: CANCELLED | OUTPATIENT
Start: 2022-01-21

## 2022-01-20 RX ORDER — NALOXONE HYDROCHLORIDE 0.4 MG/ML
0.2 INJECTION, SOLUTION INTRAMUSCULAR; INTRAVENOUS; SUBCUTANEOUS
Status: CANCELLED | OUTPATIENT
Start: 2022-01-21

## 2022-01-20 RX ORDER — DEXAMETHASONE 4 MG/1
TABLET ORAL
COMMUNITY
Start: 2022-01-10 | End: 2024-07-08

## 2022-01-20 RX ORDER — METHYLPREDNISOLONE SODIUM SUCCINATE 125 MG/2ML
125 INJECTION, POWDER, LYOPHILIZED, FOR SOLUTION INTRAMUSCULAR; INTRAVENOUS ONCE
Status: COMPLETED | OUTPATIENT
Start: 2022-01-20 | End: 2022-01-20

## 2022-01-20 RX ADMIN — HUMAN IMMUNOGLOBULIN G 25 G: 20 LIQUID INTRAVENOUS at 10:05

## 2022-01-20 RX ADMIN — ACETAMINOPHEN 650 MG: 325 TABLET ORAL at 09:27

## 2022-01-20 RX ADMIN — METHYLPREDNISOLONE SODIUM SUCCINATE 125 MG: 125 INJECTION, POWDER, FOR SOLUTION INTRAMUSCULAR; INTRAVENOUS at 09:28

## 2022-01-20 RX ADMIN — FAMOTIDINE 20 MG: 10 INJECTION, SOLUTION INTRAVENOUS at 09:28

## 2022-01-20 RX ADMIN — SODIUM CHLORIDE 250 ML: 9 INJECTION, SOLUTION INTRAVENOUS at 09:27

## 2022-01-20 RX ADMIN — DIPHENHYDRAMINE HCL 12.5 MG: 25 TABLET ORAL at 09:27

## 2022-01-20 RX ADMIN — HEPARIN 5 ML: 100 SYRINGE at 11:40

## 2022-01-20 NOTE — PROGRESS NOTES
Infusion Nursing Note:  Avis GALVAN Humberto presents today for IVIG.    Patient seen by provider today: No   present during visit today: Not Applicable.    Note: Pt premedicated as ordered.     Intravenous Access:  Implanted Port.    Treatment Conditions:  Not Applicable.      Post Infusion Assessment:  Patient tolerated infusion without incident.       Discharge Plan:   Patient discharged in stable condition accompanied by: self.  Departure Mode: Ambulatory.      Celeste Hensley RN

## 2022-01-20 NOTE — LETTER
January 24, 2022      Avis Paul  [id number] [facility] [address]        Dear ,    We are writing to inform you of your test results.    Your test results fall within the expected range(s) or remain unchanged from previous results.  Please continue with current treatment plan.    Resulted Orders   TSH with free T4 reflex   Result Value Ref Range    TSH 0.38 0.30 - 5.00 uIU/mL       If you have any questions or concerns, please call the clinic at the number listed above.       Sincerely,      Sunil Tan MD

## 2022-01-25 ENCOUNTER — OFFICE VISIT (OUTPATIENT)
Dept: SLEEP MEDICINE | Facility: CLINIC | Age: 57
End: 2022-01-25
Payer: COMMERCIAL

## 2022-01-25 VITALS
SYSTOLIC BLOOD PRESSURE: 135 MMHG | OXYGEN SATURATION: 99 % | DIASTOLIC BLOOD PRESSURE: 77 MMHG | RESPIRATION RATE: 16 BRPM | WEIGHT: 289.4 LBS | HEART RATE: 78 BPM | HEIGHT: 68 IN | BODY MASS INDEX: 43.86 KG/M2

## 2022-01-25 DIAGNOSIS — G47.33 OSA (OBSTRUCTIVE SLEEP APNEA): Primary | ICD-10-CM

## 2022-01-25 PROBLEM — D80.1 HYPOGAMMAGLOBULINEMIA, ACQUIRED (H): Status: ACTIVE | Noted: 2017-06-23

## 2022-01-25 PROBLEM — Z51.11 MAINTENANCE ANTINEOPLASTIC CHEMOTHERAPY: Status: ACTIVE | Noted: 2021-04-29

## 2022-01-25 PROBLEM — N18.4 CKD (CHRONIC KIDNEY DISEASE) STAGE 4, GFR 15-29 ML/MIN (H): Chronic | Status: ACTIVE | Noted: 2018-08-17

## 2022-01-25 PROBLEM — U07.1 2019 NOVEL CORONAVIRUS DISEASE (COVID-19): Status: ACTIVE | Noted: 2021-12-17

## 2022-01-25 PROBLEM — D80.1 HYPOGAMMAGLOBULINEMIA, ACQUIRED (H): Chronic | Status: ACTIVE | Noted: 2017-06-23
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
## 2022-01-25 PROBLEM — Z51.11 MAINTENANCE ANTINEOPLASTIC CHEMOTHERAPY: Chronic | Status: ACTIVE | Noted: 2021-04-29

## 2022-01-25 PROBLEM — R50.9 FEVER: Status: RESOLVED | Noted: 2017-06-19 | Resolved: 2022-01-25

## 2022-01-25 PROCEDURE — 99203 OFFICE O/P NEW LOW 30 MIN: CPT | Performed by: PHYSICIAN ASSISTANT

## 2022-01-25 ASSESSMENT — MIFFLIN-ST. JEOR: SCORE: 1943.27

## 2022-01-25 NOTE — PATIENT INSTRUCTIONS
Your BMI is Body mass index is 44.66 kg/m .  Weight management is a personal decision.  If you are interested in exploring weight loss strategies, the following discussion covers the approaches that may be successful. Body mass index (BMI) is one way to tell whether you are at a healthy weight, overweight, or obese. It measures your weight in relation to your height.  A BMI of 18.5 to 24.9 is in the healthy range. A person with a BMI of 25 to 29.9 is considered overweight, and someone with a BMI of 30 or greater is considered obese. More than two-thirds of American adults are considered overweight or obese.  Being overweight or obese increases the risk for further weight gain. Excess weight may lead to heart disease and diabetes.  Creating and following plans for healthy eating and physical activity may help you improve your health.  Weight control is part of healthy lifestyle and includes exercise, emotional health, and healthy eating habits. Careful eating habits lifelong are the mainstay of weight control. Though there are significant health benefits from weight loss, long-term weight loss with diet alone may be very difficult to achieve- studies show long-term success with dietary management in less than 10% of people. Attaining a healthy weight may be especially difficult to achieve in those with severe obesity. In some cases, medications, devices and surgical management might be considered.  What can you do?  If you are overweight or obese and are interested in methods for weight loss, you should discuss this with your provider.     Consider reducing daily calorie intake by 500 calories.     Keep a food journal.     Avoiding skipping meals, consider cutting portions instead.    Diet combined with exercise helps maintain muscle while optimizing fat loss. Strength training is particularly important for building and maintaining muscle mass. Exercise helps reduce stress, increase energy, and improves fitness.  Increasing exercise without diet control, however, may not burn enough calories to loose weight.       Start walking three days a week 10-20 minutes at a time    Work towards walking thirty minutes five days a week     Eventually, increase the speed of your walking for 1-2 minutes at time    And look into health and wellness programs that may be available through your health insurance provider, employer, local community center, or [employer] club.    Weight management plan: Patient was referred to their PCP to discuss a diet and exercise plan.

## 2022-01-25 NOTE — NURSING NOTE
[place] sent to [facility] to get full copies of her previous sleep studies. [place] scanned into chart. DME orders have been automatically faxed to [facility].  2 year appointment reminder sent via My Chart. Elza Chand, CMA

## 2022-01-25 NOTE — NURSING NOTE
"Chief Complaint   Patient presents with     CPAP Follow Up     Has sleep apnea and is using CPAP       Initial /77   Pulse 78   Resp 16   Ht 1.715 m (5' 7.5\")   Wt 131.3 kg (289 lb 6.4 oz)   SpO2 99%   BMI 44.66 kg/m   Estimated body mass index is 44.66 kg/m  as calculated from the following:    Height as of this encounter: 1.715 m (5' 7.5\").    Weight as of this encounter: 131.3 kg (289 lb 6.4 oz).    Medication Reconciliation: complete   ESS: 6  Neck circumference: 40 centimeters.  DME: Has a current DME, but hates them. ? [employer]. Would like to try a new company.  Elza Chand CMA      "
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
## 2022-01-27 ENCOUNTER — CARE COORDINATION (OUTPATIENT)
Dept: SLEEP MEDICINE | Facility: CLINIC | Age: 57
End: 2022-01-27
Payer: COMMERCIAL

## 2022-01-27 NOTE — PROGRESS NOTES
2/18/2010 Sleep Study arrived via fax. Scanned into Procedures and forwarded to provider. Elza Chand, CMA

## 2022-01-28 ENCOUNTER — TELEPHONE (OUTPATIENT)
Dept: SLEEP MEDICINE | Facility: CLINIC | Age: 57
End: 2022-01-28
Payer: COMMERCIAL

## 2022-01-28 NOTE — TELEPHONE ENCOUNTER
Left message for patient to return call if [facility] would [facility].

## 2022-02-14 ENCOUNTER — TELEPHONE (OUTPATIENT)
Dept: SLEEP MEDICINE | Facility: CLINIC | Age: 57
End: 2022-02-14
Payer: COMMERCIAL
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
## 2022-02-16 ENCOUNTER — INFUSION THERAPY VISIT (OUTPATIENT)
Dept: INFUSION THERAPY | Facility: HOSPITAL | Age: 57
End: 2022-02-16
Attending: INTERNAL MEDICINE
Payer: COMMERCIAL

## 2022-02-16 VITALS
RESPIRATION RATE: 18 BRPM | DIASTOLIC BLOOD PRESSURE: 72 MMHG | HEART RATE: 69 BPM | SYSTOLIC BLOOD PRESSURE: 124 MMHG | OXYGEN SATURATION: 100 % | TEMPERATURE: 97.5 F

## 2022-02-16 DIAGNOSIS — D80.1 HYPOGAMMAGLOBULINEMIA (H): Primary | ICD-10-CM

## 2022-02-16 PROCEDURE — 96365 THER/PROPH/DIAG IV INF INIT: CPT

## 2022-02-16 PROCEDURE — 250N000013 HC RX MED GY IP 250 OP 250 PS 637: Performed by: INTERNAL MEDICINE

## 2022-02-16 PROCEDURE — 96375 TX/PRO/DX INJ NEW DRUG ADDON: CPT

## 2022-02-16 PROCEDURE — 250N000009 HC RX 250: Performed by: INTERNAL MEDICINE

## 2022-02-16 PROCEDURE — 258N000003 HC RX IP 258 OP 636: Performed by: INTERNAL MEDICINE

## 2022-02-16 PROCEDURE — 250N000011 HC RX IP 250 OP 636: Performed by: INTERNAL MEDICINE

## 2022-02-16 RX ORDER — DIPHENHYDRAMINE HYDROCHLORIDE 50 MG/ML
50 INJECTION INTRAMUSCULAR; INTRAVENOUS
Status: DISCONTINUED | OUTPATIENT
Start: 2022-02-16 | End: 2022-02-16 | Stop reason: HOSPADM

## 2022-02-16 RX ORDER — ALBUTEROL SULFATE 0.83 MG/ML
2.5 SOLUTION RESPIRATORY (INHALATION)
Status: DISCONTINUED | OUTPATIENT
Start: 2022-02-16 | End: 2022-02-16 | Stop reason: HOSPADM

## 2022-02-16 RX ORDER — ALBUTEROL SULFATE 90 UG/1
1-2 AEROSOL, METERED RESPIRATORY (INHALATION)
Status: CANCELLED
Start: 2022-02-18

## 2022-02-16 RX ORDER — METHYLPREDNISOLONE SODIUM SUCCINATE 125 MG/2ML
125 INJECTION, POWDER, LYOPHILIZED, FOR SOLUTION INTRAMUSCULAR; INTRAVENOUS ONCE
Status: COMPLETED | OUTPATIENT
Start: 2022-02-16 | End: 2022-02-16

## 2022-02-16 RX ORDER — ALBUTEROL SULFATE 90 UG/1
1-2 AEROSOL, METERED RESPIRATORY (INHALATION)
Status: DISCONTINUED | OUTPATIENT
Start: 2022-02-16 | End: 2022-02-16 | Stop reason: HOSPADM

## 2022-02-16 RX ORDER — NALOXONE HYDROCHLORIDE 0.4 MG/ML
0.2 INJECTION, SOLUTION INTRAMUSCULAR; INTRAVENOUS; SUBCUTANEOUS
Status: CANCELLED | OUTPATIENT
Start: 2022-02-18

## 2022-02-16 RX ORDER — DIPHENHYDRAMINE HCL 25 MG
12.5 TABLET ORAL ONCE
Status: CANCELLED
Start: 2022-02-18

## 2022-02-16 RX ORDER — METHYLPREDNISOLONE SODIUM SUCCINATE 125 MG/2ML
125 INJECTION, POWDER, LYOPHILIZED, FOR SOLUTION INTRAMUSCULAR; INTRAVENOUS
Status: CANCELLED
Start: 2022-02-18

## 2022-02-16 RX ORDER — HEPARIN SODIUM (PORCINE) LOCK FLUSH IV SOLN 100 UNIT/ML 100 UNIT/ML
5 SOLUTION INTRAVENOUS
Status: CANCELLED | OUTPATIENT
Start: 2022-02-18

## 2022-02-16 RX ORDER — DIPHENHYDRAMINE HCL 25 MG
12.5 TABLET ORAL ONCE
Status: COMPLETED | OUTPATIENT
Start: 2022-02-16 | End: 2022-02-16

## 2022-02-16 RX ORDER — ALBUTEROL SULFATE 0.83 MG/ML
2.5 SOLUTION RESPIRATORY (INHALATION)
Status: CANCELLED | OUTPATIENT
Start: 2022-02-18

## 2022-02-16 RX ORDER — METHYLPREDNISOLONE SODIUM SUCCINATE 125 MG/2ML
125 INJECTION, POWDER, LYOPHILIZED, FOR SOLUTION INTRAMUSCULAR; INTRAVENOUS ONCE
Status: CANCELLED
Start: 2022-02-18 | End: 2022-02-18

## 2022-02-16 RX ORDER — METHYLPREDNISOLONE SODIUM SUCCINATE 125 MG/2ML
125 INJECTION, POWDER, LYOPHILIZED, FOR SOLUTION INTRAMUSCULAR; INTRAVENOUS
Status: DISCONTINUED | OUTPATIENT
Start: 2022-02-16 | End: 2022-02-16 | Stop reason: HOSPADM

## 2022-02-16 RX ORDER — ACETAMINOPHEN 325 MG/1
650 TABLET ORAL ONCE
Status: CANCELLED
Start: 2022-02-18

## 2022-02-16 RX ORDER — HEPARIN SODIUM (PORCINE) LOCK FLUSH IV SOLN 100 UNIT/ML 100 UNIT/ML
5 SOLUTION INTRAVENOUS
Status: DISCONTINUED | OUTPATIENT
Start: 2022-02-16 | End: 2022-02-16 | Stop reason: HOSPADM

## 2022-02-16 RX ORDER — HEPARIN SODIUM,PORCINE 10 UNIT/ML
5 VIAL (ML) INTRAVENOUS
Status: CANCELLED | OUTPATIENT
Start: 2022-02-18

## 2022-02-16 RX ORDER — EPINEPHRINE 1 MG/ML
0.3 INJECTION, SOLUTION INTRAMUSCULAR; SUBCUTANEOUS EVERY 5 MIN PRN
Status: DISCONTINUED | OUTPATIENT
Start: 2022-02-16 | End: 2022-02-16 | Stop reason: HOSPADM

## 2022-02-16 RX ORDER — DIPHENHYDRAMINE HYDROCHLORIDE 50 MG/ML
50 INJECTION INTRAMUSCULAR; INTRAVENOUS
Status: CANCELLED
Start: 2022-02-18

## 2022-02-16 RX ORDER — NALOXONE HYDROCHLORIDE 0.4 MG/ML
0.2 INJECTION, SOLUTION INTRAMUSCULAR; INTRAVENOUS; SUBCUTANEOUS
Status: DISCONTINUED | OUTPATIENT
Start: 2022-02-16 | End: 2022-02-16 | Stop reason: HOSPADM

## 2022-02-16 RX ORDER — MEPERIDINE HYDROCHLORIDE 25 MG/ML
25 INJECTION INTRAMUSCULAR; INTRAVENOUS; SUBCUTANEOUS EVERY 30 MIN PRN
Status: DISCONTINUED | OUTPATIENT
Start: 2022-02-16 | End: 2022-02-16 | Stop reason: HOSPADM

## 2022-02-16 RX ORDER — EPINEPHRINE 1 MG/ML
0.3 INJECTION, SOLUTION INTRAMUSCULAR; SUBCUTANEOUS EVERY 5 MIN PRN
Status: CANCELLED | OUTPATIENT
Start: 2022-02-18

## 2022-02-16 RX ORDER — ACETAMINOPHEN 325 MG/1
650 TABLET ORAL ONCE
Status: COMPLETED | OUTPATIENT
Start: 2022-02-16 | End: 2022-02-16

## 2022-02-16 RX ORDER — MEPERIDINE HYDROCHLORIDE 25 MG/ML
25 INJECTION INTRAMUSCULAR; INTRAVENOUS; SUBCUTANEOUS EVERY 30 MIN PRN
Status: CANCELLED | OUTPATIENT
Start: 2022-02-18

## 2022-02-16 RX ADMIN — FAMOTIDINE 20 MG: 10 INJECTION, SOLUTION INTRAVENOUS at 10:23

## 2022-02-16 RX ADMIN — ACETAMINOPHEN 650 MG: 325 TABLET ORAL at 10:14

## 2022-02-16 RX ADMIN — HEPARIN 5 ML: 100 SYRINGE at 12:39

## 2022-02-16 RX ADMIN — SODIUM CHLORIDE 250 ML: 9 INJECTION, SOLUTION INTRAVENOUS at 10:13

## 2022-02-16 RX ADMIN — DIPHENHYDRAMINE HCL 12.5 MG: 25 TABLET ORAL at 10:14

## 2022-02-16 RX ADMIN — METHYLPREDNISOLONE SODIUM SUCCINATE 125 MG: 125 INJECTION, POWDER, FOR SOLUTION INTRAMUSCULAR; INTRAVENOUS at 10:24

## 2022-02-16 RX ADMIN — HUMAN IMMUNOGLOBULIN G 25 G: 20 LIQUID INTRAVENOUS at 11:12

## 2022-02-16 NOTE — PROGRESS NOTES
Infusion Nursing Note:  Avis Paul presents today for IVIG.    Patient seen by provider today: No   present during visit today: Not Applicable.    Note: Avis arrived ambulatory by herself for IVIG. Premedicated with oral Tylenol, oral Benadryl, IV pepcid and IV solumedrol as ordered. Dose rate increased per treatment plan.    Intravenous Access:  Implanted Port.    Treatment Conditions:  She denies symptoms, reactions, ill, recent major or local infection, on antibiotics, productive cough, recent surgery or elevated temperature.  Ok to proceed with treatment.    Post Infusion Assessment:  Patient tolerated infusion without incident. VSS.  Blood return noted pre and post infusion.  Site patent and intact, free from redness, edema or discomfort.  Access discontinued per protocol.   Bleeding noted at port site upon needle de access. Pressure applied with gauze pad for about 3 minutes. No further bleeding noted. New 4x4 gauze and paper tape applied.     Discharge Plan:   AVS declined. Patient will return March 21st for next appointment.   Patient discharged in stable condition accompanied by: self.  Departure Mode: Ambulatory.      Vidhya Kennedy RN

## 2022-03-08 ENCOUNTER — DOCUMENTATION ONLY (OUTPATIENT)
Dept: SLEEP MEDICINE | Facility: CLINIC | Age: 57
End: 2022-03-08
Payer: COMMERCIAL

## 2022-03-21 ENCOUNTER — INFUSION THERAPY VISIT (OUTPATIENT)
Dept: INFUSION THERAPY | Facility: HOSPITAL | Age: 57
End: 2022-03-21
Attending: INTERNAL MEDICINE
Payer: COMMERCIAL

## 2022-03-21 ENCOUNTER — ONCOLOGY VISIT (OUTPATIENT)
Dept: ONCOLOGY | Facility: HOSPITAL | Age: 57
End: 2022-03-21
Attending: NURSE PRACTITIONER
Payer: COMMERCIAL

## 2022-03-21 ENCOUNTER — HOSPITAL ENCOUNTER (OUTPATIENT)
Dept: CT IMAGING | Facility: HOSPITAL | Age: 57
Discharge: HOME | End: 2022-03-21
Attending: NURSE PRACTITIONER
Payer: COMMERCIAL

## 2022-03-21 VITALS
WEIGHT: 293 LBS | SYSTOLIC BLOOD PRESSURE: 132 MMHG | BODY MASS INDEX: 45.52 KG/M2 | HEART RATE: 79 BPM | TEMPERATURE: 97.7 F | OXYGEN SATURATION: 95 % | RESPIRATION RATE: 18 BRPM | DIASTOLIC BLOOD PRESSURE: 86 MMHG

## 2022-03-21 VITALS
SYSTOLIC BLOOD PRESSURE: 134 MMHG | HEART RATE: 71 BPM | RESPIRATION RATE: 18 BRPM | TEMPERATURE: 97.9 F | WEIGHT: 293 LBS | BODY MASS INDEX: 45.52 KG/M2 | DIASTOLIC BLOOD PRESSURE: 87 MMHG | OXYGEN SATURATION: 100 %

## 2022-03-21 DIAGNOSIS — C83.18 LYMPHOMA, MANTLE CELL, MULTIPLE SITES (H): ICD-10-CM

## 2022-03-21 DIAGNOSIS — D80.1 HYPOGAMMAGLOBULINEMIA (H): Primary | ICD-10-CM

## 2022-03-21 DIAGNOSIS — C83.18 LYMPHOMA, MANTLE CELL, MULTIPLE SITES (H): Primary | ICD-10-CM

## 2022-03-21 LAB
ALBUMIN SERPL-MCNC: 3.5 G/DL (ref 3.5–5)
ALP SERPL-CCNC: 119 U/L (ref 45–120)
ALT SERPL W P-5'-P-CCNC: 24 U/L (ref 0–45)
ANION GAP SERPL CALCULATED.3IONS-SCNC: 9 MMOL/L (ref 5–18)
AST SERPL W P-5'-P-CCNC: 27 U/L (ref 0–40)
BASOPHILS # BLD AUTO: 0 10E3/UL (ref 0–0.2)
BASOPHILS NFR BLD AUTO: 1 %
BILIRUB SERPL-MCNC: 0.5 MG/DL (ref 0–1)
BUN SERPL-MCNC: 28 MG/DL (ref 8–22)
CALCIUM SERPL-MCNC: 8.7 MG/DL (ref 8.5–10.5)
CHLORIDE BLD-SCNC: 104 MMOL/L (ref 98–107)
CO2 SERPL-SCNC: 25 MMOL/L (ref 22–31)
CREAT SERPL-MCNC: 1.84 MG/DL (ref 0.6–1.1)
EOSINOPHIL # BLD AUTO: 0.1 10E3/UL (ref 0–0.7)
EOSINOPHIL NFR BLD AUTO: 2 %
ERYTHROCYTE [DISTWIDTH] IN BLOOD BY AUTOMATED COUNT: 14.2 % (ref 10–15)
GFR SERPL CREATININE-BSD FRML MDRD: 31 ML/MIN/1.73M2
GLUCOSE BLD-MCNC: 88 MG/DL (ref 70–125)
HCT VFR BLD AUTO: 46.3 % (ref 35–47)
HGB BLD-MCNC: 14.9 G/DL (ref 11.7–15.7)
IMM GRANULOCYTES # BLD: 0 10E3/UL
IMM GRANULOCYTES NFR BLD: 0 %
LDH SERPL L TO P-CCNC: 203 U/L (ref 125–220)
LYMPHOCYTES # BLD AUTO: 1.6 10E3/UL (ref 0.8–5.3)
LYMPHOCYTES NFR BLD AUTO: 25 %
MCH RBC QN AUTO: 29.6 PG (ref 26.5–33)
MCHC RBC AUTO-ENTMCNC: 32.2 G/DL (ref 31.5–36.5)
MCV RBC AUTO: 92 FL (ref 78–100)
MONOCYTES # BLD AUTO: 0.5 10E3/UL (ref 0–1.3)
MONOCYTES NFR BLD AUTO: 8 %
NEUTROPHILS # BLD AUTO: 4.1 10E3/UL (ref 1.6–8.3)
NEUTROPHILS NFR BLD AUTO: 64 %
NRBC # BLD AUTO: 0 10E3/UL
NRBC BLD AUTO-RTO: 0 /100
PLATELET # BLD AUTO: 140 10E3/UL (ref 150–450)
POTASSIUM BLD-SCNC: 3.9 MMOL/L (ref 3.5–5)
PROT SERPL-MCNC: 6.6 G/DL (ref 6–8)
RBC # BLD AUTO: 5.03 10E6/UL (ref 3.8–5.2)
SODIUM SERPL-SCNC: 138 MMOL/L (ref 136–145)
WBC # BLD AUTO: 6.3 10E3/UL (ref 4–11)

## 2022-03-21 PROCEDURE — G0463 HOSPITAL OUTPT CLINIC VISIT: HCPCS | Mod: 25

## 2022-03-21 PROCEDURE — 250N000013 HC RX MED GY IP 250 OP 250 PS 637: Performed by: INTERNAL MEDICINE

## 2022-03-21 PROCEDURE — 80053 COMPREHEN METABOLIC PANEL: CPT

## 2022-03-21 PROCEDURE — 96375 TX/PRO/DX INJ NEW DRUG ADDON: CPT

## 2022-03-21 PROCEDURE — 85025 COMPLETE CBC W/AUTO DIFF WBC: CPT

## 2022-03-21 PROCEDURE — 250N000011 HC RX IP 250 OP 636: Performed by: INTERNAL MEDICINE

## 2022-03-21 PROCEDURE — 71250 CT THORAX DX C-: CPT

## 2022-03-21 PROCEDURE — 250N000009 HC RX 250: Performed by: INTERNAL MEDICINE

## 2022-03-21 PROCEDURE — 96366 THER/PROPH/DIAG IV INF ADDON: CPT

## 2022-03-21 PROCEDURE — 96365 THER/PROPH/DIAG IV INF INIT: CPT

## 2022-03-21 PROCEDURE — 99214 OFFICE O/P EST MOD 30 MIN: CPT | Performed by: INTERNAL MEDICINE

## 2022-03-21 PROCEDURE — 36591 DRAW BLOOD OFF VENOUS DEVICE: CPT

## 2022-03-21 PROCEDURE — 83615 LACTATE (LD) (LDH) ENZYME: CPT

## 2022-03-21 RX ORDER — ALBUTEROL SULFATE 0.83 MG/ML
2.5 SOLUTION RESPIRATORY (INHALATION)
Status: DISCONTINUED | OUTPATIENT
Start: 2022-03-21 | End: 2022-03-21 | Stop reason: HOSPADM

## 2022-03-21 RX ORDER — METHYLPREDNISOLONE SODIUM SUCCINATE 125 MG/2ML
125 INJECTION, POWDER, LYOPHILIZED, FOR SOLUTION INTRAMUSCULAR; INTRAVENOUS ONCE
Status: CANCELLED
Start: 2022-04-15 | End: 2022-04-15

## 2022-03-21 RX ORDER — MEPERIDINE HYDROCHLORIDE 25 MG/ML
25 INJECTION INTRAMUSCULAR; INTRAVENOUS; SUBCUTANEOUS EVERY 30 MIN PRN
Status: CANCELLED | OUTPATIENT
Start: 2022-04-15

## 2022-03-21 RX ORDER — METHYLPREDNISOLONE SODIUM SUCCINATE 125 MG/2ML
125 INJECTION, POWDER, LYOPHILIZED, FOR SOLUTION INTRAMUSCULAR; INTRAVENOUS
Status: CANCELLED
Start: 2022-04-15

## 2022-03-21 RX ORDER — HEPARIN SODIUM (PORCINE) LOCK FLUSH IV SOLN 100 UNIT/ML 100 UNIT/ML
5 SOLUTION INTRAVENOUS
Status: CANCELLED | OUTPATIENT
Start: 2022-04-15

## 2022-03-21 RX ORDER — MEPERIDINE HYDROCHLORIDE 25 MG/ML
25 INJECTION INTRAMUSCULAR; INTRAVENOUS; SUBCUTANEOUS EVERY 30 MIN PRN
Status: DISCONTINUED | OUTPATIENT
Start: 2022-03-21 | End: 2022-03-21 | Stop reason: HOSPADM

## 2022-03-21 RX ORDER — METHYLPREDNISOLONE SODIUM SUCCINATE 125 MG/2ML
125 INJECTION, POWDER, LYOPHILIZED, FOR SOLUTION INTRAMUSCULAR; INTRAVENOUS ONCE
Status: COMPLETED | OUTPATIENT
Start: 2022-03-21 | End: 2022-03-21

## 2022-03-21 RX ORDER — DIPHENHYDRAMINE HYDROCHLORIDE 50 MG/ML
50 INJECTION INTRAMUSCULAR; INTRAVENOUS
Status: DISCONTINUED | OUTPATIENT
Start: 2022-03-21 | End: 2022-03-21 | Stop reason: HOSPADM

## 2022-03-21 RX ORDER — ACETAMINOPHEN 325 MG/1
650 TABLET ORAL ONCE
Status: CANCELLED
Start: 2022-04-15

## 2022-03-21 RX ORDER — DIPHENHYDRAMINE HCL 25 MG
12.5 TABLET ORAL ONCE
Status: COMPLETED | OUTPATIENT
Start: 2022-03-21 | End: 2022-03-21

## 2022-03-21 RX ORDER — NALOXONE HYDROCHLORIDE 0.4 MG/ML
0.2 INJECTION, SOLUTION INTRAMUSCULAR; INTRAVENOUS; SUBCUTANEOUS
Status: DISCONTINUED | OUTPATIENT
Start: 2022-03-21 | End: 2022-03-21 | Stop reason: HOSPADM

## 2022-03-21 RX ORDER — HEPARIN SODIUM,PORCINE 10 UNIT/ML
5 VIAL (ML) INTRAVENOUS
Status: DISCONTINUED | OUTPATIENT
Start: 2022-03-21 | End: 2022-03-21 | Stop reason: HOSPADM

## 2022-03-21 RX ORDER — ALBUTEROL SULFATE 90 UG/1
1-2 AEROSOL, METERED RESPIRATORY (INHALATION)
Status: DISCONTINUED | OUTPATIENT
Start: 2022-03-21 | End: 2022-03-21 | Stop reason: HOSPADM

## 2022-03-21 RX ORDER — DIPHENHYDRAMINE HCL 25 MG
12.5 CAPSULE ORAL ONCE
Status: CANCELLED
Start: 2022-04-15

## 2022-03-21 RX ORDER — NALOXONE HYDROCHLORIDE 0.4 MG/ML
0.2 INJECTION, SOLUTION INTRAMUSCULAR; INTRAVENOUS; SUBCUTANEOUS
Status: CANCELLED | OUTPATIENT
Start: 2022-04-15

## 2022-03-21 RX ORDER — HEPARIN SODIUM (PORCINE) LOCK FLUSH IV SOLN 100 UNIT/ML 100 UNIT/ML
5 SOLUTION INTRAVENOUS
Status: DISCONTINUED | OUTPATIENT
Start: 2022-03-21 | End: 2022-03-21 | Stop reason: HOSPADM

## 2022-03-21 RX ORDER — ALBUTEROL SULFATE 0.83 MG/ML
2.5 SOLUTION RESPIRATORY (INHALATION)
Status: CANCELLED | OUTPATIENT
Start: 2022-04-15

## 2022-03-21 RX ORDER — DIPHENHYDRAMINE HCL 25 MG
12.5 TABLET ORAL ONCE
Status: CANCELLED
Start: 2022-04-15 | End: 2022-04-15

## 2022-03-21 RX ORDER — EPINEPHRINE 1 MG/ML
0.3 INJECTION, SOLUTION INTRAMUSCULAR; SUBCUTANEOUS EVERY 5 MIN PRN
Status: DISCONTINUED | OUTPATIENT
Start: 2022-03-21 | End: 2022-03-21 | Stop reason: HOSPADM

## 2022-03-21 RX ORDER — ALBUTEROL SULFATE 90 UG/1
1-2 AEROSOL, METERED RESPIRATORY (INHALATION)
Status: CANCELLED
Start: 2022-04-15

## 2022-03-21 RX ORDER — ACETAMINOPHEN 325 MG/1
650 TABLET ORAL ONCE
Status: COMPLETED | OUTPATIENT
Start: 2022-03-21 | End: 2022-03-21

## 2022-03-21 RX ORDER — HEPARIN SODIUM,PORCINE 10 UNIT/ML
5 VIAL (ML) INTRAVENOUS
Status: CANCELLED | OUTPATIENT
Start: 2022-04-15

## 2022-03-21 RX ORDER — METHYLPREDNISOLONE SODIUM SUCCINATE 125 MG/2ML
125 INJECTION, POWDER, LYOPHILIZED, FOR SOLUTION INTRAMUSCULAR; INTRAVENOUS
Status: DISCONTINUED | OUTPATIENT
Start: 2022-03-21 | End: 2022-03-21 | Stop reason: HOSPADM

## 2022-03-21 RX ORDER — EPINEPHRINE 1 MG/ML
0.3 INJECTION, SOLUTION INTRAMUSCULAR; SUBCUTANEOUS EVERY 5 MIN PRN
Status: CANCELLED | OUTPATIENT
Start: 2022-04-15

## 2022-03-21 RX ORDER — DIPHENHYDRAMINE HYDROCHLORIDE 50 MG/ML
50 INJECTION INTRAMUSCULAR; INTRAVENOUS
Status: CANCELLED
Start: 2022-04-15

## 2022-03-21 RX ADMIN — METHYLPREDNISOLONE SODIUM SUCCINATE 125 MG: 125 INJECTION, POWDER, FOR SOLUTION INTRAMUSCULAR; INTRAVENOUS at 09:20

## 2022-03-21 RX ADMIN — HUMAN IMMUNOGLOBULIN G 25 G: 20 LIQUID INTRAVENOUS at 09:48

## 2022-03-21 RX ADMIN — HEPARIN SODIUM (PORCINE) LOCK FLUSH IV SOLN 100 UNIT/ML 5 ML: 100 SOLUTION at 11:24

## 2022-03-21 RX ADMIN — ACETAMINOPHEN 650 MG: 325 TABLET ORAL at 09:14

## 2022-03-21 RX ADMIN — DIPHENHYDRAMINE HCL 12.5 MG: 25 TABLET ORAL at 09:15

## 2022-03-21 RX ADMIN — FAMOTIDINE 20 MG: 10 INJECTION, SOLUTION INTRAVENOUS at 09:16

## 2022-03-21 ASSESSMENT — PAIN SCALES - GENERAL: PAINLEVEL: SEVERE PAIN (6)

## 2022-03-21 NOTE — PROGRESS NOTES
Infusion Nursing Note:  Avis Paul presents today for IVIG infusion.    Patient seen by provider today: No   present during visit today: Not Applicable.    Note: Avis arrived ambulatory. Port accessed with positive blood return.  Labs drawn without difficulty. IVIG infused per orders followed by normal saline. Avis will be going to MD appointment this afternoon at 2 PM. Avis discharged from infusion center at 1130.      Intravenous Access:  Implanted Port.    Treatment Conditions:  Not Applicable.      Post Infusion Assessment:  Patient tolerated infusion without incident.  Blood return noted pre and post infusion.  Access discontinued per protocol.       Discharge Plan:   Patient discharged in stable condition accompanied by: self.  Departure Mode: Ambulatory.      Tracie Wright RN

## 2022-03-21 NOTE — LETTER
"    3/21/2022         RE: Avis Paul  [id number] [facility] [id number]        Dear Colleague,    Thank you for referring your patient, Avis Paul, to the [facility]. Please see a copy of my visit note below.    Oncology Rooming Note    March 21, 2022 2:00 PM   Avis Paul is a 57 year old female who presents for:    Chief Complaint   Patient presents with     Oncology Clinic Visit     Initial Vitals: /86   Pulse 79   Temp 97.7  F (36.5  C)   Resp 18   Wt 133.8 kg (295 lb)   SpO2 95%   BMI 45.52 kg/m   Estimated body mass index is 45.52 kg/m  as calculated from the following:    Height as of 1/25/22: 1.715 m (5' 7.5\").    Weight as of this encounter: 133.8 kg (295 lb). Body surface area is 2.52 meters squared.  Severe Pain (6) Comment: Data Unavailable   No LMP recorded. Patient is postmenopausal.  Allergies reviewed: Yes  Medications reviewed: Yes    Medications: Medication refills not needed today.  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  Patient reassured.  We will " continue with observation.  We will repeat lab work and scans with visit in about 4 months again.  If stable can go to 6 months.    Continues to have some infection issues.  We will continue with IVIG every 4 weeks.  We will recheck antibody levels with return.    Skin issues are better at this time but are usually worse in the summer months.    Renal function and neuropathy are stable.    Plan: Continue observation for lymphoma  Continue IVIG infusion every 4 weeks  Follow-up with lab and scans in 4 months    Measurable disease: Patient currently in remission    Current therapy: Observation  IVIG resumed in January 2017 and stopped in May 2018; resumed again in September 2018  Maintenance Rituxan started December 2015, and completed August 2017, 8 doses  Acyclovir 400 mg twice daily  IVIG every 3 months, first dose August 29, 2016(currently on hold, last dose November 2016)   IVIG every 4 weeks restarted in June 2017 and stopped in May 2018      Treatment history:  Ibrutinib 420 mg p.o. daily started for presumed relapse of mantle cell lymphoma  July 3, 2020 and stopped August 4, 2020    First set of immunizations received at the [facility] in August 2016  First dose of IVIG  Patient completed 1 year of inhalational pentamidine  Autologous stem cell transplant with Beam prep, August 18, 2015  One cycle of R-ICE, Naye 15, 2015, ifosfamide and etoposide reduced by 25% because of renal dysfunction  Ibrutinib since October 10, 2014, current dose of 420 mg by mouth daily, stopped June 9, 2015   3 cycles of R-DHAP completed late August 2014   5 cycles of R CHOP, started after diagnosis in March 2014       ECOG Performance   ECOG Performance Status: 0    Distress Assessment  Distress Assessment Score: 3    Pain           Problem List    1. 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 Mantle cell lymphoma of lymph nodes of multiple regions (H)          CC: Sunil Tan MD    ______________________________________________________________________________    History of Present Illness    Pretty is here for reevaluation.  She was seen 4 months ago.  She has had some upper respiratory infections and Covid between last November and January.  Also had some vaginal infection issues.  Currently feels like she is having some sinus symptoms.    No palpable adenopathy.  No bleeding.  Good energy level.  Appetite and weight stable.  No fever chills or sweats.      June 29, 2020  Pretty is here for reevaluation.  Seen 2 weeks ago.  Underwent excisional biopsy of the left inguinal lymph node and is here to review results.  No new symptoms or problems.  ECOG status is 0.  January 2018:   Ms. Avis Paul returns for a follow-up.  She was here for IVIG infusion yesterday and raise concerns about symptoms suggesting recurrence of lymphoma.  She had CT scans and is here to review results.  Has been having aches all over and increased fatigue and weight gain.  Describes some numbness in the right thigh area.  Also has been having memory issues since she received chemotherapy.  Notes an area of induration in the mouth on the lower right mandibular/jaw area.        November 2017:   She was seen 3 months ago.  She did have a visit shortly thereafter at the [place] with bone marrow and CT scan showing that she is in complete remission from her mantle cell lymphoma.  She has continued IVIG infusions monthly between June - October 2017.  She was informed recently that the insurance would not cover this.  We had previously got this approved prior to starting infusions in June 2017.    She is currently having sinus symptoms and cough productive of greenish phlegm.  No other infection issues in the last 3 months.  She is in significant distress because of the insurance issues.    No other new  symptoms or problems.  ECOG status is 0.    August 2017:   She was seen 4 weeks ago.  She has received 2 doses of IVIG.  No infusion reaction with the last one.  Her last infection was a sinus infection more than 4 weeks ago.  She has some discomfort in the right ear.  No fever or mild sores.  No shortness of breath or cough.  No new adenopathy.  She has been starting to have some loose bowel moments 2-3 times a day in the last 3 weeks.  No other new problems.    Pain Status  Currently in Pain: Yes    Review of Systems    Constitutional     Neurosensory     Cardiovascular     Pulmonary     Gastrointestinal     Genitourinary     Integumentary     Patient Coping  Patient Coping: Accepting  Distress Assessment  Distress Assessment Score: 3  Accompanied by  Accompanied by: Alone    Past History  Past Medical History:   Diagnosis Date     Anemia      Chronic kidney disease     elevated creatinine due to chemo     History of anesthesia complications 2015    urinary retention after lymph node excision. required catheterization     History of transfusion      Hypothyroid      Mantle cell lymphoma (H)     stage 4     Sleep apnea     uses cpap     Thrombocytopenia (H)          Past Surgical History:   Procedure Laterality Date     ANTERIOR / POSTERIOR COMBINED FUSION CERVICAL SPINE  2011    C2-C7     CARPAL TUNNEL RELEASE Right 2000     CERVICAL FUSION  2004    C4, C5, C6.  Pt. states surgery x 2     KIDNEY STONE SURGERY  1999    removal     LIMBAL STEM CELL TRANSPLANT  08/2015     LYMPH NODE DISSECTION Left 6/25/2020    Procedure: LEFT INGUINAL LYMPH NODE BIOPSY;  Surgeon: Catalina Pascal MD;  Location: [facility] OR;  Service: General     SC BX/REMV,LYMPH NODE,DEEP AXILL Right 6/3/2015    Procedure: RIGHT INGUINAL LYMPH NODE BIOPSY;  Surgeon: Clayton Burgos MD;  Location: [facility] OR;  Service: General     TUBAL LIGATION  2004     US GUIDED NEEDLE PLACEMENT  6/25/2020     US LYMPH NODE BIOPSY  6/10/2020        Physical Exam    Recent Vitals 5/13/2021   Height -   Weight 293 lbs   BSA (m2) 2.51 m2   /83   Pulse 64   Temp 98   Temp src 1   SpO2 98   Some recent data might be hidden       GENERAL: Alert and oriented. Seated comfortably. In no distress.    HEAD: Atraumatic and normocephalic.  Has a full head of hair.    EYES: ROMEO, EOMI.  No pallor.  No icterus.    Oral cavity: no mucosal lesion or tonsillar enlargement.  Induration right mandibular lower jaw area    NECK: supple. JVP normal.  No thyroid enlargement.    LYMPH NODES: No palpable, cervical, axillary or inguinal lymphadenopathy.    CHEST: She has slight bilateral wheezing noted  Resonant to percussion throughout bilaterally.  Symmetrical breath movements bilaterally.    CVS: S1 and S2 are heard. Regular rate and rhythm.  No murmur or gallop or rub heard.    ABDOMEN: Soft. Not tender. Not distended.  No palpable hepatomegaly or splenomegaly.  No other mass palpable.  Bowel sounds heard.    EXTREMITIES: Warm.  No peripheral edema.    SKIN: no rash, or bruising or purpura.    CNS: Nonfocal        Lab Results    Recent Results (from the past 168 hour(s))   Comprehensive Metabolic Panel   Result Value Ref Range    Sodium 139 136 - 145 mmol/L    Potassium 3.9 3.5 - 5.0 mmol/L    Chloride 107 98 - 107 mmol/L    CO2 23 22 - 31 mmol/L    Anion Gap, Calculation 9 5 - 18 mmol/L    Glucose 90 70 - 125 mg/dL    BUN 24 (H) 8 - 22 mg/dL    Creatinine 2.24 (H) 0.60 - 1.10 mg/dL    GFR MDRD Af Amer 27 (L) >60 mL/min/1.73m2    GFR MDRD Non Af Amer 23 (L) >60 mL/min/1.73m2    Bilirubin, Total 0.4 0.0 - 1.0 mg/dL    Calcium 8.8 8.5 - 10.5 mg/dL    Protein, Total 6.4 6.0 - 8.0 g/dL    Albumin 3.5 3.5 - 5.0 g/dL    Alkaline Phosphatase 109 45 - 120 U/L    AST 22 0 - 40 U/L    ALT 16 0 - 45 U/L   HM1 (CBC with Diff)   Result Value Ref Range    WBC 5.7 4.0 - 11.0 thou/uL    RBC 4.66 3.80 - 5.40 mill/uL    Hemoglobin 13.7 12.0 - 16.0 g/dL    Hematocrit 43.1 35.0 - 47.0 %     MCV 93 80 - 100 fL    MCH 29.4 27.0 - 34.0 pg    MCHC 31.8 (L) 32.0 - 36.0 g/dL    RDW 14.1 11.0 - 14.5 %    Platelets 161 140 - 440 thou/uL    MPV 10.3 8.5 - 12.5 fL    Neutrophils % 65 50 - 70 %    Lymphocytes % 25 20 - 40 %    Monocytes % 8 2 - 10 %    Eosinophils % 2 0 - 6 %    Basophils % 1 0 - 2 %    Immature Granulocyte % 0 <=0 %    Neutrophils Absolute 3.7 2.0 - 7.7 thou/uL    Lymphocytes Absolute 1.4 0.8 - 4.4 thou/uL    Monocytes Absolute 0.4 0.0 - 0.9 thou/uL    Eosinophils Absolute 0.1 0.0 - 0.4 thou/uL    Basophils Absolute 0.0 0.0 - 0.2 thou/uL    Immature Granulocyte Absolute 0.0 <=0.0 thou/uL       Imaging    Ct Chest Abdomen Pelvis Without Oral Without Iv Contrast    Result Date: 5/13/2021  EXAM: CT CHEST ABDOMEN PELVIS WO ORAL WO IV CONTRAST LOCATION: [facility] DATE/TIME: 5/13/2021 9:26 AM INDICATION: Followup lymphoma. COMPARISON: 02/04/2021, 11/12/2020. TECHNIQUE: CT scan of the chest, abdomen, and pelvis was performed without IV contrast. Multiplanar reformats were obtained. Dose reduction techniques were used. CONTRAST: None. FINDINGS: LUNGS AND PLEURA: There are small pulmonary nodules, reference a 2 mm nodule in the right lower lobe on image 95 series 3. These are stable compared to the prior 2 studies and should be benign. There is a small linear density in the inferior aspect of the lingula that is stable dating back to November 2020 MEDIASTINUM/AXILLAE: There is a port in the right anterior chest wall extending into the superior vena cava. No enlarged mediastinal lymph nodes are seen. No enlarged axillary nodes are seen. Again seen are calcified and noncalcified nodules in both breasts that are similar in appearance to prior studies. These may be fibroadenomas. CORONARY ARTERY CALCIFICATION: None. HEPATOBILIARY: There are a few tiny low dense lesions in the liver that are stable but too small to characterize, reference posterior right lobe image 64. These are  likely benign. PANCREAS: Normal. SPLEEN: Normal in size, stable. ADRENAL GLANDS: Normal. KIDNEYS/BLADDER: Normal. BOWEL: There is some colonic diverticulosis. There is no evidence of appendicitis. LYMPH NODES: Left internal iliac lymph nodes?? with follow-up abutting the anterior cortex of the left sacral alae. On image 415 series 4 this measures 1.9 x 1.3 and on the prior study it measured 1.9 x 1.5 cm. VASCULATURE: Unremarkable. PELVIC ORGANS: Normal. MUSCULOSKELETAL: There are normal degenerative changes in the spine.     1.  There is very mild left internal iliac adenopathy that is stable to slightly smaller than on the prior study. 2.  There are multiple benign-appearing nodules in the breast, some with calcification and all are stable, these are likely fibroadenomas. 3.  No evidence of new disease is seen.        Signed by: Yelena Starks MD        Again, thank you for allowing me to participate in the care of your patient.        Sincerely,        Yelena Starks MD

## 2022-03-21 NOTE — PROGRESS NOTES
[facility] Hematology and Oncology Progress Note    Patient: Avis Paul  MRN: [id number]  Date of Service:         Reason for Visit    Chief Complaint   Patient presents with     HE Cancer     Mantle cell lymphoma of lymph nodes of inguinal region        Assessment and Plan    Mantle cell lymphoma status post autologous transplant, August 18, 2015  Hypogammaglobulinemia with multiple recurrent infections, on IVIG every 4 weeks  Hives/eosinophilia/chronic urticaria  New left inguinal and pelvic adenopathy, May 2020(patient had 2 separate biopsies, initial 1 suggesting relapse of mantle cell lymphoma, review at [facility] was negative; patient treated with 1 month of ibrutinib with resolution of CT scan and PET findings)  Neuropathy    CT scan show no recurrence of lymphoma.  Patient reassured.  We will continue with observation.  We will repeat lab work and scans with visit in about 4 months again.  If stable can go to 6 months.    Continues to have some infection issues.  We will continue with IVIG every 4 weeks.  We will recheck antibody levels with return.    Skin issues are better at this time but are usually worse in the summer months.    Renal function and neuropathy are stable.    Plan: Continue observation for lymphoma  Continue IVIG infusion every 4 weeks  Follow-up with lab and scans in 4 months    Measurable disease: Patient currently in remission    Current therapy: Observation  IVIG resumed in January 2017 and stopped in May 2018; resumed again in September 2018  Maintenance Rituxan started December 2015, and completed August 2017, 8 doses  Acyclovir 400 mg twice daily  IVIG every 3 months, first dose August 29, 2016(currently on hold, last dose November 2016)   IVIG every 4 weeks restarted in June 2017 and stopped in May 2018      Treatment history:  Ibrutinib 420 mg p.o. daily started for presumed relapse of mantle cell lymphoma  July 3, 2020 and stopped August 4, 2020    First set of  immunizations received at the [facility] in August 2016  First dose of IVIG  Patient completed 1 year of inhalational pentamidine  Autologous stem cell transplant with Beam prep, August 18, 2015  One cycle of R-ICE, Naye 15, 2015, ifosfamide and etoposide reduced by 25% because of renal dysfunction  Ibrutinib since October 10, 2014, current dose of 420 mg by mouth daily, stopped June 9, 2015   3 cycles of R-DHAP completed late August 2014   5 cycles of R CHOP, started after diagnosis in March 2014       ECOG Performance   ECOG Performance Status: 0    Distress Assessment  Distress Assessment Score: 3    Pain           Problem List    1. 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 Mantle cell lymphoma of lymph nodes of multiple regions (H)          CC: Sunil Tan MD    ______________________________________________________________________________    History of Present Illness    Pretty is here for reevaluation.  She was seen 4 months ago.  She has had some upper respiratory infections and Covid between last November and January.  Also had some vaginal infection issues.  Currently feels like she is having some sinus symptoms.    No palpable adenopathy.  No bleeding.  Good energy level.  Appetite and weight stable.  No fever chills or sweats.      June 29, 2020  Pretty is here for reevaluation.  Seen 2 weeks ago.  Underwent excisional biopsy of the left inguinal lymph node and is here to review results.  No new symptoms or problems.  ECOG status is 0.  January 2018:   Ms. Avis Paul returns for a follow-up.  She was here for IVIG infusion yesterday and raise concerns about symptoms suggesting recurrence of lymphoma.  She had CT scans and is here to review results.  Has been having aches all over and increased fatigue and weight gain.  Describes some numbness  in the right thigh area.  Also has been having memory issues since she received chemotherapy.  Notes an area of induration in the mouth on the lower right mandibular/jaw area.        November 2017:   She was seen 3 months ago.  She did have a visit shortly thereafter at the [facility] with bone marrow and CT scan showing that she is in complete remission from her mantle cell lymphoma.  She has continued IVIG infusions monthly between June - October 2017.  She was informed recently that the insurance would not cover this.  We had previously got this approved prior to starting infusions in June 2017.    She is currently having sinus symptoms and cough productive of greenish phlegm.  No other infection issues in the last 3 months.  She is in significant distress because of the insurance issues.    No other new symptoms or problems.  ECOG status is 0.    August 2017:   She was seen 4 weeks ago.  She has received 2 doses of IVIG.  No infusion reaction with the last one.  Her last infection was a sinus infection more than 4 weeks ago.  She has some discomfort in the right ear.  No fever or mild sores.  No shortness of breath or cough.  No new adenopathy.  She has been starting to have some loose bowel moments 2-3 times a day in the last 3 weeks.  No other new problems.    Pain Status  Currently in Pain: Yes    Review of Systems    Constitutional     Neurosensory     Cardiovascular     Pulmonary     Gastrointestinal     Genitourinary     Integumentary     Patient Coping  Patient Coping: Accepting  Distress Assessment  Distress Assessment Score: 3  Accompanied by  Accompanied by: Alone    Past History  Past Medical History:   Diagnosis Date     Anemia      Chronic kidney disease     elevated creatinine due to chemo     History of anesthesia complications 2015    urinary retention after lymph node excision. required catheterization     History of transfusion      Hypothyroid      Mantle cell lymphoma (H)     stage 4      Sleep apnea     uses cpap     Thrombocytopenia (H)          Past Surgical History:   Procedure Laterality Date     ANTERIOR / POSTERIOR COMBINED FUSION CERVICAL SPINE  2011    C2-C7     CARPAL TUNNEL RELEASE Right 2000     CERVICAL FUSION  2004    C4, C5, C6.  Pt. states surgery x 2     KIDNEY STONE SURGERY  1999    removal     LIMBAL STEM CELL TRANSPLANT  08/2015     LYMPH NODE DISSECTION Left 6/25/2020    Procedure: LEFT INGUINAL LYMPH NODE BIOPSY;  Surgeon: Catalina Pascal MD;  Location: [facility];  Service: General     IA BX/REMV,LYMPH NODE,DEEP AXILL Right 6/3/2015    Procedure: RIGHT INGUINAL LYMPH NODE BIOPSY;  Surgeon: Clayton Burgos MD;  Location: [facility];  Service: General     TUBAL LIGATION  2004     US GUIDED NEEDLE PLACEMENT  6/25/2020     US LYMPH NODE BIOPSY  6/10/2020       Physical Exam    Recent Vitals 5/13/2021   Height -   Weight 293 lbs   BSA (m2) 2.51 m2   /83   Pulse 64   Temp 98   Temp src 1   SpO2 98   Some recent data might be hidden       GENERAL: Alert and oriented. Seated comfortably. In no distress.    HEAD: Atraumatic and normocephalic.  Has a full head of hair.    EYES: ROMEO, EOMI.  No pallor.  No icterus.    Oral cavity: no mucosal lesion or tonsillar enlargement.  Induration right mandibular lower jaw area    NECK: supple. JVP normal.  No thyroid enlargement.    LYMPH NODES: No palpable, cervical, axillary or inguinal lymphadenopathy.    CHEST: She has slight bilateral wheezing noted  Resonant to percussion throughout bilaterally.  Symmetrical breath movements bilaterally.    CVS: S1 and S2 are heard. Regular rate and rhythm.  No murmur or gallop or rub heard.    ABDOMEN: Soft. Not tender. Not distended.  No palpable hepatomegaly or splenomegaly.  No other mass palpable.  Bowel sounds heard.    EXTREMITIES: Warm.  No peripheral edema.    SKIN: no rash, or bruising or purpura.    CNS: Nonfocal        Lab Results    Recent Results (from the past 168  hour(s))   Comprehensive Metabolic Panel   Result Value Ref Range    Sodium 139 136 - 145 mmol/L    Potassium 3.9 3.5 - 5.0 mmol/L    Chloride 107 98 - 107 mmol/L    CO2 23 22 - 31 mmol/L    Anion Gap, Calculation 9 5 - 18 mmol/L    Glucose 90 70 - 125 mg/dL    BUN 24 (H) 8 - 22 mg/dL    Creatinine 2.24 (H) 0.60 - 1.10 mg/dL    GFR MDRD Af Amer 27 (L) >60 mL/min/1.73m2    GFR MDRD Non Af Amer 23 (L) >60 mL/min/1.73m2    Bilirubin, Total 0.4 0.0 - 1.0 mg/dL    Calcium 8.8 8.5 - 10.5 mg/dL    Protein, Total 6.4 6.0 - 8.0 g/dL    Albumin 3.5 3.5 - 5.0 g/dL    Alkaline Phosphatase 109 45 - 120 U/L    AST 22 0 - 40 U/L    ALT 16 0 - 45 U/L   HM1 (CBC with Diff)   Result Value Ref Range    WBC 5.7 4.0 - 11.0 thou/uL    RBC 4.66 3.80 - 5.40 mill/uL    Hemoglobin 13.7 12.0 - 16.0 g/dL    Hematocrit 43.1 35.0 - 47.0 %    MCV 93 80 - 100 fL    MCH 29.4 27.0 - 34.0 pg    MCHC 31.8 (L) 32.0 - 36.0 g/dL    RDW 14.1 11.0 - 14.5 %    Platelets 161 140 - 440 thou/uL    MPV 10.3 8.5 - 12.5 fL    Neutrophils % 65 50 - 70 %    Lymphocytes % 25 20 - 40 %    Monocytes % 8 2 - 10 %    Eosinophils % 2 0 - 6 %    Basophils % 1 0 - 2 %    Immature Granulocyte % 0 <=0 %    Neutrophils Absolute 3.7 2.0 - 7.7 thou/uL    Lymphocytes Absolute 1.4 0.8 - 4.4 thou/uL    Monocytes Absolute 0.4 0.0 - 0.9 thou/uL    Eosinophils Absolute 0.1 0.0 - 0.4 thou/uL    Basophils Absolute 0.0 0.0 - 0.2 thou/uL    Immature Granulocyte Absolute 0.0 <=0.0 thou/uL       Imaging    Ct Chest Abdomen Pelvis Without Oral Without Iv Contrast    Result Date: 5/13/2021  EXAM: CT CHEST ABDOMEN PELVIS WO ORAL WO IV CONTRAST LOCATION: [facility] DATE/TIME: 5/13/2021 9:26 AM INDICATION: Followup lymphoma. COMPARISON: 02/04/2021, 11/12/2020. TECHNIQUE: CT scan of the chest, abdomen, and pelvis was performed without IV contrast. Multiplanar reformats were obtained. Dose reduction techniques were used. CONTRAST: None. FINDINGS: LUNGS AND PLEURA: There  are small pulmonary nodules, reference a 2 mm nodule in the right lower lobe on image 95 series 3. These are stable compared to the prior 2 studies and should be benign. There is a small linear density in the inferior aspect of the lingula that is stable dating back to November 2020 MEDIASTINUM/AXILLAE: There is a port in the right anterior chest wall extending into the superior vena cava. No enlarged mediastinal lymph nodes are seen. No enlarged axillary nodes are seen. Again seen are calcified and noncalcified nodules in both breasts that are similar in appearance to prior studies. These may be fibroadenomas. CORONARY ARTERY CALCIFICATION: None. HEPATOBILIARY: There are a few tiny low dense lesions in the liver that are stable but too small to characterize, reference posterior right lobe image 64. These are likely benign. PANCREAS: Normal. SPLEEN: Normal in size, stable. ADRENAL GLANDS: Normal. KIDNEYS/BLADDER: Normal. BOWEL: There is some colonic diverticulosis. There is no evidence of appendicitis. LYMPH NODES: Left internal iliac lymph nodes?? with follow-up abutting the anterior cortex of the left sacral alae. On image 415 series 4 this measures 1.9 x 1.3 and on the prior study it measured 1.9 x 1.5 cm. VASCULATURE: Unremarkable. PELVIC ORGANS: Normal. MUSCULOSKELETAL: There are normal degenerative changes in the spine.     1.  There is very mild left internal iliac adenopathy that is stable to slightly smaller than on the prior study. 2.  There are multiple benign-appearing nodules in the breast, some with calcification and all are stable, these are likely fibroadenomas. 3.  No evidence of new disease is seen.        Signed by: Yelena Starks MD

## 2022-03-21 NOTE — PROGRESS NOTES
"Oncology Rooming Note    March 21, 2022 2:00 PM   Avis Paul is a 57 year old female who presents for:    Chief Complaint   Patient presents with     Oncology Clinic Visit     Initial Vitals: /86   Pulse 79   Temp 97.7  F (36.5  C)   Resp 18   Wt 133.8 kg (295 lb)   SpO2 95%   BMI 45.52 kg/m   Estimated body mass index is 45.52 kg/m  as calculated from the following:    Height as of 1/25/22: 1.715 m (5' 7.5\").    Weight as of this encounter: 133.8 kg (295 lb). Body surface area is 2.52 meters squared.  Severe Pain (6) Comment: Data Unavailable   No LMP recorded. Patient is postmenopausal.  Allergies reviewed: Yes  Medications reviewed: Yes    Medications: Medication refills not needed today.  Pharmacy name entered into [employer]: [id number] - [place], [place] - [facility] [address]     Clinical concerns: None      Celeste Hensley RN            "

## 2022-03-30 DIAGNOSIS — C83.18 LYMPHOMA, MANTLE CELL, MULTIPLE SITES (H): Primary | ICD-10-CM

## 2022-04-20 ENCOUNTER — INFUSION THERAPY VISIT (OUTPATIENT)
Dept: INFUSION THERAPY | Facility: HOSPITAL | Age: 57
End: 2022-04-20
Attending: INTERNAL MEDICINE
Payer: COMMERCIAL

## 2022-04-20 VITALS
WEIGHT: 293 LBS | BODY MASS INDEX: 45.68 KG/M2 | OXYGEN SATURATION: 99 % | RESPIRATION RATE: 18 BRPM | DIASTOLIC BLOOD PRESSURE: 81 MMHG | HEART RATE: 77 BPM | TEMPERATURE: 97.7 F | SYSTOLIC BLOOD PRESSURE: 116 MMHG

## 2022-04-20 DIAGNOSIS — D80.1 HYPOGAMMAGLOBULINEMIA (H): Primary | ICD-10-CM

## 2022-04-20 LAB — IGG SERPL-MCNC: 597 MG/DL (ref 700–1700)

## 2022-04-20 PROCEDURE — 96365 THER/PROPH/DIAG IV INF INIT: CPT

## 2022-04-20 PROCEDURE — 82784 ASSAY IGA/IGD/IGG/IGM EACH: CPT | Performed by: INTERNAL MEDICINE

## 2022-04-20 PROCEDURE — 250N000013 HC RX MED GY IP 250 OP 250 PS 637: Performed by: INTERNAL MEDICINE

## 2022-04-20 PROCEDURE — 250N000011 HC RX IP 250 OP 636: Performed by: INTERNAL MEDICINE

## 2022-04-20 PROCEDURE — 96375 TX/PRO/DX INJ NEW DRUG ADDON: CPT

## 2022-04-20 PROCEDURE — 258N000003 HC RX IP 258 OP 636: Performed by: INTERNAL MEDICINE

## 2022-04-20 PROCEDURE — 250N000009 HC RX 250: Performed by: INTERNAL MEDICINE

## 2022-04-20 PROCEDURE — 36591 DRAW BLOOD OFF VENOUS DEVICE: CPT | Performed by: INTERNAL MEDICINE

## 2022-04-20 RX ORDER — METHYLPREDNISOLONE SODIUM SUCCINATE 125 MG/2ML
125 INJECTION, POWDER, LYOPHILIZED, FOR SOLUTION INTRAMUSCULAR; INTRAVENOUS ONCE
Status: COMPLETED | OUTPATIENT
Start: 2022-04-20 | End: 2022-04-20

## 2022-04-20 RX ORDER — EPINEPHRINE 1 MG/ML
0.3 INJECTION, SOLUTION INTRAMUSCULAR; SUBCUTANEOUS EVERY 5 MIN PRN
Status: DISCONTINUED | OUTPATIENT
Start: 2022-04-20 | End: 2022-04-20 | Stop reason: HOSPADM

## 2022-04-20 RX ORDER — DIPHENHYDRAMINE HYDROCHLORIDE 50 MG/ML
50 INJECTION INTRAMUSCULAR; INTRAVENOUS
Status: CANCELLED
Start: 2022-05-16

## 2022-04-20 RX ORDER — ALBUTEROL SULFATE 0.83 MG/ML
2.5 SOLUTION RESPIRATORY (INHALATION)
Status: CANCELLED | OUTPATIENT
Start: 2022-05-16

## 2022-04-20 RX ORDER — NALOXONE HYDROCHLORIDE 0.4 MG/ML
0.2 INJECTION, SOLUTION INTRAMUSCULAR; INTRAVENOUS; SUBCUTANEOUS
Status: CANCELLED | OUTPATIENT
Start: 2022-05-16

## 2022-04-20 RX ORDER — DIPHENHYDRAMINE HCL 25 MG
12.5 TABLET ORAL ONCE
Status: CANCELLED
Start: 2022-05-16 | End: 2022-05-16

## 2022-04-20 RX ORDER — NALOXONE HYDROCHLORIDE 0.4 MG/ML
0.2 INJECTION, SOLUTION INTRAMUSCULAR; INTRAVENOUS; SUBCUTANEOUS
Status: DISCONTINUED | OUTPATIENT
Start: 2022-04-20 | End: 2022-04-20 | Stop reason: HOSPADM

## 2022-04-20 RX ORDER — ALBUTEROL SULFATE 0.83 MG/ML
2.5 SOLUTION RESPIRATORY (INHALATION)
Status: DISCONTINUED | OUTPATIENT
Start: 2022-04-20 | End: 2022-04-20 | Stop reason: HOSPADM

## 2022-04-20 RX ORDER — METHYLPREDNISOLONE SODIUM SUCCINATE 125 MG/2ML
125 INJECTION, POWDER, LYOPHILIZED, FOR SOLUTION INTRAMUSCULAR; INTRAVENOUS
Status: DISCONTINUED | OUTPATIENT
Start: 2022-04-20 | End: 2022-04-20 | Stop reason: HOSPADM

## 2022-04-20 RX ORDER — HEPARIN SODIUM (PORCINE) LOCK FLUSH IV SOLN 100 UNIT/ML 100 UNIT/ML
5 SOLUTION INTRAVENOUS
Status: DISCONTINUED | OUTPATIENT
Start: 2022-04-20 | End: 2022-04-20 | Stop reason: HOSPADM

## 2022-04-20 RX ORDER — METHYLPREDNISOLONE SODIUM SUCCINATE 125 MG/2ML
125 INJECTION, POWDER, LYOPHILIZED, FOR SOLUTION INTRAMUSCULAR; INTRAVENOUS
Status: CANCELLED
Start: 2022-05-16

## 2022-04-20 RX ORDER — ACETAMINOPHEN 325 MG/1
650 TABLET ORAL ONCE
Status: CANCELLED
Start: 2022-05-16

## 2022-04-20 RX ORDER — ALBUTEROL SULFATE 90 UG/1
1-2 AEROSOL, METERED RESPIRATORY (INHALATION)
Status: CANCELLED
Start: 2022-05-16

## 2022-04-20 RX ORDER — MEPERIDINE HYDROCHLORIDE 25 MG/ML
25 INJECTION INTRAMUSCULAR; INTRAVENOUS; SUBCUTANEOUS EVERY 30 MIN PRN
Status: CANCELLED | OUTPATIENT
Start: 2022-05-16

## 2022-04-20 RX ORDER — METHYLPREDNISOLONE SODIUM SUCCINATE 125 MG/2ML
125 INJECTION, POWDER, LYOPHILIZED, FOR SOLUTION INTRAMUSCULAR; INTRAVENOUS ONCE
Status: CANCELLED
Start: 2022-05-16 | End: 2022-05-16

## 2022-04-20 RX ORDER — EPINEPHRINE 1 MG/ML
0.3 INJECTION, SOLUTION INTRAMUSCULAR; SUBCUTANEOUS EVERY 5 MIN PRN
Status: CANCELLED | OUTPATIENT
Start: 2022-05-16

## 2022-04-20 RX ORDER — HEPARIN SODIUM (PORCINE) LOCK FLUSH IV SOLN 100 UNIT/ML 100 UNIT/ML
5 SOLUTION INTRAVENOUS
Status: CANCELLED | OUTPATIENT
Start: 2022-05-16

## 2022-04-20 RX ORDER — ACETAMINOPHEN 325 MG/1
650 TABLET ORAL ONCE
Status: COMPLETED | OUTPATIENT
Start: 2022-04-20 | End: 2022-04-20

## 2022-04-20 RX ORDER — HEPARIN SODIUM,PORCINE 10 UNIT/ML
5 VIAL (ML) INTRAVENOUS
Status: CANCELLED | OUTPATIENT
Start: 2022-05-16

## 2022-04-20 RX ORDER — DIPHENHYDRAMINE HYDROCHLORIDE 50 MG/ML
50 INJECTION INTRAMUSCULAR; INTRAVENOUS
Status: DISCONTINUED | OUTPATIENT
Start: 2022-04-20 | End: 2022-04-20 | Stop reason: HOSPADM

## 2022-04-20 RX ORDER — MEPERIDINE HYDROCHLORIDE 25 MG/ML
25 INJECTION INTRAMUSCULAR; INTRAVENOUS; SUBCUTANEOUS EVERY 30 MIN PRN
Status: DISCONTINUED | OUTPATIENT
Start: 2022-04-20 | End: 2022-04-20 | Stop reason: HOSPADM

## 2022-04-20 RX ORDER — DIPHENHYDRAMINE HCL 25 MG
12.5 TABLET ORAL ONCE
Status: COMPLETED | OUTPATIENT
Start: 2022-04-20 | End: 2022-04-20

## 2022-04-20 RX ORDER — ALBUTEROL SULFATE 90 UG/1
1-2 AEROSOL, METERED RESPIRATORY (INHALATION)
Status: DISCONTINUED | OUTPATIENT
Start: 2022-04-20 | End: 2022-04-20 | Stop reason: HOSPADM

## 2022-04-20 RX ADMIN — DIPHENHYDRAMINE HCL 12.5 MG: 25 TABLET ORAL at 10:05

## 2022-04-20 RX ADMIN — METHYLPREDNISOLONE SODIUM SUCCINATE 125 MG: 125 INJECTION, POWDER, FOR SOLUTION INTRAMUSCULAR; INTRAVENOUS at 10:09

## 2022-04-20 RX ADMIN — HUMAN IMMUNOGLOBULIN G 25 G: 20 LIQUID INTRAVENOUS at 10:51

## 2022-04-20 RX ADMIN — FAMOTIDINE 20 MG: 10 INJECTION INTRAVENOUS at 10:10

## 2022-04-20 RX ADMIN — HEPARIN 5 ML: 100 SYRINGE at 12:17

## 2022-04-20 RX ADMIN — ACETAMINOPHEN 650 MG: 325 TABLET ORAL at 10:04

## 2022-04-20 RX ADMIN — SODIUM CHLORIDE 250 ML: 9 INJECTION, SOLUTION INTRAVENOUS at 10:08

## 2022-04-20 NOTE — PROGRESS NOTES
Infusion Nursing Note:  Avis Paul presents today for lab and IVIG.    Patient seen by provider today: No   present during visit today: Not Applicable.     Note: Avis arrived ambulatory by herself for IVIG. She reports a runny nose and mild, non productive cough which she attributes to allergies. She feels well and reports no recent fevers. Premedicated with oral Tylenol, oral Benadryl, IV pepcid and IV solumedrol as ordered. Dose rate increased per treatment plan.     Intravenous Access:  Implanted Port.     Treatment Conditions:  She denies symptoms, reactions, ill, recent major or local infection, on antibiotics, productive cough, recent surgery or elevated temperature.  Ok to proceed with treatment.     Post Infusion Assessment:  Patient tolerated infusion without incident. VSS.  Blood return noted pre and post infusion.  Site patent and intact, free from redness, edema or discomfort.  Access discontinued per protocol.      Discharge Plan:   AVS declined. Patient will return March 21st for next appointment.   Patient discharged in stable condition accompanied by: self.  Departure Mode: Ambulatory.        Vidhya Kennedy RN

## 2022-04-21 DIAGNOSIS — C83.18 LYMPHOMA, MANTLE CELL, MULTIPLE SITES (H): Primary | ICD-10-CM

## 2022-05-24 ENCOUNTER — INFUSION THERAPY VISIT (OUTPATIENT)
Dept: INFUSION THERAPY | Facility: HOSPITAL | Age: 57
End: 2022-05-24
Attending: INTERNAL MEDICINE
Payer: COMMERCIAL

## 2022-05-24 VITALS
SYSTOLIC BLOOD PRESSURE: 132 MMHG | TEMPERATURE: 97.7 F | OXYGEN SATURATION: 99 % | RESPIRATION RATE: 18 BRPM | DIASTOLIC BLOOD PRESSURE: 93 MMHG | BODY MASS INDEX: 45.98 KG/M2 | WEIGHT: 293 LBS | HEART RATE: 70 BPM

## 2022-05-24 DIAGNOSIS — D80.1 HYPOGAMMAGLOBULINEMIA (H): Primary | ICD-10-CM

## 2022-05-24 PROCEDURE — 96365 THER/PROPH/DIAG IV INF INIT: CPT

## 2022-05-24 PROCEDURE — 250N000013 HC RX MED GY IP 250 OP 250 PS 637: Performed by: INTERNAL MEDICINE

## 2022-05-24 PROCEDURE — 96366 THER/PROPH/DIAG IV INF ADDON: CPT

## 2022-05-24 PROCEDURE — 250N000011 HC RX IP 250 OP 636: Performed by: INTERNAL MEDICINE

## 2022-05-24 PROCEDURE — 258N000003 HC RX IP 258 OP 636: Performed by: INTERNAL MEDICINE

## 2022-05-24 PROCEDURE — 96375 TX/PRO/DX INJ NEW DRUG ADDON: CPT

## 2022-05-24 RX ORDER — HEPARIN SODIUM (PORCINE) LOCK FLUSH IV SOLN 100 UNIT/ML 100 UNIT/ML
5 SOLUTION INTRAVENOUS
Status: DISCONTINUED | OUTPATIENT
Start: 2022-05-24 | End: 2022-05-24 | Stop reason: HOSPADM

## 2022-05-24 RX ORDER — ALBUTEROL SULFATE 0.83 MG/ML
2.5 SOLUTION RESPIRATORY (INHALATION)
Status: DISCONTINUED | OUTPATIENT
Start: 2022-05-24 | End: 2022-05-24 | Stop reason: HOSPADM

## 2022-05-24 RX ORDER — HEPARIN SODIUM,PORCINE 10 UNIT/ML
5 VIAL (ML) INTRAVENOUS
Status: CANCELLED | OUTPATIENT
Start: 2022-06-15

## 2022-05-24 RX ORDER — ACETAMINOPHEN 325 MG/1
650 TABLET ORAL ONCE
Status: CANCELLED
Start: 2022-06-15

## 2022-05-24 RX ORDER — ALBUTEROL SULFATE 90 UG/1
1-2 AEROSOL, METERED RESPIRATORY (INHALATION)
Status: DISCONTINUED | OUTPATIENT
Start: 2022-05-24 | End: 2022-05-24 | Stop reason: HOSPADM

## 2022-05-24 RX ORDER — HEPARIN SODIUM (PORCINE) LOCK FLUSH IV SOLN 100 UNIT/ML 100 UNIT/ML
5 SOLUTION INTRAVENOUS
Status: CANCELLED | OUTPATIENT
Start: 2022-06-15

## 2022-05-24 RX ORDER — ACETAMINOPHEN 325 MG/1
650 TABLET ORAL ONCE
Status: COMPLETED | OUTPATIENT
Start: 2022-05-24 | End: 2022-05-24

## 2022-05-24 RX ORDER — METHYLPREDNISOLONE SODIUM SUCCINATE 125 MG/2ML
125 INJECTION, POWDER, LYOPHILIZED, FOR SOLUTION INTRAMUSCULAR; INTRAVENOUS
Status: CANCELLED
Start: 2022-06-15

## 2022-05-24 RX ORDER — DIPHENHYDRAMINE HYDROCHLORIDE 50 MG/ML
50 INJECTION INTRAMUSCULAR; INTRAVENOUS
Status: DISCONTINUED | OUTPATIENT
Start: 2022-05-24 | End: 2022-05-24 | Stop reason: HOSPADM

## 2022-05-24 RX ORDER — EPINEPHRINE 1 MG/ML
0.3 INJECTION, SOLUTION INTRAMUSCULAR; SUBCUTANEOUS EVERY 5 MIN PRN
Status: DISCONTINUED | OUTPATIENT
Start: 2022-05-24 | End: 2022-05-24 | Stop reason: HOSPADM

## 2022-05-24 RX ORDER — DIPHENHYDRAMINE HCL 25 MG
12.5 TABLET ORAL ONCE
Status: COMPLETED | OUTPATIENT
Start: 2022-05-24 | End: 2022-05-24

## 2022-05-24 RX ORDER — MEPERIDINE HYDROCHLORIDE 25 MG/ML
25 INJECTION INTRAMUSCULAR; INTRAVENOUS; SUBCUTANEOUS EVERY 30 MIN PRN
Status: CANCELLED | OUTPATIENT
Start: 2022-06-15

## 2022-05-24 RX ORDER — DIPHENHYDRAMINE HCL 25 MG
12.5 TABLET ORAL ONCE
Status: CANCELLED
Start: 2022-06-15 | End: 2022-06-15

## 2022-05-24 RX ORDER — ALBUTEROL SULFATE 90 UG/1
1-2 AEROSOL, METERED RESPIRATORY (INHALATION)
Status: CANCELLED
Start: 2022-06-15

## 2022-05-24 RX ORDER — METHYLPREDNISOLONE SODIUM SUCCINATE 125 MG/2ML
125 INJECTION, POWDER, LYOPHILIZED, FOR SOLUTION INTRAMUSCULAR; INTRAVENOUS
Status: DISCONTINUED | OUTPATIENT
Start: 2022-05-24 | End: 2022-05-24 | Stop reason: HOSPADM

## 2022-05-24 RX ORDER — MEPERIDINE HYDROCHLORIDE 25 MG/ML
25 INJECTION INTRAMUSCULAR; INTRAVENOUS; SUBCUTANEOUS EVERY 30 MIN PRN
Status: DISCONTINUED | OUTPATIENT
Start: 2022-05-24 | End: 2022-05-24 | Stop reason: HOSPADM

## 2022-05-24 RX ORDER — DIPHENHYDRAMINE HYDROCHLORIDE 50 MG/ML
50 INJECTION INTRAMUSCULAR; INTRAVENOUS
Status: CANCELLED
Start: 2022-06-15

## 2022-05-24 RX ORDER — ALBUTEROL SULFATE 0.83 MG/ML
2.5 SOLUTION RESPIRATORY (INHALATION)
Status: CANCELLED | OUTPATIENT
Start: 2022-06-15

## 2022-05-24 RX ORDER — NALOXONE HYDROCHLORIDE 0.4 MG/ML
0.2 INJECTION, SOLUTION INTRAMUSCULAR; INTRAVENOUS; SUBCUTANEOUS
Status: DISCONTINUED | OUTPATIENT
Start: 2022-05-24 | End: 2022-05-24 | Stop reason: HOSPADM

## 2022-05-24 RX ORDER — METHYLPREDNISOLONE SODIUM SUCCINATE 125 MG/2ML
125 INJECTION, POWDER, LYOPHILIZED, FOR SOLUTION INTRAMUSCULAR; INTRAVENOUS ONCE
Status: COMPLETED | OUTPATIENT
Start: 2022-05-24 | End: 2022-05-24

## 2022-05-24 RX ORDER — NALOXONE HYDROCHLORIDE 0.4 MG/ML
0.2 INJECTION, SOLUTION INTRAMUSCULAR; INTRAVENOUS; SUBCUTANEOUS
Status: CANCELLED | OUTPATIENT
Start: 2022-06-15

## 2022-05-24 RX ORDER — EPINEPHRINE 1 MG/ML
0.3 INJECTION, SOLUTION INTRAMUSCULAR; SUBCUTANEOUS EVERY 5 MIN PRN
Status: CANCELLED | OUTPATIENT
Start: 2022-06-15

## 2022-05-24 RX ORDER — METHYLPREDNISOLONE SODIUM SUCCINATE 125 MG/2ML
125 INJECTION, POWDER, LYOPHILIZED, FOR SOLUTION INTRAMUSCULAR; INTRAVENOUS ONCE
Status: CANCELLED
Start: 2022-06-15 | End: 2022-06-15

## 2022-05-24 RX ADMIN — METHYLPREDNISOLONE SODIUM SUCCINATE 125 MG: 125 INJECTION, POWDER, FOR SOLUTION INTRAMUSCULAR; INTRAVENOUS at 09:43

## 2022-05-24 RX ADMIN — FAMOTIDINE 20 MG: 10 INJECTION, SOLUTION INTRAVENOUS at 09:43

## 2022-05-24 RX ADMIN — HUMAN IMMUNOGLOBULIN G 25 G: 20 LIQUID INTRAVENOUS at 10:27

## 2022-05-24 RX ADMIN — ACETAMINOPHEN 650 MG: 325 TABLET ORAL at 09:43

## 2022-05-24 RX ADMIN — DIPHENHYDRAMINE HCL 12.5 MG: 25 TABLET ORAL at 09:53

## 2022-05-24 RX ADMIN — SODIUM CHLORIDE 250 ML: 9 INJECTION, SOLUTION INTRAVENOUS at 09:29

## 2022-05-24 RX ADMIN — Medication 5 ML: at 12:20

## 2022-05-24 NOTE — PROGRESS NOTES
Infusion Nursing Note:  Avis Paul presents today for IVIG infusion.    Patient seen by provider today: No   present during visit today: Not Applicable.    Note:Avis arrived ambulatory by herself for IVIG. Avis answers no to all her Biological infusion checklist. I went over the plan of care and she verbalized understanding. Port was accessed with aseptic technique and had great blood return throughout. Premedicated with oral Tylenol, oral Benadryl, IV pepcid and IV solumedrol as ordered. Dose rate increased per treatment plan. Avis tolerated with no sign or symptom of a reaction. Port de-accessed and covered with gauze. Avis left ambulatory and plans on returning on 06/20/22.    Intravenous Access:  Implanted Port.    Treatment Conditions:  Biological Infusion Checklist:  ~~~ NOTE: If the patient answers yes to any of the questions below, hold the infusion and contact ordering provider or on-call provider.    1. Have you recently had an elevated temperature, fever, chills, productive cough, coughing for 3 weeks or longer or hemoptysis, abnormal vital signs, night sweats,  chest pain or have you noticed a decrease in your appetite, unexplained weight loss or fatigue? No  2. Do you have any open wounds or new incisions? No  3. Do you have any recent or upcoming hospitalizations, surgeries or dental procedures? No  4. Do you currently have or recently have had any signs of illness or infection or are you on any antibiotics? No  5. Have you had any new, sudden or worsening abdominal pain? No  6. Have you or anyone in your household received a live vaccination in the past 4 weeks? Please note:  No live vaccines while on biologic/chemotherapy until 6 months after the last treatment.  Patient can receive the flu vaccine (shot only) and the pneumovax.  It is optimal for the patient to get these vaccines mid cycle, but they can be given at any time as long as it is not on the day of the infusion.  No  7. Have you recently been diagnosed with any new nervous system diseases (ie. Multiple sclerosis, Guillain [place], seizures, neurological changes) or cancer diagnosis? No  8. Are you on any form of radiation or chemotherapy? No  9. Are you pregnant or breast feeding or do you have plans of pregnancy in the future? No  10. Have you been having any signs of worsening depression or suicidal ideations?  (benlysta only) No  11. Have there been any other new onset medical symptoms? No      Results reviewed, labs MET treatment parameters, ok to proceed with treatment.    Post Infusion Assessment:  Patient tolerated infusion without incident.  Blood return noted pre and post infusion.  Site patent and intact, free from redness, edema or discomfort.  No evidence of extravasations.  Access discontinued per protocol.  Biologic Infusion Post Education: Call the triage nurse at your clinic or seek medical attention if you have chills and/or temperature greater than or equal to 100.5, uncontrolled nausea/vomiting, diarrhea, constipation, dizziness, shortness of breath, chest pain, heart palpitations, weakness or any other new or concerning symptoms, questions or concerns.  You cannot have any live virus vaccines prior to or during treatment or up to 6 months post infusion.  If you have an upcoming surgery, medical procedure or dental procedure during treatment, this should be discussed with your ordering physician and your surgeon/dentist.  If you are having any concerning symptom, if you are unsure if you should get your next infusion or wish to speak to a provider before your next infusion, please call your care coordinator or triage nurse at your clinic to notify them so we can adequately serve you.       Discharge Plan:   Patient discharged in stable condition accompanied by: self.  Departure Mode: Ambulatory.      SURJIT KEATING RN

## 2022-06-20 ENCOUNTER — INFUSION THERAPY VISIT (OUTPATIENT)
Dept: INFUSION THERAPY | Facility: HOSPITAL | Age: 57
End: 2022-06-20
Attending: INTERNAL MEDICINE
Payer: COMMERCIAL

## 2022-06-20 VITALS
OXYGEN SATURATION: 97 % | SYSTOLIC BLOOD PRESSURE: 125 MMHG | HEART RATE: 68 BPM | BODY MASS INDEX: 45.98 KG/M2 | RESPIRATION RATE: 18 BRPM | DIASTOLIC BLOOD PRESSURE: 75 MMHG | WEIGHT: 293 LBS | TEMPERATURE: 98.4 F

## 2022-06-20 DIAGNOSIS — D80.1 HYPOGAMMAGLOBULINEMIA (H): Primary | ICD-10-CM

## 2022-06-20 PROCEDURE — 96365 THER/PROPH/DIAG IV INF INIT: CPT

## 2022-06-20 PROCEDURE — 96375 TX/PRO/DX INJ NEW DRUG ADDON: CPT

## 2022-06-20 PROCEDURE — 250N000011 HC RX IP 250 OP 636: Performed by: INTERNAL MEDICINE

## 2022-06-20 PROCEDURE — 250N000013 HC RX MED GY IP 250 OP 250 PS 637: Performed by: INTERNAL MEDICINE

## 2022-06-20 RX ORDER — ACETAMINOPHEN 325 MG/1
650 TABLET ORAL ONCE
Status: COMPLETED | OUTPATIENT
Start: 2022-06-20 | End: 2022-06-20

## 2022-06-20 RX ORDER — HEPARIN SODIUM (PORCINE) LOCK FLUSH IV SOLN 100 UNIT/ML 100 UNIT/ML
5 SOLUTION INTRAVENOUS
Status: DISCONTINUED | OUTPATIENT
Start: 2022-06-20 | End: 2022-06-20 | Stop reason: HOSPADM

## 2022-06-20 RX ORDER — DIPHENHYDRAMINE HYDROCHLORIDE 50 MG/ML
50 INJECTION INTRAMUSCULAR; INTRAVENOUS
Status: CANCELLED
Start: 2022-06-21

## 2022-06-20 RX ORDER — METHYLPREDNISOLONE SODIUM SUCCINATE 125 MG/2ML
125 INJECTION, POWDER, LYOPHILIZED, FOR SOLUTION INTRAMUSCULAR; INTRAVENOUS ONCE
Status: CANCELLED
Start: 2022-06-21 | End: 2022-06-21

## 2022-06-20 RX ORDER — HEPARIN SODIUM,PORCINE 10 UNIT/ML
5 VIAL (ML) INTRAVENOUS
Status: CANCELLED | OUTPATIENT
Start: 2022-06-21

## 2022-06-20 RX ORDER — DIPHENHYDRAMINE HCL 25 MG
12.5 TABLET ORAL ONCE
Status: CANCELLED
Start: 2022-06-21 | End: 2022-06-21

## 2022-06-20 RX ORDER — MEPERIDINE HYDROCHLORIDE 25 MG/ML
25 INJECTION INTRAMUSCULAR; INTRAVENOUS; SUBCUTANEOUS EVERY 30 MIN PRN
Status: CANCELLED | OUTPATIENT
Start: 2022-06-21

## 2022-06-20 RX ORDER — NALOXONE HYDROCHLORIDE 0.4 MG/ML
0.2 INJECTION, SOLUTION INTRAMUSCULAR; INTRAVENOUS; SUBCUTANEOUS
Status: CANCELLED | OUTPATIENT
Start: 2022-06-21

## 2022-06-20 RX ORDER — DIPHENHYDRAMINE HCL 25 MG
12.5 TABLET ORAL ONCE
Status: COMPLETED | OUTPATIENT
Start: 2022-06-20 | End: 2022-06-20

## 2022-06-20 RX ORDER — ACETAMINOPHEN 325 MG/1
650 TABLET ORAL ONCE
Status: CANCELLED
Start: 2022-06-21

## 2022-06-20 RX ORDER — EPINEPHRINE 1 MG/ML
0.3 INJECTION, SOLUTION INTRAMUSCULAR; SUBCUTANEOUS EVERY 5 MIN PRN
Status: CANCELLED | OUTPATIENT
Start: 2022-06-21

## 2022-06-20 RX ORDER — HEPARIN SODIUM (PORCINE) LOCK FLUSH IV SOLN 100 UNIT/ML 100 UNIT/ML
5 SOLUTION INTRAVENOUS
Status: CANCELLED | OUTPATIENT
Start: 2022-06-21

## 2022-06-20 RX ORDER — METHYLPREDNISOLONE SODIUM SUCCINATE 125 MG/2ML
125 INJECTION, POWDER, LYOPHILIZED, FOR SOLUTION INTRAMUSCULAR; INTRAVENOUS ONCE
Status: COMPLETED | OUTPATIENT
Start: 2022-06-20 | End: 2022-06-20

## 2022-06-20 RX ORDER — ALBUTEROL SULFATE 90 UG/1
1-2 AEROSOL, METERED RESPIRATORY (INHALATION)
Status: CANCELLED
Start: 2022-06-21

## 2022-06-20 RX ORDER — ALBUTEROL SULFATE 0.83 MG/ML
2.5 SOLUTION RESPIRATORY (INHALATION)
Status: CANCELLED | OUTPATIENT
Start: 2022-06-21

## 2022-06-20 RX ORDER — METHYLPREDNISOLONE SODIUM SUCCINATE 125 MG/2ML
125 INJECTION, POWDER, LYOPHILIZED, FOR SOLUTION INTRAMUSCULAR; INTRAVENOUS
Status: CANCELLED
Start: 2022-06-21

## 2022-06-20 RX ADMIN — METHYLPREDNISOLONE SODIUM SUCCINATE 125 MG: 125 INJECTION, POWDER, FOR SOLUTION INTRAMUSCULAR; INTRAVENOUS at 09:40

## 2022-06-20 RX ADMIN — ACETAMINOPHEN 650 MG: 325 TABLET ORAL at 09:25

## 2022-06-20 RX ADMIN — HUMAN IMMUNOGLOBULIN G 25 G: 20 LIQUID INTRAVENOUS at 10:07

## 2022-06-20 RX ADMIN — DIPHENHYDRAMINE HCL 12.5 MG: 25 TABLET ORAL at 09:33

## 2022-06-20 RX ADMIN — Medication 5 ML: at 11:25

## 2022-06-20 RX ADMIN — FAMOTIDINE 20 MG: 10 INJECTION INTRAVENOUS at 09:37

## 2022-06-20 NOTE — PROGRESS NOTES
Pt arrived ambulatory to clinic for her IVIG infusion.  Port was accessed using aseptic technique without difficulties with excellent blood return.  Administered premedications and IVIG per MD order.  Pt tolerated infusion well, no s/s of infusion reaction.  VSS prior to and post IVIG infusion.  Port was flushed with NS and Heparin then de-accessed using 2x2 and papertape.  Instructed pt to call clinic with any questions or concerns.  Pt verbalized understanding of plan of care and return to clinic.

## 2022-06-21 ENCOUNTER — MEDICAL CORRESPONDENCE (OUTPATIENT)
Dept: HEALTH INFORMATION MANAGEMENT | Facility: CLINIC | Age: 57
End: 2022-06-21

## 2022-07-16 DIAGNOSIS — C83.18 LYMPHOMA, MANTLE CELL, MULTIPLE SITES (H): ICD-10-CM

## 2022-07-18 ENCOUNTER — ONCOLOGY VISIT (OUTPATIENT)
Dept: ONCOLOGY | Facility: HOSPITAL | Age: 57
End: 2022-07-18
Attending: INTERNAL MEDICINE
Payer: COMMERCIAL

## 2022-07-18 ENCOUNTER — INFUSION THERAPY VISIT (OUTPATIENT)
Dept: INFUSION THERAPY | Facility: HOSPITAL | Age: 57
End: 2022-07-18
Attending: INTERNAL MEDICINE
Payer: COMMERCIAL

## 2022-07-18 ENCOUNTER — HOSPITAL ENCOUNTER (OUTPATIENT)
Dept: CT IMAGING | Facility: HOSPITAL | Age: 57
Setting detail: RADIATION/ONCOLOGY SERIES
Discharge: HOME | End: 2022-07-18
Attending: INTERNAL MEDICINE
Payer: COMMERCIAL

## 2022-07-18 VITALS
TEMPERATURE: 97.8 F | OXYGEN SATURATION: 99 % | BODY MASS INDEX: 45.98 KG/M2 | RESPIRATION RATE: 18 BRPM | WEIGHT: 293 LBS | SYSTOLIC BLOOD PRESSURE: 119 MMHG | DIASTOLIC BLOOD PRESSURE: 75 MMHG | HEART RATE: 63 BPM

## 2022-07-18 DIAGNOSIS — E07.9 DISORDER OF THYROID, UNSPECIFIED: ICD-10-CM

## 2022-07-18 DIAGNOSIS — D80.1 HYPOGAMMAGLOBULINEMIA (H): Primary | ICD-10-CM

## 2022-07-18 DIAGNOSIS — C83.18 LYMPHOMA, MANTLE CELL, MULTIPLE SITES (H): ICD-10-CM

## 2022-07-18 DIAGNOSIS — D80.1 HYPOGAMMAGLOBULINEMIA (H): ICD-10-CM

## 2022-07-18 DIAGNOSIS — E07.9 DISORDER OF THYROID, UNSPECIFIED: Primary | ICD-10-CM

## 2022-07-18 LAB
ALBUMIN SERPL-MCNC: 3.5 G/DL (ref 3.5–5)
ALP SERPL-CCNC: 103 U/L (ref 45–120)
ALT SERPL W P-5'-P-CCNC: 15 U/L (ref 0–45)
ANION GAP SERPL CALCULATED.3IONS-SCNC: 5 MMOL/L (ref 5–18)
AST SERPL W P-5'-P-CCNC: 21 U/L (ref 0–40)
BASOPHILS # BLD AUTO: 0 10E3/UL (ref 0–0.2)
BASOPHILS NFR BLD AUTO: 0 %
BILIRUB SERPL-MCNC: 0.5 MG/DL (ref 0–1)
BUN SERPL-MCNC: 30 MG/DL (ref 8–22)
CALCIUM SERPL-MCNC: 9.2 MG/DL (ref 8.5–10.5)
CHLORIDE BLD-SCNC: 107 MMOL/L (ref 98–107)
CO2 SERPL-SCNC: 28 MMOL/L (ref 22–31)
CREAT SERPL-MCNC: 2.14 MG/DL (ref 0.6–1.1)
EOSINOPHIL # BLD AUTO: 0.2 10E3/UL (ref 0–0.7)
EOSINOPHIL NFR BLD AUTO: 4 %
ERYTHROCYTE [DISTWIDTH] IN BLOOD BY AUTOMATED COUNT: 14.8 % (ref 10–15)
GFR SERPL CREATININE-BSD FRML MDRD: 26 ML/MIN/1.73M2
GLUCOSE BLD-MCNC: 91 MG/DL (ref 70–125)
HCT VFR BLD AUTO: 45.5 % (ref 35–47)
HGB BLD-MCNC: 14.5 G/DL (ref 11.7–15.7)
IMM GRANULOCYTES # BLD: 0 10E3/UL
IMM GRANULOCYTES NFR BLD: 0 %
LDH SERPL L TO P-CCNC: 165 U/L (ref 125–220)
LYMPHOCYTES # BLD AUTO: 1.3 10E3/UL (ref 0.8–5.3)
LYMPHOCYTES NFR BLD AUTO: 22 %
MCH RBC QN AUTO: 29.4 PG (ref 26.5–33)
MCHC RBC AUTO-ENTMCNC: 31.9 G/DL (ref 31.5–36.5)
MCV RBC AUTO: 92 FL (ref 78–100)
MONOCYTES # BLD AUTO: 0.4 10E3/UL (ref 0–1.3)
MONOCYTES NFR BLD AUTO: 7 %
NEUTROPHILS # BLD AUTO: 4.1 10E3/UL (ref 1.6–8.3)
NEUTROPHILS NFR BLD AUTO: 67 %
NRBC # BLD AUTO: 0 10E3/UL
NRBC BLD AUTO-RTO: 0 /100
PLATELET # BLD AUTO: 162 10E3/UL (ref 150–450)
POTASSIUM BLD-SCNC: 4.2 MMOL/L (ref 3.5–5)
PROT SERPL-MCNC: 6.3 G/DL (ref 6–8)
RBC # BLD AUTO: 4.94 10E6/UL (ref 3.8–5.2)
SODIUM SERPL-SCNC: 140 MMOL/L (ref 136–145)
TSH SERPL DL<=0.005 MIU/L-ACNC: 2.3 UIU/ML (ref 0.3–5)
WBC # BLD AUTO: 6.1 10E3/UL (ref 4–11)

## 2022-07-18 PROCEDURE — 85025 COMPLETE CBC W/AUTO DIFF WBC: CPT

## 2022-07-18 PROCEDURE — 36591 DRAW BLOOD OFF VENOUS DEVICE: CPT

## 2022-07-18 PROCEDURE — 82784 ASSAY IGA/IGD/IGG/IGM EACH: CPT

## 2022-07-18 PROCEDURE — G0463 HOSPITAL OUTPT CLINIC VISIT: HCPCS | Mod: 25

## 2022-07-18 PROCEDURE — 250N000011 HC RX IP 250 OP 636: Performed by: NURSE PRACTITIONER

## 2022-07-18 PROCEDURE — 83615 LACTATE (LD) (LDH) ENZYME: CPT

## 2022-07-18 PROCEDURE — 99214 OFFICE O/P EST MOD 30 MIN: CPT | Performed by: NURSE PRACTITIONER

## 2022-07-18 PROCEDURE — 250N000013 HC RX MED GY IP 250 OP 250 PS 637: Performed by: INTERNAL MEDICINE

## 2022-07-18 PROCEDURE — 96375 TX/PRO/DX INJ NEW DRUG ADDON: CPT

## 2022-07-18 PROCEDURE — 96365 THER/PROPH/DIAG IV INF INIT: CPT

## 2022-07-18 PROCEDURE — 96366 THER/PROPH/DIAG IV INF ADDON: CPT

## 2022-07-18 PROCEDURE — 250N000013 HC RX MED GY IP 250 OP 250 PS 637: Performed by: NURSE PRACTITIONER

## 2022-07-18 PROCEDURE — 71250 CT THORAX DX C-: CPT

## 2022-07-18 PROCEDURE — 84443 ASSAY THYROID STIM HORMONE: CPT

## 2022-07-18 PROCEDURE — 80053 COMPREHEN METABOLIC PANEL: CPT

## 2022-07-18 RX ORDER — HEPARIN SODIUM (PORCINE) LOCK FLUSH IV SOLN 100 UNIT/ML 100 UNIT/ML
5 SOLUTION INTRAVENOUS
Status: DISCONTINUED | OUTPATIENT
Start: 2022-07-18 | End: 2022-07-18 | Stop reason: HOSPADM

## 2022-07-18 RX ORDER — HEPARIN SODIUM (PORCINE) LOCK FLUSH IV SOLN 100 UNIT/ML 100 UNIT/ML
5 SOLUTION INTRAVENOUS
Status: CANCELLED | OUTPATIENT
Start: 2022-07-19

## 2022-07-18 RX ORDER — NALOXONE HYDROCHLORIDE 0.4 MG/ML
0.2 INJECTION, SOLUTION INTRAMUSCULAR; INTRAVENOUS; SUBCUTANEOUS
Status: CANCELLED | OUTPATIENT
Start: 2022-07-18

## 2022-07-18 RX ORDER — ACETAMINOPHEN 325 MG/1
650 TABLET ORAL ONCE
Status: CANCELLED
Start: 2022-07-19

## 2022-07-18 RX ORDER — DIPHENHYDRAMINE HYDROCHLORIDE 50 MG/ML
50 INJECTION INTRAMUSCULAR; INTRAVENOUS
Status: CANCELLED
Start: 2022-07-18

## 2022-07-18 RX ORDER — DIPHENHYDRAMINE HCL 25 MG
12.5 TABLET ORAL ONCE
Status: COMPLETED | OUTPATIENT
Start: 2022-07-18 | End: 2022-07-18

## 2022-07-18 RX ORDER — DIPHENHYDRAMINE HYDROCHLORIDE 50 MG/ML
50 INJECTION INTRAMUSCULAR; INTRAVENOUS
Status: CANCELLED
Start: 2022-07-19

## 2022-07-18 RX ORDER — ALBUTEROL SULFATE 90 UG/1
1-2 AEROSOL, METERED RESPIRATORY (INHALATION)
Status: CANCELLED
Start: 2022-07-19

## 2022-07-18 RX ORDER — HEPARIN SODIUM,PORCINE 10 UNIT/ML
5 VIAL (ML) INTRAVENOUS
Status: CANCELLED | OUTPATIENT
Start: 2022-07-19

## 2022-07-18 RX ORDER — ALBUTEROL SULFATE 0.83 MG/ML
2.5 SOLUTION RESPIRATORY (INHALATION)
Status: CANCELLED | OUTPATIENT
Start: 2022-07-18

## 2022-07-18 RX ORDER — MEPERIDINE HYDROCHLORIDE 25 MG/ML
25 INJECTION INTRAMUSCULAR; INTRAVENOUS; SUBCUTANEOUS EVERY 30 MIN PRN
Status: CANCELLED | OUTPATIENT
Start: 2022-07-18

## 2022-07-18 RX ORDER — METHYLPREDNISOLONE SODIUM SUCCINATE 125 MG/2ML
125 INJECTION, POWDER, LYOPHILIZED, FOR SOLUTION INTRAMUSCULAR; INTRAVENOUS
Status: CANCELLED
Start: 2022-07-19

## 2022-07-18 RX ORDER — MEPERIDINE HYDROCHLORIDE 25 MG/ML
25 INJECTION INTRAMUSCULAR; INTRAVENOUS; SUBCUTANEOUS EVERY 30 MIN PRN
Status: CANCELLED | OUTPATIENT
Start: 2022-07-19

## 2022-07-18 RX ORDER — EPINEPHRINE 1 MG/ML
0.3 INJECTION, SOLUTION INTRAMUSCULAR; SUBCUTANEOUS EVERY 5 MIN PRN
Status: CANCELLED | OUTPATIENT
Start: 2022-07-18

## 2022-07-18 RX ORDER — ALBUTEROL SULFATE 0.83 MG/ML
2.5 SOLUTION RESPIRATORY (INHALATION)
Status: CANCELLED | OUTPATIENT
Start: 2022-07-19

## 2022-07-18 RX ORDER — ACYCLOVIR 400 MG/1
TABLET ORAL
Qty: 180 TABLET | Refills: 0 | Status: SHIPPED | OUTPATIENT
Start: 2022-07-18 | End: 2023-03-09

## 2022-07-18 RX ORDER — ALBUTEROL SULFATE 90 UG/1
1-2 AEROSOL, METERED RESPIRATORY (INHALATION)
Status: CANCELLED
Start: 2022-07-18

## 2022-07-18 RX ORDER — DIPHENHYDRAMINE HCL 25 MG
12.5 TABLET ORAL ONCE
Status: CANCELLED
Start: 2022-07-19 | End: 2022-07-19

## 2022-07-18 RX ORDER — METHYLPREDNISOLONE SODIUM SUCCINATE 125 MG/2ML
125 INJECTION, POWDER, LYOPHILIZED, FOR SOLUTION INTRAMUSCULAR; INTRAVENOUS
Status: CANCELLED
Start: 2022-07-18

## 2022-07-18 RX ORDER — NALOXONE HYDROCHLORIDE 0.4 MG/ML
0.2 INJECTION, SOLUTION INTRAMUSCULAR; INTRAVENOUS; SUBCUTANEOUS
Status: CANCELLED | OUTPATIENT
Start: 2022-07-19

## 2022-07-18 RX ORDER — ACETAMINOPHEN 325 MG/1
650 TABLET ORAL ONCE
Status: COMPLETED | OUTPATIENT
Start: 2022-07-18 | End: 2022-07-18

## 2022-07-18 RX ORDER — EPINEPHRINE 1 MG/ML
0.3 INJECTION, SOLUTION INTRAMUSCULAR; SUBCUTANEOUS EVERY 5 MIN PRN
Status: CANCELLED | OUTPATIENT
Start: 2022-07-19

## 2022-07-18 RX ORDER — METHYLPREDNISOLONE SODIUM SUCCINATE 125 MG/2ML
125 INJECTION, POWDER, LYOPHILIZED, FOR SOLUTION INTRAMUSCULAR; INTRAVENOUS ONCE
Status: CANCELLED
Start: 2022-07-19 | End: 2022-07-19

## 2022-07-18 RX ORDER — HEPARIN SODIUM,PORCINE 10 UNIT/ML
5 VIAL (ML) INTRAVENOUS
Status: CANCELLED | OUTPATIENT
Start: 2022-07-18

## 2022-07-18 RX ORDER — METHYLPREDNISOLONE SODIUM SUCCINATE 125 MG/2ML
125 INJECTION, POWDER, LYOPHILIZED, FOR SOLUTION INTRAMUSCULAR; INTRAVENOUS ONCE
Status: COMPLETED | OUTPATIENT
Start: 2022-07-18 | End: 2022-07-18

## 2022-07-18 RX ADMIN — DIPHENHYDRAMINE HCL 12.5 MG: 25 TABLET ORAL at 09:07

## 2022-07-18 RX ADMIN — METHYLPREDNISOLONE SODIUM SUCCINATE 125 MG: 125 INJECTION, POWDER, FOR SOLUTION INTRAMUSCULAR; INTRAVENOUS at 09:12

## 2022-07-18 RX ADMIN — HUMAN IMMUNOGLOBULIN G 25 G: 20 LIQUID INTRAVENOUS at 09:43

## 2022-07-18 RX ADMIN — ACETAMINOPHEN 650 MG: 325 TABLET ORAL at 09:07

## 2022-07-18 RX ADMIN — FAMOTIDINE 20 MG: 10 INJECTION INTRAVENOUS at 09:09

## 2022-07-18 RX ADMIN — Medication 5 ML: at 11:53

## 2022-07-18 NOTE — PROGRESS NOTES
Infusion Nursing Note:  Avis Paul presents today for labs and IVIG infusion.   Patient seen by provider today: Yes: Nanda Olivares    present during visit today: Not Applicable.    Note: Avis arrived ambulatory. Port accessed and labs drawn. Avis requested to infuse IVIG at a slower rate today.  She reports being very drowsy from the benadryl the last 2 infusions and falls asleep while driving. By infusing at a slower rate it allows more time for side effects of benadryl to subside.  I also informed her she could stay as long as she wanted after the infusion until she was safe to drive. Pre medications given. Tolerated IVIG infusion well. Remained after infusion for 1.5 hours until she felt awake enough to drive home. Avis is scheduled to return to infusion center again on 8/15/22.    Intravenous Access:  Labs drawn without difficulty.  Implanted Port.    Treatment Conditions:  Not Applicable.    Post Infusion Assessment:  Patient tolerated infusion without incident.  Blood return noted pre and post infusion.  Access discontinued per protocol.     Discharge Plan:   Patient discharged in stable condition accompanied by: self.  Departure Mode: Ambulatory.      Tracie Wright RN

## 2022-07-18 NOTE — TELEPHONE ENCOUNTER
Jazmin Lawton contacted Avis on 07/18/22 and left a message. If patient calls back please schedule appointment as soon as possible.

## 2022-07-18 NOTE — TELEPHONE ENCOUNTER
Sent 90 days.  Notify patient will need appt for refills. Schedule this now as appt are booking out by 2-3 months.  Thank you,  Sunil Tan MD

## 2022-07-18 NOTE — TELEPHONE ENCOUNTER
"Requested Prescriptions   Pending Prescriptions Disp Refills    acyclovir (ZOVIRAX) 400 MG tablet [Pharmacy Med Name: Acyclovir 400 MG Oral Tablet] 180 tablet 0     Sig: Take 1 tablet by mouth twice daily        Antivirals for Herpes Protocol Failed - 7/16/2022  5:30 AM        Failed - Recent (12 mo) or future (30 days) visit within the authorizing provider's specialty     Patient has had an office visit with the authorizing provider or a provider within the authorizing providers department within the previous 12 mos or has a future within next 30 days. See \"Patient Info\" tab in inbasket, or \"Choose Columns\" in Meds & Orders section of the refill encounter.              Failed - Normal serum creatinine on file in past 12 months     Recent Labs   Lab Test 07/18/22  0838   CR 2.14*       Ok to refill medication if creatinine is low          Passed - Patient is age 12 or older        Passed - Medication is active on med list              "

## 2022-07-18 NOTE — PROGRESS NOTES
[facility] Hematology and Oncology Progress Note    Patient: Avis Paul  MRN: [id number]  Date of Service: Jul 18, 2022          Reason for Visit    No chief complaint on file.      Assessment and Plan    Cancer Staging  No matching staging information was found for the patient.    1. Mantle cell lymphoma status post autologous transplant, August 18, 2015: Then completed 2 years of maintenance Rituxan therapy.  She is currently on observation.   She will return in 6 months to see Dr. Starks with CT. Encourage patient to call us if she has any lymphadenopathy that she notices clinically before then.     2. Hypogammaglobulinemia with multiple recurrent infections:   On IVIG. She will get it every 4 weeks.  We will continue to monitor her IgG levels intermittently.  Still pending from today.     3. Mild left inguinal adenopathy found in May 2020: pt had 2 biopsies. Initial one suggested relapse of lymphoma, but review at [facility] was negative. Pt treatment with one month of ibrutinib in the meantime and lymph node resolved.  Has been stable since that time. Repeat scan in 6 months.    4.  Worsening neuropathy and fatigue: There are some chronic issues but states that she is struggling with this.  These are all from chemotherapy side effects.  She has tried to increase her workload and has been struggling with that.  She says if she tries to work more than 4 hours a day she has significant fatigue, significant worsening neuropathy with falls and she will get infections much easier.  She is on occasions that are making this worse.     ECOG Performance    0 - Independent    Distress Screening (within last 30 days)    No data recorded     Pain       Problem List    Patient Active Problem List   Diagnosis     Lymphoma, mantle cell, multiple sites (H)     H/O renal impairment     NHL (non-Hodgkin's lymphoma) (H)     Neutropenic fever (H)     Hypogammaglobulinemia (H)     Anemia due to antineoplastic  chemotherapy     Drug-induced thrombocytopenia     Constipation     Morbid obesity (H)     Elevation of level of transaminase or lactic acid dehydrogenase (LDH)     Malaise and fatigue     Postoperative anemia due to acute blood loss     CKD (chronic kidney disease) stage 4, GFR 15-29 ml/min (H)     2019 novel coronavirus disease (COVID-19)     Hypogammaglobulinemia, acquired (H)     Accidental fall     Maintenance antineoplastic chemotherapy     Acquired hypothyroidism     Obstructive sleep apnea syndrome        ______________________________________________________________________________    History of Present Illness    Measurable disease: Patient currently in remission. Disease previously found on CT, PET     Current therapy: Observation  IVIG resumed in January 2017 and stopped in May 2018; resumed again in September 2018  Maintenance Rituxan started December 2015, and completed August 2017, 8 doses  Acyclovir 400 mg twice daily  IVIG every 3 months, first dose August 29, 2016(currently on hold, last dose November 2016)   IVIG every 4 weeks restarted in June 2017 and stopped in May 2018        Treatment history:  Ibrutinib 420 mg p.o. daily started for presumed relapse of mantle cell lymphoma  July 3, 2020 and stopped August 4, 2020     First set of immunizations received at the [facility] in August 2016  First dose of IVIG  Patient completed 1 year of inhalational pentamidine  Autologous stem cell transplant with Beam prep, August 18, 2015  One cycle of R-ICE, Naye 15, 2015, ifosfamide and etoposide reduced by 25% because of renal dysfunction  Ibrutinib since October 10, 2014, current dose of 420 mg by mouth daily, stopped June 9, 2015   3 cycles of R-DHAP completed late August 2014   5 cycles of R CHOP, started after diagnosis in March 2014     Interim History: Is here today for a month follow-up visit.  Overall she says she is doing okay but is worried that her scan is getting show recurrent disease.  She  says she is gained some weight and she has not increased what she is eating and says that that sometimes is a sign of her lymphoma.  She says her neuropathy is gotten worse and she is been trying to work more and it has made it worse.  She is had some falls that have resulted in a lot of injuries.  She denies any new bone or back pain.  Denies any new lymphadenopathy.  She says she does have issues in the summer with having significant reactions to bug bites.  She has not had any major infections lately but states when she does try to work too much she gets rundown easily and then gets infections much easier as well.  She says the recovery time is a lot slower.         Review of Systems    Pertinent items are noted in HPI.    Past History    Past Medical History:   Diagnosis Date     Anemia      Cancer (H) 2014    Mantle Cell Lymphoma     Chronic kidney disease     elevated creatinine due to chemo     Cough 6/19/2017    Overview:  Created by Conversion     Dehydration 8/12/2014     Fever 6/19/2017    Overview:  Created by Conversion     History of anesthesia complications 2015    urinary retention after lymph node excision. required catheterization     History of blood transfusion     reaction to platelets with hives in the past     History of transfusion      Hypothyroid      Hypothyroidism 2007     Kidney stones 2002     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     Mantle cell lymphoma (H)     stage 4     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)     significant improved     GENNA (obstructive sleep apnea)     uses CPAP     PONV (postoperative nausea and vomiting)      Sleep apnea     uses cpap     Thrombocytopenia (H)        PHYSICAL EXAM  There were no vitals taken for this visit.    GENERAL: no acute distress. Cooperative in conversation. Here alone. Mask on  RESP: Regular respiratory rate. No expiratory wheezes   MUSCULOSKELETAL: no bilateral leg  swelling  NEURO: non focal. Alert and oriented x3.   PSYCH: within normal limits. No depression or anxiety.  SKIN: exposed skin is dry intact.     Lab Results    Recent Results (from the past 168 hour(s))   Comprehensive metabolic panel (BMP + Alb, Alk Phos, ALT, AST, Total. Bili, TP)   Result Value Ref Range    Sodium 140 136 - 145 mmol/L    Potassium 4.2 3.5 - 5.0 mmol/L    Chloride 107 98 - 107 mmol/L    Carbon Dioxide (CO2) 28 22 - 31 mmol/L    Anion Gap 5 5 - 18 mmol/L    Urea Nitrogen 30 (H) 8 - 22 mg/dL    Creatinine 2.14 (H) 0.60 - 1.10 mg/dL    Calcium 9.2 8.5 - 10.5 mg/dL    Glucose 91 70 - 125 mg/dL    Alkaline Phosphatase 103 45 - 120 U/L    AST 21 0 - 40 U/L    ALT 15 0 - 45 U/L    Protein Total 6.3 6.0 - 8.0 g/dL    Albumin 3.5 3.5 - 5.0 g/dL    Bilirubin Total 0.5 0.0 - 1.0 mg/dL    GFR Estimate 26 (L) >60 mL/min/1.73m2   Lactate Dehydrogenase   Result Value Ref Range    Lactate Dehydrogenase 165 125 - 220 U/L   CBC with platelets and differential   Result Value Ref Range    WBC Count 6.1 4.0 - 11.0 10e3/uL    RBC Count 4.94 3.80 - 5.20 10e6/uL    Hemoglobin 14.5 11.7 - 15.7 g/dL    Hematocrit 45.5 35.0 - 47.0 %    MCV 92 78 - 100 fL    MCH 29.4 26.5 - 33.0 pg    MCHC 31.9 31.5 - 36.5 g/dL    RDW 14.8 10.0 - 15.0 %    Platelet Count 162 150 - 450 10e3/uL    % Neutrophils 67 %    % Lymphocytes 22 %    % Monocytes 7 %    % Eosinophils 4 %    % Basophils 0 %    % Immature Granulocytes 0 %    NRBCs per 100 WBC 0 <1 /100    Absolute Neutrophils 4.1 1.6 - 8.3 10e3/uL    Absolute Lymphocytes 1.3 0.8 - 5.3 10e3/uL    Absolute Monocytes 0.4 0.0 - 1.3 10e3/uL    Absolute Eosinophils 0.2 0.0 - 0.7 10e3/uL    Absolute Basophils 0.0 0.0 - 0.2 10e3/uL    Absolute Immature Granulocytes 0.0 <=0.4 10e3/uL    Absolute NRBCs 0.0 10e3/uL       Imaging    CT Chest Abdomen Pelvis w/o Contrast    Result Date: 7/18/2022  EXAM: CT CHEST ABDOMEN PELVIS W/O CONTRAST LOCATION: [facility] DATE/TIME:  7/18/2022 7:14 AM INDICATION:  Lymphoma, mantle cell, multiple sites (H) COMPARISON: 03/21/2022 TECHNIQUE: CT scan of the chest, abdomen, and pelvis was performed without IV contrast. Multiplanar reformats were obtained. Dose reduction techniques were used. CONTRAST: None. FINDINGS: LUNGS AND PLEURA: There are two tiny fissural lymph nodes on the right. No other pulmonary nodules. MEDIASTINUM/AXILLAE: No lymphadenopathy. There is a right chest wall Port-A-Cath. CORONARY ARTERY CALCIFICATION: None. HEPATOBILIARY: Hypoattenuating right hepatic lobe lesions have not changed from the comparison study (series 4 images 188 and 218). PANCREAS: Normal. SPLEEN: Normal. ADRENAL GLANDS: Normal. KIDNEYS/BLADDER: Normal. BOWEL: Normal. LYMPH NODES: Normal. VASCULATURE: Unremarkable. PELVIC ORGANS: Tubal ligation. MUSCULOSKELETAL: No aggressive lesions. Cervical fusion hardware is partially included in the field-of-view.     IMPRESSION: 1.  No lymphadenopathy. Normal size of the spleen. 2.  Two hypoattenuating hepatic lesions are again seen.        Signed by: CORNELIUS Post CNP

## 2022-07-18 NOTE — LETTER
7/18/2022         RE: Avis Paul  [address] [address] [id number]        Dear Colleague,    Thank you for referring your patient, Avis Paul, to the [facility] [place]. Please see a copy of my visit note below.    [facility] Hematology and Oncology Progress Note    Patient: Avis Paul  MRN: [id number]  Date of Service: Jul 18, 2022          Reason for Visit    No chief complaint on file.      Assessment and Plan    Cancer Staging  No matching staging information was found for the patient.    1. Mantle cell lymphoma status post autologous transplant, August 18, 2015: Then completed 2 years of maintenance Rituxan therapy.  She is currently on observation.   She will return in 6 months to see Dr. Starks with CT. Encourage patient to call us if she has any lymphadenopathy that she notices clinically before then.     2. Hypogammaglobulinemia with multiple recurrent infections:   On IVIG. She will get it every 4 weeks.  We will continue to monitor her IgG levels intermittently.  Still pending from today.     3. Mild left inguinal adenopathy found in May 2020: pt had 2 biopsies. Initial one suggested relapse of lymphoma, but review at [facility] was negative. Pt treatment with one month of ibrutinib in the meantime and lymph node resolved.  Has been stable since that time. Repeat scan in 6 months.    4.  Worsening neuropathy and fatigue: There are some chronic issues but states that she is struggling with this.  These are all from chemotherapy side effects.  She has tried to increase her workload and has been struggling with that.  She says if she tries to work more than 4 hours a day she has significant fatigue, significant worsening neuropathy with falls and she will get infections much easier.  She is on occasions that are making this worse.     ECOG Performance    0 - Independent    Distress Screening (within last 30 days)    No data recorded     Pain        Problem List    Patient Active Problem List   Diagnosis     Lymphoma, mantle cell, multiple sites (H)     H/O renal impairment     NHL (non-Hodgkin's lymphoma) (H)     Neutropenic fever (H)     Hypogammaglobulinemia (H)     Anemia due to antineoplastic chemotherapy     Drug-induced thrombocytopenia     Constipation     Morbid obesity (H)     Elevation of level of transaminase or lactic acid dehydrogenase (LDH)     Malaise and fatigue     Postoperative anemia due to acute blood loss     CKD (chronic kidney disease) stage 4, GFR 15-29 ml/min (H)     2019 novel coronavirus disease (COVID-19)     Hypogammaglobulinemia, acquired (H)     Accidental fall     Maintenance antineoplastic chemotherapy     Acquired hypothyroidism     Obstructive sleep apnea syndrome        ______________________________________________________________________________    History of Present Illness    Measurable disease: Patient currently in remission. Disease previously found on CT, PET     Current therapy: Observation  IVIG resumed in January 2017 and stopped in May 2018; resumed again in September 2018  Maintenance Rituxan started December 2015, and completed August 2017, 8 doses  Acyclovir 400 mg twice daily  IVIG every 3 months, first dose August 29, 2016(currently on hold, last dose November 2016)   IVIG every 4 weeks restarted in June 2017 and stopped in May 2018        Treatment history:  Ibrutinib 420 mg p.o. daily started for presumed relapse of mantle cell lymphoma  July 3, 2020 and stopped August 4, 2020     First set of immunizations received at the [facility] in August 2016  First dose of IVIG  Patient completed 1 year of inhalational pentamidine  Autologous stem cell transplant with Beam prep, August 18, 2015  One cycle of R-ICE, Naye 15, 2015, ifosfamide and etoposide reduced by 25% because of renal dysfunction  Ibrutinib since October 10, 2014, current dose of 420 mg by mouth daily, stopped June 9, 2015   3 cycles of R-DHAP  completed late August 2014   5 cycles of R CHOP, started after diagnosis in March 2014     Interim History: Is here today for a month follow-up visit.  Overall she says she is doing okay but is worried that her scan is getting show recurrent disease.  She says she is gained some weight and she has not increased what she is eating and says that that sometimes is a sign of her lymphoma.  She says her neuropathy is gotten worse and she is been trying to work more and it has made it worse.  She is had some falls that have resulted in a lot of injuries.  She denies any new bone or back pain.  Denies any new lymphadenopathy.  She says she does have issues in the summer with having significant reactions to bug bites.  She has not had any major infections lately but states when she does try to work too much she gets rundown easily and then gets infections much easier as well.  She says the recovery time is a lot slower.         Review of Systems    Pertinent items are noted in HPI.    Past History    Past Medical History:   Diagnosis Date     Anemia      Cancer (H) 2014    Mantle Cell Lymphoma     Chronic kidney disease     elevated creatinine due to chemo     Cough 6/19/2017    Overview:  Created by Conversion     Dehydration 8/12/2014     Fever 6/19/2017    Overview:  Created by Conversion     History of anesthesia complications 2015    urinary retention after lymph node excision. required catheterization     History of blood transfusion     reaction to platelets with hives in the past     History of transfusion      Hypothyroid      Hypothyroidism 2007     Kidney stones 2002     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     Mantle cell lymphoma (H)     stage 4     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)     significant improved     GENNA (obstructive sleep apnea)     uses CPAP     PONV (postoperative nausea and vomiting)      Sleep apnea     uses cpap      Thrombocytopenia (H)        PHYSICAL EXAM  There were no vitals taken for this visit.    GENERAL: no acute distress. Cooperative in conversation. Here alone. Mask on  RESP: Regular respiratory rate. No expiratory wheezes   MUSCULOSKELETAL: no bilateral leg swelling  NEURO: non focal. Alert and oriented x3.   PSYCH: within normal limits. No depression or anxiety.  SKIN: exposed skin is dry intact.     Lab Results    Recent Results (from the past 168 hour(s))   Comprehensive metabolic panel (BMP + Alb, Alk Phos, ALT, AST, Total. Bili, TP)   Result Value Ref Range    Sodium 140 136 - 145 mmol/L    Potassium 4.2 3.5 - 5.0 mmol/L    Chloride 107 98 - 107 mmol/L    Carbon Dioxide (CO2) 28 22 - 31 mmol/L    Anion Gap 5 5 - 18 mmol/L    Urea Nitrogen 30 (H) 8 - 22 mg/dL    Creatinine 2.14 (H) 0.60 - 1.10 mg/dL    Calcium 9.2 8.5 - 10.5 mg/dL    Glucose 91 70 - 125 mg/dL    Alkaline Phosphatase 103 45 - 120 U/L    AST 21 0 - 40 U/L    ALT 15 0 - 45 U/L    Protein Total 6.3 6.0 - 8.0 g/dL    Albumin 3.5 3.5 - 5.0 g/dL    Bilirubin Total 0.5 0.0 - 1.0 mg/dL    GFR Estimate 26 (L) >60 mL/min/1.73m2   Lactate Dehydrogenase   Result Value Ref Range    Lactate Dehydrogenase 165 125 - 220 U/L   CBC with platelets and differential   Result Value Ref Range    WBC Count 6.1 4.0 - 11.0 10e3/uL    RBC Count 4.94 3.80 - 5.20 10e6/uL    Hemoglobin 14.5 11.7 - 15.7 g/dL    Hematocrit 45.5 35.0 - 47.0 %    MCV 92 78 - 100 fL    MCH 29.4 26.5 - 33.0 pg    MCHC 31.9 31.5 - 36.5 g/dL    RDW 14.8 10.0 - 15.0 %    Platelet Count 162 150 - 450 10e3/uL    % Neutrophils 67 %    % Lymphocytes 22 %    % Monocytes 7 %    % Eosinophils 4 %    % Basophils 0 %    % Immature Granulocytes 0 %    NRBCs per 100 WBC 0 <1 /100    Absolute Neutrophils 4.1 1.6 - 8.3 10e3/uL    Absolute Lymphocytes 1.3 0.8 - 5.3 10e3/uL    Absolute Monocytes 0.4 0.0 - 1.3 10e3/uL    Absolute Eosinophils 0.2 0.0 - 0.7 10e3/uL    Absolute Basophils 0.0 0.0 - 0.2 10e3/uL    Absolute  Immature Granulocytes 0.0 <=0.4 10e3/uL    Absolute NRBCs 0.0 10e3/uL       Imaging    CT Chest Abdomen Pelvis w/o Contrast    Result Date: 7/18/2022  EXAM: CT CHEST ABDOMEN PELVIS W/O CONTRAST LOCATION: [facility] DATE/TIME: 7/18/2022 7:14 AM INDICATION:  Lymphoma, mantle cell, multiple sites (H) COMPARISON: 03/21/2022 TECHNIQUE: CT scan of the chest, abdomen, and pelvis was performed without IV contrast. Multiplanar reformats were obtained. Dose reduction techniques were used. CONTRAST: None. FINDINGS: LUNGS AND PLEURA: There are two tiny fissural lymph nodes on the right. No other pulmonary nodules. MEDIASTINUM/AXILLAE: No lymphadenopathy. There is a right chest wall Port-A-Cath. CORONARY ARTERY CALCIFICATION: None. HEPATOBILIARY: Hypoattenuating right hepatic lobe lesions have not changed from the comparison study (series 4 images 188 and 218). PANCREAS: Normal. SPLEEN: Normal. ADRENAL GLANDS: Normal. KIDNEYS/BLADDER: Normal. BOWEL: Normal. LYMPH NODES: Normal. VASCULATURE: Unremarkable. PELVIC ORGANS: Tubal ligation. MUSCULOSKELETAL: No aggressive lesions. Cervical fusion hardware is partially included in the field-of-view.     IMPRESSION: 1.  No lymphadenopathy. Normal size of the spleen. 2.  Two hypoattenuating hepatic lesions are again seen.        Signed by: CORNELIUS Post CNP      Again, thank you for allowing me to participate in the care of your patient.        Sincerely,        CORNELIUS Post CNP

## 2022-07-19 LAB
IGA SERPL-MCNC: 3 MG/DL (ref 84–499)
IGG SERPL-MCNC: 585 MG/DL (ref 610–1616)
IGM SERPL-MCNC: <10 MG/DL (ref 35–242)

## 2022-08-07 ENCOUNTER — MYC MEDICAL ADVICE (OUTPATIENT)
Dept: FAMILY MEDICINE | Facility: CLINIC | Age: 57
End: 2022-08-07

## 2022-08-07 DIAGNOSIS — E03.9 HYPOTHYROIDISM, UNSPECIFIED TYPE: ICD-10-CM

## 2022-08-08 NOTE — TELEPHONE ENCOUNTER
Dr Tan  See mobli message. Pt would like adjusted dose in thyroid medication due to  Increased lab values      French Anglin RN

## 2022-08-10 RX ORDER — LEVOTHYROXINE SODIUM 112 UG/1
112 TABLET ORAL EVERY OTHER DAY
Qty: 45 TABLET | Refills: 3 | Status: SHIPPED | OUTPATIENT
Start: 2022-08-10 | End: 2022-09-27

## 2022-08-10 RX ORDER — LEVOTHYROXINE SODIUM 100 UG/1
TABLET ORAL
Qty: 45 TABLET | Refills: 3 | Status: SHIPPED | OUTPATIENT
Start: 2022-08-10 | End: 2022-10-04

## 2022-08-10 NOTE — TELEPHONE ENCOUNTER
Dr. Tan,    Patient unable to do a vrt or e visit she lives in [facility].  Office visit or telephone visit? Jaz SAWYER RN

## 2022-08-12 NOTE — PATIENT INSTRUCTIONS

## 2022-08-15 ENCOUNTER — INFUSION THERAPY VISIT (OUTPATIENT)
Dept: INFUSION THERAPY | Facility: HOSPITAL | Age: 57
End: 2022-08-15
Attending: INTERNAL MEDICINE
Payer: COMMERCIAL

## 2022-08-15 VITALS
HEART RATE: 67 BPM | HEIGHT: 66 IN | SYSTOLIC BLOOD PRESSURE: 137 MMHG | RESPIRATION RATE: 16 BRPM | BODY MASS INDEX: 47.09 KG/M2 | OXYGEN SATURATION: 97 % | TEMPERATURE: 98.2 F | DIASTOLIC BLOOD PRESSURE: 83 MMHG | WEIGHT: 293 LBS

## 2022-08-15 DIAGNOSIS — D80.1 HYPOGAMMAGLOBULINEMIA (H): Primary | ICD-10-CM

## 2022-08-15 PROCEDURE — 250N000013 HC RX MED GY IP 250 OP 250 PS 637: Performed by: NURSE PRACTITIONER

## 2022-08-15 PROCEDURE — 250N000013 HC RX MED GY IP 250 OP 250 PS 637: Performed by: INTERNAL MEDICINE

## 2022-08-15 PROCEDURE — 96375 TX/PRO/DX INJ NEW DRUG ADDON: CPT

## 2022-08-15 PROCEDURE — 250N000011 HC RX IP 250 OP 636: Performed by: NURSE PRACTITIONER

## 2022-08-15 PROCEDURE — 96366 THER/PROPH/DIAG IV INF ADDON: CPT

## 2022-08-15 PROCEDURE — 258N000003 HC RX IP 258 OP 636: Performed by: NURSE PRACTITIONER

## 2022-08-15 PROCEDURE — 96365 THER/PROPH/DIAG IV INF INIT: CPT

## 2022-08-15 RX ORDER — HEPARIN SODIUM (PORCINE) LOCK FLUSH IV SOLN 100 UNIT/ML 100 UNIT/ML
5 SOLUTION INTRAVENOUS
Status: DISCONTINUED | OUTPATIENT
Start: 2022-08-15 | End: 2022-08-15 | Stop reason: HOSPADM

## 2022-08-15 RX ORDER — NALOXONE HYDROCHLORIDE 0.4 MG/ML
0.2 INJECTION, SOLUTION INTRAMUSCULAR; INTRAVENOUS; SUBCUTANEOUS
Status: DISCONTINUED | OUTPATIENT
Start: 2022-08-15 | End: 2022-08-15 | Stop reason: HOSPADM

## 2022-08-15 RX ORDER — EPINEPHRINE 1 MG/ML
0.3 INJECTION, SOLUTION INTRAMUSCULAR; SUBCUTANEOUS EVERY 5 MIN PRN
Status: CANCELLED | OUTPATIENT
Start: 2022-08-16

## 2022-08-15 RX ORDER — ALBUTEROL SULFATE 0.83 MG/ML
2.5 SOLUTION RESPIRATORY (INHALATION)
Status: CANCELLED | OUTPATIENT
Start: 2022-08-16

## 2022-08-15 RX ORDER — METHYLPREDNISOLONE SODIUM SUCCINATE 125 MG/2ML
125 INJECTION, POWDER, LYOPHILIZED, FOR SOLUTION INTRAMUSCULAR; INTRAVENOUS
Status: CANCELLED
Start: 2022-08-16

## 2022-08-15 RX ORDER — ACETAMINOPHEN 325 MG/1
650 TABLET ORAL ONCE
Status: COMPLETED | OUTPATIENT
Start: 2022-08-15 | End: 2022-08-15

## 2022-08-15 RX ORDER — MEPERIDINE HYDROCHLORIDE 25 MG/ML
25 INJECTION INTRAMUSCULAR; INTRAVENOUS; SUBCUTANEOUS EVERY 30 MIN PRN
Status: DISCONTINUED | OUTPATIENT
Start: 2022-08-15 | End: 2022-08-15 | Stop reason: HOSPADM

## 2022-08-15 RX ORDER — METHYLPREDNISOLONE SODIUM SUCCINATE 125 MG/2ML
125 INJECTION, POWDER, LYOPHILIZED, FOR SOLUTION INTRAMUSCULAR; INTRAVENOUS ONCE
Status: COMPLETED | OUTPATIENT
Start: 2022-08-15 | End: 2022-08-15

## 2022-08-15 RX ORDER — ALBUTEROL SULFATE 0.83 MG/ML
2.5 SOLUTION RESPIRATORY (INHALATION)
Status: DISCONTINUED | OUTPATIENT
Start: 2022-08-15 | End: 2022-08-15 | Stop reason: HOSPADM

## 2022-08-15 RX ORDER — ALBUTEROL SULFATE 90 UG/1
1-2 AEROSOL, METERED RESPIRATORY (INHALATION)
Status: CANCELLED
Start: 2022-08-16

## 2022-08-15 RX ORDER — DIPHENHYDRAMINE HYDROCHLORIDE 50 MG/ML
50 INJECTION INTRAMUSCULAR; INTRAVENOUS
Status: DISCONTINUED | OUTPATIENT
Start: 2022-08-15 | End: 2022-08-15 | Stop reason: HOSPADM

## 2022-08-15 RX ORDER — DIPHENHYDRAMINE HYDROCHLORIDE 50 MG/ML
50 INJECTION INTRAMUSCULAR; INTRAVENOUS
Status: CANCELLED
Start: 2022-08-16

## 2022-08-15 RX ORDER — HEPARIN SODIUM (PORCINE) LOCK FLUSH IV SOLN 100 UNIT/ML 100 UNIT/ML
5 SOLUTION INTRAVENOUS
Status: CANCELLED | OUTPATIENT
Start: 2022-08-16

## 2022-08-15 RX ORDER — METHYLPREDNISOLONE SODIUM SUCCINATE 125 MG/2ML
125 INJECTION, POWDER, LYOPHILIZED, FOR SOLUTION INTRAMUSCULAR; INTRAVENOUS ONCE
Status: CANCELLED
Start: 2022-08-16 | End: 2022-08-16

## 2022-08-15 RX ORDER — ACETAMINOPHEN 325 MG/1
650 TABLET ORAL ONCE
Status: CANCELLED
Start: 2022-08-16

## 2022-08-15 RX ORDER — DIPHENHYDRAMINE HCL 25 MG
12.5 TABLET ORAL ONCE
Status: COMPLETED | OUTPATIENT
Start: 2022-08-15 | End: 2022-08-15

## 2022-08-15 RX ORDER — ALBUTEROL SULFATE 90 UG/1
1-2 AEROSOL, METERED RESPIRATORY (INHALATION)
Status: DISCONTINUED | OUTPATIENT
Start: 2022-08-15 | End: 2022-08-15 | Stop reason: HOSPADM

## 2022-08-15 RX ORDER — NALOXONE HYDROCHLORIDE 0.4 MG/ML
0.2 INJECTION, SOLUTION INTRAMUSCULAR; INTRAVENOUS; SUBCUTANEOUS
Status: CANCELLED | OUTPATIENT
Start: 2022-08-16

## 2022-08-15 RX ORDER — HEPARIN SODIUM,PORCINE 10 UNIT/ML
5 VIAL (ML) INTRAVENOUS
Status: CANCELLED | OUTPATIENT
Start: 2022-08-16

## 2022-08-15 RX ORDER — DIPHENHYDRAMINE HCL 25 MG
12.5 TABLET ORAL ONCE
Status: CANCELLED
Start: 2022-08-16 | End: 2022-08-16

## 2022-08-15 RX ORDER — METHYLPREDNISOLONE SODIUM SUCCINATE 125 MG/2ML
125 INJECTION, POWDER, LYOPHILIZED, FOR SOLUTION INTRAMUSCULAR; INTRAVENOUS
Status: DISCONTINUED | OUTPATIENT
Start: 2022-08-15 | End: 2022-08-15 | Stop reason: HOSPADM

## 2022-08-15 RX ORDER — MEPERIDINE HYDROCHLORIDE 25 MG/ML
25 INJECTION INTRAMUSCULAR; INTRAVENOUS; SUBCUTANEOUS EVERY 30 MIN PRN
Status: CANCELLED | OUTPATIENT
Start: 2022-08-16

## 2022-08-15 RX ORDER — EPINEPHRINE 1 MG/ML
0.3 INJECTION, SOLUTION INTRAMUSCULAR; SUBCUTANEOUS EVERY 5 MIN PRN
Status: DISCONTINUED | OUTPATIENT
Start: 2022-08-15 | End: 2022-08-15 | Stop reason: HOSPADM

## 2022-08-15 RX ADMIN — ACETAMINOPHEN 650 MG: 325 TABLET ORAL at 10:04

## 2022-08-15 RX ADMIN — METHYLPREDNISOLONE SODIUM SUCCINATE 125 MG: 125 INJECTION, POWDER, FOR SOLUTION INTRAMUSCULAR; INTRAVENOUS at 10:05

## 2022-08-15 RX ADMIN — HUMAN IMMUNOGLOBULIN G 25 G: 20 LIQUID INTRAVENOUS at 10:46

## 2022-08-15 RX ADMIN — SODIUM CHLORIDE 250 ML: 9 INJECTION, SOLUTION INTRAVENOUS at 09:54

## 2022-08-15 RX ADMIN — DIPHENHYDRAMINE HCL 12.5 MG: 25 TABLET ORAL at 10:04

## 2022-08-15 RX ADMIN — Medication 5 ML: at 12:50

## 2022-08-15 RX ADMIN — FAMOTIDINE 20 MG: 10 INJECTION, SOLUTION INTRAVENOUS at 10:04

## 2022-08-15 ASSESSMENT — PAIN SCALES - GENERAL: PAINLEVEL: NO PAIN (0)

## 2022-08-15 NOTE — PROGRESS NOTES
Infusion Nursing Note:  Avis Paul presents today for IVIG    Patient seen by provider today: No   present during visit today: Not Applicable.    Note: Avis arrived ambulatory in a stable condition for IVIG, VSS Plan of care reviewed, she verbalized understanding of plan of care and return to clinic. She feels well and reports no recent fevers. Premedicated with oral Tylenol 650 mg, oral Benadryl 12.5 mg, IV pepcid 20 mg and IV solumedrol 125 mg as ordered. IVIG dose given and rate increased per protocol.    Intravenous Access:  Implanted Port.    Treatment Conditions:  Biological Infusion Checklist:  ~~~ NOTE: If the patient answers yes to any of the questions below, hold the infusion and contact ordering provider or on-call provider.    1. Have you recently had an elevated temperature, fever, chills, productive cough, coughing for 3 weeks or longer or hemoptysis, abnormal vital signs, night sweats,  chest pain or have you noticed a decrease in your appetite, unexplained weight loss or fatigue? No  2. Do you have any open wounds or new incisions? No  3. Do you have any recent or upcoming hospitalizations, surgeries or dental procedures? No  4. Do you currently have or recently have had any signs of illness or infection or are you on any antibiotics? No  5. Have you had any new, sudden or worsening abdominal pain? No  6. Have you or anyone in your household received a live vaccination in the past 4 weeks? Please note:  No live vaccines while on biologic/chemotherapy until 6 months after the last treatment.  Patient can receive the flu vaccine (shot only) and the pneumovax.  It is optimal for the patient to get these vaccines mid cycle, but they can be given at any time as long as it is not on the day of the infusion. No  7. Have you recently been diagnosed with any new nervous system diseases (ie. Multiple sclerosis, Guillain [place], seizures, neurological changes) or cancer diagnosis? No  8. Are you  on any form of radiation or chemotherapy? No  9. Are you pregnant or breast feeding or do you have plans of pregnancy in the future? No  10. Have you been having any signs of worsening depression or suicidal ideations?  (benlysta only) No  11. Have there been any other new onset medical symptoms? No  Post Infusion Assessment:  Patient tolerated infusion without incident.  Blood return noted pre and post infusion.  Site patent and intact, free from redness, edema or discomfort.  No evidence of extravasations.  Access discontinued per protocol.  Biologic Infusion Post Education: Call the triage nurse at your clinic or seek medical attention if you have chills and/or temperature greater than or equal to 100.5, uncontrolled nausea/vomiting, diarrhea, constipation, dizziness, shortness of breath, chest pain, heart palpitations, weakness or any other new or concerning symptoms, questions or concerns.  You cannot have any live virus vaccines prior to or during treatment or up to 6 months post infusion.  If you have an upcoming surgery, medical procedure or dental procedure during treatment, this should be discussed with your ordering physician and your surgeon/dentist.  If you are having any concerning symptom, if you are unsure if you should get your next infusion or wish to speak to a provider before your next infusion, please call your care coordinator or triage nurse at your clinic to notify them so we can adequately serve you.     Discharge Plan:   Discharge instructions reviewed with: Patient.  Patient and/or family verbalized understanding of discharge instructions and all questions answered.  Copy of AVS reviewed with patient and/or family. Patient will return 9/20/22 for next appointment.  Patient discharged in stable condition accompanied by: self.  Departure Mode: Ambulatory.    Jessica Paniagua RN

## 2022-08-31 NOTE — TELEPHONE ENCOUNTER
Telephone Encounter by Sameera Oviedo RN at 7/13/2020  2:47 PM     Author: Sameera Oviedo RN Service: -- Author Type: Registered Nurse    Filed: 7/13/2020  2:48 PM Encounter Date: 7/13/2020 Status: Signed    : Sameera Oviedo RN (Registered Nurse)       Avis was able to return my call from last week.  We introduced each other and Avis asked for some help with resources.  I did the following and sent her this e0mail with all of the information:    Vj Albarran!    It was nice to touch base with you todayJ    First of all, I like to give this letter to folks when we first meet (or talkJ)!    It is 2 pages, one a bit about my role and the 2nd page some important numbers:        [employer] Fund:    I applied on-line for you.    Here is an information sheet.    It is for $200 and they should send you a letter in the next 7-10 days.          [facility] Travel Assistance ([place] Chapter):    I applied on-line for you today.    Here is an information sheet.    It is $500 to use towards transportation/travel-type of expenses:        This fund can take several weeks.  I am working on the MD form (needs a signature) & will upload to the website as soon as I get it back!    I will let you know when the national program has funds and you qualify!        [employer] Assistance Program:      Website:  https://www.lls.org/support/financial-support/ri-mwh-vhhpjebdsc-program    Here are some files explaining as well or go to the website link above.        Patients need to call in and get this set up.    They are super helpful when applying and later if you have questions.    There IS available funding ($5000) for Mantle Cell Lymphoma.  Id call this week!    [phone]      Please let me know if you have any questions!!  Looking forward to working with Hannah    Sameera     [phone]      Sameera Oviedo RN, BSN  Nurse Navigator       
none

## 2022-09-03 ENCOUNTER — HEALTH MAINTENANCE LETTER (OUTPATIENT)
Age: 57
End: 2022-09-03

## 2022-09-20 ENCOUNTER — INFUSION THERAPY VISIT (OUTPATIENT)
Dept: INFUSION THERAPY | Facility: HOSPITAL | Age: 57
End: 2022-09-20
Attending: INTERNAL MEDICINE
Payer: COMMERCIAL

## 2022-09-20 VITALS
WEIGHT: 293 LBS | SYSTOLIC BLOOD PRESSURE: 122 MMHG | OXYGEN SATURATION: 97 % | HEART RATE: 62 BPM | BODY MASS INDEX: 48.1 KG/M2 | TEMPERATURE: 98.6 F | DIASTOLIC BLOOD PRESSURE: 65 MMHG | RESPIRATION RATE: 18 BRPM

## 2022-09-20 DIAGNOSIS — D80.1 HYPOGAMMAGLOBULINEMIA (H): Primary | ICD-10-CM

## 2022-09-20 PROCEDURE — 36591 DRAW BLOOD OFF VENOUS DEVICE: CPT | Performed by: NURSE PRACTITIONER

## 2022-09-20 PROCEDURE — 96365 THER/PROPH/DIAG IV INF INIT: CPT

## 2022-09-20 PROCEDURE — 96375 TX/PRO/DX INJ NEW DRUG ADDON: CPT

## 2022-09-20 PROCEDURE — 258N000003 HC RX IP 258 OP 636: Performed by: NURSE PRACTITIONER

## 2022-09-20 PROCEDURE — 82784 ASSAY IGA/IGD/IGG/IGM EACH: CPT | Performed by: NURSE PRACTITIONER

## 2022-09-20 PROCEDURE — 250N000013 HC RX MED GY IP 250 OP 250 PS 637: Performed by: NURSE PRACTITIONER

## 2022-09-20 PROCEDURE — 250N000011 HC RX IP 250 OP 636: Performed by: NURSE PRACTITIONER

## 2022-09-20 RX ORDER — METHYLPREDNISOLONE SODIUM SUCCINATE 125 MG/2ML
125 INJECTION, POWDER, LYOPHILIZED, FOR SOLUTION INTRAMUSCULAR; INTRAVENOUS ONCE
Status: CANCELLED
Start: 2022-10-10 | End: 2022-10-10

## 2022-09-20 RX ORDER — ACETAMINOPHEN 325 MG/1
650 TABLET ORAL ONCE
Status: COMPLETED | OUTPATIENT
Start: 2022-09-20 | End: 2022-09-20

## 2022-09-20 RX ORDER — ALBUTEROL SULFATE 0.83 MG/ML
2.5 SOLUTION RESPIRATORY (INHALATION)
Status: CANCELLED | OUTPATIENT
Start: 2022-10-10

## 2022-09-20 RX ORDER — METHYLPREDNISOLONE SODIUM SUCCINATE 125 MG/2ML
125 INJECTION, POWDER, LYOPHILIZED, FOR SOLUTION INTRAMUSCULAR; INTRAVENOUS
Status: DISCONTINUED | OUTPATIENT
Start: 2022-09-20 | End: 2022-09-20 | Stop reason: HOSPADM

## 2022-09-20 RX ORDER — ALBUTEROL SULFATE 90 UG/1
1-2 AEROSOL, METERED RESPIRATORY (INHALATION)
Status: CANCELLED
Start: 2022-10-10

## 2022-09-20 RX ORDER — METHYLPREDNISOLONE SODIUM SUCCINATE 125 MG/2ML
125 INJECTION, POWDER, LYOPHILIZED, FOR SOLUTION INTRAMUSCULAR; INTRAVENOUS ONCE
Status: COMPLETED | OUTPATIENT
Start: 2022-09-20 | End: 2022-09-20

## 2022-09-20 RX ORDER — ALBUTEROL SULFATE 0.83 MG/ML
2.5 SOLUTION RESPIRATORY (INHALATION)
Status: DISCONTINUED | OUTPATIENT
Start: 2022-09-20 | End: 2022-09-20 | Stop reason: HOSPADM

## 2022-09-20 RX ORDER — DIPHENHYDRAMINE HYDROCHLORIDE 50 MG/ML
50 INJECTION INTRAMUSCULAR; INTRAVENOUS
Status: CANCELLED
Start: 2022-10-10

## 2022-09-20 RX ORDER — HEPARIN SODIUM (PORCINE) LOCK FLUSH IV SOLN 100 UNIT/ML 100 UNIT/ML
5 SOLUTION INTRAVENOUS
Status: DISCONTINUED | OUTPATIENT
Start: 2022-09-20 | End: 2022-09-20 | Stop reason: HOSPADM

## 2022-09-20 RX ORDER — EPINEPHRINE 1 MG/ML
0.3 INJECTION, SOLUTION INTRAMUSCULAR; SUBCUTANEOUS EVERY 5 MIN PRN
Status: CANCELLED | OUTPATIENT
Start: 2022-10-10

## 2022-09-20 RX ORDER — DIPHENHYDRAMINE HCL 25 MG
12.5 TABLET ORAL ONCE
Status: COMPLETED | OUTPATIENT
Start: 2022-09-20 | End: 2022-09-20

## 2022-09-20 RX ORDER — NALOXONE HYDROCHLORIDE 0.4 MG/ML
0.2 INJECTION, SOLUTION INTRAMUSCULAR; INTRAVENOUS; SUBCUTANEOUS
Status: DISCONTINUED | OUTPATIENT
Start: 2022-09-20 | End: 2022-09-20 | Stop reason: HOSPADM

## 2022-09-20 RX ORDER — NALOXONE HYDROCHLORIDE 0.4 MG/ML
0.2 INJECTION, SOLUTION INTRAMUSCULAR; INTRAVENOUS; SUBCUTANEOUS
Status: CANCELLED | OUTPATIENT
Start: 2022-10-10

## 2022-09-20 RX ORDER — MEPERIDINE HYDROCHLORIDE 25 MG/ML
25 INJECTION INTRAMUSCULAR; INTRAVENOUS; SUBCUTANEOUS EVERY 30 MIN PRN
Status: CANCELLED | OUTPATIENT
Start: 2022-10-10

## 2022-09-20 RX ORDER — DIPHENHYDRAMINE HYDROCHLORIDE 50 MG/ML
50 INJECTION INTRAMUSCULAR; INTRAVENOUS
Status: DISCONTINUED | OUTPATIENT
Start: 2022-09-20 | End: 2022-09-20 | Stop reason: HOSPADM

## 2022-09-20 RX ORDER — MEPERIDINE HYDROCHLORIDE 25 MG/ML
25 INJECTION INTRAMUSCULAR; INTRAVENOUS; SUBCUTANEOUS EVERY 30 MIN PRN
Status: DISCONTINUED | OUTPATIENT
Start: 2022-09-20 | End: 2022-09-20 | Stop reason: HOSPADM

## 2022-09-20 RX ORDER — HEPARIN SODIUM,PORCINE 10 UNIT/ML
5 VIAL (ML) INTRAVENOUS
Status: CANCELLED | OUTPATIENT
Start: 2022-10-10

## 2022-09-20 RX ORDER — METHYLPREDNISOLONE SODIUM SUCCINATE 125 MG/2ML
125 INJECTION, POWDER, LYOPHILIZED, FOR SOLUTION INTRAMUSCULAR; INTRAVENOUS
Status: CANCELLED
Start: 2022-10-10

## 2022-09-20 RX ORDER — EPINEPHRINE 1 MG/ML
0.3 INJECTION, SOLUTION INTRAMUSCULAR; SUBCUTANEOUS EVERY 5 MIN PRN
Status: DISCONTINUED | OUTPATIENT
Start: 2022-09-20 | End: 2022-09-20 | Stop reason: HOSPADM

## 2022-09-20 RX ORDER — HEPARIN SODIUM (PORCINE) LOCK FLUSH IV SOLN 100 UNIT/ML 100 UNIT/ML
5 SOLUTION INTRAVENOUS
Status: CANCELLED | OUTPATIENT
Start: 2022-10-10

## 2022-09-20 RX ORDER — ACETAMINOPHEN 325 MG/1
650 TABLET ORAL ONCE
Status: CANCELLED
Start: 2022-10-10

## 2022-09-20 RX ORDER — DIPHENHYDRAMINE HCL 25 MG
12.5 TABLET ORAL ONCE
Status: CANCELLED
Start: 2022-10-10 | End: 2022-10-10

## 2022-09-20 RX ORDER — ALBUTEROL SULFATE 90 UG/1
1-2 AEROSOL, METERED RESPIRATORY (INHALATION)
Status: DISCONTINUED | OUTPATIENT
Start: 2022-09-20 | End: 2022-09-20 | Stop reason: HOSPADM

## 2022-09-20 RX ADMIN — HUMAN IMMUNOGLOBULIN G 25 G: 20 LIQUID INTRAVENOUS at 10:26

## 2022-09-20 RX ADMIN — ACETAMINOPHEN 650 MG: 325 TABLET, FILM COATED ORAL at 09:45

## 2022-09-20 RX ADMIN — SODIUM CHLORIDE 250 ML: 9 INJECTION, SOLUTION INTRAVENOUS at 09:45

## 2022-09-20 RX ADMIN — FAMOTIDINE 20 MG: 10 INJECTION INTRAVENOUS at 09:47

## 2022-09-20 RX ADMIN — METHYLPREDNISOLONE SODIUM SUCCINATE 125 MG: 125 INJECTION, POWDER, FOR SOLUTION INTRAMUSCULAR; INTRAVENOUS at 09:52

## 2022-09-20 RX ADMIN — Medication 5 ML: at 11:59

## 2022-09-20 RX ADMIN — DIPHENHYDRAMINE HCL 12.5 MG: 25 TABLET ORAL at 09:45

## 2022-09-20 NOTE — PROGRESS NOTES
Infusion Nursing Note:  Avis Paul presents today for IVIG    Patient seen by provider today: No   present during visit today: Not Applicable.    Note: Avis arrived ambulatory in a stable condition for IVIG, VSS Plan of care reviewed, she verbalized understanding of plan of care and return to clinic. She feels well and reports no recent fevers. Premedicated with oral Tylenol 650 mg, oral Benadryl 12.5 mg, IV pepcid 20 mg and IV solumedrol 125 mg as ordered. IVIG dose given and rate increased per protocol.    Intravenous Access:  Implanted Port.    Treatment Conditions:  Biological Infusion Checklist:  ~~~ NOTE: If the patient answers yes to any of the questions below, hold the infusion and contact ordering provider or on-call provider.    1. Have you recently had an elevated temperature, fever, chills, productive cough, coughing for 3 weeks or longer or hemoptysis, abnormal vital signs, night sweats,  chest pain or have you noticed a decrease in your appetite, unexplained weight loss or fatigue? No  2. Do you have any open wounds or new incisions? No  3. Do you have any recent or upcoming hospitalizations, surgeries or dental procedures? No  4. Do you currently have or recently have had any signs of illness or infection or are you on any antibiotics? No  5. Have you had any new, sudden or worsening abdominal pain? No  6. Have you or anyone in your household received a live vaccination in the past 4 weeks? Please note:  No live vaccines while on biologic/chemotherapy until 6 months after the last treatment.  Patient can receive the flu vaccine (shot only) and the pneumovax.  It is optimal for the patient to get these vaccines mid cycle, but they can be given at any time as long as it is not on the day of the infusion. No  7. Have you recently been diagnosed with any new nervous system diseases (ie. Multiple sclerosis, Guillain [place], seizures, neurological changes) or cancer diagnosis? No  8. Are you  on any form of radiation or chemotherapy? No  9. Are you pregnant or breast feeding or do you have plans of pregnancy in the future? No  10. Have you been having any signs of worsening depression or suicidal ideations?  (benlysta only) No  11. Have there been any other new onset medical symptoms? No  Post Infusion Assessment:  Patient tolerated infusion without incident.  Blood return noted pre and post infusion.  Site patent and intact, free from redness, edema or discomfort.  No evidence of extravasations.  Access discontinued per protocol.  Biologic Infusion Post Education: Call the triage nurse at your clinic or seek medical attention if you have chills and/or temperature greater than or equal to 100.5, uncontrolled nausea/vomiting, diarrhea, constipation, dizziness, shortness of breath, chest pain, heart palpitations, weakness or any other new or concerning symptoms, questions or concerns.  You cannot have any live virus vaccines prior to or during treatment or up to 6 months post infusion.  If you have an upcoming surgery, medical procedure or dental procedure during treatment, this should be discussed with your ordering physician and your surgeon/dentist.  If you are having any concerning symptom, if you are unsure if you should get your next infusion or wish to speak to a provider before your next infusion, please call your care coordinator or triage nurse at your clinic to notify them so we can adequately serve you.     Discharge Plan:   Discharge instructions reviewed with: Patient.  Patient and/or family verbalized understanding of discharge instructions and all questions answered.  Copy of AVS reviewed with patient and/or family. Patient will return 10/17/22 for next appointment.  Patient discharged in stable condition accompanied by: self.  Departure Mode: Ambulatory.    Violetta Rios RN

## 2022-09-21 LAB — IGG SERPL-MCNC: 509 MG/DL (ref 610–1616)

## 2022-10-03 ENCOUNTER — MYC MEDICAL ADVICE (OUTPATIENT)
Dept: FAMILY MEDICINE | Facility: CLINIC | Age: 57
End: 2022-10-03

## 2022-10-03 DIAGNOSIS — E03.9 HYPOTHYROIDISM, UNSPECIFIED TYPE: ICD-10-CM

## 2022-10-04 RX ORDER — LEVOTHYROXINE SODIUM 112 UG/1
112 TABLET ORAL DAILY
Qty: 90 TABLET | Refills: 3 | Status: SHIPPED | OUTPATIENT
Start: 2022-10-04 | End: 2022-12-16

## 2022-10-04 NOTE — TELEPHONE ENCOUNTER
See [employer] message. Routing to PCP for review and clarification of current med list.    Elin Contreras RN  [facility]

## 2022-10-17 ENCOUNTER — INFUSION THERAPY VISIT (OUTPATIENT)
Dept: INFUSION THERAPY | Facility: HOSPITAL | Age: 57
End: 2022-10-17
Attending: INTERNAL MEDICINE
Payer: COMMERCIAL

## 2022-10-17 VITALS
RESPIRATION RATE: 18 BRPM | OXYGEN SATURATION: 97 % | WEIGHT: 293 LBS | HEART RATE: 67 BPM | DIASTOLIC BLOOD PRESSURE: 91 MMHG | BODY MASS INDEX: 47.94 KG/M2 | TEMPERATURE: 97.5 F | SYSTOLIC BLOOD PRESSURE: 135 MMHG

## 2022-10-17 DIAGNOSIS — D80.1 HYPOGAMMAGLOBULINEMIA (H): Primary | ICD-10-CM

## 2022-10-17 PROCEDURE — 250N000013 HC RX MED GY IP 250 OP 250 PS 637: Performed by: NURSE PRACTITIONER

## 2022-10-17 PROCEDURE — 96366 THER/PROPH/DIAG IV INF ADDON: CPT

## 2022-10-17 PROCEDURE — 250N000011 HC RX IP 250 OP 636: Performed by: NURSE PRACTITIONER

## 2022-10-17 PROCEDURE — 258N000003 HC RX IP 258 OP 636: Performed by: NURSE PRACTITIONER

## 2022-10-17 PROCEDURE — 96375 TX/PRO/DX INJ NEW DRUG ADDON: CPT

## 2022-10-17 PROCEDURE — 96365 THER/PROPH/DIAG IV INF INIT: CPT

## 2022-10-17 RX ORDER — METHYLPREDNISOLONE SODIUM SUCCINATE 125 MG/2ML
125 INJECTION, POWDER, LYOPHILIZED, FOR SOLUTION INTRAMUSCULAR; INTRAVENOUS ONCE
Status: COMPLETED | OUTPATIENT
Start: 2022-10-17 | End: 2022-10-17

## 2022-10-17 RX ORDER — HEPARIN SODIUM (PORCINE) LOCK FLUSH IV SOLN 100 UNIT/ML 100 UNIT/ML
5 SOLUTION INTRAVENOUS
Status: CANCELLED | OUTPATIENT
Start: 2022-10-18

## 2022-10-17 RX ORDER — ACETAMINOPHEN 325 MG/1
650 TABLET ORAL ONCE
Status: CANCELLED
Start: 2022-10-18

## 2022-10-17 RX ORDER — DIPHENHYDRAMINE HCL 25 MG
12.5 TABLET ORAL ONCE
Status: CANCELLED
Start: 2022-10-18 | End: 2022-10-18

## 2022-10-17 RX ORDER — ALBUTEROL SULFATE 0.83 MG/ML
2.5 SOLUTION RESPIRATORY (INHALATION)
Status: DISCONTINUED | OUTPATIENT
Start: 2022-10-17 | End: 2022-10-17 | Stop reason: HOSPADM

## 2022-10-17 RX ORDER — DIPHENHYDRAMINE HCL 25 MG
12.5 TABLET ORAL ONCE
Status: COMPLETED | OUTPATIENT
Start: 2022-10-17 | End: 2022-10-17

## 2022-10-17 RX ORDER — HEPARIN SODIUM (PORCINE) LOCK FLUSH IV SOLN 100 UNIT/ML 100 UNIT/ML
5 SOLUTION INTRAVENOUS
Status: DISCONTINUED | OUTPATIENT
Start: 2022-10-17 | End: 2022-10-17 | Stop reason: HOSPADM

## 2022-10-17 RX ORDER — MEPERIDINE HYDROCHLORIDE 25 MG/ML
25 INJECTION INTRAMUSCULAR; INTRAVENOUS; SUBCUTANEOUS EVERY 30 MIN PRN
Status: CANCELLED | OUTPATIENT
Start: 2022-10-18

## 2022-10-17 RX ORDER — METHYLPREDNISOLONE SODIUM SUCCINATE 125 MG/2ML
125 INJECTION, POWDER, LYOPHILIZED, FOR SOLUTION INTRAMUSCULAR; INTRAVENOUS
Status: DISCONTINUED | OUTPATIENT
Start: 2022-10-17 | End: 2022-10-17 | Stop reason: HOSPADM

## 2022-10-17 RX ORDER — HEPARIN SODIUM,PORCINE 10 UNIT/ML
5 VIAL (ML) INTRAVENOUS
Status: CANCELLED | OUTPATIENT
Start: 2022-10-18

## 2022-10-17 RX ORDER — NALOXONE HYDROCHLORIDE 0.4 MG/ML
0.2 INJECTION, SOLUTION INTRAMUSCULAR; INTRAVENOUS; SUBCUTANEOUS
Status: CANCELLED | OUTPATIENT
Start: 2022-10-18

## 2022-10-17 RX ORDER — MEPERIDINE HYDROCHLORIDE 25 MG/ML
25 INJECTION INTRAMUSCULAR; INTRAVENOUS; SUBCUTANEOUS EVERY 30 MIN PRN
Status: DISCONTINUED | OUTPATIENT
Start: 2022-10-17 | End: 2022-10-17 | Stop reason: HOSPADM

## 2022-10-17 RX ORDER — DIPHENHYDRAMINE HYDROCHLORIDE 50 MG/ML
50 INJECTION INTRAMUSCULAR; INTRAVENOUS
Status: DISCONTINUED | OUTPATIENT
Start: 2022-10-17 | End: 2022-10-17 | Stop reason: HOSPADM

## 2022-10-17 RX ORDER — ACETAMINOPHEN 325 MG/1
650 TABLET ORAL ONCE
Status: COMPLETED | OUTPATIENT
Start: 2022-10-17 | End: 2022-10-17

## 2022-10-17 RX ORDER — METHYLPREDNISOLONE SODIUM SUCCINATE 125 MG/2ML
125 INJECTION, POWDER, LYOPHILIZED, FOR SOLUTION INTRAMUSCULAR; INTRAVENOUS
Status: CANCELLED
Start: 2022-10-18

## 2022-10-17 RX ORDER — ALBUTEROL SULFATE 0.83 MG/ML
2.5 SOLUTION RESPIRATORY (INHALATION)
Status: CANCELLED | OUTPATIENT
Start: 2022-10-18

## 2022-10-17 RX ORDER — DIPHENHYDRAMINE HYDROCHLORIDE 50 MG/ML
50 INJECTION INTRAMUSCULAR; INTRAVENOUS
Status: CANCELLED
Start: 2022-10-18

## 2022-10-17 RX ORDER — ALBUTEROL SULFATE 90 UG/1
1-2 AEROSOL, METERED RESPIRATORY (INHALATION)
Status: DISCONTINUED | OUTPATIENT
Start: 2022-10-17 | End: 2022-10-17 | Stop reason: HOSPADM

## 2022-10-17 RX ORDER — NALOXONE HYDROCHLORIDE 0.4 MG/ML
0.2 INJECTION, SOLUTION INTRAMUSCULAR; INTRAVENOUS; SUBCUTANEOUS
Status: DISCONTINUED | OUTPATIENT
Start: 2022-10-17 | End: 2022-10-17 | Stop reason: HOSPADM

## 2022-10-17 RX ORDER — ALBUTEROL SULFATE 90 UG/1
1-2 AEROSOL, METERED RESPIRATORY (INHALATION)
Status: CANCELLED
Start: 2022-10-18

## 2022-10-17 RX ORDER — METHYLPREDNISOLONE SODIUM SUCCINATE 125 MG/2ML
125 INJECTION, POWDER, LYOPHILIZED, FOR SOLUTION INTRAMUSCULAR; INTRAVENOUS ONCE
Status: CANCELLED
Start: 2022-10-18 | End: 2022-10-18

## 2022-10-17 RX ORDER — EPINEPHRINE 1 MG/ML
0.3 INJECTION, SOLUTION INTRAMUSCULAR; SUBCUTANEOUS EVERY 5 MIN PRN
Status: CANCELLED | OUTPATIENT
Start: 2022-10-18

## 2022-10-17 RX ORDER — EPINEPHRINE 1 MG/ML
0.3 INJECTION, SOLUTION INTRAMUSCULAR; SUBCUTANEOUS EVERY 5 MIN PRN
Status: DISCONTINUED | OUTPATIENT
Start: 2022-10-17 | End: 2022-10-17 | Stop reason: HOSPADM

## 2022-10-17 RX ADMIN — METHYLPREDNISOLONE SODIUM SUCCINATE 125 MG: 125 INJECTION, POWDER, FOR SOLUTION INTRAMUSCULAR; INTRAVENOUS at 10:17

## 2022-10-17 RX ADMIN — ACETAMINOPHEN 650 MG: 325 TABLET, FILM COATED ORAL at 10:11

## 2022-10-17 RX ADMIN — FAMOTIDINE 20 MG: 10 INJECTION INTRAVENOUS at 10:12

## 2022-10-17 RX ADMIN — DIPHENHYDRAMINE HCL 12.5 MG: 25 TABLET ORAL at 10:11

## 2022-10-17 RX ADMIN — SODIUM CHLORIDE 250 ML: 9 INJECTION, SOLUTION INTRAVENOUS at 10:11

## 2022-10-17 RX ADMIN — Medication 5 ML: at 12:33

## 2022-10-17 RX ADMIN — HUMAN IMMUNOGLOBULIN G 25 G: 20 LIQUID INTRAVENOUS at 10:50

## 2022-10-17 NOTE — PATIENT INSTRUCTIONS
Post Infusion Assessment:  Biologic Infusion Post Education: Call the triage nurse at your clinic or seek medical attention if you have chills and/or temperature greater than or equal to 100.5, uncontrolled nausea/vomiting, diarrhea, constipation, dizziness, shortness of breath, chest pain, heart palpitations, weakness or any other new or concerning symptoms, questions or concerns.  You cannot have any live virus vaccines prior to or during treatment or up to 6 months post infusion.  If you have an upcoming surgery, medical procedure or dental procedure during treatment, this should be discussed with your ordering physician and your surgeon/dentist.  If you are having any concerning symptom, if you are unsure if you should get your next infusion or wish to speak to a provider before your next infusion, please call your care coordinator or triage nurse at your clinic to notify them so we can adequately serve you.

## 2022-10-17 NOTE — PROGRESS NOTES
Infusion Nursing Note:  Avis Paul presents today for IVIG    Patient seen by provider today: No   present during visit today: Not Applicable.    Note: Avis arrived ambulatory in a stable condition for IVIG, VSS. Patient had the flu the other weekend but has been feeling better. No fever. She just got back from seeing her daughter this weekend in [place].  Plan of care reviewed, she verbalized understanding of plan of care and return to clinic. She feels well and reports no recent fevers. Premedicated with oral Tylenol 650 mg, oral Benadryl 12.5 mg, IV pepcid 20 mg and IV solumedrol 125 mg as ordered. IVIG dose given and rate increased per protocol.    Intravenous Access:  Implanted Port.    Treatment Conditions:  Biological Infusion Checklist:  ~~~ NOTE: If the patient answers yes to any of the questions below, hold the infusion and contact ordering provider or on-call provider.    1. Have you recently had an elevated temperature, fever, chills, productive cough, coughing for 3 weeks or longer or hemoptysis,  abnormal vital signs, night sweats,  chest pain or have you noticed a decrease in your appetite, unexplained weight loss or fatigue? No  2. Do you have any open wounds or new incisions? No  3. Do you have any upcoming hospitalizations or surgeries? Does not include esophagogastroduodenoscopy, colonoscopy, endoscopic retrograde cholangiopancreatography (ERCP), endoscopic ultrasound (EUS), dental procedures or joint aspiration/steroid injections No  4. Do you currently have any signs of illness or infection or are you on any antibiotics? No  5. Have you had any new, sudden or worsening abdominal pain? No  6. Have you or anyone in your household received a live vaccination in the past 4 weeks? Please note: No live vaccines while on biologic/chemotherapy until 6 months after the last treatment. Patient can receive the flu vaccine (shot only), pneumovax and the Covid vaccine. It is optimal for  the patient to get these vaccines mid cycle, but they can be given at any time as long as it is not on the day of the infusion. No  7. Have you recently been diagnosed with any new nervous system diseases (ie. Multiple sclerosis, Guillain [place], seizures, neurological changes) or cancer diagnosis? Are you on any form of radiation or chemotherapy? No  8. Are you pregnant or breast feeding or do you have plans of pregnancy in the future? No  9. Have you been having any signs of worsening depression or suicidal ideations?  (benlysta only) No  10. Have there been any other new onset medical symptoms? No  11. Have you had any new blood clots? (IVIG only) No  Post Infusion Assessment:  Patient tolerated infusion without incident.  Blood return noted pre and post infusion.  Site patent and intact, free from redness, edema or discomfort.  No evidence of extravasations.  Access discontinued per protocol.  Biologic Infusion Post Education: Call the triage nurse at your clinic or seek medical attention if you have chills and/or temperature greater than or equal to 100.5, uncontrolled nausea/vomiting, diarrhea, constipation, dizziness, shortness of breath, chest pain, heart palpitations, weakness or any other new or concerning symptoms, questions or concerns.  You cannot have any live virus vaccines prior to or during treatment or up to 6 months post infusion.  If you have an upcoming surgery, medical procedure or dental procedure during treatment, this should be discussed with your ordering physician and your surgeon/dentist.  If you are having any concerning symptom, if you are unsure if you should get your next infusion or wish to speak to a provider before your next infusion, please call your care coordinator or triage nurse at your clinic to notify them so we can adequately serve you.     Discharge Plan:   Discharge instructions reviewed with: Patient.  Patient and/or family verbalized understanding of discharge  instructions and all questions answered.  Copy of AVS reviewed with patient and/or family. Patient will return 11/14/22 for next appointment.  Patient discharged in stable condition accompanied by: self.  Departure Mode: Ambulatory.    Violetta Rios RN

## 2022-10-18 ENCOUNTER — DOCUMENTATION ONLY (OUTPATIENT)
Dept: SLEEP MEDICINE | Facility: CLINIC | Age: 57
End: 2022-10-18

## 2022-10-18 NOTE — PROGRESS NOTES
DATE: 10/18/2022  LOCATION OF MASK FITTING: [place]  REASON FOR MASK FITTING:  ELIGIBLE  DISCUSSED/VIEWED THE FOLLOWING MASKS: AITFIT P30I REQUESTED BY PT    MASK AND SIZE SELECTED: AIRFIT P30I SW CUSHION  MASK CLARIFICATION NEEDED: N

## 2022-11-14 ENCOUNTER — INFUSION THERAPY VISIT (OUTPATIENT)
Dept: INFUSION THERAPY | Facility: HOSPITAL | Age: 57
End: 2022-11-14
Attending: INTERNAL MEDICINE
Payer: COMMERCIAL

## 2022-11-14 ENCOUNTER — DOCUMENTATION ONLY (OUTPATIENT)
Dept: ONCOLOGY | Facility: HOSPITAL | Age: 57
End: 2022-11-14

## 2022-11-14 VITALS
DIASTOLIC BLOOD PRESSURE: 80 MMHG | OXYGEN SATURATION: 100 % | WEIGHT: 290 LBS | RESPIRATION RATE: 18 BRPM | TEMPERATURE: 98.1 F | HEIGHT: 67 IN | SYSTOLIC BLOOD PRESSURE: 116 MMHG | HEART RATE: 78 BPM | BODY MASS INDEX: 45.52 KG/M2

## 2022-11-14 DIAGNOSIS — D80.1 HYPOGAMMAGLOBULINEMIA (H): Primary | ICD-10-CM

## 2022-11-14 PROCEDURE — 250N000013 HC RX MED GY IP 250 OP 250 PS 637: Performed by: NURSE PRACTITIONER

## 2022-11-14 PROCEDURE — 258N000003 HC RX IP 258 OP 636: Performed by: NURSE PRACTITIONER

## 2022-11-14 PROCEDURE — 96365 THER/PROPH/DIAG IV INF INIT: CPT

## 2022-11-14 PROCEDURE — 250N000011 HC RX IP 250 OP 636: Performed by: NURSE PRACTITIONER

## 2022-11-14 PROCEDURE — 96375 TX/PRO/DX INJ NEW DRUG ADDON: CPT

## 2022-11-14 RX ORDER — MEPERIDINE HYDROCHLORIDE 25 MG/ML
25 INJECTION INTRAMUSCULAR; INTRAVENOUS; SUBCUTANEOUS EVERY 30 MIN PRN
Status: CANCELLED | OUTPATIENT
Start: 2022-11-15

## 2022-11-14 RX ORDER — NALOXONE HYDROCHLORIDE 0.4 MG/ML
0.2 INJECTION, SOLUTION INTRAMUSCULAR; INTRAVENOUS; SUBCUTANEOUS
Status: CANCELLED | OUTPATIENT
Start: 2022-11-15

## 2022-11-14 RX ORDER — ALBUTEROL SULFATE 90 UG/1
1-2 AEROSOL, METERED RESPIRATORY (INHALATION)
Status: DISCONTINUED | OUTPATIENT
Start: 2022-11-14 | End: 2022-11-14 | Stop reason: HOSPADM

## 2022-11-14 RX ORDER — ACETAMINOPHEN 325 MG/1
650 TABLET ORAL ONCE
Status: COMPLETED | OUTPATIENT
Start: 2022-11-14 | End: 2022-11-14

## 2022-11-14 RX ORDER — DIPHENHYDRAMINE HYDROCHLORIDE 50 MG/ML
50 INJECTION INTRAMUSCULAR; INTRAVENOUS
Status: DISCONTINUED | OUTPATIENT
Start: 2022-11-14 | End: 2022-11-14 | Stop reason: HOSPADM

## 2022-11-14 RX ORDER — DIPHENHYDRAMINE HCL 25 MG
12.5 TABLET ORAL ONCE
Status: COMPLETED | OUTPATIENT
Start: 2022-11-14 | End: 2022-11-14

## 2022-11-14 RX ORDER — METHYLPREDNISOLONE SODIUM SUCCINATE 125 MG/2ML
125 INJECTION, POWDER, LYOPHILIZED, FOR SOLUTION INTRAMUSCULAR; INTRAVENOUS ONCE
Status: COMPLETED | OUTPATIENT
Start: 2022-11-14 | End: 2022-11-14

## 2022-11-14 RX ORDER — METHYLPREDNISOLONE SODIUM SUCCINATE 125 MG/2ML
125 INJECTION, POWDER, LYOPHILIZED, FOR SOLUTION INTRAMUSCULAR; INTRAVENOUS
Status: DISCONTINUED | OUTPATIENT
Start: 2022-11-14 | End: 2022-11-14 | Stop reason: HOSPADM

## 2022-11-14 RX ORDER — ALBUTEROL SULFATE 90 UG/1
1-2 AEROSOL, METERED RESPIRATORY (INHALATION)
Status: CANCELLED
Start: 2022-11-15

## 2022-11-14 RX ORDER — ALBUTEROL SULFATE 0.83 MG/ML
2.5 SOLUTION RESPIRATORY (INHALATION)
Status: CANCELLED | OUTPATIENT
Start: 2022-11-15

## 2022-11-14 RX ORDER — EPINEPHRINE 1 MG/ML
0.3 INJECTION, SOLUTION INTRAMUSCULAR; SUBCUTANEOUS EVERY 5 MIN PRN
Status: DISCONTINUED | OUTPATIENT
Start: 2022-11-14 | End: 2022-11-14 | Stop reason: HOSPADM

## 2022-11-14 RX ORDER — METHYLPREDNISOLONE SODIUM SUCCINATE 125 MG/2ML
125 INJECTION, POWDER, LYOPHILIZED, FOR SOLUTION INTRAMUSCULAR; INTRAVENOUS
Status: CANCELLED
Start: 2022-11-15

## 2022-11-14 RX ORDER — HEPARIN SODIUM,PORCINE 10 UNIT/ML
5 VIAL (ML) INTRAVENOUS
Status: CANCELLED | OUTPATIENT
Start: 2022-11-15

## 2022-11-14 RX ORDER — ACETAMINOPHEN 325 MG/1
650 TABLET ORAL ONCE
Status: CANCELLED
Start: 2022-11-15

## 2022-11-14 RX ORDER — ALBUTEROL SULFATE 0.83 MG/ML
2.5 SOLUTION RESPIRATORY (INHALATION)
Status: DISCONTINUED | OUTPATIENT
Start: 2022-11-14 | End: 2022-11-14 | Stop reason: HOSPADM

## 2022-11-14 RX ORDER — HEPARIN SODIUM (PORCINE) LOCK FLUSH IV SOLN 100 UNIT/ML 100 UNIT/ML
5 SOLUTION INTRAVENOUS
Status: DISCONTINUED | OUTPATIENT
Start: 2022-11-14 | End: 2022-11-14 | Stop reason: HOSPADM

## 2022-11-14 RX ORDER — MEPERIDINE HYDROCHLORIDE 25 MG/ML
25 INJECTION INTRAMUSCULAR; INTRAVENOUS; SUBCUTANEOUS EVERY 30 MIN PRN
Status: DISCONTINUED | OUTPATIENT
Start: 2022-11-14 | End: 2022-11-14 | Stop reason: HOSPADM

## 2022-11-14 RX ORDER — EPINEPHRINE 1 MG/ML
0.3 INJECTION, SOLUTION INTRAMUSCULAR; SUBCUTANEOUS EVERY 5 MIN PRN
Status: CANCELLED | OUTPATIENT
Start: 2022-11-15

## 2022-11-14 RX ORDER — HEPARIN SODIUM (PORCINE) LOCK FLUSH IV SOLN 100 UNIT/ML 100 UNIT/ML
5 SOLUTION INTRAVENOUS
Status: CANCELLED | OUTPATIENT
Start: 2022-11-15

## 2022-11-14 RX ORDER — NALOXONE HYDROCHLORIDE 0.4 MG/ML
0.2 INJECTION, SOLUTION INTRAMUSCULAR; INTRAVENOUS; SUBCUTANEOUS
Status: DISCONTINUED | OUTPATIENT
Start: 2022-11-14 | End: 2022-11-14 | Stop reason: HOSPADM

## 2022-11-14 RX ORDER — DIPHENHYDRAMINE HCL 25 MG
12.5 TABLET ORAL ONCE
Status: CANCELLED
Start: 2022-11-15 | End: 2022-11-15

## 2022-11-14 RX ORDER — METHYLPREDNISOLONE SODIUM SUCCINATE 125 MG/2ML
125 INJECTION, POWDER, LYOPHILIZED, FOR SOLUTION INTRAMUSCULAR; INTRAVENOUS ONCE
Status: CANCELLED
Start: 2022-11-15 | End: 2022-11-15

## 2022-11-14 RX ORDER — DIPHENHYDRAMINE HYDROCHLORIDE 50 MG/ML
50 INJECTION INTRAMUSCULAR; INTRAVENOUS
Status: CANCELLED
Start: 2022-11-15

## 2022-11-14 RX ADMIN — METHYLPREDNISOLONE SODIUM SUCCINATE 125 MG: 125 INJECTION, POWDER, FOR SOLUTION INTRAMUSCULAR; INTRAVENOUS at 10:08

## 2022-11-14 RX ADMIN — FAMOTIDINE 20 MG: 10 INJECTION, SOLUTION INTRAVENOUS at 10:07

## 2022-11-14 RX ADMIN — Medication 5 ML: at 12:08

## 2022-11-14 RX ADMIN — SODIUM CHLORIDE 250 ML: 9 INJECTION, SOLUTION INTRAVENOUS at 10:02

## 2022-11-14 RX ADMIN — DIPHENHYDRAMINE HCL 12.5 MG: 25 TABLET ORAL at 10:08

## 2022-11-14 RX ADMIN — HUMAN IMMUNOGLOBULIN G 25 G: 20 LIQUID INTRAVENOUS at 10:41

## 2022-11-14 RX ADMIN — ACETAMINOPHEN 650 MG: 325 TABLET, FILM COATED ORAL at 10:07

## 2022-11-14 ASSESSMENT — PAIN SCALES - GENERAL: PAINLEVEL: MODERATE PAIN (4)

## 2022-11-14 NOTE — PROGRESS NOTES
[facility]: Cancer Care                                                                                          Continuance of Disability - Attending Physician Statement form [facility]     Fax: [phone]    Signature:  Cecy Gan RN

## 2022-11-14 NOTE — PROGRESS NOTES
Infusion Nursing Note:  Avis Paul presents today for IVIG    Patient seen by provider today: No   present during visit today: Not Applicable.    Note: Avis arrived ambulatory in a stable condition for IVIG, VSS Plan of care reviewed, she verbalized understanding of plan of care and return to clinic. She feels well and reports no recent fevers. Premedicated with oral Tylenol 650 mg, oral Benadryl 12.5 mg, IV pepcid 20 mg and IV solumedrol 125 mg as ordered. IVIG dose given and rate increased per protocol.    Intravenous Access:  Implanted Port.    Treatment Conditions:  Biological Infusion Checklist:  ~~~ NOTE: If the patient answers yes to any of the questions below, hold the infusion and contact ordering provider or on-call provider.    1. Have you recently had an elevated temperature, fever, chills, productive cough, coughing for 3 weeks or longer or hemoptysis, abnormal vital signs, night sweats,  chest pain or have you noticed a decrease in your appetite, unexplained weight loss or fatigue? No  2. Do you have any open wounds or new incisions? No  3. Do you have any recent or upcoming hospitalizations, surgeries or dental procedures? No  4. Do you currently have or recently have had any signs of illness or infection or are you on any antibiotics? No  5. Have you had any new, sudden or worsening abdominal pain? No  6. Have you or anyone in your household received a live vaccination in the past 4 weeks? Please note:  No live vaccines while on biologic/chemotherapy until 6 months after the last treatment.  Patient can receive the flu vaccine (shot only) and the pneumovax.  It is optimal for the patient to get these vaccines mid cycle, but they can be given at any time as long as it is not on the day of the infusion. No  7. Have you recently been diagnosed with any new nervous system diseases (ie. Multiple sclerosis, Guillain [place], seizures, neurological changes) or cancer diagnosis? No  8. Are you  on any form of radiation or chemotherapy? No  9. Are you pregnant or breast feeding or do you have plans of pregnancy in the future? No  10. Have you been having any signs of worsening depression or suicidal ideations?  (benlysta only) No  11. Have there been any other new onset medical symptoms? No  Post Infusion Assessment:  Patient tolerated infusion without incident.  Blood return noted pre and post infusion.  Site patent and intact, free from redness, edema or discomfort.  No evidence of extravasations.  Access discontinued per protocol.  Biologic Infusion Post Education: Call the triage nurse at your clinic or seek medical attention if you have chills and/or temperature greater than or equal to 100.5, uncontrolled nausea/vomiting, diarrhea, constipation, dizziness, shortness of breath, chest pain, heart palpitations, weakness or any other new or concerning symptoms, questions or concerns.  You cannot have any live virus vaccines prior to or during treatment or up to 6 months post infusion.  If you have an upcoming surgery, medical procedure or dental procedure during treatment, this should be discussed with your ordering physician and your surgeon/dentist.  If you are having any concerning symptom, if you are unsure if you should get your next infusion or wish to speak to a provider before your next infusion, please call your care coordinator or triage nurse at your clinic to notify them so we can adequately serve you.     Discharge Plan:   Discharge instructions reviewed with: Patient.  Patient and/or family verbalized understanding of discharge instructions and all questions answered.  Copy of AVS reviewed with patient and/or family. Patient will return 12/12/22 for next appointment.  Patient discharged in stable condition accompanied by: self.  Departure Mode: Ambulatory.    Jessica Paniagua RN

## 2022-11-28 ENCOUNTER — TELEPHONE (OUTPATIENT)
Dept: SLEEP MEDICINE | Facility: CLINIC | Age: 57
End: 2022-11-28

## 2022-11-28 NOTE — TELEPHONE ENCOUNTER
PT LVM ON 11/22/22 ABOUT MACHINE MOTOR LIFE EXCEEDING LIMIT AND SHE IS ON WAITLIST FOR REPLACEMENT MACHINE. I GOT PA FROM [facility] FOR PT AND LVM FOR HER TO CALL ME BACK AND SCHEDULE FOR [facility] FOR NEW MACHINE OR SHE CAN BRING OLD MACHINE IN AND WE CAN TEST TO SEE IF IT STILLS WORKS BEFORE GIVING HER REPLACEMENT.     Heber Louis, Respiratory DME Coordinator

## 2022-12-02 ENCOUNTER — TELEPHONE (OUTPATIENT)
Dept: ONCOLOGY | Facility: HOSPITAL | Age: 57
End: 2022-12-02

## 2022-12-02 NOTE — TELEPHONE ENCOUNTER
[facility] care insurance care coordinator is Aline [phone]  [facility] [id number]

## 2022-12-12 ENCOUNTER — INFUSION THERAPY VISIT (OUTPATIENT)
Dept: INFUSION THERAPY | Facility: HOSPITAL | Age: 57
End: 2022-12-12
Attending: INTERNAL MEDICINE
Payer: COMMERCIAL

## 2022-12-12 VITALS
SYSTOLIC BLOOD PRESSURE: 134 MMHG | RESPIRATION RATE: 18 BRPM | DIASTOLIC BLOOD PRESSURE: 92 MMHG | WEIGHT: 293 LBS | TEMPERATURE: 97.8 F | OXYGEN SATURATION: 99 % | BODY MASS INDEX: 46.52 KG/M2 | HEART RATE: 72 BPM

## 2022-12-12 DIAGNOSIS — Z13.220 SCREENING FOR HYPERLIPIDEMIA: ICD-10-CM

## 2022-12-12 DIAGNOSIS — C83.18 LYMPHOMA, MANTLE CELL, MULTIPLE SITES (H): ICD-10-CM

## 2022-12-12 DIAGNOSIS — N92.0 SPOTTING: ICD-10-CM

## 2022-12-12 DIAGNOSIS — N18.4 CKD (CHRONIC KIDNEY DISEASE) STAGE 4, GFR 15-29 ML/MIN (H): ICD-10-CM

## 2022-12-12 DIAGNOSIS — E03.9 HYPOTHYROIDISM, UNSPECIFIED TYPE: ICD-10-CM

## 2022-12-12 DIAGNOSIS — E03.9 HYPOTHYROIDISM, UNSPECIFIED TYPE: Primary | ICD-10-CM

## 2022-12-12 DIAGNOSIS — D80.1 HYPOGAMMAGLOBULINEMIA (H): Primary | ICD-10-CM

## 2022-12-12 LAB
HOLD SPECIMEN: NORMAL
TSH SERPL DL<=0.005 MIU/L-ACNC: 2.23 UIU/ML (ref 0.3–4.2)

## 2022-12-12 PROCEDURE — 96365 THER/PROPH/DIAG IV INF INIT: CPT

## 2022-12-12 PROCEDURE — 80061 LIPID PANEL: CPT

## 2022-12-12 PROCEDURE — 84443 ASSAY THYROID STIM HORMONE: CPT

## 2022-12-12 PROCEDURE — 96366 THER/PROPH/DIAG IV INF ADDON: CPT

## 2022-12-12 PROCEDURE — 250N000013 HC RX MED GY IP 250 OP 250 PS 637: Performed by: NURSE PRACTITIONER

## 2022-12-12 PROCEDURE — 250N000011 HC RX IP 250 OP 636: Performed by: NURSE PRACTITIONER

## 2022-12-12 PROCEDURE — 250N000013 HC RX MED GY IP 250 OP 250 PS 637: Performed by: INTERNAL MEDICINE

## 2022-12-12 PROCEDURE — 36591 DRAW BLOOD OFF VENOUS DEVICE: CPT

## 2022-12-12 PROCEDURE — 82784 ASSAY IGA/IGD/IGG/IGM EACH: CPT

## 2022-12-12 PROCEDURE — 96375 TX/PRO/DX INJ NEW DRUG ADDON: CPT

## 2022-12-12 PROCEDURE — 83970 ASSAY OF PARATHORMONE: CPT

## 2022-12-12 PROCEDURE — 258N000003 HC RX IP 258 OP 636: Performed by: NURSE PRACTITIONER

## 2022-12-12 PROCEDURE — 83001 ASSAY OF GONADOTROPIN (FSH): CPT

## 2022-12-12 RX ORDER — ACETAMINOPHEN 325 MG/1
650 TABLET ORAL ONCE
Status: CANCELLED
Start: 2022-12-13

## 2022-12-12 RX ORDER — METHYLPREDNISOLONE SODIUM SUCCINATE 125 MG/2ML
125 INJECTION, POWDER, LYOPHILIZED, FOR SOLUTION INTRAMUSCULAR; INTRAVENOUS ONCE
Status: CANCELLED
Start: 2022-12-13 | End: 2022-12-13

## 2022-12-12 RX ORDER — HEPARIN SODIUM (PORCINE) LOCK FLUSH IV SOLN 100 UNIT/ML 100 UNIT/ML
5 SOLUTION INTRAVENOUS
Status: CANCELLED | OUTPATIENT
Start: 2022-12-13

## 2022-12-12 RX ORDER — DIPHENHYDRAMINE HYDROCHLORIDE 50 MG/ML
50 INJECTION INTRAMUSCULAR; INTRAVENOUS
Status: DISCONTINUED | OUTPATIENT
Start: 2022-12-12 | End: 2022-12-12 | Stop reason: HOSPADM

## 2022-12-12 RX ORDER — EPINEPHRINE 1 MG/ML
0.3 INJECTION, SOLUTION INTRAMUSCULAR; SUBCUTANEOUS EVERY 5 MIN PRN
Status: CANCELLED | OUTPATIENT
Start: 2022-12-13

## 2022-12-12 RX ORDER — ALBUTEROL SULFATE 90 UG/1
1-2 AEROSOL, METERED RESPIRATORY (INHALATION)
Status: DISCONTINUED | OUTPATIENT
Start: 2022-12-12 | End: 2022-12-12 | Stop reason: HOSPADM

## 2022-12-12 RX ORDER — ACETAMINOPHEN 325 MG/1
650 TABLET ORAL ONCE
Status: COMPLETED | OUTPATIENT
Start: 2022-12-12 | End: 2022-12-12

## 2022-12-12 RX ORDER — NALOXONE HYDROCHLORIDE 0.4 MG/ML
0.2 INJECTION, SOLUTION INTRAMUSCULAR; INTRAVENOUS; SUBCUTANEOUS
Status: CANCELLED | OUTPATIENT
Start: 2022-12-13

## 2022-12-12 RX ORDER — MEPERIDINE HYDROCHLORIDE 25 MG/ML
25 INJECTION INTRAMUSCULAR; INTRAVENOUS; SUBCUTANEOUS EVERY 30 MIN PRN
Status: CANCELLED | OUTPATIENT
Start: 2022-12-13

## 2022-12-12 RX ORDER — MEPERIDINE HYDROCHLORIDE 25 MG/ML
25 INJECTION INTRAMUSCULAR; INTRAVENOUS; SUBCUTANEOUS EVERY 30 MIN PRN
Status: DISCONTINUED | OUTPATIENT
Start: 2022-12-12 | End: 2022-12-12 | Stop reason: HOSPADM

## 2022-12-12 RX ORDER — NALOXONE HYDROCHLORIDE 0.4 MG/ML
0.2 INJECTION, SOLUTION INTRAMUSCULAR; INTRAVENOUS; SUBCUTANEOUS
Status: DISCONTINUED | OUTPATIENT
Start: 2022-12-12 | End: 2022-12-12 | Stop reason: HOSPADM

## 2022-12-12 RX ORDER — ALBUTEROL SULFATE 90 UG/1
1-2 AEROSOL, METERED RESPIRATORY (INHALATION)
Status: CANCELLED
Start: 2022-12-13

## 2022-12-12 RX ORDER — HEPARIN SODIUM,PORCINE 10 UNIT/ML
5 VIAL (ML) INTRAVENOUS
Status: CANCELLED | OUTPATIENT
Start: 2022-12-13

## 2022-12-12 RX ORDER — DIPHENHYDRAMINE HCL 12.5MG/5ML
12.5 LIQUID (ML) ORAL ONCE
Status: COMPLETED | OUTPATIENT
Start: 2022-12-12 | End: 2022-12-12

## 2022-12-12 RX ORDER — DIPHENHYDRAMINE HYDROCHLORIDE 50 MG/ML
50 INJECTION INTRAMUSCULAR; INTRAVENOUS
Status: CANCELLED
Start: 2022-12-13

## 2022-12-12 RX ORDER — METHYLPREDNISOLONE SODIUM SUCCINATE 125 MG/2ML
125 INJECTION, POWDER, LYOPHILIZED, FOR SOLUTION INTRAMUSCULAR; INTRAVENOUS
Status: CANCELLED
Start: 2022-12-13

## 2022-12-12 RX ORDER — ALBUTEROL SULFATE 0.83 MG/ML
2.5 SOLUTION RESPIRATORY (INHALATION)
Status: CANCELLED | OUTPATIENT
Start: 2022-12-13

## 2022-12-12 RX ORDER — METHYLPREDNISOLONE SODIUM SUCCINATE 125 MG/2ML
125 INJECTION, POWDER, LYOPHILIZED, FOR SOLUTION INTRAMUSCULAR; INTRAVENOUS
Status: DISCONTINUED | OUTPATIENT
Start: 2022-12-12 | End: 2022-12-12 | Stop reason: HOSPADM

## 2022-12-12 RX ORDER — ALBUTEROL SULFATE 0.83 MG/ML
2.5 SOLUTION RESPIRATORY (INHALATION)
Status: DISCONTINUED | OUTPATIENT
Start: 2022-12-12 | End: 2022-12-12 | Stop reason: HOSPADM

## 2022-12-12 RX ORDER — METHYLPREDNISOLONE SODIUM SUCCINATE 125 MG/2ML
125 INJECTION, POWDER, LYOPHILIZED, FOR SOLUTION INTRAMUSCULAR; INTRAVENOUS ONCE
Status: COMPLETED | OUTPATIENT
Start: 2022-12-12 | End: 2022-12-12

## 2022-12-12 RX ORDER — HEPARIN SODIUM (PORCINE) LOCK FLUSH IV SOLN 100 UNIT/ML 100 UNIT/ML
5 SOLUTION INTRAVENOUS
Status: DISCONTINUED | OUTPATIENT
Start: 2022-12-12 | End: 2022-12-12 | Stop reason: HOSPADM

## 2022-12-12 RX ORDER — DIPHENHYDRAMINE HCL 25 MG
12.5 TABLET ORAL ONCE
Status: CANCELLED
Start: 2022-12-13 | End: 2022-12-13

## 2022-12-12 RX ORDER — DIPHENHYDRAMINE HCL 25 MG
12.5 TABLET ORAL ONCE
Status: DISCONTINUED | OUTPATIENT
Start: 2022-12-12 | End: 2022-12-12

## 2022-12-12 RX ORDER — EPINEPHRINE 1 MG/ML
0.3 INJECTION, SOLUTION INTRAMUSCULAR; SUBCUTANEOUS EVERY 5 MIN PRN
Status: DISCONTINUED | OUTPATIENT
Start: 2022-12-12 | End: 2022-12-12 | Stop reason: HOSPADM

## 2022-12-12 RX ADMIN — FAMOTIDINE 20 MG: 10 INJECTION, SOLUTION INTRAVENOUS at 09:57

## 2022-12-12 RX ADMIN — ACETAMINOPHEN 650 MG: 325 TABLET, FILM COATED ORAL at 09:57

## 2022-12-12 RX ADMIN — Medication 5 ML: at 12:42

## 2022-12-12 RX ADMIN — METHYLPREDNISOLONE SODIUM SUCCINATE 125 MG: 125 INJECTION, POWDER, FOR SOLUTION INTRAMUSCULAR; INTRAVENOUS at 09:57

## 2022-12-12 RX ADMIN — SODIUM CHLORIDE 250 ML: 9 INJECTION, SOLUTION INTRAVENOUS at 09:35

## 2022-12-12 RX ADMIN — DIPHENHYDRAMINE HYDROCHLORIDE 12.5 MG: 12.5 SOLUTION ORAL at 10:07

## 2022-12-12 RX ADMIN — HUMAN IMMUNOGLOBULIN G 25 G: 20 LIQUID INTRAVENOUS at 10:39

## 2022-12-12 NOTE — LETTER
December 13, 2022      Avis Paul  [address] [facility] [address]        Dear ,    We are writing to inform you of your test results.    TSH thyroid test is normal.       Resulted Orders   TSH with free T4 reflex   Result Value Ref Range    TSH 2.23 0.30 - 4.20 uIU/mL       If you have any questions or concerns, please call the clinic at the number listed above.       Sincerely,      Sunil Tan MD

## 2022-12-12 NOTE — PROGRESS NOTES
"Avis arrived A&Ox4 ambulatory and stable, here for lab draw, then pre meds and IVIG  Port accesed with ease (used 1\" gripper needle), excellent blood return and labs drawn including \"Extra\" tube, ирина- Avis states she needs TSH drawn for PCP appt this after noon, no order in Epic for TSH but specimen drawn; port flushed with NS20mL and capped.   Pre meds given as ordered; then IVIG infused as ordered and at rate per MAR (initiate @ 0.5mL/kg/min and increase by 0.5 mL/kg/min every 15minutes if pt tolerating infusion, to max infusion rate of 2.5mL/kg/hr. Avis tolerated infusion w/o sxs adverse Rxn; VSS. line flushed NS20mL; port flushed, heparinized and gripper needle dc'd, gauze applied. Pt given AVS. Avis dc'd A&Ox4 ambulatory and stable.  "

## 2022-12-12 NOTE — PATIENT INSTRUCTIONS
"Green tube was drawn. When the TSH order gets entered, the clinic call \"add it to the specimen collected\" this morning at Infusion.  "

## 2022-12-12 NOTE — PROGRESS NOTES
Treatment Conditions:  ~~~ NOTE: If the patient answers yes to any of the questions below, hold the infusion and contact ordering provider or on-call provider.    1. Have you recently had an elevated temperature, fever, chills, productive cough, coughing for 3 weeks or longer or hemoptysis,  abnormal vital signs, night sweats,  chest pain or have you noticed a decrease in your appetite, unexplained weight loss or fatigue? No  2. Do you have any open wounds or new incisions? No  3. Do you have any upcoming hospitalizations or surgeries? Does not include esophagogastroduodenoscopy, colonoscopy, endoscopic retrograde cholangiopancreatography (ERCP), endoscopic ultrasound (EUS), dental procedures or joint aspiration/steroid injections No  4. Do you currently have any signs of illness or infection or are you on any antibiotics? No  5. Have you had any new, sudden or worsening abdominal pain? No  6. Have you or anyone in your household received a live vaccination in the past 4 weeks? Please note: No live vaccines while on biologic/chemotherapy until 6 months after the last treatment. Patient can receive the flu vaccine (shot only), pneumovax and the Covid vaccine. It is optimal for the patient to get these vaccines mid cycle, but they can be given at any time as long as it is not on the day of the infusion. No  7. Have you recently been diagnosed with any new nervous system diseases (ie. Multiple sclerosis, Guillain [place], seizures, neurological changes) or cancer diagnosis? Are you on any form of radiation or chemotherapy? No  8. Are you pregnant or breast feeding or do you have plans of pregnancy in the future? No  9. Have you been having any signs of worsening depression or suicidal ideations?  (benlysta only) No  10. Have there been any other new onset medical symptoms? No  11. Have you had any new blood clots? (IVIG only) No          SURJIT KEATING RN

## 2022-12-13 LAB
IGA SERPL-MCNC: 3 MG/DL (ref 84–499)
IGG SERPL-MCNC: 593 MG/DL (ref 610–1616)
IGM SERPL-MCNC: <10 MG/DL (ref 35–242)

## 2022-12-13 RX ORDER — DIPHENHYDRAMINE HCL 12.5MG/5ML
12.5 LIQUID (ML) ORAL ONCE
Status: CANCELLED
Start: 2023-01-02 | End: 2023-01-02

## 2022-12-13 ASSESSMENT — ENCOUNTER SYMPTOMS
HEMATURIA: 0
MYALGIAS: 1
SORE THROAT: 0
PALPITATIONS: 0
BREAST MASS: 0
ARTHRALGIAS: 1
CONSTIPATION: 0
NAUSEA: 0
DIZZINESS: 1
PARESTHESIAS: 0
CHILLS: 0
NERVOUS/ANXIOUS: 0
ABDOMINAL PAIN: 0
HEMATOCHEZIA: 0
SHORTNESS OF BREATH: 1
COUGH: 1
DIARRHEA: 1
DYSURIA: 0
JOINT SWELLING: 1
WEAKNESS: 1
FREQUENCY: 0
HEADACHES: 0
EYE PAIN: 0
HEARTBURN: 0
FEVER: 0

## 2022-12-13 ASSESSMENT — ACTIVITIES OF DAILY LIVING (ADL): CURRENT_FUNCTION: NO ASSISTANCE NEEDED

## 2022-12-14 DIAGNOSIS — G47.33 OSA (OBSTRUCTIVE SLEEP APNEA): Primary | ICD-10-CM

## 2022-12-16 ENCOUNTER — OFFICE VISIT (OUTPATIENT)
Dept: FAMILY MEDICINE | Facility: CLINIC | Age: 57
End: 2022-12-16
Payer: COMMERCIAL

## 2022-12-16 VITALS
HEART RATE: 83 BPM | TEMPERATURE: 99.4 F | SYSTOLIC BLOOD PRESSURE: 134 MMHG | OXYGEN SATURATION: 97 % | RESPIRATION RATE: 16 BRPM | WEIGHT: 293 LBS | BODY MASS INDEX: 45.99 KG/M2 | HEIGHT: 67 IN | DIASTOLIC BLOOD PRESSURE: 70 MMHG

## 2022-12-16 DIAGNOSIS — R19.7 DIARRHEA, UNSPECIFIED TYPE: ICD-10-CM

## 2022-12-16 DIAGNOSIS — N18.4 CKD (CHRONIC KIDNEY DISEASE) STAGE 4, GFR 15-29 ML/MIN (H): ICD-10-CM

## 2022-12-16 DIAGNOSIS — N76.1 DESQUAMATIVE INFLAMMATORY VAGINITIS: ICD-10-CM

## 2022-12-16 DIAGNOSIS — Z13.220 SCREENING FOR HYPERLIPIDEMIA: ICD-10-CM

## 2022-12-16 DIAGNOSIS — D80.1 HYPOGAMMAGLOBULINEMIA (H): Chronic | ICD-10-CM

## 2022-12-16 DIAGNOSIS — C83.18 LYMPHOMA, MANTLE CELL, MULTIPLE SITES (H): Chronic | ICD-10-CM

## 2022-12-16 DIAGNOSIS — Z00.00 ENCOUNTER FOR MEDICARE ANNUAL WELLNESS EXAM: Primary | ICD-10-CM

## 2022-12-16 DIAGNOSIS — N92.0 SPOTTING: ICD-10-CM

## 2022-12-16 DIAGNOSIS — G62.0 DRUG-INDUCED POLYNEUROPATHY (H): ICD-10-CM

## 2022-12-16 DIAGNOSIS — E03.9 HYPOTHYROIDISM, UNSPECIFIED TYPE: ICD-10-CM

## 2022-12-16 LAB
CHOLEST SERPL-MCNC: 183 MG/DL
CREAT UR-MCNC: 148.9 MG/DL
FSH SERPL IRP2-ACNC: 60.7 MIU/ML
HDLC SERPL-MCNC: 54 MG/DL
LDLC SERPL CALC-MCNC: 109 MG/DL
MICROALBUMIN UR-MCNC: 44.1 MG/L
MICROALBUMIN/CREAT UR: 29.62 MG/G CR (ref 0–25)
NONHDLC SERPL-MCNC: 129 MG/DL
PTH-INTACT SERPL-MCNC: 129 PG/ML (ref 15–65)
TRIGL SERPL-MCNC: 98 MG/DL

## 2022-12-16 PROCEDURE — 90471 IMMUNIZATION ADMIN: CPT | Performed by: FAMILY MEDICINE

## 2022-12-16 PROCEDURE — 82043 UR ALBUMIN QUANTITATIVE: CPT | Performed by: FAMILY MEDICINE

## 2022-12-16 PROCEDURE — 99214 OFFICE O/P EST MOD 30 MIN: CPT | Mod: 25 | Performed by: FAMILY MEDICINE

## 2022-12-16 PROCEDURE — 90682 RIV4 VACC RECOMBINANT DNA IM: CPT | Performed by: FAMILY MEDICINE

## 2022-12-16 PROCEDURE — G0439 PPPS, SUBSEQ VISIT: HCPCS | Performed by: FAMILY MEDICINE

## 2022-12-16 RX ORDER — DULOXETIN HYDROCHLORIDE 20 MG/1
20 CAPSULE, DELAYED RELEASE ORAL DAILY
Qty: 90 CAPSULE | Refills: 1 | Status: SHIPPED | OUTPATIENT
Start: 2022-12-16 | End: 2023-06-26

## 2022-12-16 RX ORDER — LEVOTHYROXINE SODIUM 112 UG/1
112 TABLET ORAL DAILY
Qty: 90 TABLET | Refills: 3 | Status: SHIPPED | OUTPATIENT
Start: 2022-12-16 | End: 2023-12-01

## 2022-12-16 RX ORDER — DESONIDE 0.5 MG/G
CREAM TOPICAL DAILY
Qty: 60 G | Refills: 1 | Status: SHIPPED | OUTPATIENT
Start: 2022-12-16

## 2022-12-16 RX ORDER — NEOMYCIN SULFATE, POLYMYXIN B SULFATE AND DEXAMETHASONE 3.5; 10000; 1 MG/ML; [USP'U]/ML; MG/ML
SUSPENSION/ DROPS OPHTHALMIC
COMMUNITY
Start: 2022-12-15 | End: 2023-01-09

## 2022-12-16 ASSESSMENT — ENCOUNTER SYMPTOMS
HEARTBURN: 0
ARTHRALGIAS: 1
PARESTHESIAS: 0
ABDOMINAL PAIN: 0
EYE PAIN: 0
SHORTNESS OF BREATH: 1
DIZZINESS: 1
CONSTIPATION: 0
PALPITATIONS: 0
FREQUENCY: 0
COUGH: 1
JOINT SWELLING: 1
MYALGIAS: 1
NERVOUS/ANXIOUS: 0
DYSURIA: 0
DIARRHEA: 1
NAUSEA: 0
CHILLS: 0
WEAKNESS: 1
BREAST MASS: 0
SORE THROAT: 0
FEVER: 0
HEADACHES: 0
HEMATURIA: 0
HEMATOCHEZIA: 0

## 2022-12-16 ASSESSMENT — ACTIVITIES OF DAILY LIVING (ADL): CURRENT_FUNCTION: NO ASSISTANCE NEEDED

## 2022-12-16 ASSESSMENT — PAIN SCALES - GENERAL: PAINLEVEL: MODERATE PAIN (5)

## 2022-12-16 NOTE — PATIENT INSTRUCTIONS
Urine sample today    Desquamative inflammatory vaginitis: clindamycin cream intravaginal at bedtime nightly for 21 night, use the desonide pea size amount apply topically, if it improves after 1- 2weeks   We may need to taper down if it comes right back.    Stool samples to be sure not infection    Start cymbalata for the neuropathy  In 4-6 weeks, if not going well let me know sooner.    Patient Education   Personalized Prevention Plan  You are due for the preventive services outlined below.  Your care team is available to assist you in scheduling these services.  If you have already completed any of these items, please share that information with your care team to update in your medical record.  Health Maintenance Due   Topic Date Due    Kidney Microalbumin Urine Test  Never done    Parathyroid Lab  Never done    ANNUAL REVIEW OF HM ORDERS  Never done    Pneumococcal Vaccine (2 - PPSV23 if available, else PCV20) 09/27/2018    Cholesterol Lab  04/23/2021    Basic Metabolic Panel  10/18/2022    Hemoglobin  01/18/2023     Your Health Risk Assessment indicates you feel you are not in good health    A healthy lifestyle helps keep the body fit and the mind alert. It helps protect you from disease, helps you fight disease, and helps prevent chronic disease (disease that doesn't go away) from getting worse. This is important as you get older and begin to notice twinges in muscles and joints and a decline in the strength and stamina you once took for granted. A healthy lifestyle includes good healthcare, good nutrition, weight control, recreation, and regular exercise. Avoid harmful substances and do what you can to keep safe. Another part of a healthy lifestyle is stay mentally active and socially involved.    Good healthcare   Have a wellness visit every year.   If you have new symptoms, let us know right away. Don't wait until the next checkup.   Take medicines exactly as prescribed and keep your medicines in a safe  place. Tell us if your medicine causes problems.   Healthy diet and weight control   Eat 3 or 4 small, nutritious, low-fat, high-fiber meals a day. Include a variety of fruits, vegetables, and whole-grain foods.   Make sure you get enough calcium in your diet. Calcium, vitamin D, and exercise help prevent osteoporosis (bone thinning).   If you live alone, try eating with others when you can. That way you get a good meal and have company while you eat it.   Try to keep a healthy weight. If you eat more calories than your body uses for energy, it will be stored as fat and you will gain weight.     Recreation   Recreation is not limited to sports and team events. It includes any activity that provides relaxation, interest, enjoyment, and exercise. Recreation provides an outlet for physical, mental, and social energy. It can give a sense of worth and achievement. It can help you stay healthy.    Mental Exercise and Social Involvement  Mental and emotional health is as important as physical health. Keep in touch with friends and family. Stay as active as possible. Continue to learn and challenge yourself.   Things you can do to stay mentally active are:  Learn something new, like a foreign language or musical instrument.   Play SCRABBLE or do crossword puzzles. If you cannot find people to play these games with you at home, you can play them with others on your computer through the Internet.   Join a games club--anything from card games to chess or checkers or lawn bowling.   Start a new hobby.   Go back to school.   Volunteer.   Read.   Keep up with world events.    Understanding USDA MyPlate  The USDA has guidelines to help you make healthy food choices. These are called MyPlate. MyPlate shows the food groups that make up healthy meals using the image of a place setting. Before you eat, think about the healthiest choices for what to put on your plate or in your cup or bowl. To learn more about building a healthy plate,  visit www.choosemyplate.gov.    The food groups  Fruits. Any fruit or 100% fruit juice counts as part of the Fruit Group. Fruits may be fresh, canned, frozen, or dried, and may be whole, cut-up, or pureed. Make 1/2 of your plate fruits and vegetables.  Vegetables. Any vegetable or 100% vegetable juice counts as a member of the Vegetable Group. Vegetables may be fresh, frozen, canned, or dried. They can be served raw or cooked and may be whole, cut-up, or mashed. Make 1/2 of your plate fruits and vegetables.  Grains. All foods made from grains are part of the Grains Group. These include wheat, rice, oats, cornmeal, and barley. Grains are often used to make foods such as bread, pasta, oatmeal, cereal, tortillas, and grits. Grains should be no more than 1/4 of your plate. At least half of your grains should be whole grains.  Protein. This group includes meat, poultry, seafood, beans and peas, eggs, processed soy products (such as tofu), nuts (including nut butters), and seeds. Make protein choices no more than 1/4 of your plate. Meat and poultry choices should be lean or low fat.  Dairy. The Dairy Group includes all fluid milk products and foods made from milk that contain calcium, such as yogurt and cheese. (Foods that have little calcium, such as cream, butter, and cream cheese, are not part of this group.) Most dairy choices should be low-fat or fat-free.  Oils. Oils aren't a food group, but they do contain essential nutrients. However it's important to watch your intake of oils. These are fats that are liquid at room temperature. They include canola, corn, olive, soybean, vegetable, and sunflower oil. Foods that are mainly oil include mayonnaise, certain salad dressings, and soft margarines. You likely already get your daily oil allowance from the foods you eat.  Things to limit  Eating healthy also means limiting these things in your diet:     Salt (sodium). Many processed foods have a lot of sodium. To keep  sodium intake down, eat fresh vegetables, meats, poultry, and seafood when possible. Purchase low-sodium, reduced-sodium, or no-salt-added food products at the store. And don't add salt to your meals at home. Instead, season them with herbs and spices such as dill, oregano, cumin, and paprika. Or try adding flavor with lemon or lime zest and juice.  Saturated fat. Saturated fats are most often found in animal products such as beef, pork, and chicken. They are often solid at room temperature, such as butter. To reduce your saturated fat intake, choose leaner cuts of meat and poultry. And try healthier cooking methods such as grilling, broiling, roasting, or baking. For a simple lower-fat swap, use plain nonfat yogurt instead of mayonnaise when making potato salad or macaroni salad.  Added sugars. These are sugars added to foods. They are in foods such as ice cream, candy, soda, fruit drinks, sports drinks, energy drinks, cookies, pastries, jams, and syrups. Cut down on added sugars by sharing sweet treats with a family member or friend. You can also choose fruit for dessert, and drink water or other unsweetened beverages.     [employer] last reviewed this educational content on 6/1/2020 2000-2021 [employer]. All rights reserved. This information is not intended as a substitute for professional medical care. Always follow your healthcare professional's instructions.          Signs of Hearing Loss      Hearing much better with one ear can be a sign of hearing loss.   Hearing loss is a problem shared by many people. In fact, it is one of the most common health problems, particularly as people age. Most people age 65 and older have some hearing loss. By age 80, almost everyone does. Hearing loss often occurs slowly over the years. So you may not realize your hearing has gotten worse.  Have your hearing checked  Call your healthcare provider if you:  Have to strain to hear normal conversation  Have to  watch other people s faces very carefully to follow what they re saying  Need to ask people to repeat what they ve said  Often misunderstand what people are saying  Turn the volume of the television or radio up so high that others complain  Feel that people are mumbling when they re talking to you  Find that the effort to hear leaves you feeling tired and irritated  Notice, when using the phone, that you hear better with one ear than the other  [employer] last reviewed this educational content on 1/1/2020 2000-2021 [employer]. All rights reserved. This information is not intended as a substitute for professional medical care. Always follow your healthcare professional's instructions.

## 2022-12-16 NOTE — PROGRESS NOTES
"   SUBJECTIVE:   CC: Avis is an 57 year old who presents for preventive health visit.   Patient has been advised of split billing requirements and indicates understanding: Yes  Healthy Habits:     In general, how would you rate your overall health?  Fair    Frequency of exercise:  2-3 days/week    Duration of exercise:  30-45 minutes    Do you usually eat at least 4 servings of fruit and vegetables a day, include whole grains    & fiber and avoid regularly eating high fat or \"junk\" foods?  No    Taking medications regularly:  Yes    Medication side effects:  Muscle aches, Significant flushing and Other    Ability to successfully perform activities of daily living:  No assistance needed    Home Safety:  No safety concerns identified    Hearing Impairment:  Difficulty following a conversation in a noisy restaurant or crowded room, feel that people are mumbling or not speaking clearly, need to ask people to speak up or repeat themselves, difficulty understanding soft or whispered speech and difficulty understanding speech on the telephone    In the past 6 months, have you been bothered by leaking of urine?  No    In general, how would you rate your overall mental or emotional health?  Good      PHQ-2 Total Score: 1    Additional concerns today:  Yes    Neuropathy from chemo  Waking up in middle of night  If stands longer than 1 hour then it seems to spread up the feet/legs  Gabapentin in past with feeling ill nauseated    History of CKD from her cancer  Saw nephrology in the past but has been stable for years.  So just checking at clinic.    Last 2 years  Recurrent 2020 with lymph node removal  Having thick sticky discharge, has seen OBGYN. US was normal.  Tried boric acid, metronidazole, diflucan  Light spotting for few hours maybe once a month.    Has disability and workability in the last few year had a letter for working up to 20 hours a week as tolerated.    COVID last year  And some cough and thick " phlegm    Hypothyroidism Follow-up      Since last visit, patient describes the following symptoms: weight gain and high blood pressure when on 100mcg. Patient states states the 112mcg was going good.      Today's PHQ-2 Score:   PHQ-2 ( 1999 Pfizer) 12/13/2022   Q1: Little interest or pleasure in doing things 0   Q2: Feeling down, depressed or hopeless 1   PHQ-2 Score 1   PHQ-2 Total Score (12-17 Years)- Positive if 3 or more points; Administer PHQ-A if positive -   Q1: Little interest or pleasure in doing things Not at all   Q2: Feeling down, depressed or hopeless Several days   PHQ-2 Score 1           Social History     Tobacco Use     Smoking status: Never     Smokeless tobacco: Never   Substance Use Topics     Alcohol use: No     If you drink alcohol do you typically have >3 drinks per day or >7 drinks per week? No    Alcohol Use 12/13/2022   Prescreen: >3 drinks/day or >7 drinks/week? No   No flowsheet data found.    Reviewed orders with patient.  Reviewed health maintenance and updated orders accordingly - Yes  BP Readings from Last 3 Encounters:   12/16/22 134/70   12/12/22 (!) 134/92   11/14/22 116/80    Wt Readings from Last 3 Encounters:   12/16/22 135.6 kg (299 lb)   12/12/22 134.7 kg (297 lb)   11/14/22 131.5 kg (290 lb)                    Breast Cancer Screening:    FHS-7:   Breast CA Risk Assessment (FHS-7) 8/31/2021   Did any of your first-degree relatives have breast or ovarian cancer? No   Did any of your relatives have bilateral breast cancer? No   Did any man in your family have breast cancer? No   Did any woman in your family have breast and ovarian cancer? No   Did any woman in your family have breast cancer before age 50 y? No   Do you have 2 or more relatives with breast and/or ovarian cancer? No   Do you have 2 or more relatives with breast and/or bowel cancer? No       Mammogram Screening: Recommended mammography every 1-2 years with patient discussion and risk factor  "consideration  Pertinent mammograms are reviewed under the imaging tab.    History of abnormal Pap smear: NO - age 30-65 PAP every 5 years with negative HPV co-testing recommended  PAP / HPV Latest Ref Rng & Units 7/8/2021   PAP (Historical) - NIL   HPV16 NEG:Negative Negative   HPV18 NEG:Negative Negative   HRHPV NEG:Negative Negative     Reviewed and updated as needed this visit by clinical staff   Tobacco  Allergies  Meds  Problems             Reviewed and updated as needed this visit by Provider                     Review of Systems   Constitutional: Negative for chills and fever.   HENT: Positive for hearing loss. Negative for congestion, ear pain and sore throat.    Eyes: Negative for pain and visual disturbance.   Respiratory: Positive for cough and shortness of breath.    Cardiovascular: Positive for peripheral edema. Negative for chest pain and palpitations.   Gastrointestinal: Positive for diarrhea. Negative for abdominal pain, constipation, heartburn, hematochezia and nausea.   Breasts:  Negative for tenderness, breast mass and discharge.   Genitourinary: Positive for urgency, vaginal bleeding and vaginal discharge. Negative for dysuria, frequency, genital sores, hematuria and pelvic pain.   Musculoskeletal: Positive for arthralgias, joint swelling and myalgias.   Skin: Negative for rash.   Neurological: Positive for dizziness and weakness. Negative for headaches and paresthesias.   Psychiatric/Behavioral: Negative for mood changes. The patient is not nervous/anxious.           OBJECTIVE:   /70 (BP Location: Other (Comment), Patient Position: Sitting, Cuff Size: Adult Regular)   Pulse 83   Temp 99.4  F (37.4  C) (Tympanic)   Resp 16   Ht 1.702 m (5' 7\")   Wt 135.6 kg (299 lb)   SpO2 97%   BMI 46.83 kg/m    Physical Exam  GENERAL APPEARANCE: healthy, alert and no distress  EYES: Eyes grossly normal to inspection, PERRL and conjunctivae and sclerae normal  HENT: ear canals and TM's normal, " nose and mouth without ulcers or lesions, oropharynx clear and oral mucous membranes moist  NECK: no adenopathy, no asymmetry, masses, or scars and thyroid normal to palpation  RESP: lungs clear to auscultation - no rales, rhonchi or wheezes  CV: regular rate and rhythm, normal S1 S2, no S3 or S4, no murmur, click or rub, no peripheral edema and peripheral pulses strong  MS: no musculoskeletal defects are noted and gait is age appropriate without ataxia  SKIN: no suspicious lesions or rashes  NEURO: Normal strength and tone, sensory exam grossly normal, mentation intact and speech normal  PSYCH: mentation appears normal and affect normal/bright    Diagnostic Test Results:  Labs reviewed in Epic    ASSESSMENT/PLAN:   Avis was seen today for physical.    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 Is seeing Oncology in 6 weeks so could follow up with them about how this is going, otherwise notify me sooner if concerns.  -discussed risks, benefits, and side effects of this new medication. Patient understands and is in agreement.  -     DULoxetine (CYMBALTA) 20 MG capsule; Take 1 capsule (20 mg) by mouth daily    Spotting: ?atrophic vaginitis or in menopause  Check lab,   Has seen OB frequently for vaginal concerns, this may likely be DIV as below  -     Follicle stimulating hormone;  "Future    Desquamative inflammatory vaginitis: wet preps often show +WBC and sometimes clue cells  Add clindamycin and desonide to treat likely DIV  If spotting doesn't resolve then back to OB.  -     clindamycin (CLEOCIN 1 DOSE) 2 % vaginal cream; Place 1 applicator vaginally At Bedtime for 21 days  -     desonide (DESOWEN) 0.05 % external cream; Apply topically daily To vulva/vagina for 14 days.    Diarrhea, unspecified type: with low immune system, rule out infection.  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  Is going to be talking to Oncology about her IVIg.  &  Lymphoma, mantle cell, multiple sites (H)  &  Hypogammaglobulinemia (H)  -     Enteric Bacteria and Virus Panel by ENRIQUE Stool; Future  -     C. difficile Toxin B PCR with reflex to C. difficile Antigen and Toxins A/B EIA; Future  -     Ova and Parasite Exam Routine; Future    Other orders  -     REVIEW OF HEALTH MAINTENANCE PROTOCOL ORDERS  -     INFLUENZA VACCINE 50-64 OR 18-64 W/EGG ALLERGY (FLUBLOK)        Patient has been advised of split billing requirements and indicates understanding: Yes      COUNSELING:  Reviewed preventive health counseling, as reflected in patient instructions       Regular exercise       Healthy diet/nutrition       Vision screening      BMI:   Estimated body mass index is 46.83 kg/m  as calculated from the following:    Height as of this encounter: 1.702 m (5' 7\").    Weight as of this encounter: 135.6 kg (299 lb).   Weight management plan: Discussed healthy diet and exercise guidelines      She reports that she has never smoked. She has never used smokeless tobacco.      Sunil Tan MD  [facility]  "

## 2022-12-16 NOTE — PROGRESS NOTES
The patient was provided with suggestions to help her develop a healthy physical lifestyle.  The patient was counseled and encouraged to consider modifying their diet and eating habits. She was provided with information on recommended healthy diet options.  The patient was provided with written information regarding signs of hearing loss.

## 2023-01-09 ENCOUNTER — ONCOLOGY VISIT (OUTPATIENT)
Dept: ONCOLOGY | Facility: HOSPITAL | Age: 58
End: 2023-01-09
Attending: INTERNAL MEDICINE
Payer: COMMERCIAL

## 2023-01-09 ENCOUNTER — HOSPITAL ENCOUNTER (OUTPATIENT)
Dept: CT IMAGING | Facility: HOSPITAL | Age: 58
Discharge: HOME | End: 2023-01-09
Attending: NURSE PRACTITIONER
Payer: COMMERCIAL

## 2023-01-09 ENCOUNTER — MEDICAL CORRESPONDENCE (OUTPATIENT)
Dept: HEALTH INFORMATION MANAGEMENT | Facility: CLINIC | Age: 58
End: 2023-01-09

## 2023-01-09 ENCOUNTER — LAB (OUTPATIENT)
Dept: INFUSION THERAPY | Facility: HOSPITAL | Age: 58
End: 2023-01-09
Attending: INTERNAL MEDICINE
Payer: COMMERCIAL

## 2023-01-09 VITALS
BODY MASS INDEX: 45.99 KG/M2 | OXYGEN SATURATION: 100 % | HEART RATE: 70 BPM | SYSTOLIC BLOOD PRESSURE: 133 MMHG | RESPIRATION RATE: 24 BRPM | DIASTOLIC BLOOD PRESSURE: 70 MMHG | WEIGHT: 293 LBS | HEIGHT: 67 IN | TEMPERATURE: 98 F

## 2023-01-09 VITALS
OXYGEN SATURATION: 97 % | SYSTOLIC BLOOD PRESSURE: 122 MMHG | TEMPERATURE: 97.7 F | DIASTOLIC BLOOD PRESSURE: 68 MMHG | RESPIRATION RATE: 16 BRPM | HEART RATE: 62 BPM

## 2023-01-09 DIAGNOSIS — C83.13 MANTLE CELL LYMPHOMA OF INTRA-ABDOMINAL LYMPH NODES (H): Primary | ICD-10-CM

## 2023-01-09 DIAGNOSIS — D80.1 HYPOGAMMAGLOBULINEMIA (H): Primary | ICD-10-CM

## 2023-01-09 DIAGNOSIS — C83.18 LYMPHOMA, MANTLE CELL, MULTIPLE SITES (H): ICD-10-CM

## 2023-01-09 LAB
ALBUMIN SERPL BCG-MCNC: 3.6 G/DL (ref 3.5–5.2)
ALP SERPL-CCNC: 102 U/L (ref 35–104)
ALT SERPL W P-5'-P-CCNC: 20 U/L (ref 10–35)
ANION GAP SERPL CALCULATED.3IONS-SCNC: 10 MMOL/L (ref 7–15)
AST SERPL W P-5'-P-CCNC: 28 U/L (ref 10–35)
BASOPHILS # BLD AUTO: 0 10E3/UL (ref 0–0.2)
BASOPHILS NFR BLD AUTO: 1 %
BILIRUB SERPL-MCNC: 0.3 MG/DL
BUN SERPL-MCNC: 25.9 MG/DL (ref 6–20)
CALCIUM SERPL-MCNC: 8.8 MG/DL (ref 8.6–10)
CHLORIDE SERPL-SCNC: 104 MMOL/L (ref 98–107)
CREAT SERPL-MCNC: 1.99 MG/DL (ref 0.51–0.95)
DEPRECATED HCO3 PLAS-SCNC: 25 MMOL/L (ref 22–29)
EOSINOPHIL # BLD AUTO: 0.1 10E3/UL (ref 0–0.7)
EOSINOPHIL NFR BLD AUTO: 3 %
ERYTHROCYTE [DISTWIDTH] IN BLOOD BY AUTOMATED COUNT: 14.9 % (ref 10–15)
GFR SERPL CREATININE-BSD FRML MDRD: 29 ML/MIN/1.73M2
GLUCOSE SERPL-MCNC: 88 MG/DL (ref 70–99)
HCT VFR BLD AUTO: 43.1 % (ref 35–47)
HGB BLD-MCNC: 13.8 G/DL (ref 11.7–15.7)
IMM GRANULOCYTES # BLD: 0.1 10E3/UL
IMM GRANULOCYTES NFR BLD: 2 %
LDH SERPL L TO P-CCNC: 166 U/L (ref 0–250)
LYMPHOCYTES # BLD AUTO: 1.3 10E3/UL (ref 0.8–5.3)
LYMPHOCYTES NFR BLD AUTO: 26 %
MCH RBC QN AUTO: 29.5 PG (ref 26.5–33)
MCHC RBC AUTO-ENTMCNC: 32 G/DL (ref 31.5–36.5)
MCV RBC AUTO: 92 FL (ref 78–100)
MONOCYTES # BLD AUTO: 0.4 10E3/UL (ref 0–1.3)
MONOCYTES NFR BLD AUTO: 8 %
NEUTROPHILS # BLD AUTO: 3.1 10E3/UL (ref 1.6–8.3)
NEUTROPHILS NFR BLD AUTO: 60 %
NRBC # BLD AUTO: 0 10E3/UL
NRBC BLD AUTO-RTO: 0 /100
PLATELET # BLD AUTO: 141 10E3/UL (ref 150–450)
POTASSIUM SERPL-SCNC: 3.9 MMOL/L (ref 3.4–5.3)
PROT SERPL-MCNC: 6 G/DL (ref 6.4–8.3)
RBC # BLD AUTO: 4.68 10E6/UL (ref 3.8–5.2)
SODIUM SERPL-SCNC: 139 MMOL/L (ref 136–145)
WBC # BLD AUTO: 4.9 10E3/UL (ref 4–11)

## 2023-01-09 PROCEDURE — 36591 DRAW BLOOD OFF VENOUS DEVICE: CPT

## 2023-01-09 PROCEDURE — 80053 COMPREHEN METABOLIC PANEL: CPT

## 2023-01-09 PROCEDURE — 250N000013 HC RX MED GY IP 250 OP 250 PS 637: Performed by: NURSE PRACTITIONER

## 2023-01-09 PROCEDURE — 99214 OFFICE O/P EST MOD 30 MIN: CPT | Performed by: INTERNAL MEDICINE

## 2023-01-09 PROCEDURE — 83615 LACTATE (LD) (LDH) ENZYME: CPT

## 2023-01-09 PROCEDURE — 96365 THER/PROPH/DIAG IV INF INIT: CPT

## 2023-01-09 PROCEDURE — 85041 AUTOMATED RBC COUNT: CPT

## 2023-01-09 PROCEDURE — G0463 HOSPITAL OUTPT CLINIC VISIT: HCPCS | Mod: 25 | Performed by: INTERNAL MEDICINE

## 2023-01-09 PROCEDURE — 96375 TX/PRO/DX INJ NEW DRUG ADDON: CPT

## 2023-01-09 PROCEDURE — 250N000011 HC RX IP 250 OP 636: Performed by: NURSE PRACTITIONER

## 2023-01-09 PROCEDURE — 71250 CT THORAX DX C-: CPT | Mod: MG

## 2023-01-09 RX ORDER — HEPARIN SODIUM (PORCINE) LOCK FLUSH IV SOLN 100 UNIT/ML 100 UNIT/ML
5 SOLUTION INTRAVENOUS
Status: DISCONTINUED | OUTPATIENT
Start: 2023-01-09 | End: 2023-01-09 | Stop reason: HOSPADM

## 2023-01-09 RX ORDER — ACETAMINOPHEN 325 MG/1
650 TABLET ORAL ONCE
Status: COMPLETED | OUTPATIENT
Start: 2023-01-09 | End: 2023-01-09

## 2023-01-09 RX ORDER — METHYLPREDNISOLONE SODIUM SUCCINATE 125 MG/2ML
125 INJECTION, POWDER, LYOPHILIZED, FOR SOLUTION INTRAMUSCULAR; INTRAVENOUS ONCE
Status: COMPLETED | OUTPATIENT
Start: 2023-01-09 | End: 2023-01-09

## 2023-01-09 RX ORDER — METHYLPREDNISOLONE SODIUM SUCCINATE 125 MG/2ML
125 INJECTION, POWDER, LYOPHILIZED, FOR SOLUTION INTRAMUSCULAR; INTRAVENOUS
Status: CANCELLED
Start: 2023-01-10

## 2023-01-09 RX ORDER — EPINEPHRINE 1 MG/ML
0.3 INJECTION, SOLUTION INTRAMUSCULAR; SUBCUTANEOUS EVERY 5 MIN PRN
Status: CANCELLED | OUTPATIENT
Start: 2023-01-10

## 2023-01-09 RX ORDER — DIPHENHYDRAMINE HYDROCHLORIDE 50 MG/ML
50 INJECTION INTRAMUSCULAR; INTRAVENOUS
Status: CANCELLED
Start: 2023-01-10

## 2023-01-09 RX ORDER — METHYLPREDNISOLONE SODIUM SUCCINATE 125 MG/2ML
125 INJECTION, POWDER, LYOPHILIZED, FOR SOLUTION INTRAMUSCULAR; INTRAVENOUS ONCE
Status: CANCELLED
Start: 2023-01-10 | End: 2023-01-10

## 2023-01-09 RX ORDER — HEPARIN SODIUM (PORCINE) LOCK FLUSH IV SOLN 100 UNIT/ML 100 UNIT/ML
5 SOLUTION INTRAVENOUS
Status: CANCELLED | OUTPATIENT
Start: 2023-01-10

## 2023-01-09 RX ORDER — MEPERIDINE HYDROCHLORIDE 25 MG/ML
25 INJECTION INTRAMUSCULAR; INTRAVENOUS; SUBCUTANEOUS EVERY 30 MIN PRN
Status: DISCONTINUED | OUTPATIENT
Start: 2023-01-09 | End: 2023-01-09 | Stop reason: HOSPADM

## 2023-01-09 RX ORDER — ALBUTEROL SULFATE 90 UG/1
1-2 AEROSOL, METERED RESPIRATORY (INHALATION)
Status: CANCELLED
Start: 2023-01-10

## 2023-01-09 RX ORDER — MEPERIDINE HYDROCHLORIDE 25 MG/ML
25 INJECTION INTRAMUSCULAR; INTRAVENOUS; SUBCUTANEOUS EVERY 30 MIN PRN
Status: CANCELLED | OUTPATIENT
Start: 2023-01-10

## 2023-01-09 RX ORDER — DIPHENHYDRAMINE HCL 12.5MG/5ML
12.5 LIQUID (ML) ORAL ONCE
Status: COMPLETED | OUTPATIENT
Start: 2023-01-09 | End: 2023-01-09

## 2023-01-09 RX ORDER — ALBUTEROL SULFATE 0.83 MG/ML
2.5 SOLUTION RESPIRATORY (INHALATION)
Status: CANCELLED | OUTPATIENT
Start: 2023-01-10

## 2023-01-09 RX ORDER — HEPARIN SODIUM,PORCINE 10 UNIT/ML
5 VIAL (ML) INTRAVENOUS
Status: CANCELLED | OUTPATIENT
Start: 2023-01-10

## 2023-01-09 RX ORDER — ALBUTEROL SULFATE 0.83 MG/ML
2.5 SOLUTION RESPIRATORY (INHALATION)
Status: DISCONTINUED | OUTPATIENT
Start: 2023-01-09 | End: 2023-01-09 | Stop reason: HOSPADM

## 2023-01-09 RX ORDER — DIPHENHYDRAMINE HYDROCHLORIDE 50 MG/ML
50 INJECTION INTRAMUSCULAR; INTRAVENOUS
Status: DISCONTINUED | OUTPATIENT
Start: 2023-01-09 | End: 2023-01-09 | Stop reason: HOSPADM

## 2023-01-09 RX ORDER — ALBUTEROL SULFATE 90 UG/1
1-2 AEROSOL, METERED RESPIRATORY (INHALATION)
Status: DISCONTINUED | OUTPATIENT
Start: 2023-01-09 | End: 2023-01-09 | Stop reason: HOSPADM

## 2023-01-09 RX ORDER — METHYLPREDNISOLONE SODIUM SUCCINATE 125 MG/2ML
125 INJECTION, POWDER, LYOPHILIZED, FOR SOLUTION INTRAMUSCULAR; INTRAVENOUS
Status: DISCONTINUED | OUTPATIENT
Start: 2023-01-09 | End: 2023-01-09 | Stop reason: HOSPADM

## 2023-01-09 RX ORDER — ACETAMINOPHEN 325 MG/1
650 TABLET ORAL ONCE
Status: CANCELLED
Start: 2023-01-10

## 2023-01-09 RX ORDER — CLINDAMYCIN PHOSPHATE 20 MG/G
CREAM VAGINAL
COMMUNITY
Start: 2022-12-19 | End: 2023-02-06

## 2023-01-09 RX ORDER — NALOXONE HYDROCHLORIDE 0.4 MG/ML
0.2 INJECTION, SOLUTION INTRAMUSCULAR; INTRAVENOUS; SUBCUTANEOUS
Status: DISCONTINUED | OUTPATIENT
Start: 2023-01-09 | End: 2023-01-09 | Stop reason: HOSPADM

## 2023-01-09 RX ORDER — EPINEPHRINE 1 MG/ML
0.3 INJECTION, SOLUTION INTRAMUSCULAR; SUBCUTANEOUS EVERY 5 MIN PRN
Status: DISCONTINUED | OUTPATIENT
Start: 2023-01-09 | End: 2023-01-09 | Stop reason: HOSPADM

## 2023-01-09 RX ORDER — NALOXONE HYDROCHLORIDE 0.4 MG/ML
0.2 INJECTION, SOLUTION INTRAMUSCULAR; INTRAVENOUS; SUBCUTANEOUS
Status: CANCELLED | OUTPATIENT
Start: 2023-01-10

## 2023-01-09 RX ORDER — DIPHENHYDRAMINE HCL 12.5MG/5ML
12.5 LIQUID (ML) ORAL ONCE
Status: CANCELLED
Start: 2023-01-10 | End: 2023-01-10

## 2023-01-09 RX ADMIN — Medication 5 ML: at 13:29

## 2023-01-09 RX ADMIN — ACETAMINOPHEN 650 MG: 325 TABLET ORAL at 11:31

## 2023-01-09 RX ADMIN — METHYLPREDNISOLONE SODIUM SUCCINATE 125 MG: 125 INJECTION, POWDER, FOR SOLUTION INTRAMUSCULAR; INTRAVENOUS at 11:38

## 2023-01-09 RX ADMIN — DIPHENHYDRAMINE HYDROCHLORIDE 12.5 MG: 12.5 SOLUTION ORAL at 11:31

## 2023-01-09 RX ADMIN — FAMOTIDINE 20 MG: 10 INJECTION, SOLUTION INTRAVENOUS at 11:33

## 2023-01-09 RX ADMIN — HUMAN IMMUNOGLOBULIN G 25 G: 20 LIQUID INTRAVENOUS at 12:09

## 2023-01-09 ASSESSMENT — PAIN SCALES - GENERAL: PAINLEVEL: MODERATE PAIN (4)

## 2023-01-09 NOTE — PROGRESS NOTES
[facility] Hematology and Oncology Progress Note    Patient: Avis Paul  MRN: [id number]  Date of Service:         Reason for Visit    Chief Complaint   Patient presents with     HE Cancer     Mantle cell lymphoma of lymph nodes of inguinal region        Assessment and Plan    Mantle cell lymphoma status post autologous transplant, August 18, 2015  Hypogammaglobulinemia with multiple recurrent infections, on IVIG every 4 weeks  Hives/eosinophilia/chronic urticaria  New left inguinal and pelvic adenopathy, May 2020(patient had 2 separate biopsies, initial 1 suggesting relapse of mantle cell lymphoma, review at [facility] was negative; patient treated with 1 month of ibrutinib with resolution of CT scan and PET findings)  Neuropathy    No clinical concern for recurrence of lymphoma.  CT scan results pending.  Will inform patient of results prior to leaving today.    Will continue with IVIG infusions every 4 weeks.    Still having recurrent infections requiring antibiotic therapy.  Also did have influenza.  Will refer to immunology for any additional recommendations.  She has not had hospitalization due to any of the infection issues.  Unclear but immunodeficiency may have been due to Rituxan therapy.    Renal function and neuropathy are stable.    Plan: Continue IVIG infusion every 4 weeks  Referral to immunology for any additional recommendations to manage immunodeficiency  Follow-up in 6 months with recheck of labs and CT scans      Measurable disease: Patient currently in remission    Current therapy: Observation  IVIG resumed in January 2017 and stopped in May 2018; resumed again in September 2018  Maintenance Rituxan started December 2015, and completed August 2017, 8 doses  Acyclovir 400 mg twice daily  IVIG every 3 months, first dose August 29, 2016(currently on hold, last dose November 2016)   IVIG every 4 weeks restarted in June 2017 and stopped in May 2018      Treatment history:  Ibrutinib 420 mg p.o.  daily started for presumed relapse of mantle cell lymphoma  July 3, 2020 and stopped August 4, 2020    First set of immunizations received at the [facility] in August 2016  First dose of IVIG  Patient completed 1 year of inhalational pentamidine  Autologous stem cell transplant with Beam prep, August 18, 2015  One cycle of R-ICE, Naye 15, 2015, ifosfamide and etoposide reduced by 25% because of renal dysfunction  Ibrutinib since October 10, 2014, current dose of 420 mg by mouth daily, stopped June 9, 2015   3 cycles of R-DHAP completed late August 2014   5 cycles of R CHOP, started after diagnosis in March 2014       ECOG Performance   ECOG Performance Status: 0    Distress Assessment  Distress Assessment Score: 3    Pain           Problem List    1. 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 Mantle cell lymphoma of lymph nodes of multiple regions (H)          CC: Sunil Tan MD    ______________________________________________________________________________    History of Present Illness    Pretty is here for reevaluation.  Seen 6 months ago.  Has required antibiotics on 3 occasions in the last 6 months.  Did have influenza prior to receiving the vaccination.  Has had some persistent vaginal infection and also diarrhea issues.  No fever chills or sweats.  No new palpable adenopathy.  No bleeding.  ECOG status 1.    June 29, 2020  Pretty is here for reevaluation.  Seen 2 weeks ago.  Underwent excisional biopsy of the left inguinal lymph node and is here to review results.  No new symptoms or problems.  ECOG status is 0.  January 2018:   Ms. Avis Paul returns for a follow-up.  She was here for IVIG infusion yesterday and raise concerns about symptoms suggesting recurrence of lymphoma.  She had CT scans and is here to review results.  Has been having aches  all over and increased fatigue and weight gain.  Describes some numbness in the right thigh area.  Also has been having memory issues since she received chemotherapy.  Notes an area of induration in the mouth on the lower right mandibular/jaw area.        November 2017:   She was seen 3 months ago.  She did have a visit shortly thereafter at the [facility] with bone marrow and CT scan showing that she is in complete remission from her mantle cell lymphoma.  She has continued IVIG infusions monthly between June - October 2017.  She was informed recently that the insurance would not cover this.  We had previously got this approved prior to starting infusions in June 2017.    She is currently having sinus symptoms and cough productive of greenish phlegm.  No other infection issues in the last 3 months.  She is in significant distress because of the insurance issues.    No other new symptoms or problems.  ECOG status is 0.    August 2017:   She was seen 4 weeks ago.  She has received 2 doses of IVIG.  No infusion reaction with the last one.  Her last infection was a sinus infection more than 4 weeks ago.  She has some discomfort in the right ear.  No fever or mild sores.  No shortness of breath or cough.  No new adenopathy.  She has been starting to have some loose bowel moments 2-3 times a day in the last 3 weeks.  No other new problems.    Pain Status  Currently in Pain: Yes    Review of Systems    Constitutional     Neurosensory     Cardiovascular     Pulmonary     Gastrointestinal     Genitourinary     Integumentary     Patient Coping  Patient Coping: Accepting  Distress Assessment  Distress Assessment Score: 3  Accompanied by  Accompanied by: Alone    Past History  Past Medical History:   Diagnosis Date     Anemia      Chronic kidney disease     elevated creatinine due to chemo     History of anesthesia complications 2015    urinary retention after lymph node excision. required catheterization     History of  transfusion      Hypothyroid      Mantle cell lymphoma (H)     stage 4     Sleep apnea     uses cpap     Thrombocytopenia (H)          Past Surgical History:   Procedure Laterality Date     ANTERIOR / POSTERIOR COMBINED FUSION CERVICAL SPINE  2011    C2-C7     CARPAL TUNNEL RELEASE Right 2000     CERVICAL FUSION  2004    C4, C5, C6.  Pt. states surgery x 2     KIDNEY STONE SURGERY  1999    removal     LIMBAL STEM CELL TRANSPLANT  08/2015     LYMPH NODE DISSECTION Left 6/25/2020    Procedure: LEFT INGUINAL LYMPH NODE BIOPSY;  Surgeon: Catalina Pascal MD;  Location: [facility] OR;  Service: General     ID BX/REMV,LYMPH NODE,DEEP AXILL Right 6/3/2015    Procedure: RIGHT INGUINAL LYMPH NODE BIOPSY;  Surgeon: Clayton Burgos MD;  Location: [facility] OR;  Service: General     TUBAL LIGATION  2004     US GUIDED NEEDLE PLACEMENT  6/25/2020      LYMPH NODE BIOPSY  6/10/2020       Physical Exam    Recent Vitals 5/13/2021   Height -   Weight 293 lbs   BSA (m2) 2.51 m2   /83   Pulse 64   Temp 98   Temp src 1   SpO2 98   Some recent data might be hidden       GENERAL: Alert and oriented. Seated comfortably. In no distress.    HEAD: Atraumatic and normocephalic.  Has a full head of hair.    EYES: ROMEO, EOMI.  No pallor.  No icterus.    Oral cavity: no mucosal lesion or tonsillar enlargement.  Induration right mandibular lower jaw area    NECK: supple. JVP normal.  No thyroid enlargement.    LYMPH NODES: No palpable, cervical, axillary or inguinal lymphadenopathy.    CHEST: She has slight bilateral wheezing noted  Resonant to percussion throughout bilaterally.  Symmetrical breath movements bilaterally.    CVS: S1 and S2 are heard. Regular rate and rhythm.  No murmur or gallop or rub heard.    ABDOMEN: Soft. Not tender. Not distended.  No palpable hepatomegaly or splenomegaly.  No other mass palpable.  Bowel sounds heard.    EXTREMITIES: Warm.  No peripheral edema.    SKIN: no rash, or bruising or  purpura.    CNS: Nonfocal        Lab Results    Recent Results (from the past 168 hour(s))   Comprehensive Metabolic Panel   Result Value Ref Range    Sodium 139 136 - 145 mmol/L    Potassium 3.9 3.5 - 5.0 mmol/L    Chloride 107 98 - 107 mmol/L    CO2 23 22 - 31 mmol/L    Anion Gap, Calculation 9 5 - 18 mmol/L    Glucose 90 70 - 125 mg/dL    BUN 24 (H) 8 - 22 mg/dL    Creatinine 2.24 (H) 0.60 - 1.10 mg/dL    GFR MDRD Af Amer 27 (L) >60 mL/min/1.73m2    GFR MDRD Non Af Amer 23 (L) >60 mL/min/1.73m2    Bilirubin, Total 0.4 0.0 - 1.0 mg/dL    Calcium 8.8 8.5 - 10.5 mg/dL    Protein, Total 6.4 6.0 - 8.0 g/dL    Albumin 3.5 3.5 - 5.0 g/dL    Alkaline Phosphatase 109 45 - 120 U/L    AST 22 0 - 40 U/L    ALT 16 0 - 45 U/L   HM1 (CBC with Diff)   Result Value Ref Range    WBC 5.7 4.0 - 11.0 thou/uL    RBC 4.66 3.80 - 5.40 mill/uL    Hemoglobin 13.7 12.0 - 16.0 g/dL    Hematocrit 43.1 35.0 - 47.0 %    MCV 93 80 - 100 fL    MCH 29.4 27.0 - 34.0 pg    MCHC 31.8 (L) 32.0 - 36.0 g/dL    RDW 14.1 11.0 - 14.5 %    Platelets 161 140 - 440 thou/uL    MPV 10.3 8.5 - 12.5 fL    Neutrophils % 65 50 - 70 %    Lymphocytes % 25 20 - 40 %    Monocytes % 8 2 - 10 %    Eosinophils % 2 0 - 6 %    Basophils % 1 0 - 2 %    Immature Granulocyte % 0 <=0 %    Neutrophils Absolute 3.7 2.0 - 7.7 thou/uL    Lymphocytes Absolute 1.4 0.8 - 4.4 thou/uL    Monocytes Absolute 0.4 0.0 - 0.9 thou/uL    Eosinophils Absolute 0.1 0.0 - 0.4 thou/uL    Basophils Absolute 0.0 0.0 - 0.2 thou/uL    Immature Granulocyte Absolute 0.0 <=0.0 thou/uL       Imaging    Ct Chest Abdomen Pelvis Without Oral Without Iv Contrast    Result Date: 5/13/2021  EXAM: CT CHEST ABDOMEN PELVIS WO ORAL WO IV CONTRAST LOCATION: [facility] DATE/TIME: 5/13/2021 9:26 AM INDICATION: Followup lymphoma. COMPARISON: 02/04/2021, 11/12/2020. TECHNIQUE: CT scan of the chest, abdomen, and pelvis was performed without IV contrast. Multiplanar reformats were obtained. Dose  reduction techniques were used. CONTRAST: None. FINDINGS: LUNGS AND PLEURA: There are small pulmonary nodules, reference a 2 mm nodule in the right lower lobe on image 95 series 3. These are stable compared to the prior 2 studies and should be benign. There is a small linear density in the inferior aspect of the lingula that is stable dating back to November 2020 MEDIASTINUM/AXILLAE: There is a port in the right anterior chest wall extending into the superior vena cava. No enlarged mediastinal lymph nodes are seen. No enlarged axillary nodes are seen. Again seen are calcified and noncalcified nodules in both breasts that are similar in appearance to prior studies. These may be fibroadenomas. CORONARY ARTERY CALCIFICATION: None. HEPATOBILIARY: There are a few tiny low dense lesions in the liver that are stable but too small to characterize, reference posterior right lobe image 64. These are likely benign. PANCREAS: Normal. SPLEEN: Normal in size, stable. ADRENAL GLANDS: Normal. KIDNEYS/BLADDER: Normal. BOWEL: There is some colonic diverticulosis. There is no evidence of appendicitis. LYMPH NODES: Left internal iliac lymph nodes?? with follow-up abutting the anterior cortex of the left sacral alae. On image 415 series 4 this measures 1.9 x 1.3 and on the prior study it measured 1.9 x 1.5 cm. VASCULATURE: Unremarkable. PELVIC ORGANS: Normal. MUSCULOSKELETAL: There are normal degenerative changes in the spine.     1.  There is very mild left internal iliac adenopathy that is stable to slightly smaller than on the prior study. 2.  There are multiple benign-appearing nodules in the breast, some with calcification and all are stable, these are likely fibroadenomas. 3.  No evidence of new disease is seen.        Signed by: Yelena Starks MD

## 2023-01-09 NOTE — PROGRESS NOTES
"Oncology Rooming Note    January 9, 2023 10:14 AM   Avis Paul is a 57 year old female who presents for:    Chief Complaint   Patient presents with     Oncology Clinic Visit     6 month return Lymphoma, mantle cell, multiple sites, review labs, CT & Infusion on 1 st floor     Initial Vitals: /70 (BP Location: Left arm, Patient Position: Sitting, Cuff Size: Adult Large)   Pulse 70   Temp 98  F (36.7  C) (Oral)   Resp 24   Ht 1.702 m (5' 7.01\")   Wt 137.4 kg (303 lb)   SpO2 100%   BMI 47.45 kg/m   Estimated body mass index is 47.45 kg/m  as calculated from the following:    Height as of this encounter: 1.702 m (5' 7.01\").    Weight as of this encounter: 137.4 kg (303 lb). Body surface area is 2.55 meters squared.  Moderate Pain (4) Comment: Data Unavailable   No LMP recorded. Patient is postmenopausal.  Allergies reviewed: Yes  Medications reviewed: Yes    Medications: Medication refills not needed today.  Pharmacy name entered into [employer]: [id number] - [place], [place] - [address]     Clinical concerns: 6 month return Lymphoma, mantle cell, multiple sites, review labs, CT & Infusion on 1 st floor      Malu Adams CMA              "

## 2023-01-09 NOTE — PROGRESS NOTES
Infusion Nursing Note:  Avis Paul presents today for IVIG infusion.    Patient seen by provider today: Yes: Dr. Starks   present during visit today: Not Applicable.    Note: Avis returned to infusion center after appointment with Dr. Starks.  Port previously accessed at 0930 this morning, flushing well with positive blood return. Avis premedicated with tylenol, benadryl, solumedrol, and pepcid.  IVIG infused as ordered.  Avis stayed for another 30 minutes to rest due to drowsiness from benadryl. She will return for next IVIG infusion on 2/6/23.    Intravenous Access:  Implanted Port.    Treatment Conditions:  Biological Infusion Checklist:  ~~~ NOTE: If the patient answers yes to any of the questions below, hold the infusion and contact ordering provider or on-call provider.    1. Have you recently had an elevated temperature, fever, chills, productive cough, coughing for 3 weeks or longer or hemoptysis, abnormal vital signs, night sweats,  chest pain or have you noticed a decrease in your appetite, unexplained weight loss or fatigue? No  2. Do you have any open wounds or new incisions? No  3. Do you have any recent or upcoming hospitalizations, surgeries or dental procedures? No  4. Do you currently have or recently have had any signs of illness or infection or are you on any antibiotics? Yes, acyclovir. MD aware  5. Have you had any new, sudden or worsening abdominal pain? No  6. Have you or anyone in your household received a live vaccination in the past 4 weeks? Please note:  No live vaccines while on biologic/chemotherapy until 6 months after the last treatment.  Patient can receive the flu vaccine (shot only) and the pneumovax.  It is optimal for the patient to get these vaccines mid cycle, but they can be given at any time as long as it is not on the day of the infusion. No  7. Have you recently been diagnosed with any new nervous system diseases (ie. Multiple sclerosis, Guillain  May, seizures, neurological changes) or cancer diagnosis? No  8. Are you on any form of radiation or chemotherapy? No  9. Are you pregnant or breast feeding or do you have plans of pregnancy in the future? No  10. Have you been having any signs of worsening depression or suicidal ideations?  (benlysta only) No  11. Have there been any other new onset medical symptoms? No      Post Infusion Assessment:  Patient tolerated infusion without incident.  Blood return noted pre and post infusion.  Site patent and intact, free from redness, edema or discomfort.  Access discontinued per protocol.     Discharge Plan:   Patient discharged in stable condition accompanied by: self.  Departure Mode: Ambulatory.      Tracie Wright RN

## 2023-01-09 NOTE — LETTER
"    1/9/2023         RE: Avis Paul  [address] [facility] [address]        Dear Colleague,    Thank you for referring your patient, Avis Paul, to the [facility]. Please see a copy of my visit note below.    Oncology Rooming Note    January 9, 2023 10:14 AM   Avis Paul is a 57 year old female who presents for:    Chief Complaint   Patient presents with     Oncology Clinic Visit     6 month return Lymphoma, mantle cell, multiple sites, review labs, CT & Infusion on 1 st floor     Initial Vitals: /70 (BP Location: Left arm, Patient Position: Sitting, Cuff Size: Adult Large)   Pulse 70   Temp 98  F (36.7  C) (Oral)   Resp 24   Ht 1.702 m (5' 7.01\")   Wt 137.4 kg (303 lb)   SpO2 100%   BMI 47.45 kg/m   Estimated body mass index is 47.45 kg/m  as calculated from the following:    Height as of this encounter: 1.702 m (5' 7.01\").    Weight as of this encounter: 137.4 kg (303 lb). Body surface area is 2.55 meters squared.  Moderate Pain (4) Comment: Data Unavailable   No LMP recorded. Patient is postmenopausal.  Allergies reviewed: Yes  Medications reviewed: Yes    Medications: Medication refills not needed today.  Pharmacy name entered into [employer] [facility] - [place], [place] - [facility] [address]     Clinical concerns: 6 month return Lymphoma, mantle cell, multiple sites, review labs, CT & Infusion on 1 st floor      Malu Adams 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  CT scan results pending.  Will inform patient of results prior to leaving today.    Will continue with IVIG infusions every 4 weeks.    Still having recurrent infections requiring antibiotic therapy.  Also did have influenza.  Will refer to immunology for any additional recommendations.  She has not had hospitalization due to any of the infection issues.  Unclear but immunodeficiency may have been due to Rituxan therapy.    Renal function and neuropathy are stable.    Plan: Continue IVIG infusion every 4 weeks  Referral to immunology for any additional recommendations to manage immunodeficiency  Follow-up in 6 months with recheck of labs and CT scans      Measurable disease: Patient currently in remission    Current therapy: Observation  IVIG resumed in January 2017 and stopped in May 2018; resumed again in September 2018  Maintenance Rituxan started December 2015, and completed August 2017, 8 doses  Acyclovir 400 mg twice daily  IVIG every 3 months, first dose August 29, 2016(currently on hold, last dose November 2016)   IVIG every 4 weeks restarted in June 2017 and stopped in May 2018      Treatment history:  Ibrutinib 420 mg p.o. daily started for presumed relapse of mantle cell lymphoma  July 3, 2020 and stopped August 4, 2020    First set of immunizations received at the [facility] in August 2016  First dose of IVIG  Patient completed 1 year of inhalational pentamidine  Autologous stem cell transplant with Beam prep, August 18, 2015  One cycle of R-ICE, Naye 15, 2015, ifosfamide and etoposide reduced by 25% because of renal dysfunction  Ibrutinib since October 10, 2014, current dose of 420 mg by mouth daily, stopped June  9, 2015   3 cycles of R-DHAP completed late August 2014   5 cycles of R CHOP, started after diagnosis in March 2014       ECOG Performance   ECOG Performance Status: 0    Distress Assessment  Distress Assessment Score: 3    Pain           Problem List    1. 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 Mantle cell lymphoma of lymph nodes of multiple regions (H)          CC: Sunil Tan MD    ______________________________________________________________________________    History of Present Illness    Pretty is here for reevaluation.  Seen 6 months ago.  Has required antibiotics on 3 occasions in the last 6 months.  Did have influenza prior to receiving the vaccination.  Has had some persistent vaginal infection and also diarrhea issues.  No fever chills or sweats.  No new palpable adenopathy.  No bleeding.  ECOG status 1.    June 29, 2020  Pretty is here for reevaluation.  Seen 2 weeks ago.  Underwent excisional biopsy of the left inguinal lymph node and is here to review results.  No new symptoms or problems.  ECOG status is 0.  January 2018:   Ms. Avis Paul returns for a follow-up.  She was here for IVIG infusion yesterday and raise concerns about symptoms suggesting recurrence of lymphoma.  She had CT scans and is here to review results.  Has been having aches all over and increased fatigue and weight gain.  Describes some numbness in the right thigh area.  Also has been having memory issues since she received chemotherapy.  Notes an area of induration in the mouth on the lower right mandibular/jaw area.        November 2017:   She was seen 3 months ago.  She did have a visit shortly thereafter at the [facility] with bone marrow and CT scan showing that she is in complete remission from her mantle cell lymphoma.  She has continued IVIG infusions monthly  between June - October 2017.  She was informed recently that the insurance would not cover this.  We had previously got this approved prior to starting infusions in June 2017.    She is currently having sinus symptoms and cough productive of greenish phlegm.  No other infection issues in the last 3 months.  She is in significant distress because of the insurance issues.    No other new symptoms or problems.  ECOG status is 0.    August 2017:   She was seen 4 weeks ago.  She has received 2 doses of IVIG.  No infusion reaction with the last one.  Her last infection was a sinus infection more than 4 weeks ago.  She has some discomfort in the right ear.  No fever or mild sores.  No shortness of breath or cough.  No new adenopathy.  She has been starting to have some loose bowel moments 2-3 times a day in the last 3 weeks.  No other new problems.    Pain Status  Currently in Pain: Yes    Review of Systems    Constitutional     Neurosensory     Cardiovascular     Pulmonary     Gastrointestinal     Genitourinary     Integumentary     Patient Coping  Patient Coping: Accepting  Distress Assessment  Distress Assessment Score: 3  Accompanied by  Accompanied by: Alone    Past History  Past Medical History:   Diagnosis Date     Anemia      Chronic kidney disease     elevated creatinine due to chemo     History of anesthesia complications 2015    urinary retention after lymph node excision. required catheterization     History of transfusion      Hypothyroid      Mantle cell lymphoma (H)     stage 4     Sleep apnea     uses cpap     Thrombocytopenia (H)          Past Surgical History:   Procedure Laterality Date     ANTERIOR / POSTERIOR COMBINED FUSION CERVICAL SPINE  2011    C2-C7     CARPAL TUNNEL RELEASE Right 2000     CERVICAL FUSION  2004    C4, C5, C6.  Pt. states surgery x 2     KIDNEY STONE SURGERY  1999    removal     LIMBAL STEM CELL TRANSPLANT  08/2015     LYMPH NODE DISSECTION Left 6/25/2020    Procedure: LEFT  INGUINAL LYMPH NODE BIOPSY;  Surgeon: Catalina Pascal MD;  Location: [facility];  Service: General     ID BX/REMV,LYMPH NODE,DEEP AXILL Right 6/3/2015    Procedure: RIGHT INGUINAL LYMPH NODE BIOPSY;  Surgeon: Clayton Burgos MD;  Location: [facility];  Service: General     TUBAL LIGATION  2004     US GUIDED NEEDLE PLACEMENT  6/25/2020     US LYMPH NODE BIOPSY  6/10/2020       Physical Exam    Recent Vitals 5/13/2021   Height -   Weight 293 lbs   BSA (m2) 2.51 m2   /83   Pulse 64   Temp 98   Temp src 1   SpO2 98   Some recent data might be hidden       GENERAL: Alert and oriented. Seated comfortably. In no distress.    HEAD: Atraumatic and normocephalic.  Has a full head of hair.    EYES: ROMEO, EOMI.  No pallor.  No icterus.    Oral cavity: no mucosal lesion or tonsillar enlargement.  Induration right mandibular lower jaw area    NECK: supple. JVP normal.  No thyroid enlargement.    LYMPH NODES: No palpable, cervical, axillary or inguinal lymphadenopathy.    CHEST: She has slight bilateral wheezing noted  Resonant to percussion throughout bilaterally.  Symmetrical breath movements bilaterally.    CVS: S1 and S2 are heard. Regular rate and rhythm.  No murmur or gallop or rub heard.    ABDOMEN: Soft. Not tender. Not distended.  No palpable hepatomegaly or splenomegaly.  No other mass palpable.  Bowel sounds heard.    EXTREMITIES: Warm.  No peripheral edema.    SKIN: no rash, or bruising or purpura.    CNS: Nonfocal        Lab Results    Recent Results (from the past 168 hour(s))   Comprehensive Metabolic Panel   Result Value Ref Range    Sodium 139 136 - 145 mmol/L    Potassium 3.9 3.5 - 5.0 mmol/L    Chloride 107 98 - 107 mmol/L    CO2 23 22 - 31 mmol/L    Anion Gap, Calculation 9 5 - 18 mmol/L    Glucose 90 70 - 125 mg/dL    BUN 24 (H) 8 - 22 mg/dL    Creatinine 2.24 (H) 0.60 - 1.10 mg/dL    GFR MDRD Af Amer 27 (L) >60 mL/min/1.73m2    GFR MDRD Non Af Amer 23 (L) >60 mL/min/1.73m2     Bilirubin, Total 0.4 0.0 - 1.0 mg/dL    Calcium 8.8 8.5 - 10.5 mg/dL    Protein, Total 6.4 6.0 - 8.0 g/dL    Albumin 3.5 3.5 - 5.0 g/dL    Alkaline Phosphatase 109 45 - 120 U/L    AST 22 0 - 40 U/L    ALT 16 0 - 45 U/L   HM1 (CBC with Diff)   Result Value Ref Range    WBC 5.7 4.0 - 11.0 thou/uL    RBC 4.66 3.80 - 5.40 mill/uL    Hemoglobin 13.7 12.0 - 16.0 g/dL    Hematocrit 43.1 35.0 - 47.0 %    MCV 93 80 - 100 fL    MCH 29.4 27.0 - 34.0 pg    MCHC 31.8 (L) 32.0 - 36.0 g/dL    RDW 14.1 11.0 - 14.5 %    Platelets 161 140 - 440 thou/uL    MPV 10.3 8.5 - 12.5 fL    Neutrophils % 65 50 - 70 %    Lymphocytes % 25 20 - 40 %    Monocytes % 8 2 - 10 %    Eosinophils % 2 0 - 6 %    Basophils % 1 0 - 2 %    Immature Granulocyte % 0 <=0 %    Neutrophils Absolute 3.7 2.0 - 7.7 thou/uL    Lymphocytes Absolute 1.4 0.8 - 4.4 thou/uL    Monocytes Absolute 0.4 0.0 - 0.9 thou/uL    Eosinophils Absolute 0.1 0.0 - 0.4 thou/uL    Basophils Absolute 0.0 0.0 - 0.2 thou/uL    Immature Granulocyte Absolute 0.0 <=0.0 thou/uL       Imaging    Ct Chest Abdomen Pelvis Without Oral Without Iv Contrast    Result Date: 5/13/2021  EXAM: CT CHEST ABDOMEN PELVIS WO ORAL WO IV CONTRAST LOCATION: [facility] DATE/TIME: 5/13/2021 9:26 AM INDICATION: Followup lymphoma. COMPARISON: 02/04/2021, 11/12/2020. TECHNIQUE: CT scan of the chest, abdomen, and pelvis was performed without IV contrast. Multiplanar reformats were obtained. Dose reduction techniques were used. CONTRAST: None. FINDINGS: LUNGS AND PLEURA: There are small pulmonary nodules, reference a 2 mm nodule in the right lower lobe on image 95 series 3. These are stable compared to the prior 2 studies and should be benign. There is a small linear density in the inferior aspect of the lingula that is stable dating back to November 2020 MEDIASTINUM/AXILLAE: There is a port in the right anterior chest wall extending into the superior vena cava. No enlarged mediastinal lymph  nodes are seen. No enlarged axillary nodes are seen. Again seen are calcified and noncalcified nodules in both breasts that are similar in appearance to prior studies. These may be fibroadenomas. CORONARY ARTERY CALCIFICATION: None. HEPATOBILIARY: There are a few tiny low dense lesions in the liver that are stable but too small to characterize, reference posterior right lobe image 64. These are likely benign. PANCREAS: Normal. SPLEEN: Normal in size, stable. ADRENAL GLANDS: Normal. KIDNEYS/BLADDER: Normal. BOWEL: There is some colonic diverticulosis. There is no evidence of appendicitis. LYMPH NODES: Left internal iliac lymph nodes?? with follow-up abutting the anterior cortex of the left sacral alae. On image 415 series 4 this measures 1.9 x 1.3 and on the prior study it measured 1.9 x 1.5 cm. VASCULATURE: Unremarkable. PELVIC ORGANS: Normal. MUSCULOSKELETAL: There are normal degenerative changes in the spine.     1.  There is very mild left internal iliac adenopathy that is stable to slightly smaller than on the prior study. 2.  There are multiple benign-appearing nodules in the breast, some with calcification and all are stable, these are likely fibroadenomas. 3.  No evidence of new disease is seen.        Signed by: Yelena Starks MD        Again, thank you for allowing me to participate in the care of your patient.        Sincerely,        Yelena Starks MD

## 2023-01-09 NOTE — PROGRESS NOTES
Avis arrived in stable condition for port access and lab draw.  Port accessed with excellent blood return. Labs drawn and sent to lab. She has an appointment with provider then she will return for IVIG infusion.

## 2023-01-12 ENCOUNTER — TELEPHONE (OUTPATIENT)
Dept: ONCOLOGY | Facility: HOSPITAL | Age: 58
End: 2023-01-12

## 2023-01-12 NOTE — TELEPHONE ENCOUNTER
Oncology Distress Screen    Called Avis in regards to her positive oncology nutrition distress screen:    2. How concerned are you about unintended weight loss or your current weight? : 9 (current weight)    Attempted to call Avis today. Left voicemail explaining role and provided call back information.      Isatu Littlejohn, MS, RD, LD

## 2023-01-21 ENCOUNTER — HOSPITAL ENCOUNTER (OUTPATIENT)
Dept: MAMMOGRAPHY | Facility: CLINIC | Age: 58
Discharge: HOME | End: 2023-01-21
Attending: FAMILY MEDICINE | Admitting: FAMILY MEDICINE
Payer: COMMERCIAL

## 2023-01-21 DIAGNOSIS — Z12.31 VISIT FOR SCREENING MAMMOGRAM: ICD-10-CM

## 2023-01-21 PROCEDURE — 77067 SCR MAMMO BI INCL CAD: CPT

## 2023-02-06 ENCOUNTER — INFUSION THERAPY VISIT (OUTPATIENT)
Dept: INFUSION THERAPY | Facility: HOSPITAL | Age: 58
End: 2023-02-06
Attending: INTERNAL MEDICINE
Payer: COMMERCIAL

## 2023-02-06 VITALS
DIASTOLIC BLOOD PRESSURE: 84 MMHG | WEIGHT: 293 LBS | OXYGEN SATURATION: 99 % | HEIGHT: 67 IN | RESPIRATION RATE: 16 BRPM | SYSTOLIC BLOOD PRESSURE: 136 MMHG | TEMPERATURE: 97.6 F | BODY MASS INDEX: 45.99 KG/M2 | HEART RATE: 74 BPM

## 2023-02-06 DIAGNOSIS — D80.1 HYPOGAMMAGLOBULINEMIA (H): Primary | ICD-10-CM

## 2023-02-06 LAB — IGG SERPL-MCNC: 668 MG/DL (ref 610–1616)

## 2023-02-06 PROCEDURE — 96375 TX/PRO/DX INJ NEW DRUG ADDON: CPT

## 2023-02-06 PROCEDURE — 250N000011 HC RX IP 250 OP 636: Performed by: NURSE PRACTITIONER

## 2023-02-06 PROCEDURE — 258N000003 HC RX IP 258 OP 636: Performed by: NURSE PRACTITIONER

## 2023-02-06 PROCEDURE — 96365 THER/PROPH/DIAG IV INF INIT: CPT

## 2023-02-06 PROCEDURE — 36591 DRAW BLOOD OFF VENOUS DEVICE: CPT | Performed by: NURSE PRACTITIONER

## 2023-02-06 PROCEDURE — 250N000013 HC RX MED GY IP 250 OP 250 PS 637: Performed by: NURSE PRACTITIONER

## 2023-02-06 PROCEDURE — 82784 ASSAY IGA/IGD/IGG/IGM EACH: CPT | Performed by: NURSE PRACTITIONER

## 2023-02-06 RX ORDER — HEPARIN SODIUM,PORCINE 10 UNIT/ML
5 VIAL (ML) INTRAVENOUS
Status: CANCELLED | OUTPATIENT
Start: 2023-02-07

## 2023-02-06 RX ORDER — NALOXONE HYDROCHLORIDE 0.4 MG/ML
0.2 INJECTION, SOLUTION INTRAMUSCULAR; INTRAVENOUS; SUBCUTANEOUS
Status: DISCONTINUED | OUTPATIENT
Start: 2023-02-06 | End: 2023-02-06 | Stop reason: HOSPADM

## 2023-02-06 RX ORDER — METHYLPREDNISOLONE SODIUM SUCCINATE 125 MG/2ML
125 INJECTION, POWDER, LYOPHILIZED, FOR SOLUTION INTRAMUSCULAR; INTRAVENOUS ONCE
Status: COMPLETED | OUTPATIENT
Start: 2023-02-06 | End: 2023-02-06

## 2023-02-06 RX ORDER — METHYLPREDNISOLONE SODIUM SUCCINATE 125 MG/2ML
125 INJECTION, POWDER, LYOPHILIZED, FOR SOLUTION INTRAMUSCULAR; INTRAVENOUS ONCE
Status: CANCELLED
Start: 2023-02-07 | End: 2023-02-07

## 2023-02-06 RX ORDER — DIPHENHYDRAMINE HYDROCHLORIDE 50 MG/ML
50 INJECTION INTRAMUSCULAR; INTRAVENOUS
Status: CANCELLED
Start: 2023-02-07

## 2023-02-06 RX ORDER — ACETAMINOPHEN 325 MG/1
650 TABLET ORAL ONCE
Status: COMPLETED | OUTPATIENT
Start: 2023-02-06 | End: 2023-02-06

## 2023-02-06 RX ORDER — MEPERIDINE HYDROCHLORIDE 25 MG/ML
25 INJECTION INTRAMUSCULAR; INTRAVENOUS; SUBCUTANEOUS EVERY 30 MIN PRN
Status: CANCELLED | OUTPATIENT
Start: 2023-02-07

## 2023-02-06 RX ORDER — ALBUTEROL SULFATE 90 UG/1
1-2 AEROSOL, METERED RESPIRATORY (INHALATION)
Status: DISCONTINUED | OUTPATIENT
Start: 2023-02-06 | End: 2023-02-06 | Stop reason: HOSPADM

## 2023-02-06 RX ORDER — DIPHENHYDRAMINE HCL 25 MG
12.5 TABLET ORAL ONCE
Status: CANCELLED
Start: 2023-03-06 | End: 2023-03-06

## 2023-02-06 RX ORDER — ALBUTEROL SULFATE 0.83 MG/ML
2.5 SOLUTION RESPIRATORY (INHALATION)
Status: DISCONTINUED | OUTPATIENT
Start: 2023-02-06 | End: 2023-02-06 | Stop reason: HOSPADM

## 2023-02-06 RX ORDER — HEPARIN SODIUM (PORCINE) LOCK FLUSH IV SOLN 100 UNIT/ML 100 UNIT/ML
5 SOLUTION INTRAVENOUS
Status: DISCONTINUED | OUTPATIENT
Start: 2023-02-06 | End: 2023-02-06 | Stop reason: HOSPADM

## 2023-02-06 RX ORDER — DIPHENHYDRAMINE HYDROCHLORIDE 50 MG/ML
50 INJECTION INTRAMUSCULAR; INTRAVENOUS
Status: DISCONTINUED | OUTPATIENT
Start: 2023-02-06 | End: 2023-02-06 | Stop reason: HOSPADM

## 2023-02-06 RX ORDER — ACETAMINOPHEN 325 MG/1
650 TABLET ORAL ONCE
Status: CANCELLED
Start: 2023-02-07

## 2023-02-06 RX ORDER — NALOXONE HYDROCHLORIDE 0.4 MG/ML
0.2 INJECTION, SOLUTION INTRAMUSCULAR; INTRAVENOUS; SUBCUTANEOUS
Status: CANCELLED | OUTPATIENT
Start: 2023-02-07

## 2023-02-06 RX ORDER — DIPHENHYDRAMINE HCL 25 MG
12.5 TABLET ORAL ONCE
Status: CANCELLED
Start: 2023-02-06 | End: 2023-02-06

## 2023-02-06 RX ORDER — METHYLPREDNISOLONE SODIUM SUCCINATE 125 MG/2ML
125 INJECTION, POWDER, LYOPHILIZED, FOR SOLUTION INTRAMUSCULAR; INTRAVENOUS
Status: CANCELLED
Start: 2023-02-07

## 2023-02-06 RX ORDER — METHYLPREDNISOLONE SODIUM SUCCINATE 125 MG/2ML
125 INJECTION, POWDER, LYOPHILIZED, FOR SOLUTION INTRAMUSCULAR; INTRAVENOUS
Status: DISCONTINUED | OUTPATIENT
Start: 2023-02-06 | End: 2023-02-06 | Stop reason: HOSPADM

## 2023-02-06 RX ORDER — EPINEPHRINE 1 MG/ML
0.3 INJECTION, SOLUTION INTRAMUSCULAR; SUBCUTANEOUS EVERY 5 MIN PRN
Status: CANCELLED | OUTPATIENT
Start: 2023-02-07

## 2023-02-06 RX ORDER — HEPARIN SODIUM (PORCINE) LOCK FLUSH IV SOLN 100 UNIT/ML 100 UNIT/ML
5 SOLUTION INTRAVENOUS
Status: CANCELLED | OUTPATIENT
Start: 2023-02-07

## 2023-02-06 RX ORDER — ALBUTEROL SULFATE 90 UG/1
1-2 AEROSOL, METERED RESPIRATORY (INHALATION)
Status: CANCELLED
Start: 2023-02-07

## 2023-02-06 RX ORDER — ALBUTEROL SULFATE 0.83 MG/ML
2.5 SOLUTION RESPIRATORY (INHALATION)
Status: CANCELLED | OUTPATIENT
Start: 2023-02-07

## 2023-02-06 RX ORDER — EPINEPHRINE 1 MG/ML
0.3 INJECTION, SOLUTION INTRAMUSCULAR; SUBCUTANEOUS EVERY 5 MIN PRN
Status: DISCONTINUED | OUTPATIENT
Start: 2023-02-06 | End: 2023-02-06 | Stop reason: HOSPADM

## 2023-02-06 RX ORDER — MEPERIDINE HYDROCHLORIDE 25 MG/ML
25 INJECTION INTRAMUSCULAR; INTRAVENOUS; SUBCUTANEOUS EVERY 30 MIN PRN
Status: DISCONTINUED | OUTPATIENT
Start: 2023-02-06 | End: 2023-02-06 | Stop reason: HOSPADM

## 2023-02-06 RX ORDER — DIPHENHYDRAMINE HCL 12.5MG/5ML
12.5 LIQUID (ML) ORAL ONCE
Status: CANCELLED
Start: 2023-02-07 | End: 2023-02-07

## 2023-02-06 RX ADMIN — DIPHENHYDRAMINE HYDROCHLORIDE 12.5 MG: 50 INJECTION, SOLUTION INTRAMUSCULAR; INTRAVENOUS at 10:39

## 2023-02-06 RX ADMIN — METHYLPREDNISOLONE SODIUM SUCCINATE 125 MG: 125 INJECTION, POWDER, FOR SOLUTION INTRAMUSCULAR; INTRAVENOUS at 10:40

## 2023-02-06 RX ADMIN — HUMAN IMMUNOGLOBULIN G 25 G: 20 LIQUID INTRAVENOUS at 11:39

## 2023-02-06 RX ADMIN — FAMOTIDINE 20 MG: 10 INJECTION, SOLUTION INTRAVENOUS at 10:39

## 2023-02-06 RX ADMIN — Medication 5 ML: at 13:10

## 2023-02-06 RX ADMIN — ACETAMINOPHEN 650 MG: 325 TABLET ORAL at 10:39

## 2023-02-06 ASSESSMENT — PAIN SCALES - GENERAL: PAINLEVEL: MODERATE PAIN (4)

## 2023-02-06 NOTE — PROGRESS NOTES
Infusion Nursing Note:  Avis Paul presents today for Lab/IVIG    Patient seen by provider today: No   present during visit today: Not Applicable.    Note:  Avis arrived ambulatory in a stable condition for IVIG, VSS. Plan of care reviewed, she verbalized understanding of plan of care and return to clinic. She feels well and reports no recent fevers. Premedicated with oral Tylenol 650 mg, IV Benadryl 12.5 mg, IV pepcid 20 mg and IV solumedrol 125 mg as ordered. IVIG dose given per protocol. Lab drawn for IGG.    .    Intravenous Access:  Implanted Port.    Treatment Conditions:  Biological Infusion Checklist:  ~~~ NOTE: If the patient answers yes to any of the questions below, hold the infusion and contact ordering provider or on-call provider.    1. Have you recently had an elevated temperature, fever, chills, productive cough, coughing for 3 weeks or longer or hemoptysis, abnormal vital signs, night sweats,  chest pain or have you noticed a decrease in your appetite, unexplained weight loss or fatigue? No  2. Do you have any open wounds or new incisions? No  3. Do you have any recent or upcoming hospitalizations, surgeries or dental procedures? No  4. Do you currently have or recently have had any signs of illness or infection or are you on any antibiotics? No  5. Have you had any new, sudden or worsening abdominal pain? No  6. Have you or anyone in your household received a live vaccination in the past 4 weeks? Please note:  No live vaccines while on biologic/chemotherapy until 6 months after the last treatment.  Patient can receive the flu vaccine (shot only) and the pneumovax.  It is optimal for the patient to get these vaccines mid cycle, but they can be given at any time as long as it is not on the day of the infusion. No  7. Have you recently been diagnosed with any new nervous system diseases (ie. Multiple sclerosis, Guillain [place], seizures, neurological changes) or cancer diagnosis?  No  8. Are you on any form of radiation or chemotherapy? No  9. Are you pregnant or breast feeding or do you have plans of pregnancy in the future? No  10. Have you been having any signs of worsening depression or suicidal ideations?  (benlysta only) No  11. Have there been any other new onset medical symptoms? No      Post Infusion Assessment:  Patient tolerated infusion without incident.  Blood return noted pre and post infusion.  Site patent and intact, free from redness, edema or discomfort.  No evidence of extravasations.  Biologic Infusion Post Education: Call the triage nurse at your clinic or seek medical attention if you have chills and/or temperature greater than or equal to 100.5, uncontrolled nausea/vomiting, diarrhea, constipation, dizziness, shortness of breath, chest pain, heart palpitations, weakness or any other new or concerning symptoms, questions or concerns.  You cannot have any live virus vaccines prior to or during treatment or up to 6 months post infusion.  If you have an upcoming surgery, medical procedure or dental procedure during treatment, this should be discussed with your ordering physician and your surgeon/dentist.  If you are having any concerning symptom, if you are unsure if you should get your next infusion or wish to speak to a provider before your next infusion, please call your care coordinator or triage nurse at your clinic to notify them so we can adequately serve you.     Discharge Plan:   Discharge instructions reviewed with: Patient.  Patient and/or family verbalized understanding of discharge instructions and all questions answered.  Patient discharged in stable condition accompanied by: self.  Departure Mode: Ambulatory.      Jessica Paniagua RN

## 2023-03-06 ENCOUNTER — INFUSION THERAPY VISIT (OUTPATIENT)
Dept: INFUSION THERAPY | Facility: HOSPITAL | Age: 58
End: 2023-03-06
Attending: INTERNAL MEDICINE
Payer: COMMERCIAL

## 2023-03-06 VITALS
BODY MASS INDEX: 45.99 KG/M2 | SYSTOLIC BLOOD PRESSURE: 140 MMHG | WEIGHT: 293 LBS | RESPIRATION RATE: 16 BRPM | DIASTOLIC BLOOD PRESSURE: 87 MMHG | TEMPERATURE: 97.3 F | HEART RATE: 76 BPM | HEIGHT: 67 IN | OXYGEN SATURATION: 97 %

## 2023-03-06 DIAGNOSIS — D80.1 HYPOGAMMAGLOBULINEMIA (H): Primary | ICD-10-CM

## 2023-03-06 PROCEDURE — 96365 THER/PROPH/DIAG IV INF INIT: CPT

## 2023-03-06 PROCEDURE — 250N000011 HC RX IP 250 OP 636: Performed by: NURSE PRACTITIONER

## 2023-03-06 PROCEDURE — 82784 ASSAY IGA/IGD/IGG/IGM EACH: CPT | Performed by: NURSE PRACTITIONER

## 2023-03-06 PROCEDURE — 96366 THER/PROPH/DIAG IV INF ADDON: CPT

## 2023-03-06 PROCEDURE — 250N000013 HC RX MED GY IP 250 OP 250 PS 637: Performed by: INTERNAL MEDICINE

## 2023-03-06 PROCEDURE — 250N000011 HC RX IP 250 OP 636: Performed by: INTERNAL MEDICINE

## 2023-03-06 PROCEDURE — 36591 DRAW BLOOD OFF VENOUS DEVICE: CPT | Performed by: NURSE PRACTITIONER

## 2023-03-06 PROCEDURE — 96375 TX/PRO/DX INJ NEW DRUG ADDON: CPT

## 2023-03-06 PROCEDURE — 250N000013 HC RX MED GY IP 250 OP 250 PS 637: Performed by: NURSE PRACTITIONER

## 2023-03-06 RX ORDER — ALBUTEROL SULFATE 0.83 MG/ML
2.5 SOLUTION RESPIRATORY (INHALATION)
Status: DISCONTINUED | OUTPATIENT
Start: 2023-03-06 | End: 2023-03-06 | Stop reason: HOSPADM

## 2023-03-06 RX ORDER — ALBUTEROL SULFATE 0.83 MG/ML
2.5 SOLUTION RESPIRATORY (INHALATION)
Status: CANCELLED | OUTPATIENT
Start: 2023-03-07

## 2023-03-06 RX ORDER — NALOXONE HYDROCHLORIDE 0.4 MG/ML
0.2 INJECTION, SOLUTION INTRAMUSCULAR; INTRAVENOUS; SUBCUTANEOUS
Status: DISCONTINUED | OUTPATIENT
Start: 2023-03-06 | End: 2023-03-06 | Stop reason: HOSPADM

## 2023-03-06 RX ORDER — ACETAMINOPHEN 325 MG/1
650 TABLET ORAL ONCE
Status: COMPLETED | OUTPATIENT
Start: 2023-03-06 | End: 2023-03-06

## 2023-03-06 RX ORDER — DIPHENHYDRAMINE HCL 25 MG
12.5 TABLET ORAL ONCE
Status: CANCELLED
Start: 2023-03-07 | End: 2023-03-07

## 2023-03-06 RX ORDER — NALOXONE HYDROCHLORIDE 0.4 MG/ML
0.2 INJECTION, SOLUTION INTRAMUSCULAR; INTRAVENOUS; SUBCUTANEOUS
Status: CANCELLED | OUTPATIENT
Start: 2023-03-07

## 2023-03-06 RX ORDER — MEPERIDINE HYDROCHLORIDE 25 MG/ML
25 INJECTION INTRAMUSCULAR; INTRAVENOUS; SUBCUTANEOUS EVERY 30 MIN PRN
Status: DISCONTINUED | OUTPATIENT
Start: 2023-03-06 | End: 2023-03-06 | Stop reason: HOSPADM

## 2023-03-06 RX ORDER — HEPARIN SODIUM (PORCINE) LOCK FLUSH IV SOLN 100 UNIT/ML 100 UNIT/ML
5 SOLUTION INTRAVENOUS
Status: DISCONTINUED | OUTPATIENT
Start: 2023-03-06 | End: 2023-03-06 | Stop reason: HOSPADM

## 2023-03-06 RX ORDER — HEPARIN SODIUM,PORCINE 10 UNIT/ML
5 VIAL (ML) INTRAVENOUS
Status: CANCELLED | OUTPATIENT
Start: 2023-03-07

## 2023-03-06 RX ORDER — METHYLPREDNISOLONE SODIUM SUCCINATE 125 MG/2ML
125 INJECTION, POWDER, LYOPHILIZED, FOR SOLUTION INTRAMUSCULAR; INTRAVENOUS ONCE
Status: COMPLETED | OUTPATIENT
Start: 2023-03-06 | End: 2023-03-06

## 2023-03-06 RX ORDER — DIPHENHYDRAMINE HCL 12.5MG/5ML
12.5 LIQUID (ML) ORAL ONCE
Status: COMPLETED | OUTPATIENT
Start: 2023-03-06 | End: 2023-03-06

## 2023-03-06 RX ORDER — METHYLPREDNISOLONE SODIUM SUCCINATE 125 MG/2ML
125 INJECTION, POWDER, LYOPHILIZED, FOR SOLUTION INTRAMUSCULAR; INTRAVENOUS
Status: CANCELLED
Start: 2023-03-07

## 2023-03-06 RX ORDER — ALBUTEROL SULFATE 90 UG/1
1-2 AEROSOL, METERED RESPIRATORY (INHALATION)
Status: CANCELLED
Start: 2023-03-07

## 2023-03-06 RX ORDER — EPINEPHRINE 1 MG/ML
0.3 INJECTION, SOLUTION INTRAMUSCULAR; SUBCUTANEOUS EVERY 5 MIN PRN
Status: CANCELLED | OUTPATIENT
Start: 2023-03-07

## 2023-03-06 RX ORDER — METHYLPREDNISOLONE SODIUM SUCCINATE 125 MG/2ML
125 INJECTION, POWDER, LYOPHILIZED, FOR SOLUTION INTRAMUSCULAR; INTRAVENOUS
Status: DISCONTINUED | OUTPATIENT
Start: 2023-03-06 | End: 2023-03-06 | Stop reason: HOSPADM

## 2023-03-06 RX ORDER — ALBUTEROL SULFATE 90 UG/1
1-2 AEROSOL, METERED RESPIRATORY (INHALATION)
Status: DISCONTINUED | OUTPATIENT
Start: 2023-03-06 | End: 2023-03-06 | Stop reason: HOSPADM

## 2023-03-06 RX ORDER — MEPERIDINE HYDROCHLORIDE 25 MG/ML
25 INJECTION INTRAMUSCULAR; INTRAVENOUS; SUBCUTANEOUS EVERY 30 MIN PRN
Status: CANCELLED | OUTPATIENT
Start: 2023-03-07

## 2023-03-06 RX ORDER — DIPHENHYDRAMINE HYDROCHLORIDE 50 MG/ML
50 INJECTION INTRAMUSCULAR; INTRAVENOUS
Status: DISCONTINUED | OUTPATIENT
Start: 2023-03-06 | End: 2023-03-06 | Stop reason: HOSPADM

## 2023-03-06 RX ORDER — METHYLPREDNISOLONE SODIUM SUCCINATE 125 MG/2ML
125 INJECTION, POWDER, LYOPHILIZED, FOR SOLUTION INTRAMUSCULAR; INTRAVENOUS ONCE
Status: CANCELLED
Start: 2023-03-07 | End: 2023-03-07

## 2023-03-06 RX ORDER — DIPHENHYDRAMINE HCL 25 MG
12.5 TABLET ORAL ONCE
Status: DISCONTINUED | OUTPATIENT
Start: 2023-03-06 | End: 2023-03-06

## 2023-03-06 RX ORDER — ACETAMINOPHEN 325 MG/1
650 TABLET ORAL ONCE
Status: CANCELLED
Start: 2023-03-07

## 2023-03-06 RX ORDER — DIPHENHYDRAMINE HYDROCHLORIDE 50 MG/ML
50 INJECTION INTRAMUSCULAR; INTRAVENOUS
Status: CANCELLED
Start: 2023-03-07

## 2023-03-06 RX ORDER — HEPARIN SODIUM (PORCINE) LOCK FLUSH IV SOLN 100 UNIT/ML 100 UNIT/ML
5 SOLUTION INTRAVENOUS
Status: CANCELLED | OUTPATIENT
Start: 2023-03-07

## 2023-03-06 RX ORDER — EPINEPHRINE 1 MG/ML
0.3 INJECTION, SOLUTION INTRAMUSCULAR; SUBCUTANEOUS EVERY 5 MIN PRN
Status: DISCONTINUED | OUTPATIENT
Start: 2023-03-06 | End: 2023-03-06 | Stop reason: HOSPADM

## 2023-03-06 RX ADMIN — DIPHENHYDRAMINE HYDROCHLORIDE 12.5 MG: 12.5 SOLUTION ORAL at 11:10

## 2023-03-06 RX ADMIN — FAMOTIDINE 20 MG: 10 INJECTION, SOLUTION INTRAVENOUS at 10:53

## 2023-03-06 RX ADMIN — METHYLPREDNISOLONE SODIUM SUCCINATE 125 MG: 125 INJECTION, POWDER, FOR SOLUTION INTRAMUSCULAR; INTRAVENOUS at 10:54

## 2023-03-06 RX ADMIN — Medication 5 ML: at 13:13

## 2023-03-06 RX ADMIN — ACETAMINOPHEN 650 MG: 325 TABLET ORAL at 10:53

## 2023-03-06 RX ADMIN — HUMAN IMMUNOGLOBULIN G 25 G: 10 LIQUID INTRAVENOUS at 11:32

## 2023-03-06 ASSESSMENT — PAIN SCALES - GENERAL: PAINLEVEL: MODERATE PAIN (4)

## 2023-03-06 NOTE — PROGRESS NOTES
Infusion Nursing Note:  Avis Paul presents today for Lab/IVIG    Patient seen by provider today: No   present during visit today: Not Applicable.    Note:  Avis arrived ambulatory in a stable condition for IVIG, VSS. Plan of care reviewed, she verbalized understanding of plan of care and return to clinic. She feels well and reports no recent fevers. Labs ( IGG )was drawn before IVIG. Premedicated with oral Tylenol 650 mg, IV Benadryl 12.5 mg, IV pepcid 20 mg and IV solumedrol 125 mg as ordered. IVIG dose given per protocol. Lab drawn for IGG.    .    Intravenous Access:  Implanted Port.    Treatment Conditions:  Biological Infusion Checklist:  ~~~ NOTE: If the patient answers yes to any of the questions below, hold the infusion and contact ordering provider or on-call provider.    1. Have you recently had an elevated temperature, fever, chills, productive cough, coughing for 3 weeks or longer or hemoptysis, abnormal vital signs, night sweats,  chest pain or have you noticed a decrease in your appetite, unexplained weight loss or fatigue? No  2. Do you have any open wounds or new incisions? No  3. Do you have any recent or upcoming hospitalizations, surgeries or dental procedures? No  4. Do you currently have or recently have had any signs of illness or infection or are you on any antibiotics? No  5. Have you had any new, sudden or worsening abdominal pain? No  6. Have you or anyone in your household received a live vaccination in the past 4 weeks? Please note:  No live vaccines while on biologic/chemotherapy until 6 months after the last treatment.  Patient can receive the flu vaccine (shot only) and the pneumovax.  It is optimal for the patient to get these vaccines mid cycle, but they can be given at any time as long as it is not on the day of the infusion. No  7. Have you recently been diagnosed with any new nervous system diseases (ie. Multiple sclerosis, Guillain [place], seizures, neurological  changes) or cancer diagnosis? No  8. Are you on any form of radiation or chemotherapy? No  9. Are you pregnant or breast feeding or do you have plans of pregnancy in the future? No  10. Have you been having any signs of worsening depression or suicidal ideations?  (benlysta only) No  11. Have there been any other new onset medical symptoms? No      Post Infusion Assessment:  Patient tolerated infusion without incident.  Blood return noted pre and post infusion.  Site patent and intact, free from redness, edema or discomfort.  No evidence of extravasations.  Biologic Infusion Post Education: Call the triage nurse at your clinic or seek medical attention if you have chills and/or temperature greater than or equal to 100.5, uncontrolled nausea/vomiting, diarrhea, constipation, dizziness, shortness of breath, chest pain, heart palpitations, weakness or any other new or concerning symptoms, questions or concerns.  You cannot have any live virus vaccines prior to or during treatment or up to 6 months post infusion.  If you have an upcoming surgery, medical procedure or dental procedure during treatment, this should be discussed with your ordering physician and your surgeon/dentist.  If you are having any concerning symptom, if you are unsure if you should get your next infusion or wish to speak to a provider before your next infusion, please call your care coordinator or triage nurse at your clinic to notify them so we can adequately serve you.     Discharge Plan:   Discharge instructions reviewed with: Patient.  Patient and/or family verbalized understanding of discharge instructions and all questions answered.  Patient discharged in stable condition accompanied by: self.  Departure Mode: Ambulatory.      Jessica Paniagua RN

## 2023-03-07 LAB — IGG SERPL-MCNC: 676 MG/DL (ref 610–1616)

## 2023-03-08 DIAGNOSIS — C83.18 LYMPHOMA, MANTLE CELL, MULTIPLE SITES (H): ICD-10-CM

## 2023-03-09 RX ORDER — ACYCLOVIR 400 MG/1
TABLET ORAL
Qty: 180 TABLET | Refills: 0 | Status: SHIPPED | OUTPATIENT
Start: 2023-03-09 | End: 2023-11-17

## 2023-03-09 NOTE — TELEPHONE ENCOUNTER
Routing refill request to provider for review/approval because:  Labs out of range:  CR      Lab Test 01/09/23  0933   CR 1.99*      NOÉ Thorne

## 2023-03-21 ENCOUNTER — VIRTUAL VISIT (OUTPATIENT)
Dept: SLEEP MEDICINE | Facility: CLINIC | Age: 58
End: 2023-03-21
Payer: COMMERCIAL

## 2023-03-21 VITALS — BODY MASS INDEX: 44.41 KG/M2 | HEIGHT: 68 IN | WEIGHT: 293 LBS

## 2023-03-21 DIAGNOSIS — G47.33 OSA (OBSTRUCTIVE SLEEP APNEA): Primary | ICD-10-CM

## 2023-03-21 PROCEDURE — 99213 OFFICE O/P EST LOW 20 MIN: CPT | Mod: VID | Performed by: PHYSICIAN ASSISTANT

## 2023-03-21 ASSESSMENT — SLEEP AND FATIGUE QUESTIONNAIRES
HOW LIKELY ARE YOU TO NOD OFF OR FALL ASLEEP WHILE SITTING AND READING: SLIGHT CHANCE OF DOZING
HOW LIKELY ARE YOU TO NOD OFF OR FALL ASLEEP IN A CAR, WHILE STOPPED FOR A FEW MINUTES IN TRAFFIC: WOULD NEVER DOZE
HOW LIKELY ARE YOU TO NOD OFF OR FALL ASLEEP WHILE WATCHING TV: SLIGHT CHANCE OF DOZING
HOW LIKELY ARE YOU TO NOD OFF OR FALL ASLEEP WHILE SITTING QUIETLY AFTER LUNCH WITHOUT ALCOHOL: WOULD NEVER DOZE
HOW LIKELY ARE YOU TO NOD OFF OR FALL ASLEEP WHILE SITTING INACTIVE IN A PUBLIC PLACE: WOULD NEVER DOZE
HOW LIKELY ARE YOU TO NOD OFF OR FALL ASLEEP WHEN YOU ARE A PASSENGER IN A CAR FOR AN HOUR WITHOUT A BREAK: SLIGHT CHANCE OF DOZING
HOW LIKELY ARE YOU TO NOD OFF OR FALL ASLEEP WHILE LYING DOWN TO REST IN THE AFTERNOON WHEN CIRCUMSTANCES PERMIT: SLIGHT CHANCE OF DOZING
HOW LIKELY ARE YOU TO NOD OFF OR FALL ASLEEP WHILE SITTING AND TALKING TO SOMEONE: WOULD NEVER DOZE

## 2023-03-21 NOTE — NURSING NOTE
Is the patient currently in the state of [place]? YES    Visit mode:VIDEO    If the visit is dropped, the patient can be reconnected by: VIDEO VISIT: Text to cell phone: [phone]    Will anyone else be joining the visit? NO      How would you like to obtain your AVS? MyChart    Are changes needed to the allergy or medication list? NO    Reason for visit: CPAP follow up

## 2023-03-21 NOTE — PROGRESS NOTES
Video-Visit Details    Type of service:  Video Visit    Video Start Time (time video started): 2:50 PM    Video End Time (time video stopped): 2:59 PM    Originating Location (pt. Location): Home        Distant Location (provider location):  On-site    Mode of Communication:  Video Conference via [facility]    Sleep Apnea - Follow-up Visit:    Impression/Plan:    Moderate obstructive sleep apnea-  Excellent CPAP compliance and AHI is well controlled on CPAP 14 cm/H20. Daytime symptoms are improved.   Continue current treatment.   Comprehensive DME order placed.    Avis Paul will follow up in about 2 years or sooner if any concerns.    .  20 minutes spent on day of encounter doing chart review, history and exam, documentation, and further activities as noted above.    Davis Mcgraw PA-C  Sleep Medicine    History of Present Illness:  Chief Complaint   Patient presents with     CPAP Follow Up       Avis Paul presents for follow-up of their moderate sleep apnea, managed with CPAP.     Patient was initially seen at [facility] for snoring and excessive daytime sleepiness.     We received an interpretation of her sleep study:   She had a sleep study done in 2019  (276#)-AHI 15.5, RDI -,  lowest oxygen saturation was 88%, PLMI 149 (39 were associated with cortical arousal), CPAP 10cm/H20     She obtained a new CPAP in 12/2022.     Do you use a CPAP Machine at home: Yes  Overall, on a scale of 0-10 how would you rate your CPAP (0 poor, 10 great): 9    What type of mask do you use: Full Face Mask  Is your mask comfortable: Yes  If not, why:      Is your mask leaking: Yes. She needs a new mask.   If yes, where do you feel it: when I sleep on my side or in the morning just before waking up  How many night per week does the mask leak (0-7): 5    Do you notice snoring with mask on: Yes  Do you notice gasping arousals with mask on: No  Are you having significant oral or nasal dryness: Yes  Is the pressure setting  comfortable: Yes  If not, why:      What is your typical bedtime: 10:30 - 11:30  How long does it take you to go to sleep on PAP therapy: 15  What time do you typically get out of bed for the day: 7:30  How many hours on average per night are you using PAP therapy: 8-9  How many hours are you sleeping per night: 8-9  Do you feel well rested in the morning: Yes      ResMed   CPAP 14 cmH2O 30 day usage data:  97% of days with > 4 hours of use. 0/30 days with no use.   Average use 7 hours and 23 minutes per day.   95%ile Leak 41 L/min.   AHI 0.8 events per hour.       EPWORTH SLEEPINESS SCALE      Idabel Sleepiness Scale ( TROY Warner  [id number]<br>ESS - [place]/English - Final version - 21 Nov 07 - [facility].) 3/21/2023   Sitting and reading Slight chance of dozing   Watching TV Slight chance of dozing   Sitting, inactive in a public place (e.g. a theatre or a meeting) Would never doze   As a passenger in a car for an hour without a break Slight chance of dozing   Lying down to rest in the afternoon when circumstances permit Slight chance of dozing   Sitting and talking to someone Would never doze   Sitting quietly after a lunch without alcohol Would never doze   In a car, while stopped for a few minutes in traffic Would never doze   Idabel Score (MC) 4   Idabel Score (Sleep) 4       INSOMNIA SEVERITY INDEX (MARCELINO)      Insomnia Severity Index (MARCELINO) 3/21/2023   Difficulty falling asleep 1   Difficulty staying asleep 2   Problems waking up too early 1   How SATISFIED/DISSATISFIED are you with your CURRENT sleep pattern? 2   How NOTICEABLE to others do you think your sleep problem is in terms of impairing the quality of your life? 1   How WORRIED/DISTRESSED are you about your current sleep problem? 1   To what extent do you consider your sleep problem to INTERFERE with your daily functioning (e.g. daytime fatigue, mood, ability to function at work/daily chores, concentration, memory, mood, etc.) CURRENTLY? 1    MARCELINO Total Score 9       Guidelines for Scoring/Interpretation:  Total score categories:  0-7 = No clinically significant insomnia   8-14 = Subthreshold insomnia   15-21 = Clinical insomnia (moderate severity)  22-28 = Clinical insomnia (severe)  Used via courtesy of www.[employer].[place].gov with permission from Chuckie Vizcaino PhD., [facility]        Past medical/surgical history, family history, social history, medications and allergies were reviewed.        Problem List:  Patient Active Problem List    Diagnosis Date Noted     Obstructive sleep apnea syndrome 01/25/2022     Priority: Medium     2019 novel coronavirus disease (COVID-19) 12/17/2021     Priority: Medium     12/17/2021       Accidental fall 04/29/2021     Priority: Medium     Maintenance antineoplastic chemotherapy 04/29/2021     Priority: Medium     CKD (chronic kidney disease) stage 4, GFR 15-29 ml/min (H) 08/17/2018     Priority: Medium     Hypogammaglobulinemia, acquired (H) 06/23/2017     Priority: Medium     Elevation of level of transaminase or lactic acid dehydrogenase (LDH) 06/19/2017     Priority: Medium     Overview:   Created by Conversion  IMO Regulatory Load OCT 2020       Malaise and fatigue 06/19/2017     Priority: Medium     Overview:   Created by Conversion       Hypogammaglobulinemia (H) 08/23/2016     Priority: Medium     Neutropenic fever (H) 08/21/2015     Priority: Medium     NHL (non-Hodgkin's lymphoma) (H) 08/12/2015     Priority: Medium     Drug-induced thrombocytopenia 07/02/2015     Priority: Medium     Overview:   Replacement Utility updated for latest IMO load       H/O renal impairment 05/22/2015     Priority: Medium     Anemia due to antineoplastic chemotherapy 07/29/2014     Priority: Medium     Constipation 07/29/2014     Priority: Medium     Lymphoma, mantle cell, multiple sites (H) 06/24/2014     Priority: Medium     Postoperative anemia due to acute blood loss 11/17/2011     Priority: Medium     Morbid obesity  "(H) 11/15/2011     Priority: Medium     Acquired hypothyroidism 07/07/2009     Priority: Medium        Ht 1.715 m (5' 7.5\")   Wt 133.8 kg (295 lb)   BMI 45.52 kg/m          "

## 2023-04-03 ENCOUNTER — INFUSION THERAPY VISIT (OUTPATIENT)
Dept: INFUSION THERAPY | Facility: HOSPITAL | Age: 58
End: 2023-04-03
Attending: INTERNAL MEDICINE
Payer: COMMERCIAL

## 2023-04-03 VITALS
WEIGHT: 293 LBS | OXYGEN SATURATION: 97 % | BODY MASS INDEX: 46.14 KG/M2 | DIASTOLIC BLOOD PRESSURE: 84 MMHG | HEART RATE: 66 BPM | RESPIRATION RATE: 18 BRPM | SYSTOLIC BLOOD PRESSURE: 146 MMHG | TEMPERATURE: 97.6 F

## 2023-04-03 DIAGNOSIS — D80.1 HYPOGAMMAGLOBULINEMIA (H): Primary | ICD-10-CM

## 2023-04-03 PROCEDURE — 96375 TX/PRO/DX INJ NEW DRUG ADDON: CPT

## 2023-04-03 PROCEDURE — 250N000011 HC RX IP 250 OP 636: Performed by: NURSE PRACTITIONER

## 2023-04-03 PROCEDURE — 258N000003 HC RX IP 258 OP 636: Performed by: NURSE PRACTITIONER

## 2023-04-03 PROCEDURE — 250N000013 HC RX MED GY IP 250 OP 250 PS 637: Performed by: INTERNAL MEDICINE

## 2023-04-03 PROCEDURE — 96365 THER/PROPH/DIAG IV INF INIT: CPT

## 2023-04-03 PROCEDURE — 250N000013 HC RX MED GY IP 250 OP 250 PS 637: Performed by: NURSE PRACTITIONER

## 2023-04-03 RX ORDER — EPINEPHRINE 1 MG/ML
0.3 INJECTION, SOLUTION INTRAMUSCULAR; SUBCUTANEOUS EVERY 5 MIN PRN
Status: CANCELLED | OUTPATIENT
Start: 2023-04-04

## 2023-04-03 RX ORDER — HEPARIN SODIUM (PORCINE) LOCK FLUSH IV SOLN 100 UNIT/ML 100 UNIT/ML
5 SOLUTION INTRAVENOUS
Status: DISCONTINUED | OUTPATIENT
Start: 2023-04-03 | End: 2023-04-03 | Stop reason: HOSPADM

## 2023-04-03 RX ORDER — ALBUTEROL SULFATE 0.83 MG/ML
2.5 SOLUTION RESPIRATORY (INHALATION)
Status: DISCONTINUED | OUTPATIENT
Start: 2023-04-03 | End: 2023-04-03

## 2023-04-03 RX ORDER — ALBUTEROL SULFATE 90 UG/1
1-2 AEROSOL, METERED RESPIRATORY (INHALATION)
Status: CANCELLED
Start: 2023-04-04

## 2023-04-03 RX ORDER — ACETAMINOPHEN 325 MG/1
650 TABLET ORAL ONCE
Status: CANCELLED
Start: 2023-04-04

## 2023-04-03 RX ORDER — HEPARIN SODIUM,PORCINE 10 UNIT/ML
5 VIAL (ML) INTRAVENOUS
Status: CANCELLED | OUTPATIENT
Start: 2023-04-04

## 2023-04-03 RX ORDER — MEPERIDINE HYDROCHLORIDE 25 MG/ML
25 INJECTION INTRAMUSCULAR; INTRAVENOUS; SUBCUTANEOUS EVERY 30 MIN PRN
Status: DISCONTINUED | OUTPATIENT
Start: 2023-04-03 | End: 2023-04-03 | Stop reason: HOSPADM

## 2023-04-03 RX ORDER — ACETAMINOPHEN 325 MG/1
650 TABLET ORAL ONCE
Status: COMPLETED | OUTPATIENT
Start: 2023-04-03 | End: 2023-04-03

## 2023-04-03 RX ORDER — MEPERIDINE HYDROCHLORIDE 25 MG/ML
25 INJECTION INTRAMUSCULAR; INTRAVENOUS; SUBCUTANEOUS EVERY 30 MIN PRN
Status: CANCELLED | OUTPATIENT
Start: 2023-04-04

## 2023-04-03 RX ORDER — DIPHENHYDRAMINE HYDROCHLORIDE 50 MG/ML
50 INJECTION INTRAMUSCULAR; INTRAVENOUS
Status: CANCELLED
Start: 2023-04-04

## 2023-04-03 RX ORDER — DIPHENHYDRAMINE HCL 25 MG
12.5 TABLET ORAL ONCE
Status: CANCELLED
Start: 2023-04-04 | End: 2023-04-04

## 2023-04-03 RX ORDER — METHYLPREDNISOLONE SODIUM SUCCINATE 125 MG/2ML
125 INJECTION, POWDER, LYOPHILIZED, FOR SOLUTION INTRAMUSCULAR; INTRAVENOUS ONCE
Status: COMPLETED | OUTPATIENT
Start: 2023-04-03 | End: 2023-04-03

## 2023-04-03 RX ORDER — LEVALBUTEROL INHALATION SOLUTION 1.25 MG/3ML
1.25 SOLUTION RESPIRATORY (INHALATION)
Status: ACTIVE | OUTPATIENT
Start: 2023-04-03

## 2023-04-03 RX ORDER — ALBUTEROL SULFATE 90 UG/1
1-2 AEROSOL, METERED RESPIRATORY (INHALATION)
Status: DISCONTINUED | OUTPATIENT
Start: 2023-04-03 | End: 2023-04-03 | Stop reason: HOSPADM

## 2023-04-03 RX ORDER — METHYLPREDNISOLONE SODIUM SUCCINATE 125 MG/2ML
125 INJECTION, POWDER, LYOPHILIZED, FOR SOLUTION INTRAMUSCULAR; INTRAVENOUS
Status: CANCELLED
Start: 2023-04-04

## 2023-04-03 RX ORDER — NALOXONE HYDROCHLORIDE 0.4 MG/ML
0.2 INJECTION, SOLUTION INTRAMUSCULAR; INTRAVENOUS; SUBCUTANEOUS
Status: CANCELLED | OUTPATIENT
Start: 2023-04-04

## 2023-04-03 RX ORDER — DIPHENHYDRAMINE HYDROCHLORIDE 50 MG/ML
50 INJECTION INTRAMUSCULAR; INTRAVENOUS
Status: DISCONTINUED | OUTPATIENT
Start: 2023-04-03 | End: 2023-04-03 | Stop reason: HOSPADM

## 2023-04-03 RX ORDER — NALOXONE HYDROCHLORIDE 0.4 MG/ML
0.2 INJECTION, SOLUTION INTRAMUSCULAR; INTRAVENOUS; SUBCUTANEOUS
Status: DISCONTINUED | OUTPATIENT
Start: 2023-04-03 | End: 2023-04-03 | Stop reason: HOSPADM

## 2023-04-03 RX ORDER — METHYLPREDNISOLONE SODIUM SUCCINATE 125 MG/2ML
125 INJECTION, POWDER, LYOPHILIZED, FOR SOLUTION INTRAMUSCULAR; INTRAVENOUS ONCE
Status: CANCELLED
Start: 2023-04-04 | End: 2023-04-04

## 2023-04-03 RX ORDER — EPINEPHRINE 1 MG/ML
0.3 INJECTION, SOLUTION INTRAMUSCULAR; SUBCUTANEOUS EVERY 5 MIN PRN
Status: DISCONTINUED | OUTPATIENT
Start: 2023-04-03 | End: 2023-04-03 | Stop reason: HOSPADM

## 2023-04-03 RX ORDER — HEPARIN SODIUM (PORCINE) LOCK FLUSH IV SOLN 100 UNIT/ML 100 UNIT/ML
5 SOLUTION INTRAVENOUS
Status: CANCELLED | OUTPATIENT
Start: 2023-04-04

## 2023-04-03 RX ORDER — ALBUTEROL SULFATE 0.83 MG/ML
2.5 SOLUTION RESPIRATORY (INHALATION)
Status: CANCELLED | OUTPATIENT
Start: 2023-04-04

## 2023-04-03 RX ORDER — DIPHENHYDRAMINE HCL 25 MG
12.5 TABLET ORAL ONCE
Status: DISCONTINUED | OUTPATIENT
Start: 2023-04-03 | End: 2023-04-03

## 2023-04-03 RX ORDER — DIPHENHYDRAMINE HCL 12.5MG/5ML
12.5 LIQUID (ML) ORAL ONCE
Status: COMPLETED | OUTPATIENT
Start: 2023-04-03 | End: 2023-04-03

## 2023-04-03 RX ORDER — METHYLPREDNISOLONE SODIUM SUCCINATE 125 MG/2ML
125 INJECTION, POWDER, LYOPHILIZED, FOR SOLUTION INTRAMUSCULAR; INTRAVENOUS
Status: DISCONTINUED | OUTPATIENT
Start: 2023-04-03 | End: 2023-04-03 | Stop reason: HOSPADM

## 2023-04-03 RX ADMIN — ACETAMINOPHEN 650 MG: 325 TABLET ORAL at 10:48

## 2023-04-03 RX ADMIN — DIPHENHYDRAMINE HYDROCHLORIDE 12.5 MG: 12.5 SOLUTION ORAL at 10:49

## 2023-04-03 RX ADMIN — FAMOTIDINE 20 MG: 10 INJECTION, SOLUTION INTRAVENOUS at 10:49

## 2023-04-03 RX ADMIN — Medication 5 ML: at 12:53

## 2023-04-03 RX ADMIN — METHYLPREDNISOLONE SODIUM SUCCINATE 125 MG: 125 INJECTION, POWDER, FOR SOLUTION INTRAMUSCULAR; INTRAVENOUS at 10:53

## 2023-04-03 RX ADMIN — SODIUM CHLORIDE 250 ML: 9 INJECTION, SOLUTION INTRAVENOUS at 10:46

## 2023-04-03 RX ADMIN — HUMAN IMMUNOGLOBULIN G 25 G: 20 LIQUID INTRAVENOUS at 11:34

## 2023-04-03 NOTE — PROGRESS NOTES
Infusion Nursing Note:  Avis Paul presents today for IVIG.    Patient seen by provider today: No   present during visit today: Not Applicable.    Note: Avis comes to infusion ambulatory and in stable condition. Confirms that she is here for an IVIG infusion. Her port was accessed under sterile technique x 1 attempt. Excellent blood return noted. The pre medications were given (PO Tylenol and Benadryl, IV Pepcid and Solumedrol). Waited approximately 30 minutes and then administered the IVIG per orders. Tolerated well. Once completed, the line was flushed with NS, Heparin, and then was de accessed per protocol. The site was covered with 2x2 gauze and secured in place with paper tape. Left infusion ambulatory and in stable condition. Will return next month.     Intravenous Access:  Implanted Port.    Treatment Conditions:  Not Applicable.    Post Infusion Assessment:  Patient tolerated infusion without incident.  Blood return noted pre and post infusion.  Site patent and intact, free from redness, edema or discomfort.  No evidence of extravasations.  Access discontinued per protocol.     Discharge Plan:   Discharge instructions reviewed with: Patient.  Patient and/or family verbalized understanding of discharge instructions and all questions answered.  AVS to patient via [employer]. Patient will return on 5/1 for next appointment.   Patient discharged in stable condition accompanied by: self.  Departure Mode: Ambulatory.    Jessi Smith RN

## 2023-04-04 ENCOUNTER — MYC MEDICAL ADVICE (OUTPATIENT)
Dept: SLEEP MEDICINE | Facility: CLINIC | Age: 58
End: 2023-04-04

## 2023-04-04 DIAGNOSIS — G47.33 OSA (OBSTRUCTIVE SLEEP APNEA): Primary | ICD-10-CM

## 2023-04-04 NOTE — NURSING NOTE
Future [employer] message sent reminding patient of 2 year follow up appointment.     Elin Givens MA

## 2023-05-01 ENCOUNTER — HOSPITAL ENCOUNTER (OUTPATIENT)
Dept: INTERVENTIONAL RADIOLOGY/VASCULAR | Facility: HOSPITAL | Age: 58
Discharge: HOME | End: 2023-05-01
Attending: INTERNAL MEDICINE
Payer: COMMERCIAL

## 2023-05-01 ENCOUNTER — TELEPHONE (OUTPATIENT)
Dept: ONCOLOGY | Facility: HOSPITAL | Age: 58
End: 2023-05-01
Payer: MEDICARE

## 2023-05-01 ENCOUNTER — INFUSION THERAPY VISIT (OUTPATIENT)
Dept: INFUSION THERAPY | Facility: HOSPITAL | Age: 58
End: 2023-05-01
Attending: INTERNAL MEDICINE
Payer: COMMERCIAL

## 2023-05-01 VITALS
DIASTOLIC BLOOD PRESSURE: 81 MMHG | BODY MASS INDEX: 45.99 KG/M2 | TEMPERATURE: 97.7 F | OXYGEN SATURATION: 100 % | HEART RATE: 76 BPM | RESPIRATION RATE: 16 BRPM | HEIGHT: 67 IN | SYSTOLIC BLOOD PRESSURE: 133 MMHG | WEIGHT: 293 LBS

## 2023-05-01 DIAGNOSIS — D80.1 HYPOGAMMAGLOBULINEMIA (H): ICD-10-CM

## 2023-05-01 DIAGNOSIS — D80.1 HYPOGAMMAGLOBULINEMIA (H): Primary | ICD-10-CM

## 2023-05-01 PROCEDURE — 250N000011 HC RX IP 250 OP 636: Performed by: NURSE PRACTITIONER

## 2023-05-01 PROCEDURE — 96375 TX/PRO/DX INJ NEW DRUG ADDON: CPT

## 2023-05-01 PROCEDURE — 250N000013 HC RX MED GY IP 250 OP 250 PS 637: Performed by: INTERNAL MEDICINE

## 2023-05-01 PROCEDURE — 255N000002 HC RX 255 OP 636: Performed by: RADIOLOGY

## 2023-05-01 PROCEDURE — 96365 THER/PROPH/DIAG IV INF INIT: CPT | Mod: 59

## 2023-05-01 PROCEDURE — 36598 INJ W/FLUOR EVAL CV DEVICE: CPT

## 2023-05-01 PROCEDURE — 250N000013 HC RX MED GY IP 250 OP 250 PS 637: Performed by: NURSE PRACTITIONER

## 2023-05-01 PROCEDURE — 96366 THER/PROPH/DIAG IV INF ADDON: CPT

## 2023-05-01 RX ORDER — MEPERIDINE HYDROCHLORIDE 25 MG/ML
25 INJECTION INTRAMUSCULAR; INTRAVENOUS; SUBCUTANEOUS EVERY 30 MIN PRN
Status: CANCELLED | OUTPATIENT
Start: 2023-05-02

## 2023-05-01 RX ORDER — METHYLPREDNISOLONE SODIUM SUCCINATE 125 MG/2ML
125 INJECTION, POWDER, LYOPHILIZED, FOR SOLUTION INTRAMUSCULAR; INTRAVENOUS ONCE
Status: CANCELLED
Start: 2023-05-02 | End: 2023-05-02

## 2023-05-01 RX ORDER — ALBUTEROL SULFATE 90 UG/1
1-2 AEROSOL, METERED RESPIRATORY (INHALATION)
Status: DISCONTINUED | OUTPATIENT
Start: 2023-05-01 | End: 2023-05-01 | Stop reason: HOSPADM

## 2023-05-01 RX ORDER — MEPERIDINE HYDROCHLORIDE 25 MG/ML
25 INJECTION INTRAMUSCULAR; INTRAVENOUS; SUBCUTANEOUS EVERY 30 MIN PRN
Status: DISCONTINUED | OUTPATIENT
Start: 2023-05-01 | End: 2023-05-01 | Stop reason: HOSPADM

## 2023-05-01 RX ORDER — DIPHENHYDRAMINE HCL 12.5MG/5ML
12.5 LIQUID (ML) ORAL ONCE
Status: COMPLETED | OUTPATIENT
Start: 2023-05-01 | End: 2023-05-01

## 2023-05-01 RX ORDER — NALOXONE HYDROCHLORIDE 0.4 MG/ML
0.2 INJECTION, SOLUTION INTRAMUSCULAR; INTRAVENOUS; SUBCUTANEOUS
Status: CANCELLED | OUTPATIENT
Start: 2023-05-02

## 2023-05-01 RX ORDER — ALBUTEROL SULFATE 0.83 MG/ML
2.5 SOLUTION RESPIRATORY (INHALATION)
Status: DISCONTINUED | OUTPATIENT
Start: 2023-05-01 | End: 2023-05-01 | Stop reason: HOSPADM

## 2023-05-01 RX ORDER — DIPHENHYDRAMINE HYDROCHLORIDE 50 MG/ML
50 INJECTION INTRAMUSCULAR; INTRAVENOUS
Status: CANCELLED
Start: 2023-05-02

## 2023-05-01 RX ORDER — METHYLPREDNISOLONE SODIUM SUCCINATE 125 MG/2ML
125 INJECTION, POWDER, LYOPHILIZED, FOR SOLUTION INTRAMUSCULAR; INTRAVENOUS
Status: DISCONTINUED | OUTPATIENT
Start: 2023-05-01 | End: 2023-05-01 | Stop reason: HOSPADM

## 2023-05-01 RX ORDER — HEPARIN SODIUM,PORCINE 10 UNIT/ML
5 VIAL (ML) INTRAVENOUS
Status: CANCELLED | OUTPATIENT
Start: 2023-05-02

## 2023-05-01 RX ORDER — HEPARIN SODIUM (PORCINE) LOCK FLUSH IV SOLN 100 UNIT/ML 100 UNIT/ML
5 SOLUTION INTRAVENOUS
Status: DISCONTINUED | OUTPATIENT
Start: 2023-05-01 | End: 2023-05-01 | Stop reason: HOSPADM

## 2023-05-01 RX ORDER — HEPARIN SODIUM (PORCINE) LOCK FLUSH IV SOLN 100 UNIT/ML 100 UNIT/ML
5 SOLUTION INTRAVENOUS
Status: CANCELLED | OUTPATIENT
Start: 2023-05-02

## 2023-05-01 RX ORDER — HEPARIN SODIUM,PORCINE 10 UNIT/ML
5 VIAL (ML) INTRAVENOUS
Status: DISCONTINUED | OUTPATIENT
Start: 2023-05-01 | End: 2023-05-01 | Stop reason: HOSPADM

## 2023-05-01 RX ORDER — ACETAMINOPHEN 325 MG/1
650 TABLET ORAL ONCE
Status: CANCELLED
Start: 2023-05-02

## 2023-05-01 RX ORDER — NALOXONE HYDROCHLORIDE 0.4 MG/ML
0.2 INJECTION, SOLUTION INTRAMUSCULAR; INTRAVENOUS; SUBCUTANEOUS
Status: DISCONTINUED | OUTPATIENT
Start: 2023-05-01 | End: 2023-05-01 | Stop reason: HOSPADM

## 2023-05-01 RX ORDER — METHYLPREDNISOLONE SODIUM SUCCINATE 125 MG/2ML
125 INJECTION, POWDER, LYOPHILIZED, FOR SOLUTION INTRAMUSCULAR; INTRAVENOUS
Status: CANCELLED
Start: 2023-05-02

## 2023-05-01 RX ORDER — EPINEPHRINE 1 MG/ML
0.3 INJECTION, SOLUTION INTRAMUSCULAR; SUBCUTANEOUS EVERY 5 MIN PRN
Status: CANCELLED | OUTPATIENT
Start: 2023-05-02

## 2023-05-01 RX ORDER — DIPHENHYDRAMINE HCL 25 MG
12.5 TABLET ORAL ONCE
Status: CANCELLED
Start: 2023-05-02 | End: 2023-05-02

## 2023-05-01 RX ORDER — METHYLPREDNISOLONE SODIUM SUCCINATE 125 MG/2ML
125 INJECTION, POWDER, LYOPHILIZED, FOR SOLUTION INTRAMUSCULAR; INTRAVENOUS ONCE
Status: COMPLETED | OUTPATIENT
Start: 2023-05-01 | End: 2023-05-01

## 2023-05-01 RX ORDER — ALBUTEROL SULFATE 0.83 MG/ML
2.5 SOLUTION RESPIRATORY (INHALATION)
Status: CANCELLED | OUTPATIENT
Start: 2023-05-02

## 2023-05-01 RX ORDER — ALBUTEROL SULFATE 90 UG/1
1-2 AEROSOL, METERED RESPIRATORY (INHALATION)
Status: CANCELLED
Start: 2023-05-02

## 2023-05-01 RX ORDER — EPINEPHRINE 1 MG/ML
0.3 INJECTION, SOLUTION INTRAMUSCULAR; SUBCUTANEOUS EVERY 5 MIN PRN
Status: DISCONTINUED | OUTPATIENT
Start: 2023-05-01 | End: 2023-05-01 | Stop reason: HOSPADM

## 2023-05-01 RX ORDER — DIPHENHYDRAMINE HYDROCHLORIDE 50 MG/ML
50 INJECTION INTRAMUSCULAR; INTRAVENOUS
Status: DISCONTINUED | OUTPATIENT
Start: 2023-05-01 | End: 2023-05-01 | Stop reason: HOSPADM

## 2023-05-01 RX ORDER — ACETAMINOPHEN 325 MG/1
650 TABLET ORAL ONCE
Status: COMPLETED | OUTPATIENT
Start: 2023-05-01 | End: 2023-05-01

## 2023-05-01 RX ADMIN — ACETAMINOPHEN 650 MG: 325 TABLET ORAL at 10:58

## 2023-05-01 RX ADMIN — HEPARIN 5 ML: 100 SYRINGE at 15:11

## 2023-05-01 RX ADMIN — DIPHENHYDRAMINE HYDROCHLORIDE 12.5 MG: 12.5 SOLUTION ORAL at 10:59

## 2023-05-01 RX ADMIN — METHYLPREDNISOLONE SODIUM SUCCINATE 125 MG: 125 INJECTION, POWDER, FOR SOLUTION INTRAMUSCULAR; INTRAVENOUS at 10:59

## 2023-05-01 RX ADMIN — IOHEXOL 10 ML: 350 INJECTION, SOLUTION INTRAVENOUS at 15:13

## 2023-05-01 RX ADMIN — HUMAN IMMUNOGLOBULIN G 25 G: 20 LIQUID INTRAVENOUS at 11:40

## 2023-05-01 ASSESSMENT — PAIN SCALES - GENERAL: PAINLEVEL: NO PAIN (0)

## 2023-05-01 NOTE — TELEPHONE ENCOUNTER
Per Dr. Starks, Avis is to get her IVIG as scheduled. No need to come any sooner. I called her, left her a detailed message. I encouraged her to call back should she have any further questions/concerns.     Cecy Gan  RN Care Coordinator  [facility]

## 2023-05-01 NOTE — PROGRESS NOTES
Infusion Nursing Note:  Avis Paul presents today for IVIG    Patient seen by provider today: No   present during visit today: Not Applicable.    Note:Avis arrived ambulatory in a stable condition for IVIG, VSS. Plan of care reviewed, she verbalized understanding of her plan of care and return to clinic. Port accessed and pre medication was given. NS running when the line was tugged the site was at accidentally. Port access was assessed and it looked good and patient denied any pain at this time but felt a little sting and she thought it was from the previous stick which did not go in well. IVIG infused after 30 minutes after premedication. At the end of IVIG during the flush with NS, Avis woke up from sleep and noticed swelling on the right upper chest around the port, no redness or warmth noted but it is swollen, and patient said it feels tight. Warm compress placed, MD notified who ordered CT Scan with dye, same was done in the IR and infiltration was confirmed. Patient advised to continue with the warm compress. Avis wants to know if she will come back for another dose of IVIG before her  4 weeks scheduled dose when the next dose is due. Send a message to MD through the Triage nurse in the clinic. Cecy  will call and update Avis with the MD's decision.         Intravenous Access:  Implanted Port.      Treatment Conditions:  Biological Infusion Checklist:  ~~~ NOTE: If the patient answers yes to any of the questions below, hold the infusion and contact ordering provider or on-call provider.    1. Have you recently had an elevated temperature, fever, chills, productive cough, coughing for 3 weeks or longer or hemoptysis, abnormal vital signs, night sweats,  chest pain or have you noticed a decrease in your appetite, unexplained weight loss or fatigue? No  2. Do you have any open wounds or new incisions? No  3. Do you have any recent or upcoming hospitalizations, surgeries or dental  procedures? No  4. Do you currently have or recently have had any signs of illness or infection or are you on any antibiotics? No  5. Have you had any new, sudden or worsening abdominal pain? No  6. Have you or anyone in your household received a live vaccination in the past 4 weeks? Please note:  No live vaccines while on biologic/chemotherapy until 6 months after the last treatment.  Patient can receive the flu vaccine (shot only) and the pneumovax.  It is optimal for the patient to get these vaccines mid cycle, but they can be given at any time as long as it is not on the day of the infusion. No  7. Have you recently been diagnosed with any new nervous system diseases (ie. Multiple sclerosis, Guillain Lindsay, seizures, neurological changes) or cancer diagnosis? No  8. Are you on any form of radiation or chemotherapy? No  9. Are you pregnant or breast feeding or do you have plans of pregnancy in the future? No  10. Have you been having any signs of worsening depression or suicidal ideations?  (benlysta only) No  11. Have there been any other new onset medical symptoms? No         Post Infusion Assessment:  Site swollen, not red, not warm. Feels tight per patient. Sent to IR for CT with dye. Infiltration confirmed.      Discharge Plan:   Discharge instructions reviewed with: Patient.  Patient and/or family verbalized understanding of discharge instructions and all questions answered.  Patient discharged in stable condition accompanied by: self.  Departure Mode: Ambulatory.      Jessica Paniagua RN

## 2023-05-01 NOTE — PROGRESS NOTES
Pt having IR study of port a cath today; port was accessed, NS infusing and tubing was inadvertently caught under foot, pulling on port needle/dressing/tubing.pt initially reported some burning sensation but stated that feeling subsided. NS was infused w/o issues. IVIG was then infused w/o issues. NS flush was infusing and pt reported swelling and burning at port site. Dr Starks was notified, he ordered IR consult. Pt was concerned with the use of dye, she has stage 3 kidney failure due to chemo; per Lilli it is ok for dye study. Pt was escorted to IR.

## 2023-05-19 ENCOUNTER — CARE COORDINATION (OUTPATIENT)
Dept: PHARMACY | Facility: CLINIC | Age: 58
End: 2023-05-19
Payer: MEDICARE

## 2023-05-19 NOTE — PROGRESS NOTES
Referred to [facility]    Avis Paul, 1965  Medication (name, frequency and route):  Cuvitru SubQ weekly   Start of Care Date: 5/30/23 (Confirmed with patient)  Infusion location: [facility]  Skilled Nursing will be provided by: [facility]  @ [phone]      * please cancel IVIG appts at [facility]  * Pt will continue to have labs drawn in clinic    NOÉ Beaulieu

## 2023-05-30 ENCOUNTER — DOCUMENTATION ONLY (OUTPATIENT)
Dept: PHARMACY | Facility: CLINIC | Age: 58
End: 2023-05-30
Payer: MEDICARE

## 2023-05-30 NOTE — PROGRESS NOTES
Skilled Nurse visit in the [facility]\"" Ambulatory Infusion Site to administer Cuvitru.  No recent elevated temperature, fever, chills, productive cough, coughing for 3 weeks or longer or hemoptysis, abnormal vital signs, night sweats, chest pain. No  decrease in your appetite, unexplained weight loss or fatigue.  No other new onset medical symptoms.  Current weight 307 lbs. Infusion completed without complication or reaction. Pt reports therapy is effective in managing symptoms related to therapy.

## 2023-06-07 ENCOUNTER — DOCUMENTATION ONLY (OUTPATIENT)
Dept: PHARMACY | Facility: CLINIC | Age: 58
End: 2023-06-07
Payer: MEDICARE

## 2023-06-07 NOTE — PROGRESS NOTES
Skilled Nurse visit in the Patient Home to administer Cuvitru 12g subcutaneous.  No recent elevated temperature, fever, chills, productive cough, coughing for 3 weeks or longer or hemoptysis, abnormal vital signs, night sweats, chest pain. No  decrease in your appetite, unexplained weight loss or fatigue.  No other new onset medical symptoms.  Current weight 305lb.  Subcutanous sites upper L abdomen and lower L abdomen. Infusion completed without complication or reaction. Pt reports therapy iseffective in managing symptoms related to therapy.    Pt did have abdominal redness about the size of a basketball diameter and swelling where her last infusion was given. Pt reports it lasted about 48 hours.     Kristie Murray RN, BSN  [facility]  [email or web address]  [phone]

## 2023-06-26 ENCOUNTER — ONCOLOGY VISIT (OUTPATIENT)
Dept: ONCOLOGY | Facility: HOSPITAL | Age: 58
End: 2023-06-26
Attending: INTERNAL MEDICINE
Payer: COMMERCIAL

## 2023-06-26 ENCOUNTER — HOSPITAL ENCOUNTER (OUTPATIENT)
Dept: CT IMAGING | Facility: HOSPITAL | Age: 58
Discharge: HOME | End: 2023-06-26
Attending: INTERNAL MEDICINE
Payer: COMMERCIAL

## 2023-06-26 ENCOUNTER — LAB (OUTPATIENT)
Dept: INFUSION THERAPY | Facility: HOSPITAL | Age: 58
End: 2023-06-26
Attending: INTERNAL MEDICINE
Payer: COMMERCIAL

## 2023-06-26 VITALS
OXYGEN SATURATION: 98 % | HEIGHT: 67 IN | DIASTOLIC BLOOD PRESSURE: 78 MMHG | RESPIRATION RATE: 24 BRPM | WEIGHT: 293 LBS | SYSTOLIC BLOOD PRESSURE: 142 MMHG | HEART RATE: 71 BPM | TEMPERATURE: 98.3 F | BODY MASS INDEX: 45.99 KG/M2

## 2023-06-26 DIAGNOSIS — C83.13 MANTLE CELL LYMPHOMA OF INTRA-ABDOMINAL LYMPH NODES (H): Primary | ICD-10-CM

## 2023-06-26 DIAGNOSIS — C83.13 MANTLE CELL LYMPHOMA OF INTRA-ABDOMINAL LYMPH NODES (H): ICD-10-CM

## 2023-06-26 DIAGNOSIS — Z51.11 MAINTENANCE ANTINEOPLASTIC CHEMOTHERAPY: ICD-10-CM

## 2023-06-26 LAB
ALBUMIN SERPL BCG-MCNC: 3.8 G/DL (ref 3.5–5.2)
ALP SERPL-CCNC: 107 U/L (ref 35–104)
ALT SERPL W P-5'-P-CCNC: 21 U/L (ref 0–50)
ANION GAP SERPL CALCULATED.3IONS-SCNC: 9 MMOL/L (ref 7–15)
AST SERPL W P-5'-P-CCNC: 29 U/L (ref 0–45)
BASOPHILS # BLD AUTO: 0 10E3/UL (ref 0–0.2)
BASOPHILS NFR BLD AUTO: 0 %
BILIRUB SERPL-MCNC: 0.4 MG/DL
BUN SERPL-MCNC: 29.4 MG/DL (ref 6–20)
CALCIUM SERPL-MCNC: 9.3 MG/DL (ref 8.6–10)
CHLORIDE SERPL-SCNC: 106 MMOL/L (ref 98–107)
CREAT SERPL-MCNC: 2.26 MG/DL (ref 0.51–0.95)
DEPRECATED HCO3 PLAS-SCNC: 25 MMOL/L (ref 22–29)
EOSINOPHIL # BLD AUTO: 0.2 10E3/UL (ref 0–0.7)
EOSINOPHIL NFR BLD AUTO: 4 %
ERYTHROCYTE [DISTWIDTH] IN BLOOD BY AUTOMATED COUNT: 14.9 % (ref 10–15)
GFR SERPL CREATININE-BSD FRML MDRD: 24 ML/MIN/1.73M2
GLUCOSE SERPL-MCNC: 90 MG/DL (ref 70–99)
HCT VFR BLD AUTO: 44.9 % (ref 35–47)
HGB BLD-MCNC: 14.5 G/DL (ref 11.7–15.7)
IMM GRANULOCYTES # BLD: 0 10E3/UL
IMM GRANULOCYTES NFR BLD: 0 %
LDH SERPL L TO P-CCNC: 184 U/L (ref 0–250)
LYMPHOCYTES # BLD AUTO: 1.2 10E3/UL (ref 0.8–5.3)
LYMPHOCYTES NFR BLD AUTO: 22 %
MCH RBC QN AUTO: 29.1 PG (ref 26.5–33)
MCHC RBC AUTO-ENTMCNC: 32.3 G/DL (ref 31.5–36.5)
MCV RBC AUTO: 90 FL (ref 78–100)
MONOCYTES # BLD AUTO: 0.4 10E3/UL (ref 0–1.3)
MONOCYTES NFR BLD AUTO: 7 %
NEUTROPHILS # BLD AUTO: 3.8 10E3/UL (ref 1.6–8.3)
NEUTROPHILS NFR BLD AUTO: 67 %
NRBC # BLD AUTO: 0 10E3/UL
NRBC BLD AUTO-RTO: 0 /100
PLATELET # BLD AUTO: 143 10E3/UL (ref 150–450)
POTASSIUM SERPL-SCNC: 4.4 MMOL/L (ref 3.4–5.3)
PROT SERPL-MCNC: 6.6 G/DL (ref 6.4–8.3)
RBC # BLD AUTO: 4.99 10E6/UL (ref 3.8–5.2)
SODIUM SERPL-SCNC: 140 MMOL/L (ref 136–145)
WBC # BLD AUTO: 5.7 10E3/UL (ref 4–11)

## 2023-06-26 PROCEDURE — 36591 DRAW BLOOD OFF VENOUS DEVICE: CPT

## 2023-06-26 PROCEDURE — 80053 COMPREHEN METABOLIC PANEL: CPT

## 2023-06-26 PROCEDURE — 83615 LACTATE (LD) (LDH) ENZYME: CPT

## 2023-06-26 PROCEDURE — 99215 OFFICE O/P EST HI 40 MIN: CPT | Performed by: INTERNAL MEDICINE

## 2023-06-26 PROCEDURE — 85025 COMPLETE CBC W/AUTO DIFF WBC: CPT

## 2023-06-26 PROCEDURE — 82784 ASSAY IGA/IGD/IGG/IGM EACH: CPT

## 2023-06-26 PROCEDURE — G0463 HOSPITAL OUTPT CLINIC VISIT: HCPCS | Mod: 25 | Performed by: INTERNAL MEDICINE

## 2023-06-26 PROCEDURE — 71250 CT THORAX DX C-: CPT | Mod: MG

## 2023-06-26 PROCEDURE — 250N000011 HC RX IP 250 OP 636: Mod: JZ | Performed by: INTERNAL MEDICINE

## 2023-06-26 PROCEDURE — G1010 CDSM STANSON: HCPCS

## 2023-06-26 RX ORDER — HEPARIN SODIUM,PORCINE 10 UNIT/ML
5-20 VIAL (ML) INTRAVENOUS DAILY PRN
Status: CANCELLED | OUTPATIENT
Start: 2023-06-26

## 2023-06-26 RX ORDER — HEPARIN SODIUM (PORCINE) LOCK FLUSH IV SOLN 100 UNIT/ML 100 UNIT/ML
5 SOLUTION INTRAVENOUS
Status: CANCELLED | OUTPATIENT
Start: 2023-06-26

## 2023-06-26 RX ORDER — HEPARIN SODIUM (PORCINE) LOCK FLUSH IV SOLN 100 UNIT/ML 100 UNIT/ML
5 SOLUTION INTRAVENOUS
Status: DISCONTINUED | OUTPATIENT
Start: 2023-06-26 | End: 2023-06-26 | Stop reason: HOSPADM

## 2023-06-26 RX ADMIN — Medication 5 ML: at 09:53

## 2023-06-26 ASSESSMENT — PAIN SCALES - GENERAL: PAINLEVEL: MODERATE PAIN (4)

## 2023-06-26 NOTE — PROGRESS NOTES
[facility] Hematology and Oncology Progress Note    Patient: Avis Paul  MRN: [id number]  Date of Service:         Reason for Visit    Chief Complaint   Patient presents with     HE Cancer     Mantle cell lymphoma of lymph nodes of inguinal region        Assessment and Plan    Mantle cell lymphoma status post autologous transplant, August 18, 2015  Hypogammaglobulinemia with multiple recurrent infections, on IVIG every 4 weeks  Hives/eosinophilia/chronic urticaria  New left inguinal and pelvic adenopathy, May 2020(patient had 2 separate biopsies, initial 1 suggesting relapse of mantle cell lymphoma, review at [facility] was negative; patient treated with 1 month of ibrutinib with resolution of CT scan and PET findings)  Neuropathy    No concern for recurrence of lymphoma.  CT scan results are pending.  We will inform patient of results.  Schedule follow-up again in 6 months with recheck of scans and labs.    Has switched to home administration of IVIG subcutaneously.  Did have some local site reaction with the first 2 administrations but has been fine after that.  We will continue this under the guidance of immunology.    Creatinine noted to be a little elevated which could be related to the new formulation of IVIG.  We will check creatinine monthly for 3 months.    Questions answered in 40 minutes spent.    Plan: Check BMP monthly for 3 months  Follow-up in 6 months with recheck of labs and CT scans    Measurable disease: Patient currently in remission    Current therapy: Observation  IVIG resumed in January 2017 and stopped in May 2018; resumed again in September 2018  Maintenance Rituxan started December 2015, and completed August 2017, 8 doses  Acyclovir 400 mg twice daily  IVIG every 3 months, first dose August 29, 2016(currently on hold, last dose November 2016)   IVIG every 4 weeks restarted in June 2017 and stopped in May 2018      Treatment history:  Ibrutinib 420 mg p.o. daily started for presumed  relapse of mantle cell lymphoma  July 3, 2020 and stopped August 4, 2020    First set of immunizations received at the [facility] in August 2016  First dose of IVIG  Patient completed 1 year of inhalational pentamidine  Autologous stem cell transplant with Beam prep, August 18, 2015  One cycle of R-ICE, Naye 15, 2015, ifosfamide and etoposide reduced by 25% because of renal dysfunction  Ibrutinib since October 10, 2014, current dose of 420 mg by mouth daily, stopped June 9, 2015   3 cycles of R-DHAP completed late August 2014   5 cycles of R CHOP, started after diagnosis in March 2014       ECOG Performance   ECOG Performance Status: 0    Distress Assessment  Distress Assessment Score: 3    Pain           Problem List    1. 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 Mantle cell lymphoma of lymph nodes of multiple regions (H)          CC: Sunil Tan MD    ______________________________________________________________________________    History of Present Illness    Pretty is here for reevaluation.  Seen 6 months ago.  Did have a mishap with administration of IVIG in May with inadvertent administration into the soft tissue but did fine.  Saw immunology and has switched to home subcutaneous self administration of IVIG beginning in the last week of May.  Did have some site reaction with the first 2 infusions but has done fine after that.    No fever chills or sweats.  Continues to have some reaction to bug bites.  No other new symptoms or problems.  ECOG status 0.    June 29, 2020  Pretty is here for reevaluation.  Seen 2 weeks ago.  Underwent excisional biopsy of the left inguinal lymph node and is here to review results.  No new symptoms or problems.  ECOG status is 0.  January 2018:   Ms. Avis Paul returns for a follow-up.  She was here for IVIG infusion  yesterday and raise concerns about symptoms suggesting recurrence of lymphoma.  She had CT scans and is here to review results.  Has been having aches all over and increased fatigue and weight gain.  Describes some numbness in the right thigh area.  Also has been having memory issues since she received chemotherapy.  Notes an area of induration in the mouth on the lower right mandibular/jaw area.        November 2017:   She was seen 3 months ago.  She did have a visit shortly thereafter at the [facility] with bone marrow and CT scan showing that she is in complete remission from her mantle cell lymphoma.  She has continued IVIG infusions monthly between June - October 2017.  She was informed recently that the insurance would not cover this.  We had previously got this approved prior to starting infusions in June 2017.    She is currently having sinus symptoms and cough productive of greenish phlegm.  No other infection issues in the last 3 months.  She is in significant distress because of the insurance issues.    No other new symptoms or problems.  ECOG status is 0.    August 2017:   She was seen 4 weeks ago.  She has received 2 doses of IVIG.  No infusion reaction with the last one.  Her last infection was a sinus infection more than 4 weeks ago.  She has some discomfort in the right ear.  No fever or mild sores.  No shortness of breath or cough.  No new adenopathy.  She has been starting to have some loose bowel moments 2-3 times a day in the last 3 weeks.  No other new problems.    Pain Status  Currently in Pain: Yes    Review of Systems    Constitutional     Neurosensory     Cardiovascular     Pulmonary     Gastrointestinal     Genitourinary     Integumentary     Patient Coping  Patient Coping: Accepting  Distress Assessment  Distress Assessment Score: 3  Accompanied by  Accompanied by: Alone    Past History  Past Medical History:   Diagnosis Date     Anemia      Chronic kidney disease     elevated creatinine  due to chemo     History of anesthesia complications 2015    urinary retention after lymph node excision. required catheterization     History of transfusion      Hypothyroid      Mantle cell lymphoma (H)     stage 4     Sleep apnea     uses cpap     Thrombocytopenia (H)          Past Surgical History:   Procedure Laterality Date     ANTERIOR / POSTERIOR COMBINED FUSION CERVICAL SPINE  2011    C2-C7     CARPAL TUNNEL RELEASE Right 2000     CERVICAL FUSION  2004    C4, C5, C6.  Pt. states surgery x 2     KIDNEY STONE SURGERY  1999    removal     LIMBAL STEM CELL TRANSPLANT  08/2015     LYMPH NODE DISSECTION Left 6/25/2020    Procedure: LEFT INGUINAL LYMPH NODE BIOPSY;  Surgeon: Catalina Pascal MD;  Location: [facility];  Service: General     NE BX/REMV,LYMPH NODE,DEEP AXILL Right 6/3/2015    Procedure: RIGHT INGUINAL LYMPH NODE BIOPSY;  Surgeon: Clayton Burgos MD;  Location: [facility] OR;  Service: General     TUBAL LIGATION  2004     US GUIDED NEEDLE PLACEMENT  6/25/2020      LYMPH NODE BIOPSY  6/10/2020       Physical Exam    Recent Vitals 5/13/2021   Height -   Weight 293 lbs   BSA (m2) 2.51 m2   /83   Pulse 64   Temp 98   Temp src 1   SpO2 98   Some recent data might be hidden       GENERAL: Alert and oriented. Seated comfortably. In no distress.    HEAD: Atraumatic and normocephalic.  Has a full head of hair.    EYES: ROMEO, EOMI.  No pallor.  No icterus.    Oral cavity: no mucosal lesion or tonsillar enlargement.  Induration right mandibular lower jaw area    NECK: supple. JVP normal.  No thyroid enlargement.    LYMPH NODES: No palpable, cervical, axillary or inguinal lymphadenopathy.    CHEST: She has slight bilateral wheezing noted  Resonant to percussion throughout bilaterally.  Symmetrical breath movements bilaterally.    CVS: S1 and S2 are heard. Regular rate and rhythm.  No murmur or gallop or rub heard.    ABDOMEN: Soft. Not tender. Not distended.  No palpable hepatomegaly  or splenomegaly.  No other mass palpable.  Bowel sounds heard.    EXTREMITIES: Warm.  No peripheral edema.    SKIN: no rash, or bruising or purpura.    CNS: Nonfocal        Lab Results    Recent Results (from the past 168 hour(s))   Comprehensive Metabolic Panel   Result Value Ref Range    Sodium 139 136 - 145 mmol/L    Potassium 3.9 3.5 - 5.0 mmol/L    Chloride 107 98 - 107 mmol/L    CO2 23 22 - 31 mmol/L    Anion Gap, Calculation 9 5 - 18 mmol/L    Glucose 90 70 - 125 mg/dL    BUN 24 (H) 8 - 22 mg/dL    Creatinine 2.24 (H) 0.60 - 1.10 mg/dL    GFR MDRD Af Amer 27 (L) >60 mL/min/1.73m2    GFR MDRD Non Af Amer 23 (L) >60 mL/min/1.73m2    Bilirubin, Total 0.4 0.0 - 1.0 mg/dL    Calcium 8.8 8.5 - 10.5 mg/dL    Protein, Total 6.4 6.0 - 8.0 g/dL    Albumin 3.5 3.5 - 5.0 g/dL    Alkaline Phosphatase 109 45 - 120 U/L    AST 22 0 - 40 U/L    ALT 16 0 - 45 U/L   HM1 (CBC with Diff)   Result Value Ref Range    WBC 5.7 4.0 - 11.0 thou/uL    RBC 4.66 3.80 - 5.40 mill/uL    Hemoglobin 13.7 12.0 - 16.0 g/dL    Hematocrit 43.1 35.0 - 47.0 %    MCV 93 80 - 100 fL    MCH 29.4 27.0 - 34.0 pg    MCHC 31.8 (L) 32.0 - 36.0 g/dL    RDW 14.1 11.0 - 14.5 %    Platelets 161 140 - 440 thou/uL    MPV 10.3 8.5 - 12.5 fL    Neutrophils % 65 50 - 70 %    Lymphocytes % 25 20 - 40 %    Monocytes % 8 2 - 10 %    Eosinophils % 2 0 - 6 %    Basophils % 1 0 - 2 %    Immature Granulocyte % 0 <=0 %    Neutrophils Absolute 3.7 2.0 - 7.7 thou/uL    Lymphocytes Absolute 1.4 0.8 - 4.4 thou/uL    Monocytes Absolute 0.4 0.0 - 0.9 thou/uL    Eosinophils Absolute 0.1 0.0 - 0.4 thou/uL    Basophils Absolute 0.0 0.0 - 0.2 thou/uL    Immature Granulocyte Absolute 0.0 <=0.0 thou/uL       Imaging    Ct Chest Abdomen Pelvis Without Oral Without Iv Contrast    Result Date: 5/13/2021  EXAM: CT CHEST ABDOMEN PELVIS WO ORAL WO IV CONTRAST LOCATION: [facility] DATE/TIME: 5/13/2021 9:26 AM INDICATION: Followup lymphoma. COMPARISON: 02/04/2021,  11/12/2020. TECHNIQUE: CT scan of the chest, abdomen, and pelvis was performed without IV contrast. Multiplanar reformats were obtained. Dose reduction techniques were used. CONTRAST: None. FINDINGS: LUNGS AND PLEURA: There are small pulmonary nodules, reference a 2 mm nodule in the right lower lobe on image 95 series 3. These are stable compared to the prior 2 studies and should be benign. There is a small linear density in the inferior aspect of the lingula that is stable dating back to November 2020 MEDIASTINUM/AXILLAE: There is a port in the right anterior chest wall extending into the superior vena cava. No enlarged mediastinal lymph nodes are seen. No enlarged axillary nodes are seen. Again seen are calcified and noncalcified nodules in both breasts that are similar in appearance to prior studies. These may be fibroadenomas. CORONARY ARTERY CALCIFICATION: None. HEPATOBILIARY: There are a few tiny low dense lesions in the liver that are stable but too small to characterize, reference posterior right lobe image 64. These are likely benign. PANCREAS: Normal. SPLEEN: Normal in size, stable. ADRENAL GLANDS: Normal. KIDNEYS/BLADDER: Normal. BOWEL: There is some colonic diverticulosis. There is no evidence of appendicitis. LYMPH NODES: Left internal iliac lymph nodes?? with follow-up abutting the anterior cortex of the left sacral alae. On image 415 series 4 this measures 1.9 x 1.3 and on the prior study it measured 1.9 x 1.5 cm. VASCULATURE: Unremarkable. PELVIC ORGANS: Normal. MUSCULOSKELETAL: There are normal degenerative changes in the spine.     1.  There is very mild left internal iliac adenopathy that is stable to slightly smaller than on the prior study. 2.  There are multiple benign-appearing nodules in the breast, some with calcification and all are stable, these are likely fibroadenomas. 3.  No evidence of new disease is seen.        Signed by: Yelena Starks MD

## 2023-06-26 NOTE — LETTER
6/26/2023         RE: Avis Paul  [id number] [address] [id number]        Dear Colleague,    Thank you for referring your patient, Avis Paul, to the [facility] CANCER [facility]. Please see a copy of my visit note below.    [facility] Hematology and Oncology Progress Note    Patient: Avis Paul  MRN: [id number]  Date of Service:         Reason for Visit    Chief Complaint   Patient presents with     HE Cancer     Mantle cell lymphoma of lymph nodes of inguinal region        Assessment and Plan    Mantle cell lymphoma status post autologous transplant, August 18, 2015  Hypogammaglobulinemia with multiple recurrent infections, on IVIG every 4 weeks  Hives/eosinophilia/chronic urticaria  New left inguinal and pelvic adenopathy, May 2020(patient had 2 separate biopsies, initial 1 suggesting relapse of mantle cell lymphoma, review at [facility] was negative; patient treated with 1 month of ibrutinib with resolution of CT scan and PET findings)  Neuropathy    No concern for recurrence of lymphoma.  CT scan results are pending.  We will inform patient of results.  Schedule follow-up again in 6 months with recheck of scans and labs.    Has switched to home administration of IVIG subcutaneously.  Did have some local site reaction with the first 2 administrations but has been fine after that.  We will continue this under the guidance of immunology.    Creatinine noted to be a little elevated which could be related to the new formulation of IVIG.  We will check creatinine monthly for 3 months.    Questions answered in 40 minutes spent.    Plan: Check BMP monthly for 3 months  Follow-up in 6 months with recheck of labs and CT scans    Measurable disease: Patient currently in remission    Current therapy: Observation  IVIG resumed in January 2017 and stopped in May 2018; resumed again in September 2018  Maintenance Rituxan started December 2015, and completed August 2017, 8  doses  Acyclovir 400 mg twice daily  IVIG every 3 months, first dose August 29, 2016(currently on hold, last dose November 2016)   IVIG every 4 weeks restarted in June 2017 and stopped in May 2018      Treatment history:  Ibrutinib 420 mg p.o. daily started for presumed relapse of mantle cell lymphoma  July 3, 2020 and stopped August 4, 2020    First set of immunizations received at the [facility] in August 2016  First dose of IVIG  Patient completed 1 year of inhalational pentamidine  Autologous stem cell transplant with Beam prep, August 18, 2015  One cycle of R-ICE, Naye 15, 2015, ifosfamide and etoposide reduced by 25% because of renal dysfunction  Ibrutinib since October 10, 2014, current dose of 420 mg by mouth daily, stopped June 9, 2015   3 cycles of R-DHAP completed late August 2014   5 cycles of R CHOP, started after diagnosis in March 2014       ECOG Performance   ECOG Performance Status: 0    Distress Assessment  Distress Assessment Score: 3    Pain           Problem List    1. 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 Mantle cell lymphoma of lymph nodes of multiple regions (H)          CC: Sunil Tan MD    ______________________________________________________________________________    History of Present Illness    Pretty is here for reevaluation.  Seen 6 months ago.  Did have a mishap with administration of IVIG in May with inadvertent administration into the soft tissue but did fine.  Saw immunology and has switched to home subcutaneous self administration of IVIG beginning in the last week of May.  Did have some site reaction with the first 2 infusions but has done fine after that.    No fever chills or sweats.  Continues to have some reaction to bug bites.  No other new symptoms or problems.  ECOG status 0.    June 29, 2020  Pretty is here  for reevaluation.  Seen 2 weeks ago.  Underwent excisional biopsy of the left inguinal lymph node and is here to review results.  No new symptoms or problems.  ECOG status is 0.  January 2018:   Ms. Avis Paul returns for a follow-up.  She was here for IVIG infusion yesterday and raise concerns about symptoms suggesting recurrence of lymphoma.  She had CT scans and is here to review results.  Has been having aches all over and increased fatigue and weight gain.  Describes some numbness in the right thigh area.  Also has been having memory issues since she received chemotherapy.  Notes an area of induration in the mouth on the lower right mandibular/jaw area.        November 2017:   She was seen 3 months ago.  She did have a visit shortly thereafter at the [facility] with bone marrow and CT scan showing that she is in complete remission from her mantle cell lymphoma.  She has continued IVIG infusions monthly between June - October 2017.  She was informed recently that the insurance would not cover this.  We had previously got this approved prior to starting infusions in June 2017.    She is currently having sinus symptoms and cough productive of greenish phlegm.  No other infection issues in the last 3 months.  She is in significant distress because of the insurance issues.    No other new symptoms or problems.  ECOG status is 0.    August 2017:   She was seen 4 weeks ago.  She has received 2 doses of IVIG.  No infusion reaction with the last one.  Her last infection was a sinus infection more than 4 weeks ago.  She has some discomfort in the right ear.  No fever or mild sores.  No shortness of breath or cough.  No new adenopathy.  She has been starting to have some loose bowel moments 2-3 times a day in the last 3 weeks.  No other new problems.    Pain Status  Currently in Pain: Yes    Review of Systems    Constitutional     Neurosensory     Cardiovascular     Pulmonary     Gastrointestinal     Genitourinary      Integumentary     Patient Coping  Patient Coping: Accepting  Distress Assessment  Distress Assessment Score: 3  Accompanied by  Accompanied by: Alone    Past History  Past Medical History:   Diagnosis Date     Anemia      Chronic kidney disease     elevated creatinine due to chemo     History of anesthesia complications 2015    urinary retention after lymph node excision. required catheterization     History of transfusion      Hypothyroid      Mantle cell lymphoma (H)     stage 4     Sleep apnea     uses cpap     Thrombocytopenia (H)          Past Surgical History:   Procedure Laterality Date     ANTERIOR / POSTERIOR COMBINED FUSION CERVICAL SPINE  2011    C2-C7     CARPAL TUNNEL RELEASE Right 2000     CERVICAL FUSION  2004    C4, C5, C6.  Pt. states surgery x 2     KIDNEY STONE SURGERY  1999    removal     LIMBAL STEM CELL TRANSPLANT  08/2015     LYMPH NODE DISSECTION Left 6/25/2020    Procedure: LEFT INGUINAL LYMPH NODE BIOPSY;  Surgeon: Catalina Pascal MD;  Location: [facility] OR;  Service: General     NM BX/REMV,LYMPH NODE,DEEP AXILL Right 6/3/2015    Procedure: RIGHT INGUINAL LYMPH NODE BIOPSY;  Surgeon: Clayton Burgos MD;  Location: [facility];  Service: General     TUBAL LIGATION  2004     US GUIDED NEEDLE PLACEMENT  6/25/2020     US LYMPH NODE BIOPSY  6/10/2020       Physical Exam    Recent Vitals 5/13/2021   Height -   Weight 293 lbs   BSA (m2) 2.51 m2   /83   Pulse 64   Temp 98   Temp src 1   SpO2 98   Some recent data might be hidden       GENERAL: Alert and oriented. Seated comfortably. In no distress.    HEAD: Atraumatic and normocephalic.  Has a full head of hair.    EYES: ROMEO, EOMI.  No pallor.  No icterus.    Oral cavity: no mucosal lesion or tonsillar enlargement.  Induration right mandibular lower jaw area    NECK: supple. JVP normal.  No thyroid enlargement.    LYMPH NODES: No palpable, cervical, axillary or inguinal lymphadenopathy.    CHEST: She has slight  bilateral wheezing noted  Resonant to percussion throughout bilaterally.  Symmetrical breath movements bilaterally.    CVS: S1 and S2 are heard. Regular rate and rhythm.  No murmur or gallop or rub heard.    ABDOMEN: Soft. Not tender. Not distended.  No palpable hepatomegaly or splenomegaly.  No other mass palpable.  Bowel sounds heard.    EXTREMITIES: Warm.  No peripheral edema.    SKIN: no rash, or bruising or purpura.    CNS: Nonfocal        Lab Results    Recent Results (from the past 168 hour(s))   Comprehensive Metabolic Panel   Result Value Ref Range    Sodium 139 136 - 145 mmol/L    Potassium 3.9 3.5 - 5.0 mmol/L    Chloride 107 98 - 107 mmol/L    CO2 23 22 - 31 mmol/L    Anion Gap, Calculation 9 5 - 18 mmol/L    Glucose 90 70 - 125 mg/dL    BUN 24 (H) 8 - 22 mg/dL    Creatinine 2.24 (H) 0.60 - 1.10 mg/dL    GFR MDRD Af Amer 27 (L) >60 mL/min/1.73m2    GFR MDRD Non Af Amer 23 (L) >60 mL/min/1.73m2    Bilirubin, Total 0.4 0.0 - 1.0 mg/dL    Calcium 8.8 8.5 - 10.5 mg/dL    Protein, Total 6.4 6.0 - 8.0 g/dL    Albumin 3.5 3.5 - 5.0 g/dL    Alkaline Phosphatase 109 45 - 120 U/L    AST 22 0 - 40 U/L    ALT 16 0 - 45 U/L   HM1 (CBC with Diff)   Result Value Ref Range    WBC 5.7 4.0 - 11.0 thou/uL    RBC 4.66 3.80 - 5.40 mill/uL    Hemoglobin 13.7 12.0 - 16.0 g/dL    Hematocrit 43.1 35.0 - 47.0 %    MCV 93 80 - 100 fL    MCH 29.4 27.0 - 34.0 pg    MCHC 31.8 (L) 32.0 - 36.0 g/dL    RDW 14.1 11.0 - 14.5 %    Platelets 161 140 - 440 thou/uL    MPV 10.3 8.5 - 12.5 fL    Neutrophils % 65 50 - 70 %    Lymphocytes % 25 20 - 40 %    Monocytes % 8 2 - 10 %    Eosinophils % 2 0 - 6 %    Basophils % 1 0 - 2 %    Immature Granulocyte % 0 <=0 %    Neutrophils Absolute 3.7 2.0 - 7.7 thou/uL    Lymphocytes Absolute 1.4 0.8 - 4.4 thou/uL    Monocytes Absolute 0.4 0.0 - 0.9 thou/uL    Eosinophils Absolute 0.1 0.0 - 0.4 thou/uL    Basophils Absolute 0.0 0.0 - 0.2 thou/uL    Immature Granulocyte Absolute 0.0 <=0.0 thou/uL        Imaging    Ct Chest Abdomen Pelvis Without Oral Without Iv Contrast    Result Date: 5/13/2021  EXAM: CT CHEST ABDOMEN PELVIS WO ORAL WO IV CONTRAST LOCATION: [facility] DATE/TIME: 5/13/2021 9:26 AM INDICATION: Followup lymphoma. COMPARISON: 02/04/2021, 11/12/2020. TECHNIQUE: CT scan of the chest, abdomen, and pelvis was performed without IV contrast. Multiplanar reformats were obtained. Dose reduction techniques were used. CONTRAST: None. FINDINGS: LUNGS AND PLEURA: There are small pulmonary nodules, reference a 2 mm nodule in the right lower lobe on image 95 series 3. These are stable compared to the prior 2 studies and should be benign. There is a small linear density in the inferior aspect of the lingula that is stable dating back to November 2020 MEDIASTINUM/AXILLAE: There is a port in the right anterior chest wall extending into the superior vena cava. No enlarged mediastinal lymph nodes are seen. No enlarged axillary nodes are seen. Again seen are calcified and noncalcified nodules in both breasts that are similar in appearance to prior studies. These may be fibroadenomas. CORONARY ARTERY CALCIFICATION: None. HEPATOBILIARY: There are a few tiny low dense lesions in the liver that are stable but too small to characterize, reference posterior right lobe image 64. These are likely benign. PANCREAS: Normal. SPLEEN: Normal in size, stable. ADRENAL GLANDS: Normal. KIDNEYS/BLADDER: Normal. BOWEL: There is some colonic diverticulosis. There is no evidence of appendicitis. LYMPH NODES: Left internal iliac lymph nodes?? with follow-up abutting the anterior cortex of the left sacral alae. On image 415 series 4 this measures 1.9 x 1.3 and on the prior study it measured 1.9 x 1.5 cm. VASCULATURE: Unremarkable. PELVIC ORGANS: Normal. MUSCULOSKELETAL: There are normal degenerative changes in the spine.     1.  There is very mild left internal iliac adenopathy that is stable to slightly smaller  than on the prior study. 2.  There are multiple benign-appearing nodules in the breast, some with calcification and all are stable, these are likely fibroadenomas. 3.  No evidence of new disease is seen.        Signed by: Yelena Starks MD        Again, thank you for allowing me to participate in the care of your patient.        Sincerely,        Yelena Starks MD

## 2023-06-27 LAB
IGA SERPL-MCNC: 2 MG/DL (ref 84–499)
IGG SERPL-MCNC: 807 MG/DL (ref 610–1616)
IGM SERPL-MCNC: <10 MG/DL (ref 35–242)

## 2023-07-24 ENCOUNTER — LAB (OUTPATIENT)
Dept: INFUSION THERAPY | Facility: CLINIC | Age: 58
End: 2023-07-24
Attending: INTERNAL MEDICINE
Payer: COMMERCIAL

## 2023-07-24 DIAGNOSIS — Z51.11 MAINTENANCE ANTINEOPLASTIC CHEMOTHERAPY: Primary | ICD-10-CM

## 2023-07-24 DIAGNOSIS — C83.13 MANTLE CELL LYMPHOMA OF INTRA-ABDOMINAL LYMPH NODES (H): ICD-10-CM

## 2023-07-24 LAB
ANION GAP SERPL CALCULATED.3IONS-SCNC: 10 MMOL/L (ref 7–15)
BUN SERPL-MCNC: 25.8 MG/DL (ref 6–20)
CALCIUM SERPL-MCNC: 9.4 MG/DL (ref 8.6–10)
CHLORIDE SERPL-SCNC: 104 MMOL/L (ref 98–107)
CREAT SERPL-MCNC: 2 MG/DL (ref 0.51–0.95)
DEPRECATED HCO3 PLAS-SCNC: 25 MMOL/L (ref 22–29)
GFR SERPL CREATININE-BSD FRML MDRD: 28 ML/MIN/1.73M2
GLUCOSE SERPL-MCNC: 81 MG/DL (ref 70–99)
POTASSIUM SERPL-SCNC: 4.3 MMOL/L (ref 3.4–5.3)
SODIUM SERPL-SCNC: 139 MMOL/L (ref 136–145)

## 2023-07-24 PROCEDURE — 80048 BASIC METABOLIC PNL TOTAL CA: CPT | Performed by: INTERNAL MEDICINE

## 2023-07-24 PROCEDURE — 36591 DRAW BLOOD OFF VENOUS DEVICE: CPT

## 2023-07-24 PROCEDURE — 250N000011 HC RX IP 250 OP 636: Mod: JZ | Performed by: INTERNAL MEDICINE

## 2023-07-24 RX ORDER — HEPARIN SODIUM (PORCINE) LOCK FLUSH IV SOLN 100 UNIT/ML 100 UNIT/ML
5 SOLUTION INTRAVENOUS
Status: DISCONTINUED | OUTPATIENT
Start: 2023-07-24 | End: 2023-07-24 | Stop reason: HOSPADM

## 2023-07-24 RX ORDER — HEPARIN SODIUM (PORCINE) LOCK FLUSH IV SOLN 100 UNIT/ML 100 UNIT/ML
5 SOLUTION INTRAVENOUS
Status: CANCELLED | OUTPATIENT
Start: 2023-07-24

## 2023-07-24 RX ORDER — HEPARIN SODIUM,PORCINE 10 UNIT/ML
5-20 VIAL (ML) INTRAVENOUS DAILY PRN
Status: CANCELLED | OUTPATIENT
Start: 2023-07-24

## 2023-07-24 RX ADMIN — Medication 5 ML: at 09:20

## 2023-08-21 ENCOUNTER — TELEPHONE (OUTPATIENT)
Dept: ONCOLOGY | Facility: HOSPITAL | Age: 58
End: 2023-08-21

## 2023-08-21 ENCOUNTER — LAB (OUTPATIENT)
Dept: INFUSION THERAPY | Facility: CLINIC | Age: 58
End: 2023-08-21
Attending: INTERNAL MEDICINE
Payer: COMMERCIAL

## 2023-08-21 DIAGNOSIS — C83.13 MANTLE CELL LYMPHOMA OF INTRA-ABDOMINAL LYMPH NODES (H): ICD-10-CM

## 2023-08-21 DIAGNOSIS — Z51.11 MAINTENANCE ANTINEOPLASTIC CHEMOTHERAPY: Primary | ICD-10-CM

## 2023-08-21 LAB
ANION GAP SERPL CALCULATED.3IONS-SCNC: 10 MMOL/L (ref 7–15)
BUN SERPL-MCNC: 25.1 MG/DL (ref 6–20)
CALCIUM SERPL-MCNC: 9.2 MG/DL (ref 8.6–10)
CHLORIDE SERPL-SCNC: 108 MMOL/L (ref 98–107)
CREAT SERPL-MCNC: 2.08 MG/DL (ref 0.51–0.95)
DEPRECATED HCO3 PLAS-SCNC: 24 MMOL/L (ref 22–29)
GFR SERPL CREATININE-BSD FRML MDRD: 27 ML/MIN/1.73M2
GLUCOSE SERPL-MCNC: 88 MG/DL (ref 70–99)
POTASSIUM SERPL-SCNC: 3.9 MMOL/L (ref 3.4–5.3)
SODIUM SERPL-SCNC: 142 MMOL/L (ref 136–145)

## 2023-08-21 PROCEDURE — 36591 DRAW BLOOD OFF VENOUS DEVICE: CPT

## 2023-08-21 PROCEDURE — 80048 BASIC METABOLIC PNL TOTAL CA: CPT | Performed by: INTERNAL MEDICINE

## 2023-08-21 PROCEDURE — 250N000011 HC RX IP 250 OP 636: Mod: JZ | Performed by: INTERNAL MEDICINE

## 2023-08-21 RX ORDER — HEPARIN SODIUM,PORCINE 10 UNIT/ML
5-20 VIAL (ML) INTRAVENOUS DAILY PRN
Status: CANCELLED | OUTPATIENT
Start: 2023-08-21

## 2023-08-21 RX ORDER — HEPARIN SODIUM (PORCINE) LOCK FLUSH IV SOLN 100 UNIT/ML 100 UNIT/ML
5 SOLUTION INTRAVENOUS
Status: CANCELLED | OUTPATIENT
Start: 2023-08-21

## 2023-08-21 RX ORDER — HEPARIN SODIUM (PORCINE) LOCK FLUSH IV SOLN 100 UNIT/ML 100 UNIT/ML
5 SOLUTION INTRAVENOUS
Status: DISCONTINUED | OUTPATIENT
Start: 2023-08-21 | End: 2023-08-21 | Stop reason: HOSPADM

## 2023-08-21 RX ADMIN — Medication 5 ML: at 09:49

## 2023-08-21 NOTE — PROGRESS NOTES
PAC accessed per protocol, labs drawn as ordered. Port flushed, needle removed. Pt discharged. Page Matos RN

## 2023-08-21 NOTE — TELEPHONE ENCOUNTER
I call Avis to report that Dr. Starks has reviewed lab results and indicates th following:     No change, continue with same plan.     I was unable to get a hold of patient. I left her a detailed voice message. I encouraged her to call me back should she have any concerns.     Cecy Gan  RN Care Coordinator  [facility]

## 2023-09-15 ENCOUNTER — LAB (OUTPATIENT)
Dept: INFUSION THERAPY | Facility: CLINIC | Age: 58
End: 2023-09-15
Attending: INTERNAL MEDICINE
Payer: COMMERCIAL

## 2023-09-15 VITALS
RESPIRATION RATE: 16 BRPM | HEART RATE: 66 BPM | SYSTOLIC BLOOD PRESSURE: 131 MMHG | TEMPERATURE: 98.3 F | DIASTOLIC BLOOD PRESSURE: 73 MMHG

## 2023-09-15 DIAGNOSIS — Z51.11 MAINTENANCE ANTINEOPLASTIC CHEMOTHERAPY: Primary | ICD-10-CM

## 2023-09-15 DIAGNOSIS — C83.13 MANTLE CELL LYMPHOMA OF INTRA-ABDOMINAL LYMPH NODES (H): ICD-10-CM

## 2023-09-15 LAB
ANION GAP SERPL CALCULATED.3IONS-SCNC: 9 MMOL/L (ref 7–15)
BUN SERPL-MCNC: 29.5 MG/DL (ref 6–20)
CALCIUM SERPL-MCNC: 9.2 MG/DL (ref 8.6–10)
CHLORIDE SERPL-SCNC: 106 MMOL/L (ref 98–107)
CREAT SERPL-MCNC: 1.99 MG/DL (ref 0.51–0.95)
DEPRECATED HCO3 PLAS-SCNC: 25 MMOL/L (ref 22–29)
EGFRCR SERPLBLD CKD-EPI 2021: 28 ML/MIN/1.73M2
GLUCOSE SERPL-MCNC: 97 MG/DL (ref 70–99)
POTASSIUM SERPL-SCNC: 3.9 MMOL/L (ref 3.4–5.3)
SODIUM SERPL-SCNC: 140 MMOL/L (ref 136–145)

## 2023-09-15 PROCEDURE — 250N000011 HC RX IP 250 OP 636: Mod: JZ | Performed by: INTERNAL MEDICINE

## 2023-09-15 PROCEDURE — 36591 DRAW BLOOD OFF VENOUS DEVICE: CPT

## 2023-09-15 PROCEDURE — 80048 BASIC METABOLIC PNL TOTAL CA: CPT | Performed by: INTERNAL MEDICINE

## 2023-09-15 RX ORDER — HEPARIN SODIUM (PORCINE) LOCK FLUSH IV SOLN 100 UNIT/ML 100 UNIT/ML
5 SOLUTION INTRAVENOUS
Status: CANCELLED | OUTPATIENT
Start: 2023-09-15

## 2023-09-15 RX ORDER — HEPARIN SODIUM,PORCINE 10 UNIT/ML
5-20 VIAL (ML) INTRAVENOUS DAILY PRN
Status: CANCELLED | OUTPATIENT
Start: 2023-09-15

## 2023-09-15 RX ORDER — HEPARIN SODIUM (PORCINE) LOCK FLUSH IV SOLN 100 UNIT/ML 100 UNIT/ML
5 SOLUTION INTRAVENOUS
Status: DISCONTINUED | OUTPATIENT
Start: 2023-09-15 | End: 2023-09-15 | Stop reason: HOSPADM

## 2023-09-15 RX ADMIN — Medication 5 ML: at 11:00

## 2023-09-15 NOTE — PROGRESS NOTES
Pt port accessed. Port is floating and tilted slightly. Must hold skin taught and release pressure slowly. Port flushes and draws back well. Labs drawn, flushed, and Heparin locked per orders. Port de-accessed.

## 2023-10-16 ENCOUNTER — LAB (OUTPATIENT)
Dept: INFUSION THERAPY | Facility: CLINIC | Age: 58
End: 2023-10-16
Attending: INTERNAL MEDICINE
Payer: COMMERCIAL

## 2023-10-16 ENCOUNTER — MYC MEDICAL ADVICE (OUTPATIENT)
Dept: ONCOLOGY | Facility: HOSPITAL | Age: 58
End: 2023-10-16

## 2023-10-16 DIAGNOSIS — C83.13 MANTLE CELL LYMPHOMA OF INTRA-ABDOMINAL LYMPH NODES (H): ICD-10-CM

## 2023-10-16 DIAGNOSIS — Z51.11 MAINTENANCE ANTINEOPLASTIC CHEMOTHERAPY: ICD-10-CM

## 2023-10-16 DIAGNOSIS — C83.18 LYMPHOMA, MANTLE CELL, MULTIPLE SITES (H): Primary | ICD-10-CM

## 2023-10-16 LAB
ANION GAP SERPL CALCULATED.3IONS-SCNC: 9 MMOL/L (ref 7–15)
BUN SERPL-MCNC: 28.1 MG/DL (ref 6–20)
CALCIUM SERPL-MCNC: 9.4 MG/DL (ref 8.6–10)
CHLORIDE SERPL-SCNC: 103 MMOL/L (ref 98–107)
CREAT SERPL-MCNC: 2.13 MG/DL (ref 0.51–0.95)
DEPRECATED HCO3 PLAS-SCNC: 26 MMOL/L (ref 22–29)
EGFRCR SERPLBLD CKD-EPI 2021: 26 ML/MIN/1.73M2
GLUCOSE SERPL-MCNC: 89 MG/DL (ref 70–99)
POTASSIUM SERPL-SCNC: 4.2 MMOL/L (ref 3.4–5.3)
SODIUM SERPL-SCNC: 138 MMOL/L (ref 135–145)

## 2023-10-16 PROCEDURE — 80048 BASIC METABOLIC PNL TOTAL CA: CPT | Performed by: INTERNAL MEDICINE

## 2023-10-16 PROCEDURE — 250N000011 HC RX IP 250 OP 636: Mod: JZ | Performed by: INTERNAL MEDICINE

## 2023-10-16 PROCEDURE — 36591 DRAW BLOOD OFF VENOUS DEVICE: CPT

## 2023-10-16 RX ORDER — HEPARIN SODIUM (PORCINE) LOCK FLUSH IV SOLN 100 UNIT/ML 100 UNIT/ML
5 SOLUTION INTRAVENOUS
Status: CANCELLED | OUTPATIENT
Start: 2023-10-16

## 2023-10-16 RX ORDER — HEPARIN SODIUM (PORCINE) LOCK FLUSH IV SOLN 100 UNIT/ML 100 UNIT/ML
5 SOLUTION INTRAVENOUS
Status: DISCONTINUED | OUTPATIENT
Start: 2023-10-16 | End: 2023-10-16 | Stop reason: HOSPADM

## 2023-10-16 RX ORDER — HEPARIN SODIUM,PORCINE 10 UNIT/ML
5-20 VIAL (ML) INTRAVENOUS DAILY PRN
Status: CANCELLED | OUTPATIENT
Start: 2023-10-16

## 2023-10-16 RX ADMIN — Medication 5 ML: at 09:22

## 2023-10-16 NOTE — PROGRESS NOTES
Infusion Nursing Note:  Avis Paul presents today for port labs.    Patient seen by provider today: No   present during visit today: Not Applicable.    Note: Patient started IVIG in May and states her MD is following her creatinine level. BMP drawn today.    Intravenous Access:  Implanted Port.    Treatment Conditions:  Not Applicable.    Post Infusion Assessment:  Blood return noted.  Site patent and intact, free from redness, edema or discomfort.  No evidence of extravasations.  Access discontinued per protocol.     Discharge Plan:   Discharge instructions reviewed with: Patient.  Patient and/or family verbalized understanding of discharge instructions and all questions answered.  AVS to patient via [employer].  Patient will return 11/13/2023 for next appointment.   Patient discharged in stable condition accompanied by: self.  Departure Mode: Ambulatory.    Louise Sanders RN

## 2023-11-02 ENCOUNTER — TELEPHONE (OUTPATIENT)
Dept: OTOLARYNGOLOGY | Facility: CLINIC | Age: 58
End: 2023-11-02
Payer: COMMERCIAL

## 2023-11-02 NOTE — TELEPHONE ENCOUNTER
FUTURE VISIT INFORMATION      FUTURE VISIT INFORMATION:  Date: 11/13/2023  Time: 1:20 PM  Location: [facility]  REFERRAL INFORMATION:  Referring provider:    Referring providers clinic:    Reason for visit/diagnosis  Oral ulcer [K12.1]  Lymphoma, mantle cell, multiple sites (H) [C83.18]  Hypogammaglobulinemia (H24) [D80.1]   referred by     RECORDS REQUESTED FROM:       Clinic name Comments Records Status Imaging Status   [facility] 10/31/23 mychart advice/ referral -Sunil Tan MD  [facility] Imaging CT chest abd pel 6/26/23  PET 9/11/20 [facility] PACS   [facility] [place] 6/26/23 OV notes -Yelena Starks MD Epic

## 2023-11-13 ENCOUNTER — INFUSION THERAPY VISIT (OUTPATIENT)
Dept: INFUSION THERAPY | Facility: CLINIC | Age: 58
End: 2023-11-13
Attending: INTERNAL MEDICINE
Payer: COMMERCIAL

## 2023-11-13 ENCOUNTER — PRE VISIT (OUTPATIENT)
Dept: OTOLARYNGOLOGY | Facility: CLINIC | Age: 58
End: 2023-11-13

## 2023-11-13 ENCOUNTER — OFFICE VISIT (OUTPATIENT)
Dept: OTOLARYNGOLOGY | Facility: CLINIC | Age: 58
End: 2023-11-13
Attending: FAMILY MEDICINE
Payer: COMMERCIAL

## 2023-11-13 VITALS
SYSTOLIC BLOOD PRESSURE: 159 MMHG | TEMPERATURE: 98 F | BODY MASS INDEX: 45.99 KG/M2 | WEIGHT: 293 LBS | OXYGEN SATURATION: 98 % | HEIGHT: 67 IN | HEART RATE: 82 BPM | DIASTOLIC BLOOD PRESSURE: 96 MMHG

## 2023-11-13 DIAGNOSIS — Z51.11 MAINTENANCE ANTINEOPLASTIC CHEMOTHERAPY: Primary | ICD-10-CM

## 2023-11-13 DIAGNOSIS — D80.1 HYPOGAMMAGLOBULINEMIA (H): ICD-10-CM

## 2023-11-13 DIAGNOSIS — E03.9 ACQUIRED HYPOTHYROIDISM: Primary | Chronic | ICD-10-CM

## 2023-11-13 DIAGNOSIS — C83.18 LYMPHOMA, MANTLE CELL, MULTIPLE SITES (H): ICD-10-CM

## 2023-11-13 DIAGNOSIS — K12.1 ORAL ULCER: ICD-10-CM

## 2023-11-13 PROCEDURE — 99203 OFFICE O/P NEW LOW 30 MIN: CPT | Mod: 25 | Performed by: STUDENT IN AN ORGANIZED HEALTH CARE EDUCATION/TRAINING PROGRAM

## 2023-11-13 PROCEDURE — 250N000011 HC RX IP 250 OP 636: Mod: JZ | Performed by: INTERNAL MEDICINE

## 2023-11-13 PROCEDURE — 88305 TISSUE EXAM BY PATHOLOGIST: CPT | Mod: 26 | Performed by: PATHOLOGY

## 2023-11-13 PROCEDURE — 40808 BIOPSY OF MOUTH LESION: CPT | Performed by: STUDENT IN AN ORGANIZED HEALTH CARE EDUCATION/TRAINING PROGRAM

## 2023-11-13 PROCEDURE — 88305 TISSUE EXAM BY PATHOLOGIST: CPT | Mod: TC | Performed by: STUDENT IN AN ORGANIZED HEALTH CARE EDUCATION/TRAINING PROGRAM

## 2023-11-13 PROCEDURE — 96523 IRRIG DRUG DELIVERY DEVICE: CPT

## 2023-11-13 RX ORDER — HEPARIN SODIUM (PORCINE) LOCK FLUSH IV SOLN 100 UNIT/ML 100 UNIT/ML
5 SOLUTION INTRAVENOUS
Status: DISCONTINUED | OUTPATIENT
Start: 2023-11-13 | End: 2023-11-13 | Stop reason: HOSPADM

## 2023-11-13 RX ORDER — HEPARIN SODIUM (PORCINE) LOCK FLUSH IV SOLN 100 UNIT/ML 100 UNIT/ML
5 SOLUTION INTRAVENOUS
Status: CANCELLED | OUTPATIENT
Start: 2023-11-13

## 2023-11-13 RX ORDER — HEPARIN SODIUM,PORCINE 10 UNIT/ML
5-20 VIAL (ML) INTRAVENOUS DAILY PRN
Status: CANCELLED | OUTPATIENT
Start: 2023-11-13

## 2023-11-13 RX ADMIN — Medication 5 ML: at 11:18

## 2023-11-13 ASSESSMENT — PAIN SCALES - GENERAL: PAINLEVEL: SEVERE PAIN (6)

## 2023-11-13 NOTE — PROGRESS NOTES
Head and Neck Surgery  11/13/2023    I had the pleasure of meeting Mrs Paul today in clinic.    She is a pleasant 58-year-old woman referred for evaluation of a left oral lesion.  Approximately 3 months ago while eating a tortilla chip the chip stabbed her upper gums.  It has not healed in 3 months.  She is a prior history of mantle cell lymphoma status post bone marrow transplant.  Given the none healing wound she was referred for evaluation.    Past medical history:  Mantle cell lymphoma  Hypothyroidism  Kidney stones    Past surgical history:  Carpal tunnel surgery  Tubal ligation  Cervical spine surgery  Bone marrow transplant  Port-A-Cath    Medications:  Acyclovir  Albuterol  Desonide  Flovent  Flonase  Levalbuterol  Synthroid  Montelukast    Allergies:  Aspirin  Codeine  Penicillins  Sulfa antibiotics    Social history:  Never smoker  No alcohol use  No drug use    Family history:  None    Review of systems:  12 point review of systems was performed and is negative asides from that  in the HPI    Physical examination:  Alert and in no acute distress  No palpable cervical adenopathy  8 mm exophytic inflammatory appearing lesion on the lingual surface between the canine and first premolar  No other lesions in the oral cavity or oropharynx    Procedure:  Biopsy of the lesion was performed using a cup forceps    Assessment and plan:  58-year-old woman with an inflammatory lesion of the left maxillary mucosa.  Biopsy was performed today.  We will follow-up on these results and let her know.    Edison Harper MD      30 minutes spent on the date of the encounter in chart review, patient visit, review of tests, documentation and/or discussion with other providers about the issues documented above asides from time doing mouth biopsy.

## 2023-11-13 NOTE — NURSING NOTE
"Chief Complaint   Patient presents with    Consult     Oral  ulcer      .Blood pressure (!) 159/96, pulse 82, temperature 98  F (36.7  C), height 1.702 m (5' 7\"), weight 142 kg (313 lb), SpO2 98%, not currently breastfeeding.    Joe Gutierrez LPN    "

## 2023-11-13 NOTE — LETTER
11/13/2023       RE: Avis Paul  [address] [address] [facility] [address]     Dear Colleague,    Thank you for referring your patient, Avis Paul, to the [facility] EAR NOSE AND THROAT CLINIC [place] at [facility]. Please see a copy of my visit note below.    Head and Neck Surgery  11/13/2023    I had the pleasure of meeting Mrs Paul today in clinic.    She is a pleasant 58-year-old woman referred for evaluation of a left oral lesion.  Approximately 3 months ago while eating a tortilla chip the chip stabbed her upper gums.  It has not healed in 3 months.  She is a prior history of mantle cell lymphoma status post bone marrow transplant.  Given the none healing wound she was referred for evaluation.    Past medical history:  Mantle cell lymphoma  Hypothyroidism  Kidney stones    Past surgical history:  Carpal tunnel surgery  Tubal ligation  Cervical spine surgery  Bone marrow transplant  Port-A-Cath    Medications:  Acyclovir  Albuterol  Desonide  Flovent  Flonase  Levalbuterol  Synthroid  Montelukast    Allergies:  Aspirin  Codeine  Penicillins  Sulfa antibiotics    Social history:  Never smoker  No alcohol use  No drug use    Family history:  None    Review of systems:  12 point review of systems was performed and is negative asides from that  in the HPI    Physical examination:  Alert and in no acute distress  No palpable cervical adenopathy  8 mm exophytic inflammatory appearing lesion on the lingual surface between the canine and first premolar  No other lesions in the oral cavity or oropharynx    Procedure:  Biopsy of the lesion was performed using a cup forceps    Assessment and plan:  58-year-old woman with an inflammatory lesion of the left maxillary mucosa.  Biopsy was performed today.  We will follow-up on these results and let her know.    Edison Harper MD      30 minutes spent on the date of the encounter in chart review, patient  visit, review of tests, documentation and/or discussion with other providers about the issues documented above asides from time doing mouth biopsy.

## 2023-11-13 NOTE — PATIENT INSTRUCTIONS
"You were seen in the clinic today by Dr. Harper. If you have any questions or concerns after your appointment, please call the clinic at [phone]. Press \"1\" for scheduling, press \"3\" for nurse advice.    2.   The following has been recommended for you based upon your appointment today:   -We will call you with the results of your biopsy done in clinic today     Roslyn ORTEGA, RN  RN Care Coordinator, ENT Clinic  [facility]  Direct: [phone]   "

## 2023-11-13 NOTE — PROGRESS NOTES
PAC accessed per protocol, port flushed without difficulty. Good blood return noted. Needle removed, pt discharged. Page Matos RN

## 2023-11-14 LAB
PATH REPORT.COMMENTS IMP SPEC: NORMAL
PATH REPORT.COMMENTS IMP SPEC: NORMAL
PATH REPORT.FINAL DX SPEC: NORMAL
PATH REPORT.GROSS SPEC: NORMAL
PATH REPORT.MICROSCOPIC SPEC OTHER STN: NORMAL
PATH REPORT.RELEVANT HX SPEC: NORMAL
PHOTO IMAGE: NORMAL

## 2023-11-17 DIAGNOSIS — C83.18 LYMPHOMA, MANTLE CELL, MULTIPLE SITES (H): ICD-10-CM

## 2023-11-17 RX ORDER — ACYCLOVIR 400 MG/1
TABLET ORAL
Qty: 180 TABLET | Refills: 3 | Status: SHIPPED | OUTPATIENT
Start: 2023-11-17

## 2023-11-27 ENCOUNTER — OFFICE VISIT (OUTPATIENT)
Dept: OTOLARYNGOLOGY | Facility: CLINIC | Age: 58
End: 2023-11-27
Payer: COMMERCIAL

## 2023-11-27 VITALS
HEART RATE: 80 BPM | SYSTOLIC BLOOD PRESSURE: 153 MMHG | HEIGHT: 67 IN | DIASTOLIC BLOOD PRESSURE: 84 MMHG | WEIGHT: 293 LBS | BODY MASS INDEX: 45.99 KG/M2

## 2023-11-27 DIAGNOSIS — K12.1 ORAL ULCER: Primary | ICD-10-CM

## 2023-11-27 PROCEDURE — 17250 CHEM CAUT OF GRANLTJ TISSUE: CPT | Performed by: STUDENT IN AN ORGANIZED HEALTH CARE EDUCATION/TRAINING PROGRAM

## 2023-11-27 ASSESSMENT — PAIN SCALES - GENERAL: PAINLEVEL: NO PAIN (0)

## 2023-11-27 NOTE — LETTER
11/27/2023       RE: Avis Paul  [address] [address] [address]     Dear Colleague,    Thank you for referring your patient, Avis Paul, to the [facility] EAR NOSE AND THROAT CLINIC [place] at [facility]. Please see a copy of my visit note below.    Head and Neck Surgery  11/27/23    Ms. Paul returns today for follow-up.  I met her a few weeks ago when she presented with an abnormal area of tissue in the left axilla.  On exam was consistent with granulation tissue and biopsy was consistent with this as well.  She presents today for cauterization of the granulation tissue.     Physical examination:  5-8 millimeter area of granulation in the left axilla.  Slightly smaller than previous    Procedure:  Silver nitrate was used for topical cauterization of the area    Assessment/plan:  Area of granulation in the left maxilla.  This was cauterized today.  She can follow-up with me in the future if additional cauterization is needed    Edison Harper MD    10 minutes spent on the date of the encounter in chart review, patient visit, review of tests, documentation and/or discussion with other providers about the issues documented above aside from time spent doing cauterization.

## 2023-11-27 NOTE — PATIENT INSTRUCTIONS
"You were seen in the clinic today by Dr. Harper. If you have any questions or concerns after your appointment, please call the clinic at [phone]. Press \"1\" for scheduling, press \"3\" for nurse advice.    2.   The following has been recommended for you based upon your appointment today:   -Follow up as needed. Call the clinic if you feel you need an appointment.     Roslyn ORTEGA, RN  RN Care Coordinator, ENT Clinic  [facility]  Direct: [phone]   "

## 2023-11-29 NOTE — PROGRESS NOTES
Head and Neck Surgery  11/27/23    Ms. Paul returns today for follow-up.  I met her a few weeks ago when she presented with an abnormal area of tissue in the left axilla.  On exam was consistent with granulation tissue and biopsy was consistent with this as well.  She presents today for cauterization of the granulation tissue.     Physical examination:  5-8 millimeter area of granulation in the left axilla.  Slightly smaller than previous    Procedure:  Silver nitrate was used for topical cauterization of the area    Assessment/plan:  Area of granulation in the left maxilla.  This was cauterized today.  She can follow-up with me in the future if additional cauterization is needed    Edison Harper MD    10 minutes spent on the date of the encounter in chart review, patient visit, review of tests, documentation and/or discussion with other providers about the issues documented above aside from time spent doing cauterization.

## 2023-11-30 DIAGNOSIS — E03.9 HYPOTHYROIDISM, UNSPECIFIED TYPE: ICD-10-CM

## 2023-12-01 RX ORDER — LEVOTHYROXINE SODIUM 112 UG/1
112 TABLET ORAL DAILY
Qty: 90 TABLET | Refills: 1 | Status: SHIPPED | OUTPATIENT
Start: 2023-12-01 | End: 2024-01-19

## 2023-12-19 ENCOUNTER — ONCOLOGY VISIT (OUTPATIENT)
Dept: ONCOLOGY | Facility: HOSPITAL | Age: 58
End: 2023-12-19
Attending: INTERNAL MEDICINE
Payer: COMMERCIAL

## 2023-12-19 ENCOUNTER — HOSPITAL ENCOUNTER (OUTPATIENT)
Dept: CT IMAGING | Facility: HOSPITAL | Age: 58
Discharge: HOME | End: 2023-12-19
Attending: INTERNAL MEDICINE
Payer: COMMERCIAL

## 2023-12-19 ENCOUNTER — LAB (OUTPATIENT)
Dept: INFUSION THERAPY | Facility: HOSPITAL | Age: 58
End: 2023-12-19
Attending: INTERNAL MEDICINE
Payer: COMMERCIAL

## 2023-12-19 VITALS
SYSTOLIC BLOOD PRESSURE: 120 MMHG | OXYGEN SATURATION: 97 % | BODY MASS INDEX: 45.99 KG/M2 | HEIGHT: 67 IN | TEMPERATURE: 97.3 F | WEIGHT: 293 LBS | RESPIRATION RATE: 22 BRPM | HEART RATE: 69 BPM | DIASTOLIC BLOOD PRESSURE: 62 MMHG

## 2023-12-19 DIAGNOSIS — C83.13 MANTLE CELL LYMPHOMA OF INTRA-ABDOMINAL LYMPH NODES (H): Primary | ICD-10-CM

## 2023-12-19 DIAGNOSIS — C83.13 MANTLE CELL LYMPHOMA OF INTRA-ABDOMINAL LYMPH NODES (H): ICD-10-CM

## 2023-12-19 DIAGNOSIS — Z51.11 MAINTENANCE ANTINEOPLASTIC CHEMOTHERAPY: Primary | ICD-10-CM

## 2023-12-19 LAB
ALBUMIN SERPL BCG-MCNC: 3.9 G/DL (ref 3.5–5.2)
ALP SERPL-CCNC: 111 U/L (ref 40–150)
ALT SERPL W P-5'-P-CCNC: 20 U/L (ref 0–50)
ANION GAP SERPL CALCULATED.3IONS-SCNC: 9 MMOL/L (ref 7–15)
AST SERPL W P-5'-P-CCNC: 26 U/L (ref 0–45)
BASOPHILS # BLD AUTO: 0 10E3/UL (ref 0–0.2)
BASOPHILS NFR BLD AUTO: 1 %
BILIRUB SERPL-MCNC: 0.5 MG/DL
BUN SERPL-MCNC: 29.4 MG/DL (ref 6–20)
CALCIUM SERPL-MCNC: 9.3 MG/DL (ref 8.6–10)
CHLORIDE SERPL-SCNC: 105 MMOL/L (ref 98–107)
CREAT SERPL-MCNC: 2.01 MG/DL (ref 0.51–0.95)
DEPRECATED HCO3 PLAS-SCNC: 26 MMOL/L (ref 22–29)
EGFRCR SERPLBLD CKD-EPI 2021: 28 ML/MIN/1.73M2
EOSINOPHIL # BLD AUTO: 0.1 10E3/UL (ref 0–0.7)
EOSINOPHIL NFR BLD AUTO: 1 %
ERYTHROCYTE [DISTWIDTH] IN BLOOD BY AUTOMATED COUNT: 14.6 % (ref 10–15)
GLUCOSE SERPL-MCNC: 94 MG/DL (ref 70–99)
HCT VFR BLD AUTO: 43.8 % (ref 35–47)
HGB BLD-MCNC: 14 G/DL (ref 11.7–15.7)
IMM GRANULOCYTES # BLD: 0 10E3/UL
IMM GRANULOCYTES NFR BLD: 0 %
LDH SERPL L TO P-CCNC: 181 U/L (ref 0–250)
LYMPHOCYTES # BLD AUTO: 0.8 10E3/UL (ref 0.8–5.3)
LYMPHOCYTES NFR BLD AUTO: 17 %
MCH RBC QN AUTO: 29.2 PG (ref 26.5–33)
MCHC RBC AUTO-ENTMCNC: 32 G/DL (ref 31.5–36.5)
MCV RBC AUTO: 91 FL (ref 78–100)
MONOCYTES # BLD AUTO: 0.3 10E3/UL (ref 0–1.3)
MONOCYTES NFR BLD AUTO: 7 %
NEUTROPHILS # BLD AUTO: 3.7 10E3/UL (ref 1.6–8.3)
NEUTROPHILS NFR BLD AUTO: 74 %
NRBC # BLD AUTO: 0 10E3/UL
NRBC BLD AUTO-RTO: 0 /100
PLATELET # BLD AUTO: 128 10E3/UL (ref 150–450)
POTASSIUM SERPL-SCNC: 4.2 MMOL/L (ref 3.4–5.3)
PROT SERPL-MCNC: 6.5 G/DL (ref 6.4–8.3)
RBC # BLD AUTO: 4.8 10E6/UL (ref 3.8–5.2)
SODIUM SERPL-SCNC: 140 MMOL/L (ref 135–145)
WBC # BLD AUTO: 5 10E3/UL (ref 4–11)

## 2023-12-19 PROCEDURE — 83615 LACTATE (LD) (LDH) ENZYME: CPT

## 2023-12-19 PROCEDURE — 250N000011 HC RX IP 250 OP 636: Mod: JZ | Performed by: INTERNAL MEDICINE

## 2023-12-19 PROCEDURE — 71250 CT THORAX DX C-: CPT | Mod: MG

## 2023-12-19 PROCEDURE — 99214 OFFICE O/P EST MOD 30 MIN: CPT | Performed by: INTERNAL MEDICINE

## 2023-12-19 PROCEDURE — 99213 OFFICE O/P EST LOW 20 MIN: CPT | Performed by: INTERNAL MEDICINE

## 2023-12-19 PROCEDURE — 80051 ELECTROLYTE PANEL: CPT

## 2023-12-19 PROCEDURE — 36591 DRAW BLOOD OFF VENOUS DEVICE: CPT

## 2023-12-19 PROCEDURE — 85025 COMPLETE CBC W/AUTO DIFF WBC: CPT

## 2023-12-19 PROCEDURE — 82784 ASSAY IGA/IGD/IGG/IGM EACH: CPT

## 2023-12-19 RX ORDER — CHLORHEXIDINE GLUCONATE ORAL RINSE 1.2 MG/ML
SOLUTION DENTAL
COMMUNITY
Start: 2023-09-14 | End: 2024-07-08

## 2023-12-19 RX ORDER — HEPARIN SODIUM (PORCINE) LOCK FLUSH IV SOLN 100 UNIT/ML 100 UNIT/ML
5 SOLUTION INTRAVENOUS
Status: CANCELLED | OUTPATIENT
Start: 2023-12-19

## 2023-12-19 RX ORDER — OXYCODONE AND ACETAMINOPHEN 5; 325 MG/1; MG/1
1 TABLET ORAL EVERY 6 HOURS PRN
COMMUNITY
End: 2024-01-02

## 2023-12-19 RX ORDER — HEPARIN SODIUM,PORCINE 10 UNIT/ML
5-20 VIAL (ML) INTRAVENOUS DAILY PRN
Status: CANCELLED | OUTPATIENT
Start: 2023-12-19

## 2023-12-19 RX ORDER — HEPARIN SODIUM (PORCINE) LOCK FLUSH IV SOLN 100 UNIT/ML 100 UNIT/ML
5 SOLUTION INTRAVENOUS
Status: DISCONTINUED | OUTPATIENT
Start: 2023-12-19 | End: 2023-12-19 | Stop reason: HOSPADM

## 2023-12-19 RX ADMIN — Medication 5 ML: at 09:39

## 2023-12-19 ASSESSMENT — PAIN SCALES - GENERAL: PAINLEVEL: MODERATE PAIN (4)

## 2023-12-19 NOTE — LETTER
"    12/19/2023         RE: Avis Paul  [address] [address] [address]        Dear Colleague,    Thank you for referring your patient, Avis Paul, to the [facility]. Please see a copy of my visit note below.    Oncology Rooming Note    December 19, 2023 10:12 AM   Avis Paul is a 58 year old female who presents for:    Chief Complaint   Patient presents with     Oncology Clinic Visit     6 month follow up with labs and CT review related to Mantle cell lymphoma of intra-abdominal lymph nodes     Initial Vitals: /62 (BP Location: Left arm, Patient Position: Sitting, Cuff Size: Adult Large)   Pulse 69   Temp 97.3  F (36.3  C) (Tympanic)   Resp 22   Ht 1.702 m (5' 7\")   Wt 141.5 kg (312 lb)   SpO2 97%   BMI 48.87 kg/m   Estimated body mass index is 48.87 kg/m  as calculated from the following:    Height as of this encounter: 1.702 m (5' 7\").    Weight as of this encounter: 141.5 kg (312 lb). Body surface area is 2.59 meters squared.  Moderate Pain (4) Comment: Data Unavailable   No LMP recorded. Patient is postmenopausal.  Allergies reviewed: Yes  Medications reviewed: Yes    Medications: Medication refills not needed today.  Pharmacy name entered into [employer]: [facility] [place] [place] - [facility] [address] [email or web address]    Clinical concerns: 6 month follow up with labs and CT review related to Mantle cell lymphoma of intra-abdominal lymph nodes      KTARINA ESCOBEDO, [facility] Hematology and Oncology Progress Note    Patient: Avis Paul  MRN: [id number]  Date of Service:         Reason for Visit    Chief Complaint   Patient presents with     HE Cancer     Mantle cell lymphoma of lymph nodes of inguinal region        Assessment and Plan    Mantle cell lymphoma status post autologous transplant, August 18, 2015  Hypogammaglobulinemia with multiple recurrent infections, on IVIG every 4 weeks  Hives/eosinophilia/chronic urticaria  New left inguinal " and pelvic adenopathy, May 2020(patient had 2 separate biopsies, initial 1 suggesting relapse of mantle cell lymphoma, review at [facility] was negative; patient treated with 1 month of ibrutinib with resolution of CT scan and PET findings)  Neuropathy    CT scan shows recurrent mild iliac chain adenopathy.  Patient is asymptomatic.  Labs including LDH are normal.  Will get PET scan for further evaluation.  May need biopsy after that.  Will see back in a few weeks to make further recommendations.    Creatinine has stabilized and with weekly home IVIG she has had much fewer infection issues.    Still with some excessive skin reactions to insect bites in the summertime.    Plan: Schedule PET scan  Follow-up after      Measurable disease: Patient currently in remission    Current therapy: Observation  IVIG resumed in January 2017 and stopped in May 2018; resumed again in September 2018  Maintenance Rituxan started December 2015, and completed August 2017, 8 doses  Acyclovir 400 mg twice daily  IVIG every 3 months, first dose August 29, 2016(currently on hold, last dose November 2016)   IVIG every 4 weeks restarted in June 2017 and stopped in May 2018      Treatment history:  Ibrutinib 420 mg p.o. daily started for presumed relapse of mantle cell lymphoma  July 3, 2020 and stopped August 4, 2020    First set of immunizations received at the [facility] in August 2016  First dose of IVIG  Patient completed 1 year of inhalational pentamidine  Autologous stem cell transplant with Beam prep, August 18, 2015  One cycle of R-ICE, Naye 15, 2015, ifosfamide and etoposide reduced by 25% because of renal dysfunction  Ibrutinib since October 10, 2014, current dose of 420 mg by mouth daily, stopped June 9, 2015   3 cycles of R-DHAP completed late August 2014   5 cycles of R CHOP, started after diagnosis in March 2014       ECOG Performance   ECOG Performance Status: 0    Distress Assessment  Distress Assessment Score: 3    Pain            Problem List    1. 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 Mantle cell lymphoma of lymph nodes of multiple regions (H)          CC: Sunil Tan MD    ______________________________________________________________________________    History of Present Illness    Pretty is here for reevaluation.  Seen 6 months ago.  Did have a mishap with administration of IVIG in May with inadvertent administration into the soft tissue but did fine.  Saw immunology and has switched to home subcutaneous self administration of IVIG beginning in the last week of May.  Did have some site reaction with the first 2 infusions but has done fine after that.    No fever chills or sweats.  Continues to have some reaction to bug bites.  No other new symptoms or problems.  ECOG status 0.    June 29, 2020  Pretty is here for reevaluation.  Seen 2 weeks ago.  Underwent excisional biopsy of the left inguinal lymph node and is here to review results.  No new symptoms or problems.  ECOG status is 0.  January 2018:   Ms. Avis Paul returns for a follow-up.  She was here for IVIG infusion yesterday and raise concerns about symptoms suggesting recurrence of lymphoma.  She had CT scans and is here to review results.  Has been having aches all over and increased fatigue and weight gain.  Describes some numbness in the right thigh area.  Also has been having memory issues since she received chemotherapy.  Notes an area of induration in the mouth on the lower right mandibular/jaw area.        November 2017:   She was seen 3 months ago.  She did have a visit shortly thereafter at the [place] with bone marrow and CT scan showing that she is in complete remission from her mantle cell lymphoma.  She has continued IVIG infusions monthly between June - October 2017.  She was informed  recently that the insurance would not cover this.  We had previously got this approved prior to starting infusions in June 2017.    She is currently having sinus symptoms and cough productive of greenish phlegm.  No other infection issues in the last 3 months.  She is in significant distress because of the insurance issues.    No other new symptoms or problems.  ECOG status is 0.    August 2017:   She was seen 4 weeks ago.  She has received 2 doses of IVIG.  No infusion reaction with the last one.  Her last infection was a sinus infection more than 4 weeks ago.  She has some discomfort in the right ear.  No fever or mild sores.  No shortness of breath or cough.  No new adenopathy.  She has been starting to have some loose bowel moments 2-3 times a day in the last 3 weeks.  No other new problems.    Pain Status  Currently in Pain: Yes    Review of Systems    Constitutional     Neurosensory     Cardiovascular     Pulmonary     Gastrointestinal     Genitourinary     Integumentary     Patient Coping  Patient Coping: Accepting  Distress Assessment  Distress Assessment Score: 3  Accompanied by  Accompanied by: Alone    Past History  Past Medical History:   Diagnosis Date     Anemia      Chronic kidney disease     elevated creatinine due to chemo     History of anesthesia complications 2015    urinary retention after lymph node excision. required catheterization     History of transfusion      Hypothyroid      Mantle cell lymphoma (H)     stage 4     Sleep apnea     uses cpap     Thrombocytopenia (H)          Past Surgical History:   Procedure Laterality Date     ANTERIOR / POSTERIOR COMBINED FUSION CERVICAL SPINE  2011    C2-C7     CARPAL TUNNEL RELEASE Right 2000     CERVICAL FUSION  2004    C4, C5, C6.  Pt. states surgery x 2     KIDNEY STONE SURGERY  1999    removal     LIMBAL STEM CELL TRANSPLANT  08/2015     LYMPH NODE DISSECTION Left 6/25/2020    Procedure: LEFT INGUINAL LYMPH NODE BIOPSY;  Surgeon: Catalina Pascal  MD Vidhya;  Location: [facility] OR;  Service: General     LA BX/REMV,LYMPH NODE,DEEP AXILL Right 6/3/2015    Procedure: RIGHT INGUINAL LYMPH NODE BIOPSY;  Surgeon: Clayton Burgos MD;  Location: [facility];  Service: General     TUBAL LIGATION  2004     US GUIDED NEEDLE PLACEMENT  6/25/2020     US LYMPH NODE BIOPSY  6/10/2020       Physical Exam    Recent Vitals 5/13/2021   Height -   Weight 293 lbs   BSA (m2) 2.51 m2   /83   Pulse 64   Temp 98   Temp src 1   SpO2 98   Some recent data might be hidden       GENERAL: Alert and oriented. Seated comfortably. In no distress.    HEAD: Atraumatic and normocephalic.  Has a full head of hair.    EYES: ROMEO, EOMI.  No pallor.  No icterus.    Oral cavity: no mucosal lesion or tonsillar enlargement.  Induration right mandibular lower jaw area    NECK: supple. JVP normal.  No thyroid enlargement.    LYMPH NODES: No palpable, cervical, axillary or inguinal lymphadenopathy.    CHEST: She has slight bilateral wheezing noted  Resonant to percussion throughout bilaterally.  Symmetrical breath movements bilaterally.    CVS: S1 and S2 are heard. Regular rate and rhythm.  No murmur or gallop or rub heard.    ABDOMEN: Soft. Not tender. Not distended.  No palpable hepatomegaly or splenomegaly.  No other mass palpable.  Bowel sounds heard.    EXTREMITIES: Warm.  No peripheral edema.    SKIN: no rash, or bruising or purpura.    CNS: Nonfocal        Lab Results    Recent Results (from the past 168 hour(s))   Comprehensive Metabolic Panel   Result Value Ref Range    Sodium 139 136 - 145 mmol/L    Potassium 3.9 3.5 - 5.0 mmol/L    Chloride 107 98 - 107 mmol/L    CO2 23 22 - 31 mmol/L    Anion Gap, Calculation 9 5 - 18 mmol/L    Glucose 90 70 - 125 mg/dL    BUN 24 (H) 8 - 22 mg/dL    Creatinine 2.24 (H) 0.60 - 1.10 mg/dL    GFR MDRD Af Amer 27 (L) >60 mL/min/1.73m2    GFR MDRD Non Af Amer 23 (L) >60 mL/min/1.73m2    Bilirubin, Total 0.4 0.0 - 1.0 mg/dL    Calcium 8.8  8.5 - 10.5 mg/dL    Protein, Total 6.4 6.0 - 8.0 g/dL    Albumin 3.5 3.5 - 5.0 g/dL    Alkaline Phosphatase 109 45 - 120 U/L    AST 22 0 - 40 U/L    ALT 16 0 - 45 U/L   HM1 (CBC with Diff)   Result Value Ref Range    WBC 5.7 4.0 - 11.0 thou/uL    RBC 4.66 3.80 - 5.40 mill/uL    Hemoglobin 13.7 12.0 - 16.0 g/dL    Hematocrit 43.1 35.0 - 47.0 %    MCV 93 80 - 100 fL    MCH 29.4 27.0 - 34.0 pg    MCHC 31.8 (L) 32.0 - 36.0 g/dL    RDW 14.1 11.0 - 14.5 %    Platelets 161 140 - 440 thou/uL    MPV 10.3 8.5 - 12.5 fL    Neutrophils % 65 50 - 70 %    Lymphocytes % 25 20 - 40 %    Monocytes % 8 2 - 10 %    Eosinophils % 2 0 - 6 %    Basophils % 1 0 - 2 %    Immature Granulocyte % 0 <=0 %    Neutrophils Absolute 3.7 2.0 - 7.7 thou/uL    Lymphocytes Absolute 1.4 0.8 - 4.4 thou/uL    Monocytes Absolute 0.4 0.0 - 0.9 thou/uL    Eosinophils Absolute 0.1 0.0 - 0.4 thou/uL    Basophils Absolute 0.0 0.0 - 0.2 thou/uL    Immature Granulocyte Absolute 0.0 <=0.0 thou/uL       Imaging    Ct Chest Abdomen Pelvis Without Oral Without Iv Contrast    Result Date: 5/13/2021  EXAM: CT CHEST ABDOMEN PELVIS WO ORAL WO IV CONTRAST LOCATION: [facility] DATE/TIME: 5/13/2021 9:26 AM INDICATION: Followup lymphoma. COMPARISON: 02/04/2021, 11/12/2020. TECHNIQUE: CT scan of the chest, abdomen, and pelvis was performed without IV contrast. Multiplanar reformats were obtained. Dose reduction techniques were used. CONTRAST: None. FINDINGS: LUNGS AND PLEURA: There are small pulmonary nodules, reference a 2 mm nodule in the right lower lobe on image 95 series 3. These are stable compared to the prior 2 studies and should be benign. There is a small linear density in the inferior aspect of the lingula that is stable dating back to November 2020 MEDIASTINUM/AXILLAE: There is a port in the right anterior chest wall extending into the superior vena cava. No enlarged mediastinal lymph nodes are seen. No enlarged axillary nodes are seen.  Again seen are calcified and noncalcified nodules in both breasts that are similar in appearance to prior studies. These may be fibroadenomas. CORONARY ARTERY CALCIFICATION: None. HEPATOBILIARY: There are a few tiny low dense lesions in the liver that are stable but too small to characterize, reference posterior right lobe image 64. These are likely benign. PANCREAS: Normal. SPLEEN: Normal in size, stable. ADRENAL GLANDS: Normal. KIDNEYS/BLADDER: Normal. BOWEL: There is some colonic diverticulosis. There is no evidence of appendicitis. LYMPH NODES: Left internal iliac lymph nodes?? with follow-up abutting the anterior cortex of the left sacral alae. On image 415 series 4 this measures 1.9 x 1.3 and on the prior study it measured 1.9 x 1.5 cm. VASCULATURE: Unremarkable. PELVIC ORGANS: Normal. MUSCULOSKELETAL: There are normal degenerative changes in the spine.     1.  There is very mild left internal iliac adenopathy that is stable to slightly smaller than on the prior study. 2.  There are multiple benign-appearing nodules in the breast, some with calcification and all are stable, these are likely fibroadenomas. 3.  No evidence of new disease is seen.        Signed by: Yelena Starks MD      Again, thank you for allowing me to participate in the care of your patient.        Sincerely,        Yelena Starks MD

## 2023-12-19 NOTE — PROGRESS NOTES
Avis Paul, 58 year old, female, arrived to Chemo Infusion for lab draw/port flush. Port accessed with good blood return, labs drawn, and flushed with 20ml Normal Saline and 500 units Heparin. Port de-accessed and site covered with gauze and papertape. Avis Paul discharged to [facility] ambulatory and stable.

## 2023-12-19 NOTE — PROGRESS NOTES
"Oncology Rooming Note    December 19, 2023 10:12 AM   Avis Paul is a 58 year old female who presents for:    Chief Complaint   Patient presents with    Oncology Clinic Visit     6 month follow up with labs and CT review related to Mantle cell lymphoma of intra-abdominal lymph nodes     Initial Vitals: /62 (BP Location: Left arm, Patient Position: Sitting, Cuff Size: Adult Large)   Pulse 69   Temp 97.3  F (36.3  C) (Tympanic)   Resp 22   Ht 1.702 m (5' 7\")   Wt 141.5 kg (312 lb)   SpO2 97%   BMI 48.87 kg/m   Estimated body mass index is 48.87 kg/m  as calculated from the following:    Height as of this encounter: 1.702 m (5' 7\").    Weight as of this encounter: 141.5 kg (312 lb). Body surface area is 2.59 meters squared.  Moderate Pain (4) Comment: Data Unavailable   No LMP recorded. Patient is postmenopausal.  Allergies reviewed: Yes  Medications reviewed: Yes    Medications: Medication refills not needed today.  Pharmacy name entered into [employer]: [facility] - [place], [place] - [facility] [address]     Clinical concerns: 6 month follow up with labs and CT review related to Mantle cell lymphoma of intra-abdominal lymph nodes      KATRINA ESCOBEDO CMA            "

## 2023-12-19 NOTE — PROGRESS NOTES
[facility] Hematology and Oncology Progress Note    Patient: Avis Paul  MRN: [id number]  Date of Service:         Reason for Visit    Chief Complaint   Patient presents with    HE Cancer     Mantle cell lymphoma of lymph nodes of inguinal region        Assessment and Plan    Mantle cell lymphoma status post autologous transplant, August 18, 2015  Hypogammaglobulinemia with multiple recurrent infections, on IVIG every 4 weeks  Hives/eosinophilia/chronic urticaria  New left inguinal and pelvic adenopathy, May 2020(patient had 2 separate biopsies, initial 1 suggesting relapse of mantle cell lymphoma, review at [facility] was negative; patient treated with 1 month of ibrutinib with resolution of CT scan and PET findings)  Neuropathy    CT scan shows recurrent mild iliac chain adenopathy.  Patient is asymptomatic.  Labs including LDH are normal.  Will get PET scan for further evaluation.  May need biopsy after that.  Will see back in a few weeks to make further recommendations.    Creatinine has stabilized and with weekly home IVIG she has had much fewer infection issues.    Still with some excessive skin reactions to insect bites in the summertime.    Plan: Schedule PET scan  Follow-up after      Measurable disease: Patient currently in remission    Current therapy: Observation  IVIG resumed in January 2017 and stopped in May 2018; resumed again in September 2018  Maintenance Rituxan started December 2015, and completed August 2017, 8 doses  Acyclovir 400 mg twice daily  IVIG every 3 months, first dose August 29, 2016(currently on hold, last dose November 2016)   IVIG every 4 weeks restarted in June 2017 and stopped in May 2018      Treatment history:  Ibrutinib 420 mg p.o. daily started for presumed relapse of mantle cell lymphoma  July 3, 2020 and stopped August 4, 2020    [facility]  First dose of IVIG  Patient completed 1 year of inhalational  pentamidine  Autologous stem cell transplant with Beam prep, August 18, 2015  One cycle of R-ICE, Naye 15, 2015, ifosfamide and etoposide reduced by 25% because of renal dysfunction  Ibrutinib since October 10, 2014, current dose of 420 mg by mouth daily, stopped June 9, 2015   3 cycles of R-DHAP completed late August 2014   5 cycles of R CHOP, started after diagnosis in March 2014       ECOG Performance   ECOG Performance Status: 0    Distress Assessment  Distress Assessment Score: 3    Pain           Problem List    1. 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 Mantle cell lymphoma of lymph nodes of multiple regions (H)          CC: Sunil Tan MD    ______________________________________________________________________________    History of Present Illness    Pretty is here for reevaluation.  Seen 6 months ago.  Did have a mishap with administration of IVIG in May with inadvertent administration into the soft tissue but did fine.  Saw immunology and has switched to home subcutaneous self administration of IVIG beginning in the last week of May.  Did have some site reaction with the first 2 infusions but has done fine after that.    No fever chills or sweats.  Continues to have some reaction to bug bites.  No other new symptoms or problems.  ECOG status 0.    June 29, 2020  Pretty is here for reevaluation.  Seen 2 weeks ago.  Underwent excisional biopsy of the left inguinal lymph node and is here to review results.  No new symptoms or problems.  ECOG status is 0.  January 2018:   Ms. Avis Paul returns for a follow-up.  She was here for IVIG infusion yesterday and raise concerns about symptoms suggesting recurrence of lymphoma.  She had CT scans and is here to review results.  Has been having aches all over and increased fatigue and weight gain.   Describes some numbness in the right thigh area.  Also has been having memory issues since she received chemotherapy.  Notes an area of induration in the mouth on the lower right mandibular/jaw area.        November 2017:   She was seen 3 months ago.  She did have a visit shortly thereafter at the [facility] with bone marrow and CT scan showing that she is in complete remission from her mantle cell lymphoma.  She has continued IVIG infusions monthly between June - October 2017.  She was informed recently that the insurance would not cover this.  We had previously got this approved prior to starting infusions in June 2017.    She is currently having sinus symptoms and cough productive of greenish phlegm.  No other infection issues in the last 3 months.  She is in significant distress because of the insurance issues.    No other new symptoms or problems.  ECOG status is 0.    August 2017:   She was seen 4 weeks ago.  She has received 2 doses of IVIG.  No infusion reaction with the last one.  Her last infection was a sinus infection more than 4 weeks ago.  She has some discomfort in the right ear.  No fever or mild sores.  No shortness of breath or cough.  No new adenopathy.  She has been starting to have some loose bowel moments 2-3 times a day in the last 3 weeks.  No other new problems.    Pain Status  Currently in Pain: Yes    Review of Systems    Constitutional     Neurosensory     Cardiovascular     Pulmonary     Gastrointestinal     Genitourinary     Integumentary     Patient Coping  Patient Coping: Accepting  Distress Assessment  Distress Assessment Score: 3  Accompanied by  Accompanied by: Alone    Past History  Past Medical History:   Diagnosis Date    Anemia     Chronic kidney disease     elevated creatinine due to chemo    History of anesthesia complications 2015    urinary retention after lymph node excision. required catheterization    History of transfusion     Hypothyroid     Mantle cell lymphoma (H)      stage 4    Sleep apnea     uses cpap    Thrombocytopenia (H)          Past Surgical History:   Procedure Laterality Date    ANTERIOR / POSTERIOR COMBINED FUSION CERVICAL SPINE  2011    C2-C7    CARPAL TUNNEL RELEASE Right 2000    CERVICAL FUSION  2004    C4, C5, C6.  Pt. states surgery x 2    KIDNEY STONE SURGERY  1999    removal    LIMBAL STEM CELL TRANSPLANT  08/2015    LYMPH NODE DISSECTION Left 6/25/2020    Procedure: LEFT INGUINAL LYMPH NODE BIOPSY;  Surgeon: Catalina Pascal MD;  Location: [facility] OR;  Service: General    WA BX/REMV,LYMPH NODE,DEEP AXILL Right 6/3/2015    Procedure: RIGHT INGUINAL LYMPH NODE BIOPSY;  Surgeon: Clayton Burgos MD;  Location: [facility] OR;  Service: General    TUBAL LIGATION  2004    US GUIDED NEEDLE PLACEMENT  6/25/2020    US LYMPH NODE BIOPSY  6/10/2020       Physical Exam    Recent Vitals 5/13/2021   Height -   Weight 293 lbs   BSA (m2) 2.51 m2   /83   Pulse 64   Temp 98   Temp src 1   SpO2 98   Some recent data might be hidden       GENERAL: Alert and oriented. Seated comfortably. In no distress.    HEAD: Atraumatic and normocephalic.  Has a full head of hair.    EYES: ROMEO, EOMI.  No pallor.  No icterus.    Oral cavity: no mucosal lesion or tonsillar enlargement.  Induration right mandibular lower jaw area    NECK: supple. JVP normal.  No thyroid enlargement.    LYMPH NODES: No palpable, cervical, axillary or inguinal lymphadenopathy.    CHEST: She has slight bilateral wheezing noted  Resonant to percussion throughout bilaterally.  Symmetrical breath movements bilaterally.    CVS: S1 and S2 are heard. Regular rate and rhythm.  No murmur or gallop or rub heard.    ABDOMEN: Soft. Not tender. Not distended.  No palpable hepatomegaly or splenomegaly.  No other mass palpable.  Bowel sounds heard.    EXTREMITIES: Warm.  No peripheral edema.    SKIN: no rash, or bruising or purpura.    CNS: Nonfocal        Lab Results    Recent Results (from the past  168 hour(s))   Comprehensive Metabolic Panel   Result Value Ref Range    Sodium 139 136 - 145 mmol/L    Potassium 3.9 3.5 - 5.0 mmol/L    Chloride 107 98 - 107 mmol/L    CO2 23 22 - 31 mmol/L    Anion Gap, Calculation 9 5 - 18 mmol/L    Glucose 90 70 - 125 mg/dL    BUN 24 (H) 8 - 22 mg/dL    Creatinine 2.24 (H) 0.60 - 1.10 mg/dL    GFR MDRD Af Amer 27 (L) >60 mL/min/1.73m2    GFR MDRD Non Af Amer 23 (L) >60 mL/min/1.73m2    Bilirubin, Total 0.4 0.0 - 1.0 mg/dL    Calcium 8.8 8.5 - 10.5 mg/dL    Protein, Total 6.4 6.0 - 8.0 g/dL    Albumin 3.5 3.5 - 5.0 g/dL    Alkaline Phosphatase 109 45 - 120 U/L    AST 22 0 - 40 U/L    ALT 16 0 - 45 U/L   HM1 (CBC with Diff)   Result Value Ref Range    WBC 5.7 4.0 - 11.0 thou/uL    RBC 4.66 3.80 - 5.40 mill/uL    Hemoglobin 13.7 12.0 - 16.0 g/dL    Hematocrit 43.1 35.0 - 47.0 %    MCV 93 80 - 100 fL    MCH 29.4 27.0 - 34.0 pg    MCHC 31.8 (L) 32.0 - 36.0 g/dL    RDW 14.1 11.0 - 14.5 %    Platelets 161 140 - 440 thou/uL    MPV 10.3 8.5 - 12.5 fL    Neutrophils % 65 50 - 70 %    Lymphocytes % 25 20 - 40 %    Monocytes % 8 2 - 10 %    Eosinophils % 2 0 - 6 %    Basophils % 1 0 - 2 %    Immature Granulocyte % 0 <=0 %    Neutrophils Absolute 3.7 2.0 - 7.7 thou/uL    Lymphocytes Absolute 1.4 0.8 - 4.4 thou/uL    Monocytes Absolute 0.4 0.0 - 0.9 thou/uL    Eosinophils Absolute 0.1 0.0 - 0.4 thou/uL    Basophils Absolute 0.0 0.0 - 0.2 thou/uL    Immature Granulocyte Absolute 0.0 <=0.0 thou/uL       Imaging    Ct Chest Abdomen Pelvis Without Oral Without Iv Contrast    Result Date: 5/13/2021  EXAM: CT CHEST ABDOMEN PELVIS WO ORAL WO IV CONTRAST LOCATION: [facility] DATE/TIME: 5/13/2021 9:26 AM INDICATION: Followup lymphoma. COMPARISON: 02/04/2021, 11/12/2020. TECHNIQUE: CT scan of the chest, abdomen, and pelvis was performed without IV contrast. Multiplanar reformats were obtained. Dose reduction techniques were used. CONTRAST: None. FINDINGS: LUNGS AND PLEURA:  There are small pulmonary nodules, reference a 2 mm nodule in the right lower lobe on image 95 series 3. These are stable compared to the prior 2 studies and should be benign. There is a small linear density in the inferior aspect of the lingula that is stable dating back to November 2020 MEDIASTINUM/AXILLAE: There is a port in the right anterior chest wall extending into the superior vena cava. No enlarged mediastinal lymph nodes are seen. No enlarged axillary nodes are seen. Again seen are calcified and noncalcified nodules in both breasts that are similar in appearance to prior studies. These may be fibroadenomas. CORONARY ARTERY CALCIFICATION: None. HEPATOBILIARY: There are a few tiny low dense lesions in the liver that are stable but too small to characterize, reference posterior right lobe image 64. These are likely benign. PANCREAS: Normal. SPLEEN: Normal in size, stable. ADRENAL GLANDS: Normal. KIDNEYS/BLADDER: Normal. BOWEL: There is some colonic diverticulosis. There is no evidence of appendicitis. LYMPH NODES: Left internal iliac lymph nodes?? with follow-up abutting the anterior cortex of the left sacral alae. On image 415 series 4 this measures 1.9 x 1.3 and on the prior study it measured 1.9 x 1.5 cm. VASCULATURE: Unremarkable. PELVIC ORGANS: Normal. MUSCULOSKELETAL: There are normal degenerative changes in the spine.     1.  There is very mild left internal iliac adenopathy that is stable to slightly smaller than on the prior study. 2.  There are multiple benign-appearing nodules in the breast, some with calcification and all are stable, these are likely fibroadenomas. 3.  No evidence of new disease is seen.        Signed by: Yelena Starks MD

## 2023-12-20 LAB
IGA SERPL-MCNC: <2 MG/DL (ref 84–499)
IGG SERPL-MCNC: 874 MG/DL (ref 610–1616)
IGM SERPL-MCNC: <10 MG/DL (ref 35–242)

## 2023-12-29 ENCOUNTER — TELEPHONE (OUTPATIENT)
Dept: ONCOLOGY | Facility: HOSPITAL | Age: 58
End: 2023-12-29

## 2023-12-29 ENCOUNTER — HOSPITAL ENCOUNTER (OUTPATIENT)
Dept: PET IMAGING | Facility: HOSPITAL | Age: 58
Discharge: HOME | End: 2023-12-29
Attending: INTERNAL MEDICINE | Admitting: INTERNAL MEDICINE
Payer: COMMERCIAL

## 2023-12-29 DIAGNOSIS — C83.13 MANTLE CELL LYMPHOMA OF INTRA-ABDOMINAL LYMPH NODES (H): ICD-10-CM

## 2023-12-29 DIAGNOSIS — C83.16 MANTLE CELL LYMPHOMA OF INTRAPELVIC LYMPH NODES (H): Primary | ICD-10-CM

## 2023-12-29 LAB — GLUCOSE BLDC GLUCOMTR-MCNC: 81 MG/DL (ref 70–99)

## 2023-12-29 PROCEDURE — A9552 F18 FDG: HCPCS | Performed by: INTERNAL MEDICINE

## 2023-12-29 PROCEDURE — 250N000011 HC RX IP 250 OP 636: Performed by: INTERNAL MEDICINE

## 2023-12-29 PROCEDURE — 82962 GLUCOSE BLOOD TEST: CPT

## 2023-12-29 PROCEDURE — 343N000001 HC RX 343: Performed by: INTERNAL MEDICINE

## 2023-12-29 PROCEDURE — 78815 PET IMAGE W/CT SKULL-THIGH: CPT | Mod: MA,PS

## 2023-12-29 RX ORDER — HEPARIN SODIUM (PORCINE) LOCK FLUSH IV SOLN 100 UNIT/ML 100 UNIT/ML
500 SOLUTION INTRAVENOUS ONCE
Status: COMPLETED | OUTPATIENT
Start: 2023-12-29 | End: 2023-12-29

## 2023-12-29 RX ADMIN — FLUDEOXYGLUCOSE F-18 17.86 MILLICURIE: 500 INJECTION, SOLUTION INTRAVENOUS at 08:47

## 2023-12-29 RX ADMIN — HEPARIN 500 UNITS: 100 SYRINGE at 08:58

## 2023-12-29 NOTE — TELEPHONE ENCOUNTER
[facility]: Cancer Care                                                                                          I call Avis to let her know that her PET scan indicates lymph nodes suspicious for recurrent lymphoma. She will need a biopsy done for further evaluation. She verbalized understanding and appreciation of our conversation.    Signature:  Cecy Gan RN Care Coordinator  [facility]  Cancer [facility]

## 2024-01-03 ENCOUNTER — MYC MEDICAL ADVICE (OUTPATIENT)
Dept: INTERVENTIONAL RADIOLOGY/VASCULAR | Facility: CLINIC | Age: 59
End: 2024-01-03
Payer: COMMERCIAL

## 2024-01-04 NOTE — TELEPHONE ENCOUNTER
Detailed Voicemail message left with request for patient to contact Interventional Radiology Department and acknowledge understanding of MYCHART instructions that were sent in anticipation of procedure scheduled 1/9/2024.      IR contact number provided in [facility].    Isatu VIDAL  Interventional Radiology RN   [phone]

## 2024-01-09 ENCOUNTER — HOSPITAL ENCOUNTER (OUTPATIENT)
Dept: CT IMAGING | Facility: HOSPITAL | Age: 59
Discharge: HOME | End: 2024-01-09
Attending: INTERNAL MEDICINE | Admitting: STUDENT IN AN ORGANIZED HEALTH CARE EDUCATION/TRAINING PROGRAM
Payer: COMMERCIAL

## 2024-01-09 VITALS
TEMPERATURE: 98.1 F | RESPIRATION RATE: 20 BRPM | HEART RATE: 80 BPM | SYSTOLIC BLOOD PRESSURE: 112 MMHG | OXYGEN SATURATION: 96 % | DIASTOLIC BLOOD PRESSURE: 70 MMHG

## 2024-01-09 DIAGNOSIS — C83.16 MANTLE CELL LYMPHOMA OF INTRAPELVIC LYMPH NODES (H): ICD-10-CM

## 2024-01-09 DIAGNOSIS — C83.18 LYMPHOMA, MANTLE CELL, MULTIPLE SITES (H): Primary | ICD-10-CM

## 2024-01-09 LAB
HGB BLD-MCNC: 14.3 G/DL (ref 11.7–15.7)
INR PPP: 1.05 (ref 0.85–1.15)
PLATELET # BLD AUTO: 114 10E3/UL (ref 150–450)

## 2024-01-09 PROCEDURE — 272N000486 CT ABDOMEN RETROPERITONEAL BIOPSY

## 2024-01-09 PROCEDURE — 36591 DRAW BLOOD OFF VENOUS DEVICE: CPT | Performed by: RADIOLOGY

## 2024-01-09 PROCEDURE — 88185 FLOWCYTOMETRY/TC ADD-ON: CPT | Performed by: INTERNAL MEDICINE

## 2024-01-09 PROCEDURE — 85610 PROTHROMBIN TIME: CPT | Performed by: RADIOLOGY

## 2024-01-09 PROCEDURE — 250N000011 HC RX IP 250 OP 636: Performed by: STUDENT IN AN ORGANIZED HEALTH CARE EDUCATION/TRAINING PROGRAM

## 2024-01-09 PROCEDURE — 88184 FLOWCYTOMETRY/ TC 1 MARKER: CPT | Performed by: PATHOLOGY

## 2024-01-09 PROCEDURE — 250N000011 HC RX IP 250 OP 636: Performed by: INTERNAL MEDICINE

## 2024-01-09 PROCEDURE — 272N000431 CT ABDOMEN RETROPERITONEAL BIOPSY

## 2024-01-09 PROCEDURE — 88189 FLOWCYTOMETRY/READ 16 & >: CPT | Mod: GC | Performed by: PATHOLOGY

## 2024-01-09 PROCEDURE — 88360 TUMOR IMMUNOHISTOCHEM/MANUAL: CPT | Mod: TC | Performed by: INTERNAL MEDICINE

## 2024-01-09 PROCEDURE — 85049 AUTOMATED PLATELET COUNT: CPT | Performed by: RADIOLOGY

## 2024-01-09 PROCEDURE — 88333 PATH CONSLTJ SURG CYTO XM 1: CPT | Mod: TC | Performed by: INTERNAL MEDICINE

## 2024-01-09 PROCEDURE — 88184 FLOWCYTOMETRY/ TC 1 MARKER: CPT | Performed by: INTERNAL MEDICINE

## 2024-01-09 PROCEDURE — 85018 HEMOGLOBIN: CPT | Performed by: RADIOLOGY

## 2024-01-09 RX ORDER — NALOXONE HYDROCHLORIDE 0.4 MG/ML
0.2 INJECTION, SOLUTION INTRAMUSCULAR; INTRAVENOUS; SUBCUTANEOUS
Status: DISCONTINUED | OUTPATIENT
Start: 2024-01-09 | End: 2024-01-10 | Stop reason: HOSPADM

## 2024-01-09 RX ORDER — NALOXONE HYDROCHLORIDE 0.4 MG/ML
0.4 INJECTION, SOLUTION INTRAMUSCULAR; INTRAVENOUS; SUBCUTANEOUS
Status: DISCONTINUED | OUTPATIENT
Start: 2024-01-09 | End: 2024-01-10 | Stop reason: HOSPADM

## 2024-01-09 RX ORDER — FENTANYL CITRATE 50 UG/ML
25-50 INJECTION, SOLUTION INTRAMUSCULAR; INTRAVENOUS EVERY 5 MIN PRN
Status: DISCONTINUED | OUTPATIENT
Start: 2024-01-09 | End: 2024-01-10 | Stop reason: HOSPADM

## 2024-01-09 RX ORDER — ONDANSETRON 2 MG/ML
4 INJECTION INTRAMUSCULAR; INTRAVENOUS EVERY 6 HOURS PRN
Status: DISCONTINUED | OUTPATIENT
Start: 2024-01-09 | End: 2024-01-10 | Stop reason: HOSPADM

## 2024-01-09 RX ORDER — HEPARIN SODIUM (PORCINE) LOCK FLUSH IV SOLN 100 UNIT/ML 100 UNIT/ML
500 SOLUTION INTRAVENOUS ONCE
Status: COMPLETED | OUTPATIENT
Start: 2024-01-09 | End: 2024-01-09

## 2024-01-09 RX ORDER — FLUMAZENIL 0.1 MG/ML
0.2 INJECTION, SOLUTION INTRAVENOUS
Status: DISCONTINUED | OUTPATIENT
Start: 2024-01-09 | End: 2024-01-10 | Stop reason: HOSPADM

## 2024-01-09 RX ADMIN — Medication 500 UNITS: at 10:55

## 2024-01-09 RX ADMIN — FENTANYL CITRATE 50 MCG: 50 INJECTION, SOLUTION INTRAMUSCULAR; INTRAVENOUS at 08:42

## 2024-01-09 RX ADMIN — MIDAZOLAM HYDROCHLORIDE 1 MG: 1 INJECTION, SOLUTION INTRAMUSCULAR; INTRAVENOUS at 08:40

## 2024-01-09 RX ADMIN — MIDAZOLAM HYDROCHLORIDE 1 MG: 1 INJECTION, SOLUTION INTRAMUSCULAR; INTRAVENOUS at 08:45

## 2024-01-09 RX ADMIN — ONDANSETRON 4 MG: 2 INJECTION INTRAMUSCULAR; INTRAVENOUS at 08:45

## 2024-01-09 RX ADMIN — FENTANYL CITRATE 50 MCG: 50 INJECTION, SOLUTION INTRAMUSCULAR; INTRAVENOUS at 08:47

## 2024-01-09 RX ADMIN — MIDAZOLAM HYDROCHLORIDE 1 MG: 1 INJECTION, SOLUTION INTRAMUSCULAR; INTRAVENOUS at 09:07

## 2024-01-09 RX ADMIN — FENTANYL CITRATE 50 MCG: 50 INJECTION, SOLUTION INTRAMUSCULAR; INTRAVENOUS at 09:09

## 2024-01-09 NOTE — IR NOTE
Patient Name: Avis Paul  Medical Record Number: [id number]  Today's Date: 1/9/2024    Procedure: retroperitoneal bx  Proceduralist: Dr Valladares    Procedure Start: 0845  Procedure end: 0933  Sedation medications administered: 3 mg midazolam and 150 mcg fentanyl   Sedation time: 48 minutes

## 2024-01-09 NOTE — PRE-PROCEDURE
GENERAL PRE-PROCEDURE:   Procedure:  CT guided pelvic lymph node biopsy under moderate sedation  Date/Time:  1/9/2024 8:43 AM    Written consent obtained?: Yes    Risks and benefits: Risks, benefits and alternatives were discussed    Patient states understanding of procedure being performed: Yes    Patient's understanding of procedure matches consent: Yes    Procedure consent matches procedure scheduled: Yes    Appropriately NPO:  Yes  ASA Class:  1  Mallampati  :  Grade 1- soft palate, uvula, tonsillar pillars, and posterior pharyngeal wall visible  Lungs:  Lungs clear with good breath sounds bilaterally  Heart:  Normal heart sounds and rate  History & Physical reviewed:  History and physical reviewed and no updates needed  Statement of review:  I have reviewed the lab findings, diagnostic data, medications, and the plan for sedation

## 2024-01-09 NOTE — PROCEDURES
[facility]    Procedure: IR Procedure Note    Date/Time: 1/9/2024 9:38 AM    Performed by: Justin Chand MD  Authorized by: Justin Chand MD      UNIVERSAL PROTOCOL   Site Marked: NA  Prior Images Obtained and Reviewed:  Yes  Required items: Required blood products, implants, devices and special equipment available    Patient identity confirmed:  Verbally with patient, arm band, provided demographic data and hospital-assigned identification number  Patient was reevaluated immediately before administering moderate or deep sedation or anesthesia  Confirmation Checklist:  Patient's identity using two indicators, relevant allergies, procedure was appropriate and matched the consent or emergent situation and correct equipment/implants were available  Time out: Immediately prior to the procedure a time out was called    Universal Protocol: the Joint Commission Universal Protocol was followed    Preparation: Patient was prepped and draped in usual sterile fashion    ESBL (mL):  0     ANESTHESIA    Anesthesia:  Local infiltration  Local Anesthetic:  Lidocaine 1% without epinephrine  Anesthetic Total (mL):  10      SEDATION  Patient Sedated: Yes    Sedation:  Midazolam, fentanyl and see MAR for details  Vital signs: Vital signs monitored during sedation    See dictated procedure note for full details.  Findings: Successful CT guided pelvic lymph node biopsy and FNA.     Specimens: none    Complications: None    Condition: Stable    Plan: Routine post-bx care.      PROCEDURE  Describe Procedure: Successful CT guided pelvic lymph node biopsy and FNA.   Patient Tolerance:  Patient tolerated the procedure well with no immediate complications  Length of time physician/provider present for 1:1 monitoring during sedation: 45

## 2024-01-10 LAB
PATH REPORT.COMMENTS IMP SPEC: ABNORMAL
PATH REPORT.COMMENTS IMP SPEC: YES
PATH REPORT.FINAL DX SPEC: ABNORMAL
PATH REPORT.MICROSCOPIC SPEC OTHER STN: ABNORMAL
PATH REPORT.RELEVANT HX SPEC: ABNORMAL

## 2024-01-10 PROCEDURE — 88341 IMHCHEM/IMCYTCHM EA ADD ANTB: CPT | Mod: 26 | Performed by: PATHOLOGY

## 2024-01-10 PROCEDURE — 88333 PATH CONSLTJ SURG CYTO XM 1: CPT | Mod: 26 | Performed by: PATHOLOGY

## 2024-01-10 PROCEDURE — 88365 INSITU HYBRIDIZATION (FISH): CPT | Mod: 26 | Performed by: PATHOLOGY

## 2024-01-10 PROCEDURE — 88342 IMHCHEM/IMCYTCHM 1ST ANTB: CPT | Mod: 26 | Performed by: PATHOLOGY

## 2024-01-10 PROCEDURE — 88360 TUMOR IMMUNOHISTOCHEM/MANUAL: CPT | Mod: 26 | Performed by: PATHOLOGY

## 2024-01-10 PROCEDURE — 88305 TISSUE EXAM BY PATHOLOGIST: CPT | Mod: 26 | Performed by: PATHOLOGY

## 2024-01-10 PROCEDURE — 88364 INSITU HYBRIDIZATION (FISH): CPT | Mod: 26 | Performed by: PATHOLOGY

## 2024-01-15 ENCOUNTER — PATIENT OUTREACH (OUTPATIENT)
Dept: ONCOLOGY | Facility: HOSPITAL | Age: 59
End: 2024-01-15

## 2024-01-15 ENCOUNTER — ONCOLOGY VISIT (OUTPATIENT)
Dept: ONCOLOGY | Facility: HOSPITAL | Age: 59
End: 2024-01-15
Attending: INTERNAL MEDICINE
Payer: COMMERCIAL

## 2024-01-15 VITALS
TEMPERATURE: 98 F | SYSTOLIC BLOOD PRESSURE: 131 MMHG | DIASTOLIC BLOOD PRESSURE: 75 MMHG | HEART RATE: 75 BPM | RESPIRATION RATE: 24 BRPM | OXYGEN SATURATION: 99 %

## 2024-01-15 DIAGNOSIS — C83.13 MANTLE CELL LYMPHOMA OF INTRA-ABDOMINAL LYMPH NODES (H): Primary | ICD-10-CM

## 2024-01-15 DIAGNOSIS — C83.18 LYMPHOMA, MANTLE CELL, MULTIPLE SITES (H): Chronic | ICD-10-CM

## 2024-01-15 LAB
SPECIMEN STATUS: NORMAL
SPECIMEN STATUS: NORMAL

## 2024-01-15 PROCEDURE — 99214 OFFICE O/P EST MOD 30 MIN: CPT | Performed by: NURSE PRACTITIONER

## 2024-01-15 PROCEDURE — G0463 HOSPITAL OUTPT CLINIC VISIT: HCPCS | Performed by: NURSE PRACTITIONER

## 2024-01-15 ASSESSMENT — PAIN SCALES - GENERAL: PAINLEVEL: MODERATE PAIN (4)

## 2024-01-15 NOTE — LETTER
"    1/15/2024         RE: Avis Paul  [address] [facility] [address]        Dear Colleague,    Thank you for referring your patient, Avis Paul, to the [facility]. Please see a copy of my visit note below.    Oncology Rooming Note    January 15, 2024 2:02 PM   Avis Paul is a 58 year old female who presents for:    Chief Complaint   Patient presents with     Oncology Clinic Visit     Return Mantle cell lymphoma of intra-abdominal lymph nodes, review Biopsy     Initial Vitals: /75 (BP Location: Left arm, Patient Position: Sitting, Cuff Size: Adult Regular)   Pulse 75   Temp 98  F (36.7  C) (Oral)   Resp 24   SpO2 99%  Estimated body mass index is 48.87 kg/m  as calculated from the following:    Height as of 12/19/23: 1.702 m (5' 7\").    Weight as of 12/19/23: 141.5 kg (312 lb). There is no height or weight on file to calculate BSA.  Moderate Pain (4) Comment: Data Unavailable   No LMP recorded. Patient is postmenopausal.  Allergies reviewed: Yes  Medications reviewed: Yes    Medications: Medication refills not needed today.  Pharmacy name entered into [employer]: [facility] [place] [address] [address] [facility]    Frailty Screening:   Is the patient here for a new oncology consult visit in cancer care? 1. Yes. Over the past month, have you experienced difficulty or required a caregiver to assist with:   1. Balance, walking or general mobility (including any falls)? NO  2. Completion of self-care tasks such as bathing, dressing, toileting, grooming/hygiene?  NO  3. Concentration or memory that affects your daily life?  NO       Clinical concerns:  Return Mantle cell lymphoma of intra-abdominal lymph nodes, review Biopsy      [facility] Hematology and Oncology Progress Note    Patient: Avis Paul  MRN: [id number]  Date of Service: Lloyd 15, 2024          Reason for Visit    Chief Complaint   Patient presents " with     Oncology Clinic Visit     Return Mantle cell lymphoma of intra-abdominal lymph nodes, review Biopsy       Assessment and Plan     Cancer Staging   No matching staging information was found for the patient.      1. Mantle cell lymphoma status post autologous transplant, August 18, 2015: Then completed 2 years of maintenance Rituxan therapy.  She is currently on observation.   She was found to have some lymphadenopathy in December and had a PET scan that showed there was some worrisome lymph nodes.  She had a biopsy and it does confirm a recurrent mantle cell lymphoma.  Clinically patient says she is starting to feel some lymphadenopathy in her pelvis.  She says she feels like her underwear line is starting to get tight and she is feeling a little uncomfortable.  After discussion with Dr. Gill since Dr. Starks is on vacation he suggested her going back to the [facility] to discuss CAR-T therapy.  Patient would like to go ASAP so we will make a referral and have our care coordinator call down and see if they can get an appointment as soon as possible.  Patient was evaluated for this in 2020 when we thought she had relapse but it was proven that she probably did not have relapse so that was put on hold.     2. Hypogammaglobulinemia with multiple recurrent infections:   On IVIG. This has decreased infections. Doing at home. Continue for now.      3. Mild left inguinal adenopathy found in May 2020: pt had 2 biopsies. Initial one suggested relapse of lymphoma, but review at [facility] was negative. Pt treatment with one month of ibrutinib in the meantime and lymph node resolved.  Has been stable since that time, but now having adenopathy with relapse.          ECOG Performance    0 - Independent    Distress Screening (within last 30 days)    No data recorded     Pain  Pain Score: Moderate Pain (4)    Problem List    Patient Active Problem List   Diagnosis     Lymphoma, mantle cell, multiple sites (H)      H/O renal impairment     NHL (non-Hodgkin's lymphoma) (H)     Neutropenic fever  (H24)     Hypogammaglobulinemia (H24)     Anemia due to antineoplastic chemotherapy     Drug-induced thrombocytopenia     Constipation     Morbid obesity (H)     Elevation of level of transaminase or lactic acid dehydrogenase (LDH)     Malaise and fatigue     Postoperative anemia due to acute blood loss     CKD (chronic kidney disease) stage 4, GFR 15-29 ml/min (H)     2019 novel coronavirus disease (COVID-19)     Hypogammaglobulinemia, acquired (H24)     Accidental fall     Maintenance antineoplastic chemotherapy     Acquired hypothyroidism     Obstructive sleep apnea syndrome        ______________________________________________________________________________    History of Present Illness    Measurable disease: Patient currently in remission. Disease previously found on CT, PET     Current therapy: Observation  IVIG resumed in January 2017 and stopped in May 2018; resumed again in September 2018  Maintenance Rituxan started December 2015, and completed August 2017, 8 doses  Acyclovir 400 mg twice daily  IVIG every 3 months, first dose August 29, 2016(currently on hold, last dose November 2016)   IVIG every 4 weeks restarted in June 2017 and stopped in May 2018        Treatment history:  Ibrutinib 420 mg p.o. daily started for presumed relapse of mantle cell lymphoma  July 3, 2020 and stopped August 4, 2020     First set of immunizations received at the [place] in August 2016  First dose of IVIG  Patient completed 1 year of inhalational pentamidine  Autologous stem cell transplant with Beam prep, August 18, 2015  One cycle of R-ICE, Naye 15, 2015, ifosfamide and etoposide reduced by 25% because of renal dysfunction  Ibrutinib since October 10, 2014, current dose of 420 mg by mouth daily, stopped June 9, 2015   3 cycles of R-DHAP completed late August 2014   5 cycles of R CHOP, started after diagnosis in March 2014     Interim  History: Is here today for a follow-up visit and to review biopsy results.  Patient is quite anxious to get the results.  She did see them on MyChart and wants to know what the plan will be.  She says she feels like her lymph nodes are getting bigger in her pelvis and she can she is feeling more uncomfortable.  She says she is feeling that her underwear line is getting tighter.  She denies any constitutional symptoms but does feel fatigued.  She is anxious about getting going on treatment.         Review of Systems    Pertinent items are noted in HPI.    Past History    Past Medical History:   Diagnosis Date     Anemia      Cancer (H) 2014    Mantle Cell Lymphoma     Chronic kidney disease     elevated creatinine due to chemo     Cough 06/19/2017    Overview:  Created by Conversion     Dehydration 08/12/2014     Fever 06/19/2017    Overview:  Created by Conversion     History of anesthesia complications 2015    urinary retention after lymph node excision. required catheterization     History of blood transfusion     reaction to platelets with hives in the past     History of transfusion      Hypothyroid      Hypothyroidism 2007     Kidney stones 2002     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 Cooperative in conversation. Here alone.   RESP: Regular respiratory rate. No expiratory wheezes   MUSCULOSKELETAL: no bilateral leg swelling  NEURO: non focal. Alert and oriented x3.   PSYCH: within  normal limits. No depression or anxiety.  SKIN: exposed skin is dry intact.     Lab Results    Recent Results (from the past 168 hour(s))   Hemoglobin   Result Value Ref Range    Hemoglobin 14.3 11.7 - 15.7 g/dL   Platelet count   Result Value Ref Range    Platelet Count 114 (L) 150 - 450 10e3/uL   INR   Result Value Ref Range    INR 1.05 0.85 - 1.15   Surgical Pathology Exam   Result Value Ref Range    Case Report       Surgical Pathology Report                         Case: [id number]                                  Authorizing Provider:  Yelena Starks MD Collected:           01/09/2024 09:12 AM          Ordering Location:     [facility]      Received:            01/09/2024 09:57 AM                                 [facility]                                                           Pathologist:           Randy Love MD                                                        Specimen:    Lymph Node(s), Pelvis, Left                                                                Addendum       This case was submitted to [facility] for additional testing as requested by Dr. Randy Love. See scanned Cytogenetics Report (accession #[id number]).      Final Diagnosis       A(A). Lymph Node(s), Pelvis, Left, :  -Mantle cell lymphoma with increased proliferation rate (at least 30-40%)  - Pending FISH ([facility])  - Pending flow cytometry ([facility])        Comment       The clinical history has been reviewed; I favor an increased proliferation rate by Ki-67 of at least 30-40% and it may be as high as 50-60% in some areas.  I am happy to review any subsequent data.    Cytology and histology demonstrate atypical lymphoid infiltrate composed predominantly of small to medium sized mature forms.  The tumor cells are positive for CD20, Bcl-2 and cyclin D1.  Kappa and lambda in situ hybridization studies demonstrate kappa light chain restriction.  The increased proliferation rate has been  "described.  CD21 and CD23 highlight residual follicular dendritic cell meshworks.  BCL6 is negative for lymphomas of germinal cell origin.  CD3 highlights background T cells.  CD30 and RANDY are negative for classical Hodgkin lymphoma.  An Dominic keratin stain is negative for carcinoma.        Clinical Information       Clinical history:  Mantel cell lymphoma of intrapelvic lymph nodes.  Reason for procedure:  Tissue type      Gross Description       A(A). Lymph Node(s), Pelvis, Left, :  Biopsy was performed under CT guidance by Dr. Justin Chand with 7 pass(es) from which:                 5 Air-dried smear(s)  1 vial with multiple core(s) in 10% Formalin   Specimen in 10% Formalin at 0912 on 1/9/24  1 core block(s) are prepared at [facility].    Assisted by:  KATHLEEN Rizzo    GROSS DESCRIPTION:  The specimen consists of multiple tan needle core biopsies ranging in size from 0.3 to 1.1 cm in length.  TE-1C  Time placed in formalin:  0912    IMMEDIATE CYTOLOGIC EVALUATION (CORE IMPRINTS):  \"Numerous lymphocytes; additional FNA collected in RPMI for flow cytometry.\"   Dr. Lynn Hutchinson MD      Microscopic Description       Microscopic examination performed, substantiating the above diagnosis.       Disclaimer       Analyte Specific Reagents (ASRs) are used in many laboratory tests necessary for standard medical care and generally do not require FDA approval. This test was developed and its performance characteristics determined by [facility]. It has not been cleared or approved by the U.S. Food and Drug Administration.  [facility] are certified for the performance of high-complexity clinical testing under the Clinical Laboratory Improvement Amendments of 1988 (CLIA), and in keeping with the certification requirements, the laboratory has verified this test's accuracy, precision and/or validity of the method.        MCRS Yes (A) N/A    Performing Labs   "     The technical component of this testing was completed at [facility]      Case Images     Flow Cytometry    Specimen: Lymph Node(s), Pelvis, Left; Tissue   Result Value Ref Range    Case Report       Flow Cytometry Report                             Case: [id number]                                  Authorizing Provider:  Yelena Starks MD Collected:           01/09/2024 09:12 AM          Ordering Location:     [facility]      Received:            01/09/2024 10:05 AM                                 [facility]                                                           Pathologist:           Val Palma MD                                                           Specimen:    Lymph Node(s), Pelvis, Left                                                                Flow Interpretation       A. Lymph Node(s), Pelvis, Left:  -CD5-positive kappa monotypic B cells (77%)  -No aberrant immunophenotype on T cells  See comment        Comment       The immunophenotype is consistent with the patient's history of a diagnosis of mantle cell lymphoma. Final interpretation requires correlation with results of other ancillary studies, morphologic, and clinical features.         Flow Phenotypic Data       89% of total events are CD45 positive and are viable by 7-AAD. A low percentage may be caused by low viability and/or a low number of CD45 positive cells. Of the CD45 positive leukocytes, 91% are viable by 7-AAD.      Unless otherwise indicated, percentages reported below are based on the total number of CD45 positive viable leukocytes. If applicable, percentage of plasma cells is from total viable nucleated cells.    77% B cells which express CD5, CD19, CD20 and CD38 and monotypic kappa (bright) immunoglobulin light chains and lack CD10. The B cells have increased forward scatter relative to background T cells suggestive of increased  size; however, precise size determination is deferred to morphology.    Also present are:  7% polytypic B cells  11% T cells with a CD4:CD8 ratio of 1.3:1.   0.2% NK cells    Case was reviewed by the following:  Pathology Fellow: Ghanshyam Ash MD  A resident/fellow was involved in the selection of testing, review of flow scattergrams, and/or interpretation of this case.  I, as the senior physician, attest that I: (i) confirmed appropriate testing, (ii) examined the relevant flow scattergrams for the specimen(s); and (ii) rendered or confirmed the interpretation(s).      Flow Processing Information       Multi-color flow analysis is performed for the following markers: CD2, CD3, CD4, CD5, CD7, CD8, CD10, CD16, CD19, CD20, CD34, CD38, CD45, CD56, CD57, and kappa and lambda immunoglobulin light chains. Cells are gated to isolate populations (CD45 versus side scatter and forward scatter versus side scatter), to exclude debris (forward scatter versus side scatter) and to exclude cell doublets (forward scatter height versus forward scatter width and side scatter height versus side scatter width). Forward scatter varies with cell size. Side scatter varies with the amount of cytoplasmic granules. Intensity for CD45 usually increases as hematolymphoid cells mature.       Clinical Information       58 yrs old female with enlarged pelvic node and a history of mantle cell lymphoma      FDA Disclaimer       This test was developed and its performance characteristics determined by the [facility]. It has not been cleared or approved by the US Food and Drug Administration.  FDA does not require this test to go through premarket FDA review. This test is used for clinical purposes and should not be regarded as investigational or for research. This laboratory is certified under the Clinical Laboratory Improvement Amendments (CLIA) as qualified to perform high complexity  clinical laboratory testing.      MCRS Yes (A) N/A    Performing Labs       The technical component of this testing was completed at [facility]     Dual Hit Lymphoma Panel   Result Value Ref Range    See Scanned Result See Scanned Result    Mantle Cell   Result Value Ref Range    See Scanned Result See Scanned Result        Imaging    CT Abdomen Retroperitoneal Biopsy    Result Date: 1/9/2024  EXAM: 1. PERCUTANEOUS BIOPSY LEFT PELVIC LYMPH NODE 2. CT GUIDANCE 3. CONSCIOUS SEDATION LOCATION: [facility] DATE: 1/9/2024 INDICATION: Mental cell lymphoma with enlarging pelvic sidewall lymph node, which is FDG avid. TECHNIQUE: Dose reduction techniques were used. PROCEDURE: Informed consent obtained. Site marked. Prior images reviewed. Required items made available. Patient identity confirmed verbally and with arm band. Patient reevaluated immediately before administering sedation. Universal protocol was followed. Time out performed. The site was prepped and draped in sterile fashion. 10 mL of 1% lidocaine was infused into the local soft tissues. Using standard technique and under direct CT guidance, an 18-gauge biopsy device was used to obtain 5 core biopsies. Tissue was submitted to Pathology, who deemed the cores adequate by preliminary review. However, at the request of the pathologist, 2 FNA samples were obtained for flow cytometry analysis. The patient tolerated the procedure well. No immediate complications. SEDATION: Versed 3 mg. Fentanyl 150 mcg. The procedure was performed with administration intravenous conscious sedation with appropriate preoperative, intraoperative, and postoperative evaluation. 53 minutes of supervised face to face conscious sedation time was provided by a radiology nurse under my direct supervision.     IMPRESSION: Successful CT-guided biopsy of left pelvic sidewall lymph node.     PET Oncology (Eyes to  Thighs)    Result Date: 12/29/2023  EXAM: PET ONCOLOGY (EYES TO THIGHS) LOCATION: [facility] DATE: 12/29/2023 INDICATION: Subsequent treatment strategy for restaging Mantle cell lymphoma of intra-abdominal lymph nodes. COMPARISON: CT 12/19/2023, FDG PET/CT 09/11/2020 CONTRAST: None TECHNIQUE: Serum glucose level 81 mg/dL. One hour post intravenous administration of 17.86 mCi F-18 FDG, PET imaging was performed from the skull vertex to mid thighs, utilizing attenuation correction with concurrent axial CT and PET/CT image fusion. Dose reduction techniques were used. PET/CT FINDINGS: FDG avid lymphadenopathy including near the aortic bifurcation (SUV max 6.5) and left iliac chain lymph nodes with reference examples including a 1.4 x 1.1 cm left common iliac lymph node (SUV max 9.5) and a 3.8 x 3.8 cm left internal iliac lymph node (SUV max 13.6) suspicious for recurrent lymphoma (Deauville 5). Prominent but symmetric FDG uptake within the bilateral palatine tonsils appear similar to prior and is favored physiologic. CT FINDINGS: Mild senescent intracranial changes. Right chest wall port catheter with tip in the right atrium. Multiple bilateral non-FDG avid breast nodules appear similar to prior. Linear atelectasis or scarring in the lingula. Few small probable hepatic cysts. The spleen is normal in size and FDG uptake. Distal colonic diverticulosis. Multilevel degenerative changes in the spine with postoperative changes in the cervical spine. Subcutaneous stranding and small locules of gas in the anterior abdominal wall typical of medication injection sites. Small intramuscular lipoma within the right anterior thigh.     IMPRESSION: FDG avid lymphadenopathy near the aortic bifurcation and involving left iliac chain lymph nodes suspicious for recurrent lymphoma (Deauville 5).    CT Chest Abdomen Pelvis w/o Contrast    Result Date: 12/19/2023  EXAM: CT CHEST ABDOMEN PELVIS W/O CONTRAST LOCATION:  [facility] DATE: 12/19/2023 INDICATION: Follow-up lymphoma COMPARISON: CT exams 06/26/2023, 1/9/2023 and 3/21/2022 TECHNIQUE: CT scan of the chest, abdomen, and pelvis was performed without IV contrast. Multiplanar reformats were obtained. Dose reduction techniques were used. CONTRAST: None. FINDINGS: LUNGS AND PLEURA: The central airways are clear. Mild parenchymal scarring. No mass, suspicious nodule or pleural effusion. MEDIASTINUM/AXILLAE: Multiple stable bilateral calcified and noncalcified solid breast nodules, likely fibroadenomas. Right IJ port catheter. No thoracic adenopathy. Normal heart size and no pericardial effusion. CORONARY ARTERY CALCIFICATION: None. HEPATOBILIARY: Stable benign 9 mm segment 7/8 hypodense hepatic lesion and stable subcentimeter lower right hepatic lobe lesions, likely cysts. Normal gallbladder and bile ducts. PANCREAS: Normal. SPLEEN: Normal. ADRENAL GLANDS: Normal. KIDNEYS/BLADDER: Symmetric mild bilateral renal atrophy. No hydronephrosis or hydroureter. BOWEL: Duodenal diverticula. Normal appendix. Colonic diverticulosis. No free fluid. LYMPH NODES: Interval development of mild iliac chain adenopathy. A dominant node adjacent to the aortic bifurcation measures 1.6 x 1.8 cm, previously 0.4 x 0.5 cm. A left common iliac chain lymph node measures 1.2 x 1.3 cm, previously 0.2 x 0.6 cm. A left internal iliac chain lymph node measures 4.1 x 4.5 cm. No mesenteric or upper abdominal adenopathy. Presumed right lower quadrant subcutaneous injections with skin thickening, subcutaneous edema and small air pockets. VASCULATURE: Unremarkable. PELVIC ORGANS: Stable incidental 10 mm left ovarian cyst. MUSCULOSKELETAL: Incidental 2 cm intramuscular lipoma within the right anterior thigh compartment. Spinal degenerative changes. No suspicious osseous lesion.     IMPRESSION: 1.  Interval development of pelvic lymphadenopathy likely representing recurrent lymphoma. A  dominant left internal iliac chain lymph node measures up to 4.5 cm. 2.  No evidence for recurrent lymphoma within the chest or abdomen.     Total time spent with patient in face to face time, chart review and documentation was 30 minutes.  Discussed with Dr. Gill      Signed by: CORNELIUS Post CNP      Again, thank you for allowing me to participate in the care of your patient.        Sincerely,        CORNELIUS Post CNP

## 2024-01-15 NOTE — PROGRESS NOTES
"Oncology Rooming Note    January 15, 2024 2:02 PM   Avis Paul is a 58 year old female who presents for:    Chief Complaint   Patient presents with    Oncology Clinic Visit     Return Mantle cell lymphoma of intra-abdominal lymph nodes, review Biopsy     Initial Vitals: /75 (BP Location: Left arm, Patient Position: Sitting, Cuff Size: Adult Regular)   Pulse 75   Temp 98  F (36.7  C) (Oral)   Resp 24   SpO2 99%  Estimated body mass index is 48.87 kg/m  as calculated from the following:    Height as of 12/19/23: 1.702 m (5' 7\").    Weight as of 12/19/23: 141.5 kg (312 lb). There is no height or weight on file to calculate BSA.  Moderate Pain (4) Comment: Data Unavailable   No LMP recorded. Patient is postmenopausal.  Allergies reviewed: Yes  Medications reviewed: Yes    Medications: Medication refills not needed today.  Pharmacy name entered into [employer]: [facility] [place] [address] [address] [facility]    Frailty Screening:   Is the patient here for a new oncology consult visit in cancer care? 1. Yes. Over the past month, have you experienced difficulty or required a caregiver to assist with:   1. Balance, walking or general mobility (including any falls)? NO  2. Completion of self-care tasks such as bathing, dressing, toileting, grooming/hygiene?  NO  3. Concentration or memory that affects your daily life?  NO       Clinical concerns:  Return Mantle cell lymphoma of intra-abdominal lymph nodes, review Biopsy      Malu Adams CMA            "

## 2024-01-15 NOTE — PROGRESS NOTES
[facility] Hematology and Oncology Progress Note    Patient: Avis Paul  MRN: [id number]  Date of Service: Lloyd 15, 2024          Reason for Visit    Chief Complaint   Patient presents with    Oncology Clinic Visit     Return Mantle cell lymphoma of intra-abdominal lymph nodes, review Biopsy       Assessment and Plan     Cancer Staging   No matching staging information was found for the patient.      1. Mantle cell lymphoma status post autologous transplant, August 18, 2015: Then completed 2 years of maintenance Rituxan therapy.  She is currently on observation.   She was found to have some lymphadenopathy in December and had a PET scan that showed there was some worrisome lymph nodes.  She had a biopsy and it does confirm a recurrent mantle cell lymphoma.  Clinically patient says she is starting to feel some lymphadenopathy in her pelvis.  She says she feels like her underwear line is starting to get tight and she is feeling a little uncomfortable.  After discussion with Dr. Gill since Dr. Starks is on vacation he suggested her going back to the [facility] to discuss CAR-T therapy.  Patient would like to go ASAP so we will make a referral and have our care coordinator call down and see if they can get an appointment as soon as possible.  Patient was evaluated for this in 2020 when we thought she had relapse but it was proven that she probably did not have relapse so that was put on hold.     2. Hypogammaglobulinemia with multiple recurrent infections:   On IVIG. This has decreased infections. Doing at home. Continue for now.      3. Mild left inguinal adenopathy found in May 2020: pt had 2 biopsies. Initial one suggested relapse of lymphoma, but review at [facility] was negative. Pt treatment with one month of ibrutinib in the meantime and lymph node resolved.  Has been stable since that time, but now having adenopathy with relapse.          ECOG Performance    0 - Independent    Distress  Screening (within last 30 days)    No data recorded     Pain  Pain Score: Moderate Pain (4)    Problem List    Patient Active Problem List   Diagnosis    Lymphoma, mantle cell, multiple sites (H)    H/O renal impairment    NHL (non-Hodgkin's lymphoma) (H)    Neutropenic fever  (H24)    Hypogammaglobulinemia (H24)    Anemia due to antineoplastic chemotherapy    Drug-induced thrombocytopenia    Constipation    Morbid obesity (H)    Elevation of level of transaminase or lactic acid dehydrogenase (LDH)    Malaise and fatigue    Postoperative anemia due to acute blood loss    CKD (chronic kidney disease) stage 4, GFR 15-29 ml/min (H)    2019 novel coronavirus disease (COVID-19)    Hypogammaglobulinemia, acquired (H24)    Accidental fall    Maintenance antineoplastic chemotherapy    Acquired hypothyroidism    Obstructive sleep apnea syndrome        ______________________________________________________________________________    History of Present Illness    Measurable disease: Patient currently in remission. Disease previously found on CT, PET     Current therapy: Observation  IVIG resumed in January 2017 and stopped in May 2018; resumed again in September 2018  Maintenance Rituxan started December 2015, and completed August 2017, 8 doses  Acyclovir 400 mg twice daily  IVIG every 3 months, first dose August 29, 2016(currently on hold, last dose November 2016)   IVIG every 4 weeks restarted in June 2017 and stopped in May 2018        Treatment history:  Ibrutinib 420 mg p.o. daily started for presumed relapse of mantle cell lymphoma  July 3, 2020 and stopped August 4, 2020     First set of immunizations received at the [place] in August 2016  First dose of IVIG  Patient completed 1 year of inhalational pentamidine  Autologous stem cell transplant with Beam prep, August 18, 2015  One cycle of R-ICE, Naye 15, 2015, ifosfamide and etoposide reduced by 25% because of renal dysfunction  Ibrutinib since October 10, 2014,  current dose of 420 mg by mouth daily, stopped June 9, 2015   3 cycles of R-DHAP completed late August 2014   5 cycles of R CHOP, started after diagnosis in March 2014     Interim History: Is here today for a follow-up visit and to review biopsy results.  Patient is quite anxious to get the results.  She did see them on MyChart and wants to know what the plan will be.  She says she feels like her lymph nodes are getting bigger in her pelvis and she can she is feeling more uncomfortable.  She says she is feeling that her underwear line is getting tighter.  She denies any constitutional symptoms but does feel fatigued.  She is anxious about getting going on treatment.         Review of Systems    Pertinent items are noted in HPI.    Past History    Past Medical History:   Diagnosis Date    Anemia     Cancer (H) 2014    Mantle Cell Lymphoma    Chronic kidney disease     elevated creatinine due to chemo    Cough 06/19/2017    Overview:  Created by Conversion    Dehydration 08/12/2014    Fever 06/19/2017    Overview:  Created by Conversion    History of anesthesia complications 2015    urinary retention after lymph node excision. required catheterization    History of blood transfusion     reaction to platelets with hives in the past    History of transfusion     Hypothyroid     Hypothyroidism 2007    Kidney stones 2002    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 Cooperative in conversation. Here alone.    RESP: Regular respiratory rate. No expiratory wheezes   MUSCULOSKELETAL: no bilateral leg swelling  NEURO: non focal. Alert and oriented x3.   PSYCH: within normal limits. No depression or anxiety.  SKIN: exposed skin is dry intact.     Lab Results    Recent Results (from the past 168 hour(s))   Hemoglobin   Result Value Ref Range    Hemoglobin 14.3 11.7 - 15.7 g/dL   Platelet count   Result Value Ref Range    Platelet Count 114 (L) 150 - 450 10e3/uL   INR   Result Value Ref Range    INR 1.05 0.85 - 1.15   Surgical Pathology Exam   Result Value Ref Range    Case Report       Surgical Pathology Report                         Case: [id number]                                  Authorizing Provider:  Yelena Starks MD Collected:           01/09/2024 09:12 AM          Ordering Location:     [facility]      Received:            01/09/2024 09:57 AM                                 [facility] CT                                                           Pathologist:           Randy Love MD                                                        Specimen:    Lymph Node(s), Pelvis, Left                                                                Addendum       This case was submitted to [facility] for additional testing as requested by Dr. Randy Love. See scanned Cytogenetics Report (accession #[id number]).      Final Diagnosis       A(A). Lymph Node(s), Pelvis, Left, :  -Mantle cell lymphoma with increased proliferation rate (at least 30-40%)  - Pending FISH ([facility])  - Pending flow cytometry ([facility])        Comment       The clinical history has been reviewed; I favor an increased proliferation rate by Ki-67 of at least 30-40% and it may be as high as 50-60% in some areas.  I am happy to review any subsequent data.    Cytology and histology demonstrate atypical lymphoid infiltrate composed predominantly of small to medium sized mature forms.  The tumor cells are positive for CD20,  "Bcl-2 and cyclin D1.  Kappa and lambda in situ hybridization studies demonstrate kappa light chain restriction.  The increased proliferation rate has been described.  CD21 and CD23 highlight residual follicular dendritic cell meshworks.  BCL6 is negative for lymphomas of germinal cell origin.  CD3 highlights background T cells.  CD30 and RANDY are negative for classical Hodgkin lymphoma.  An Dominic keratin stain is negative for carcinoma.        Clinical Information       Clinical history:  Mantel cell lymphoma of intrapelvic lymph nodes.  Reason for procedure:  Tissue type      Gross Description       A(A). Lymph Node(s), Pelvis, Left, :  Biopsy was performed under CT guidance by Dr. Justin Chand with 7 pass(es) from which:                 5 Air-dried smear(s)  1 vial with multiple core(s) in 10% Formalin   Specimen in 10% Formalin at 0912 on 1/9/24  1 core block(s) are prepared at [facility].    Assisted by:  KATHLEEN Rizzo    GROSS DESCRIPTION:  The specimen consists of multiple tan needle core biopsies ranging in size from 0.3 to 1.1 cm in length.  TE-1C  Time placed in formalin:  0912    IMMEDIATE CYTOLOGIC EVALUATION (CORE IMPRINTS):  \"Numerous lymphocytes; additional FNA collected in RPMI for flow cytometry.\"   Dr. Lynn Hutchinson MD      Microscopic Description       Microscopic examination performed, substantiating the above diagnosis.       Disclaimer       Analyte Specific Reagents (ASRs) are used in many laboratory tests necessary for standard medical care and generally do not require FDA approval. This test was developed and its performance characteristics determined by [facility]. It has not been cleared or approved by the U.S. Food and Drug Administration.  [facility] are certified for the performance of high-complexity clinical testing under the Clinical Laboratory Improvement Amendments of 1988 (CLIA), and in keeping with the " certification requirements, the laboratory has verified this test's accuracy, precision and/or validity of the method.        MCRS Yes (A) N/A    Performing Labs       The technical component of this testing was completed at [facility]      Case Images     Flow Cytometry    Specimen: Lymph Node(s), Pelvis, Left; Tissue   Result Value Ref Range    Case Report       Flow Cytometry Report                             Case: [id number]                                  Authorizing Provider:  Yelena Starks MD Collected:           01/09/2024 09:12 AM          Ordering Location:     [facility]      Received:            01/09/2024 10:05 AM                                 [facility]                                                           Pathologist:           Val Palma MD                                                           Specimen:    Lymph Node(s), Pelvis, Left                                                                Flow Interpretation       A. Lymph Node(s), Pelvis, Left:  -CD5-positive kappa monotypic B cells (77%)  -No aberrant immunophenotype on T cells  See comment        Comment       The immunophenotype is consistent with the patient's history of a diagnosis of mantle cell lymphoma. Final interpretation requires correlation with results of other ancillary studies, morphologic, and clinical features.         Flow Phenotypic Data       89% of total events are CD45 positive and are viable by 7-AAD. A low percentage may be caused by low viability and/or a low number of CD45 positive cells. Of the CD45 positive leukocytes, 91% are viable by 7-AAD.      Unless otherwise indicated, percentages reported below are based on the total number of CD45 positive viable leukocytes. If applicable, percentage of plasma cells is from total viable nucleated cells.    77% B cells which express CD5, CD19, CD20 and CD38 and  monotypic kappa (bright) immunoglobulin light chains and lack CD10. The B cells have increased forward scatter relative to background T cells suggestive of increased size; however, precise size determination is deferred to morphology.    Also present are:  7% polytypic B cells  11% T cells with a CD4:CD8 ratio of 1.3:1.   0.2% NK cells    Case was reviewed by the following:  Pathology Fellow: Ghanshyam Ash MD  A resident/fellow was involved in the selection of testing, review of flow scattergrams, and/or interpretation of this case.  I, as the senior physician, attest that I: (i) confirmed appropriate testing, (ii) examined the relevant flow scattergrams for the specimen(s); and (ii) rendered or confirmed the interpretation(s).      Flow Processing Information       Multi-color flow analysis is performed for the following markers: CD2, CD3, CD4, CD5, CD7, CD8, CD10, CD16, CD19, CD20, CD34, CD38, CD45, CD56, CD57, and kappa and lambda immunoglobulin light chains. Cells are gated to isolate populations (CD45 versus side scatter and forward scatter versus side scatter), to exclude debris (forward scatter versus side scatter) and to exclude cell doublets (forward scatter height versus forward scatter width and side scatter height versus side scatter width). Forward scatter varies with cell size. Side scatter varies with the amount of cytoplasmic granules. Intensity for CD45 usually increases as hematolymphoid cells mature.       Clinical Information       58 yrs old female with enlarged pelvic node and a history of mantle cell lymphoma      FDA Disclaimer       This test was developed and its performance characteristics determined by the [facility]. It has not been cleared or approved by the US Food and Drug Administration.  FDA does not require this test to go through premarket FDA review. This test is used for clinical purposes and should not be regarded  as investigational or for research. This laboratory is certified under the Clinical Laboratory Improvement Amendments (CLIA) as qualified to perform high complexity clinical laboratory testing.      MCRS Yes (A) N/A    Performing Labs       The technical component of this testing was completed at [facility]     Dual Hit Lymphoma Panel   Result Value Ref Range    See Scanned Result See Scanned Result    Mantle Cell   Result Value Ref Range    See Scanned Result See Scanned Result        Imaging    CT Abdomen Retroperitoneal Biopsy    Result Date: 1/9/2024  EXAM: 1. PERCUTANEOUS BIOPSY LEFT PELVIC LYMPH NODE 2. CT GUIDANCE 3. CONSCIOUS SEDATION LOCATION: [facility] DATE: 1/9/2024 INDICATION: Mental cell lymphoma with enlarging pelvic sidewall lymph node, which is FDG avid. TECHNIQUE: Dose reduction techniques were used. PROCEDURE: Informed consent obtained. Site marked. Prior images reviewed. Required items made available. Patient identity confirmed verbally and with arm band. Patient reevaluated immediately before administering sedation. Universal protocol was followed. Time out performed. The site was prepped and draped in sterile fashion. 10 mL of 1% lidocaine was infused into the local soft tissues. Using standard technique and under direct CT guidance, an 18-gauge biopsy device was used to obtain 5 core biopsies. Tissue was submitted to Pathology, who deemed the cores adequate by preliminary review. However, at the request of the pathologist, 2 FNA samples were obtained for flow cytometry analysis. The patient tolerated the procedure well. No immediate complications. SEDATION: Versed 3 mg. Fentanyl 150 mcg. The procedure was performed with administration intravenous conscious sedation with appropriate preoperative, intraoperative, and postoperative evaluation. 53 minutes of supervised face to face conscious sedation time was  provided by a radiology nurse under my direct supervision.     IMPRESSION: Successful CT-guided biopsy of left pelvic sidewall lymph node.     PET Oncology (Eyes to Thighs)    Result Date: 12/29/2023  EXAM: PET ONCOLOGY (EYES TO THIGHS) LOCATION: [facility] DATE: 12/29/2023 INDICATION: Subsequent treatment strategy for restaging Mantle cell lymphoma of intra-abdominal lymph nodes. COMPARISON: CT 12/19/2023, FDG PET/CT 09/11/2020 CONTRAST: None TECHNIQUE: Serum glucose level 81 mg/dL. One hour post intravenous administration of 17.86 mCi F-18 FDG, PET imaging was performed from the skull vertex to mid thighs, utilizing attenuation correction with concurrent axial CT and PET/CT image fusion. Dose reduction techniques were used. PET/CT FINDINGS: FDG avid lymphadenopathy including near the aortic bifurcation (SUV max 6.5) and left iliac chain lymph nodes with reference examples including a 1.4 x 1.1 cm left common iliac lymph node (SUV max 9.5) and a 3.8 x 3.8 cm left internal iliac lymph node (SUV max 13.6) suspicious for recurrent lymphoma (Deauville 5). Prominent but symmetric FDG uptake within the bilateral palatine tonsils appear similar to prior and is favored physiologic. CT FINDINGS: Mild senescent intracranial changes. Right chest wall port catheter with tip in the right atrium. Multiple bilateral non-FDG avid breast nodules appear similar to prior. Linear atelectasis or scarring in the lingula. Few small probable hepatic cysts. The spleen is normal in size and FDG uptake. Distal colonic diverticulosis. Multilevel degenerative changes in the spine with postoperative changes in the cervical spine. Subcutaneous stranding and small locules of gas in the anterior abdominal wall typical of medication injection sites. Small intramuscular lipoma within the right anterior thigh.     IMPRESSION: FDG avid lymphadenopathy near the aortic bifurcation and involving left iliac chain lymph nodes  suspicious for recurrent lymphoma (Deauville 5).    CT Chest Abdomen Pelvis w/o Contrast    Result Date: 12/19/2023  EXAM: CT CHEST ABDOMEN PELVIS W/O CONTRAST LOCATION: [facility] DATE: 12/19/2023 INDICATION: Follow-up lymphoma COMPARISON: CT exams 06/26/2023, 1/9/2023 and 3/21/2022 TECHNIQUE: CT scan of the chest, abdomen, and pelvis was performed without IV contrast. Multiplanar reformats were obtained. Dose reduction techniques were used. CONTRAST: None. FINDINGS: LUNGS AND PLEURA: The central airways are clear. Mild parenchymal scarring. No mass, suspicious nodule or pleural effusion. MEDIASTINUM/AXILLAE: Multiple stable bilateral calcified and noncalcified solid breast nodules, likely fibroadenomas. Right IJ port catheter. No thoracic adenopathy. Normal heart size and no pericardial effusion. CORONARY ARTERY CALCIFICATION: None. HEPATOBILIARY: Stable benign 9 mm segment 7/8 hypodense hepatic lesion and stable subcentimeter lower right hepatic lobe lesions, likely cysts. Normal gallbladder and bile ducts. PANCREAS: Normal. SPLEEN: Normal. ADRENAL GLANDS: Normal. KIDNEYS/BLADDER: Symmetric mild bilateral renal atrophy. No hydronephrosis or hydroureter. BOWEL: Duodenal diverticula. Normal appendix. Colonic diverticulosis. No free fluid. LYMPH NODES: Interval development of mild iliac chain adenopathy. A dominant node adjacent to the aortic bifurcation measures 1.6 x 1.8 cm, previously 0.4 x 0.5 cm. A left common iliac chain lymph node measures 1.2 x 1.3 cm, previously 0.2 x 0.6 cm. A left internal iliac chain lymph node measures 4.1 x 4.5 cm. No mesenteric or upper abdominal adenopathy. Presumed right lower quadrant subcutaneous injections with skin thickening, subcutaneous edema and small air pockets. VASCULATURE: Unremarkable. PELVIC ORGANS: Stable incidental 10 mm left ovarian cyst. MUSCULOSKELETAL: Incidental 2 cm intramuscular lipoma within the right anterior thigh compartment.  Spinal degenerative changes. No suspicious osseous lesion.     IMPRESSION: 1.  Interval development of pelvic lymphadenopathy likely representing recurrent lymphoma. A dominant left internal iliac chain lymph node measures up to 4.5 cm. 2.  No evidence for recurrent lymphoma within the chest or abdomen.     Total time spent with patient in face to face time, chart review and documentation was 30 minutes.  Discussed with Dr. Gill      Signed by: CORNELIUS Post CNP

## 2024-01-15 NOTE — PROGRESS NOTES
[facility]: Cancer Care                                                                                          Referral to [facility] for Car-T therapy has been faxed to [place]'s Medical Oncology department. They will review and reach out to the patient in a few days.     Fax: [phone]    Signature:  Cecy Gan RN Care Coordinator  [facility]  Cancer [facility]

## 2024-01-16 ENCOUNTER — PATIENT OUTREACH (OUTPATIENT)
Dept: CARE COORDINATION | Facility: CLINIC | Age: 59
End: 2024-01-16
Payer: COMMERCIAL

## 2024-01-16 ENCOUNTER — TELEPHONE (OUTPATIENT)
Dept: ONCOLOGY | Facility: CLINIC | Age: 59
End: 2024-01-16
Payer: COMMERCIAL

## 2024-01-16 NOTE — PROGRESS NOTES
CLINICAL NUTRITION SERVICES- ONCOLOGY DISTRESS SCREENING     Identified Concern and Score From Distress Screenin. How concerned are you about your ability to eat? :  0  2. How concerned are you about unintended weight loss or your current weight? : 8     Date of Distress Screenin/15     Findings: Phone call with Avis. Patient declined nutrition visit at this time, but she may be interested at a later time.      Intervention: None at this time      Follow-up Required: As needed.     Mariana Sharpe RD, LD  [address] office [phone]

## 2024-01-16 NOTE — TELEPHONE ENCOUNTER
Social Work - Distress Screen Intervention  [facility]    Identified Concern and Score from Distress Screenin. How concerned are you about your ability to eat? 0     2. How concerned are you about unintended weight loss or your current weight? (!) 8     3. How concerned are you about feeling depressed or very sad?  7     4. How concerned are you about feeling anxious or very scared?  6     5. Do you struggle with the loss of meaning and leidy in your life?  Not at all     6. How concerned are you about work and home life issues that may be affected by your cancer?  3     7. How concerned are you about knowing what resources are available to help you?  (!) 10     8. Do you currently have what you would describe as Gnosticist or spiritual struggles? Not at all     9. If you want to be contacted by one of our professionals, I can send a message to them right now.  No data recorded     Date of Distress Screen: 1/15/24  Data: At time of last visit, patient scored positive on distress screening.  outreached to patient today to follow up on elevated distress and introduce psychosocial services and support.  Intervention/Education provided:  contacted patient by phone to discuss distress screening results. Avis shared that she has had cancer two times and now a third recurrence. She wanted to connect with  re grants/support available. Let her know that she may be able to do a financial seminar through [employer] for gift cards, but now their grants are 1x. Also encouraged her to apply for grants through [employer]. SW offered to complete medical portion. Provided empathetic listening and emotional support. Encouraged her to reach out with any questions/concerns. SW gave contact information and availability.    Follow-up Required:  will remain available to patient for support as needed    BRODERICK Holloway, [facility]  Adult Oncology  [facility] (M,W), [place] (T) & [place] (Th)  *I am off Friday  Office: [phone]

## 2024-01-17 ENCOUNTER — PATIENT OUTREACH (OUTPATIENT)
Dept: CARE COORDINATION | Facility: CLINIC | Age: 59
End: 2024-01-17
Payer: COMMERCIAL

## 2024-01-17 NOTE — TELEPHONE ENCOUNTER
Social Work - Follow-Up  [facility]    Data/Intervention:    Patient Name: Avis Paul Goes By: Avis    /Age: 1965 (58 year old)    Reason for Follow-Up:  Luis Felipe information    Intervention/Education/Resources Provided:   connected with [employer] re any grants Avis might qualify for. She has utilized all their grants before so unfortunately doesn't qualify for any. Avis is aware of other grants from our conversation yesterday and will reach out to  if support is needed. No other needs at this time.     Assessment/Plan:  Previously provided patient with writer's contact information and availability.    BRODERICK Holloway, [facility]  Adult Oncology Clinics  [place] (M,W), [place] (T) & [place] (Th)  *I am off Friday  Office: [phone]

## 2024-01-19 ENCOUNTER — OFFICE VISIT (OUTPATIENT)
Dept: FAMILY MEDICINE | Facility: CLINIC | Age: 59
End: 2024-01-19
Payer: COMMERCIAL

## 2024-01-19 VITALS
BODY MASS INDEX: 45.99 KG/M2 | HEART RATE: 88 BPM | TEMPERATURE: 97.7 F | WEIGHT: 293 LBS | DIASTOLIC BLOOD PRESSURE: 72 MMHG | RESPIRATION RATE: 20 BRPM | SYSTOLIC BLOOD PRESSURE: 132 MMHG | HEIGHT: 67 IN | OXYGEN SATURATION: 98 %

## 2024-01-19 DIAGNOSIS — Z00.00 ENCOUNTER FOR MEDICARE ANNUAL WELLNESS EXAM: Primary | ICD-10-CM

## 2024-01-19 DIAGNOSIS — N18.4 CKD (CHRONIC KIDNEY DISEASE) STAGE 4, GFR 15-29 ML/MIN (H): ICD-10-CM

## 2024-01-19 DIAGNOSIS — E03.9 ACQUIRED HYPOTHYROIDISM: ICD-10-CM

## 2024-01-19 DIAGNOSIS — E03.9 HYPOTHYROIDISM, UNSPECIFIED TYPE: ICD-10-CM

## 2024-01-19 DIAGNOSIS — N76.1 DESQUAMATIVE INFLAMMATORY VAGINITIS: ICD-10-CM

## 2024-01-19 DIAGNOSIS — J01.90 ACUTE SINUSITIS WITH SYMPTOMS > 10 DAYS: ICD-10-CM

## 2024-01-19 PROCEDURE — 99396 PREV VISIT EST AGE 40-64: CPT | Performed by: FAMILY MEDICINE

## 2024-01-19 PROCEDURE — 99213 OFFICE O/P EST LOW 20 MIN: CPT | Mod: 25 | Performed by: FAMILY MEDICINE

## 2024-01-19 RX ORDER — LEVOTHYROXINE SODIUM 112 UG/1
112 TABLET ORAL DAILY
Qty: 90 TABLET | Refills: 3 | Status: SHIPPED | OUTPATIENT
Start: 2024-01-19

## 2024-01-19 RX ORDER — CLINDAMYCIN PHOSPHATE 20 MG/G
CREAM VAGINAL AT BEDTIME
Qty: 120 G | Refills: 3 | Status: SHIPPED | OUTPATIENT
Start: 2024-01-19

## 2024-01-19 RX ORDER — DOXYCYCLINE 100 MG/1
100 CAPSULE ORAL 2 TIMES DAILY
Qty: 28 CAPSULE | Refills: 0 | Status: SHIPPED | OUTPATIENT
Start: 2024-01-19 | End: 2024-02-02

## 2024-01-19 ASSESSMENT — ENCOUNTER SYMPTOMS
ARTHRALGIAS: 1
ABDOMINAL PAIN: 1
FREQUENCY: 0
WEAKNESS: 0
NAUSEA: 0
COUGH: 1
DIZZINESS: 1
NERVOUS/ANXIOUS: 1
HEMATURIA: 0
FEVER: 0
BREAST MASS: 0
CHILLS: 0
JOINT SWELLING: 1
DYSURIA: 0
PARESTHESIAS: 0
HEARTBURN: 0
PALPITATIONS: 0
SHORTNESS OF BREATH: 1
HEMATOCHEZIA: 0
EYE PAIN: 0
HEADACHES: 1
SORE THROAT: 0
MYALGIAS: 1
CONSTIPATION: 0
DIARRHEA: 1

## 2024-01-19 ASSESSMENT — PAIN SCALES - GENERAL: PAINLEVEL: MODERATE PAIN (4)

## 2024-01-19 ASSESSMENT — ACTIVITIES OF DAILY LIVING (ADL): CURRENT_FUNCTION: NO ASSISTANCE NEEDED

## 2024-01-19 NOTE — PROGRESS NOTES
"Preventive Care Visit  [facility]  Sunil Tan MD, Family Medicine  Jan 19, 2024      SUBJECTIVE:   Avis is a 58 year old, presenting for the following:  Physical and Update on health (Cancer is back.)        1/19/2024     1:50 PM   Additional Questions   Roomed by Laila Jasso   Accompanied by self         1/19/2024     1:50 PM   Patient Reported Additional Medications   Patient reports taking the following new medications antibiotic for sore in mouth daily- has not started it yet     Are you in the first 12 months of your Medicare coverage?  No    Healthy Habits:     In general, how would you rate your overall health?  Fair    Frequency of exercise:  2-3 days/week    Duration of exercise:  15-30 minutes    Do you usually eat at least 4 servings of fruit and vegetables a day, include whole grains    & fiber and avoid regularly eating high fat or \"junk\" foods?  No    Taking medications regularly:  Yes    Medication side effects:  Other    Ability to successfully perform activities of daily living:  No assistance needed    Home Safety:  No safety concerns identified    Hearing Impairment:  Difficulty following a conversation in a noisy restaurant or crowded room, feel that people are mumbling or not speaking clearly, need to ask people to speak up or repeat themselves, difficulty understanding soft or whispered speech and difficulty understanding speech on the telephone    In the past 6 months, have you been bothered by leaking of urine? Yes    In general, how would you rate your overall mental or emotional health?  Fair    Additional concerns today:  Yes    Sinus pressures for 2 months. Tried OTC medications.      Today's PHQ-2 Score:       1/19/2024     1:43 PM   PHQ-2 ( 1999 [employer])   Q1: Little interest or pleasure in doing things 1   Q2: Feeling down, depressed or hopeless 1   PHQ-2 Score 2   Q1: Little interest or pleasure in doing things Several days   Q2: Feeling down, " depressed or hopeless Several days   PHQ-2 Score 2           Have you ever done Advance Care Planning? (For example, a Health Directive, POLST, or a discussion with a medical provider or your loved ones about your wishes): Yes, patient states has an Advance Care Planning document and will bring a copy to the clinic.       Fall risk  Fallen 2 or more times in the past year?: No  Any fall with injury in the past year?: No    Cognitive Screening   1) Repeat 3 items (Leader, Season, Table)    2) Clock draw: NORMAL  3) 3 item recall: Recalls 3 objects  Results: 3 items recalled: COGNITIVE IMPAIRMENT LESS LIKELY    Mini-CogTM Copyright S Stacie. Licensed by the author for use in [facility]; reprinted with permission (soob@[email or web address]). All rights reserved.      Do you have sleep apnea, excessive snoring or daytime drowsiness? : yes- sleep apnea    Reviewed and updated as needed this visit by clinical staff   Tobacco  Allergies  Meds              Reviewed and updated as needed this visit by Provider                  Social History     Tobacco Use    Smoking status: Never     Passive exposure: Past    Smokeless tobacco: Never   Substance Use Topics    Alcohol use: No             1/19/2024     1:43 PM   Alcohol Use   Prescreen: >3 drinks/day or >7 drinks/week? Not Applicable          No data to display              Do you have a current opioid prescription? No  Do you use any other controlled substances or medications that are not prescribed by a provider? None          Medication Followup of Clindamycin cream  Taking Medication as prescribed: yes  Side Effects:  None  Medication Helping Symptoms:  yes    Current providers sharing in care for this patient include:   Patient Care Team:  Sunil Tan MD as PCP - General  Yelena Starks MD as MD (Hematology & Oncology)  Carol Rose MD as BMT Physician (BMT - Adult)  Taylor Tinoco RN as Clinic Care Coordinator  (Gastroenterology)  Cecy Gan, RN as Specialty Care Coordinator (Hematology & Oncology)  Monica Campos, NOÉ as Nurse Coordinator (Hematology & Oncology)  Sunil Tan MD as Assigned PCP  Yelena Starks MD as Assigned Cancer Care Provider  Edison Harper MD as Assigned Surgical Provider    The following health maintenance items are reviewed in Epic and correct as of today:  Health Maintenance   Topic Date Due    Pneumococcal Vaccine: Pediatrics (0 to 5 Years) and At-Risk Patients (6 to 64 Years) (2 of 2 - PPSV23 or PCV20) 11/22/2017    MICROALBUMIN  03/16/2023    INFLUENZA VACCINE (1) 09/01/2023    COVID-19 Vaccine (5 - 2023-24 season) 09/01/2023    LIPID  12/12/2023    TSH W/FREE T4 REFLEX  12/12/2023    MEDICARE ANNUAL WELLNESS VISIT  12/16/2023    ANNUAL REVIEW OF HM ORDERS  12/16/2023    HPV TEST  07/08/2024    PAP  07/08/2024    BMP  03/19/2024    HEMOGLOBIN  07/09/2024    MAMMO SCREENING  01/21/2025    ADVANCE CARE PLANNING  07/08/2026    COLORECTAL CANCER SCREENING  03/22/2027    DTAP/TDAP/TD IMMUNIZATION (6 - Td or Tdap) 09/27/2027    PARATHYROID  Completed    PHOSPHORUS  Completed    HEPATITIS C SCREENING  Completed    HIV SCREENING  Completed    PHQ-2 (once per calendar year)  Completed    URINALYSIS  Completed    ALK PHOS  Completed    IPV IMMUNIZATION  Completed    ZOSTER IMMUNIZATION  Completed    HEPATITIS B IMMUNIZATION  Completed    HPV IMMUNIZATION  Aged Out    MENINGITIS IMMUNIZATION  Aged Out    RSV MONOCLONAL ANTIBODY  Aged Out     BP Readings from Last 3 Encounters:   01/19/24 132/72   01/15/24 131/75   01/09/24 112/70    Wt Readings from Last 3 Encounters:   01/19/24 141.2 kg (311 lb 3.2 oz)   12/19/23 141.5 kg (312 lb)   11/27/23 139.7 kg (308 lb)                   FHS-7:       8/31/2021     4:33 PM 1/21/2023     8:52 AM   Breast CA Risk Assessment (FHS-7)   Did any of your first-degree relatives have breast or ovarian cancer? No No   Did any of your relatives  "have bilateral breast cancer? No No   Did any man in your family have breast cancer? No No   Did any woman in your family have breast and ovarian cancer? No No   Did any woman in your family have breast cancer before age 50 y? No Yes   Do you have 2 or more relatives with breast and/or ovarian cancer? No No   Do you have 2 or more relatives with breast and/or bowel cancer? No No     Mammogram Screening: Recommended mammography every 1-2 years with patient discussion and risk factor consideration  Pertinent mammograms are reviewed under the imaging tab.  Review of Systems   Constitutional:  Negative for chills and fever.   HENT:  Positive for congestion, ear pain and hearing loss. Negative for sore throat.    Eyes:  Negative for pain and visual disturbance.   Respiratory:  Positive for cough and shortness of breath.    Cardiovascular:  Negative for chest pain and palpitations.   Gastrointestinal:  Positive for abdominal pain and diarrhea. Negative for constipation and nausea.   Genitourinary:  Positive for pelvic pain, vaginal bleeding and vaginal discharge. Negative for dysuria, frequency, genital sores, hematuria and urgency.   Musculoskeletal:  Positive for arthralgias, joint swelling and myalgias.   Skin:  Negative for rash.   Neurological:  Positive for dizziness and headaches. Negative for weakness.   Psychiatric/Behavioral:  The patient is nervous/anxious.         OBJECTIVE:   /72 (BP Location: Left arm, Patient Position: Sitting, Cuff Size: Adult Regular)   Pulse 88   Temp 97.7  F (36.5  C) (Tympanic)   Resp 20   Ht 1.692 m (5' 6.61\")   Wt 141.2 kg (311 lb 3.2 oz)   SpO2 98%   BMI 49.31 kg/m     Estimated body mass index is 49.31 kg/m  as calculated from the following:    Height as of this encounter: 1.692 m (5' 6.61\").    Weight as of this encounter: 141.2 kg (311 lb 3.2 oz).  Physical Exam  GENERAL: alert and no distress  NECK: no adenopathy, no asymmetry, masses, or scars  RESP: lungs clear to " "auscultation - no rales, rhonchi or wheezes  CV: regular rate and rhythm, normal S1 S2, no S3 or S4, no murmur, click or rub, no peripheral edema  ABDOMEN: soft, nontender, no hepatosplenomegaly, no masses and bowel sounds normal  MS: no gross musculoskeletal defects noted, no edema    Diagnostic Test Results:  Labs reviewed in Epic    ASSESSMENT / PLAN:   Encounter for Medicare annual wellness exam    Desquamative inflammatory vaginitis  Stabel, if needed, refill  - clindamycin (CLEOCIN) 2 % vaginal cream; Place vaginally at bedtime    Hypothyroidism, unspecified type  Stable, recheck and refill  - levothyroxine (SYNTHROID/LEVOTHROID) 112 MCG tablet; Take 1 tablet (112 mcg) by mouth daily    CKD (chronic kidney disease) stage 4, GFR 15-29 ml/min (H)  Seeing [place]    Acquired hypothyroidism  recheck  - TSH WITH FREE T4 REFLEX; Future    Cervical cancer screening  Not today, last pap caused pain and bleeding.    Acute sinusitis with symptoms > 10 days  14 day treatment  - doxycycline hyclate (VIBRAMYCIN) 100 MG capsule; Take 1 capsule (100 mg) by mouth 2 times daily for 14 days    Patient has been advised of split billing requirements and indicates understanding: Yes      Counseling  Reviewed preventive health counseling, as reflected in patient instructions       Regular exercise       Healthy diet/nutrition       Vision screening       Osteoporosis prevention/bone health      BMI  Estimated body mass index is 49.31 kg/m  as calculated from the following:    Height as of this encounter: 1.692 m (5' 6.61\").    Weight as of this encounter: 141.2 kg (311 lb 3.2 oz).   Weight management plan: Discussed healthy diet and exercise guidelines      She reports that she has never smoked. She has been exposed to tobacco smoke. She has never used smokeless tobacco.      Appropriate preventive services were discussed with this patient, including applicable screening as appropriate for fall prevention, nutrition, physical " activity, Tobacco-use cessation, weight loss and cognition.  Checklist reviewing preventive services available has been given to the patient.    Reviewed patients plan of care and provided an AVS. The Basic Care Plan (routine screening as documented in Health Maintenance) for Avis meets the Care Plan requirement. This Care Plan has been established and reviewed with the Patient.          Signed Electronically by: Sunil Tan MD    Identified Health Risks  I have reviewed Opioid Use Disorder and Substance Use Disorder risk factors and made any needed referrals. The patient was provided with suggestions to help her develop a healthy physical lifestyle.  The patient was counseled and encouraged to consider modifying their diet and eating habits. She was provided with information on recommended healthy diet options.  The patient was provided with written information regarding signs of hearing loss.  Information on urinary incontinence and treatment options given to patient.  The patient was provided with suggestions to help her develop a healthy emotional lifestyle.

## 2024-01-19 NOTE — PATIENT INSTRUCTIONS
Patient Education   Personalized Prevention Plan  You are due for the preventive services outlined below.  Your care team is available to assist you in scheduling these services.  If you have already completed any of these items, please share that information with your care team to update in your medical record.  Health Maintenance Due   Topic Date Due     Pneumococcal Vaccine (2 of 2 - PPSV23 or PCV20) 11/22/2017     Kidney Microalbumin Urine Test  03/16/2023     Flu Vaccine (1) 09/01/2023     COVID-19 Vaccine (5 - 2023-24 season) 09/01/2023     Cholesterol Lab  12/12/2023     Thyroid Function Lab  12/12/2023     ANNUAL REVIEW OF HM ORDERS  12/16/2023     HPV Screening  07/08/2024     PAP Smear  07/08/2024     Your Health Risk Assessment indicates you feel you are not in good health    A healthy lifestyle helps keep the body fit and the mind alert. It helps protect you from disease, helps you fight disease, and helps prevent chronic disease (disease that doesn't go away) from getting worse. This is important as you get older and begin to notice twinges in muscles and joints and a decline in the strength and stamina you once took for granted. A healthy lifestyle includes good healthcare, good nutrition, weight control, recreation, and regular exercise. Avoid harmful substances and do what you can to keep safe. Another part of a healthy lifestyle is stay mentally active and socially involved.    Good healthcare   Have a wellness visit every year.   If you have new symptoms, let us know right away. Don't wait until the next checkup.   Take medicines exactly as prescribed and keep your medicines in a safe place. Tell us if your medicine causes problems.   Healthy diet and weight control   Eat 3 or 4 small, nutritious, low-fat, high-fiber meals a day. Include a variety of fruits, vegetables, and whole-grain foods.   Make sure you get enough calcium in your diet. Calcium, vitamin D, and exercise help prevent osteoporosis  (bone thinning).   If you live alone, try eating with others when you can. That way you get a good meal and have company while you eat it.   Try to keep a healthy weight. If you eat more calories than your body uses for energy, it will be stored as fat and you will gain weight.     Recreation   Recreation is not limited to sports and team events. It includes any activity that provides relaxation, interest, enjoyment, and exercise. Recreation provides an outlet for physical, mental, and social energy. It can give a sense of worth and achievement. It can help you stay healthy.    Mental Exercise and Social Involvement  Mental and emotional health is as important as physical health. Keep in touch with friends and family. Stay as active as possible. Continue to learn and challenge yourself.   Things you can do to stay mentally active are:  Learn something new, like a foreign language or musical instrument.   Play SCRABBLE or do crossword puzzles. If you cannot find people to play these games with you at home, you can play them with others on your computer through the Internet.   Join a games club--anything from card games to chess or checkers or lawn bowling.   Start a new hobby.   Go back to school.   Volunteer.   Read.   Keep up with world events.  Learning About Dietary Guidelines  What are the Dietary Guidelines for Americans?     Dietary Guidelines for Americans provide tips for eating well and staying healthy. This helps reduce the risk for long-term (chronic) diseases.  These guidelines recommend that you:  Eat and drink the right amount for you. The U.[place] government's food guide is called MyPlate. It can help you make your own well-balanced eating plan.  Try to balance your eating with your activity. This helps you stay at a healthy weight.  Drink alcohol in moderation, if at all.  Limit foods high in salt, saturated fat, trans fat, and added sugar.  These guidelines are from the U.S. Department of Agriculture  "and the U.S. Department of Health and Human Services. They are updated every 5 years.  What is MyPlate?  MyPlate is the U.[place] government's food guide. It can help you make your own well-balanced eating plan. A balanced eating plan means that you eat enough, but not too much, and that your food gives you the nutrients you need to stay healthy.  MyPlate focuses on eating plenty of whole grains, fruits, and vegetables, and on limiting fat and sugar. It is available online at www.ChooseMyPlate.gov.  How can you get started?  If you're trying to eat healthier, you can slowly change your eating habits over time. You don't have to make big changes all at once. Start by adding one or two healthy foods to your meals each day.  Grains  Choose whole-grain breads and cereals and whole-wheat pasta and whole-grain crackers.  Vegetables  Eat a variety of vegetables every day. They have lots of nutrients and are part of a heart-healthy diet.  Fruits  Eat a variety of fruits every day. Fruits contain lots of nutrients. Choose fresh fruit instead of fruit juice.  Protein foods  Choose fish and lean poultry more often. Eat red meat and fried meats less often. Dried beans, tofu, and nuts are also good sources of protein.  Dairy  Choose low-fat or fat-free products from this food group. If you have problems digesting milk, try eating cheese or yogurt instead.  Fats and oils  Limit fats and oils if you're trying to cut calories. Choose healthy fats when you cook. These include canola oil and olive oil.  Where can you learn more?  Go to https://www.healthThink1stBoxing.com.net/patiented  Enter D676 in the search box to learn more about \"Learning About Dietary Guidelines.\"  Current as of: February 28, 2023               Content Version: 13.8    4942-8219 [employer].   Care instructions adapted under license by your healthcare professional. If you have questions about a medical condition or this instruction, always ask your healthcare " professional. [employer], [facility] disclaims any warranty or liability for your use of this information.      Hearing Loss: Care Instructions  Overview     Hearing loss is a sudden or slow decrease in how well you hear. It can range from slight to profound. Permanent hearing loss can occur with aging. It also can happen when you are exposed long-term to loud noise. Examples include listening to loud music, riding motorcycles, or being around other loud machines.  Hearing loss can affect your work and home life. It can make you feel lonely or depressed. You may feel that you have lost your independence. But hearing aids and other devices can help you hear better and feel connected to others.  Follow-up care is a key part of your treatment and safety. Be sure to make and go to all appointments, and call your doctor if you are having problems. It's also a good idea to know your test results and keep a list of the medicines you take.  How can you care for yourself at home?  Avoid loud noises whenever possible. This helps keep your hearing from getting worse.  Always wear hearing protection around loud noises.  Wear a hearing aid as directed.  A professional can help you pick a hearing aid that will work best for you.  You can also get hearing aids over the counter for mild to moderate hearing loss.  Have hearing tests as your doctor suggests. They can show whether your hearing has changed. Your hearing aid may need to be adjusted.  Use other devices as needed. These may include:  Telephone amplifiers and hearing aids that can connect to a television, stereo, radio, or microphone.  Devices that use lights or vibrations. These alert you to the doorbell, a ringing telephone, or a baby monitor.  Television closed-captioning. This shows the words at the bottom of the screen. Most new TVs can do this.  TTY (text telephone). This lets you type messages back and forth on the telephone instead of talking or listening. These  "devices are also called TDD. When messages are typed on the keyboard, they are sent over the phone line to a receiving TTY. The message is shown on a monitor.  Use text messaging, social media, and email if it is hard for you to communicate by telephone.  Try to learn a listening technique called speechreading. It is not lipreading. You pay attention to people's gestures, expressions, posture, and tone of voice. These clues can help you understand what a person is saying. Face the person you are talking to, and have them face you. Make sure the lighting is good. You need to see the other person's face clearly.  Think about counseling if you need help to adjust to your hearing loss.  When should you call for help?  Watch closely for changes in your health, and be sure to contact your doctor if:    You think your hearing is getting worse.     You have new symptoms, such as dizziness or nausea.   Where can you learn more?  Go to https://www.shopatplaces.net/patiented  Enter R798 in the search box to learn more about \"Hearing Loss: Care Instructions.\"  Current as of: February 28, 2023               Content Version: 13.8    6234-4419 [facility].   Care instructions adapted under license by your healthcare professional. If you have questions about a medical condition or this instruction, always ask your healthcare professional. [facility] disclaims any warranty or liability for your use of this information.      Bladder Training: Care Instructions  Your Care Instructions     Bladder training is used to treat urge incontinence and stress incontinence. Urge incontinence means that the need to urinate comes on so fast that you can't get to a toilet in time. Stress incontinence means that you leak urine because of pressure on your bladder. For example, it may happen when you laugh, cough, or lift something heavy.  Bladder training can increase how long you can wait before you have to urinate. It can " also help your bladder hold more urine. And it can give you better control over the urge to urinate.  It is important to remember that bladder training takes a few weeks to a few months to make a difference. You may not see results right away, but don't give up.  Follow-up care is a key part of your treatment and safety. Be sure to make and go to all appointments, and call your doctor if you are having problems. It's also a good idea to know your test results and keep a list of the medicines you take.  How can you care for yourself at home?  Work with your doctor to come up with a bladder training program that is right for you. You may use one or more of the following methods.  Delayed urination  In the beginning, try to keep from urinating for 5 minutes after you first feel the need to go.  While you wait, take deep, slow breaths to relax. Kegel exercises can also help you delay the need to go to the bathroom.  After some practice, when you can easily wait 5 minutes to urinate, try to wait 10 minutes before you urinate.  Slowly increase the waiting period until you are able to control when you have to urinate.  Scheduled urination  Empty your bladder when you first wake up in the morning.  Schedule times throughout the day when you will urinate.  Start by going to the bathroom every hour, even if you don't need to go.  Slowly increase the time between trips to the bathroom.  When you have found a schedule that works well for you, keep doing it.  If you wake up during the night and have to urinate, do it. Apply your schedule to waking hours only.  Kegel exercises  These tighten and strengthen pelvic muscles, which can help you control the flow of urine. (If doing these exercises causes pain, stop doing them and talk with your doctor.) To do Kegel exercises:  Squeeze your muscles as if you were trying not to pass gas. Or squeeze your muscles as if you were stopping the flow of urine. Your belly, legs, and buttocks  "shouldn't move.  Hold the squeeze for 3 seconds, then relax for 5 to 10 seconds.  Start with 3 seconds, then add 1 second each week until you are able to squeeze for 10 seconds.  Repeat the exercise 10 times a session. Do 3 to 8 sessions a day.  When should you call for help?  Watch closely for changes in your health, and be sure to contact your doctor if:    Your incontinence is getting worse.     You do not get better as expected.   Where can you learn more?  Go to https://www.CompStak.net/patiented  Enter V684 in the search box to learn more about \"Bladder Training: Care Instructions.\"  Current as of: February 28, 2023               Content Version: 13.8    9972-8307 [employer].   Care instructions adapted under license by your healthcare professional. If you have questions about a medical condition or this instruction, always ask your healthcare professional. [employer] disclaims any warranty or liability for your use of this information.      Your Health Risk Assessment indicates you feel you are not in good emotional health.    Recreation   Recreation is not limited to sports and team events. It includes any activity that provides relaxation, interest, enjoyment, and exercise. Recreation provides an outlet for physical, mental, and social energy. It can give a sense of worth and achievement. It can help you stay healthy.    Mental Exercise and Social Involvement  Mental and emotional health is as important as physical health. Keep in touch with friends and family. Stay as active as possible. Continue to learn and challenge yourself.   Things you can do to stay mentally active are:  Learn something new, like a foreign language or musical instrument.   Play SCRABBLE or do crossword puzzles. If you cannot find people to play these games with you at home, you can play them with others on your computer through the Internet.   Join a games club--anything from card games to chess or " checkers or lawn bowling.   Start a new hobby.   Go back to school.   Volunteer.   Read.   Keep up with world events.

## 2024-01-23 ENCOUNTER — MYC MEDICAL ADVICE (OUTPATIENT)
Dept: FAMILY MEDICINE | Facility: CLINIC | Age: 59
End: 2024-01-23
Payer: COMMERCIAL

## 2024-01-23 DIAGNOSIS — E03.9 HYPOTHYROIDISM, UNSPECIFIED TYPE: Primary | ICD-10-CM

## 2024-01-23 DIAGNOSIS — E03.9 ACQUIRED HYPOTHYROIDISM: ICD-10-CM

## 2024-01-30 NOTE — TELEPHONE ENCOUNTER
Routed to provider.  Please see MyChart message from pt.  Any suggestions on how to help this pt?  Gale Terry RN

## 2024-01-31 NOTE — TELEPHONE ENCOUNTER
I called [facility].  They cannot accept reflex test.  Tests need to be ordered individually.    Fax to [phone], [facility].    Orders needs to be printed and signed by provider.    Routed to provider.  Gale Terry RN

## 2024-01-31 NOTE — TELEPHONE ENCOUNTER
Please call [facility]. The test I ordered is the TSH with reflex to T4, but if this is not an option there then just a TSH and Free T4.  Thank you,  Sunil Tan MD

## 2024-02-12 ASSESSMENT — SLEEP AND FATIGUE QUESTIONNAIRES
HOW LIKELY ARE YOU TO NOD OFF OR FALL ASLEEP WHEN YOU ARE A PASSENGER IN A CAR FOR AN HOUR WITHOUT A BREAK: MODERATE CHANCE OF DOZING
HOW LIKELY ARE YOU TO NOD OFF OR FALL ASLEEP WHILE SITTING QUIETLY AFTER LUNCH WITHOUT ALCOHOL: WOULD NEVER DOZE
HOW LIKELY ARE YOU TO NOD OFF OR FALL ASLEEP WHILE SITTING AND TALKING TO SOMEONE: WOULD NEVER DOZE
HOW LIKELY ARE YOU TO NOD OFF OR FALL ASLEEP WHILE WATCHING TV: MODERATE CHANCE OF DOZING
HOW LIKELY ARE YOU TO NOD OFF OR FALL ASLEEP IN A CAR, WHILE STOPPED FOR A FEW MINUTES IN TRAFFIC: WOULD NEVER DOZE
HOW LIKELY ARE YOU TO NOD OFF OR FALL ASLEEP WHILE SITTING AND READING: SLIGHT CHANCE OF DOZING
HOW LIKELY ARE YOU TO NOD OFF OR FALL ASLEEP WHILE SITTING INACTIVE IN A PUBLIC PLACE: WOULD NEVER DOZE
HOW LIKELY ARE YOU TO NOD OFF OR FALL ASLEEP WHILE LYING DOWN TO REST IN THE AFTERNOON WHEN CIRCUMSTANCES PERMIT: SLIGHT CHANCE OF DOZING

## 2024-02-13 NOTE — TELEPHONE ENCOUNTER
----- Message from Jessica Galeana sent at 6/16/2017 12:52 PM CDT -----  Regarding: RE: re: ENT  [phone]. Thanks    Jessica  ----- Message -----     From: Elise Gama, NOÉ     Sent: 6/16/2017  12:49 PM       To: Jessica Joseph  Subject: RE: re: ENT                                      What is the number for ENT?  ----- Message -----     From: Jessica Galeana     Sent: 6/13/2017   4:48 PM       To: Elise Joseph RN  Subject: re: ENT                                          Hi all,    1st available w/ENT is July. Pt want next week. Please call Triage to get double. Thanks    Jessica      
Per dr Rose, start levaquin 500 mg po daily for 14 days. Writer also left message for ENT Triage team to expedite appt. Pt notified of plan on vm, instructed her to call back with questions.   
juan ramon

## 2024-02-19 ENCOUNTER — VIRTUAL VISIT (OUTPATIENT)
Dept: SLEEP MEDICINE | Facility: CLINIC | Age: 59
End: 2024-02-19
Payer: COMMERCIAL

## 2024-02-19 VITALS — WEIGHT: 293 LBS | BODY MASS INDEX: 50.02 KG/M2 | HEIGHT: 64 IN

## 2024-02-19 DIAGNOSIS — G47.33 OSA (OBSTRUCTIVE SLEEP APNEA): Primary | ICD-10-CM

## 2024-02-19 PROCEDURE — 99213 OFFICE O/P EST LOW 20 MIN: CPT | Mod: 95 | Performed by: PHYSICIAN ASSISTANT

## 2024-02-19 ASSESSMENT — PAIN SCALES - GENERAL: PAINLEVEL: MODERATE PAIN (4)

## 2024-02-19 NOTE — PATIENT INSTRUCTIONS
Your Body mass index is 52.87 kg/m .  Weight management is a personal decision.  If you are interested in exploring weight loss strategies, the following discussion covers the approaches that may be successful. Body mass index (BMI) is one way to tell whether you are at a healthy weight, overweight, or obese. It measures your weight in relation to your height.  A BMI of 18.5 to 24.9 is in the healthy range. A person with a BMI of 25 to 29.9 is considered overweight, and someone with a BMI of 30 or greater is considered obese. More than two-thirds of American adults are considered overweight or obese.  Being overweight or obese increases the risk for further weight gain. Excess weight may lead to heart disease and diabetes.  Creating and following plans for healthy eating and physical activity may help you improve your health.  Weight control is part of healthy lifestyle and includes exercise, emotional health, and healthy eating habits. Careful eating habits lifelong are the mainstay of weight control. Though there are significant health benefits from weight loss, long-term weight loss with diet alone may be very difficult to achieve- studies show long-term success with dietary management in less than 10% of people. Attaining a healthy weight may be especially difficult to achieve in those with severe obesity. In some cases, medications, devices and surgical management might be considered.  What can you do?  If you are overweight or obese and are interested in methods for weight loss, you should discuss this with your provider.   Consider reducing daily calorie intake by 500 calories.   Keep a food journal.   Avoiding skipping meals, consider cutting portions instead.    Diet combined with exercise helps maintain muscle while optimizing fat loss. Strength training is particularly important for building and maintaining muscle mass. Exercise helps reduce stress, increase energy, and improves fitness. Increasing  exercise without diet control, however, may not burn enough calories to loose weight.     Start walking three days a week 10-20 minutes at a time  Work towards walking thirty minutes five days a week   Eventually, increase the speed of your walking for 1-2 minutes at time    In addition, we recommend that you review healthy lifestyles and methods for weight loss available through the National Institutes of Health patient information sites:  http://win.niddk.nih.gov/publications/index.htm    And look into health and wellness programs that may be available through your health insurance provider, employer, local community center, or yesenia club.

## 2024-02-19 NOTE — NURSING NOTE
Pt refused PHQ-2 Cancer has returned and she is currently stqaying at [facility] for treatments of Cancer, but not admitted.         Is the patient currently in the state of [place]? YES    Visit mode:VIDEO    If the visit is dropped, the patient can be reconnected by: VIDEO VISIT: Text to cell phone:   Telephone Information:   Mobile [phone]       Will anyone else be joining the visit? NO  (If patient encounters technical issues they should call [phone]    How would you like to obtain your AVS? MyChart    Are changes needed to the allergy or medication list? Pt stated no med changes and none    Reason for visit: RECHECK    Maxine Aguirre VVF  Has patient had flu shot for current/most recent flu season? If so, when? No

## 2024-02-19 NOTE — PROGRESS NOTES
Video-Visit Details    Type of service:  Video Visit    Video Visit Start Time:10:26 AM    Video End Time:10:36 AM    Originating Location (pt. Location): Home      Distant Location (provider location):  On-site     Platform used for Video Visit: [facility]    Sleep Apnea - Follow-up Visit:    Impression/Plan:  Moderate obstructive sleep apnea-  Excellent CPAP compliance and AHI is well controlled on CPAP 14 cm/H20. Daytime symptoms are improved.   Continue current treatment.   Comprehensive DME order placed.     Avis Paul will follow up in about 2 years or sooner if any concerns    21 minutes spent on day of encounter doing chart review,  history and exam, counseling, coordinating plan of care, documentation and further activities as noted above.       Davis Mcgraw PA-C  Sleep Medicine     History of Present Illness:  Chief Complaint   Patient presents with    RECHECK    CPAP Follow Up       Avis Paul presents for follow-up of their moderate sleep apnea, managed with CPAP.     Patient was initially seen at [facility] for snoring and excessive daytime sleepiness.     We received an interpretation of her sleep study:   She had a sleep study done in 2019  (276#)-AHI 15.5, RDI -,  lowest oxygen saturation was 88%, PLMI 149 (39 were associated with cortical arousal), CPAP 10cm/H20    DME [facility]    Do you use a CPAP Machine at home: Yes  Overall, on a scale of 0-10 how would you rate your CPAP (0 poor, 10 great): 8    What type of mask do you use:    Is your mask comfortable: Yes  If not, why:      Is your mask leaking: Yes  If yes, where do you feel it: In mornings, sides of mask seem to leak and puff, noise waking me.up.  How many night per week does the mask leak (0-7): 6    Do you notice snoring with mask on: No  Do you notice gasping arousals with mask on: No  Are you having significant oral or nasal dryness: Yes  Is the pressure setting comfortable: Yes  If not, why:      What is your  typical bedtime: 11pm 12 am  How long does it take you to go to sleep on PAP therapy: 15 min  What time do you typically get out of bed for the day: 8am  How many hours on average per night are you using PAP therapy: 7-8  How many hours are you sleeping per night: 7-8  Do you feel well rested in the morning: Yes      ResMed   CPAP 14 cmH2O 30 day usage data:  100% of days with > 4 hours of use. 0/30 days with no use.   Average use 7:58 minutes per day.   95%ile Leak 37 L/min.   AHI 0.5 events per hour.       EPWORTH SLEEPINESS SCALE         2/12/2024    12:43 PM    [place] Sleepiness Scale ( TROY Warner  [id number]<br>ESS - [place]/English - Final version - 21 Nov 07 - [facility] [place].)   Sitting and reading Slight chance of dozing   Watching TV Moderate chance of dozing   Sitting, inactive in a public place (e.g. a theatre or a meeting) Would never doze   As a passenger in a car for an hour without a break Moderate chance of dozing   Lying down to rest in the afternoon when circumstances permit Slight chance of dozing   Sitting and talking to someone Would never doze   Sitting quietly after a lunch without alcohol Would never doze   In a car, while stopped for a few minutes in traffic Would never doze   D[place] Score (MC) 6   D[place] Score (Sleep) 6       INSOMNIA SEVERITY INDEX (MARCELINO)          2/12/2024    12:40 PM   Insomnia Severity Index (MARCELINO)   Difficulty falling asleep 1   Difficulty staying asleep 1   Problems waking up too early 0   How SATISFIED/DISSATISFIED are you with your CURRENT sleep pattern? 1   How NOTICEABLE to others do you think your sleep problem is in terms of impairing the quality of your life? 0   How WORRIED/DISTRESSED are you about your current sleep problem? 0   To what extent do you consider your sleep problem to INTERFERE with your daily functioning (e.g. daytime fatigue, mood, ability to function at work/daily chores, concentration, memory, mood, etc.) CURRENTLY? 1   MARCELINO Total Score 4        Guidelines for Scoring/Interpretation:  Total score categories:  0-7 = No clinically significant insomnia   8-14 = Subthreshold insomnia   15-21 = Clinical insomnia (moderate severity)  22-28 = Clinical insomnia (severe)  Used via courtesy of www.[employer].va.gov with permission from Chuckie Vizcaino PhD., [facility]        Past medical/surgical history, family history, social history, medications and allergies were reviewed.        Problem List:  Patient Active Problem List    Diagnosis Date Noted    Obstructive sleep apnea syndrome 01/25/2022     Priority: Medium     She had a sleep study done in 2019  (276#)-AHI 15.5, RDI -,  lowest oxygen saturation was 88%, PLMI 149 (39 were associated with cortical arousal), CPAP 10cm/H20        2019 novel coronavirus disease (COVID-19) 12/17/2021     Priority: Medium     12/17/2021      Accidental fall 04/29/2021     Priority: Medium    Maintenance antineoplastic chemotherapy 04/29/2021     Priority: Medium    CKD (chronic kidney disease) stage 4, GFR 15-29 ml/min (H) 08/17/2018     Priority: Medium    Hypogammaglobulinemia, acquired (H24) 06/23/2017     Priority: Medium    Elevation of level of transaminase or lactic acid dehydrogenase (LDH) 06/19/2017     Priority: Medium     Overview:   Created by Conversion  IMO Regulatory Load OCT 2020      Malaise and fatigue 06/19/2017     Priority: Medium     Overview:   Created by Conversion      Hypogammaglobulinemia (H24) 08/23/2016     Priority: Medium    Neutropenic fever  (H24) 08/21/2015     Priority: Medium    NHL (non-Hodgkin's lymphoma) (H) 08/12/2015     Priority: Medium    Drug-induced thrombocytopenia 07/02/2015     Priority: Medium     Overview:   Replacement Utility updated for latest IMO load      H/O renal impairment 05/22/2015     Priority: Medium    Anemia due to antineoplastic chemotherapy 07/29/2014     Priority: Medium    Constipation 07/29/2014     Priority: Medium    Lymphoma, mantle cell, multiple  "sites (H) 06/24/2014     Priority: Medium    Postoperative anemia due to acute blood loss 11/17/2011     Priority: Medium    Morbid obesity (H) 11/15/2011     Priority: Medium    Acquired hypothyroidism 07/07/2009     Priority: Medium        Ht 1.626 m (5' 4\")   Wt 139.7 kg (308 lb)   BMI 52.87 kg/m     "

## 2024-02-20 ENCOUNTER — TRANSFERRED RECORDS (OUTPATIENT)
Dept: HEALTH INFORMATION MANAGEMENT | Facility: CLINIC | Age: 59
End: 2024-02-20
Payer: COMMERCIAL

## 2024-02-20 LAB — TSH SERPL-ACNC: 1.7 MIU/L (ref 0.3–4.2)

## 2024-03-05 ENCOUNTER — PATIENT OUTREACH (OUTPATIENT)
Dept: ONCOLOGY | Facility: HOSPITAL | Age: 59
End: 2024-03-05
Payer: COMMERCIAL

## 2024-03-06 LAB
PATH REPORT.ADDENDUM SPEC: ABNORMAL
PATH REPORT.ADDENDUM SPEC: ABNORMAL
PATH REPORT.COMMENTS IMP SPEC: ABNORMAL
PATH REPORT.COMMENTS IMP SPEC: YES
PATH REPORT.FINAL DX SPEC: ABNORMAL
PATH REPORT.GROSS SPEC: ABNORMAL
PATH REPORT.MICROSCOPIC SPEC OTHER STN: ABNORMAL
PATH REPORT.RELEVANT HX SPEC: ABNORMAL
PHOTO IMAGE: ABNORMAL

## 2024-04-10 ENCOUNTER — TELEPHONE (OUTPATIENT)
Dept: ONCOLOGY | Facility: HOSPITAL | Age: 59
End: 2024-04-10
Payer: COMMERCIAL

## 2024-04-10 NOTE — TELEPHONE ENCOUNTER
"I received a phone call today from Ame, CAR-T coordinator at [facility].  She is faxing over a summary letter for Dr. Starks.  She states Avis is done with CAR-T therapy and is doing \"very well.\"  She will be needing monthly lab work and the patient is hoping to do that at our facility.  Per Ame, the summary letter outlines what lab work is needed each month.  The patient did have labs in the beginning of April so she will not be due until the beginning of May.  I let her know that I would get this message over to [facility] to watch for the fax and help coordinate the lab work.  If any questions, Ame can be reached at [phone].    Emperatriz Sterling RN on 4/10/2024 at 1:15 PM    "

## 2024-04-12 ENCOUNTER — PATIENT OUTREACH (OUTPATIENT)
Dept: ONCOLOGY | Facility: HOSPITAL | Age: 59
End: 2024-04-12
Payer: COMMERCIAL

## 2024-04-23 ENCOUNTER — PATIENT OUTREACH (OUTPATIENT)
Dept: ONCOLOGY | Facility: HOSPITAL | Age: 59
End: 2024-04-23
Payer: COMMERCIAL

## 2024-04-23 DIAGNOSIS — C83.13 MANTLE CELL LYMPHOMA OF INTRA-ABDOMINAL LYMPH NODES (H): Primary | ICD-10-CM

## 2024-04-29 ENCOUNTER — LAB (OUTPATIENT)
Dept: INFUSION THERAPY | Facility: CLINIC | Age: 59
End: 2024-04-29
Attending: INTERNAL MEDICINE
Payer: COMMERCIAL

## 2024-04-29 DIAGNOSIS — N18.4 CKD (CHRONIC KIDNEY DISEASE) STAGE 4, GFR 15-29 ML/MIN (H): ICD-10-CM

## 2024-04-29 DIAGNOSIS — C83.13 MANTLE CELL LYMPHOMA OF INTRA-ABDOMINAL LYMPH NODES (H): ICD-10-CM

## 2024-04-29 DIAGNOSIS — Z51.11 MAINTENANCE ANTINEOPLASTIC CHEMOTHERAPY: Primary | ICD-10-CM

## 2024-04-29 LAB
ALBUMIN SERPL BCG-MCNC: 3.6 G/DL (ref 3.5–5.2)
ANION GAP SERPL CALCULATED.3IONS-SCNC: 11 MMOL/L (ref 7–15)
BUN SERPL-MCNC: 15.3 MG/DL (ref 8–23)
CALCIUM SERPL-MCNC: 9.1 MG/DL (ref 8.6–10)
CD19 CELLS # BLD: <1 CELLS/UL (ref 107–698)
CD19 CELLS NFR BLD: <1 % (ref 6–27)
CD3 CELLS # BLD: 562 CELLS/UL (ref 603–2990)
CD3 CELLS NFR BLD: 90 % (ref 49–84)
CD3+CD4+ CELLS # BLD: 188 CELLS/UL (ref 441–2156)
CD3+CD4+ CELLS NFR BLD: 30 % (ref 28–63)
CD3+CD4+ CELLS/CD3+CD8+ CLL BLD: 0.5 % (ref 1.4–2.6)
CD3+CD8+ CELLS # BLD: 377 CELLS/UL (ref 125–1312)
CD3+CD8+ CELLS NFR BLD: 61 % (ref 10–40)
CD3-CD16+CD56+ CELLS # BLD: 54 CELLS/UL (ref 95–640)
CD3-CD16+CD56+ CELLS NFR BLD: 9 % (ref 4–25)
CHLORIDE SERPL-SCNC: 109 MMOL/L (ref 98–107)
CMV DNA SPEC NAA+PROBE-ACNC: NOT DETECTED IU/ML
CREAT SERPL-MCNC: 2.24 MG/DL (ref 0.51–0.95)
DEPRECATED HCO3 PLAS-SCNC: 22 MMOL/L (ref 22–29)
EGFRCR SERPLBLD CKD-EPI 2021: 25 ML/MIN/1.73M2
GLUCOSE SERPL-MCNC: 116 MG/DL (ref 70–99)
PHOSPHATE SERPL-MCNC: 2.9 MG/DL (ref 2.5–4.5)
POTASSIUM SERPL-SCNC: 3.5 MMOL/L (ref 3.4–5.3)
SODIUM SERPL-SCNC: 142 MMOL/L (ref 135–145)
T CELL EXTENDED COMMENT: ABNORMAL

## 2024-04-29 PROCEDURE — 250N000011 HC RX IP 250 OP 636

## 2024-04-29 PROCEDURE — 86357 NK CELLS TOTAL COUNT: CPT

## 2024-04-29 PROCEDURE — 82947 ASSAY GLUCOSE BLOOD QUANT: CPT | Performed by: FAMILY MEDICINE

## 2024-04-29 PROCEDURE — 82784 ASSAY IGA/IGD/IGG/IGM EACH: CPT

## 2024-04-29 PROCEDURE — 36591 DRAW BLOOD OFF VENOUS DEVICE: CPT

## 2024-04-29 RX ORDER — HEPARIN SODIUM,PORCINE 10 UNIT/ML
5-20 VIAL (ML) INTRAVENOUS DAILY PRN
Status: CANCELLED | OUTPATIENT
Start: 2024-04-29

## 2024-04-29 RX ORDER — HEPARIN SODIUM (PORCINE) LOCK FLUSH IV SOLN 100 UNIT/ML 100 UNIT/ML
5 SOLUTION INTRAVENOUS
Status: DISCONTINUED | OUTPATIENT
Start: 2024-04-29 | End: 2024-04-29 | Stop reason: HOSPADM

## 2024-04-29 RX ORDER — HEPARIN SODIUM (PORCINE) LOCK FLUSH IV SOLN 100 UNIT/ML 100 UNIT/ML
5 SOLUTION INTRAVENOUS
Status: CANCELLED | OUTPATIENT
Start: 2024-04-29

## 2024-04-29 RX ORDER — HEPARIN SODIUM (PORCINE) LOCK FLUSH IV SOLN 100 UNIT/ML 100 UNIT/ML
SOLUTION INTRAVENOUS
Status: COMPLETED
Start: 2024-04-29 | End: 2024-04-29

## 2024-04-29 RX ADMIN — HEPARIN SODIUM (PORCINE) LOCK FLUSH IV SOLN 100 UNIT/ML 5 ML: 100 SOLUTION at 10:05

## 2024-04-29 RX ADMIN — Medication 5 ML: at 10:05

## 2024-04-30 LAB
IGA SERPL-MCNC: <2 MG/DL (ref 84–499)
IGG SERPL-MCNC: 779 MG/DL (ref 610–1616)
IGM SERPL-MCNC: <10 MG/DL (ref 35–242)

## 2024-05-08 DIAGNOSIS — N17.9 ACUTE RENAL FAILURE, UNSPECIFIED ACUTE RENAL FAILURE TYPE (H): Primary | ICD-10-CM

## 2024-05-15 ENCOUNTER — LAB (OUTPATIENT)
Dept: INFUSION THERAPY | Facility: CLINIC | Age: 59
End: 2024-05-15
Payer: MEDICARE

## 2024-05-15 DIAGNOSIS — N17.9 ACUTE RENAL FAILURE, UNSPECIFIED ACUTE RENAL FAILURE TYPE (H): ICD-10-CM

## 2024-05-15 LAB
ALBUMIN SERPL BCG-MCNC: 3.9 G/DL (ref 3.5–5.2)
ANION GAP SERPL CALCULATED.3IONS-SCNC: 9 MMOL/L (ref 7–15)
BUN SERPL-MCNC: 18.2 MG/DL (ref 8–23)
CALCIUM SERPL-MCNC: 9.4 MG/DL (ref 8.6–10)
CHLORIDE SERPL-SCNC: 106 MMOL/L (ref 98–107)
CREAT SERPL-MCNC: 2.31 MG/DL (ref 0.51–0.95)
DEPRECATED HCO3 PLAS-SCNC: 23 MMOL/L (ref 22–29)
EGFRCR SERPLBLD CKD-EPI 2021: 24 ML/MIN/1.73M2
GLUCOSE SERPL-MCNC: 99 MG/DL (ref 70–99)
PHOSPHATE SERPL-MCNC: 3.6 MG/DL (ref 2.5–4.5)
POTASSIUM SERPL-SCNC: 3.9 MMOL/L (ref 3.4–5.3)
SODIUM SERPL-SCNC: 138 MMOL/L (ref 135–145)

## 2024-05-15 PROCEDURE — 250N000011 HC RX IP 250 OP 636: Performed by: FAMILY MEDICINE

## 2024-05-15 PROCEDURE — 36591 DRAW BLOOD OFF VENOUS DEVICE: CPT

## 2024-05-15 PROCEDURE — 80069 RENAL FUNCTION PANEL: CPT

## 2024-05-15 RX ORDER — HEPARIN SODIUM (PORCINE) LOCK FLUSH IV SOLN 100 UNIT/ML 100 UNIT/ML
5 SOLUTION INTRAVENOUS ONCE
Status: COMPLETED | OUTPATIENT
Start: 2024-05-15 | End: 2024-05-15

## 2024-05-15 RX ADMIN — Medication 5 ML: at 15:30

## 2024-05-15 NOTE — PROGRESS NOTES
Labs drawn from [facility] without incident. Results faxed to [place] @ [phone].

## 2024-05-28 DIAGNOSIS — N18.4 CKD (CHRONIC KIDNEY DISEASE) STAGE 4, GFR 15-29 ML/MIN (H): Primary | ICD-10-CM

## 2024-06-06 ENCOUNTER — TELEPHONE (OUTPATIENT)
Dept: FAMILY MEDICINE | Facility: CLINIC | Age: 59
End: 2024-06-06
Payer: COMMERCIAL

## 2024-06-06 DIAGNOSIS — D80.1 HYPOGAMMAGLOBULINEMIA (H): ICD-10-CM

## 2024-06-06 DIAGNOSIS — C83.18 LYMPHOMA, MANTLE CELL, MULTIPLE SITES (H): Primary | ICD-10-CM

## 2024-06-06 DIAGNOSIS — D72.818 LOW CD4 CELL COUNT DETERMINED BY FLOW CYTOMETRY: ICD-10-CM

## 2024-06-06 NOTE — TELEPHONE ENCOUNTER
Call from Michael, Nurse with [place] transferred to author  Call back: [phone]    Michael states she had faxed a treatment plan/recommendation to Dr. Tan for her to review    She was wanting to confirm that the fax was received and that patient could receive Pentamadine at [place] or at another [facility] that can administer the medication     Michael would like a call back that plan was received and if patient can receive medication     Eduar Zamorano, Clinic RN  [facility]

## 2024-06-11 RX ORDER — PENTAMIDINE ISETHIONATE 300 MG/300MG
300 INHALANT RESPIRATORY (INHALATION)
Status: ACTIVE | OUTPATIENT
Start: 2024-06-18 | End: 2025-03-24

## 2024-06-11 RX ORDER — ALBUTEROL SULFATE 0.83 MG/ML
2.5 SOLUTION RESPIRATORY (INHALATION)
Status: ACTIVE | OUTPATIENT
Start: 2024-06-18 | End: 2025-03-24

## 2024-06-11 NOTE — TELEPHONE ENCOUNTER
Called back and wanted to clarify.  Left message with . The NP from the CAR T team will call back.  Called back    Clarified with Raina the CAR T nurse that the earlier the better to start the pentamidine as she has not received a dose at [place].    Orders are monthly CMV DNA quant, IgG subclass, and lymphocyte subsets.  Fax to Division of Hematology in [place] [address] [place] [address]  Dept [phone]  Fax: [phone] Attn: Dr. Alexandra/[facility]ymph[facility] RNs    Every 28 day albuterol neb 30 minutes prior to pentamidine neb  Once CD4 >200 then discontinue pentamidine.    Vaccines can start 6 months post infusion.      Thank you,  Sunil Tan MD

## 2024-06-19 ENCOUNTER — TELEPHONE (OUTPATIENT)
Dept: PULMONOLOGY | Facility: CLINIC | Age: 59
End: 2024-06-19
Payer: COMMERCIAL

## 2024-06-19 NOTE — TELEPHONE ENCOUNTER
No direct contact number provider nor last name or initial.  Pentamidine is not ordered/administered in [facility].  Should check with Resp Therapy perhaps.  Or else staff message to Dr Cuello from Provider.    Rachel BRYANT   Specialty Clinic RN    Will send response to Bernie LEA in clinic for contact.

## 2024-06-19 NOTE — TELEPHONE ENCOUNTER
Spoke with Rachel Mcgarry RN on the phone. She suggested to speak with respiratory therapy for further information.     Laila Jasso MA

## 2024-06-19 NOTE — TELEPHONE ENCOUNTER
[facility] Call Center    Phone Message    May a detailed message be left on voicemail: no     Reason for Call: Other: HARIS Ferrer, w Dr. Tan care team would like to know if care team could call her back regarding how to order medication (pentamidine) for pt. Pt has not yet seen pulm, but caller just wants to clarified if it was ordered correctly. Please ask to speak to Carissa with Dr. Tan care team.       Action Taken: Other: pulm     Travel Screening: Not Applicable     Date of Service:

## 2024-06-21 DIAGNOSIS — N17.9 ACUTE RENAL FAILURE, UNSPECIFIED ACUTE RENAL FAILURE TYPE (H): Primary | ICD-10-CM

## 2024-06-26 RX ORDER — ALBUTEROL SULFATE 0.83 MG/ML
2.5 SOLUTION RESPIRATORY (INHALATION)
Qty: 1 ML | Refills: 9 | Status: SHIPPED | OUTPATIENT
Start: 2024-06-26 | End: 2025-03-06

## 2024-06-26 RX ORDER — PENTAMIDINE ISETHIONATE 300 MG/300MG
300 INHALANT RESPIRATORY (INHALATION)
Qty: 1 EACH | Refills: 9 | Status: SHIPPED | OUTPATIENT
Start: 2024-06-26 | End: 2025-03-06

## 2024-06-26 NOTE — TELEPHONE ENCOUNTER
Pentamidine and albuterol ordered.  Please have patient call RT to schedule [phone]  Thank you,  Sunil Tan MD

## 2024-06-28 DIAGNOSIS — D72.818 LOW CD4 CELL COUNT DETERMINED BY FLOW CYTOMETRY: ICD-10-CM

## 2024-06-28 DIAGNOSIS — C83.18 LYMPHOMA, MANTLE CELL, MULTIPLE SITES (H): Primary | ICD-10-CM

## 2024-06-28 DIAGNOSIS — D80.1 HYPOGAMMAGLOBULINEMIA (H): ICD-10-CM

## 2024-06-28 PROCEDURE — 94642 AEROSOL INHALATION TREATMENT: CPT | Performed by: INTERNAL MEDICINE

## 2024-06-28 PROCEDURE — 94640 AIRWAY INHALATION TREATMENT: CPT | Performed by: INTERNAL MEDICINE

## 2024-06-28 NOTE — PROGRESS NOTES
Patient was seen today for a Pentamidine nebulizer tx ordered by Dr. Tan.    Patient was first given 2.5 mg of abuterol nebulizer, after which Pentamidine 300 mg (Lot # [id number]) mixed with 6cc Sterile H20 was administered through a filtered nebulizer.    Pre-treatment: SpO2 = 100%   HR = 100   BBS = clear   Post-treatment: SpO2 = 97%  HR = 102  BBS = clear    No adverse side effects noted by the patient.    This service today was provided under the direct supervision of Dr. Cuello, who was available if needed.     Procedure was completed by Sybil La.

## 2024-07-01 ENCOUNTER — LAB (OUTPATIENT)
Dept: INFUSION THERAPY | Facility: CLINIC | Age: 59
End: 2024-07-01
Payer: COMMERCIAL

## 2024-07-01 DIAGNOSIS — N18.4 CKD (CHRONIC KIDNEY DISEASE) STAGE 4, GFR 15-29 ML/MIN (H): ICD-10-CM

## 2024-07-01 DIAGNOSIS — Z51.11 MAINTENANCE ANTINEOPLASTIC CHEMOTHERAPY: Primary | ICD-10-CM

## 2024-07-01 DIAGNOSIS — N17.9 ACUTE RENAL FAILURE, UNSPECIFIED ACUTE RENAL FAILURE TYPE (H): ICD-10-CM

## 2024-07-01 LAB
ALBUMIN SERPL BCG-MCNC: 3.4 G/DL (ref 3.5–5.2)
ANION GAP SERPL CALCULATED.3IONS-SCNC: 9 MMOL/L (ref 7–15)
BUN SERPL-MCNC: 20.5 MG/DL (ref 8–23)
CALCIUM SERPL-MCNC: 8.9 MG/DL (ref 8.6–10)
CHLORIDE SERPL-SCNC: 104 MMOL/L (ref 98–107)
CREAT SERPL-MCNC: 2.42 MG/DL (ref 0.51–0.95)
CREAT UR-MCNC: 96.6 MG/DL
DEPRECATED HCO3 PLAS-SCNC: 22 MMOL/L (ref 22–29)
EGFRCR SERPLBLD CKD-EPI 2021: 22 ML/MIN/1.73M2
GLUCOSE SERPL-MCNC: 101 MG/DL (ref 70–99)
HGB BLD-MCNC: 11.2 G/DL (ref 11.7–15.7)
MICROALBUMIN UR-MCNC: 19.9 MG/L
MICROALBUMIN/CREAT UR: 20.6 MG/G CR (ref 0–25)
MUCOUS THREADS #/AREA URNS LPF: PRESENT /LPF
PHOSPHATE SERPL-MCNC: 2.7 MG/DL (ref 2.5–4.5)
POTASSIUM SERPL-SCNC: 3.8 MMOL/L (ref 3.4–5.3)
RBC URINE: 5 /HPF
SODIUM SERPL-SCNC: 135 MMOL/L (ref 135–145)
SQUAMOUS EPITHELIAL: 2 /HPF
WBC URINE: 6 /HPF

## 2024-07-01 PROCEDURE — 36415 COLL VENOUS BLD VENIPUNCTURE: CPT | Performed by: FAMILY MEDICINE

## 2024-07-01 PROCEDURE — 85018 HEMOGLOBIN: CPT | Performed by: FAMILY MEDICINE

## 2024-07-01 PROCEDURE — 81015 MICROSCOPIC EXAM OF URINE: CPT

## 2024-07-01 PROCEDURE — 80069 RENAL FUNCTION PANEL: CPT

## 2024-07-01 PROCEDURE — 82043 UR ALBUMIN QUANTITATIVE: CPT

## 2024-07-01 PROCEDURE — 36591 DRAW BLOOD OFF VENOUS DEVICE: CPT

## 2024-07-01 PROCEDURE — 250N000011 HC RX IP 250 OP 636: Performed by: INTERNAL MEDICINE

## 2024-07-01 RX ORDER — HEPARIN SODIUM,PORCINE 10 UNIT/ML
5-20 VIAL (ML) INTRAVENOUS DAILY PRN
Status: CANCELLED | OUTPATIENT
Start: 2024-07-01

## 2024-07-01 RX ORDER — HEPARIN SODIUM (PORCINE) LOCK FLUSH IV SOLN 100 UNIT/ML 100 UNIT/ML
5 SOLUTION INTRAVENOUS
Status: CANCELLED | OUTPATIENT
Start: 2024-07-01

## 2024-07-01 RX ORDER — HEPARIN SODIUM (PORCINE) LOCK FLUSH IV SOLN 100 UNIT/ML 100 UNIT/ML
5 SOLUTION INTRAVENOUS
Status: DISCONTINUED | OUTPATIENT
Start: 2024-07-01 | End: 2024-07-01 | Stop reason: HOSPADM

## 2024-07-01 RX ADMIN — Medication 5 ML: at 08:15

## 2024-07-01 NOTE — PROGRESS NOTES
Port accessed and labs drawn without difficulty.  Port then flushed per [facility] Protocol and deaccessed.  Pt tolerated well.    Analia Stacy RN

## 2024-07-08 ENCOUNTER — TELEPHONE (OUTPATIENT)
Dept: FAMILY MEDICINE | Facility: CLINIC | Age: 59
End: 2024-07-08

## 2024-07-08 ENCOUNTER — OFFICE VISIT (OUTPATIENT)
Dept: FAMILY MEDICINE | Facility: CLINIC | Age: 59
End: 2024-07-08
Payer: COMMERCIAL

## 2024-07-08 VITALS
DIASTOLIC BLOOD PRESSURE: 80 MMHG | HEART RATE: 100 BPM | SYSTOLIC BLOOD PRESSURE: 132 MMHG | OXYGEN SATURATION: 97 % | WEIGHT: 290.5 LBS | BODY MASS INDEX: 46.69 KG/M2 | HEIGHT: 66 IN | TEMPERATURE: 100.4 F

## 2024-07-08 DIAGNOSIS — N17.9 ACUTE RENAL FAILURE, UNSPECIFIED ACUTE RENAL FAILURE TYPE (H): Primary | ICD-10-CM

## 2024-07-08 DIAGNOSIS — R05.1 ACUTE COUGH: Primary | ICD-10-CM

## 2024-07-08 PROBLEM — Z12.4 CERVICAL CANCER SCREENING: Status: ACTIVE | Noted: 2024-07-08

## 2024-07-08 PROBLEM — D72.818 LOW CD4 CELL COUNT DETERMINED BY FLOW CYTOMETRY: Status: ACTIVE | Noted: 2024-07-08

## 2024-07-08 PROCEDURE — 99213 OFFICE O/P EST LOW 20 MIN: CPT

## 2024-07-08 RX ORDER — DEXAMETHASONE 4 MG/1
1 TABLET ORAL 2 TIMES DAILY
Status: CANCELLED | OUTPATIENT
Start: 2024-07-08

## 2024-07-08 RX ORDER — ALBUTEROL SULFATE 90 UG/1
2 AEROSOL, METERED RESPIRATORY (INHALATION) EVERY 4 HOURS PRN
Qty: 18 G | Status: CANCELLED | OUTPATIENT
Start: 2024-07-08

## 2024-07-08 RX ORDER — BENZONATATE 100 MG/1
100 CAPSULE ORAL 3 TIMES DAILY PRN
Qty: 90 CAPSULE | Refills: 2 | Status: SHIPPED | OUTPATIENT
Start: 2024-07-08

## 2024-07-08 RX ORDER — MONTELUKAST SODIUM 10 MG/1
10 TABLET ORAL DAILY
Qty: 30 TABLET | Refills: 2 | Status: SHIPPED | OUTPATIENT
Start: 2024-07-08

## 2024-07-08 RX ORDER — ALBUTEROL SULFATE 1.25 MG/3ML
1.25 SOLUTION RESPIRATORY (INHALATION) EVERY 6 HOURS PRN
Qty: 90 ML | Refills: 2 | Status: SHIPPED | OUTPATIENT
Start: 2024-07-08

## 2024-07-08 RX ORDER — ALBUTEROL SULFATE 0.83 MG/ML
2.5 SOLUTION RESPIRATORY (INHALATION)
Qty: 1 ML | Refills: 9 | Status: CANCELLED | OUTPATIENT
Start: 2024-07-08

## 2024-07-08 RX ORDER — ALBUTEROL SULFATE 1.25 MG/3ML
1.25 SOLUTION RESPIRATORY (INHALATION) EVERY 6 HOURS PRN
Qty: 90 ML | Refills: 2 | Status: CANCELLED | OUTPATIENT
Start: 2024-07-08

## 2024-07-08 ASSESSMENT — ASTHMA QUESTIONNAIRES: ACT_TOTALSCORE: 11

## 2024-07-08 ASSESSMENT — PAIN SCALES - GENERAL: PAINLEVEL: SEVERE PAIN (6)

## 2024-07-08 NOTE — PROGRESS NOTES
"  Assessment & Plan     Acute cough  Patient presents with a consistent cough for previous 12 weeks. Patient was given albuterol inhaler during previous visit and reports it making cough worse. Patient receives Albuterol nebulizer prior to cancer treatments. Patient willing to try Albuterol Nebulizer PRN at lower dosage at home for coughing relief and Tessalon Perles for symptom relief. Sputum culture taken to rule out bacterial infection.     - montelukast (SINGULAIR) 10 MG tablet; Take 1 tablet (10 mg) by mouth daily Summer only  - Nebulizer and Supplies Order for DME - ONLY FOR DME  - benzonatate (TESSALON) 100 MG capsule; Take 1 capsule (100 mg) by mouth 3 times daily as needed for cough  - albuterol (ACCUNEB) 1.25 MG/3ML neb solution; Take 1 vial (1.25 mg) by nebulization every 6 hours as needed for shortness of breath, wheezing or cough  - Respiratory Aerobic Bacterial Culture      BMI  Estimated body mass index is 46.69 kg/m  as calculated from the following:    Height as of this encounter: 1.68 m (5' 6.14\").    Weight as of this encounter: 131.8 kg (290 lb 8 oz).       Asif Albarran is a 59 year old, presenting for the following health issues:  Back Pain, Chronic Cough (For 12 weeks), and Recheck Medication        7/8/2024     2:13 PM   Additional Questions   Roomed by Monica ALVARADO LPN   Accompanied by self         7/8/2024     2:13 PM   Patient Reported Additional Medications   Patient reports taking the following new medications no new meds     History of Present Illness       Back Pain:  She presents for follow up of back pain. Patient's back pain is a new problem.    Original cause of back pain: other  First noticed back pain: today  Patient feels back pain: comes and goesLocation of back pain:  Right lower back and right middle of back  Description of back pain: sharp  Back pain spreads: nowhere    Since patient first noticed back pain, pain is: gradually worsening  Does back pain interfere with her " job:  Not applicable  On a scale of 1-10 (10 being the worst), patient describes pain as:  6  What makes back pain worse: bending and sitting   Acupuncture: not tried  Acetaminophen: helpful  Activity or exercise: not tried  Chiropractor:  Not tried  Cold: helpful  Heat: helpful  Massage: not tried  Muscle relaxants: not tried  NSAIDS: not tried  Opioids: not tried  Physical Therapy: not tried  Rest: not tried  Steroid Injection: not tried  Stretching: not helpful  Surgery: not tried  TENS unit: not tried  Topical pain relievers: not helpful  Other healthcare providers patient is seeing for back pain: None    Reason for visit:  Brochial cough for 12 weeks  Symptom onset:  More than a month  Symptoms include:  Hacking barking cough (would like to get inhalers and neb refilled as they usually help)  Symptom intensity:  Moderate  Symptom progression:  Staying the same    She eats 2-3 servings of fruits and vegetables daily.She consumes 1 sweetened beverage(s) daily.She exercises with enough effort to increase her heart rate 10 to 19 minutes per day.  She exercises with enough effort to increase her heart rate 3 or less days per week.   She is taking medications regularly.     Asthma Follow-Up    Was ACT completed today?  Yes        7/8/2024     3:11 PM   ACT Total Scores   ACT TOTAL SCORE (Goal Greater than or Equal to 20) 11   In the past 12 months, how many times did you visit the emergency room for your asthma without being admitted to the hospital? 0   In the past 12 months, how many times were you hospitalized overnight because of your asthma? 0       How many days per week do you miss taking your asthma controller medication?  0  Please describe any recent triggers for your asthma: pollens and humidity  Have you had any Emergency Room Visits, Urgent Care Visits, or Hospital Admissions since your last office visit?  No      Medication Followup of Albuterol inhaler PRN  Taking Medication as prescribed: NO-ran  "out  Side Effects:  None  Medication Helping Symptoms:  yes  Medication Followup of Albuterol Nebulizer PRN  Taking Medication as prescribed: No- ran out  Side Effects:  None  Medication Helping Symptoms:  yes     Medication Followup of Flovent inhaler BID  Taking Medication as prescribed: NO-ran out  Side Effects:  None  Medication Helping Symptoms:  yes  Medication Followup of Singulair 10 mg daily  Taking Medication as prescribed: yes  Side Effects:  None  Medication Helping Symptoms:  yes         Review of Systems  CONSTITUTIONAL: NEGATIVE for fever, chills, change in weight  ENT/MOUTH: NEGATIVE for ear, mouth and throat problems  RESP: NEGATIVE for significant cough or SOB  CV: NEGATIVE for chest pain, palpitations or peripheral edema      Objective    /80 (BP Location: Right arm, Patient Position: Sitting, Cuff Size: Adult Regular)   Pulse 100   Temp 100.4  F (38  C) (Tympanic)   Ht 1.68 m (5' 6.14\")   Wt 131.8 kg (290 lb 8 oz)   SpO2 97%   BMI 46.69 kg/m    Body mass index is 46.69 kg/m .  Physical Exam   GENERAL: alert and no distress  NECK: no adenopathy, no asymmetry, masses, or scars  RESP: lungs clear to auscultation - no rales, rhonchi or wheezes  CV: regular rate and rhythm, normal S1 S2, no S3 or S4, no murmur, click or rub, no peripheral edema  MS: no gross musculoskeletal defects noted, no edema         Signed Electronically by: CORNELIUS Lagos CNP    "

## 2024-07-08 NOTE — PATIENT INSTRUCTIONS
Be aware of and adverse reactions from nebulizer. Stop if any coughing increases and contact clinic.   See lab for Sputum culture collection.

## 2024-07-08 NOTE — TELEPHONE ENCOUNTER
Malu,    Please advise on new script for Albuterol.    Patient was seen today and was prescribed a home nebulizer machine and Albuterol. Patient picked up the nebulizer machine but states they kept her printed script for the Albuterol.    Thank you  Tye HUYNH RN  [facility]

## 2024-07-09 DIAGNOSIS — N17.9 ACUTE RENAL FAILURE, UNSPECIFIED ACUTE RENAL FAILURE TYPE (H): Primary | ICD-10-CM

## 2024-07-09 NOTE — TELEPHONE ENCOUNTER
Malu Weston, APRN CNP  P [facility] Pool  Caller: Unspecified (Yesterday,  4:54 PM)  Can someone please call patient and ask if she needs another script for Albuterol nebulizer solution. If so, can we print and fax to her preferred pharmacy?    Thank You,    Malu Weston, DELFINO    Patient was called, she states the home medical staff called her this morning and sent the rx to [facility] for her.   KATHLEEN García MA

## 2024-07-11 LAB
BACTERIA SPT CULT: NORMAL
GRAM STAIN RESULT: NORMAL

## 2024-07-11 PROCEDURE — 87205 SMEAR GRAM STAIN: CPT

## 2024-08-02 ENCOUNTER — LAB (OUTPATIENT)
Dept: INFUSION THERAPY | Facility: CLINIC | Age: 59
End: 2024-08-02
Attending: INTERNAL MEDICINE
Payer: COMMERCIAL

## 2024-08-02 DIAGNOSIS — Z51.11 MAINTENANCE ANTINEOPLASTIC CHEMOTHERAPY: Primary | ICD-10-CM

## 2024-08-02 DIAGNOSIS — C83.18 LYMPHOMA, MANTLE CELL, MULTIPLE SITES (H): ICD-10-CM

## 2024-08-02 DIAGNOSIS — D72.818 LOW CD4 CELL COUNT DETERMINED BY FLOW CYTOMETRY: ICD-10-CM

## 2024-08-02 DIAGNOSIS — N17.9 ACUTE RENAL FAILURE, UNSPECIFIED ACUTE RENAL FAILURE TYPE (H): ICD-10-CM

## 2024-08-02 DIAGNOSIS — D80.1 HYPOGAMMAGLOBULINEMIA (H): ICD-10-CM

## 2024-08-02 DIAGNOSIS — N18.4 CKD (CHRONIC KIDNEY DISEASE) STAGE 4, GFR 15-29 ML/MIN (H): ICD-10-CM

## 2024-08-02 LAB
ALBUMIN SERPL BCG-MCNC: 3.6 G/DL (ref 3.5–5.2)
ANION GAP SERPL CALCULATED.3IONS-SCNC: 10 MMOL/L (ref 7–15)
BUN SERPL-MCNC: 31.2 MG/DL (ref 8–23)
CALCIUM SERPL-MCNC: 9.2 MG/DL (ref 8.8–10.4)
CD19 CELLS # BLD: <1 CELLS/UL (ref 107–698)
CD19 CELLS NFR BLD: <1 % (ref 6–27)
CD3 CELLS # BLD: 180 CELLS/UL (ref 603–2990)
CD3 CELLS NFR BLD: 84 % (ref 49–84)
CD3+CD4+ CELLS # BLD: 75 CELLS/UL (ref 441–2156)
CD3+CD4+ CELLS NFR BLD: 35 % (ref 28–63)
CD3+CD4+ CELLS/CD3+CD8+ CLL BLD: 0.73 % (ref 1.4–2.6)
CD3+CD8+ CELLS # BLD: 102 CELLS/UL (ref 125–1312)
CD3+CD8+ CELLS NFR BLD: 48 % (ref 10–40)
CD3-CD16+CD56+ CELLS # BLD: 32 CELLS/UL (ref 95–640)
CD3-CD16+CD56+ CELLS NFR BLD: 15 % (ref 4–25)
CHLORIDE SERPL-SCNC: 103 MMOL/L (ref 98–107)
CHOLEST SERPL-MCNC: 167 MG/DL
CMV DNA SPEC NAA+PROBE-ACNC: NOT DETECTED IU/ML
CREAT SERPL-MCNC: 2.52 MG/DL (ref 0.51–0.95)
CREAT UR-MCNC: 36.2 MG/DL
EGFRCR SERPLBLD CKD-EPI 2021: 21 ML/MIN/1.73M2
FASTING STATUS PATIENT QL REPORTED: NO
GLUCOSE SERPL-MCNC: 93 MG/DL (ref 70–99)
HCO3 SERPL-SCNC: 23 MMOL/L (ref 22–29)
HDLC SERPL-MCNC: 49 MG/DL
HGB BLD-MCNC: 11.6 G/DL (ref 11.7–15.7)
LDLC SERPL CALC-MCNC: 97 MG/DL
MICROALBUMIN UR-MCNC: <12 MG/L
MICROALBUMIN/CREAT UR: NORMAL MG/G{CREAT}
MUCOUS THREADS #/AREA URNS LPF: PRESENT /LPF
NONHDLC SERPL-MCNC: 118 MG/DL
PHOSPHATE SERPL-MCNC: 3.1 MG/DL (ref 2.5–4.5)
POTASSIUM SERPL-SCNC: 4.3 MMOL/L (ref 3.4–5.3)
RBC URINE: 11 /HPF
SODIUM SERPL-SCNC: 136 MMOL/L (ref 135–145)
SQUAMOUS EPITHELIAL: 1 /HPF
T CELL EXTENDED COMMENT: ABNORMAL
TRIGL SERPL-MCNC: 104 MG/DL
WBC URINE: 9 /HPF

## 2024-08-02 PROCEDURE — 81015 MICROSCOPIC EXAM OF URINE: CPT | Performed by: FAMILY MEDICINE

## 2024-08-02 PROCEDURE — 82787 IGG 1 2 3 OR 4 EACH: CPT | Performed by: FAMILY MEDICINE

## 2024-08-02 PROCEDURE — 82570 ASSAY OF URINE CREATININE: CPT | Performed by: FAMILY MEDICINE

## 2024-08-02 PROCEDURE — 250N000011 HC RX IP 250 OP 636: Performed by: INTERNAL MEDICINE

## 2024-08-02 PROCEDURE — 85018 HEMOGLOBIN: CPT | Performed by: FAMILY MEDICINE

## 2024-08-02 PROCEDURE — 82310 ASSAY OF CALCIUM: CPT

## 2024-08-02 PROCEDURE — 36591 DRAW BLOOD OFF VENOUS DEVICE: CPT

## 2024-08-02 PROCEDURE — 80061 LIPID PANEL: CPT | Performed by: FAMILY MEDICINE

## 2024-08-02 PROCEDURE — 86359 T CELLS TOTAL COUNT: CPT | Performed by: FAMILY MEDICINE

## 2024-08-02 RX ORDER — HEPARIN SODIUM (PORCINE) LOCK FLUSH IV SOLN 100 UNIT/ML 100 UNIT/ML
5 SOLUTION INTRAVENOUS
Status: DISCONTINUED | OUTPATIENT
Start: 2024-08-02 | End: 2024-08-02 | Stop reason: HOSPADM

## 2024-08-02 RX ORDER — HEPARIN SODIUM (PORCINE) LOCK FLUSH IV SOLN 100 UNIT/ML 100 UNIT/ML
SOLUTION INTRAVENOUS
Status: COMPLETED
Start: 2024-08-02 | End: 2024-08-02

## 2024-08-02 RX ORDER — HEPARIN SODIUM (PORCINE) LOCK FLUSH IV SOLN 100 UNIT/ML 100 UNIT/ML
5 SOLUTION INTRAVENOUS
Status: CANCELLED | OUTPATIENT
Start: 2024-08-02

## 2024-08-02 RX ORDER — HEPARIN SODIUM,PORCINE 10 UNIT/ML
5-20 VIAL (ML) INTRAVENOUS DAILY PRN
Status: CANCELLED | OUTPATIENT
Start: 2024-08-02

## 2024-08-02 RX ADMIN — HEPARIN SODIUM (PORCINE) LOCK FLUSH IV SOLN 100 UNIT/ML 5 ML: 100 SOLUTION at 08:21

## 2024-08-02 RX ADMIN — Medication 5 ML: at 08:21

## 2024-08-02 NOTE — PROGRESS NOTES
Infusion Nursing Note:  Avis Paul presents today for Port labs.    Patient seen by provider today: No   present during visit today: Not Applicable.    Note: Labs faxed to Dr. Shipley at [facility] ([phone]).    Intravenous Access:  Implanted Port.    Treatment Conditions:  Not Applicable.    Post Infusion Assessment:  Blood return noted after position changes..  Site patent and intact, free from redness, edema or discomfort.  No evidence of extravasations.  Access discontinued per protocol.     Discharge Plan:   Discharge instructions reviewed with: Patient.  Patient and/or family verbalized understanding of discharge instructions and all questions answered.  AVS to patient via Stepcase[employer].  Patient will return as directed by MD for next appointment.   Patient discharged in stable condition accompanied by: self.  Departure Mode: Ambulatory.    Louise Sanders RN

## 2024-08-06 LAB
IGG SERPL-MCNC: 772 MG/DL (ref 610–1616)
IGG1 SER-MCNC: 448 MG/DL (ref 382–929)
IGG2 SER-MCNC: 269 MG/DL (ref 242–700)
IGG3 SER-MCNC: 29 MG/DL (ref 22–176)
IGG4 SER-MCNC: 17 MG/DL (ref 4–86)
SUBCLASSES, PERCENT: 99 %

## 2024-08-20 DIAGNOSIS — N17.9 ACUTE KIDNEY FAILURE, UNSPECIFIED (H): Primary | ICD-10-CM

## 2024-08-20 DIAGNOSIS — D69.6 THROMBOCYTOPENIA (H): ICD-10-CM

## 2024-08-29 ENCOUNTER — INFUSION THERAPY VISIT (OUTPATIENT)
Dept: INFUSION THERAPY | Facility: HOSPITAL | Age: 59
End: 2024-08-29
Payer: COMMERCIAL

## 2024-08-29 DIAGNOSIS — Z51.11 MAINTENANCE ANTINEOPLASTIC CHEMOTHERAPY: Primary | ICD-10-CM

## 2024-08-29 DIAGNOSIS — D69.6 THROMBOCYTOPENIA (H): ICD-10-CM

## 2024-08-29 DIAGNOSIS — N17.9 ACUTE KIDNEY FAILURE, UNSPECIFIED (H): ICD-10-CM

## 2024-08-29 LAB
ALBUMIN SERPL BCG-MCNC: 3.7 G/DL (ref 3.5–5.2)
ANION GAP SERPL CALCULATED.3IONS-SCNC: 12 MMOL/L (ref 7–15)
BASOPHILS # BLD AUTO: 0.1 10E3/UL (ref 0–0.2)
BASOPHILS NFR BLD AUTO: 1 %
BUN SERPL-MCNC: 27.6 MG/DL (ref 8–23)
CALCIUM SERPL-MCNC: 8.9 MG/DL (ref 8.8–10.4)
CHLORIDE SERPL-SCNC: 102 MMOL/L (ref 98–107)
CREAT SERPL-MCNC: 2.49 MG/DL (ref 0.51–0.95)
EGFRCR SERPLBLD CKD-EPI 2021: 22 ML/MIN/1.73M2
EOSINOPHIL # BLD AUTO: 0.4 10E3/UL (ref 0–0.7)
EOSINOPHIL NFR BLD AUTO: 7 %
ERYTHROCYTE [DISTWIDTH] IN BLOOD BY AUTOMATED COUNT: 14.2 % (ref 10–15)
GLUCOSE SERPL-MCNC: 144 MG/DL (ref 70–99)
HCO3 SERPL-SCNC: 23 MMOL/L (ref 22–29)
HCT VFR BLD AUTO: 36.1 % (ref 35–47)
HGB BLD-MCNC: 11.2 G/DL (ref 11.7–15.7)
IMM GRANULOCYTES # BLD: 0 10E3/UL
IMM GRANULOCYTES NFR BLD: 0 %
LYMPHOCYTES # BLD AUTO: 0.2 10E3/UL (ref 0.8–5.3)
LYMPHOCYTES NFR BLD AUTO: 3 %
MCH RBC QN AUTO: 29.6 PG (ref 26.5–33)
MCHC RBC AUTO-ENTMCNC: 31 G/DL (ref 31.5–36.5)
MCV RBC AUTO: 95 FL (ref 78–100)
MONOCYTES # BLD AUTO: 0.3 10E3/UL (ref 0–1.3)
MONOCYTES NFR BLD AUTO: 6 %
NEUTROPHILS # BLD AUTO: 4.6 10E3/UL (ref 1.6–8.3)
NEUTROPHILS NFR BLD AUTO: 83 %
NRBC # BLD AUTO: 0 10E3/UL
NRBC BLD AUTO-RTO: 0 /100
PHOSPHATE SERPL-MCNC: 2.8 MG/DL (ref 2.5–4.5)
PLATELET # BLD AUTO: 127 10E3/UL (ref 150–450)
POTASSIUM SERPL-SCNC: 4 MMOL/L (ref 3.4–5.3)
RBC # BLD AUTO: 3.79 10E6/UL (ref 3.8–5.2)
SODIUM SERPL-SCNC: 137 MMOL/L (ref 135–145)
WBC # BLD AUTO: 5.6 10E3/UL (ref 4–11)

## 2024-08-29 PROCEDURE — 250N000011 HC RX IP 250 OP 636: Performed by: INTERNAL MEDICINE

## 2024-08-29 PROCEDURE — 36591 DRAW BLOOD OFF VENOUS DEVICE: CPT

## 2024-08-29 PROCEDURE — 85025 COMPLETE CBC W/AUTO DIFF WBC: CPT

## 2024-08-29 PROCEDURE — 82374 ASSAY BLOOD CARBON DIOXIDE: CPT

## 2024-08-29 RX ORDER — HEPARIN SODIUM,PORCINE 10 UNIT/ML
5-20 VIAL (ML) INTRAVENOUS DAILY PRN
OUTPATIENT
Start: 2024-08-29

## 2024-08-29 RX ORDER — HEPARIN SODIUM,PORCINE 10 UNIT/ML
5-20 VIAL (ML) INTRAVENOUS DAILY PRN
Status: DISCONTINUED | OUTPATIENT
Start: 2024-08-29 | End: 2024-08-29 | Stop reason: HOSPADM

## 2024-08-29 RX ORDER — HEPARIN SODIUM (PORCINE) LOCK FLUSH IV SOLN 100 UNIT/ML 100 UNIT/ML
5 SOLUTION INTRAVENOUS
Status: DISCONTINUED | OUTPATIENT
Start: 2024-08-29 | End: 2024-08-29 | Stop reason: HOSPADM

## 2024-08-29 RX ORDER — HEPARIN SODIUM (PORCINE) LOCK FLUSH IV SOLN 100 UNIT/ML 100 UNIT/ML
5 SOLUTION INTRAVENOUS
OUTPATIENT
Start: 2024-08-29

## 2024-08-29 RX ADMIN — Medication 5 ML: at 14:14

## 2024-08-29 NOTE — PROGRESS NOTES
Infusion Nursing Note:  Avis Paul presents today for Port flush.    Patient seen by provider today: No   present during visit today: Not Applicable.    Note: Avis comes in today for a port flush. She asked if I could draw her labs that were due. I went over the plan of care with Avis and she verbalized understanding. Port was accessed per protocol and labs drawn per patient request. Port was then flushed and heparinized. Avis left ambulatory to go upstairs and talk to a nurse. She will be seeing Dr. Gill in the near future.      Intravenous Access:  Labs drawn without difficulty.  Implanted Port.    Treatment Conditions:  Not Applicable.      Post Infusion Assessment:  Blood return noted.  Site patent and intact, free from redness, edema or discomfort.  No evidence of extravasations.  Access discontinued per protocol.       Discharge Plan:   AVS to patient via MYCHART.    Patient discharged in stable condition accompanied by: self.  Departure Mode: Ambulatory.      SURJIT KEATING RN

## 2024-09-05 ENCOUNTER — ENROLLMENT (OUTPATIENT)
Dept: HOME HEALTH SERVICES | Facility: HOME HEALTH | Age: 59
End: 2024-09-05
Payer: COMMERCIAL

## 2024-09-20 NOTE — PROGRESS NOTES
03/23/2023: PT HAS COVERAGE FOR TPA, LINECARE AND HYDRATION.   CAN BE IVN IF LOCAL. AD  07/18/2024: (7/12/24 Per Tamica RIVERA [facility]- D83.0 is a covered diagnosis.)  Medicare Secondary: IF CADD DISPENDED, RETURN TO INTAKE FOR ABN. AJ    7/18/24 email from johan edmondson- The high dollar amount is most likely due to the start of the year.  After reviewing with Brenton, we cannot drop the charges to the patient for the 1/23/2024 ship date of [employer] because we did not obtain an ABN.    It looks like patient's plan pays 75%.  Their deductible is $5500.  According to the R portal and as of 1/1/2024, this has been met. Intake-  Please obtain an ABN ASAP from the patient.  Thank you! rashida

## 2024-10-03 ENCOUNTER — VIRTUAL VISIT (OUTPATIENT)
Dept: ONCOLOGY | Facility: CLINIC | Age: 59
End: 2024-10-03
Attending: INTERNAL MEDICINE
Payer: COMMERCIAL

## 2024-10-03 VITALS — BODY MASS INDEX: 45.99 KG/M2 | HEIGHT: 67 IN | WEIGHT: 293 LBS

## 2024-10-03 DIAGNOSIS — N18.4 CKD (CHRONIC KIDNEY DISEASE) STAGE 4, GFR 15-29 ML/MIN (H): Chronic | ICD-10-CM

## 2024-10-03 DIAGNOSIS — C83.18 LYMPHOMA, MANTLE CELL, MULTIPLE SITES (H): Primary | Chronic | ICD-10-CM

## 2024-10-03 DIAGNOSIS — Z92.850 H/O OF CHIMERIC ANTIGEN RECEPTOR T-CELL THERAPY: ICD-10-CM

## 2024-10-03 PROCEDURE — 99215 OFFICE O/P EST HI 40 MIN: CPT | Mod: 95 | Performed by: INTERNAL MEDICINE

## 2024-10-03 PROCEDURE — G2211 COMPLEX E/M VISIT ADD ON: HCPCS | Mod: 95 | Performed by: INTERNAL MEDICINE

## 2024-10-03 ASSESSMENT — PAIN SCALES - GENERAL: PAINLEVEL: NO PAIN (0)

## 2024-10-03 NOTE — LETTER
10/3/2024      Avis Paul  [id number] [facility] [address]      Dear Colleague,    Thank you for referring your patient, Avis Paul, to the [facility]. Please see a copy of my visit note below.    Virtual Visit Details    Type of service:  Video Visit   Video Start Time:  2:54 PM  Video End Time: 3:09 PM    Originating Location (pt. Location): Other on Road in [place]    Distant Location (provider location):  Off-site  Platform used for Video Visit: [facility] Hematology and Oncology Progress Note    Patient: Avis Paul  MRN: [id number]  Date of Service: Oct 3, 2024          Reason for Visit    Chief Complaint   Patient presents with     Consult       Assessment and Plan     Cancer Staging   No matching staging information was found for the patient.      1. Mantle cell lymphoma status post autologous transplant, August 18, 2015: Then completed 2 years of maintenance Rituxan therapy.  Recent disease progression noted on the CT scan last year.  Biopsy of the left internal iliac lymph node positive for mantle cell lymphoma.  Was referred to [facility] where she underwent CAR-T infusion on 2/29/2024.  Had CRS following CAR-T infusion requiring immunosuppression.  Now has recovered.  Currently on observation only.  Post CAR-T PET scan showed good response.  She had a repeat PET scan done a couple weeks ago.  I reviewed the PET scan report personally.  She has evidence of continued response with no abnormal uptake in any of the lymph nodes.  Deauville 1.  She has every 3-month follow-ups with [facility].  Lab studies show elevated but stable creatinine.  She has evidence of CKD following previous cisplatin chemotherapy.  Follows with nephrology at [facility].  CBC shows mild anemia with hemoglobin 11.2 and mild thrombocytopenia with a platelet count of 127.  Flow for T-cell subset analysis showed decreased CD4 count at 75.  She has been asked to  restart pentamadine inhalation for PJP prophylaxis but unfortunately due to logistical issues this has not happened.  Her last pentamadine elation was back in July.  Fortunately she is not clinically symptomatic.  She has continued acyclovir.    Reviewed further management in this setting.  As long as she is continue to follow-up with [facility] I will probably back off.  She is getting monthly labs which is being sent to [place].  She also gets 3 monthly PET scans.  I will touch base with our pulmonary team to see if we can get her restarted on pentamadine inhalation.    I will probably see her after she gets discharged from [facility] next March.  She is agreeable to the plan.    Will arrange labs to be done through [facility] through her port.  I have signed vascular device maintenance orders.     2.  Chronic kidney disease  Creatinine up at 2.5.  Follows with nephrology at [facility].  Thought to be secondary to prior chemotherapy.     ECOG Performance    0 - Independent    Distress Screening (within last 30 days)    No data recorded     Pain  Pain Score: No Pain (0)    Problem List    Patient Active Problem List   Diagnosis     Lymphoma, mantle cell, multiple sites (H)     H/O renal impairment     NHL (non-Hodgkin's lymphoma) (H)     Neutropenic fever (H)     Hypogammaglobulinemia (H)     Anemia due to antineoplastic chemotherapy     Drug-induced thrombocytopenia     Morbid obesity (H)     Elevation of level of transaminase or lactic acid dehydrogenase (LDH)     Malaise and fatigue     Postoperative anemia due to acute blood loss     CKD (chronic kidney disease) stage 4, GFR 15-29 ml/min (H)     2019 novel coronavirus disease (COVID-19)     Hypogammaglobulinemia, acquired (H)     Accidental fall     Maintenance antineoplastic chemotherapy     Acquired hypothyroidism     Obstructive sleep apnea syndrome     Cervical cancer screening     Low CD4 cell count determined by flow cytometry     Acute cough         ______________________________________________________________________________    History of Present Illness    March 2014: Diagnosed with MCL.  March - June 2014: R-CHOP x 5 cycles resulting in AZ. She reports she ultimately had disease progressing on treatment.  June - August 2014: R-DHAP x 3 cycles without CR  October 2014 - July 2015: Ibrutinib. Still had one residual lymph node, which on PET/CT on 5/21/2015 showed increase in size (2.2 from 2.0 cm) and SUVmax (13 from 6.9) compared to March 2015 PET/CT. Excisional lymph node biopsy showing MCL. Follow-up PET/CT (6/29/15) showed no FDG-avid lymphoma. Then received R-ICE x 1 cycle (25% dose reduction of etoposide and ifosfamide due to renal function).  August 2015: BEAM high dose chemotherapy followed by autologous stem cell transplantation at [facility].  2015 - 2017: Completed 2 years of maintenance rituximab in December 2017.  May - July 2020: FDG-avid lymphadenopathy (PET/CT on 5/27/20) with core biopsy (6/10/20) followed by excisional biopsy (6/25/20) of left inguinal lymph node. FISH report noted CCND1/IGH fusion in 2 out of 300 interphase nuclei, considered of unclear significance. Morphologic and immunophenotypic evaluation of the excisional node showed only reactive lymph node - no relapse. She had started ibrutinib and received approx 6 weeks duration of treatment. She was evaluated for the first time in Lymph[facility] at [facility] (Dr. Rubio). Ibrutinib discontinued and close surveillance / observation recommended without clear evidence of relapse.  September 2020: PET/CT (9/11/20) showed smaller left internal iliac lymph node (4.9 x 3.9 cm from 3.3 x 1.5 cm) with reduced SUVmax (5.6 from 12.4). Observed.  November 2020: CT abdomen/pelvis (11/12/20) showed left pre-sacral soft tissue mass - called left internal iliac node above - considered similar in size at 2.6 x 1.7 cm.  February 2021: CT abdomen/pelvis (2/4/21) showed reduced size  "now to 1.9 x 1.5.  May 2021: CT abdomen/pelvis (5/13/21) further reduction in size slightly (1.9 x 1.2 cm).  August 2021: CT abdomen/pelvis (8/5/21) 1.9 x 1.0 cm left internal iliac node.   November 2021: CT chest/abdomen/pelvis (11/24/21) 1.0 cm in short axis. No other enlarged nodes. Spleen normal.   March 2022: CT chest/abdomen/pelvis (3/21/22): \"no enlarged lymph nodes\"  July 2022: CT chest/abdomen/pelvis (7/18/22): lymph nodes \"normal\"  May 2023: IVIg switched to subcutaneous Ig.  June 2023: CT chest/abdomen/pelvis (6/26/23): \"no lymphadenopathy\"  December 2023: CT chest/abdomen/pelvis (12/19/23) performed for routine surveillance showed interval development of pelvic lymphadenopathy with dominant left internal iliac chain lymph node measuring 4.1 x 4.5 cm. Left common iliac node 1.2 x 1.3 cm. RP node 1.6 x 1.8 cm adjacent to aortic bifurcation. PET/CT (12/29/23; reviewed at [facility]) showed this dominant left internal iliac lesion to have an SUVmax of 13.6.  January 2024: CT-guided left internal iliac lymph node biopsy (1/9/24; pathology reviewed at [facility] by Dr. Kendra Carter) showing MCL with Ki-67 of 20-30%.    LD Chemo: Cyclophosphamide (Cytoxan)/Fludarabine 02/24/2024  CAR-T Infusion Date: 02/29/2024  CRS: 03/05/2024 to 03/08/2024, max grade = 2; Intervention(s) given: Tocilizumab/Steroids/Anakinra/siltuximab on 3/6/24. Developed skin rash with siltuximab.    Interim History:   This is my first time meeting the patient.  She has a history of mantle cell lymphoma diagnosed initially in March 2014.  Status post multiple lines of chemotherapy and autologous stem cell transplant.  Finished 2 years of maintenance rituximab December 2017 after transplant.  Following that she was put on a brief period of ibrutinib.  She had been on surveillance from July 2020 till January 2024.      She had evidence of relapse with development of a left iliac chain lymph node in June of last year.  Although CT scan was read as negative. "  December 2023 routine CT scan again showed worsening pelvic lymphadenopathy with dominant left iliac chain lymph node.  It was measuring 4.1 x 4.5 cm.  PET scan showed FDG active lymph nodes in the area with an SUV max of 13.6.  CT-guided left internal iliac lymph node biopsy was positive for mantle cell lymphoma.  She was referred to [facility] for consideration for CAR-T therapy.  She underwent CAR-T infusion on 2/29/2024.  Developed CRS after that but did not require any ICU placement.  Received tocilizumab, steroids, anakinra and cetuximab.  Also developed Staph epidermidis bacteremia.  Since then she has been on observation.  Has been getting routine follow-ups through [facility].  Post CAR-T PET scan showed good response.  Had a repeat PET scan done recently.  She is getting monthly labs.  Her CD4 count has been on the lower side and was recommended to get pentamadine inhalation every 2 weeks.  But unfortunately due to insurance issues and scheduling issues she has not been getting them since July of this year.  Fortunately no signs of infection.  She is here to establish care with me.  But she is continuing her follow-ups with [facility] until March of next year.    Review of Systems    Pertinent items are noted in HPI.    Past History    Past Medical History:   Diagnosis Date     Anemia      Cancer (H) 2014    Mantle Cell Lymphoma     Chronic kidney disease     elevated creatinine due to chemo     Cough 06/19/2017    Overview:  Created by Conversion     Dehydration 08/12/2014     Fever 06/19/2017    Overview:  Created by Conversion     History of anesthesia complications 2015    urinary retention after lymph node excision. required catheterization     History of blood transfusion     reaction to platelets with hives in the past     History of transfusion      Hypothyroid      Hypothyroidism 2007     Kidney stones 2002     Lymphocele after surgical procedure 06/2015    post lymph node biopsy; drainged for  "therapeutic comfort June 2015.     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 Cooperative in conversation. Here alone.   RESP: Regular respiratory rate. No expiratory wheezes   MUSCULOSKELETAL: no bilateral leg swelling  NEURO: non focal. Alert and oriented x3.   PSYCH: within normal limits. No depression or anxiety.  SKIN: exposed skin is dry intact.     Lab Results    No results found for this or any previous visit (from the past 168 hour(s)).      Imaging    PET Oncology (Eyes to Thighs)    Result Date: 9/4/2024  EXAM:  PET CT SKULL TO THIGH FDG Serum glucose at time of F-18 FDG injection was 99 mg/dL. Patient followed standard dietary/fasting requirements for this exam. RADIOPHARMACEUTICAL/MEDS: Route: intravenous fludeoxyglucose F 18 injection USP (FDG F-18),10.02 millicurie TECHNIQUE:  F18 FDG PET/CT scan was performed from the vertex through the knees with low dose, non-contrast, free-breathing CT images for attenuation correction and anatomic localization (AC/AL), with imaging beginning at approximately 60 minutes after radiotracer injection. COMPARISON:  Previous FDG PET scan dated 6/3/2024. INDICATION:  Restaging mantle cell non-Hodgkin's lymphoma. Day 180 following CAR-T therapy. Assessing disease response. Subsequent treatment strategy. The patient reports no recent vaccinations. FINDINGS:  Previous noted pelvic and retroperitoneal lymphadenopathy shows no residual increased FDG uptake. No new abnormal FDG uptake is seen within the remainder of the lymph nodes, spleen, liver, or bone marrow. Spleen remains normal size. No abnormal FDG uptake is seen to indicate recurrent lymphoma. Degenerative " changes again noted in the knees bilaterally. The multiple small soft tissue masses in both breasts, some with calcifications are unchanged and have no increased FDG uptake. Small focus of FDG uptake is seen within the right chest port consistent with injection artifact from incomplete flushing. Additional findings on the noncontrast low-dose CT: Right chest port with the tip the catheter in the right atrium. Remainder of the additional CT findings are unchanged from the previous scan.    1. Continued lymphoma response. No abnormal FDG uptake is seen to indicate lymphoma recurrence. 2. Deauville response score equal 1.     A total of 45 min was spent today on this visit which included face to face conversation with the patient, EMR review, counseling and co-ordination of care and medical documentation.      The longitudinal plan of care for the diagnosis(es)/condition(s) as documented were addressed during this visit. Due to the added complexity in care, I will continue to support Avis in the subsequent management and with ongoing continuity of care.    Signed by: Deanna Gill MD      Again, thank you for allowing me to participate in the care of your patient.        Sincerely,        Deanna Gill MD

## 2024-10-03 NOTE — PROGRESS NOTES
Virtual Visit Details    Type of service:  Video Visit   Video Start Time:  2:54 PM  Video End Time: 3:09 PM    Originating Location (pt. Location): Other on Road in [place]    Distant Location (provider location):  Off-site  Platform used for Video Visit: [facility] Hematology and Oncology Progress Note    Patient: Avis Paul  MRN: [id number]  Date of Service: Oct 3, 2024          Reason for Visit    Chief Complaint   Patient presents with    Consult       Assessment and Plan     Cancer Staging   No matching staging information was found for the patient.      1. Mantle cell lymphoma status post autologous transplant, August 18, 2015: Then completed 2 years of maintenance Rituxan therapy.  Recent disease progression noted on the CT scan last year.  Biopsy of the left internal iliac lymph node positive for mantle cell lymphoma.  Was referred to [facility] where she underwent CAR-T infusion on 2/29/2024.  Had CRS following CAR-T infusion requiring immunosuppression.  Now has recovered.  Currently on observation only.  Post CAR-T PET scan showed good response.  She had a repeat PET scan done a couple weeks ago.  I reviewed the PET scan report personally.  She has evidence of continued response with no abnormal uptake in any of the lymph nodes.  Bessie 1.  She has every 3-month follow-ups with [facility].  Lab studies show elevated but stable creatinine.  She has evidence of CKD following previous cisplatin chemotherapy.  Follows with nephrology at [facility].  CBC shows mild anemia with hemoglobin 11.2 and mild thrombocytopenia with a platelet count of 127.  Flow for T-cell subset analysis showed decreased CD4 count at 75.  She has been asked to restart pentamadine inhalation for PJP prophylaxis but unfortunately due to logistical issues this has not happened.  Her last pentamadine elation was back in July.  Fortunately she is not clinically symptomatic.  She has continued  acyclovir.    Reviewed further management in this setting.  As long as she is continue to follow-up with [facility] I will probably back off.  She is getting monthly labs which is being sent to [place].  She also gets 3 monthly PET scans.  I will touch base with our pulmonary team to see if we can get her restarted on pentamadine inhalation.    I will probably see her after she gets discharged from [facility] next March.  She is agreeable to the plan.    Will arrange labs to be done through [facility] through her port.  I have signed vascular device maintenance orders.     2.  Chronic kidney disease  Creatinine up at 2.5.  Follows with nephrology at [facility].  Thought to be secondary to prior chemotherapy.     ECOG Performance    0 - Independent    Distress Screening (within last 30 days)    No data recorded     Pain  Pain Score: No Pain (0)    Problem List    Patient Active Problem List   Diagnosis    Lymphoma, mantle cell, multiple sites (H)    H/O renal impairment    NHL (non-Hodgkin's lymphoma) (H)    Neutropenic fever (H)    Hypogammaglobulinemia (H)    Anemia due to antineoplastic chemotherapy    Drug-induced thrombocytopenia    Morbid obesity (H)    Elevation of level of transaminase or lactic acid dehydrogenase (LDH)    Malaise and fatigue    Postoperative anemia due to acute blood loss    CKD (chronic kidney disease) stage 4, GFR 15-29 ml/min (H)    2019 novel coronavirus disease (COVID-19)    Hypogammaglobulinemia, acquired (H)    Accidental fall    Maintenance antineoplastic chemotherapy    Acquired hypothyroidism    Obstructive sleep apnea syndrome    Cervical cancer screening    Low CD4 cell count determined by flow cytometry    Acute cough        ______________________________________________________________________________    History of Present Illness    March 2014: Diagnosed with MCL.  March - June 2014: R-CHOP x 5 cycles resulting in HI. She reports she ultimately had disease progressing on  treatment.  June - August 2014: R-DHAP x 3 cycles without CR  October 2014 - July 2015: Ibrutinib. Still had one residual lymph node, which on PET/CT on 5/21/2015 showed increase in size (2.2 from 2.0 cm) and SUVmax (13 from 6.9) compared to March 2015 PET/CT. Excisional lymph node biopsy showing MCL. Follow-up PET/CT (6/29/15) showed no FDG-avid lymphoma. Then received R-ICE x 1 cycle (25% dose reduction of etoposide and ifosfamide due to renal function).  August 2015: BEAM high dose chemotherapy followed by autologous stem cell transplantation at [facility].  2015 - 2017: Completed 2 years of maintenance rituximab in December 2017.  May - July 2020: FDG-avid lymphadenopathy (PET/CT on 5/27/20) with core biopsy (6/10/20) followed by excisional biopsy (6/25/20) of left inguinal lymph node. FISH report noted CCND1/IGH fusion in 2 out of 300 interphase nuclei, considered of unclear significance. Morphologic and immunophenotypic evaluation of the excisional node showed only reactive lymph node - no relapse. She had started ibrutinib and received approx 6 weeks duration of treatment. She was evaluated for the first time in Lymphoma [facility] at [facility] (Dr. Rubio). Ibrutinib discontinued and close surveillance / observation recommended without clear evidence of relapse.  September 2020: PET/CT (9/11/20) showed smaller left internal iliac lymph node (4.9 x 3.9 cm from 3.3 x 1.5 cm) with reduced SUVmax (5.6 from 12.4). Observed.  November 2020: CT abdomen/pelvis (11/12/20) showed left pre-sacral soft tissue mass - called left internal iliac node above - considered similar in size at 2.6 x 1.7 cm.  February 2021: CT abdomen/pelvis (2/4/21) showed reduced size now to 1.9 x 1.5.  May 2021: CT abdomen/pelvis (5/13/21) further reduction in size slightly (1.9 x 1.2 cm).  August 2021: CT abdomen/pelvis (8/5/21) 1.9 x 1.0 cm left internal iliac node.   November 2021: CT chest/abdomen/pelvis (11/24/21) 1.0 cm in short  "axis. No other enlarged nodes. Spleen normal.   March 2022: CT chest/abdomen/pelvis (3/21/22): \"no enlarged lymph nodes\"  July 2022: CT chest/abdomen/pelvis (7/18/22): lymph nodes \"normal\"  May 2023: IVIg switched to subcutaneous Ig.  June 2023: CT chest/abdomen/pelvis (6/26/23): \"no lymphadenopathy\"  December 2023: CT chest/abdomen/pelvis (12/19/23) performed for routine surveillance showed interval development of pelvic lymphadenopathy with dominant left internal iliac chain lymph node measuring 4.1 x 4.5 cm. Left common iliac node 1.2 x 1.3 cm. RP node 1.6 x 1.8 cm adjacent to aortic bifurcation. PET/CT (12/29/23; reviewed at [facility]) showed this dominant left internal iliac lesion to have an SUVmax of 13.6.  January 2024: CT-guided left internal iliac lymph node biopsy (1/9/24; pathology reviewed at [facility] by Dr. Kendra Carter) showing MCL with Ki-67 of 20-30%.    LD Chemo: Cyclophosphamide (Cytoxan)/Fludarabine 02/24/2024  CAR-T Infusion Date: 02/29/2024  CRS: 03/05/2024 to 03/08/2024, max grade = 2; Intervention(s) given: Tocilizumab/Steroids/Anakinra/siltuximab on 3/6/24. Developed skin rash with siltuximab.    Interim History:   This is my first time meeting the patient.  She has a history of mantle cell lymphoma diagnosed initially in March 2014.  Status post multiple lines of chemotherapy and autologous stem cell transplant.  Finished 2 years of maintenance rituximab December 2017 after transplant.  Following that she was put on a brief period of ibrutinib.  She had been on surveillance from July 2020 till January 2024.      She had evidence of relapse with development of a left iliac chain lymph node in June of last year.  Although CT scan was read as negative.  December 2023 routine CT scan again showed worsening pelvic lymphadenopathy with dominant left iliac chain lymph node.  It was measuring 4.1 x 4.5 cm.  PET scan showed FDG active lymph nodes in the area with an SUV max of 13.6.  CT-guided left internal " iliac lymph node biopsy was positive for mantle cell lymphoma.  She was referred to [facility] for consideration for CAR-T therapy.  She underwent CAR-T infusion on 2/29/2024.  Developed CRS after that but did not require any ICU placement.  Received tocilizumab, steroids, anakinra and cetuximab.  Also developed Staph epidermidis bacteremia.  Since then she has been on observation.  Has been getting routine follow-ups through [facility].  Post CAR-T PET scan showed good response.  Had a repeat PET scan done recently.  She is getting monthly labs.  Her CD4 count has been on the lower side and was recommended to get pentamadine inhalation every 2 weeks.  But unfortunately due to insurance issues and scheduling issues she has not been getting them since July of this year.  Fortunately no signs of infection.  She is here to establish care with me.  But she is continuing her follow-ups with [facility] until March of next year.    Review of Systems    Pertinent items are noted in HPI.    Past History    Past Medical History:   Diagnosis Date    Anemia     Cancer (H) 2014    Mantle Cell Lymphoma    Chronic kidney disease     elevated creatinine due to chemo    Cough 06/19/2017    Overview:  Created by Conversion    Dehydration 08/12/2014    Fever 06/19/2017    Overview:  Created by Conversion    History of anesthesia complications 2015    urinary retention after lymph node excision. required catheterization    History of blood transfusion     reaction to platelets with hives in the past    History of transfusion     Hypothyroid     Hypothyroidism 2007    Kidney stones 2002    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 Cooperative in conversation. Here alone.   RESP: Regular respiratory rate. No expiratory wheezes   MUSCULOSKELETAL: no bilateral leg swelling  NEURO: non focal. Alert and oriented x3.   PSYCH: within normal limits. No depression or anxiety.  SKIN: exposed skin is dry intact.     Lab Results    No results found for this or any previous visit (from the past 168 hour(s)).      Imaging    PET Oncology (Eyes to Thighs)    Result Date: 9/4/2024  EXAM:  PET CT SKULL TO THIGH FDG Serum glucose at time of F-18 FDG injection was 99 mg/dL. Patient followed standard dietary/fasting requirements for this exam. RADIOPHARMACEUTICAL/MEDS: Route: intravenous fludeoxyglucose F 18 injection USP (FDG F-18),10.02 millicurie TECHNIQUE:  F18 FDG PET/CT scan was performed from the vertex through the knees with low dose, non-contrast, free-breathing CT images for attenuation correction and anatomic localization (AC/AL), with imaging beginning at approximately 60 minutes after radiotracer injection. COMPARISON:  Previous FDG PET scan dated 6/3/2024. INDICATION:  Restaging mantle cell non-Hodgkin's lymphoma. Day 180 following CAR-T therapy. Assessing disease response. Subsequent treatment strategy. The patient reports no recent vaccinations. FINDINGS:  Previous noted pelvic and retroperitoneal lymphadenopathy shows no residual increased FDG uptake. No new abnormal FDG uptake is seen within the remainder of the lymph nodes, spleen, liver, or bone marrow. Spleen remains normal size. No abnormal FDG uptake is seen to indicate recurrent lymphoma. Degenerative changes again noted in the knees bilaterally. The multiple small soft tissue masses in both breasts, some with calcifications are unchanged and have no increased FDG uptake. Small focus of FDG uptake is seen within the right chest port consistent with injection artifact " from incomplete flushing. Additional findings on the noncontrast low-dose CT: Right chest port with the tip the catheter in the right atrium. Remainder of the additional CT findings are unchanged from the previous scan.    1. Continued lymphoma response. No abnormal FDG uptake is seen to indicate lymphoma recurrence. 2. Deauville response score equal 1.     A total of 45 min was spent today on this visit which included face to face conversation with the patient, EMR review, counseling and co-ordination of care and medical documentation.      The longitudinal plan of care for the diagnosis(es)/condition(s) as documented were addressed during this visit. Due to the added complexity in care, I will continue to support Avis in the subsequent management and with ongoing continuity of care.    Signed by: Deanna Gill MD

## 2024-10-03 NOTE — LETTER
10/3/2024      Avis Paul  [id number] [facility] [address]      Dear Colleague,    Thank you for referring your patient, Avis Paul, to the [facility]. Please see a copy of my visit note below.    Virtual Visit Details    Type of service:  Video Visit   Video Start Time:  2:54 PM  Video End Time: 3:09 PM    Originating Location (pt. Location): Other on Road in [place]    Distant Location (provider location):  Off-site  Platform used for Video Visit: [facility] Hematology [facility] On[facility] Progress Note    Patient: Avis Paul  MRN: [id number]  Date of Service: Oct 3, 2024          Reason for Visit    Chief Complaint   Patient presents with     Consult       Assessment and Plan     Cancer Staging   No matching staging information was found for the patient.      1. Mantle cell lymphoma status post autologous transplant, August 18, 2015: Then completed 2 years of maintenance Rituxan therapy.  Recent disease progression noted on the CT scan last year.  Biopsy of the left internal iliac lymph node positive for mantle cell lymphoma.  Was referred to [facility] where she underwent CAR-T infusion on 2/29/2024.  Had CRS following CAR-T infusion requiring immunosuppression.  Now has recovered.  Currently on observation only.  Post CAR-T PET scan showed good response.  She had a repeat PET scan done a couple weeks ago.  I reviewed the PET scan report personally.  She has evidence of continued response with no abnormal uptake in any of the lymph nodes.  Deauville 1.  She has every 3-month follow-ups with [facility].  Lab studies show elevated but stable creatinine.  She has evidence of CKD following previous cisplatin chemotherapy.  Follows with nephrology at [facility].  CBC shows mild anemia with hemoglobin 11.2 and mild thrombocytopenia with a platelet count of 127.  Flow for T-cell subset analysis showed decreased CD4 count at 75.  She has been asked to  restart pentamadine inhalation for PJP prophylaxis but unfortunately due to logistical issues this has not happened.  Her last pentamadine elation was back in July.  Fortunately she is not clinically symptomatic.  She has continued acyclovir.    Reviewed further management in this setting.  As long as she is continue to follow-up with [facility] I will probably back off.  She is getting monthly labs which is being sent to [place].  She also gets 3 monthly PET scans.  I will touch base with our pulmonary team to see if we can get her restarted on pentamadine inhalation.    I will probably see her after she gets discharged from [facility] next March.  She is agreeable to the plan.    Will arrange labs to be done through [facility] through her port.  I have signed vascular device maintenance orders.     2.  Chronic kidney disease  Creatinine up at 2.5.  Follows with nephrology at [facility].  Thought to be secondary to prior chemotherapy.     ECOG Performance    0 - Independent    Distress Screening (within last 30 days)    No data recorded     Pain  Pain Score: No Pain (0)    Problem List    Patient Active Problem List   Diagnosis     Lymphoma, mantle cell, multiple sites (H)     H/O renal impairment     NHL (non-Hodgkin's lymphoma) (H)     Neutropenic fever (H)     Hypogammaglobulinemia (H)     Anemia due to antineoplastic chemotherapy     Drug-induced thrombocytopenia     Morbid obesity (H)     Elevation of level of transaminase or lactic acid dehydrogenase (LDH)     Malaise and fatigue     Postoperative anemia due to acute blood loss     CKD (chronic kidney disease) stage 4, GFR 15-29 ml/min (H)     2019 novel coronavirus disease (COVID-19)     Hypogammaglobulinemia, acquired (H)     Accidental fall     Maintenance antineoplastic chemotherapy     Acquired hypothyroidism     Obstructive sleep apnea syndrome     Cervical cancer screening     Low CD4 cell count determined by flow cytometry     Acute cough         ______________________________________________________________________________    History of Present Illness    March 2014: Diagnosed with MCL.  March - June 2014: R-CHOP x 5 cycles resulting in IL. She reports she ultimately had disease progressing on treatment.  June - August 2014: R-DHAP x 3 cycles without CR  October 2014 - July 2015: Ibrutinib. Still had one residual lymph node, which on PET/CT on 5/21/2015 showed increase in size (2.2 from 2.0 cm) and SUVmax (13 from 6.9) compared to March 2015 PET/CT. Excisional lymph node biopsy showing MCL. Follow-up PET/CT (6/29/15) showed no FDG-avid lymphoma. Then received R-ICE x 1 cycle (25% dose reduction of etoposide and ifosfamide due to renal function).  August 2015: BEAM high dose chemotherapy followed by autologous stem cell transplantation at [facility].  2015 - 2017: Completed 2 years of maintenance rituximab in December 2017.  May - July 2020: FDG-avid lymphadenopathy (PET/CT on 5/27/20) with core biopsy (6/10/20) followed by excisional biopsy (6/25/20) of left inguinal lymph node. FISH report noted CCND1/IGH fusion in 2 out of 300 interphase nuclei, considered of unclear significance. Morphologic and immunophenotypic evaluation of the excisional node showed only reactive lymph node - no relapse. She had started ibrutinib and received approx 6 weeks duration of treatment. She was evaluated for the first time in Lymphoma Clinic at [facility] (Dr. Rubio). Ibrutinib discontinued and close surveillance / observation recommended without clear evidence of relapse.  September 2020: PET/CT (9/11/20) showed smaller left internal iliac lymph node (4.9 x 3.9 cm from 3.3 x 1.5 cm) with reduced SUVmax (5.6 from 12.4). Observed.  November 2020: CT abdomen/pelvis (11/12/20) showed left pre-sacral soft tissue mass - called left internal iliac node above - considered similar in size at 2.6 x 1.7 cm.  February 2021: CT abdomen/pelvis (2/4/21) showed reduced size  "now to 1.9 x 1.5.  May 2021: CT abdomen/pelvis (5/13/21) further reduction in size slightly (1.9 x 1.2 cm).  August 2021: CT abdomen/pelvis (8/5/21) 1.9 x 1.0 cm left internal iliac node.   November 2021: CT chest/abdomen/pelvis (11/24/21) 1.0 cm in short axis. No other enlarged nodes. Spleen normal.   March 2022: CT chest/abdomen/pelvis (3/21/22): \"no enlarged lymph nodes\"  July 2022: CT chest/abdomen/pelvis (7/18/22): lymph nodes \"normal\"  May 2023: IVIg switched to subcutaneous Ig.  June 2023: CT chest/abdomen/pelvis (6/26/23): \"no lymphadenopathy\"  December 2023: CT chest/abdomen/pelvis (12/19/23) performed for routine surveillance showed interval development of pelvic lymphadenopathy with dominant left internal iliac chain lymph node measuring 4.1 x 4.5 cm. Left common iliac node 1.2 x 1.3 cm. RP node 1.6 x 1.8 cm adjacent to aortic bifurcation. PET/CT (12/29/23; reviewed at [facility]) showed this dominant left internal iliac lesion to have an SUVmax of 13.6.  January 2024: CT-guided left internal iliac lymph node biopsy (1/9/24; pathology reviewed at [facility] by Dr. Kendra Carter) showing MCL with Ki-67 of 20-30%.    LD Chemo: Cyclophosphamide (Cytoxan)/Fludarabine 02/24/2024  CAR-T Infusion Date: 02/29/2024  CRS: 03/05/2024 to 03/08/2024, max grade = 2; Intervention(s) given: Tocilizumab/Steroids/Anakinra/siltuximab on 3/6/24. Developed skin rash with siltuximab.    Interim History:   This is my first time meeting the patient.  She has a history of mantle cell lymphoma diagnosed initially in March 2014.  Status post multiple lines of chemotherapy and autologous stem cell transplant.  Finished 2 years of maintenance rituximab December 2017 after transplant.  Following that she was put on a brief period of ibrutinib.  She had been on surveillance from July 2020 till January 2024.      She had evidence of relapse with development of a left iliac chain lymph node in June of last year.  Although CT scan was read as negative. "  December 2023 routine CT scan again showed worsening pelvic lymphadenopathy with dominant left iliac chain lymph node.  It was measuring 4.1 x 4.5 cm.  PET scan showed FDG active lymph nodes in the area with an SUV max of 13.6.  CT-guided left internal iliac lymph node biopsy was positive for mantle cell lymphoma.  She was referred to [facility] for consideration for CAR-T therapy.  She underwent CAR-T infusion on 2/29/2024.  Developed CRS after that but did not require any ICU placement.  Received tocilizumab, steroids, anakinra and cetuximab.  Also developed Staph epidermidis bacteremia.  Since then she has been on observation.  Has been getting routine follow-ups through [facility].  Post CAR-T PET scan showed good response.  Had a repeat PET scan done recently.  She is getting monthly labs.  Her CD4 count has been on the lower side and was recommended to get pentamadine inhalation every 2 weeks.  But unfortunately due to insurance issues and scheduling issues she has not been getting them since July of this year.  Fortunately no signs of infection.  She is here to establish care with me.  But she is continuing her follow-ups with [facility] until March of next year.    Review of Systems    Pertinent items are noted in HPI.    Past History    Past Medical History:   Diagnosis Date     Anemia      Cancer (H) 2014    Mantle Cell Lymphoma     Chronic kidney disease     elevated creatinine due to chemo     Cough 06/19/2017    Overview:  Created by Conversion     Dehydration 08/12/2014     Fever 06/19/2017    Overview:  Created by Conversion     History of anesthesia complications 2015    urinary retention after lymph node excision. required catheterization     History of blood transfusion     reaction to platelets with hives in the past     History of transfusion      Hypothyroid      Hypothyroidism 2007     Kidney stones 2002     Lymphocele after surgical procedure 06/2015    post lymph node biopsy; drainged for  "therapeutic comfort June 2015.     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 Cooperative in conversation. Here alone.   RESP: Regular respiratory rate. No expiratory wheezes   MUSCULOSKELETAL: no bilateral leg swelling  NEURO: non focal. Alert and oriented x3.   PSYCH: within normal limits. No depression or anxiety.  SKIN: exposed skin is dry intact.     Lab Results    No results found for this or any previous visit (from the past 168 hour(s)).      Imaging    PET Oncology (Eyes to Thighs)    Result Date: 9/4/2024  EXAM:  PET CT SKULL TO THIGH FDG Serum glucose at time of F-18 FDG injection was 99 mg/dL. Patient followed standard dietary/fasting requirements for this exam. RADIOPHARMACEUTICAL/MEDS: Route: intravenous fludeoxyglucose F 18 injection USP (FDG F-18),10.02 millicurie TECHNIQUE:  F18 FDG PET/CT scan was performed from the vertex through the knees with low dose, non-contrast, free-breathing CT images for attenuation correction and anatomic localization (AC/AL), with imaging beginning at approximately 60 minutes after radiotracer injection. COMPARISON:  Previous FDG PET scan dated 6/3/2024. INDICATION:  Restaging mantle cell non-Hodgkin's lymphoma. Day 180 following CAR-T therapy. Assessing disease response. Subsequent treatment strategy. The patient reports no recent vaccinations. FINDINGS:  Previous noted pelvic and retroperitoneal lymphadenopathy shows no residual increased FDG uptake. No new abnormal FDG uptake is seen within the remainder of the lymph nodes, spleen, liver, or bone marrow. Spleen remains normal size. No abnormal FDG uptake is seen to indicate recurrent lymphoma. Degenerative " changes again noted in the knees bilaterally. The multiple small soft tissue masses in both breasts, some with calcifications are unchanged and have no increased FDG uptake. Small focus of FDG uptake is seen within the right chest port consistent with injection artifact from incomplete flushing. Additional findings on the noncontrast low-dose CT: Right chest port with the tip the catheter in the right atrium. Remainder of the additional CT findings are unchanged from the previous scan.    1. Continued lymphoma response. No abnormal FDG uptake is seen to indicate lymphoma recurrence. 2. Deauville response score equal 1.     A total of 45 min was spent today on this visit which included face to face conversation with the patient, EMR review, counseling and co-ordination of care and medical documentation.      The longitudinal plan of care for the diagnosis(es)/condition(s) as documented were addressed during this visit. Due to the added complexity in care, I will continue to support Avis in the subsequent management and with ongoing continuity of care.    Signed by: Deanna Gill MD      Again, thank you for allowing me to participate in the care of your patient.        Sincerely,        Deanna Gill MD

## 2024-10-03 NOTE — NURSING NOTE
Patient confirms medications and allergies are accurate via patients echeck in completion, and or denies any changes since last reviewed/verified.       Current patient location:  Driving to [place]    Is the patient currently in the state of [place]? YES    Visit mode:VIDEO    If the visit is dropped, the patient can be reconnected by: VIDEO VISIT: Text to cell phone:   Telephone Information:   Mobile [phone]       Will anyone else be joining the visit? NO  (If patient encounters technical issues they should call [phone])    Are changes needed to the allergy or medication list? No    Are refills needed on medications prescribed by this physician? NO    Rooming Documentation:  Questionnaire(s) completed    Reason for visit: Consult    Gabi Veloz VVF

## 2024-10-09 ENCOUNTER — HOME INFUSION (OUTPATIENT)
Dept: HOME HEALTH SERVICES | Facility: HOME HEALTH | Age: 59
End: 2024-10-09
Payer: COMMERCIAL

## 2024-10-09 ENCOUNTER — LAB (OUTPATIENT)
Dept: INFUSION THERAPY | Facility: CLINIC | Age: 59
End: 2024-10-09
Attending: FAMILY MEDICINE
Payer: COMMERCIAL

## 2024-10-09 DIAGNOSIS — N18.4 CKD (CHRONIC KIDNEY DISEASE) STAGE 4, GFR 15-29 ML/MIN (H): ICD-10-CM

## 2024-10-09 DIAGNOSIS — D72.818 LOW CD4 CELL COUNT DETERMINED BY FLOW CYTOMETRY: ICD-10-CM

## 2024-10-09 DIAGNOSIS — Z51.11 MAINTENANCE ANTINEOPLASTIC CHEMOTHERAPY: Primary | ICD-10-CM

## 2024-10-09 DIAGNOSIS — D80.1 HYPOGAMMAGLOBULINEMIA (H): ICD-10-CM

## 2024-10-09 DIAGNOSIS — C83.18 LYMPHOMA, MANTLE CELL, MULTIPLE SITES (H): ICD-10-CM

## 2024-10-09 DIAGNOSIS — D83.0: Primary | ICD-10-CM

## 2024-10-09 LAB
ALBUMIN SERPL BCG-MCNC: 3.8 G/DL (ref 3.5–5.2)
ANION GAP SERPL CALCULATED.3IONS-SCNC: 12 MMOL/L (ref 7–15)
BUN SERPL-MCNC: 38.3 MG/DL (ref 8–23)
CALCIUM SERPL-MCNC: 9.3 MG/DL (ref 8.8–10.4)
CD19 CELLS # BLD: <1 CELLS/UL (ref 107–698)
CD19 CELLS NFR BLD: <1 % (ref 6–27)
CD3 CELLS # BLD: 139 CELLS/UL (ref 603–2990)
CD3 CELLS NFR BLD: 85 % (ref 49–84)
CD3+CD4+ CELLS # BLD: 68 CELLS/UL (ref 441–2156)
CD3+CD4+ CELLS NFR BLD: 42 % (ref 28–63)
CD3+CD4+ CELLS/CD3+CD8+ CLL BLD: 1 % (ref 1.4–2.6)
CD3+CD8+ CELLS # BLD: 67 CELLS/UL (ref 125–1312)
CD3+CD8+ CELLS NFR BLD: 42 % (ref 10–40)
CD3-CD16+CD56+ CELLS # BLD: 21 CELLS/UL (ref 95–640)
CD3-CD16+CD56+ CELLS NFR BLD: 13 % (ref 4–25)
CHLORIDE SERPL-SCNC: 103 MMOL/L (ref 98–107)
CREAT SERPL-MCNC: 2.61 MG/DL (ref 0.51–0.95)
EGFRCR SERPLBLD CKD-EPI 2021: 20 ML/MIN/1.73M2
GLUCOSE SERPL-MCNC: 89 MG/DL (ref 70–99)
HCO3 SERPL-SCNC: 21 MMOL/L (ref 22–29)
PHOSPHATE SERPL-MCNC: 3.8 MG/DL (ref 2.5–4.5)
POTASSIUM SERPL-SCNC: 4.1 MMOL/L (ref 3.4–5.3)
SODIUM SERPL-SCNC: 136 MMOL/L (ref 135–145)
T CELL EXTENDED COMMENT: ABNORMAL

## 2024-10-09 PROCEDURE — 250N000011 HC RX IP 250 OP 636: Performed by: INTERNAL MEDICINE

## 2024-10-09 PROCEDURE — 36591 DRAW BLOOD OFF VENOUS DEVICE: CPT

## 2024-10-09 PROCEDURE — 86357 NK CELLS TOTAL COUNT: CPT | Performed by: FAMILY MEDICINE

## 2024-10-09 PROCEDURE — 80069 RENAL FUNCTION PANEL: CPT | Performed by: FAMILY MEDICINE

## 2024-10-09 PROCEDURE — 82787 IGG 1 2 3 OR 4 EACH: CPT | Performed by: FAMILY MEDICINE

## 2024-10-09 RX ORDER — HEPARIN SODIUM,PORCINE 10 UNIT/ML
5-20 VIAL (ML) INTRAVENOUS DAILY PRN
OUTPATIENT
Start: 2024-10-09

## 2024-10-09 RX ORDER — HEPARIN SODIUM (PORCINE) LOCK FLUSH IV SOLN 100 UNIT/ML 100 UNIT/ML
5 SOLUTION INTRAVENOUS
Status: CANCELLED | OUTPATIENT
Start: 2024-10-09

## 2024-10-09 RX ORDER — HEPARIN SODIUM (PORCINE) LOCK FLUSH IV SOLN 100 UNIT/ML 100 UNIT/ML
5 SOLUTION INTRAVENOUS
Status: DISCONTINUED | OUTPATIENT
Start: 2024-10-09 | End: 2024-10-09 | Stop reason: HOSPADM

## 2024-10-09 RX ADMIN — Medication 5 ML: at 08:27

## 2024-10-09 NOTE — PROGRESS NOTES
Infusion Nursing Note:  Avislondon Munozert presents today for Port Labs.    Patient seen by provider today: No   present during visit today: Not Applicable.    Note: N/A.    Intravenous Access:  Implanted Port.    Treatment Conditions:  Not Applicable.    Post Infusion Assessment:  Blood return noted.  Site patent and intact, free from redness, edema or discomfort.  Access discontinued per protocol.     Discharge Plan:   Patient discharged in stable condition accompanied by: self.  Departure Mode: Ambulatory.    John Rivas RN

## 2024-10-10 LAB — CMV DNA SPEC NAA+PROBE-ACNC: NOT DETECTED IU/ML

## 2024-10-11 LAB
IGG SERPL-MCNC: 858 MG/DL (ref 610–1616)
IGG1 SER-MCNC: 453 MG/DL (ref 382–929)
IGG2 SER-MCNC: 284 MG/DL (ref 242–700)
IGG3 SER-MCNC: 31 MG/DL (ref 22–176)
IGG4 SER-MCNC: 18 MG/DL (ref 4–86)
SUBCLASSES, PERCENT: 92 %

## 2024-10-15 ENCOUNTER — HOSPITAL ENCOUNTER (OUTPATIENT)
Dept: RESPIRATORY THERAPY | Facility: CLINIC | Age: 59
Discharge: HOME | End: 2024-10-15
Attending: INTERNAL MEDICINE | Admitting: INTERNAL MEDICINE
Payer: COMMERCIAL

## 2024-10-15 VITALS — WEIGHT: 293 LBS | BODY MASS INDEX: 47.41 KG/M2

## 2024-10-15 PROBLEM — D83.0: Status: ACTIVE | Noted: 2024-10-15

## 2024-10-15 PROCEDURE — 250N000009 HC RX 250: Performed by: FAMILY MEDICINE

## 2024-10-15 PROCEDURE — 250N000012 HC RX MED GY IP 250 OP 636 PS 637: Performed by: FAMILY MEDICINE

## 2024-10-15 PROCEDURE — 94642 AEROSOL INHALATION TREATMENT: CPT

## 2024-10-15 RX ORDER — PENTAMIDINE ISETHIONATE 300 MG/300MG
300 INHALANT RESPIRATORY (INHALATION) ONCE
Status: COMPLETED | OUTPATIENT
Start: 2024-10-15 | End: 2024-10-15

## 2024-10-15 RX ORDER — EPINEPHRINE 0.3 MG/.3ML
0.3 INJECTION SUBCUTANEOUS PRN
Qty: 999999 ML | Refills: 0 | Status: ACTIVE | OUTPATIENT
Start: 2024-10-23 | End: 2025-04-18

## 2024-10-15 RX ORDER — ALBUTEROL SULFATE 90 UG/1
4 INHALANT RESPIRATORY (INHALATION) ONCE
Status: COMPLETED | OUTPATIENT
Start: 2024-10-15 | End: 2024-10-15

## 2024-10-15 RX ADMIN — PENTAMIDINE ISETHIONATE 300 MG: 300 INHALANT RESPIRATORY (INHALATION) at 09:00

## 2024-10-15 RX ADMIN — ALBUTEROL SULFATE 4 PUFF: 90 AEROSOL, METERED RESPIRATORY (INHALATION) at 08:45

## 2024-10-15 NOTE — PROGRESS NOTES
Pentamidine Nebulizer treatment given.    Pre-tx: SpO2 98%, HR 83, RR 16, /80, BS clear.    4 puffs Albuterol MDI HFA given, followed by Pentamidine Nebulizer (Nebupent) 300 mg mixed with 6 ml sterile water.    Post-tx: SpO2 98%, HR 86, /80, RR 18, BS clear.    Patient tolerated treatment well and left in no distress.

## 2024-11-07 ENCOUNTER — LAB (OUTPATIENT)
Dept: INFUSION THERAPY | Facility: CLINIC | Age: 59
End: 2024-11-07
Attending: FAMILY MEDICINE
Payer: MEDICARE

## 2024-11-07 ENCOUNTER — HOME INFUSION (OUTPATIENT)
Dept: HOME HEALTH SERVICES | Facility: HOME HEALTH | Age: 59
End: 2024-11-07
Payer: COMMERCIAL

## 2024-11-07 DIAGNOSIS — D72.818 LOW CD4 CELL COUNT DETERMINED BY FLOW CYTOMETRY: ICD-10-CM

## 2024-11-07 DIAGNOSIS — Z51.11 MAINTENANCE ANTINEOPLASTIC CHEMOTHERAPY: Primary | ICD-10-CM

## 2024-11-07 DIAGNOSIS — C83.18 LYMPHOMA, MANTLE CELL, MULTIPLE SITES (H): ICD-10-CM

## 2024-11-07 DIAGNOSIS — D80.1 HYPOGAMMAGLOBULINEMIA (H): ICD-10-CM

## 2024-11-07 DIAGNOSIS — N18.4 CKD (CHRONIC KIDNEY DISEASE) STAGE 4, GFR 15-29 ML/MIN (H): ICD-10-CM

## 2024-11-07 LAB
ALBUMIN SERPL BCG-MCNC: 3.6 G/DL (ref 3.5–5.2)
ANION GAP SERPL CALCULATED.3IONS-SCNC: 10 MMOL/L (ref 7–15)
BUN SERPL-MCNC: 26.9 MG/DL (ref 8–23)
CALCIUM SERPL-MCNC: 9.1 MG/DL (ref 8.8–10.4)
CHLORIDE SERPL-SCNC: 105 MMOL/L (ref 98–107)
CREAT SERPL-MCNC: 2.43 MG/DL (ref 0.51–0.95)
EGFRCR SERPLBLD CKD-EPI 2021: 22 ML/MIN/1.73M2
GLUCOSE SERPL-MCNC: 92 MG/DL (ref 70–99)
HCO3 SERPL-SCNC: 25 MMOL/L (ref 22–29)
PHOSPHATE SERPL-MCNC: 3.2 MG/DL (ref 2.5–4.5)
POTASSIUM SERPL-SCNC: 4.3 MMOL/L (ref 3.4–5.3)
SODIUM SERPL-SCNC: 140 MMOL/L (ref 135–145)

## 2024-11-07 PROCEDURE — 86357 NK CELLS TOTAL COUNT: CPT | Performed by: FAMILY MEDICINE

## 2024-11-07 PROCEDURE — 250N000011 HC RX IP 250 OP 636: Performed by: INTERNAL MEDICINE

## 2024-11-07 PROCEDURE — 82435 ASSAY OF BLOOD CHLORIDE: CPT | Performed by: FAMILY MEDICINE

## 2024-11-07 PROCEDURE — 82787 IGG 1 2 3 OR 4 EACH: CPT | Performed by: FAMILY MEDICINE

## 2024-11-07 PROCEDURE — 36591 DRAW BLOOD OFF VENOUS DEVICE: CPT

## 2024-11-07 RX ORDER — HEPARIN SODIUM (PORCINE) LOCK FLUSH IV SOLN 100 UNIT/ML 100 UNIT/ML
5 SOLUTION INTRAVENOUS
OUTPATIENT
Start: 2024-11-07

## 2024-11-07 RX ORDER — HEPARIN SODIUM (PORCINE) LOCK FLUSH IV SOLN 100 UNIT/ML 100 UNIT/ML
5 SOLUTION INTRAVENOUS
Status: DISCONTINUED | OUTPATIENT
Start: 2024-11-07 | End: 2024-11-07 | Stop reason: HOSPADM

## 2024-11-07 RX ORDER — HEPARIN SODIUM,PORCINE 10 UNIT/ML
5-20 VIAL (ML) INTRAVENOUS DAILY PRN
OUTPATIENT
Start: 2024-11-07

## 2024-11-07 RX ADMIN — Medication 5 ML: at 11:51

## 2024-11-08 LAB
CD19 CELLS # BLD: <1 CELLS/UL (ref 107–698)
CD19 CELLS NFR BLD: <1 % (ref 6–27)
CD3 CELLS # BLD: 169 CELLS/UL (ref 603–2990)
CD3 CELLS NFR BLD: 86 % (ref 49–84)
CD3+CD4+ CELLS # BLD: 88 CELLS/UL (ref 441–2156)
CD3+CD4+ CELLS NFR BLD: 45 % (ref 28–63)
CD3+CD4+ CELLS/CD3+CD8+ CLL BLD: 1.1 % (ref 1.4–2.6)
CD3+CD8+ CELLS # BLD: 80 CELLS/UL (ref 125–1312)
CD3+CD8+ CELLS NFR BLD: 41 % (ref 10–40)
CD3-CD16+CD56+ CELLS # BLD: 24 CELLS/UL (ref 95–640)
CD3-CD16+CD56+ CELLS NFR BLD: 12 % (ref 4–25)
CMV DNA SPEC NAA+PROBE-ACNC: NOT DETECTED IU/ML
IGG SERPL-MCNC: 792 MG/DL (ref 610–1616)
IGG1 SER-MCNC: 437 MG/DL (ref 382–929)
IGG2 SER-MCNC: 270 MG/DL (ref 242–700)
IGG3 SER-MCNC: 35 MG/DL (ref 22–176)
IGG4 SER-MCNC: 18 MG/DL (ref 4–86)
SPECIMEN TYPE: NORMAL
SUBCLASSES, PERCENT: 96 %
T CELL EXTENDED COMMENT: ABNORMAL

## 2024-11-11 ENCOUNTER — HOME INFUSION BILLING (OUTPATIENT)
Dept: HOME HEALTH SERVICES | Facility: HOME HEALTH | Age: 59
End: 2024-11-11
Payer: COMMERCIAL

## 2024-11-11 PROCEDURE — A4215 STERILE NEEDLE: HCPCS

## 2024-11-11 PROCEDURE — A4213 20+ CC SYRINGE ONLY: HCPCS

## 2024-11-13 PROCEDURE — K0552 SUP/EXT NON-INS INF PUMP SYR: HCPCS

## 2024-11-13 PROCEDURE — A4221 SUPP NON-INSULIN INF CATH/WK: HCPCS

## 2024-11-13 PROCEDURE — E0779 AMB INFUSION PUMP MECHANICAL: HCPCS | Mod: RR

## 2024-11-13 PROCEDURE — S9338 HIT IMMUNOTHERAPY DIEM: HCPCS

## 2024-11-14 ENCOUNTER — HOSPITAL ENCOUNTER (OUTPATIENT)
Dept: RESPIRATORY THERAPY | Facility: CLINIC | Age: 59
Discharge: HOME | End: 2024-11-14
Attending: FAMILY MEDICINE
Payer: MEDICARE

## 2024-11-14 RX ORDER — PENTAMIDINE ISETHIONATE 300 MG/300MG
300 INHALANT RESPIRATORY (INHALATION) ONCE
Status: COMPLETED | OUTPATIENT
Start: 2024-11-14 | End: 2024-11-14

## 2024-11-14 RX ORDER — ALBUTEROL SULFATE 90 UG/1
4 INHALANT RESPIRATORY (INHALATION) ONCE
Status: COMPLETED | OUTPATIENT
Start: 2024-11-14 | End: 2024-11-14

## 2024-11-14 RX ADMIN — PENTAMIDINE ISETHIONATE 300 MG: 300 INHALANT RESPIRATORY (INHALATION) at 13:28

## 2024-11-14 RX ADMIN — ALBUTEROL SULFATE 4 PUFF: 90 INHALANT RESPIRATORY (INHALATION) at 13:00

## 2024-11-14 NOTE — PROGRESS NOTES
Pentamidine Nebulizer treatment given.    Pre-tx: SpO2 98%, HR 87, RR 18, /78, BS clear.    4 puffs Albuterol MDI HFA given, followed by Pentamidine (Nebupent) nebulizer 300 mg mixed with 6 ml sterile water.    Post-tx:SpO2 98%, HR 88, RR 18, BS clear, /80.    Patient tolerated treatment well.

## 2024-11-18 ENCOUNTER — TELEPHONE (OUTPATIENT)
Dept: ONCOLOGY | Facility: HOSPITAL | Age: 59
End: 2024-11-18
Payer: COMMERCIAL

## 2024-11-18 NOTE — TELEPHONE ENCOUNTER
Called and spoke with a [employer]  regarding pt sending an Attending Physicians Statement to fill out for continuance of disability, as well a HIPAA [place] form. Agent stated they do not have an [place] on file for patient. Provided agent with our fax number to send a blank form, as pt scanned a form that was previously filled out by pt and had a lot of spaces white out. Will message pt back via [employer] to inform her we need a new [place] completed prior to filling out paperwork.

## 2024-11-19 ENCOUNTER — HOSPITAL ENCOUNTER (OUTPATIENT)
Dept: MAMMOGRAPHY | Facility: CLINIC | Age: 59
Discharge: HOME | End: 2024-11-19
Attending: FAMILY MEDICINE
Payer: MEDICARE

## 2024-11-19 DIAGNOSIS — Z12.31 VISIT FOR SCREENING MAMMOGRAM: ICD-10-CM

## 2024-11-19 PROCEDURE — 77063 BREAST TOMOSYNTHESIS BI: CPT

## 2024-11-20 PROCEDURE — S9338 HIT IMMUNOTHERAPY DIEM: HCPCS

## 2024-11-20 PROCEDURE — A4221 SUPP NON-INSULIN INF CATH/WK: HCPCS

## 2024-11-20 PROCEDURE — K0552 SUP/EXT NON-INS INF PUMP SYR: HCPCS

## 2024-11-25 ENCOUNTER — TELEPHONE (OUTPATIENT)
Dept: ONCOLOGY | Facility: HOSPITAL | Age: 59
End: 2024-11-25
Payer: COMMERCIAL

## 2024-11-25 NOTE — TELEPHONE ENCOUNTER
LVM letting pt know that we had previously reached out to her via [employer] regarding the disability paperwork she uploaded for our office to complete. Pt was informed of the same information as stated in [employer] message on 11/18/24. Pt was instructed to call us back with any questions or to fill out an [place] per her preference and we will complete the paperwork once the form is received.

## 2024-11-26 ENCOUNTER — TELEPHONE (OUTPATIENT)
Dept: ONCOLOGY | Facility: HOSPITAL | Age: 59
End: 2024-11-26
Payer: COMMERCIAL

## 2024-11-26 NOTE — TELEPHONE ENCOUNTER
Called pt back per message from Emperatriz in triage that pt wants to discuss Dr. Gill filling out her paperwork moving forward. Pt questioned why she would need to fill out an GEOR[place] when Dr. Starks has been filling out paperwork for pt for years. I informed pt that this is something that has been required all along, unfortunately we have not been doing this. Pt understood this information. Pt then inquired if we have any questions about her paperwork. Upon reviewing pt's chart, pt is going to follow with Dr. Gill in [place] moving forward. I informed pt that Audrey AL RN will be completing this paperwork and will be pt's point of contact moving forward. Pt inquired if she has to come on site to fill out form as she lives 1-2 hours away from any of our locations. I informed pt that she is able to download the form off of the [email or web address] website and offered to give pt step by step instructions. Pt was currently driving and unable to take notes at the time. I informed pt I would pass on all of this information to NOÉ Ventura and have her touch base with pt to make sure to get [place] and complete paperwork on her behalf moving forward. Pt verbalized understanding of this information.

## 2024-11-27 PROCEDURE — K0552 SUP/EXT NON-INS INF PUMP SYR: HCPCS

## 2024-11-27 PROCEDURE — A4221 SUPP NON-INSULIN INF CATH/WK: HCPCS

## 2024-11-27 PROCEDURE — S9338 HIT IMMUNOTHERAPY DIEM: HCPCS

## 2024-12-04 PROCEDURE — A4221 SUPP NON-INSULIN INF CATH/WK: HCPCS

## 2024-12-04 PROCEDURE — K0552 SUP/EXT NON-INS INF PUMP SYR: HCPCS

## 2024-12-04 PROCEDURE — S9338 HIT IMMUNOTHERAPY DIEM: HCPCS

## 2024-12-09 ENCOUNTER — HOME INFUSION BILLING (OUTPATIENT)
Dept: HOME HEALTH SERVICES | Facility: HOME HEALTH | Age: 59
End: 2024-12-09
Payer: COMMERCIAL

## 2024-12-16 ENCOUNTER — HOSPITAL ENCOUNTER (OUTPATIENT)
Dept: RESPIRATORY THERAPY | Facility: CLINIC | Age: 59
Discharge: HOME | End: 2024-12-16
Attending: INTERNAL MEDICINE | Admitting: INTERNAL MEDICINE
Payer: MEDICARE

## 2024-12-16 DIAGNOSIS — C83.18 LYMPHOMA, MANTLE CELL, MULTIPLE SITES (H): Primary | Chronic | ICD-10-CM

## 2024-12-16 PROCEDURE — 250N000012 HC RX MED GY IP 250 OP 636 PS 637: Performed by: FAMILY MEDICINE

## 2024-12-16 PROCEDURE — 94642 AEROSOL INHALATION TREATMENT: CPT

## 2024-12-16 RX ORDER — PENTAMIDINE ISETHIONATE 300 MG/300MG
300 INHALANT RESPIRATORY (INHALATION) ONCE
Status: COMPLETED | OUTPATIENT
Start: 2024-12-16 | End: 2024-12-16

## 2024-12-16 RX ADMIN — PENTAMIDINE ISETHIONATE 300 MG: 300 INHALANT RESPIRATORY (INHALATION) at 13:00

## 2024-12-16 NOTE — PROGRESS NOTES
Pentamidine Treatment Performed at [facility] December 16, 2024     Pretreatment Vitals:  HR 94,RR 18, Sp02 98, Breath Sounds clear.    4 puffs Albuterol HFA administered with spacer. Pentamidine 300 mg mixed with 6 ml sterile water nebulized at 6 LPM.    Posttreatment Vitals:  HR 82, RR 18,Sp02 98, Breath Sounds unchanged    Patient scheduled for follow up 1/16 and 2/13

## 2025-01-06 ENCOUNTER — HOME INFUSION BILLING (OUTPATIENT)
Dept: HOME HEALTH SERVICES | Facility: HOME HEALTH | Age: 60
End: 2025-01-06
Payer: COMMERCIAL

## 2025-01-06 DIAGNOSIS — C83.18 LYMPHOMA, MANTLE CELL, MULTIPLE SITES (H): ICD-10-CM

## 2025-01-07 NOTE — TELEPHONE ENCOUNTER
Requested Prescriptions   Pending Prescriptions Disp Refills    acyclovir (ZOVIRAX) 400 MG tablet [Pharmacy Med Name: Acyclovir 400 MG Oral Tablet] 180 tablet 0     Sig: Take 1 tablet by mouth twice daily       Antivirals Failed - 1/7/2025  9:55 AM        Failed - Medication indicated for associated diagnosis     Medication is associated with one or more of the following diagnoses:     Genital herpes simplex   Herpes simples   Herpes zoster, shingles   Varicella   Recurrent herpes simplex labialis   HIV infection   Varicella-zoster virus infection, prophylaxis          Passed - Patient is age 12 or older        Passed - Medication is active on med list        Passed - Recent (12 mo) or future (90 days) visit within the authorizing provider's specialty     The patient must have completed an in-person or virtual visit within the past 12 months or has a future visit scheduled within the next 90 days with the authorizing provider s specialty.  Urgent care and e-visits do not qualify as an office visit for this protocol.

## 2025-01-08 RX ORDER — ACYCLOVIR 400 MG/1
TABLET ORAL
Qty: 180 TABLET | Refills: 0 | Status: SHIPPED | OUTPATIENT
Start: 2025-01-08

## 2025-01-16 ENCOUNTER — HOSPITAL ENCOUNTER (OUTPATIENT)
Dept: RESPIRATORY THERAPY | Facility: CLINIC | Age: 60
End: 2025-01-16
Attending: FAMILY MEDICINE
Payer: MEDICARE

## 2025-01-16 DIAGNOSIS — D72.818 LOW CD4 CELL COUNT DETERMINED BY FLOW CYTOMETRY: ICD-10-CM

## 2025-01-16 DIAGNOSIS — C83.18 LYMPHOMA, MANTLE CELL, MULTIPLE SITES (H): Primary | Chronic | ICD-10-CM

## 2025-01-16 PROCEDURE — 94642 AEROSOL INHALATION TREATMENT: CPT

## 2025-01-16 PROCEDURE — 250N000012 HC RX MED GY IP 250 OP 636 PS 637: Performed by: FAMILY MEDICINE

## 2025-01-16 RX ORDER — PENTAMIDINE ISETHIONATE 300 MG/300MG
300 INHALANT RESPIRATORY (INHALATION) ONCE
Status: COMPLETED | OUTPATIENT
Start: 2025-01-16 | End: 2025-01-16

## 2025-01-16 RX ADMIN — PENTAMIDINE ISETHIONATE 300 MG: 300 INHALANT RESPIRATORY (INHALATION) at 14:18

## 2025-01-16 NOTE — PROGRESS NOTES
Pentamidine Treatment Performed at [facility] January 16, 2025     Pretreatment Vitals:  HR 92,RR 18, Sp02 99%, Breath Sounds clear.    4 puffs Albuterol HFA administered with spacer. Pentamidine 300 mg mixed with 6 ml sterile water nebulized at 6 LPM.    Posttreatment Vitals:  HR 85, RR 16,Sp02 96%, Breath Sounds unchanged    Patient scheduled for follow up

## 2025-01-24 ENCOUNTER — HOME INFUSION (OUTPATIENT)
Dept: HOME HEALTH SERVICES | Facility: HOME HEALTH | Age: 60
End: 2025-01-24
Payer: COMMERCIAL

## 2025-01-30 ENCOUNTER — INFUSION THERAPY VISIT (OUTPATIENT)
Dept: INFUSION THERAPY | Facility: CLINIC | Age: 60
End: 2025-01-30
Attending: INTERNAL MEDICINE
Payer: MEDICARE

## 2025-01-30 DIAGNOSIS — Z51.81 THERAPEUTIC DRUG MONITORING: ICD-10-CM

## 2025-01-30 DIAGNOSIS — D72.818 LOW CD4 CELL COUNT DETERMINED BY FLOW CYTOMETRY: ICD-10-CM

## 2025-01-30 DIAGNOSIS — C83.18 LYMPHOMA, MANTLE CELL, MULTIPLE SITES (H): ICD-10-CM

## 2025-01-30 DIAGNOSIS — N18.4 CHRONIC RENAL DISEASE, STAGE IV (H): ICD-10-CM

## 2025-01-30 DIAGNOSIS — D80.1 HYPOGAMMAGLOBULINEMIA: ICD-10-CM

## 2025-01-30 DIAGNOSIS — Z51.11 MAINTENANCE ANTINEOPLASTIC CHEMOTHERAPY: Primary | ICD-10-CM

## 2025-01-30 LAB
ALBUMIN SERPL BCG-MCNC: 3.7 G/DL (ref 3.5–5.2)
ALP SERPL-CCNC: 163 U/L (ref 40–150)
ALT SERPL W P-5'-P-CCNC: 45 U/L (ref 0–50)
ANION GAP SERPL CALCULATED.3IONS-SCNC: 9 MMOL/L (ref 7–15)
AST SERPL W P-5'-P-CCNC: 34 U/L (ref 0–45)
BASOPHILS # BLD AUTO: 0.1 10E3/UL (ref 0–0.2)
BASOPHILS NFR BLD AUTO: 1 %
BILIRUB SERPL-MCNC: 0.4 MG/DL
BUN SERPL-MCNC: 31.3 MG/DL (ref 8–23)
CALCIUM SERPL-MCNC: 9.2 MG/DL (ref 8.8–10.4)
CHLORIDE SERPL-SCNC: 105 MMOL/L (ref 98–107)
CREAT SERPL-MCNC: 2.31 MG/DL (ref 0.51–0.95)
EGFRCR SERPLBLD CKD-EPI 2021: 23 ML/MIN/1.73M2
EOSINOPHIL # BLD AUTO: 0.2 10E3/UL (ref 0–0.7)
EOSINOPHIL NFR BLD AUTO: 4 %
ERYTHROCYTE [DISTWIDTH] IN BLOOD BY AUTOMATED COUNT: 15.8 % (ref 10–15)
GLUCOSE SERPL-MCNC: 94 MG/DL (ref 70–99)
HCO3 SERPL-SCNC: 25 MMOL/L (ref 22–29)
HCT VFR BLD AUTO: 36.7 % (ref 35–47)
HGB BLD-MCNC: 11.8 G/DL (ref 11.7–15.7)
IMM GRANULOCYTES # BLD: 0 10E3/UL
IMM GRANULOCYTES NFR BLD: 1 %
LYMPHOCYTES # BLD AUTO: 0.2 10E3/UL (ref 0.8–5.3)
LYMPHOCYTES NFR BLD AUTO: 3 %
MCH RBC QN AUTO: 30.2 PG (ref 26.5–33)
MCHC RBC AUTO-ENTMCNC: 32.2 G/DL (ref 31.5–36.5)
MCV RBC AUTO: 94 FL (ref 78–100)
MONOCYTES # BLD AUTO: 0.5 10E3/UL (ref 0–1.3)
MONOCYTES NFR BLD AUTO: 9 %
NEUTROPHILS # BLD AUTO: 4.3 10E3/UL (ref 1.6–8.3)
NEUTROPHILS NFR BLD AUTO: 82 %
NRBC # BLD AUTO: 0 10E3/UL
NRBC BLD AUTO-RTO: 0 /100
PLATELET # BLD AUTO: 104 10E3/UL (ref 150–450)
POTASSIUM SERPL-SCNC: 4 MMOL/L (ref 3.4–5.3)
PROT SERPL-MCNC: 6.6 G/DL (ref 6.4–8.3)
RBC # BLD AUTO: 3.91 10E6/UL (ref 3.8–5.2)
SODIUM SERPL-SCNC: 139 MMOL/L (ref 135–145)
WBC # BLD AUTO: 5.2 10E3/UL (ref 4–11)

## 2025-01-30 PROCEDURE — 82784 ASSAY IGA/IGD/IGG/IGM EACH: CPT | Performed by: FAMILY MEDICINE

## 2025-01-30 PROCEDURE — 86360 T CELL ABSOLUTE COUNT/RATIO: CPT | Performed by: FAMILY MEDICINE

## 2025-01-30 PROCEDURE — 84155 ASSAY OF PROTEIN SERUM: CPT | Performed by: STUDENT IN AN ORGANIZED HEALTH CARE EDUCATION/TRAINING PROGRAM

## 2025-01-30 PROCEDURE — 250N000011 HC RX IP 250 OP 636: Performed by: INTERNAL MEDICINE

## 2025-01-30 PROCEDURE — 86357 NK CELLS TOTAL COUNT: CPT | Performed by: FAMILY MEDICINE

## 2025-01-30 PROCEDURE — 85025 COMPLETE CBC W/AUTO DIFF WBC: CPT | Performed by: STUDENT IN AN ORGANIZED HEALTH CARE EDUCATION/TRAINING PROGRAM

## 2025-01-30 PROCEDURE — 82565 ASSAY OF CREATININE: CPT | Performed by: STUDENT IN AN ORGANIZED HEALTH CARE EDUCATION/TRAINING PROGRAM

## 2025-01-30 RX ORDER — HEPARIN SODIUM,PORCINE 10 UNIT/ML
5-20 VIAL (ML) INTRAVENOUS DAILY PRN
OUTPATIENT
Start: 2025-01-30

## 2025-01-30 RX ORDER — HEPARIN SODIUM (PORCINE) LOCK FLUSH IV SOLN 100 UNIT/ML 100 UNIT/ML
5 SOLUTION INTRAVENOUS
OUTPATIENT
Start: 2025-01-30

## 2025-01-30 RX ORDER — HEPARIN SODIUM (PORCINE) LOCK FLUSH IV SOLN 100 UNIT/ML 100 UNIT/ML
5 SOLUTION INTRAVENOUS
Status: DISCONTINUED | OUTPATIENT
Start: 2025-01-30 | End: 2025-01-30 | Stop reason: HOSPADM

## 2025-01-30 RX ADMIN — Medication 5 ML: at 14:43

## 2025-01-30 NOTE — PROGRESS NOTES
Infusion Nursing Note:  Avis COLE MunozHumberto presents today for Port Labs.    Patient seen by provider today: No   present during visit today: Not Applicable.    Note: CMP and CBC results faxed to Dr. Brian ([phone])    Intravenous Access:  Implanted Port.    Treatment Conditions:  Not Applicable.    Post Infusion Assessment:  Blood return noted.  Site patent and intact, free from redness, edema or discomfort.  No evidence of extravasations.  Access discontinued per protocol.     Discharge Plan:   Discharge instructions reviewed with: Patient.  Patient and/or family verbalized understanding of discharge instructions and all questions answered.  AVS to patient via [employer]  Patient will return 2/27/2025 for next appointment.   Patient discharged in stable condition accompanied by: self.  Departure Mode: Ambulatory.    Louise Sanders RN

## 2025-02-03 ENCOUNTER — HOME INFUSION BILLING (OUTPATIENT)
Dept: HOME HEALTH SERVICES | Facility: HOME HEALTH | Age: 60
End: 2025-02-03
Payer: MEDICARE

## 2025-02-03 PROCEDURE — A4213 20+ CC SYRINGE ONLY: HCPCS

## 2025-02-03 PROCEDURE — A4215 STERILE NEEDLE: HCPCS

## 2025-02-05 DIAGNOSIS — E03.9 HYPOTHYROIDISM, UNSPECIFIED TYPE: ICD-10-CM

## 2025-02-05 PROCEDURE — K0552 SUP/EXT NON-INS INF PUMP SYR: HCPCS

## 2025-02-05 PROCEDURE — S9338 HIT IMMUNOTHERAPY DIEM: HCPCS

## 2025-02-06 RX ORDER — LEVOTHYROXINE SODIUM 112 UG/1
112 TABLET ORAL DAILY
Qty: 90 TABLET | Refills: 0 | Status: SHIPPED | OUTPATIENT
Start: 2025-02-06

## 2025-02-12 PROCEDURE — K0552 SUP/EXT NON-INS INF PUMP SYR: HCPCS

## 2025-02-12 PROCEDURE — S9338 HIT IMMUNOTHERAPY DIEM: HCPCS

## 2025-02-13 ENCOUNTER — HOSPITAL ENCOUNTER (OUTPATIENT)
Dept: RESPIRATORY THERAPY | Facility: CLINIC | Age: 60
End: 2025-02-13
Attending: FAMILY MEDICINE
Payer: MEDICARE

## 2025-02-13 RX ORDER — PENTAMIDINE ISETHIONATE 300 MG/300MG
300 INHALANT RESPIRATORY (INHALATION) ONCE
Status: COMPLETED | OUTPATIENT
Start: 2025-02-13 | End: 2025-02-13

## 2025-02-13 RX ORDER — ALBUTEROL SULFATE 90 UG/1
4 INHALANT RESPIRATORY (INHALATION) ONCE
Status: COMPLETED | OUTPATIENT
Start: 2025-02-13 | End: 2025-02-13

## 2025-02-13 RX ADMIN — PENTAMIDINE ISETHIONATE 300 MG: 300 INHALANT RESPIRATORY (INHALATION) at 13:33

## 2025-02-13 RX ADMIN — ALBUTEROL SULFATE 4 PUFF: 90 INHALANT RESPIRATORY (INHALATION) at 13:15

## 2025-02-13 NOTE — PROGRESS NOTES
Pentamidine Nebulizer treatment given    Pre-tx: SpO2 99%, HR 91, RR 16, /78, BS course. Persistent cough    4 puffs Albuterol HFA MDI given, followed by Pentamidine nebulizer (Nebupent) 300 mg mixed with 6 ml sterile water.    Post-tx: SpO2 99%, HR 90, RR 16, /78, BS Course. Continued persistent cough.    Treatment was well tolerated and Avis left without complaint.

## 2025-02-14 ENCOUNTER — HOME INFUSION (OUTPATIENT)
Dept: HOME HEALTH SERVICES | Facility: HOME HEALTH | Age: 60
End: 2025-02-14
Payer: COMMERCIAL

## 2025-02-14 DIAGNOSIS — D83.0: ICD-10-CM

## 2025-02-14 PROBLEM — Z12.4 CERVICAL CANCER SCREENING: Status: RESOLVED | Noted: 2024-07-08 | Resolved: 2025-02-14

## 2025-02-14 PROBLEM — U07.1 2019 NOVEL CORONAVIRUS DISEASE (COVID-19): Status: RESOLVED | Noted: 2021-12-17 | Resolved: 2025-02-14
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
## 2025-02-19 PROCEDURE — S9338 HIT IMMUNOTHERAPY DIEM: HCPCS

## 2025-02-19 PROCEDURE — K0552 SUP/EXT NON-INS INF PUMP SYR: HCPCS

## 2025-02-26 PROCEDURE — S9338 HIT IMMUNOTHERAPY DIEM: HCPCS

## 2025-02-26 PROCEDURE — K0552 SUP/EXT NON-INS INF PUMP SYR: HCPCS

## 2025-02-27 ENCOUNTER — INFUSION THERAPY VISIT (OUTPATIENT)
Dept: INFUSION THERAPY | Facility: CLINIC | Age: 60
End: 2025-02-27
Attending: INTERNAL MEDICINE
Payer: MEDICARE

## 2025-02-27 DIAGNOSIS — Z51.11 MAINTENANCE ANTINEOPLASTIC CHEMOTHERAPY: Primary | ICD-10-CM

## 2025-02-27 DIAGNOSIS — E03.9 ACQUIRED HYPOTHYROIDISM: ICD-10-CM

## 2025-02-27 DIAGNOSIS — D72.818 LOW CD4 CELL COUNT DETERMINED BY FLOW CYTOMETRY: ICD-10-CM

## 2025-02-27 DIAGNOSIS — D80.1 HYPOGAMMAGLOBULINEMIA: ICD-10-CM

## 2025-02-27 DIAGNOSIS — C83.18 LYMPHOMA, MANTLE CELL, MULTIPLE SITES (H): ICD-10-CM

## 2025-02-27 LAB
CD19 CELLS # BLD: <1 CELLS/UL (ref 107–698)
CD19 CELLS NFR BLD: <1 % (ref 6–27)
CD3 CELLS # BLD: 256 CELLS/UL (ref 603–2990)
CD3 CELLS NFR BLD: 87 % (ref 49–84)
CD3+CD4+ CELLS # BLD: 164 CELLS/UL (ref 441–2156)
CD3+CD4+ CELLS NFR BLD: 56 % (ref 28–63)
CD3+CD4+ CELLS/CD3+CD8+ CLL BLD: 1.88 % (ref 1.4–2.6)
CD3+CD8+ CELLS # BLD: 87 CELLS/UL (ref 125–1312)
CD3+CD8+ CELLS NFR BLD: 30 % (ref 10–40)
CD3-CD16+CD56+ CELLS # BLD: 31 CELLS/UL (ref 95–640)
CD3-CD16+CD56+ CELLS NFR BLD: 11 % (ref 4–25)
T CELL EXTENDED COMMENT: ABNORMAL
TSH SERPL DL<=0.005 MIU/L-ACNC: 0.68 UIU/ML (ref 0.3–4.2)

## 2025-02-27 PROCEDURE — 84443 ASSAY THYROID STIM HORMONE: CPT | Performed by: FAMILY MEDICINE

## 2025-02-27 PROCEDURE — 36591 DRAW BLOOD OFF VENOUS DEVICE: CPT

## 2025-02-27 PROCEDURE — 250N000011 HC RX IP 250 OP 636: Performed by: INTERNAL MEDICINE

## 2025-02-27 PROCEDURE — 86355 B CELLS TOTAL COUNT: CPT | Performed by: FAMILY MEDICINE

## 2025-02-27 PROCEDURE — 86359 T CELLS TOTAL COUNT: CPT | Performed by: FAMILY MEDICINE

## 2025-02-27 RX ORDER — HEPARIN SODIUM (PORCINE) LOCK FLUSH IV SOLN 100 UNIT/ML 100 UNIT/ML
5 SOLUTION INTRAVENOUS
Status: DISCONTINUED | OUTPATIENT
Start: 2025-02-27 | End: 2025-02-27 | Stop reason: HOSPADM

## 2025-02-27 RX ORDER — HEPARIN SODIUM,PORCINE 10 UNIT/ML
5-20 VIAL (ML) INTRAVENOUS DAILY PRN
OUTPATIENT
Start: 2025-02-27

## 2025-02-27 RX ORDER — HEPARIN SODIUM (PORCINE) LOCK FLUSH IV SOLN 100 UNIT/ML 100 UNIT/ML
5 SOLUTION INTRAVENOUS
OUTPATIENT
Start: 2025-02-27

## 2025-02-27 RX ADMIN — Medication 5 ML: at 11:16

## 2025-02-28 PROBLEM — D33.3 SCHWANNOMA OF CRANIAL NERVE (H): Status: ACTIVE | Noted: 2025-02-28

## 2025-02-28 PROBLEM — D32.9 MENINGIOMA (H): Status: ACTIVE | Noted: 2025-02-28

## 2025-03-10 ENCOUNTER — HOME INFUSION BILLING (OUTPATIENT)
Dept: HOME HEALTH SERVICES | Facility: HOME HEALTH | Age: 60
End: 2025-03-10
Payer: COMMERCIAL

## 2025-03-10 PROCEDURE — A4215 STERILE NEEDLE: HCPCS

## 2025-03-10 PROCEDURE — A4213 20+ CC SYRINGE ONLY: HCPCS

## 2025-03-11 RX ORDER — EPINEPHRINE 0.3 MG/.3ML
0.3 INJECTION SUBCUTANEOUS PRN
Qty: 999999 ML | Refills: 0 | OUTPATIENT
Start: 2025-03-11 | End: 2026-03-11

## 2025-03-12 ENCOUNTER — HOSPITAL ENCOUNTER (OUTPATIENT)
Dept: RESPIRATORY THERAPY | Facility: CLINIC | Age: 60
Discharge: HOME | End: 2025-03-12
Attending: FAMILY MEDICINE
Payer: MEDICARE

## 2025-03-12 DIAGNOSIS — C83.18 LYMPHOMA, MANTLE CELL, MULTIPLE SITES (H): Primary | Chronic | ICD-10-CM

## 2025-03-12 PROCEDURE — K0552 SUP/EXT NON-INS INF PUMP SYR: HCPCS

## 2025-03-12 PROCEDURE — S9338 HIT IMMUNOTHERAPY DIEM: HCPCS

## 2025-03-12 PROCEDURE — A4221 SUPP NON-INSULIN INF CATH/WK: HCPCS

## 2025-03-12 PROCEDURE — 250N000012 HC RX MED GY IP 250 OP 636 PS 637: Performed by: FAMILY MEDICINE

## 2025-03-12 PROCEDURE — 94642 AEROSOL INHALATION TREATMENT: CPT

## 2025-03-12 RX ORDER — PENTAMIDINE ISETHIONATE 300 MG/300MG
300 INHALANT RESPIRATORY (INHALATION) ONCE
Status: COMPLETED | OUTPATIENT
Start: 2025-03-12 | End: 2025-03-12

## 2025-03-12 RX ADMIN — PENTAMIDINE ISETHIONATE 300 MG: 300 INHALANT RESPIRATORY (INHALATION) at 13:55

## 2025-03-12 NOTE — PROGRESS NOTES
Pentamidine Treatment Performed at [facility] March 12, 2025     Pretreatment Vitals:  HR 88,RR 16, Sp02 99%, Breath Sounds clear.    4 puffs Albuterol HFA administered with spacer. Pentamidine 300 mg mixed with 6 ml sterile water nebulized at 6 LPM.    Posttreatment Vitals:  HR 94, RR 18,Sp02 100%, Breath Sounds unchanged

## 2025-03-13 PROCEDURE — E0779 AMB INFUSION PUMP MECHANICAL: HCPCS | Mod: RR

## 2025-03-17 ENCOUNTER — HOME INFUSION (OUTPATIENT)
Dept: HOME HEALTH SERVICES | Facility: HOME HEALTH | Age: 60
End: 2025-03-17
Payer: COMMERCIAL

## 2025-03-17 ENCOUNTER — MEDICAL CORRESPONDENCE (OUTPATIENT)
Dept: HOME HEALTH SERVICES | Facility: HOME HEALTH | Age: 60
End: 2025-03-17

## 2025-03-17 DIAGNOSIS — D83.0: ICD-10-CM

## 2025-03-17 RX ORDER — EPINEPHRINE 0.3 MG/.3ML
0.3 INJECTION SUBCUTANEOUS PRN
Qty: 999999 ML | Refills: 0 | Status: ACTIVE | OUTPATIENT
Start: 2025-03-17 | End: 2026-03-12

## 2025-03-19 PROCEDURE — A4221 SUPP NON-INSULIN INF CATH/WK: HCPCS

## 2025-03-19 PROCEDURE — S9338 HIT IMMUNOTHERAPY DIEM: HCPCS

## 2025-03-19 PROCEDURE — K0552 SUP/EXT NON-INS INF PUMP SYR: HCPCS

## 2025-03-26 ENCOUNTER — VIRTUAL VISIT (OUTPATIENT)
Dept: ONCOLOGY | Facility: HOSPITAL | Age: 60
End: 2025-03-26
Attending: INTERNAL MEDICINE
Payer: COMMERCIAL

## 2025-03-26 VITALS
OXYGEN SATURATION: 97 % | HEIGHT: 67 IN | SYSTOLIC BLOOD PRESSURE: 140 MMHG | WEIGHT: 293 LBS | HEART RATE: 85 BPM | RESPIRATION RATE: 16 BRPM | DIASTOLIC BLOOD PRESSURE: 89 MMHG | TEMPERATURE: 97.7 F | BODY MASS INDEX: 45.99 KG/M2

## 2025-03-26 DIAGNOSIS — N18.4 CKD (CHRONIC KIDNEY DISEASE) STAGE 4, GFR 15-29 ML/MIN (H): Chronic | ICD-10-CM

## 2025-03-26 DIAGNOSIS — D84.9 IMMUNOCOMPROMISED STATE: ICD-10-CM

## 2025-03-26 DIAGNOSIS — D80.1 HYPOGAMMAGLOBULINEMIA, ACQUIRED: ICD-10-CM

## 2025-03-26 DIAGNOSIS — C83.18 MANTLE CELL LYMPHOMA OF LYMPH NODES OF MULTIPLE SITES (H): Primary | ICD-10-CM

## 2025-03-26 DIAGNOSIS — Z92.850 H/O OF CHIMERIC ANTIGEN RECEPTOR T-CELL THERAPY: ICD-10-CM

## 2025-03-26 DIAGNOSIS — D72.818 LOW CD4 CELL COUNT DETERMINED BY FLOW CYTOMETRY: ICD-10-CM

## 2025-03-26 PROCEDURE — A4221 SUPP NON-INSULIN INF CATH/WK: HCPCS

## 2025-03-26 PROCEDURE — S9338 HIT IMMUNOTHERAPY DIEM: HCPCS

## 2025-03-26 PROCEDURE — K0552 SUP/EXT NON-INS INF PUMP SYR: HCPCS

## 2025-03-26 ASSESSMENT — PAIN SCALES - GENERAL: PAINLEVEL_OUTOF10: MILD PAIN (3)

## 2025-03-26 NOTE — PROGRESS NOTES
Virtual Visit Details    Type of service:  Video Visit   Video Start Time:  9:47 AM  Video End Time: 9:59 AM    Originating Location (pt. Location): Other in clinic    Distant Location (provider location):  Off-site  Platform used for Video Visit: Roland Gill MD on 3/26/2025 at 9:38 AM      [facility] Hematology and Oncology Progress Note    Patient: Avis Paul  MRN: [id number]  Date of Service: Mar 26, 2025          Reason for Visit    No chief complaint on file.      Assessment and Plan     Cancer Staging   No matching staging information was found for the patient.      1. Mantle cell lymphoma status post autologous transplant, August 18, 2015: Then completed 2 years of maintenance Rituxan therapy.  Recent disease progression noted on the CT scan last year.  Biopsy of the left internal iliac lymph node positive for mantle cell lymphoma.  Was referred to [facility] where she underwent CAR-T infusion on 2/29/2024.  Had CRS following CAR-T infusion requiring immunosuppression.  Now has recovered.  Currently on observation only.  Post CAR-T PET scan showed good response.  She had a repeat PET scan done a couple weeks ago.  I reviewed the PET scan report personally.  She has evidence of continued response with no abnormal uptake in any of the lymph nodes.  [place] 1.  She has every 3-month follow-ups with [facility].  Lab studies show elevated but stable creatinine.  She has evidence of CKD following previous cisplatin chemotherapy.  Follows with nephrology at [facility].     She was seen at [facility] yesterday for her 1 year post CAR-T follow-up.  Had a repeat PET scan.  I reviewed the PET scan report personally and independently interpreted the results.  It is showing no evidence of metabolically active lymph nodes.  For all practical purposes she is in complete remission.    Results  - PET scan: Complete metabolic remission, stable uptake, no evidence of lymphoma.  - Creatinine  level: Approximately 2.5, stable.  - CD4 count: Less than 200.  - Immunoglobulin levels: Approximately 700.  - Total lymphocyte count: Low, consistent with CAR T therapy effects.  CBC shows normal hemoglobin at 11.9.  Platelet count is slightly low at 1 14K.  - I reviewed lab results personally and independently interpreted the results.     Plan  Mantle cell lymphoma of lymph nodes of multiple sites (H)  - Patient is in complete metabolic remission as indicated by recent PET scan. Clinically asymptomatic.  - Continue monitoring with physical exams and labs. Consider imaging in 6 months if clinically indicated. Follow-up appointment scheduled for late June.    Low CD4 cell count determined by flow cytometry  - CD4 count remains less than 200.  - Continue monthly inhalation of pentamidine.  - Continue acyclovir indefinitely    CKD (chronic kidney disease) stage 4, GFR 15-29 ml/min (H)  - Creatinine level stable at around 2.5.  - Follow-up with nephrology in June.    H/O of chimeric antigen receptor T-cell therapy  - Ongoing immunosuppression noted.  - Monitor immunoglobulin levels and total lymphocyte count. Continue subcu IG therapy as per immunology recommendation.    Immunocompromised state  - Immunocompromised due to CAR T-cell therapy and stem cell transplant history.  - Continue monthly labs and monitor for infections.    Hypogammaglobulinemia, acquired  - Immunoglobulin levels stable around 700.  - Continue IG therapy. Monitor IgG levels for any changes.          ECOG Performance    0 - Independent    Distress Screening (within last 30 days)    No data recorded     Pain  Pain Score: Mild Pain (3)    Problem List    Patient Active Problem List   Diagnosis    Mantle cell lymphoma of lymph nodes of multiple sites (H)    NHL (non-Hodgkin's lymphoma) (H)    Hypogammaglobulinemia    Drug-induced thrombocytopenia    Morbid obesity (H)    Elevation of level of transaminase or lactic acid dehydrogenase (LDH)    Malaise  and fatigue    CKD (chronic kidney disease) stage 4, GFR 15-29 ml/min (H)    Hypogammaglobulinemia, acquired    Maintenance antineoplastic chemotherapy    Acquired hypothyroidism    Obstructive sleep apnea syndrome    Low CD4 cell count determined by flow cytometry    Acute cough    H/O of chimeric antigen receptor T-cell therapy    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  March - June 2014: R-CHOP x 5 cycles resulting in DC. She reports she ultimately had disease progressing on treatment.  June - August 2014: R-DHAP x 3 cycles without CR  October 2014 - July 2015: Ibrutinib. Still had one residual lymph node, which on PET/CT on 5/21/2015 showed increase in size (2.2 from 2.0 cm) and SUVmax (13 from 6.9) compared to March 2015 PET/CT. Excisional lymph node biopsy showing MCL. Follow-up PET/CT (6/29/15) showed no FDG-avid lymphoma. Then received R-ICE x 1 cycle (25% dose reduction of etoposide and ifosfamide due to renal function).  August 2015: BEAM high dose chemotherapy followed by autologous stem cell transplantation at [facility].  2015 - 2017: Completed 2 years of maintenance rituximab in December 2017.  May - July 2020: FDG-avid lymphadenopathy (PET/CT on 5/27/20) with core biopsy (6/10/20) followed by excisional biopsy (6/25/20) of left inguinal lymph node. FISH report noted CCND1/IGH fusion in 2 out of 300 interphase nuclei, considered of unclear significance. Morphologic and immunophenotypic evaluation of the excisional node showed only reactive lymph node - no relapse. She had started ibrutinib and received approx 6 weeks duration of treatment. She was evaluated for the first time in Lymphoma Clinic at [facility] (Dr. Rubio). Ibrutinib discontinued and close surveillance /  "observation recommended without clear evidence of relapse.  September 2020: PET/CT (9/11/20) showed smaller left internal iliac lymph node (4.9 x 3.9 cm from 3.3 x 1.5 cm) with reduced SUVmax (5.6 from 12.4). Observed.  November 2020: CT abdomen/pelvis (11/12/20) showed left pre-sacral soft tissue mass - called left internal iliac node above - considered similar in size at 2.6 x 1.7 cm.  February 2021: CT abdomen/pelvis (2/4/21) showed reduced size now to 1.9 x 1.5.  May 2021: CT abdomen/pelvis (5/13/21) further reduction in size slightly (1.9 x 1.2 cm).  August 2021: CT abdomen/pelvis (8/5/21) 1.9 x 1.0 cm left internal iliac node.   November 2021: CT chest/abdomen/pelvis (11/24/21) 1.0 cm in short axis. No other enlarged nodes. Spleen normal.   March 2022: CT chest/abdomen/pelvis (3/21/22): \"no enlarged lymph nodes\"  July 2022: CT chest/abdomen/pelvis (7/18/22): lymph nodes \"normal\"  May 2023: IVIg switched to subcutaneous Ig.  June 2023: CT chest/abdomen/pelvis (6/26/23): \"no lymphadenopathy\"  December 2023: CT chest/abdomen/pelvis (12/19/23) performed for routine surveillance showed interval development of pelvic lymphadenopathy with dominant left internal iliac chain lymph node measuring 4.1 x 4.5 cm. Left common iliac node 1.2 x 1.3 cm. RP node 1.6 x 1.8 cm adjacent to aortic bifurcation. PET/CT (12/29/23; reviewed at [facility]) showed this dominant left internal iliac lesion to have an SUVmax of 13.6.  January 2024: CT-guided left internal iliac lymph node biopsy (1/9/24; pathology reviewed at [facility] by Dr. Kendra Carter) showing MCL with Ki-67 of 20-30%.    LD Chemo: Cyclophosphamide (Cytoxan)/Fludarabine 02/24/2024  CAR-T Infusion Date: 02/29/2024  CRS: 03/05/2024 to 03/08/2024, max grade = 2; Intervention(s) given: Tocilizumab/Steroids/Anakinra/siltuximab on 3/6/24. Developed skin rash with siltuximab.    Interim History:   She has a history of mantle cell lymphoma diagnosed initially in March 2014.  Status post " multiple lines of chemotherapy and autologous stem cell transplant.  Finished 2 years of maintenance rituximab December 2017 after transplant.  Following that she was put on a brief period of ibrutinib.  She had been on surveillance from July 2020 till January 2024.      She had evidence of relapse with development of a left iliac chain lymph node in June of last year.  Although CT scan was read as negative.  December 2023 routine CT scan again showed worsening pelvic lymphadenopathy with dominant left iliac chain lymph node.  It was measuring 4.1 x 4.5 cm.  PET scan showed FDG active lymph nodes in the area with an SUV max of 13.6.  CT-guided left internal iliac lymph node biopsy was positive for mantle cell lymphoma.  She was referred to [facility] for consideration for CAR-T therapy.  She underwent CAR-T infusion on 2/29/2024.  Developed CRS after that but did not require any ICU placement.  Received tocilizumab, steroids, anakinra and cetuximab.  Also developed Staph epidermidis bacteremia.  Has been getting routine follow-ups through [facility].  Post CAR-T PET scan showed good response.   Her CD4 count has been on the lower side and was recommended to get pentamadine inhalation monthly.  She also follows with nephrology at [facility] for chronic kidney disease thought to be secondary to prior chemotherapy and possibly AIN.  Was on CellCept briefly.  Now is off of it.  On observation only.    Seen at [facility] yesterday.  She had a repeat PET scan done on 3/24/2025.  Clinically doing well.  No signs or symptoms of lymphoma recurrence.  Her PET scan looked pretty good without any metabolic evidence of recurrent lymphoma.  She has been experiencing sinus issues, which sometimes seem to progress towards pneumonia but do not fully develop into it. The most severe episode occurred in January and February. She has been on IVIG for 8 years due to her immune system being compromised from a past stem cell  "transplant. She mentioned that her immune system was significantly affected by the transplant, necessitating ongoing IG treatment.  Currently on weekly subcutaneous immunoglobulin injections.  Follows with immunology.  Avis has been receiving monthly pentamadine inhalation treatments for pentamidine due to a CD4 count of less than 200. She has experienced easy bruising but no bleeding, night sweats, weight loss, or palpable lymph nodes. She also mentioned a past concern regarding breast nodules, which have been evaluated previously.     Review of Systems    Pertinent items are noted in HPI.    Past History    Past Medical History:   Diagnosis Date    Anemia     Cancer (H) 2014    Mantle Cell Lymphoma    Chronic kidney disease     elevated creatinine due to chemo    Cough 06/19/2017    Overview:  Created by Conversion    Dehydration 08/12/2014    Fever 06/19/2017    Overview:  Created by Conversion    History of anesthesia complications 2015    urinary retention after lymph node excision. required catheterization    History of blood transfusion     reaction to platelets with hives in the past    History of transfusion     Hypothyroid     Hypothyroidism 2007    Kidney stones 2002    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 Cooperative in conversation.    Lab Results    No results found for this or any previous visit (from the past " week).      Imaging    PET Oncology (Eyes to Thighs)    Result Date: 3/24/2025  EXAM:  PET CT SKULL TO THIGH FDG Serum glucose at time of F-18 FDG injection was 89 mg/dL. Patient followed standard dietary/fasting requirements for this exam. RADIOPHARMACEUTICAL/MEDS: Route: intravenous fludeoxyglucose F 18 injection USP (FDG F-18),9.9 millicurie TECHNIQUE:  F18 FDG PET/CT scan was performed from the vertex through the knees with low dose, non-contrast, free-breathing CT images for attenuation correction and anatomic localization (AC/AL), with imaging beginning at approximately 60 minutes after radiotracer injection. COMPARISON:  Multiple prior PET CTs, most recently from 9/4/2024. INDICATION:  Reevaluation of lymphoma. Chemotherapies. Remote stem cell transplant. Post CAR-T. Subsequent treatment strategy. The patient reports no recent vaccinations. FINDINGS: Normal uptake in the marrow and spleen. Degenerative type uptake in the spine. Activity related to medication injection in the anterior abdominal wall. Degenerative/strain related uptake in the right knee. Stable minimal FDG uptake in bilateral breast nodules, including an area of dystrophic calcification versus biopsy clip on the left. Significant incidental findings on the low-dose unenhanced CT fusion images: Right IJ Port-A-Cath with tip in the RA. Small hiatal hernia. Duodenal diverticuli. Sigmoid diverticulosis.    Stable exam without FDG uptake worrisome for lymphoma. [place]auville 1.     The longitudinal plan of care for the diagnosis(es)/condition(s) as documented were addressed during this visit. Due to the added complexity in care, I will continue to support Avis in the subsequent management and with ongoing continuity of care.    I independently reviewed and interpreted lab studies and imaging, reviewed pertinent notes from physicians and care providers from other specialties and outside hospital ([facility]) and ordered lab tests.    A total of 35 min  was spent today on this visit which included face to face conversation with the patient, EMR review, counseling and co-ordination of care and medical documentation.      Signed by: Deanna Gill MD

## 2025-04-02 ENCOUNTER — HOSPITAL ENCOUNTER (OUTPATIENT)
Dept: MRI IMAGING | Facility: CLINIC | Age: 60
Discharge: HOME | End: 2025-04-02
Attending: FAMILY MEDICINE
Payer: MEDICARE

## 2025-04-02 DIAGNOSIS — D32.9 MENINGIOMA (H): ICD-10-CM

## 2025-04-02 DIAGNOSIS — D33.3 SCHWANNOMA OF CRANIAL NERVE (H): ICD-10-CM

## 2025-04-02 PROCEDURE — A9585 GADOBUTROL INJECTION: HCPCS | Performed by: FAMILY MEDICINE

## 2025-04-02 PROCEDURE — A4221 SUPP NON-INSULIN INF CATH/WK: HCPCS

## 2025-04-02 PROCEDURE — 255N000002 HC RX 255 OP 636: Performed by: FAMILY MEDICINE

## 2025-04-02 PROCEDURE — 70553 MRI BRAIN STEM W/O & W/DYE: CPT

## 2025-04-02 PROCEDURE — K0552 SUP/EXT NON-INS INF PUMP SYR: HCPCS

## 2025-04-02 PROCEDURE — S9338 HIT IMMUNOTHERAPY DIEM: HCPCS

## 2025-04-02 RX ORDER — HEPARIN SODIUM (PORCINE) LOCK FLUSH IV SOLN 100 UNIT/ML 100 UNIT/ML
5 SOLUTION INTRAVENOUS ONCE
Status: COMPLETED | OUTPATIENT
Start: 2025-04-02 | End: 2025-04-02

## 2025-04-02 RX ORDER — GADOBUTROL 604.72 MG/ML
13 INJECTION INTRAVENOUS ONCE
Status: COMPLETED | OUTPATIENT
Start: 2025-04-02 | End: 2025-04-02

## 2025-04-02 RX ADMIN — GADOBUTROL 13 ML: 604.72 INJECTION INTRAVENOUS at 10:18

## 2025-04-02 RX ADMIN — HEPARIN SODIUM (PORCINE) LOCK FLUSH IV SOLN 100 UNIT/ML 5 ML: 100 SOLUTION at 10:42

## 2025-04-07 ENCOUNTER — OFFICE VISIT (OUTPATIENT)
Dept: AUDIOLOGY | Facility: CLINIC | Age: 60
End: 2025-04-07
Attending: FAMILY MEDICINE
Payer: COMMERCIAL

## 2025-04-07 ENCOUNTER — HOME INFUSION BILLING (OUTPATIENT)
Dept: HOME HEALTH SERVICES | Facility: HOME HEALTH | Age: 60
End: 2025-04-07
Payer: COMMERCIAL

## 2025-04-07 DIAGNOSIS — D32.9 MENINGIOMA (H): ICD-10-CM

## 2025-04-07 DIAGNOSIS — H90.3 SENSORINEURAL HEARING LOSS, ASYMMETRICAL: Primary | ICD-10-CM

## 2025-04-07 DIAGNOSIS — D33.3 SCHWANNOMA OF CRANIAL NERVE (H): ICD-10-CM

## 2025-04-07 PROCEDURE — 92557 COMPREHENSIVE HEARING TEST: CPT | Performed by: AUDIOLOGIST

## 2025-04-07 PROCEDURE — A4245 ALCOHOL WIPES PER BOX: HCPCS

## 2025-04-07 PROCEDURE — A4213 20+ CC SYRINGE ONLY: HCPCS

## 2025-04-07 PROCEDURE — A4215 STERILE NEEDLE: HCPCS

## 2025-04-07 PROCEDURE — 92550 TYMPANOMETRY & REFLEX THRESH: CPT | Performed by: AUDIOLOGIST

## 2025-04-07 NOTE — PROGRESS NOTES
AUDIOLOGY REPORT    SUBJECTIVE:  Avis Paul is a 60 year old female who was seen in the Audiology Clinic at the [facility] for audiologic evaluation, referred by Sunil Tan M.D. .The patient has been seen previously at [facility] on 3/4/2024 for assessment and results indicated a normal sloping to severe sensorineural hearing loss bilaterally. The patient reports being diagnosed with a right cerebellopontine angle meningioma in 2024. The patient reports a decrease in her right hearing. The patient denies  bilateral otalgia and bilateral drainage.  The patient notes difficulty with communication in a variety of listening situations.  They were accompanied today by their self.    OBJECTIVE:  Abuse Screening:  Do you feel unsafe at home or work/school? No  Do you feel threatened by someone? No  Does anyone try to keep you from having contact with others, or doing things outside of your home? No  Physical signs of abuse present? No     Fall Risk Screen:  1. Have you fallen two or more times in the past year? No  2. Have you fallen and had an injury in the past year? No      Otoscopic exam indicates ears are clear of cerumen bilaterally     Pure Tone Thresholds assessed using conventional audiometry with good  reliability from 250-8000 Hz bilaterally using circumaural headphones     RIGHT:  normal sloping to mild-moderate and severe sensorineural hearing loss    LEFT:    normal sloping to mild-moderate and severe sensorineural hearing loss    Tympanogram:    RIGHT: normal eardrum mobility    LEFT:   normal eardrum mobility    Reflexes (reported by stimulus ear):  RIGHT: Ipsilateral is present at normal levels  RIGHT: Contralateral is absent at frequencies tested  LEFT:   Ipsilateral is present at normal levels  LEFT:   Contralateral is present at normal levels      Speech Reception Threshold:    RIGHT: 20 dB HL    LEFT:   10 dB HL  Word Recognition Score:     RIGHT: 100% at 60 dB HL  using NU-6 recorded word list.    LEFT:   96% at 50 dB HL using NU-6 recorded word list.      ASSESSMENT:     ICD-10-CM    1. Sensorineural hearing loss, asymmetrical  H90.3       2. Meningioma (H)  D32.9 Adult Audiology  Referral      3. Schwannoma of cranial nerve (H)  D33.3 Adult Audiology  Referral          Compared to patient's previous audiogram dated 3/4/2024, hearing has decreased 10-15 dB at 7967-1969 Hz in the right ear and 20 dB at 3000 Hz only in the left ear. Today s results were discussed with the patient in detail.     PLAN:  Patient was counseled regarding hearing loss and impact on communication.   It is recommended that the patient establish care with an ENT provider.  Please call this clinic with questions regarding these results or recommendations.        Siria KATZ, #[id number]

## 2025-04-09 ENCOUNTER — HOSPITAL ENCOUNTER (OUTPATIENT)
Dept: RESPIRATORY THERAPY | Facility: CLINIC | Age: 60
Discharge: HOME | End: 2025-04-09
Attending: FAMILY MEDICINE
Payer: MEDICARE

## 2025-04-09 DIAGNOSIS — C83.18 MANTLE CELL LYMPHOMA OF LYMPH NODES OF MULTIPLE SITES (H): Primary | ICD-10-CM

## 2025-04-09 DIAGNOSIS — C83.18 MANTLE CELL LYMPHOMA OF LYMPH NODES OF MULTIPLE SITES (H): ICD-10-CM

## 2025-04-09 DIAGNOSIS — D80.1 HYPOGAMMAGLOBULINEMIA: ICD-10-CM

## 2025-04-09 DIAGNOSIS — D72.818 LOW CD4 CELL COUNT DETERMINED BY FLOW CYTOMETRY: Primary | ICD-10-CM

## 2025-04-09 DIAGNOSIS — C83.18 LYMPHOMA, MANTLE CELL, MULTIPLE SITES (H): ICD-10-CM

## 2025-04-09 PROCEDURE — S9338 HIT IMMUNOTHERAPY DIEM: HCPCS

## 2025-04-09 PROCEDURE — 94642 AEROSOL INHALATION TREATMENT: CPT

## 2025-04-09 PROCEDURE — K0552 SUP/EXT NON-INS INF PUMP SYR: HCPCS

## 2025-04-09 PROCEDURE — 250N000009 HC RX 250: Performed by: FAMILY MEDICINE

## 2025-04-09 RX ORDER — ALBUTEROL SULFATE 0.83 MG/ML
2.5 SOLUTION RESPIRATORY (INHALATION)
Qty: 1 ML | Refills: 9 | Status: SHIPPED | OUTPATIENT
Start: 2025-04-09

## 2025-04-09 RX ORDER — PENTAMIDINE ISETHIONATE 300 MG/300MG
300 INHALANT RESPIRATORY (INHALATION)
Status: ACTIVE | OUTPATIENT
Start: 2025-04-09 | End: 2025-09-24

## 2025-04-09 RX ORDER — PENTAMIDINE ISETHIONATE 300 MG/300MG
300 INHALANT RESPIRATORY (INHALATION) ONCE
Status: COMPLETED | OUTPATIENT
Start: 2025-04-09 | End: 2025-04-09

## 2025-04-09 RX ADMIN — PENTAMIDINE ISETHIONATE 300 MG: 300 INHALANT RESPIRATORY (INHALATION) at 11:04

## 2025-04-09 NOTE — PROGRESS NOTES
Pentamidine Treatment Performed at [facility] April 9, 2025     Pretreatment Vitals:  HR 80,RR 16, Sp02 99, Breath Sounds Clear.    4 puffs Albuterol HFA administered with spacer. Pentamidine 300 mg mixed with 6 ml sterile water nebulized at 6 LPM.    Posttreatment Vitals:  HR 91, RR 16,Sp02 97, Breath Sounds Clear.    Patient scheduled for follow up 5/7/2025 pending CD4 lab results.

## 2025-04-09 NOTE — PROGRESS NOTES
Renewed prescription for 6 more doses.  ASSESSMENT/PLAN  Diagnoses and all orders for this visit:    Low CD4 cell count determined by flow cytometry  -     pentamidine (NEBUPENT) neb solution 300 mg  -     albuterol (PROVENTIL) (2.5 MG/3ML) 0.083% neb solution; Take 1 vial (2.5 mg) by nebulization every 28 days. Give 30 minutes prior to pentamidine.    Lymphoma, mantle cell, multiple sites (H)  -     albuterol (PROVENTIL) (2.5 MG/3ML) 0.083% neb solution; Take 1 vial (2.5 mg) by nebulization every 28 days. Give 30 minutes prior to pentamidine.    Mantle cell lymphoma of lymph nodes of multiple sites (H)  -     pentamidine (NEBUPENT) neb solution 300 mg    Hypogammaglobulinemia  -     albuterol (PROVENTIL) (2.5 MG/3ML) 0.083% neb solution; Take 1 vial (2.5 mg) by nebulization every 28 days. Give 30 minutes prior to pentamidine.        Sunil Tan MD  [facility]'

## 2025-04-11 ENCOUNTER — MYC MEDICAL ADVICE (OUTPATIENT)
Dept: OTOLARYNGOLOGY | Facility: CLINIC | Age: 60
End: 2025-04-11
Payer: COMMERCIAL

## 2025-04-11 ENCOUNTER — HOME INFUSION (OUTPATIENT)
Dept: HOME HEALTH SERVICES | Facility: HOME HEALTH | Age: 60
End: 2025-04-11
Payer: COMMERCIAL

## 2025-04-16 PROCEDURE — K0552 SUP/EXT NON-INS INF PUMP SYR: HCPCS

## 2025-04-16 PROCEDURE — S9338 HIT IMMUNOTHERAPY DIEM: HCPCS

## 2025-04-17 RX ORDER — ALBUTEROL SULFATE 0.83 MG/ML
2.5 SOLUTION RESPIRATORY (INHALATION)
OUTPATIENT
Start: 2025-04-24

## 2025-04-17 RX ORDER — EPINEPHRINE 1 MG/ML
0.3 INJECTION, SOLUTION, CONCENTRATE INTRAVENOUS EVERY 5 MIN PRN
OUTPATIENT
Start: 2025-04-24

## 2025-04-17 RX ORDER — ALBUTEROL SULFATE 90 UG/1
1-2 INHALANT RESPIRATORY (INHALATION)
Start: 2025-04-24

## 2025-04-17 RX ORDER — DIPHENHYDRAMINE HYDROCHLORIDE 50 MG/ML
50 INJECTION, SOLUTION INTRAMUSCULAR; INTRAVENOUS
Start: 2025-04-24

## 2025-04-17 RX ORDER — PENTAMIDINE ISETHIONATE 300 MG/300MG
300 INHALANT RESPIRATORY (INHALATION)
Start: 2025-04-24

## 2025-04-17 RX ORDER — ALBUTEROL SULFATE 0.83 MG/ML
2.5 SOLUTION RESPIRATORY (INHALATION) ONCE
Start: 2025-04-24 | End: 2025-04-24

## 2025-04-17 RX ORDER — METHYLPREDNISOLONE SODIUM SUCCINATE 40 MG/ML
40 INJECTION INTRAMUSCULAR; INTRAVENOUS
Start: 2025-04-24

## 2025-04-17 RX ORDER — DIPHENHYDRAMINE HYDROCHLORIDE 50 MG/ML
25 INJECTION, SOLUTION INTRAMUSCULAR; INTRAVENOUS
Start: 2025-04-24

## 2025-04-17 RX ORDER — MEPERIDINE HYDROCHLORIDE 25 MG/ML
25 INJECTION INTRAMUSCULAR; INTRAVENOUS; SUBCUTANEOUS
OUTPATIENT
Start: 2025-04-24

## 2025-04-23 PROCEDURE — S9338 HIT IMMUNOTHERAPY DIEM: HCPCS

## 2025-04-23 PROCEDURE — K0552 SUP/EXT NON-INS INF PUMP SYR: HCPCS

## 2025-04-30 ENCOUNTER — INFUSION THERAPY VISIT (OUTPATIENT)
Dept: INFUSION THERAPY | Facility: CLINIC | Age: 60
End: 2025-04-30
Attending: FAMILY MEDICINE
Payer: MEDICARE

## 2025-04-30 DIAGNOSIS — D80.1 HYPOGAMMAGLOBULINEMIA, ACQUIRED: Primary | ICD-10-CM

## 2025-04-30 DIAGNOSIS — Z51.11 MAINTENANCE ANTINEOPLASTIC CHEMOTHERAPY: Primary | ICD-10-CM

## 2025-04-30 DIAGNOSIS — C83.18 LYMPHOMA, MANTLE CELL, MULTIPLE SITES (H): ICD-10-CM

## 2025-04-30 DIAGNOSIS — D72.818 LOW CD4 CELL COUNT DETERMINED BY FLOW CYTOMETRY: ICD-10-CM

## 2025-04-30 DIAGNOSIS — D80.1 HYPOGAMMAGLOBULINEMIA: ICD-10-CM

## 2025-04-30 PROCEDURE — K0552 SUP/EXT NON-INS INF PUMP SYR: HCPCS

## 2025-04-30 PROCEDURE — 250N000011 HC RX IP 250 OP 636: Performed by: INTERNAL MEDICINE

## 2025-04-30 PROCEDURE — S9338 HIT IMMUNOTHERAPY DIEM: HCPCS

## 2025-04-30 PROCEDURE — 36591 DRAW BLOOD OFF VENOUS DEVICE: CPT | Performed by: INTERNAL MEDICINE

## 2025-04-30 PROCEDURE — 86357 NK CELLS TOTAL COUNT: CPT | Performed by: FAMILY MEDICINE

## 2025-04-30 PROCEDURE — 82787 IGG 1 2 3 OR 4 EACH: CPT | Performed by: INTERNAL MEDICINE

## 2025-04-30 RX ORDER — HEPARIN SODIUM (PORCINE) LOCK FLUSH IV SOLN 100 UNIT/ML 100 UNIT/ML
5 SOLUTION INTRAVENOUS
Status: DISCONTINUED | OUTPATIENT
Start: 2025-04-30 | End: 2025-04-30 | Stop reason: HOSPADM

## 2025-04-30 RX ORDER — HEPARIN SODIUM (PORCINE) LOCK FLUSH IV SOLN 100 UNIT/ML 100 UNIT/ML
5 SOLUTION INTRAVENOUS
OUTPATIENT
Start: 2025-04-30

## 2025-04-30 RX ORDER — HEPARIN SODIUM,PORCINE 10 UNIT/ML
5-20 VIAL (ML) INTRAVENOUS DAILY PRN
OUTPATIENT
Start: 2025-04-30

## 2025-04-30 RX ADMIN — HEPARIN 5 ML: 100 SYRINGE at 15:11

## 2025-04-30 NOTE — PROGRESS NOTES
Infusion Nursing Note:  Avis COLE MunozHumberto presents today for Port labs.    Patient seen by provider today: No   present during visit today: Not Applicable.    Note: N/A.    Intravenous Access:  Implanted Port.    Treatment Conditions:  Not Applicable.    Post Infusion Assessment:  Blood return noted.  Site patent and intact, free from redness, edema or discomfort.  No evidence of extravasations.  Access discontinued per protocol.     Discharge Plan:   Discharge instructions reviewed with: Patient.  Patient and/or family verbalized understanding of discharge instructions and all questions answered.  AVS to patient via [employer].  Patient will return 5/19/2025 for next appointment.   Patient discharged in stable condition accompanied by: self.  Departure Mode: Ambulatory.    Louise Sanders RN

## 2025-05-01 LAB
CD19 CELLS # BLD: <1 CELLS/UL (ref 107–698)
CD19 CELLS NFR BLD: <1 % (ref 6–27)
CD3 CELLS # BLD: 287 CELLS/UL (ref 603–2990)
CD3 CELLS NFR BLD: 90 % (ref 49–84)
CD3+CD4+ CELLS # BLD: 185 CELLS/UL (ref 441–2156)
CD3+CD4+ CELLS NFR BLD: 58 % (ref 28–63)
CD3+CD4+ CELLS/CD3+CD8+ CLL BLD: 1.9 % (ref 1.4–2.6)
CD3+CD8+ CELLS # BLD: 98 CELLS/UL (ref 125–1312)
CD3+CD8+ CELLS NFR BLD: 31 % (ref 10–40)
CD3-CD16+CD56+ CELLS # BLD: 28 CELLS/UL (ref 95–640)
CD3-CD16+CD56+ CELLS NFR BLD: 9 % (ref 4–25)
CMV DNA SPEC NAA+PROBE-ACNC: NOT DETECTED IU/ML
SPECIMEN TYPE: NORMAL
T CELL EXTENDED COMMENT: ABNORMAL

## 2025-05-05 ENCOUNTER — HOME INFUSION BILLING (OUTPATIENT)
Dept: HOME HEALTH SERVICES | Facility: HOME HEALTH | Age: 60
End: 2025-05-05
Payer: MEDICARE

## 2025-05-05 PROCEDURE — K0552 SUP/EXT NON-INS INF PUMP SYR: HCPCS

## 2025-05-05 PROCEDURE — A4221 SUPP NON-INSULIN INF CATH/WK: HCPCS

## 2025-05-05 PROCEDURE — A4213 20+ CC SYRINGE ONLY: HCPCS

## 2025-05-05 PROCEDURE — A4215 STERILE NEEDLE: HCPCS

## 2025-05-07 ENCOUNTER — HOSPITAL ENCOUNTER (OUTPATIENT)
Dept: BONE DENSITY | Facility: CLINIC | Age: 60
Discharge: HOME | End: 2025-05-07
Attending: FAMILY MEDICINE
Payer: MEDICARE

## 2025-05-07 ENCOUNTER — HOSPITAL ENCOUNTER (OUTPATIENT)
Dept: RESPIRATORY THERAPY | Facility: CLINIC | Age: 60
Discharge: HOME | End: 2025-05-07
Attending: FAMILY MEDICINE
Payer: MEDICARE

## 2025-05-07 DIAGNOSIS — Z13.820 SCREENING FOR OSTEOPOROSIS: ICD-10-CM

## 2025-05-07 DIAGNOSIS — D72.818 LOW CD4 CELL COUNT DETERMINED BY FLOW CYTOMETRY: Primary | ICD-10-CM

## 2025-05-07 DIAGNOSIS — Z78.0 ASYMPTOMATIC MENOPAUSAL STATE: ICD-10-CM

## 2025-05-07 PROCEDURE — 250N000009 HC RX 250: Performed by: FAMILY MEDICINE

## 2025-05-07 PROCEDURE — 94642 AEROSOL INHALATION TREATMENT: CPT

## 2025-05-07 PROCEDURE — 77080 DXA BONE DENSITY AXIAL: CPT

## 2025-05-07 RX ORDER — PENTAMIDINE ISETHIONATE 300 MG/300MG
300 INHALANT RESPIRATORY (INHALATION)
Status: COMPLETED | OUTPATIENT
Start: 2025-05-07 | End: 2025-05-07

## 2025-05-07 RX ORDER — ALBUTEROL SULFATE 0.83 MG/ML
2.5 SOLUTION RESPIRATORY (INHALATION)
Status: COMPLETED | OUTPATIENT
Start: 2025-05-07 | End: 2025-05-07

## 2025-05-07 RX ADMIN — PENTAMIDINE ISETHIONATE 300 MG: 300 INHALANT RESPIRATORY (INHALATION) at 13:30

## 2025-05-07 RX ADMIN — ALBUTEROL SULFATE 2.5 MG: 2.5 SOLUTION RESPIRATORY (INHALATION) at 13:30

## 2025-05-07 NOTE — PROGRESS NOTES
Pentamidine Treatment Performed at [facility] May 7, 2025     Pretreatment Vitals:  HR 86,RR 18, Sp02 99%, Breath Sounds Clear.    4 puffs Albuterol HFA administered with spacer. Pentamidine 300 mg mixed with 6 ml sterile water nebulized at 6 LPM.    Posttreatment Vitals:  HR 85, RR16 ,Sp02 95%, Breath Sounds unchanged    Patient scheduled for follow up pending CD4 levels

## 2025-05-09 ENCOUNTER — RESULTS FOLLOW-UP (OUTPATIENT)
Dept: FAMILY MEDICINE | Facility: CLINIC | Age: 60
End: 2025-05-09
Payer: COMMERCIAL

## 2025-05-12 PROCEDURE — K0552 SUP/EXT NON-INS INF PUMP SYR: HCPCS

## 2025-05-12 PROCEDURE — A4221 SUPP NON-INSULIN INF CATH/WK: HCPCS

## 2025-05-13 PROCEDURE — E0779 AMB INFUSION PUMP MECHANICAL: HCPCS | Mod: RR

## 2025-05-19 PROCEDURE — K0552 SUP/EXT NON-INS INF PUMP SYR: HCPCS

## 2025-05-19 PROCEDURE — A4221 SUPP NON-INSULIN INF CATH/WK: HCPCS

## 2025-05-26 PROCEDURE — A4221 SUPP NON-INSULIN INF CATH/WK: HCPCS

## 2025-05-26 PROCEDURE — K0552 SUP/EXT NON-INS INF PUMP SYR: HCPCS

## 2025-05-28 ENCOUNTER — HOME INFUSION (OUTPATIENT)
Dept: HOME HEALTH SERVICES | Facility: HOME HEALTH | Age: 60
End: 2025-05-28
Payer: COMMERCIAL

## 2025-05-30 PROCEDURE — 86355 B CELLS TOTAL COUNT: CPT | Performed by: FAMILY MEDICINE

## 2025-05-30 PROCEDURE — 82787 IGG 1 2 3 OR 4 EACH: CPT | Performed by: INTERNAL MEDICINE

## 2025-06-02 ENCOUNTER — HOME INFUSION BILLING (OUTPATIENT)
Dept: HOME HEALTH SERVICES | Facility: HOME HEALTH | Age: 60
End: 2025-06-02
Payer: MEDICARE

## 2025-06-02 PROCEDURE — A4213 20+ CC SYRINGE ONLY: HCPCS

## 2025-06-02 PROCEDURE — A4215 STERILE NEEDLE: HCPCS

## 2025-06-04 ENCOUNTER — HOSPITAL ENCOUNTER (OUTPATIENT)
Dept: RESPIRATORY THERAPY | Facility: CLINIC | Age: 60
Discharge: HOME | End: 2025-06-04
Attending: FAMILY MEDICINE
Payer: MEDICARE

## 2025-06-04 ENCOUNTER — RESULTS FOLLOW-UP (OUTPATIENT)
Dept: FAMILY MEDICINE | Facility: CLINIC | Age: 60
End: 2025-06-04

## 2025-06-04 DIAGNOSIS — C83.18 MANTLE CELL LYMPHOMA OF LYMPH NODES OF MULTIPLE SITES (H): Primary | ICD-10-CM

## 2025-06-04 PROCEDURE — S9338 HIT IMMUNOTHERAPY DIEM: HCPCS

## 2025-06-04 PROCEDURE — K0552 SUP/EXT NON-INS INF PUMP SYR: HCPCS

## 2025-06-04 PROCEDURE — 250N000009 HC RX 250: Performed by: FAMILY MEDICINE

## 2025-06-04 PROCEDURE — 94642 AEROSOL INHALATION TREATMENT: CPT

## 2025-06-04 RX ORDER — PENTAMIDINE ISETHIONATE 300 MG/300MG
300 INHALANT RESPIRATORY (INHALATION)
Status: COMPLETED | OUTPATIENT
Start: 2025-06-04 | End: 2025-06-04

## 2025-06-04 RX ORDER — ALBUTEROL SULFATE 0.83 MG/ML
2.5 SOLUTION RESPIRATORY (INHALATION)
Status: COMPLETED | OUTPATIENT
Start: 2025-06-04 | End: 2025-06-04

## 2025-06-04 RX ADMIN — PENTAMIDINE ISETHIONATE 300 MG: 300 INHALANT RESPIRATORY (INHALATION) at 14:04

## 2025-06-04 RX ADMIN — ALBUTEROL SULFATE 2.5 MG: 2.5 SOLUTION RESPIRATORY (INHALATION) at 14:04

## 2025-06-04 NOTE — PROGRESS NOTES
Pentamidine Treatment Performed at [facility] June 4, 2025     Pretreatment Vitals:  HR 91,RR 18, Sp02 98%, Breath Sounds clear .    Albuterol nebulizer administered. Pentamidine 300 mg mixed with 6 ml sterile water nebulized at 6 LPM.    Posttreatment Vitals:  HR 87, RR 18,Sp02 96%, Breath Sounds unchanged    Patient scheduled for follow up pending monthly CD4 lab results

## 2025-06-11 PROCEDURE — S9338 HIT IMMUNOTHERAPY DIEM: HCPCS

## 2025-06-11 PROCEDURE — K0552 SUP/EXT NON-INS INF PUMP SYR: HCPCS

## 2025-06-18 PROCEDURE — S9338 HIT IMMUNOTHERAPY DIEM: HCPCS

## 2025-06-18 PROCEDURE — K0552 SUP/EXT NON-INS INF PUMP SYR: HCPCS

## 2025-06-24 ENCOUNTER — INFUSION THERAPY VISIT (OUTPATIENT)
Dept: INFUSION THERAPY | Facility: CLINIC | Age: 60
End: 2025-06-24
Attending: INTERNAL MEDICINE
Payer: COMMERCIAL

## 2025-06-24 ENCOUNTER — RESULTS FOLLOW-UP (OUTPATIENT)
Dept: FAMILY MEDICINE | Facility: CLINIC | Age: 60
End: 2025-06-24

## 2025-06-24 VITALS — SYSTOLIC BLOOD PRESSURE: 129 MMHG | TEMPERATURE: 97.9 F | HEART RATE: 70 BPM | DIASTOLIC BLOOD PRESSURE: 81 MMHG

## 2025-06-24 DIAGNOSIS — N18.4 CKD (CHRONIC KIDNEY DISEASE) STAGE 4, GFR 15-29 ML/MIN (H): Chronic | ICD-10-CM

## 2025-06-24 DIAGNOSIS — E03.9 ACQUIRED HYPOTHYROIDISM: ICD-10-CM

## 2025-06-24 DIAGNOSIS — Z51.11 MAINTENANCE ANTINEOPLASTIC CHEMOTHERAPY: Primary | ICD-10-CM

## 2025-06-24 DIAGNOSIS — D80.1 HYPOGAMMAGLOBULINEMIA, ACQUIRED: ICD-10-CM

## 2025-06-24 LAB
ALBUMIN SERPL BCG-MCNC: 3.7 G/DL (ref 3.5–5.2)
ANION GAP SERPL CALCULATED.3IONS-SCNC: 10 MMOL/L (ref 7–15)
BUN SERPL-MCNC: 32.1 MG/DL (ref 8–23)
CALCIUM SERPL-MCNC: 9.1 MG/DL (ref 8.8–10.4)
CHLORIDE SERPL-SCNC: 105 MMOL/L (ref 98–107)
CREAT SERPL-MCNC: 2.35 MG/DL (ref 0.51–0.95)
EGFRCR SERPLBLD CKD-EPI 2021: 23 ML/MIN/1.73M2
GLUCOSE SERPL-MCNC: 89 MG/DL (ref 70–99)
HCO3 SERPL-SCNC: 22 MMOL/L (ref 22–29)
PHOSPHATE SERPL-MCNC: 3.2 MG/DL (ref 2.5–4.5)
POTASSIUM SERPL-SCNC: 4.5 MMOL/L (ref 3.4–5.3)
SODIUM SERPL-SCNC: 137 MMOL/L (ref 135–145)
TSH SERPL DL<=0.005 MIU/L-ACNC: 1.9 UIU/ML (ref 0.3–4.2)

## 2025-06-24 PROCEDURE — 250N000011 HC RX IP 250 OP 636: Performed by: INTERNAL MEDICINE

## 2025-06-24 PROCEDURE — 36591 DRAW BLOOD OFF VENOUS DEVICE: CPT

## 2025-06-24 PROCEDURE — 84443 ASSAY THYROID STIM HORMONE: CPT | Performed by: FAMILY MEDICINE

## 2025-06-24 PROCEDURE — 82787 IGG 1 2 3 OR 4 EACH: CPT | Performed by: INTERNAL MEDICINE

## 2025-06-24 PROCEDURE — 82310 ASSAY OF CALCIUM: CPT | Performed by: FAMILY MEDICINE

## 2025-06-24 RX ORDER — HEPARIN SODIUM (PORCINE) LOCK FLUSH IV SOLN 100 UNIT/ML 100 UNIT/ML
5 SOLUTION INTRAVENOUS
Status: DISCONTINUED | OUTPATIENT
Start: 2025-06-24 | End: 2025-06-24 | Stop reason: HOSPADM

## 2025-06-24 RX ORDER — HEPARIN SODIUM (PORCINE) LOCK FLUSH IV SOLN 100 UNIT/ML 100 UNIT/ML
5 SOLUTION INTRAVENOUS
OUTPATIENT
Start: 2025-06-24

## 2025-06-24 RX ORDER — HEPARIN SODIUM,PORCINE 10 UNIT/ML
5-20 VIAL (ML) INTRAVENOUS DAILY PRN
OUTPATIENT
Start: 2025-06-24

## 2025-06-24 RX ADMIN — HEPARIN 5 ML: 100 SYRINGE at 11:27

## 2025-06-24 NOTE — PROGRESS NOTES
Infusion Nursing Note:  Avis Paul presents today for PAC labs.    Patient seen by provider today: No   present during visit today: Not Applicable.    Note: Pt reports she need to have [place] put in orders for her urine test.      Intravenous Access:  Labs drawn without difficulty.  Implanted Port.        Discharge Plan:   Patient and/or family verbalized understanding of discharge instructions and all questions answered.  Patient discharged in stable condition accompanied by: self.  Departure Mode: Ambulatory.      Luly Momin RN

## 2025-06-25 ENCOUNTER — HOME INFUSION (OUTPATIENT)
Dept: HOME HEALTH SERVICES | Facility: HOME HEALTH | Age: 60
End: 2025-06-25
Payer: COMMERCIAL

## 2025-06-25 PROCEDURE — K0552 SUP/EXT NON-INS INF PUMP SYR: HCPCS

## 2025-06-25 PROCEDURE — S9338 HIT IMMUNOTHERAPY DIEM: HCPCS

## 2025-06-26 ENCOUNTER — MYC MEDICAL ADVICE (OUTPATIENT)
Dept: SLEEP MEDICINE | Facility: CLINIC | Age: 60
End: 2025-06-26
Payer: COMMERCIAL

## 2025-07-01 ENCOUNTER — HOME INFUSION BILLING (OUTPATIENT)
Dept: HOME HEALTH SERVICES | Facility: HOME HEALTH | Age: 60
End: 2025-07-01
Payer: MEDICARE

## 2025-07-01 ENCOUNTER — ONCOLOGY VISIT (OUTPATIENT)
Dept: ONCOLOGY | Facility: CLINIC | Age: 60
End: 2025-07-01
Attending: INTERNAL MEDICINE
Payer: COMMERCIAL

## 2025-07-01 ENCOUNTER — HOSPITAL ENCOUNTER (OUTPATIENT)
Dept: RESPIRATORY THERAPY | Facility: CLINIC | Age: 60
Discharge: HOME | End: 2025-07-01
Attending: INTERNAL MEDICINE
Payer: COMMERCIAL

## 2025-07-01 VITALS
SYSTOLIC BLOOD PRESSURE: 149 MMHG | TEMPERATURE: 97.5 F | OXYGEN SATURATION: 97 % | WEIGHT: 293 LBS | DIASTOLIC BLOOD PRESSURE: 97 MMHG | BODY MASS INDEX: 45.99 KG/M2 | HEIGHT: 67 IN | RESPIRATION RATE: 16 BRPM | HEART RATE: 77 BPM

## 2025-07-01 DIAGNOSIS — D72.818 LOW CD4 CELL COUNT DETERMINED BY FLOW CYTOMETRY: ICD-10-CM

## 2025-07-01 DIAGNOSIS — C83.18 MANTLE CELL LYMPHOMA OF LYMPH NODES OF MULTIPLE SITES (H): Primary | ICD-10-CM

## 2025-07-01 DIAGNOSIS — D80.1 HYPOGAMMAGLOBULINEMIA, ACQUIRED: ICD-10-CM

## 2025-07-01 PROCEDURE — 99214 OFFICE O/P EST MOD 30 MIN: CPT | Performed by: INTERNAL MEDICINE

## 2025-07-01 PROCEDURE — 99213 OFFICE O/P EST LOW 20 MIN: CPT | Performed by: INTERNAL MEDICINE

## 2025-07-01 PROCEDURE — K0552 SUP/EXT NON-INS INF PUMP SYR: HCPCS

## 2025-07-01 PROCEDURE — 94642 AEROSOL INHALATION TREATMENT: CPT

## 2025-07-01 PROCEDURE — G2211 COMPLEX E/M VISIT ADD ON: HCPCS | Performed by: INTERNAL MEDICINE

## 2025-07-01 PROCEDURE — A4215 STERILE NEEDLE: HCPCS

## 2025-07-01 PROCEDURE — A4213 20+ CC SYRINGE ONLY: HCPCS

## 2025-07-01 PROCEDURE — 250N000009 HC RX 250: Performed by: FAMILY MEDICINE

## 2025-07-01 RX ORDER — PENTAMIDINE ISETHIONATE 300 MG/300MG
300 INHALANT RESPIRATORY (INHALATION)
Status: COMPLETED | OUTPATIENT
Start: 2025-07-01 | End: 2025-07-01

## 2025-07-01 RX ORDER — ALBUTEROL SULFATE 0.83 MG/ML
2.5 SOLUTION RESPIRATORY (INHALATION)
Status: COMPLETED | OUTPATIENT
Start: 2025-07-01 | End: 2025-07-01

## 2025-07-01 RX ADMIN — PENTAMIDINE ISETHIONATE 300 MG: 300 INHALANT RESPIRATORY (INHALATION) at 14:01

## 2025-07-01 RX ADMIN — ALBUTEROL SULFATE 2.5 MG: 2.5 SOLUTION RESPIRATORY (INHALATION) at 14:01

## 2025-07-01 ASSESSMENT — PAIN SCALES - GENERAL: PAINLEVEL_OUTOF10: NO PAIN (0)

## 2025-07-01 NOTE — PROGRESS NOTES
Pentamidine Treatment Performed at [facility] July 1, 2025     Pretreatment Vitals:  HR 90,RR 18, Sp02 97%, Breath Sounds Clear.    2.5 mg/3 ml Albuterol nebulizer administered. Pentamidine 300 mg mixed with 6 ml sterile water nebulized at 6 LPM.    Posttreatment Vitals:  HR 94, RR 18,Sp02 99%, Breath Sounds unchanged.    Patient scheduled for follow up 7/29

## 2025-07-01 NOTE — LETTER
"7/1/2025      Avis Paul  [address] [facility] [address]      Dear Colleague,    Thank you for referring your patient, Avis Paul, to the [facility] [place]. Please see a copy of my visit note below.    Oncology Rooming Note    July 1, 2025 9:59 AM   Avis Paul is a 60 year old female who presents for:    Chief Complaint   Patient presents with     Oncology Clinic Visit     NHL (non-Hodgkin's lymphoma) - Provider visit only     Initial Vitals: BP (!) 149/97 (BP Location: Right arm, Patient Position: Sitting, Cuff Size: Adult Regular)   Pulse 77   Temp 97.5  F (36.4  C) (Tympanic)   Resp 16   Ht 1.702 m (5' 7\")   Wt (!) 138.8 kg (306 lb)   SpO2 97%   BMI 47.93 kg/m   Estimated body mass index is 47.93 kg/m  as calculated from the following:    Height as of this encounter: 1.702 m (5' 7\").    Weight as of this encounter: 138.8 kg (306 lb). Body surface area is 2.56 meters squared.  No Pain (0) Comment: Data Unavailable   No LMP recorded. Patient is postmenopausal.  Allergies reviewed: Yes  Medications reviewed: Yes    Medications: Medication refills not needed today.  Pharmacy name entered into [employer]: [facility] - [place], [place] - [facility] [address]     Frailty Screening:   Is the patient here for a new oncology consult visit in cancer care? 2. No    PHQ9:  Did this patient require a PHQ9?: No      Clinical concerns:  None today      Anali Patton, [facility] Hematology and Oncology Progress Note    Patient: Avis Paul  MRN: [id number]  Date of Service: Jul 1, 2025          Reason for Visit    Chief Complaint   Patient presents with     Oncology Clinic Visit     NHL (non-Hodgkin's lymphoma) - Provider visit only       Assessment and Plan     Cancer Staging   No matching staging information was found for the patient.      1. Mantle cell lymphoma status post autologous transplant, August 18, 2015: Then completed 2 years of " maintenance Rituxan therapy.  Recent disease progression noted on the CT scan last year.  Biopsy of the left internal iliac lymph node positive for mantle cell lymphoma.  Was referred to [facility] where she underwent CAR-T infusion on 2/29/2024.  Had CRS following CAR-T infusion requiring immunosuppression.  Now has recovered.  Currently on observation only.  Post CAR-T PET scan showed good response.  She had a repeat PET scan done a couple weeks ago.  I reviewed the PET scan report personally.  She has evidence of continued response with no abnormal uptake in any of the lymph nodes.  [place] 1.  She has every 3-month follow-ups with [facility].  Lab studies show elevated but stable creatinine.  She has evidence of CKD following previous cisplatin chemotherapy.  Follows with nephrology at [facility].     It has been a year and 3 months since her CAR-T therapy.  Doing well.  PET scan done in March showed complete response.  Here with repeat labs.  Complains of some weight gain.  No B symptoms.    Results  - PET scan: Last scan in March, results looked good.  - CD4 count: Less than 200 in May, approximately 180 a month before.  - Bone density test: Low bone density, performed in May.  - IgG levels: Good, as of last week.  - LDH levels: No specific results mentioned.  - Kidney function: No specific results mentioned.  - I reviewed lab results personally and independently interpreted the results.       Plan  Mantle cell lymphoma of lymph nodes of multiple sites:  No current symptoms of lymphoma recurrence such as night sweats or lymph node enlargement. Weight gain is noted, which historically has been associated with recurrence according to her. Last PET scan in March showed no significant findings.  Monitor weight gain and symptoms. Consider repeating a PET scan if labs show LDH elevation or other concerning signs. Tentatively plan for a PET scan in three months, subject to insurance approval.      Hypogammaglobulinemia, acquired:  Receiving weekly subcutaneous IGG to manage condition.  Continue current IGG treatment.    Low CD4 cell count:  CD4 count remains below 200, indicating potential risk for infections.  Continue acyclovir and monthly pentamidine inhalations. Monitor CD4 count with monthly labs.    Low bone density:  Low bone density noted in May. Concerns about vitamin D and calcium supplementation due to kidney issues.  Discuss vitamin D and calcium supplementation with nephrology. Weight-bearing exercises recommended to help build bone density.           ECOG Performance    0 - Independent    Distress Screening (within last 30 days)    No data recorded     Pain  Pain Score: No Pain (0)    Problem List    Patient Active Problem List   Diagnosis     Mantle cell lymphoma of lymph nodes of multiple sites (H)     NHL (non-Hodgkin's lymphoma) (H)     Hypogammaglobulinemia     Drug-induced thrombocytopenia     Morbid obesity (H)     Elevation of level of transaminase or lactic acid dehydrogenase (LDH)     Malaise and fatigue     CKD (chronic kidney disease) stage 4, GFR 15-29 ml/min (H)     Hypogammaglobulinemia, acquired     Maintenance antineoplastic chemotherapy     Acquired hypothyroidism     Obstructive sleep apnea syndrome     Low CD4 cell count determined by flow cytometry     Acute cough     H/O of chimeric antigen receptor T-cell therapy     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  March - June 2014: R-CHOP x 5 cycles resulting in WI. She reports she ultimately had disease progressing on treatment.  June - August 2014: R-DHAP x 3 cycles without CR  October 2014 - July 2015: Ibrutinib. Still had one residual lymph node, which on PET/CT on 5/21/2015 showed increase in  "size (2.2 from 2.0 cm) and SUVmax (13 from 6.9) compared to March 2015 PET/CT. Excisional lymph node biopsy showing MCL. Follow-up PET/CT (6/29/15) showed no FDG-avid lymphoma. Then received R-ICE x 1 cycle (25% dose reduction of etoposide and ifosfamide due to renal function).  August 2015: BEAM high dose chemotherapy followed by autologous stem cell transplantation at [facility].  2015 - 2017: Completed 2 years of maintenance rituximab in December 2017.  May - July 2020: FDG-avid lymphadenopathy (PET/CT on 5/27/20) with core biopsy (6/10/20) followed by excisional biopsy (6/25/20) of left inguinal lymph node. FISH report noted CCND1/IGH fusion in 2 out of 300 interphase nuclei, considered of unclear significance. Morphologic and immunophenotypic evaluation of the excisional node showed only reactive lymph node - no relapse. She had started ibrutinib and received approx 6 weeks duration of treatment. She was evaluated for the first time in Lymphoma Clinic at [facility] (Dr. Rubio). Ibrutinib discontinued and close surveillance / observation recommended without clear evidence of relapse.  September 2020: PET/CT (9/11/20) showed smaller left internal iliac lymph node (4.9 x 3.9 cm from 3.3 x 1.5 cm) with reduced SUVmax (5.6 from 12.4). Observed.  November 2020: CT abdomen/pelvis (11/12/20) showed left pre-sacral soft tissue mass - called left internal iliac node above - considered similar in size at 2.6 x 1.7 cm.  February 2021: CT abdomen/pelvis (2/4/21) showed reduced size now to 1.9 x 1.5.  May 2021: CT abdomen/pelvis (5/13/21) further reduction in size slightly (1.9 x 1.2 cm).  August 2021: CT abdomen/pelvis (8/5/21) 1.9 x 1.0 cm left internal iliac node.   November 2021: CT chest/abdomen/pelvis (11/24/21) 1.0 cm in short axis. No other enlarged nodes. Spleen normal.   March 2022: CT chest/abdomen/pelvis (3/21/22): \"no enlarged lymph nodes\"  July 2022: CT chest/abdomen/pelvis (7/18/22): lymph nodes " "\"normal\"  May 2023: IVIg switched to subcutaneous Ig.  June 2023: CT chest/abdomen/pelvis (6/26/23): \"no lymphadenopathy\"  December 2023: CT chest/abdomen/pelvis (12/19/23) performed for routine surveillance showed interval development of pelvic lymphadenopathy with dominant left internal iliac chain lymph node measuring 4.1 x 4.5 cm. Left common iliac node 1.2 x 1.3 cm. RP node 1.6 x 1.8 cm adjacent to aortic bifurcation. PET/CT (12/29/23; reviewed at [facility]) showed this dominant left internal iliac lesion to have an SUVmax of 13.6.  January 2024: CT-guided left internal iliac lymph node biopsy (1/9/24; pathology reviewed at [facility] by Dr. Kendra Carter) showing MCL with Ki-67 of 20-30%.    LD Chemo: Cyclophosphamide (Cytoxan)/Fludarabine 02/24/2024  CAR-T Infusion Date: 02/29/2024  CRS: 03/05/2024 to 03/08/2024, max grade = 2; Intervention(s) given: Tocilizumab/Steroids/Anakinra/siltuximab on 3/6/24. Developed skin rash with siltuximab.    Recent hematologic history:   She has a history of mantle cell lymphoma diagnosed initially in March 2014.  Status post multiple lines of chemotherapy and autologous stem cell transplant.  Finished 2 years of maintenance rituximab December 2017 after transplant.  Following that she was put on a brief period of ibrutinib.  She had been on surveillance from July 2020 till January 2024.      She had evidence of relapse with development of a left iliac chain lymph node in June of last year.  Although CT scan was read as negative.  December 2023 routine CT scan again showed worsening pelvic lymphadenopathy with dominant left iliac chain lymph node.  It was measuring 4.1 x 4.5 cm.  PET scan showed FDG active lymph nodes in the area with an SUV max of 13.6.  CT-guided left internal iliac lymph node biopsy was positive for mantle cell lymphoma.  She was referred to [facility] for consideration for CAR-T therapy.  She underwent CAR-T infusion on 2/29/2024.  Developed CRS after that but did not " require any ICU placement.  Received tocilizumab, steroids, anakinra and cetuximab.  Also developed Staph epidermidis bacteremia.  Has been getting routine follow-ups through [facility].  Post CAR-T PET scan showed good response.   Her CD4 count has been on the lower side and was recommended to get pentamadine inhalation monthly.  She also follows with nephrology at [facility] for chronic kidney disease thought to be secondary to prior chemotherapy and possibly AIN.  Was on CellCept briefly.  Now is off of it.  On observation only.    Interval history  Overall doing okay.  She reported concerns about weight gain, which she associates with the recurrence of her lymphoma. She has not experienced any night sweats or felt any lymph nodes recently. She mentioned having an upper respiratory infection in March or April, but has not had any recurrent infections or fevers since then. Avis is currently receiving weekly subcutaneous injections of IVIGG and takes acyclovir and pentamidine monthly. Her last PET scan showed favorable results, and she has not felt any lumps or bumps indicative of lymphoma recurrence.    Avis's CD4 count was noted to be less than 200 in May, with a previous count of 180 the month before. She believes the lymph node depletion worked well, suppressing irregular T cells while expanding anti-lymphoma T cells. A bone density test in May revealed low bone density, but she has not been taking vitamin D or calcium supplements due to concerns about her kidney issues. Avis has a history of kidney stones, specifically calcium oxalate stones, and has been advised to avoid high oxalate foods.     She has not experienced any loss of appetite and maintains her physical activity, although her weight has been increasing despite no changes in diet or exercise. Avis is scheduled to follow up with [facility] regarding her kidney issues in August. .     Review of Systems    Pertinent items are noted in  "HPI.    Past History    Past Medical History:   Diagnosis Date     Anemia      Cancer (H) 2014    Mantle Cell Lymphoma     Chronic kidney disease     elevated creatinine due to chemo     Cough 06/19/2017    Overview:  Created by Conversion     Dehydration 08/12/2014     Fever 06/19/2017    Overview:  Created by Conversion     History of anesthesia complications 2015    urinary retention after lymph node excision. required catheterization     History of blood transfusion     reaction to platelets with hives in the past     History of transfusion      Hypothyroid      Hypothyroidism 2007     Kidney stones 2002     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 Cooperative in conversation.  Lymph nodes: No overt peripheral lymphadenopathy noted on today's clinical exam.  CVS: RRR  Lungs: Clear to auscultation bilaterally    Lab Results    Recent Results (from the past week)   IgG subclasses   Result Value Ref Range    Immunoglobulin G 791 610 - 1,616 mg/dL    IgG1 446 382 - 929 mg/dL    IgG2 275 242 - 700 mg/dL    IgG3 30 22 - 176 mg/dL    IgG4 19 4 - 86 mg/dL   Renal panel (Alb, BUN, Ca, Cl, CO2, Creat, Gluc, Phos, K, Na)   Result Value Ref Range    Sodium 137 135 - 145 mmol/L    Potassium 4.5 3.4 - 5.3 mmol/L    Chloride 105 98 - 107 mmol/L    Carbon Dioxide (CO2) 22 22 - 29 mmol/L    Anion Gap 10 7 - 15 mmol/L    Glucose 89 70 - 99 " mg/dL    Urea Nitrogen 32.1 (H) 8.0 - 23.0 mg/dL    Creatinine 2.35 (H) 0.51 - 0.95 mg/dL    GFR Estimate 23 (L) >60 mL/min/1.73m2    Calcium 9.1 8.8 - 10.4 mg/dL    Albumin 3.7 3.5 - 5.2 g/dL    Phosphorus 3.2 2.5 - 4.5 mg/dL   TSH WITH FREE T4 REFLEX   Result Value Ref Range    TSH 1.90 0.30 - 4.20 uIU/mL         Imaging    No results found.    The longitudinal plan of care for the diagnosis(es)/condition(s) as documented were addressed during this visit. Due to the added complexity in care, I will continue to support Avis in the subsequent management and with ongoing continuity of care.        Signed by: Deanna Gill MD    Again, thank you for allowing me to participate in the care of your patient.        Sincerely,        Deanna Gill MD    Electronically signed

## 2025-07-01 NOTE — PROGRESS NOTES
"Oncology Rooming Note    July 1, 2025 9:59 AM   Avis Paul is a 60 year old female who presents for:    Chief Complaint   Patient presents with    Oncology Clinic Visit     NHL (non-Hodgkin's lymphoma) - Provider visit only     Initial Vitals: BP (!) 149/97 (BP Location: Right arm, Patient Position: Sitting, Cuff Size: Adult Regular)   Pulse 77   Temp 97.5  F (36.4  C) (Tympanic)   Resp 16   Ht 1.702 m (5' 7\")   Wt (!) 138.8 kg (306 lb)   SpO2 97%   BMI 47.93 kg/m   Estimated body mass index is 47.93 kg/m  as calculated from the following:    Height as of this encounter: 1.702 m (5' 7\").    Weight as of this encounter: 138.8 kg (306 lb). Body surface area is 2.56 meters squared.  No Pain (0) Comment: Data Unavailable   No LMP recorded. Patient is postmenopausal.  Allergies reviewed: Yes  Medications reviewed: Yes    Medications: Medication refills not needed today.  Pharmacy name entered into [employer]: [facility] - [place], [place] - [facility] [address]     Frailty Screening:   Is the patient here for a new oncology consult visit in cancer care? 2. No    PHQ9:  Did this patient require a PHQ9?: No      Clinical concerns:  None today      Anali Patton CMA            "

## 2025-07-01 NOTE — PROGRESS NOTES
[facility] Hematology and Oncology Progress Note    Patient: Avis Paul  MRN: [id number]  Date of Service: Jul 1, 2025          Reason for Visit    Chief Complaint   Patient presents with    Oncology Clinic Visit     NHL (non-Hodgkin's lymphoma) - Provider visit only       Assessment and Plan     Cancer Staging   No matching staging information was found for the patient.      1. Mantle cell lymphoma status post autologous transplant, August 18, 2015: Then completed 2 years of maintenance Rituxan therapy.  Recent disease progression noted on the CT scan last year.  Biopsy of the left internal iliac lymph node positive for mantle cell lymphoma.  Was referred to [facility] where she underwent CAR-T infusion on 2/29/2024.  Had CRS following CAR-T infusion requiring immunosuppression.  Now has recovered.  Currently on observation only.  Post CAR-T PET scan showed good response.  She had a repeat PET scan done a couple weeks ago.  I reviewed the PET scan report personally.  She has evidence of continued response with no abnormal uptake in any of the lymph nodes.  [place] 1.  She has every 3-month follow-ups with [facility].  Lab studies show elevated but stable creatinine.  She has evidence of CKD following previous cisplatin chemotherapy.  Follows with nephrology at [facility].     It has been a year and 3 months since her CAR-T therapy.  Doing well.  PET scan done in March showed complete response.  Here with repeat labs.  Complains of some weight gain.  No B symptoms.    Results  - PET scan: Last scan in March, results looked good.  - CD4 count: Less than 200 in May, approximately 180 a month before.  - Bone density test: Low bone density, performed in May.  - IgG levels: Good, as of last week.  - LDH levels: No specific results mentioned.  - Kidney function: No specific results mentioned.  - I reviewed lab results personally and independently interpreted the results.       Plan  Mantle cell  lymphoma of lymph nodes of multiple sites:  No current symptoms of lymphoma recurrence such as night sweats or lymph node enlargement. Weight gain is noted, which historically has been associated with recurrence according to her. Last PET scan in March showed no significant findings.  Monitor weight gain and symptoms. Consider repeating a PET scan if labs show LDH elevation or other concerning signs. Tentatively plan for a PET scan in three months, subject to insurance approval.     Hypogammaglobulinemia, acquired:  Receiving weekly subcutaneous IGG to manage condition.  Continue current IGG treatment.    Low CD4 cell count:  CD4 count remains below 200, indicating potential risk for infections.  Continue acyclovir and monthly pentamidine inhalations. Monitor CD4 count with monthly labs.    Low bone density:  Low bone density noted in May. Concerns about vitamin D and calcium supplementation due to kidney issues.  Discuss vitamin D and calcium supplementation with nephrology. Weight-bearing exercises recommended to help build bone density.           ECOG Performance    0 - Independent    Distress Screening (within last 30 days)    No data recorded     Pain  Pain Score: No Pain (0)    Problem List    Patient Active Problem List   Diagnosis    Mantle cell lymphoma of lymph nodes of multiple sites (H)    NHL (non-Hodgkin's lymphoma) (H)    Hypogammaglobulinemia    Drug-induced thrombocytopenia    Morbid obesity (H)    Elevation of level of transaminase or lactic acid dehydrogenase (LDH)    Malaise and fatigue    CKD (chronic kidney disease) stage 4, GFR 15-29 ml/min (H)    Hypogammaglobulinemia, acquired    Maintenance antineoplastic chemotherapy    Acquired hypothyroidism    Obstructive sleep apnea syndrome    Low CD4 cell count determined by flow cytometry    Acute cough    H/O of chimeric antigen receptor T-cell therapy    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  March - June 2014: R-CHOP x 5 cycles resulting in CA. She reports she ultimately had disease progressing on treatment.  June - August 2014: R-DHAP x 3 cycles without CR  October 2014 - July 2015: Ibrutinib. Still had one residual lymph node, which on PET/CT on 5/21/2015 showed increase in size (2.2 from 2.0 cm) and SUVmax (13 from 6.9) compared to March 2015 PET/CT. Excisional lymph node biopsy showing MCL. Follow-up PET/CT (6/29/15) showed no FDG-avid lymphoma. Then received R-ICE x 1 cycle (25% dose reduction of etoposide and ifosfamide due to renal function).  August 2015: BEAM high dose chemotherapy followed by autologous stem cell transplantation at [facility].  2015 - 2017: Completed 2 years of maintenance rituximab in December 2017.  May - July 2020: FDG-avid lymphadenopathy (PET/CT on 5/27/20) with core biopsy (6/10/20) followed by excisional biopsy (6/25/20) of left inguinal lymph node. FISH report noted CCND1/IGH fusion in 2 out of 300 interphase nuclei, considered of unclear significance. Morphologic and immunophenotypic evaluation of the excisional node showed only reactive lymph node - no relapse. She had started ibrutinib and received approx 6 weeks duration of treatment. She was evaluated for the first time in Lymphoma Clinic at [facility] (Dr. Rubio). Ibrutinib discontinued and close surveillance / observation recommended without clear evidence of relapse.  September 2020: PET/CT (9/11/20) showed smaller left internal iliac lymph node (4.9 x 3.9 cm from 3.3 x 1.5 cm) with reduced SUVmax (5.6 from 12.4). Observed.  November 2020: CT abdomen/pelvis (11/12/20) showed left pre-sacral soft tissue mass - called left internal iliac node above - considered similar in size at 2.6 x 1.7 cm.  February 2021: CT  "abdomen/pelvis (2/4/21) showed reduced size now to 1.9 x 1.5.  May 2021: CT abdomen/pelvis (5/13/21) further reduction in size slightly (1.9 x 1.2 cm).  August 2021: CT abdomen/pelvis (8/5/21) 1.9 x 1.0 cm left internal iliac node.   November 2021: CT chest/abdomen/pelvis (11/24/21) 1.0 cm in short axis. No other enlarged nodes. Spleen normal.   March 2022: CT chest/abdomen/pelvis (3/21/22): \"no enlarged lymph nodes\"  July 2022: CT chest/abdomen/pelvis (7/18/22): lymph nodes \"normal\"  May 2023: IVIg switched to subcutaneous Ig.  June 2023: CT chest/abdomen/pelvis (6/26/23): \"no lymphadenopathy\"  December 2023: CT chest/abdomen/pelvis (12/19/23) performed for routine surveillance showed interval development of pelvic lymphadenopathy with dominant left internal iliac chain lymph node measuring 4.1 x 4.5 cm. Left common iliac node 1.2 x 1.3 cm. RP node 1.6 x 1.8 cm adjacent to aortic bifurcation. PET/CT (12/29/23; reviewed at [facility]) showed this dominant left internal iliac lesion to have an SUVmax of 13.6.  January 2024: CT-guided left internal iliac lymph node biopsy (1/9/24; pathology reviewed at [facility] by Dr. Kendra Carter) showing MCL with Ki-67 of 20-30%.    LD Chemo: Cyclophosphamide (Cytoxan)/Fludarabine 02/24/2024  CAR-T Infusion Date: 02/29/2024  CRS: 03/05/2024 to 03/08/2024, max grade = 2; Intervention(s) given: Tocilizumab/Steroids/Anakinra/siltuximab on 3/6/24. Developed skin rash with siltuximab.    Recent hematologic history:   She has a history of mantle cell lymphoma diagnosed initially in March 2014.  Status post multiple lines of chemotherapy and autologous stem cell transplant.  Finished 2 years of maintenance rituximab December 2017 after transplant.  Following that she was put on a brief period of ibrutinib.  She had been on surveillance from July 2020 till January 2024.      She had evidence of relapse with development of a left iliac chain lymph node in June of last year.  Although CT scan was read " as negative.  December 2023 routine CT scan again showed worsening pelvic lymphadenopathy with dominant left iliac chain lymph node.  It was measuring 4.1 x 4.5 cm.  PET scan showed FDG active lymph nodes in the area with an SUV max of 13.6.  CT-guided left internal iliac lymph node biopsy was positive for mantle cell lymphoma.  She was referred to [facility] for consideration for CAR-T therapy.  She underwent CAR-T infusion on 2/29/2024.  Developed CRS after that but did not require any ICU placement.  Received tocilizumab, steroids, anakinra and cetuximab.  Also developed Staph epidermidis bacteremia.  Has been getting routine follow-ups through [facility].  Post CAR-T PET scan showed good response.   Her CD4 count has been on the lower side and was recommended to get pentamadine inhalation monthly.  She also follows with nephrology at [facility] for chronic kidney disease thought to be secondary to prior chemotherapy and possibly AIN.  Was on CellCept briefly.  Now is off of it.  On observation only.    Interval history  Overall doing okay.  She reported concerns about weight gain, which she associates with the recurrence of her lymphoma. She has not experienced any night sweats or felt any lymph nodes recently. She mentioned having an upper respiratory infection in March or April, but has not had any recurrent infections or fevers since then. Avis is currently receiving weekly subcutaneous injections of IVIGG and takes acyclovir and pentamidine monthly. Her last PET scan showed favorable results, and she has not felt any lumps or bumps indicative of lymphoma recurrence.    Avis's CD4 count was noted to be less than 200 in May, with a previous count of 180 the month before. She believes the lymph node depletion worked well, suppressing irregular T cells while expanding anti-lymphoma T cells. A bone density test in May revealed low bone density, but she has not been taking vitamin D or calcium supplements due  "to concerns about her kidney issues. Avis has a history of kidney stones, specifically calcium oxalate stones, and has been advised to avoid high oxalate foods.     She has not experienced any loss of appetite and maintains her physical activity, although her weight has been increasing despite no changes in diet or exercise. Avis is scheduled to follow up with [facility] regarding her kidney issues in August. .     Review of Systems    Pertinent items are noted in HPI.    Past History    Past Medical History:   Diagnosis Date    Anemia     Cancer (H) 2014    Mantle Cell Lymphoma    Chronic kidney disease     elevated creatinine due to chemo    Cough 06/19/2017    Overview:  Created by Conversion    Dehydration 08/12/2014    Fever 06/19/2017    Overview:  Created by Conversion    History of anesthesia complications 2015    urinary retention after lymph node excision. required catheterization    History of blood transfusion     reaction to platelets with hives in the past    History of transfusion     Hypothyroid     Hypothyroidism 2007    Kidney stones 2002    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 Cooperative in conversation.  Lymph nodes: No overt peripheral lymphadenopathy noted on today's clinical exam.  CVS: RRR  Lungs: Clear to auscultation bilaterally    Lab Results    Recent Results (from the past week)   IgG subclasses "   Result Value Ref Range    Immunoglobulin G 791 610 - 1,616 mg/dL    IgG1 446 382 - 929 mg/dL    IgG2 275 242 - 700 mg/dL    IgG3 30 22 - 176 mg/dL    IgG4 19 4 - 86 mg/dL   Renal panel (Alb, BUN, Ca, Cl, CO2, Creat, Gluc, Phos, K, Na)   Result Value Ref Range    Sodium 137 135 - 145 mmol/L    Potassium 4.5 3.4 - 5.3 mmol/L    Chloride 105 98 - 107 mmol/L    Carbon Dioxide (CO2) 22 22 - 29 mmol/L    Anion Gap 10 7 - 15 mmol/L    Glucose 89 70 - 99 mg/dL    Urea Nitrogen 32.1 (H) 8.0 - 23.0 mg/dL    Creatinine 2.35 (H) 0.51 - 0.95 mg/dL    GFR Estimate 23 (L) >60 mL/min/1.73m2    Calcium 9.1 8.8 - 10.4 mg/dL    Albumin 3.7 3.5 - 5.2 g/dL    Phosphorus 3.2 2.5 - 4.5 mg/dL   TSH WITH FREE T4 REFLEX   Result Value Ref Range    TSH 1.90 0.30 - 4.20 uIU/mL         Imaging    No results found.    The longitudinal plan of care for the diagnosis(es)/condition(s) as documented were addressed during this visit. Due to the added complexity in care, I will continue to support Avis in the subsequent management and with ongoing continuity of care.        Signed by: Deanna Gill MD

## 2025-07-02 DIAGNOSIS — N18.4 CKD (CHRONIC KIDNEY DISEASE) STAGE 4, GFR 15-29 ML/MIN (H): Primary | ICD-10-CM

## 2025-07-02 DIAGNOSIS — N10 ATIN (ACUTE TUBULO-INTERSTITIAL NEPHRITIS): ICD-10-CM

## 2025-07-08 PROCEDURE — K0552 SUP/EXT NON-INS INF PUMP SYR: HCPCS

## 2025-07-13 PROCEDURE — E0779 AMB INFUSION PUMP MECHANICAL: HCPCS | Mod: RR

## 2025-07-15 PROCEDURE — K0552 SUP/EXT NON-INS INF PUMP SYR: HCPCS

## 2025-07-22 PROCEDURE — K0552 SUP/EXT NON-INS INF PUMP SYR: HCPCS

## 2025-07-29 ENCOUNTER — HOSPITAL ENCOUNTER (OUTPATIENT)
Dept: RESPIRATORY THERAPY | Facility: CLINIC | Age: 60
Discharge: HOME | End: 2025-07-29
Attending: FAMILY MEDICINE
Payer: COMMERCIAL

## 2025-07-29 ENCOUNTER — HOME INFUSION (OUTPATIENT)
Dept: HOME HEALTH SERVICES | Facility: HOME HEALTH | Age: 60
End: 2025-07-29
Payer: COMMERCIAL

## 2025-07-29 ENCOUNTER — INFUSION THERAPY VISIT (OUTPATIENT)
Dept: INFUSION THERAPY | Facility: CLINIC | Age: 60
End: 2025-07-29
Attending: INTERNAL MEDICINE
Payer: COMMERCIAL

## 2025-07-29 DIAGNOSIS — D72.818 LOW CD4 CELL COUNT DETERMINED BY FLOW CYTOMETRY: ICD-10-CM

## 2025-07-29 DIAGNOSIS — C83.18 MANTLE CELL LYMPHOMA OF LYMPH NODES OF MULTIPLE SITES (H): ICD-10-CM

## 2025-07-29 DIAGNOSIS — D80.1 HYPOGAMMAGLOBULINEMIA, ACQUIRED: ICD-10-CM

## 2025-07-29 DIAGNOSIS — N18.4 CKD (CHRONIC KIDNEY DISEASE) STAGE 4, GFR 15-29 ML/MIN (H): ICD-10-CM

## 2025-07-29 DIAGNOSIS — E03.9 ACQUIRED HYPOTHYROIDISM: Chronic | ICD-10-CM

## 2025-07-29 DIAGNOSIS — N10 ATIN (ACUTE TUBULO-INTERSTITIAL NEPHRITIS): ICD-10-CM

## 2025-07-29 DIAGNOSIS — C83.18 MANTLE CELL LYMPHOMA OF LYMPH NODES OF MULTIPLE SITES (H): Primary | ICD-10-CM

## 2025-07-29 DIAGNOSIS — Z51.11 MAINTENANCE ANTINEOPLASTIC CHEMOTHERAPY: Primary | ICD-10-CM

## 2025-07-29 LAB
ALBUMIN SERPL BCG-MCNC: 3.7 G/DL (ref 3.5–5.2)
ALBUMIN UR-MCNC: NEGATIVE MG/DL
ALP SERPL-CCNC: 143 U/L (ref 40–150)
ALT SERPL W P-5'-P-CCNC: 29 U/L (ref 0–50)
ANION GAP SERPL CALCULATED.3IONS-SCNC: 11 MMOL/L (ref 7–15)
APPEARANCE UR: CLEAR
AST SERPL W P-5'-P-CCNC: 39 U/L (ref 0–45)
BACTERIA #/AREA URNS HPF: ABNORMAL /HPF
BASOPHILS # BLD AUTO: 0 10E3/UL (ref 0–0.2)
BASOPHILS NFR BLD AUTO: 1 %
BILIRUB SERPL-MCNC: 0.3 MG/DL
BILIRUB UR QL STRIP: NEGATIVE
BUN SERPL-MCNC: 28.5 MG/DL (ref 8–23)
CALCIUM SERPL-MCNC: 9.2 MG/DL (ref 8.8–10.4)
CHLORIDE SERPL-SCNC: 105 MMOL/L (ref 98–107)
COLOR UR AUTO: ABNORMAL
CREAT SERPL-MCNC: 2.39 MG/DL (ref 0.51–0.95)
CREAT UR-MCNC: 62.9 MG/DL
EGFRCR SERPLBLD CKD-EPI 2021: 23 ML/MIN/1.73M2
EOSINOPHIL # BLD AUTO: 0.2 10E3/UL (ref 0–0.7)
EOSINOPHIL NFR BLD AUTO: 5 %
ERYTHROCYTE [DISTWIDTH] IN BLOOD BY AUTOMATED COUNT: 15.5 % (ref 10–15)
GLUCOSE SERPL-MCNC: 103 MG/DL (ref 70–99)
GLUCOSE UR STRIP-MCNC: NEGATIVE MG/DL
HCO3 SERPL-SCNC: 22 MMOL/L (ref 22–29)
HCT VFR BLD AUTO: 38.5 % (ref 35–47)
HGB BLD-MCNC: 12.1 G/DL (ref 11.7–15.7)
HGB UR QL STRIP: ABNORMAL
IMM GRANULOCYTES # BLD: 0 10E3/UL
IMM GRANULOCYTES NFR BLD: 0 %
KETONES UR STRIP-MCNC: NEGATIVE MG/DL
LDH SERPL L TO P-CCNC: 224 U/L (ref 0–250)
LEUKOCYTE ESTERASE UR QL STRIP: ABNORMAL
LYMPHOCYTES # BLD AUTO: 0.3 10E3/UL (ref 0.8–5.3)
LYMPHOCYTES NFR BLD AUTO: 7 %
MCH RBC QN AUTO: 29.1 PG (ref 26.5–33)
MCHC RBC AUTO-ENTMCNC: 31.4 G/DL (ref 31.5–36.5)
MCV RBC AUTO: 93 FL (ref 78–100)
MICROALBUMIN UR-MCNC: 19.9 MG/L
MICROALBUMIN/CREAT UR: 31.64 MG/G CR (ref 0–25)
MONOCYTES # BLD AUTO: 0.4 10E3/UL (ref 0–1.3)
MONOCYTES NFR BLD AUTO: 8 %
MUCOUS THREADS #/AREA URNS LPF: PRESENT /LPF
NEUTROPHILS # BLD AUTO: 3.9 10E3/UL (ref 1.6–8.3)
NEUTROPHILS NFR BLD AUTO: 80 %
NITRATE UR QL: NEGATIVE
NRBC # BLD AUTO: 0 10E3/UL
NRBC BLD AUTO-RTO: 0 /100
PH UR STRIP: 5.5 [PH] (ref 5–7)
PHOSPHATE SERPL-MCNC: 2.7 MG/DL (ref 2.5–4.5)
PLATELET # BLD AUTO: 109 10E3/UL (ref 150–450)
POTASSIUM SERPL-SCNC: 4.1 MMOL/L (ref 3.4–5.3)
PROT SERPL-MCNC: 6.6 G/DL (ref 6.4–8.3)
RBC # BLD AUTO: 4.16 10E6/UL (ref 3.8–5.2)
RBC URINE: 5 /HPF
SODIUM SERPL-SCNC: 138 MMOL/L (ref 135–145)
SP GR UR STRIP: 1.01 (ref 1–1.03)
SQUAMOUS EPITHELIAL: <1 /HPF
UROBILINOGEN UR STRIP-MCNC: NORMAL MG/DL
WBC # BLD AUTO: 4.9 10E3/UL (ref 4–11)
WBC URINE: 26 /HPF

## 2025-07-29 PROCEDURE — 80053 COMPREHEN METABOLIC PANEL: CPT | Performed by: INTERNAL MEDICINE

## 2025-07-29 PROCEDURE — 83615 LACTATE (LD) (LDH) ENZYME: CPT | Performed by: INTERNAL MEDICINE

## 2025-07-29 PROCEDURE — 250N000009 HC RX 250: Performed by: FAMILY MEDICINE

## 2025-07-29 PROCEDURE — 82570 ASSAY OF URINE CREATININE: CPT | Performed by: STUDENT IN AN ORGANIZED HEALTH CARE EDUCATION/TRAINING PROGRAM

## 2025-07-29 PROCEDURE — 84443 ASSAY THYROID STIM HORMONE: CPT | Performed by: INTERNAL MEDICINE

## 2025-07-29 PROCEDURE — 83883 ASSAY NEPHELOMETRY NOT SPEC: CPT | Performed by: STUDENT IN AN ORGANIZED HEALTH CARE EDUCATION/TRAINING PROGRAM

## 2025-07-29 PROCEDURE — 94642 AEROSOL INHALATION TREATMENT: CPT

## 2025-07-29 PROCEDURE — 250N000011 HC RX IP 250 OP 636: Performed by: INTERNAL MEDICINE

## 2025-07-29 PROCEDURE — 85004 AUTOMATED DIFF WBC COUNT: CPT | Performed by: INTERNAL MEDICINE

## 2025-07-29 PROCEDURE — 85041 AUTOMATED RBC COUNT: CPT | Performed by: INTERNAL MEDICINE

## 2025-07-29 PROCEDURE — 86357 NK CELLS TOTAL COUNT: CPT | Performed by: INTERNAL MEDICINE

## 2025-07-29 PROCEDURE — 86360 T CELL ABSOLUTE COUNT/RATIO: CPT | Performed by: INTERNAL MEDICINE

## 2025-07-29 PROCEDURE — 36591 DRAW BLOOD OFF VENOUS DEVICE: CPT

## 2025-07-29 PROCEDURE — 84100 ASSAY OF PHOSPHORUS: CPT | Performed by: INTERNAL MEDICINE

## 2025-07-29 PROCEDURE — 81001 URINALYSIS AUTO W/SCOPE: CPT | Performed by: STUDENT IN AN ORGANIZED HEALTH CARE EDUCATION/TRAINING PROGRAM

## 2025-07-29 RX ORDER — HEPARIN SODIUM (PORCINE) LOCK FLUSH IV SOLN 100 UNIT/ML 100 UNIT/ML
SOLUTION INTRAVENOUS
Status: COMPLETED
Start: 2025-07-29 | End: 2025-07-29

## 2025-07-29 RX ORDER — HEPARIN SODIUM,PORCINE 10 UNIT/ML
5-20 VIAL (ML) INTRAVENOUS DAILY PRN
OUTPATIENT
Start: 2025-07-29

## 2025-07-29 RX ORDER — ALBUTEROL SULFATE 0.83 MG/ML
2.5 SOLUTION RESPIRATORY (INHALATION)
Status: COMPLETED | OUTPATIENT
Start: 2025-07-29 | End: 2025-07-29

## 2025-07-29 RX ORDER — HEPARIN SODIUM (PORCINE) LOCK FLUSH IV SOLN 100 UNIT/ML 100 UNIT/ML
5 SOLUTION INTRAVENOUS
Status: DISCONTINUED | OUTPATIENT
Start: 2025-07-29 | End: 2025-07-29 | Stop reason: HOSPADM

## 2025-07-29 RX ORDER — PENTAMIDINE ISETHIONATE 300 MG/300MG
300 INHALANT RESPIRATORY (INHALATION)
Status: COMPLETED | OUTPATIENT
Start: 2025-07-29 | End: 2025-07-29

## 2025-07-29 RX ORDER — HEPARIN SODIUM (PORCINE) LOCK FLUSH IV SOLN 100 UNIT/ML 100 UNIT/ML
5 SOLUTION INTRAVENOUS
OUTPATIENT
Start: 2025-07-29

## 2025-07-29 RX ADMIN — HEPARIN 5 ML: 100 SYRINGE at 14:28

## 2025-07-29 RX ADMIN — PENTAMIDINE ISETHIONATE 300 MG: 300 INHALANT RESPIRATORY (INHALATION) at 13:25

## 2025-07-29 RX ADMIN — ALBUTEROL SULFATE 2.5 MG: 2.5 SOLUTION RESPIRATORY (INHALATION) at 13:25

## 2025-07-29 RX ADMIN — HEPARIN SODIUM (PORCINE) LOCK FLUSH IV SOLN 100 UNIT/ML 5 ML: 100 SOLUTION at 14:28

## 2025-07-29 NOTE — PROGRESS NOTES
PAC labs and UA collected and sent. Port accessed without difficulty. Good blood return noted.    MWeeks RN

## 2025-07-29 NOTE — PROGRESS NOTES
Pentamidine Treatment Performed at [facility] July 29, 2025     Pretreatment Vitals:  HR 80,RR 18, Sp02 100%, Breath Sounds clear.    2.5 mg/3 ml Albuterol nebulizer administered. Pentamidine 300 mg mixed with 6 ml sterile water nebulized at 6 LPM.    Posttreatment Vitals:  HR 83, RR 18,Sp02 100%, Breath Sounds unchanged.    Patient scheduled for follow up 8/25.

## 2025-07-30 ENCOUNTER — MYC MEDICAL ADVICE (OUTPATIENT)
Dept: FAMILY MEDICINE | Facility: CLINIC | Age: 60
End: 2025-07-30
Payer: COMMERCIAL

## 2025-07-30 DIAGNOSIS — N39.0 ACUTE UTI (URINARY TRACT INFECTION): Primary | ICD-10-CM

## 2025-07-30 DIAGNOSIS — E03.9 ACQUIRED HYPOTHYROIDISM: Chronic | ICD-10-CM

## 2025-07-30 LAB
CD19 CELLS # BLD: <1 CELLS/UL (ref 107–698)
CD19 CELLS NFR BLD: <1 % (ref 6–27)
CD3 CELLS # BLD: 303 CELLS/UL (ref 603–2990)
CD3 CELLS NFR BLD: 90 % (ref 49–84)
CD3+CD4+ CELLS # BLD: 180 CELLS/UL (ref 441–2156)
CD3+CD4+ CELLS NFR BLD: 54 % (ref 28–63)
CD3+CD4+ CELLS/CD3+CD8+ CLL BLD: 1.6 % (ref 1.4–2.6)
CD3+CD8+ CELLS # BLD: 113 CELLS/UL (ref 125–1312)
CD3+CD8+ CELLS NFR BLD: 34 % (ref 10–40)
CD3-CD16+CD56+ CELLS # BLD: 29 CELLS/UL (ref 95–640)
CD3-CD16+CD56+ CELLS NFR BLD: 9 % (ref 4–25)
LAB ORDER RESULT STATUS: NORMAL
Lab: NORMAL
PERFORMING LABORATORY: NORMAL
T CELL EXTENDED COMMENT: ABNORMAL
TEST NAME: NORMAL

## 2025-07-31 ENCOUNTER — HOME INFUSION BILLING (OUTPATIENT)
Dept: HOME HEALTH SERVICES | Facility: HOME HEALTH | Age: 60
End: 2025-07-31
Payer: COMMERCIAL

## 2025-07-31 LAB — TSH SERPL DL<=0.005 MIU/L-ACNC: 2.46 UIU/ML (ref 0.3–4.2)

## 2025-07-31 PROCEDURE — A4213 20+ CC SYRINGE ONLY: HCPCS

## 2025-07-31 PROCEDURE — A4215 STERILE NEEDLE: HCPCS

## 2025-07-31 RX ORDER — NITROFURANTOIN 25; 75 MG/1; MG/1
100 CAPSULE ORAL 2 TIMES DAILY
Qty: 10 CAPSULE | Refills: 0 | Status: SHIPPED | OUTPATIENT
Start: 2025-07-31 | End: 2025-08-05

## 2025-07-31 NOTE — TELEPHONE ENCOUNTER
[facility]ing [facility] with health updates to PCP as FYI for upcoming appointment.    Patient was advised a visit for possible UTI and given information for options.     Alyssa Schmidt RN  [facility]

## 2025-08-03 PROCEDURE — K0552 SUP/EXT NON-INS INF PUMP SYR: HCPCS

## 2025-08-03 PROCEDURE — S9338 HIT IMMUNOTHERAPY DIEM: HCPCS

## 2025-08-10 PROCEDURE — K0552 SUP/EXT NON-INS INF PUMP SYR: HCPCS

## 2025-08-10 PROCEDURE — S9338 HIT IMMUNOTHERAPY DIEM: HCPCS

## 2025-08-17 PROCEDURE — S9338 HIT IMMUNOTHERAPY DIEM: HCPCS

## 2025-08-17 PROCEDURE — K0552 SUP/EXT NON-INS INF PUMP SYR: HCPCS

## 2025-08-24 PROCEDURE — K0552 SUP/EXT NON-INS INF PUMP SYR: HCPCS

## 2025-08-24 PROCEDURE — S9338 HIT IMMUNOTHERAPY DIEM: HCPCS

## 2025-08-25 ENCOUNTER — HOSPITAL ENCOUNTER (OUTPATIENT)
Dept: RESPIRATORY THERAPY | Facility: CLINIC | Age: 60
Discharge: HOME | End: 2025-08-25
Attending: FAMILY MEDICINE | Admitting: FAMILY MEDICINE
Payer: COMMERCIAL

## 2025-08-25 ENCOUNTER — INFUSION THERAPY VISIT (OUTPATIENT)
Dept: INFUSION THERAPY | Facility: CLINIC | Age: 60
End: 2025-08-25
Attending: INTERNAL MEDICINE
Payer: COMMERCIAL

## 2025-08-25 DIAGNOSIS — C83.18 MANTLE CELL LYMPHOMA OF LYMPH NODES OF MULTIPLE SITES (H): ICD-10-CM

## 2025-08-25 DIAGNOSIS — D84.9 IMMUNOCOMPROMISED STATE: ICD-10-CM

## 2025-08-25 DIAGNOSIS — D80.1 HYPOGAMMAGLOBULINEMIA: Chronic | ICD-10-CM

## 2025-08-25 DIAGNOSIS — D80.1 HYPOGAMMAGLOBULINEMIA, ACQUIRED: ICD-10-CM

## 2025-08-25 DIAGNOSIS — N18.4 CKD (CHRONIC KIDNEY DISEASE) STAGE 4, GFR 15-29 ML/MIN (H): ICD-10-CM

## 2025-08-25 DIAGNOSIS — Z51.11 MAINTENANCE ANTINEOPLASTIC CHEMOTHERAPY: Primary | ICD-10-CM

## 2025-08-25 DIAGNOSIS — C83.18 LYMPHOMA, MANTLE CELL, MULTIPLE SITES (H): ICD-10-CM

## 2025-08-25 DIAGNOSIS — C83.18 MANTLE CELL LYMPHOMA OF LYMPH NODES OF MULTIPLE SITES (H): Primary | ICD-10-CM

## 2025-08-25 LAB
ALBUMIN SERPL BCG-MCNC: 3.5 G/DL (ref 3.5–5.2)
ALP SERPL-CCNC: 130 U/L (ref 40–150)
ALT SERPL W P-5'-P-CCNC: 21 U/L (ref 0–50)
ANION GAP SERPL CALCULATED.3IONS-SCNC: 8 MMOL/L (ref 7–15)
AST SERPL W P-5'-P-CCNC: 28 U/L (ref 0–45)
BASOPHILS # BLD AUTO: 0.05 10E3/UL (ref 0–0.2)
BASOPHILS NFR BLD AUTO: 1.3 %
BILIRUB SERPL-MCNC: 0.4 MG/DL
BUN SERPL-MCNC: 26.6 MG/DL (ref 8–23)
CALCIUM SERPL-MCNC: 9 MG/DL (ref 8.8–10.4)
CD19 CELLS # BLD: <1 CELLS/UL (ref 107–698)
CD19 CELLS NFR BLD: <1 % (ref 6–27)
CD3 CELLS # BLD: 213 CELLS/UL (ref 603–2990)
CD3 CELLS NFR BLD: 89 % (ref 49–84)
CD3+CD4+ CELLS # BLD: 135 CELLS/UL (ref 441–2156)
CD3+CD4+ CELLS NFR BLD: 57 % (ref 28–63)
CD3+CD4+ CELLS/CD3+CD8+ CLL BLD: 1.97 % (ref 1.4–2.6)
CD3+CD8+ CELLS # BLD: 69 CELLS/UL (ref 125–1312)
CD3+CD8+ CELLS NFR BLD: 29 % (ref 10–40)
CD3-CD16+CD56+ CELLS # BLD: 22 CELLS/UL (ref 95–640)
CD3-CD16+CD56+ CELLS NFR BLD: 9 % (ref 4–25)
CHLORIDE SERPL-SCNC: 107 MMOL/L (ref 98–107)
CMV DNA SPEC NAA+PROBE-ACNC: NOT DETECTED IU/ML
CREAT SERPL-MCNC: 2.39 MG/DL (ref 0.51–0.95)
EGFRCR SERPLBLD CKD-EPI 2021: 23 ML/MIN/1.73M2
EOSINOPHIL # BLD AUTO: 0.27 10E3/UL (ref 0–0.7)
EOSINOPHIL NFR BLD AUTO: 7 %
ERYTHROCYTE [DISTWIDTH] IN BLOOD BY AUTOMATED COUNT: 15.8 % (ref 10–15)
GLUCOSE SERPL-MCNC: 90 MG/DL (ref 70–99)
HCO3 SERPL-SCNC: 23 MMOL/L (ref 22–29)
HCT VFR BLD AUTO: 38.2 % (ref 35–47)
HGB BLD-MCNC: 11.9 G/DL (ref 11.7–15.7)
IMM GRANULOCYTES # BLD: <0.03 10E3/UL
IMM GRANULOCYTES NFR BLD: 0.3 %
LDH SERPL L TO P-CCNC: 188 U/L (ref 0–250)
LYMPHOCYTES # BLD AUTO: 0.24 10E3/UL (ref 0.8–5.3)
LYMPHOCYTES NFR BLD AUTO: 6.2 %
MCH RBC QN AUTO: 29 PG (ref 26.5–33)
MCHC RBC AUTO-ENTMCNC: 31.2 G/DL (ref 31.5–36.5)
MCV RBC AUTO: 93.2 FL (ref 78–100)
MONOCYTES # BLD AUTO: 0.32 10E3/UL (ref 0–1.3)
MONOCYTES NFR BLD AUTO: 8.2 %
NEUTROPHILS # BLD AUTO: 2.99 10E3/UL (ref 1.6–8.3)
NEUTROPHILS NFR BLD AUTO: 77 %
NRBC # BLD AUTO: <0.03 10E3/UL
NRBC BLD AUTO-RTO: 0 /100
PLATELET # BLD AUTO: 98 10E3/UL (ref 150–450)
POTASSIUM SERPL-SCNC: 4.2 MMOL/L (ref 3.4–5.3)
PROT SERPL-MCNC: 6.3 G/DL (ref 6.4–8.3)
RBC # BLD AUTO: 4.1 10E6/UL (ref 3.8–5.2)
SODIUM SERPL-SCNC: 138 MMOL/L (ref 135–145)
SPECIMEN TYPE: NORMAL
T CELL EXTENDED COMMENT: ABNORMAL
WBC # BLD AUTO: 3.88 10E3/UL (ref 4–11)

## 2025-08-25 PROCEDURE — 83615 LACTATE (LD) (LDH) ENZYME: CPT | Performed by: INTERNAL MEDICINE

## 2025-08-25 PROCEDURE — 36591 DRAW BLOOD OFF VENOUS DEVICE: CPT

## 2025-08-25 PROCEDURE — 86359 T CELLS TOTAL COUNT: CPT | Performed by: INTERNAL MEDICINE

## 2025-08-25 PROCEDURE — 85004 AUTOMATED DIFF WBC COUNT: CPT | Performed by: INTERNAL MEDICINE

## 2025-08-25 PROCEDURE — 250N000009 HC RX 250: Performed by: FAMILY MEDICINE

## 2025-08-25 PROCEDURE — 94642 AEROSOL INHALATION TREATMENT: CPT

## 2025-08-25 PROCEDURE — 250N000011 HC RX IP 250 OP 636: Performed by: INTERNAL MEDICINE

## 2025-08-25 PROCEDURE — 84100 ASSAY OF PHOSPHORUS: CPT | Performed by: INTERNAL MEDICINE

## 2025-08-25 PROCEDURE — 84155 ASSAY OF PROTEIN SERUM: CPT | Performed by: INTERNAL MEDICINE

## 2025-08-25 PROCEDURE — 82787 IGG 1 2 3 OR 4 EACH: CPT | Performed by: INTERNAL MEDICINE

## 2025-08-25 RX ORDER — HEPARIN SODIUM (PORCINE) LOCK FLUSH IV SOLN 100 UNIT/ML 100 UNIT/ML
5 SOLUTION INTRAVENOUS
OUTPATIENT
Start: 2025-08-25

## 2025-08-25 RX ORDER — HEPARIN SODIUM (PORCINE) LOCK FLUSH IV SOLN 100 UNIT/ML 100 UNIT/ML
5 SOLUTION INTRAVENOUS
Status: DISCONTINUED | OUTPATIENT
Start: 2025-08-25 | End: 2025-08-25 | Stop reason: HOSPADM

## 2025-08-25 RX ORDER — PENTAMIDINE ISETHIONATE 300 MG/300MG
300 INHALANT RESPIRATORY (INHALATION)
Status: COMPLETED | OUTPATIENT
Start: 2025-08-25 | End: 2025-08-25

## 2025-08-25 RX ORDER — HEPARIN SODIUM,PORCINE 10 UNIT/ML
5-20 VIAL (ML) INTRAVENOUS DAILY PRN
OUTPATIENT
Start: 2025-08-25

## 2025-08-25 RX ORDER — ALBUTEROL SULFATE 0.83 MG/ML
2.5 SOLUTION RESPIRATORY (INHALATION)
Status: COMPLETED | OUTPATIENT
Start: 2025-08-25 | End: 2025-08-25

## 2025-08-25 RX ADMIN — ALBUTEROL SULFATE 2.5 MG: 2.5 SOLUTION RESPIRATORY (INHALATION) at 10:30

## 2025-08-25 RX ADMIN — HEPARIN 5 ML: 100 SYRINGE at 08:14

## 2025-08-25 RX ADMIN — PENTAMIDINE ISETHIONATE 300 MG: 300 INHALANT RESPIRATORY (INHALATION) at 10:30

## 2025-08-26 ENCOUNTER — HOME INFUSION BILLING (OUTPATIENT)
Dept: HOME HEALTH SERVICES | Facility: HOME HEALTH | Age: 60
End: 2025-08-26
Payer: COMMERCIAL

## 2025-08-26 LAB
ALBUMIN SERPL BCG-MCNC: 3.4 G/DL (ref 3.5–5.2)
ANION GAP SERPL CALCULATED.3IONS-SCNC: 11 MMOL/L (ref 7–15)
BUN SERPL-MCNC: 26.3 MG/DL (ref 8–23)
CALCIUM SERPL-MCNC: 8.9 MG/DL (ref 8.8–10.4)
CHLORIDE SERPL-SCNC: 107 MMOL/L (ref 98–107)
CREAT SERPL-MCNC: 2.39 MG/DL (ref 0.51–0.95)
EGFRCR SERPLBLD CKD-EPI 2021: 23 ML/MIN/1.73M2
GLUCOSE SERPL-MCNC: 84 MG/DL (ref 70–99)
HCO3 SERPL-SCNC: 23 MMOL/L (ref 22–29)
IGG SERPL-MCNC: 768 MG/DL (ref 610–1616)
IGG1 SER-MCNC: 424 MG/DL (ref 382–929)
IGG2 SER-MCNC: 259 MG/DL (ref 242–700)
IGG3 SER-MCNC: 28 MG/DL (ref 22–176)
IGG4 SER-MCNC: 17 MG/DL (ref 4–86)
PHOSPHATE SERPL-MCNC: 3.6 MG/DL (ref 2.5–4.5)
POTASSIUM SERPL-SCNC: 4.4 MMOL/L (ref 3.4–5.3)
SODIUM SERPL-SCNC: 141 MMOL/L (ref 135–145)

## (undated) RX ORDER — SIMETHICONE 40MG/0.6ML
SUSPENSION, DROPS(FINAL DOSAGE FORM)(ML) ORAL
Status: DISPENSED
Start: 2017-01-31

## (undated) RX ORDER — FENTANYL CITRATE 50 UG/ML
INJECTION, SOLUTION INTRAMUSCULAR; INTRAVENOUS
Status: DISPENSED
Start: 2017-01-31

## (undated) RX ORDER — ALBUTEROL SULFATE 0.83 MG/ML
SOLUTION RESPIRATORY (INHALATION)
Status: DISPENSED
Start: 2024-06-28

## (undated) RX ORDER — ONDANSETRON 2 MG/ML
INJECTION INTRAMUSCULAR; INTRAVENOUS
Status: DISPENSED
Start: 2024-01-09

## (undated) RX ORDER — LIDOCAINE HYDROCHLORIDE 10 MG/ML
INJECTION, SOLUTION INFILTRATION; PERINEURAL
Status: DISPENSED
Start: 2024-01-09

## (undated) RX ORDER — HEPARIN SODIUM (PORCINE) LOCK FLUSH IV SOLN 100 UNIT/ML 100 UNIT/ML
SOLUTION INTRAVENOUS
Status: DISPENSED
Start: 2017-03-22

## (undated) RX ORDER — FENTANYL CITRATE 50 UG/ML
INJECTION, SOLUTION INTRAMUSCULAR; INTRAVENOUS
Status: DISPENSED
Start: 2017-03-22

## (undated) RX ORDER — FENTANYL CITRATE 50 UG/ML
INJECTION, SOLUTION INTRAMUSCULAR; INTRAVENOUS
Status: DISPENSED
Start: 2024-01-09

## (undated) RX ORDER — HEPARIN SODIUM (PORCINE) LOCK FLUSH IV SOLN 100 UNIT/ML 100 UNIT/ML
SOLUTION INTRAVENOUS
Status: DISPENSED
Start: 2024-01-09

## (undated) RX ORDER — PENTAMIDINE ISETHIONATE 300 MG/300MG
INHALANT RESPIRATORY (INHALATION)
Status: DISPENSED
Start: 2024-06-28

## (undated) RX ORDER — DIPHENHYDRAMINE HYDROCHLORIDE 50 MG/ML
INJECTION INTRAMUSCULAR; INTRAVENOUS
Status: DISPENSED
Start: 2017-01-31

## (undated) RX ORDER — HEPARIN SODIUM (PORCINE) LOCK FLUSH IV SOLN 100 UNIT/ML 100 UNIT/ML
SOLUTION INTRAVENOUS
Status: DISPENSED
Start: 2025-04-02

## (undated) RX ORDER — HEPARIN SODIUM (PORCINE) LOCK FLUSH IV SOLN 100 UNIT/ML 100 UNIT/ML
SOLUTION INTRAVENOUS
Status: DISPENSED
Start: 2017-01-31

## (undated) RX ORDER — HEPARIN SODIUM (PORCINE) LOCK FLUSH IV SOLN 100 UNIT/ML 100 UNIT/ML
SOLUTION INTRAVENOUS
Status: DISPENSED
Start: 2023-12-29

## (undated) RX ORDER — DIPHENHYDRAMINE HYDROCHLORIDE 50 MG/ML
INJECTION INTRAMUSCULAR; INTRAVENOUS
Status: DISPENSED
Start: 2017-03-22